# Patient Record
Sex: FEMALE | Race: WHITE | Employment: OTHER | URBAN - METROPOLITAN AREA
[De-identification: names, ages, dates, MRNs, and addresses within clinical notes are randomized per-mention and may not be internally consistent; named-entity substitution may affect disease eponyms.]

---

## 2017-12-27 PROBLEM — I48.91 A-FIB (HCC): Status: ACTIVE | Noted: 2017-12-27

## 2018-03-10 ENCOUNTER — HOSPITAL ENCOUNTER (EMERGENCY)
Dept: GENERAL RADIOLOGY | Age: 78
Discharge: HOME | End: 2018-03-12
Payer: MEDICARE

## 2018-03-10 ENCOUNTER — HOSPITAL ENCOUNTER (EMERGENCY)
Age: 78
Discharge: HOME | End: 2018-03-10
Attending: FAMILY MEDICINE
Payer: MEDICARE

## 2018-03-10 VITALS
SYSTOLIC BLOOD PRESSURE: 148 MMHG | TEMPERATURE: 98 F | RESPIRATION RATE: 18 BRPM | DIASTOLIC BLOOD PRESSURE: 82 MMHG | HEIGHT: 63 IN | BODY MASS INDEX: 33.66 KG/M2 | WEIGHT: 190 LBS | OXYGEN SATURATION: 99 % | HEART RATE: 100 BPM

## 2018-03-10 DIAGNOSIS — S93.401A MODERATE RIGHT ANKLE SPRAIN, INITIAL ENCOUNTER: Primary | ICD-10-CM

## 2018-03-10 PROCEDURE — 73610 X-RAY EXAM OF ANKLE: CPT

## 2018-03-10 PROCEDURE — 99283 EMERGENCY DEPT VISIT LOW MDM: CPT

## 2018-03-10 ASSESSMENT — PAIN SCALES - GENERAL
PAINLEVEL_OUTOF10: 10
PAINLEVEL_OUTOF10: 4

## 2018-03-10 ASSESSMENT — PAIN DESCRIPTION - ORIENTATION
ORIENTATION: RIGHT
ORIENTATION: RIGHT

## 2018-03-10 ASSESSMENT — PAIN DESCRIPTION - LOCATION
LOCATION: ANKLE;FOOT
LOCATION: ANKLE;FOOT

## 2018-03-10 ASSESSMENT — PAIN DESCRIPTION - PAIN TYPE
TYPE: ACUTE PAIN
TYPE: ACUTE PAIN

## 2018-03-10 ASSESSMENT — PAIN DESCRIPTION - DESCRIPTORS
DESCRIPTORS: ACHING
DESCRIPTORS: ACHING;THROBBING

## 2018-03-10 NOTE — ED PROVIDER NOTES
Department of Emergency Medicine   ED  Provider Note  Admit Date/RoomTime: 3/10/2018 12:22 PM  ED Room: 07/07   Chief Complaint:   Ankle Pain (right foot and ankle pain. No known injury.)    History of Present Illness   Source of history provided by:  patient and spouse/SO. History/Exam Limitations: none. Yessica Butler is a 68 y.o. old female presenting to the emergency department by private vehicle and ambulatory, for Right ankle pain which occured 1 day(s) prior to arrival.  Cause of complaint: unknown while at home. There has been a history of no prior problems with this area in the past.  Since onset the symptoms have been moderate in degree with ability to bear weight, but with some pain. Her pain is aggraveated by walking and relieved by rest.  Tetanus Status: up to date. ROS    Pertinent positives and negatives are stated within HPI, all other systems reviewed and are negative. Past Surgical History:   Procedure Laterality Date    APPENDECTOMY      BACK SURGERY      BREAST SURGERY      reduction    CARPAL TUNNEL RELEASE      CATARACT REMOVAL  10/2016    CERVICAL DISCECTOMY  01/19/2007    ACDF C3-4, C4-5, C5-6 Dr. Jalen Montoya          FRACTURE SURGERY      left lower leg    HYSTERECTOMY      LAPAROSCOPY  05/17/2017    [facility]   Social History:  reports that she quit smoking about 21 years ago. She quit after 15.00 years of use. She has never used smokeless tobacco. She reports that she does not drink alcohol or use drugs. Family History: family history includes Cancer in her brother; Diabetes in her brother, mother, and sister; Heart Disease in her brother; High Blood Pressure in her brother and sister; Stroke in her father. Allergies: Benadryl [diphenhydramine hcl]; Benadryl [diphenhydramine];  Fish-derived products; and Iodine    Physical Exam           ED Triage Vitals   BP Temp Temp Source Pulse Resp SpO2 Height Weight   03/10/18 1228 03/10/18 1228 03/10/18 1228 03/10/18 1228 -- 03/10/18 1228 03/10/18 1226 03/10/18 1226   (!) 160/82 98.4 °F (36.9 °C) Oral 100  97 % 5' 3\" (1.6 m) 190 lb (86.2 kg)       Oxygen Saturation Interpretation: Normal.    Constitutional:  Alert, development consistent with age. Neck:  Normal ROM. Supple. Ankle:  Right Lateral:              Tenderness:  moderate. Swelling: Moderate. Deformity: no.             ROM: diminished range with pain. Skin:  no erythema, rash or wounds noted. Neurovascular: Motor deficit: none. Sensory deficit:   none. Pulse deficit: none. Capillary refill: normal.  Knee:              Tenderness:  none. Swelling: None. Deformity: no.             ROM: full range of motion. Skin:  no erythema, rash or wounds noted. Foot:              Tenderness:  none. Swelling: None. Deformity: no.             ROM: full range of motion. Skin:  no erythema, rash or wounds noted. Gait:  normal.   Lymphatics: No lymphangitis or adenopathy noted. Neurological:  Oriented. Motor functions intact. Lab / Imaging Results   (All laboratory and radiology results have been personally reviewed by myself)  Labs:  No results found for this visit on 03/10/18. Imaging: All Radiology results interpreted by Radiologist unless otherwise noted. XR ANKLE RIGHT (MIN 3 VIEWS)   Final Result      1. Remote/old fracture is seen involving medial malleolus with   adjacent well-corticated fracture fragment. 2. No acute fracture or dislocation. ED Course / Medical Decision Making   Medications - No data to display  ED Course      Consults:   None    Procedure(s):   none    MDM:       Films were obtained based on moderate suspicion for bony injury as per history/physical findings .  Plan is subsequently for symptom

## 2018-03-10 NOTE — ED NOTES
Nurse instructed the pt on homecare of the right ankle sprain using the ED discharge summary. The pt was taught the acronym RICE and indicates that she understands the teaching. ACE wrap applied to the right foot/ankle with air cast in place. The pt can wiggle her toes comfortably and has no numbness.      Two Oolitic Yerington, RN  03/10/18 3368

## 2018-03-12 ENCOUNTER — CARE COORDINATION (OUTPATIENT)
Dept: CARE COORDINATION | Age: 78
End: 2018-03-12

## 2018-03-13 ENCOUNTER — TELEPHONE (OUTPATIENT)
Dept: CARDIOLOGY CLINIC | Age: 78
End: 2018-03-13

## 2018-03-16 NOTE — TELEPHONE ENCOUNTER
Per Dr. Sherita Whelan, there is a high risk of CVA with stopping or interrupting Xarelto. Preference is not to do this.

## 2018-04-05 ENCOUNTER — HOSPITAL ENCOUNTER (OUTPATIENT)
Age: 78
Discharge: HOME | End: 2018-04-05
Payer: MEDICARE

## 2018-04-05 LAB
ALBUMIN SERPL-MCNC: 4.3 G/DL (ref 3.5–5.2)
ALP BLD-CCNC: 60 U/L (ref 35–104)
ALT SERPL-CCNC: 20 U/L (ref 0–32)
ANION GAP SERPL CALCULATED.3IONS-SCNC: 15 MMOL/L (ref 7–16)
AST SERPL-CCNC: 27 U/L (ref 0–31)
BASOPHILS ABSOLUTE: 0.06 E9/L (ref 0–0.2)
BASOPHILS RELATIVE PERCENT: 0.5 % (ref 0–2)
BILIRUB SERPL-MCNC: 0.5 MG/DL (ref 0–1.2)
BUN BLDV-MCNC: 8 MG/DL (ref 8–23)
CALCIUM SERPL-MCNC: 9.9 MG/DL (ref 8.6–10.2)
CHLORIDE BLD-SCNC: 99 MMOL/L (ref 98–107)
CO2: 29 MMOL/L (ref 22–29)
CREAT SERPL-MCNC: 0.5 MG/DL (ref 0.5–1)
EOSINOPHILS ABSOLUTE: 0.31 E9/L (ref 0.05–0.5)
EOSINOPHILS RELATIVE PERCENT: 2.5 % (ref 0–6)
GFR AFRICAN AMERICAN: >60
GFR NON-AFRICAN AMERICAN: >60 ML/MIN/1.73
GLUCOSE BLD-MCNC: 106 MG/DL (ref 74–109)
HCT VFR BLD CALC: 38.8 % (ref 34–48)
HEMOGLOBIN: 12.1 G/DL (ref 11.5–15.5)
IMMATURE GRANULOCYTES #: 0.07 E9/L
IMMATURE GRANULOCYTES %: 0.6 % (ref 0–5)
INR BLD: 2.1
LYMPHOCYTES ABSOLUTE: 3.15 E9/L (ref 1.5–4)
LYMPHOCYTES RELATIVE PERCENT: 25.9 % (ref 20–42)
MCH RBC QN AUTO: 28.2 PG (ref 26–35)
MCHC RBC AUTO-ENTMCNC: 31.2 % (ref 32–34.5)
MCV RBC AUTO: 90.4 FL (ref 80–99.9)
MONOCYTES ABSOLUTE: 0.64 E9/L (ref 0.1–0.95)
MONOCYTES RELATIVE PERCENT: 5.3 % (ref 2–12)
NEUTROPHILS ABSOLUTE: 7.93 E9/L (ref 1.8–7.3)
NEUTROPHILS RELATIVE PERCENT: 65.2 % (ref 43–80)
PDW BLD-RTO: 14.6 FL (ref 11.5–15)
PLATELET # BLD: 268 E9/L (ref 130–450)
PMV BLD AUTO: 10.2 FL (ref 7–12)
POTASSIUM SERPL-SCNC: 3.7 MMOL/L (ref 3.5–5)
PROTHROMBIN TIME: 24.5 SEC (ref 9.3–12.4)
RBC # BLD: 4.29 E12/L (ref 3.5–5.5)
SODIUM BLD-SCNC: 143 MMOL/L (ref 132–146)
TOTAL PROTEIN: 7.8 G/DL (ref 6.4–8.3)
WBC # BLD: 12.2 E9/L (ref 4.5–11.5)

## 2018-04-05 PROCEDURE — 85025 COMPLETE CBC W/AUTO DIFF WBC: CPT

## 2018-04-05 PROCEDURE — 80053 COMPREHEN METABOLIC PANEL: CPT

## 2018-04-05 PROCEDURE — 85610 PROTHROMBIN TIME: CPT

## 2018-04-05 PROCEDURE — 82105 ALPHA-FETOPROTEIN SERUM: CPT

## 2018-04-05 PROCEDURE — 36415 COLL VENOUS BLD VENIPUNCTURE: CPT

## 2018-04-07 LAB — AFP-TUMOR MARKER: 2 NG/ML (ref 0–9)

## 2018-10-31 ENCOUNTER — HOSPITAL ENCOUNTER (EMERGENCY)
Age: 78
Discharge: HOME | End: 2018-10-31
Attending: EMERGENCY MEDICINE
Payer: MEDICARE

## 2018-10-31 VITALS
SYSTOLIC BLOOD PRESSURE: 112 MMHG | HEIGHT: 63 IN | TEMPERATURE: 98 F | RESPIRATION RATE: 16 BRPM | OXYGEN SATURATION: 94 % | HEART RATE: 98 BPM | WEIGHT: 186 LBS | DIASTOLIC BLOOD PRESSURE: 56 MMHG | BODY MASS INDEX: 32.96 KG/M2

## 2018-10-31 DIAGNOSIS — D64.89 ANEMIA DUE TO OTHER CAUSE, NOT CLASSIFIED: ICD-10-CM

## 2018-10-31 DIAGNOSIS — M79.89 LEG SWELLING: Primary | ICD-10-CM

## 2018-10-31 LAB
ALBUMIN SERPL-MCNC: 4.1 G/DL (ref 3.5–5.2)
ALP BLD-CCNC: 64 U/L (ref 35–104)
ALT SERPL-CCNC: 12 U/L (ref 0–32)
ANION GAP SERPL CALCULATED.3IONS-SCNC: 18 MMOL/L (ref 7–16)
AST SERPL-CCNC: 17 U/L (ref 0–31)
BILIRUB SERPL-MCNC: 0.7 MG/DL (ref 0–1.2)
BUN BLDV-MCNC: 10 MG/DL (ref 8–23)
CALCIUM SERPL-MCNC: 9.2 MG/DL (ref 8.6–10.2)
CHLORIDE BLD-SCNC: 97 MMOL/L (ref 98–107)
CO2: 23 MMOL/L (ref 22–29)
CREAT SERPL-MCNC: 1.1 MG/DL (ref 0.5–1)
D DIMER: 281 NG/ML DDU
GFR AFRICAN AMERICAN: 58
GFR NON-AFRICAN AMERICAN: 48 ML/MIN/1.73
GLUCOSE BLD-MCNC: 144 MG/DL (ref 74–109)
POTASSIUM SERPL-SCNC: 4 MMOL/L (ref 3.5–5)
SODIUM BLD-SCNC: 138 MMOL/L (ref 132–146)
TOTAL PROTEIN: 7.5 G/DL (ref 6.4–8.3)

## 2018-10-31 PROCEDURE — 85378 FIBRIN DEGRADE SEMIQUANT: CPT

## 2018-10-31 PROCEDURE — 80053 COMPREHEN METABOLIC PANEL: CPT

## 2018-10-31 PROCEDURE — 99283 EMERGENCY DEPT VISIT LOW MDM: CPT

## 2018-10-31 PROCEDURE — 36415 COLL VENOUS BLD VENIPUNCTURE: CPT

## 2018-10-31 PROCEDURE — 96372 THER/PROPH/DIAG INJ SC/IM: CPT

## 2018-10-31 PROCEDURE — 6360000002 HC RX W HCPCS: Performed by: EMERGENCY MEDICINE

## 2018-10-31 PROCEDURE — 85025 COMPLETE CBC W/AUTO DIFF WBC: CPT

## 2018-10-31 RX ORDER — KETOROLAC TROMETHAMINE 30 MG/ML
60 INJECTION, SOLUTION INTRAMUSCULAR; INTRAVENOUS ONCE
Status: COMPLETED | OUTPATIENT
Start: 2018-10-31 | End: 2018-10-31

## 2018-10-31 RX ADMIN — KETOROLAC TROMETHAMINE 60 MG: 30 INJECTION, SOLUTION INTRAMUSCULAR at 19:55

## 2018-10-31 ASSESSMENT — PAIN SCALES - GENERAL
PAINLEVEL_OUTOF10: 9
PAINLEVEL_OUTOF10: 9

## 2018-10-31 ASSESSMENT — PAIN DESCRIPTION - ORIENTATION: ORIENTATION: LEFT;RIGHT

## 2018-10-31 ASSESSMENT — PAIN DESCRIPTION - LOCATION: LOCATION: LEG

## 2018-10-31 ASSESSMENT — PAIN DESCRIPTION - PAIN TYPE: TYPE: ACUTE PAIN

## 2018-10-31 NOTE — ED PROVIDER NOTES
U/L    AST 17 0 - 31 U/L   D-Dimer, Quantitative   Result Value Ref Range    D-Dimer, Quant 281 ng/mL DDU       RADIOLOGY:  Interpreted by Radiologist.  No orders to display       ------------------------- NURSING NOTES AND VITALS REVIEWED ---------------------------   The nursing notes within the ED encounter and vital signs as below have been reviewed. BP (!) 112/56   Pulse 98   Temp 98 °F (36.7 °C) (Oral)   Resp 16   Ht 5' 3\" (1.6 m)   Wt 186 lb (84.4 kg)   LMP  (LMP Unknown)   SpO2 94%   BMI 32.95 kg/m²   Oxygen Saturation Interpretation: Abnormal    ---------------------------------------------------PHYSICAL EXAM--------------------------------------    Constitutional/General: Alert and oriented x3, well appearing, non toxic in NAD  Head: NC/AT  Eyes: PERRL, EOMI  Mouth: Oropharynx clear, handling secretions  Neck: Supple, full ROM, no tenderness   Pulmonary: Lungs clear to auscultation bilaterally, no wheezes, rales, or rhonchi. Not in respiratory distress  Cardiovascular:  Regular rate and regular rhythm, no murmurs, gallops, or rubs. 2+ distal pulses  Abdomen: Soft, non tender, non distended. No guarding or rebound. Extremities: Moves all extremities x 4. Warm and well perfused. LLE slightly swollen but is baseline due to recent tibia and fibula fracture 3 yrs ago. RLE exhibits mild edema. Skin: warm and dry without rash. Skin intact. Neurologic: No focal deficits. Cranial nerves II-XII grossly intact. 5/5 muscle strength to extremities x 4. Psych: Normal Affect      ------------------------------ ED COURSE/MEDICAL DECISION MAKING----------------------  Medications   ketorolac (TORADOL) injection 60 mg (60 mg Intramuscular Given 10/31/18 1955)       Medical Decision Making:     Pt with Hx of Arthritis, C-spine and L-spine stenosis, DM, HTN, HLD, neck and back pain, presents to the ED due to right lower extremity edema that began 3 days ago.  Pt has chronic edema and soreness in the left lower

## 2018-11-01 ENCOUNTER — HOSPITAL ENCOUNTER (OUTPATIENT)
Dept: ULTRASOUND IMAGING | Age: 78
Discharge: HOME | End: 2018-11-03
Payer: MEDICARE

## 2018-11-01 DIAGNOSIS — M79.89 PAIN AND SWELLING OF RIGHT LOWER LEG: ICD-10-CM

## 2018-11-01 DIAGNOSIS — M79.661 PAIN AND SWELLING OF RIGHT LOWER LEG: ICD-10-CM

## 2018-11-01 LAB
ANISOCYTOSIS: ABNORMAL
BASOPHILS ABSOLUTE: 0.05 E9/L (ref 0–0.2)
BASOPHILS RELATIVE PERCENT: 0.4 % (ref 0–2)
EOSINOPHILS ABSOLUTE: 0.45 E9/L (ref 0.05–0.5)
EOSINOPHILS RELATIVE PERCENT: 3.5 % (ref 0–6)
HCT VFR BLD CALC: 24.8 % (ref 34–48)
HEMOGLOBIN: 6.7 G/DL (ref 11.5–15.5)
HYPOCHROMIA: ABNORMAL
IMMATURE GRANULOCYTES #: 0.07 E9/L
IMMATURE GRANULOCYTES %: 0.5 % (ref 0–5)
LYMPHOCYTES ABSOLUTE: 2.39 E9/L (ref 1.5–4)
LYMPHOCYTES RELATIVE PERCENT: 18.6 % (ref 20–42)
MCH RBC QN AUTO: 19.5 PG (ref 26–35)
MCHC RBC AUTO-ENTMCNC: 27 % (ref 32–34.5)
MCV RBC AUTO: 72.3 FL (ref 80–99.9)
MONOCYTES ABSOLUTE: 0.62 E9/L (ref 0.1–0.95)
MONOCYTES RELATIVE PERCENT: 4.8 % (ref 2–12)
NEUTROPHILS ABSOLUTE: 9.27 E9/L (ref 1.8–7.3)
NEUTROPHILS RELATIVE PERCENT: 72.2 % (ref 43–80)
OVALOCYTES: ABNORMAL
PDW BLD-RTO: 18.3 FL (ref 11.5–15)
PLATELET # BLD: 364 E9/L (ref 130–450)
PMV BLD AUTO: 9 FL (ref 7–12)
POIKILOCYTES: ABNORMAL
POLYCHROMASIA: ABNORMAL
RBC # BLD: 3.43 E12/L (ref 3.5–5.5)
SCHISTOCYTES: ABNORMAL
WBC # BLD: 12.9 E9/L (ref 4.5–11.5)

## 2018-11-01 PROCEDURE — 93971 EXTREMITY STUDY: CPT

## 2018-11-01 NOTE — ED NOTES
Instructions provided and questions answered. US Rx provided for outpt ultrasound. 2+ pitting edema bilat lower extrems. Pt stated right worse than left.      Clara Faulkner RN  10/31/18 [id number]

## 2018-11-02 ENCOUNTER — HOSPITAL ENCOUNTER (INPATIENT)
Age: 78
LOS: 4 days | Discharge: HOME | DRG: 378 | End: 2018-11-06
Attending: INTERNAL MEDICINE | Admitting: INTERNAL MEDICINE
Payer: MEDICARE

## 2018-11-02 PROBLEM — D64.9 ANEMIA: Status: ACTIVE | Noted: 2018-11-02

## 2018-11-02 LAB
ABO/RH: NORMAL
ANTIBODY SCREEN: NORMAL
METER GLUCOSE: 113 MG/DL (ref 70–110)

## 2018-11-02 PROCEDURE — 86901 BLOOD TYPING SEROLOGIC RH(D): CPT

## 2018-11-02 PROCEDURE — 1200000000 HC SEMI PRIVATE

## 2018-11-02 PROCEDURE — 86850 RBC ANTIBODY SCREEN: CPT

## 2018-11-02 PROCEDURE — 86900 BLOOD TYPING SEROLOGIC ABO: CPT

## 2018-11-02 PROCEDURE — 86920 COMPATIBILITY TEST SPIN: CPT

## 2018-11-02 PROCEDURE — 82962 GLUCOSE BLOOD TEST: CPT

## 2018-11-02 PROCEDURE — 36415 COLL VENOUS BLD VENIPUNCTURE: CPT

## 2018-11-02 RX ORDER — SODIUM CHLORIDE 0.9 % (FLUSH) 0.9 %
10 SYRINGE (ML) INJECTION PRN
Status: DISCONTINUED | OUTPATIENT
Start: 2018-11-02 | End: 2018-11-06 | Stop reason: HOSPADM

## 2018-11-02 RX ORDER — 0.9 % SODIUM CHLORIDE 0.9 %
250 INTRAVENOUS SOLUTION INTRAVENOUS ONCE
Status: COMPLETED | OUTPATIENT
Start: 2018-11-02 | End: 2018-11-03

## 2018-11-02 RX ORDER — OXYBUTYNIN CHLORIDE 5 MG/1
5 TABLET ORAL 2 TIMES DAILY
Status: DISCONTINUED | OUTPATIENT
Start: 2018-11-02 | End: 2018-11-06 | Stop reason: HOSPADM

## 2018-11-02 RX ORDER — GABAPENTIN 300 MG/1
300 CAPSULE ORAL 3 TIMES DAILY
Status: DISCONTINUED | OUTPATIENT
Start: 2018-11-02 | End: 2018-11-06 | Stop reason: HOSPADM

## 2018-11-02 RX ORDER — PRAMIPEXOLE DIHYDROCHLORIDE 0.25 MG/1
0.25 TABLET ORAL NIGHTLY
Status: DISCONTINUED | OUTPATIENT
Start: 2018-11-02 | End: 2018-11-03

## 2018-11-02 RX ORDER — ONDANSETRON 2 MG/ML
4 INJECTION INTRAMUSCULAR; INTRAVENOUS EVERY 6 HOURS PRN
Status: DISCONTINUED | OUTPATIENT
Start: 2018-11-02 | End: 2018-11-06 | Stop reason: HOSPADM

## 2018-11-02 RX ORDER — SODIUM CHLORIDE 0.9 % (FLUSH) 0.9 %
10 SYRINGE (ML) INJECTION EVERY 12 HOURS SCHEDULED
Status: DISCONTINUED | OUTPATIENT
Start: 2018-11-02 | End: 2018-11-06 | Stop reason: HOSPADM

## 2018-11-02 RX ORDER — SIMVASTATIN 20 MG
20 TABLET ORAL NIGHTLY
Status: DISCONTINUED | OUTPATIENT
Start: 2018-11-02 | End: 2018-11-06 | Stop reason: HOSPADM

## 2018-11-02 RX ORDER — AMLODIPINE BESYLATE 10 MG/1
10 TABLET ORAL NIGHTLY
Status: DISCONTINUED | OUTPATIENT
Start: 2018-11-02 | End: 2018-11-06 | Stop reason: HOSPADM

## 2018-11-02 ASSESSMENT — PAIN SCALES - GENERAL: PAINLEVEL_OUTOF10: 0

## 2018-11-03 ENCOUNTER — ANESTHESIA EVENT (OUTPATIENT)
Dept: ENDOSCOPY | Age: 78
DRG: 378 | End: 2018-11-03
Payer: MEDICARE

## 2018-11-03 ENCOUNTER — ANESTHESIA (OUTPATIENT)
Dept: ENDOSCOPY | Age: 78
DRG: 378 | End: 2018-11-03
Payer: MEDICARE

## 2018-11-03 LAB
ALBUMIN SERPL-MCNC: 4.5 G/DL (ref 3.5–5.2)
ALP BLD-CCNC: 64 U/L (ref 35–104)
ALT SERPL-CCNC: 13 U/L (ref 0–32)
ANISOCYTOSIS: ABNORMAL
AST SERPL-CCNC: 15 U/L (ref 0–31)
BASOPHILS ABSOLUTE: 0.05 E9/L (ref 0–0.2)
BASOPHILS RELATIVE PERCENT: 0.4 % (ref 0–2)
BILIRUB SERPL-MCNC: 1.7 MG/DL (ref 0–1.2)
BILIRUBIN DIRECT: 0.4 MG/DL (ref 0–0.3)
BILIRUBIN, INDIRECT: 1.3 MG/DL (ref 0–1)
BLOOD BANK DISPENSE STATUS: NORMAL
BLOOD BANK PRODUCT CODE: NORMAL
BPU ID: NORMAL
DESCRIPTION BLOOD BANK: NORMAL
EOSINOPHILS ABSOLUTE: 0.15 E9/L (ref 0.05–0.5)
EOSINOPHILS RELATIVE PERCENT: 1.2 % (ref 0–6)
HCT VFR BLD CALC: 29 % (ref 34–48)
HCT VFR BLD CALC: 31.1 % (ref 34–48)
HEMOGLOBIN: 8.8 G/DL (ref 11.5–15.5)
HEMOGLOBIN: 8.9 G/DL (ref 11.5–15.5)
HYPOCHROMIA: ABNORMAL
IMMATURE GRANULOCYTES #: 0.07 E9/L
IMMATURE GRANULOCYTES %: 0.6 % (ref 0–5)
INR BLD: 1.4
LIPASE: 16 U/L (ref 13–60)
LYMPHOCYTES ABSOLUTE: 1.42 E9/L (ref 1.5–4)
LYMPHOCYTES RELATIVE PERCENT: 11.6 % (ref 20–42)
MCH RBC QN AUTO: 21.2 PG (ref 26–35)
MCHC RBC AUTO-ENTMCNC: 28.6 % (ref 32–34.5)
MCV RBC AUTO: 74.2 FL (ref 80–99.9)
METER GLUCOSE: 103 MG/DL (ref 70–110)
METER GLUCOSE: 113 MG/DL (ref 70–110)
METER GLUCOSE: 122 MG/DL (ref 70–110)
METER GLUCOSE: 161 MG/DL (ref 70–110)
METER GLUCOSE: 169 MG/DL (ref 70–110)
MONOCYTES ABSOLUTE: 0.57 E9/L (ref 0.1–0.95)
MONOCYTES RELATIVE PERCENT: 4.6 % (ref 2–12)
NEUTROPHILS ABSOLUTE: 10.02 E9/L (ref 1.8–7.3)
NEUTROPHILS RELATIVE PERCENT: 81.6 % (ref 43–80)
OVALOCYTES: ABNORMAL
PDW BLD-RTO: 19.7 FL (ref 11.5–15)
PLATELET # BLD: 375 E9/L (ref 130–450)
PMV BLD AUTO: 9.7 FL (ref 7–12)
POIKILOCYTES: ABNORMAL
POLYCHROMASIA: ABNORMAL
PROTHROMBIN TIME: 15.8 SEC (ref 9.3–12.4)
RBC # BLD: 4.19 E12/L (ref 3.5–5.5)
TOTAL PROTEIN: 7.9 G/DL (ref 6.4–8.3)
WBC # BLD: 12.3 E9/L (ref 4.5–11.5)

## 2018-11-03 PROCEDURE — 6360000002 HC RX W HCPCS: Performed by: NURSE PRACTITIONER

## 2018-11-03 PROCEDURE — 36415 COLL VENOUS BLD VENIPUNCTURE: CPT

## 2018-11-03 PROCEDURE — 6370000000 HC RX 637 (ALT 250 FOR IP): Performed by: INTERNAL MEDICINE

## 2018-11-03 PROCEDURE — 1200000000 HC SEMI PRIVATE

## 2018-11-03 PROCEDURE — 83690 ASSAY OF LIPASE: CPT

## 2018-11-03 PROCEDURE — 6360000002 HC RX W HCPCS: Performed by: INTERNAL MEDICINE

## 2018-11-03 PROCEDURE — 80076 HEPATIC FUNCTION PANEL: CPT

## 2018-11-03 PROCEDURE — 85018 HEMOGLOBIN: CPT

## 2018-11-03 PROCEDURE — P9016 RBC LEUKOCYTES REDUCED: HCPCS

## 2018-11-03 PROCEDURE — 82962 GLUCOSE BLOOD TEST: CPT

## 2018-11-03 PROCEDURE — 85025 COMPLETE CBC W/AUTO DIFF WBC: CPT

## 2018-11-03 PROCEDURE — 2580000003 HC RX 258: Performed by: INTERNAL MEDICINE

## 2018-11-03 PROCEDURE — C9113 INJ PANTOPRAZOLE SODIUM, VIA: HCPCS | Performed by: NURSE PRACTITIONER

## 2018-11-03 PROCEDURE — 85610 PROTHROMBIN TIME: CPT

## 2018-11-03 PROCEDURE — 85014 HEMATOCRIT: CPT

## 2018-11-03 PROCEDURE — 36430 TRANSFUSION BLD/BLD COMPNT: CPT

## 2018-11-03 PROCEDURE — 2580000003 HC RX 258: Performed by: NURSE PRACTITIONER

## 2018-11-03 RX ORDER — DEXTROSE MONOHYDRATE 25 G/50ML
12.5 INJECTION, SOLUTION INTRAVENOUS PRN
Status: DISCONTINUED | OUTPATIENT
Start: 2018-11-03 | End: 2018-11-06 | Stop reason: HOSPADM

## 2018-11-03 RX ORDER — NICOTINE POLACRILEX 4 MG
15 LOZENGE BUCCAL PRN
Status: DISCONTINUED | OUTPATIENT
Start: 2018-11-03 | End: 2018-11-06 | Stop reason: HOSPADM

## 2018-11-03 RX ORDER — PRAMIPEXOLE DIHYDROCHLORIDE 0.25 MG/1
0.75 TABLET ORAL ONCE
Status: COMPLETED | OUTPATIENT
Start: 2018-11-04 | End: 2018-11-04

## 2018-11-03 RX ORDER — DEXTROSE MONOHYDRATE 50 MG/ML
100 INJECTION, SOLUTION INTRAVENOUS PRN
Status: DISCONTINUED | OUTPATIENT
Start: 2018-11-03 | End: 2018-11-06 | Stop reason: HOSPADM

## 2018-11-03 RX ORDER — LORAZEPAM 2 MG/ML
0.5 INJECTION INTRAMUSCULAR ONCE
Status: COMPLETED | OUTPATIENT
Start: 2018-11-03 | End: 2018-11-03

## 2018-11-03 RX ORDER — PRAMIPEXOLE DIHYDROCHLORIDE 1 MG/1
1 TABLET ORAL NIGHTLY
Status: DISCONTINUED | OUTPATIENT
Start: 2018-11-04 | End: 2018-11-06 | Stop reason: HOSPADM

## 2018-11-03 RX ORDER — 0.9 % SODIUM CHLORIDE 0.9 %
250 INTRAVENOUS SOLUTION INTRAVENOUS ONCE
Status: COMPLETED | OUTPATIENT
Start: 2018-11-03 | End: 2018-11-03

## 2018-11-03 RX ORDER — TRAMADOL HYDROCHLORIDE 50 MG/1
50 TABLET ORAL EVERY 6 HOURS PRN
Status: DISCONTINUED | OUTPATIENT
Start: 2018-11-03 | End: 2018-11-06 | Stop reason: HOSPADM

## 2018-11-03 RX ADMIN — PRAMIPEXOLE DIHYDROCHLORIDE 0.25 MG: 0.25 TABLET ORAL at 20:40

## 2018-11-03 RX ADMIN — SODIUM CHLORIDE 8 MG/HR: 9 INJECTION, SOLUTION INTRAVENOUS at 12:59

## 2018-11-03 RX ADMIN — SODIUM CHLORIDE 250 ML: 9 INJECTION, SOLUTION INTRAVENOUS at 04:45

## 2018-11-03 RX ADMIN — INSULIN LISPRO 1 UNITS: 100 INJECTION, SOLUTION INTRAVENOUS; SUBCUTANEOUS at 20:58

## 2018-11-03 RX ADMIN — OCTREOTIDE ACETATE 50 MCG/HR: 500 INJECTION, SOLUTION INTRAVENOUS; SUBCUTANEOUS at 21:08

## 2018-11-03 RX ADMIN — SODIUM CHLORIDE 250 ML: 9 INJECTION, SOLUTION INTRAVENOUS at 00:39

## 2018-11-03 RX ADMIN — GABAPENTIN 300 MG: 300 CAPSULE ORAL at 13:44

## 2018-11-03 RX ADMIN — OXYBUTYNIN CHLORIDE 5 MG: 5 TABLET ORAL at 20:40

## 2018-11-03 RX ADMIN — AMLODIPINE BESYLATE 10 MG: 10 TABLET ORAL at 20:40

## 2018-11-03 RX ADMIN — LORAZEPAM 0.5 MG: 2 INJECTION INTRAMUSCULAR; INTRAVENOUS at 08:49

## 2018-11-03 RX ADMIN — METOPROLOL TARTRATE 25 MG: 25 TABLET ORAL at 20:40

## 2018-11-03 RX ADMIN — SODIUM CHLORIDE 8 MG/HR: 9 INJECTION, SOLUTION INTRAVENOUS at 21:08

## 2018-11-03 RX ADMIN — OCTREOTIDE ACETATE 50 MCG/HR: 500 INJECTION, SOLUTION INTRAVENOUS; SUBCUTANEOUS at 11:30

## 2018-11-03 RX ADMIN — SIMVASTATIN 20 MG: 20 TABLET, FILM COATED ORAL at 20:40

## 2018-11-03 RX ADMIN — TRAMADOL HYDROCHLORIDE 50 MG: 50 TABLET, FILM COATED ORAL at 13:43

## 2018-11-03 RX ADMIN — INSULIN LISPRO 1 UNITS: 100 INJECTION, SOLUTION INTRAVENOUS; SUBCUTANEOUS at 18:14

## 2018-11-03 RX ADMIN — GABAPENTIN 300 MG: 300 CAPSULE ORAL at 20:40

## 2018-11-03 RX ADMIN — TRAMADOL HYDROCHLORIDE 50 MG: 50 TABLET, FILM COATED ORAL at 20:40

## 2018-11-03 RX ADMIN — Medication 10 ML: at 08:57

## 2018-11-03 ASSESSMENT — PAIN SCALES - GENERAL
PAINLEVEL_OUTOF10: 10
PAINLEVEL_OUTOF10: 10
PAINLEVEL_OUTOF10: 0
PAINLEVEL_OUTOF10: 10
PAINLEVEL_OUTOF10: 10

## 2018-11-03 ASSESSMENT — PAIN DESCRIPTION - FREQUENCY
FREQUENCY: CONTINUOUS

## 2018-11-03 ASSESSMENT — PAIN DESCRIPTION - ORIENTATION
ORIENTATION: RIGHT;LEFT

## 2018-11-03 ASSESSMENT — PAIN DESCRIPTION - LOCATION
LOCATION: FOOT;LEG
LOCATION: FOOT;LEG
LOCATION: LEG;FOOT

## 2018-11-03 ASSESSMENT — PAIN DESCRIPTION - DESCRIPTORS
DESCRIPTORS: DISCOMFORT;NUMBNESS;TINGLING
DESCRIPTORS: ACHING;BURNING;CONSTANT
DESCRIPTORS: ACHING;BURNING;DISCOMFORT

## 2018-11-03 ASSESSMENT — PAIN DESCRIPTION - PAIN TYPE
TYPE: CHRONIC PAIN

## 2018-11-03 NOTE — H&P
twice daily Non insulin dependent e11.9 1 kit 0    Coenzyme Q10 (CO Q 10) 100 MG CAPS Take 200 mg by mouth daily (with breakfast).  Calcium Carbonate-Vitamin D (CALCIUM + D) 600-200 MG-UNIT TABS Take 600 mg by mouth daily.  BIOTIN 5000 PO Take 5,000 mcg by mouth daily.  Oil of Oregano 1500 MG CAPS Take 1,500 mg by mouth. 1 at breakfast and 1 at supper       Past Medical History:   Diagnosis Date    Arm numbness     Arthritis     Blood transfusion     Cervical spinal stenosis     DM (diabetes mellitus) ([facility] 10/14/2012    GERD (gastroesophageal reflux disease)     HTN, goal below 130/80 10/14/2012    Hyperlipidemia LDL goal < 100 10/14/2012    Liver disease     fatty liver    Low back pain     Lumbar stenosis     Neck pain     Numbness and tingling     hand    Obesity (BMI 30.0-34.9) 10/14/2012    Renal artery aneurysm ([facility] 7/15/2015    Type II or unspecified type diabetes mellitus without mention of complication, not stated as uncontrolled     Wrist pain     radiates into shoulder & arm & rates severity of pain 10/10 on pain scale as of 11/14/13       ROS  Patient positive for  Indigestion   Patient denies any fever, chills, night sweats, weight changes   Denies headache, visual changes,   Denies dysphagia, odynophagia dysphonia,   Denies SOB, cough, sputum production,   Denies chest pain, pressure, orthopnea, palpitations,   Denies abd pain, N/V/D/C/melena, hematochezia,   Denies urinary frequency, urgency, dysuria, hematuria,   Denies any acute muscle aches, paresthesias, weakness, seizure, syncopal episodes, Denies depression, anxiety.   OBJECTIVE  Vitals:    11/03/18 0535   BP: 120/65   Pulse: 106   Resp: 18   Temp: 98.5 °F (36.9 °C)   SpO2: 93%     Gen: AO3, NAD  Head: AT/NC, PERRL, EOMIx2, no icterus, conjunctival injection  Neck: No JVD, carotid bruits, LAD, thyroid non-palpable  Heart: RRR with no murmurs, rubs, gallops  Lungs: CTA B/L, no W/R/R  Abd: soft, NT, ND, BS+, no G/R, no HSM  Ext: No C/C/E, pulses intact distally B/L  Neuro: CN 2-12 grossly intact with no focal deficits    ASSESSMENT  Anemia  GERD  HTN  DM2  PLAN    GI consult   Transfuse

## 2018-11-04 LAB
ALBUMIN SERPL-MCNC: 3.9 G/DL (ref 3.5–5.2)
ALP BLD-CCNC: 58 U/L (ref 35–104)
ALT SERPL-CCNC: 13 U/L (ref 0–32)
AST SERPL-CCNC: 19 U/L (ref 0–31)
BILIRUB SERPL-MCNC: 1.6 MG/DL (ref 0–1.2)
BILIRUBIN DIRECT: 0.4 MG/DL (ref 0–0.3)
BILIRUBIN, INDIRECT: 1.2 MG/DL (ref 0–1)
HCT VFR BLD CALC: 28.3 % (ref 34–48)
HCT VFR BLD CALC: 28.4 % (ref 34–48)
HCT VFR BLD CALC: 30.7 % (ref 34–48)
HEMOGLOBIN: 8.1 G/DL (ref 11.5–15.5)
HEMOGLOBIN: 8.4 G/DL (ref 11.5–15.5)
HEMOGLOBIN: 9 G/DL (ref 11.5–15.5)
METER GLUCOSE: 119 MG/DL (ref 70–110)
METER GLUCOSE: 150 MG/DL (ref 70–110)
METER GLUCOSE: 154 MG/DL (ref 70–110)
METER GLUCOSE: 203 MG/DL (ref 70–110)
TOTAL PROTEIN: 7.2 G/DL (ref 6.4–8.3)

## 2018-11-04 PROCEDURE — C9113 INJ PANTOPRAZOLE SODIUM, VIA: HCPCS | Performed by: NURSE PRACTITIONER

## 2018-11-04 PROCEDURE — 36415 COLL VENOUS BLD VENIPUNCTURE: CPT

## 2018-11-04 PROCEDURE — 6370000000 HC RX 637 (ALT 250 FOR IP): Performed by: INTERNAL MEDICINE

## 2018-11-04 PROCEDURE — 6360000002 HC RX W HCPCS: Performed by: NURSE PRACTITIONER

## 2018-11-04 PROCEDURE — 2580000003 HC RX 258: Performed by: NURSE PRACTITIONER

## 2018-11-04 PROCEDURE — 85014 HEMATOCRIT: CPT

## 2018-11-04 PROCEDURE — 80076 HEPATIC FUNCTION PANEL: CPT

## 2018-11-04 PROCEDURE — 1200000000 HC SEMI PRIVATE

## 2018-11-04 PROCEDURE — 85018 HEMOGLOBIN: CPT

## 2018-11-04 PROCEDURE — 82962 GLUCOSE BLOOD TEST: CPT

## 2018-11-04 PROCEDURE — 2580000003 HC RX 258: Performed by: INTERNAL MEDICINE

## 2018-11-04 RX ADMIN — OCTREOTIDE ACETATE 50 MCG/HR: 500 INJECTION, SOLUTION INTRAVENOUS; SUBCUTANEOUS at 15:55

## 2018-11-04 RX ADMIN — GABAPENTIN 300 MG: 300 CAPSULE ORAL at 08:54

## 2018-11-04 RX ADMIN — GABAPENTIN 300 MG: 300 CAPSULE ORAL at 13:49

## 2018-11-04 RX ADMIN — OCTREOTIDE ACETATE 50 MCG/HR: 500 INJECTION, SOLUTION INTRAVENOUS; SUBCUTANEOUS at 04:57

## 2018-11-04 RX ADMIN — GABAPENTIN 300 MG: 300 CAPSULE ORAL at 20:13

## 2018-11-04 RX ADMIN — SODIUM CHLORIDE 8 MG/HR: 9 INJECTION, SOLUTION INTRAVENOUS at 15:55

## 2018-11-04 RX ADMIN — TRAMADOL HYDROCHLORIDE 50 MG: 50 TABLET, FILM COATED ORAL at 20:13

## 2018-11-04 RX ADMIN — INSULIN LISPRO 1 UNITS: 100 INJECTION, SOLUTION INTRAVENOUS; SUBCUTANEOUS at 18:03

## 2018-11-04 RX ADMIN — PRAMIPEXOLE DIHYDROCHLORIDE 0.75 MG: 0.25 TABLET ORAL at 00:23

## 2018-11-04 RX ADMIN — PRAMIPEXOLE DIHYDROCHLORIDE 1 MG: 1 TABLET ORAL at 20:13

## 2018-11-04 RX ADMIN — OXYBUTYNIN CHLORIDE 5 MG: 5 TABLET ORAL at 20:13

## 2018-11-04 RX ADMIN — METOPROLOL TARTRATE 25 MG: 25 TABLET ORAL at 08:55

## 2018-11-04 RX ADMIN — SODIUM CHLORIDE 8 MG/HR: 9 INJECTION, SOLUTION INTRAVENOUS at 05:00

## 2018-11-04 RX ADMIN — INSULIN LISPRO 1 UNITS: 100 INJECTION, SOLUTION INTRAVENOUS; SUBCUTANEOUS at 08:53

## 2018-11-04 RX ADMIN — Medication 10 ML: at 22:12

## 2018-11-04 RX ADMIN — INSULIN LISPRO 2 UNITS: 100 INJECTION, SOLUTION INTRAVENOUS; SUBCUTANEOUS at 13:23

## 2018-11-04 RX ADMIN — AMLODIPINE BESYLATE 10 MG: 10 TABLET ORAL at 20:13

## 2018-11-04 RX ADMIN — SIMVASTATIN 20 MG: 20 TABLET, FILM COATED ORAL at 20:13

## 2018-11-04 RX ADMIN — METOPROLOL TARTRATE 25 MG: 25 TABLET ORAL at 20:13

## 2018-11-04 RX ADMIN — OXYBUTYNIN CHLORIDE 5 MG: 5 TABLET ORAL at 08:54

## 2018-11-04 ASSESSMENT — PAIN DESCRIPTION - DESCRIPTORS: DESCRIPTORS: ACHING;BURNING

## 2018-11-04 ASSESSMENT — PAIN DESCRIPTION - FREQUENCY: FREQUENCY: CONTINUOUS

## 2018-11-04 ASSESSMENT — PAIN DESCRIPTION - ORIENTATION: ORIENTATION: RIGHT;LEFT

## 2018-11-04 ASSESSMENT — PAIN DESCRIPTION - PAIN TYPE: TYPE: CHRONIC PAIN

## 2018-11-04 ASSESSMENT — PAIN DESCRIPTION - LOCATION: LOCATION: LEG;FOOT

## 2018-11-04 ASSESSMENT — PAIN SCALES - GENERAL
PAINLEVEL_OUTOF10: 6
PAINLEVEL_OUTOF10: 6
PAINLEVEL_OUTOF10: 0

## 2018-11-04 NOTE — PROGRESS NOTES
PROGRESS NOTE    Patient Presents with/Seen in Consultation For      *anemia  CHIEF COMPLAINT:  \"Low blood count\"    Subjective:     Patient states she feels ok today just fatigued. Pt denies abd pain, n/v, or diarrhea. States BMs are brown in color. EGD for tomorrow d/w pt with all her questions answered. Pt states she ate all her meal today. Review of Systems  Aside from what was mentioned in the PMH and HPI, essentially unremarkable, all others negative. Objective:     BP (!) 147/68   Pulse 90   Temp 98.3 °F (36.8 °C) (Oral)   Resp 18   Ht 5' 3\" (1.6 m)   Wt 196 lb 3.2 oz (89 kg)   LMP  (LMP Unknown)   SpO2 93%   BMI 34.76 kg/m²     General appearance: alert, awake, pale, sitting on edge of bed, and cooperative  Eyes: conjunctiva pale, sclera anicteric. PERRL.   Lungs: clear to auscultation bilaterally  Heart: regular rate and rhythm, no murmur, 2+ pulses; 1+ BLE edema  Abdomen: soft, non-tender; bowel sounds normal; no masses,  no organomegaly  Extremities: extremities with 1+ BLE edema  Pulses: 2+ and symmetric  Skin: Skin color pale, texture, turgor normal.   Neurologic: Grossly normal      insulin lispro (HUMALOG) injection vial 0-6 Units TID WC   insulin lispro (HUMALOG) injection vial 0-3 Units Nightly   glucose (GLUTOSE) 40 % oral gel 15 g PRN   dextrose 50 % solution 12.5 g PRN   glucagon (rDNA) injection 1 mg PRN   dextrose 5 % solution PRN   pantoprazole (PROTONIX) 80 mg in sodium chloride 0.9 % 100 mL infusion Continuous   octreotide (SANDOSTATIN) 500 mcg in sodium chloride 0.9 % 100 mL infusion Continuous   traMADol (ULTRAM) tablet 50 mg Q6H PRN   pramipexole (MIRAPEX) tablet 1 mg Nightly   amLODIPine (NORVASC) tablet 10 mg Nightly   gabapentin (NEURONTIN) capsule 300 mg TID   metoprolol tartrate (LOPRESSOR) tablet 25 mg BID   oxybutynin (DITROPAN) tablet 5 mg BID   simvastatin (ZOCOR) tablet 20 mg Nightly   sodium chloride flush 0.9 % injection 10 mL 2 times per day   sodium chloride flush 0.9 % injection 10 mL PRN   ondansetron (ZOFRAN) injection 4 mg Q6H PRN        Data Review  CBC: Lab Results   Component Value Date    WBC 12.3 11/03/2018    RBC 4.19 11/03/2018    HGB 8.4 11/04/2018    HCT 28.3 11/04/2018    MCV 74.2 11/03/2018    MCH 21.2 11/03/2018    MCHC 28.6 11/03/2018    RDW 19.7 11/03/2018     11/03/2018    MPV 9.7 11/03/2018     CMP:  Lab Results   Component Value Date     10/31/2018    K 4.0 10/31/2018    CL 97 10/31/2018    CO2 23 10/31/2018    BUN 10 10/31/2018    CREATININE 1.1 10/31/2018    GFRAA 58 10/31/2018    LABGLOM 48 10/31/2018    GLUCOSE 144 10/31/2018    PROT 7.2 11/04/2018    LABALBU 3.9 11/04/2018    CALCIUM 9.2 10/31/2018    BILITOT 1.6 11/04/2018    ALKPHOS 58 11/04/2018    AST 19 11/04/2018    ALT 13 11/04/2018     Hepatic Function Panel:  Lab Results   Component Value Date    ALKPHOS 58 11/04/2018    ALT 13 11/04/2018    AST 19 11/04/2018    PROT 7.2 11/04/2018    BILITOT 1.6 11/04/2018    BILIDIR 0.4 11/04/2018    IBILI 1.2 11/04/2018    LABALBU 3.9 11/04/2018     No components found for: CHLPL  Lab Results   Component Value Date    TRIG 162 (H) 10/15/2012     Lab Results   Component Value Date    HDL 55 10/31/2017    HDL 49.0 10/15/2012     Lab Results   Component Value Date    LDLCALC 50 10/31/2017    LDLCALC 74 10/15/2012     Lab Results   Component Value Date    LABVLDL 33 10/31/2017      PT/INR:    Lab Results   Component Value Date    PROTIME 15.8 11/03/2018    INR 1.4 11/03/2018       Assessment:     Active Problems:  · Anemia, microcytic, hypochromic  · Indigestion   · 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  Random colon bx done to r/o microscopic colitis. Gilles Regalado, ascending, biopsy: Adenomatous polyp (tubular adenoma)  (multiple fragments of). Rosanne Howard, random, biopsies (multiple): No significant pathologic change. No evidence of collagenous, microscopic/lymphocytic, or  acute/self-limited colitis is identified in specimen. Annamarie Plasencia active or acute colitis is seen. Megan Garcia, descending, biopsy: Adenomatous polyp (tubular adenoma).        Plan:     · Continue Octreotide and Protonix gtts as ordered  · EGD tomorrow with Dr. Lisseth Waters. All additional questions answered.    · Clear liquid diet  · NPO after midnight for EGD  · Serial H&H, transfuse per PCP < 7  · Monitor CMP, CBC, INR daily  · Continue to medicate for pain, nausea as ordered  · Defer other comorbidities to others  · Continue to follow    Discussed with Dr. Armando Lazaro per Dr. Juventino Elizondo [id number], FNP-BC 11/4/2018 10:52 AM For Dr. Lisseth Waters

## 2018-11-05 VITALS
SYSTOLIC BLOOD PRESSURE: 126 MMHG | OXYGEN SATURATION: 95 % | DIASTOLIC BLOOD PRESSURE: 58 MMHG | RESPIRATION RATE: 22 BRPM

## 2018-11-05 LAB
ALBUMIN SERPL-MCNC: 3.9 G/DL (ref 3.5–5.2)
ALP BLD-CCNC: 57 U/L (ref 35–104)
ALT SERPL-CCNC: 14 U/L (ref 0–32)
AST SERPL-CCNC: 21 U/L (ref 0–31)
BILIRUB SERPL-MCNC: 1.1 MG/DL (ref 0–1.2)
BILIRUBIN DIRECT: 0.3 MG/DL (ref 0–0.3)
BILIRUBIN, INDIRECT: 0.8 MG/DL (ref 0–1)
HCT VFR BLD CALC: 28.5 % (ref 34–48)
HCT VFR BLD CALC: 30.9 % (ref 34–48)
HEMOGLOBIN: 8.3 G/DL (ref 11.5–15.5)
HEMOGLOBIN: 9 G/DL (ref 11.5–15.5)
INR BLD: 1.3
METER GLUCOSE: 144 MG/DL (ref 74–99)
METER GLUCOSE: 146 MG/DL (ref 74–99)
METER GLUCOSE: 149 MG/DL (ref 74–99)
METER GLUCOSE: 160 MG/DL (ref 74–99)
PROTHROMBIN TIME: 15.5 SEC (ref 9.3–12.4)
TOTAL PROTEIN: 7 G/DL (ref 6.4–8.3)

## 2018-11-05 PROCEDURE — 6360000002 HC RX W HCPCS: Performed by: NURSE PRACTITIONER

## 2018-11-05 PROCEDURE — C9113 INJ PANTOPRAZOLE SODIUM, VIA: HCPCS | Performed by: NURSE PRACTITIONER

## 2018-11-05 PROCEDURE — 2580000003 HC RX 258: Performed by: NURSE ANESTHETIST, CERTIFIED REGISTERED

## 2018-11-05 PROCEDURE — 85610 PROTHROMBIN TIME: CPT

## 2018-11-05 PROCEDURE — 97165 OT EVAL LOW COMPLEX 30 MIN: CPT

## 2018-11-05 PROCEDURE — 99223 1ST HOSP IP/OBS HIGH 75: CPT | Performed by: INTERNAL MEDICINE

## 2018-11-05 PROCEDURE — 0DB98ZX EXCISION OF DUODENUM, VIA NATURAL OR ARTIFICIAL OPENING ENDOSCOPIC, DIAGNOSTIC: ICD-10-PCS | Performed by: INTERNAL MEDICINE

## 2018-11-05 PROCEDURE — 2500000003 HC RX 250 WO HCPCS: Performed by: NURSE ANESTHETIST, CERTIFIED REGISTERED

## 2018-11-05 PROCEDURE — G8988 SELF CARE GOAL STATUS: HCPCS

## 2018-11-05 PROCEDURE — 2709999900 HC NON-CHARGEABLE SUPPLY: Performed by: INTERNAL MEDICINE

## 2018-11-05 PROCEDURE — 97161 PT EVAL LOW COMPLEX 20 MIN: CPT

## 2018-11-05 PROCEDURE — APPSS60 APP SPLIT SHARED TIME 46-60 MINUTES: Performed by: NURSE PRACTITIONER

## 2018-11-05 PROCEDURE — 2580000003 HC RX 258: Performed by: NURSE PRACTITIONER

## 2018-11-05 PROCEDURE — 87081 CULTURE SCREEN ONLY: CPT

## 2018-11-05 PROCEDURE — 2700000000 HC OXYGEN THERAPY PER DAY

## 2018-11-05 PROCEDURE — 6370000000 HC RX 637 (ALT 250 FOR IP): Performed by: INTERNAL MEDICINE

## 2018-11-05 PROCEDURE — 82962 GLUCOSE BLOOD TEST: CPT

## 2018-11-05 PROCEDURE — 97530 THERAPEUTIC ACTIVITIES: CPT

## 2018-11-05 PROCEDURE — 7100000010 HC PHASE II RECOVERY - FIRST 15 MIN: Performed by: INTERNAL MEDICINE

## 2018-11-05 PROCEDURE — 85018 HEMOGLOBIN: CPT

## 2018-11-05 PROCEDURE — 1200000000 HC SEMI PRIVATE

## 2018-11-05 PROCEDURE — 80076 HEPATIC FUNCTION PANEL: CPT

## 2018-11-05 PROCEDURE — 36415 COLL VENOUS BLD VENIPUNCTURE: CPT

## 2018-11-05 PROCEDURE — 2580000003 HC RX 258: Performed by: INTERNAL MEDICINE

## 2018-11-05 PROCEDURE — 0DB68ZX EXCISION OF STOMACH, VIA NATURAL OR ARTIFICIAL OPENING ENDOSCOPIC, DIAGNOSTIC: ICD-10-PCS | Performed by: INTERNAL MEDICINE

## 2018-11-05 PROCEDURE — 3609012400 HC EGD TRANSORAL BIOPSY SINGLE/MULTIPLE: Performed by: INTERNAL MEDICINE

## 2018-11-05 PROCEDURE — 3700000000 HC ANESTHESIA ATTENDED CARE: Performed by: INTERNAL MEDICINE

## 2018-11-05 PROCEDURE — 3700000001 HC ADD 15 MINUTES (ANESTHESIA): Performed by: INTERNAL MEDICINE

## 2018-11-05 PROCEDURE — 7100000011 HC PHASE II RECOVERY - ADDTL 15 MIN: Performed by: INTERNAL MEDICINE

## 2018-11-05 PROCEDURE — 6360000002 HC RX W HCPCS: Performed by: NURSE ANESTHETIST, CERTIFIED REGISTERED

## 2018-11-05 PROCEDURE — G8979 MOBILITY GOAL STATUS: HCPCS

## 2018-11-05 PROCEDURE — G8987 SELF CARE CURRENT STATUS: HCPCS

## 2018-11-05 PROCEDURE — G8978 MOBILITY CURRENT STATUS: HCPCS

## 2018-11-05 PROCEDURE — 85014 HEMATOCRIT: CPT

## 2018-11-05 RX ORDER — LIDOCAINE HYDROCHLORIDE 10 MG/ML
INJECTION, SOLUTION EPIDURAL; INFILTRATION; INTRACAUDAL; PERINEURAL PRN
Status: DISCONTINUED | OUTPATIENT
Start: 2018-11-05 | End: 2018-11-05 | Stop reason: SDUPTHER

## 2018-11-05 RX ORDER — PROPOFOL 10 MG/ML
INJECTION, EMULSION INTRAVENOUS PRN
Status: DISCONTINUED | OUTPATIENT
Start: 2018-11-05 | End: 2018-11-05 | Stop reason: SDUPTHER

## 2018-11-05 RX ORDER — SODIUM CHLORIDE 9 MG/ML
INJECTION, SOLUTION INTRAVENOUS CONTINUOUS PRN
Status: DISCONTINUED | OUTPATIENT
Start: 2018-11-05 | End: 2018-11-05 | Stop reason: SDUPTHER

## 2018-11-05 RX ADMIN — SODIUM CHLORIDE 8 MG/HR: 9 INJECTION, SOLUTION INTRAVENOUS at 18:28

## 2018-11-05 RX ADMIN — OXYBUTYNIN CHLORIDE 5 MG: 5 TABLET ORAL at 21:23

## 2018-11-05 RX ADMIN — METOPROLOL TARTRATE 25 MG: 25 TABLET ORAL at 08:11

## 2018-11-05 RX ADMIN — SODIUM CHLORIDE 8 MG/HR: 9 INJECTION, SOLUTION INTRAVENOUS at 03:40

## 2018-11-05 RX ADMIN — INSULIN LISPRO 1 UNITS: 100 INJECTION, SOLUTION INTRAVENOUS; SUBCUTANEOUS at 21:27

## 2018-11-05 RX ADMIN — LIDOCAINE HYDROCHLORIDE 20 MG: 10 INJECTION, SOLUTION EPIDURAL; INFILTRATION; INTRACAUDAL; PERINEURAL at 14:28

## 2018-11-05 RX ADMIN — PRAMIPEXOLE DIHYDROCHLORIDE 1 MG: 1 TABLET ORAL at 21:23

## 2018-11-05 RX ADMIN — SIMVASTATIN 20 MG: 20 TABLET, FILM COATED ORAL at 21:23

## 2018-11-05 RX ADMIN — GABAPENTIN 300 MG: 300 CAPSULE ORAL at 21:23

## 2018-11-05 RX ADMIN — METOPROLOL TARTRATE 25 MG: 25 TABLET ORAL at 21:23

## 2018-11-05 RX ADMIN — INSULIN LISPRO 1 UNITS: 100 INJECTION, SOLUTION INTRAVENOUS; SUBCUTANEOUS at 16:31

## 2018-11-05 RX ADMIN — SODIUM CHLORIDE: 9 INJECTION, SOLUTION INTRAVENOUS at 14:28

## 2018-11-05 RX ADMIN — AMLODIPINE BESYLATE 10 MG: 10 TABLET ORAL at 21:23

## 2018-11-05 RX ADMIN — TRAMADOL HYDROCHLORIDE 50 MG: 50 TABLET, FILM COATED ORAL at 21:23

## 2018-11-05 RX ADMIN — Medication 10 ML: at 21:24

## 2018-11-05 RX ADMIN — PROPOFOL 180 MG: 10 INJECTION, EMULSION INTRAVENOUS at 14:28

## 2018-11-05 RX ADMIN — OCTREOTIDE ACETATE 50 MCG/HR: 500 INJECTION, SOLUTION INTRAVENOUS; SUBCUTANEOUS at 01:33

## 2018-11-05 ASSESSMENT — PAIN DESCRIPTION - FREQUENCY: FREQUENCY: INTERMITTENT

## 2018-11-05 ASSESSMENT — PAIN - FUNCTIONAL ASSESSMENT: PAIN_FUNCTIONAL_ASSESSMENT: 0-10

## 2018-11-05 ASSESSMENT — PAIN DESCRIPTION - LOCATION: LOCATION: ABDOMEN

## 2018-11-05 ASSESSMENT — PAIN DESCRIPTION - ONSET: ONSET: ON-GOING

## 2018-11-05 ASSESSMENT — PAIN DESCRIPTION - PROGRESSION: CLINICAL_PROGRESSION: NOT CHANGED

## 2018-11-05 ASSESSMENT — PAIN SCALES - GENERAL
PAINLEVEL_OUTOF10: 10
PAINLEVEL_OUTOF10: 0
PAINLEVEL_OUTOF10: 2
PAINLEVEL_OUTOF10: 0

## 2018-11-05 ASSESSMENT — PAIN DESCRIPTION - PAIN TYPE: TYPE: ACUTE PAIN

## 2018-11-05 ASSESSMENT — PAIN DESCRIPTION - DESCRIPTORS: DESCRIPTORS: ACHING;DISCOMFORT;SORE

## 2018-11-05 NOTE — CONSULTS
(DITROPAN) 5 MG tablet TAKE 1 TABLET BY MOUTH  DAILY WITH BREAKFAST 7/5/18   Aubrie Gonzalez MD   metFORMIN (GLUCOPHAGE) 500 MG tablet Take 2 tablets by mouth  twice a day 7/5/18   Lynne Jorge MD   glimepiride (AMARYL) 2 MG tablet Take 1 tablet by mouth every morning (before breakfast) 7/5/18   Aubrie Gonzalez MD   hydrOXYzine (ATARAX) 10 MG tablet Take 10 mg by mouth nightly 7/31/17   Historical Provider, MD   Handicap Placard MISC by Does not apply route Duration 5 years 8/2/16   Lynne Jorge MD   [facility] VERIO strip Test 3 times daily as  needed 6/24/16   Lynne Jorge MD   Blood Glucose Monitoring Suppl (ONETOUCH VERIO IQ SYSTEM) W/DEVICE KIT Test blood sugar twice daily Non insulin dependent e11.9 12/8/15   Aubrie Gonzalez MD   Coenzyme Q10 (CO Q 10) 100 MG CAPS Take 200 mg by mouth daily (with breakfast). Historical Provider, MD   Calcium Carbonate-Vitamin D (CALCIUM + D) 600-200 MG-UNIT TABS Take 600 mg by mouth daily. Historical Provider, MD   BIOTIN 5000 PO Take 5,000 mcg by mouth daily. Historical Provider, MD   Oil of Oregano 1500 MG CAPS Take 1,500 mg by mouth.  1 at breakfast and 1 at supper    Historical Provider, MD       Current Medications:    Current Facility-Administered Medications: insulin lispro (HUMALOG) injection vial 0-6 Units, 0-6 Units, Subcutaneous, TID WC  insulin lispro (HUMALOG) injection vial 0-3 Units, 0-3 Units, Subcutaneous, Nightly  glucose (GLUTOSE) 40 % oral gel 15 g, 15 g, Oral, PRN  dextrose 50 % solution 12.5 g, 12.5 g, Intravenous, PRN  glucagon (rDNA) injection 1 mg, 1 mg, Intramuscular, PRN  dextrose 5 % solution, 100 mL/hr, Intravenous, PRN  pantoprazole (PROTONIX) 80 mg in sodium chloride 0.9 % 100 mL infusion, 8 mg/hr, Intravenous, Continuous  octreotide (SANDOSTATIN) 500 mcg in sodium chloride 0.9 % 100 mL infusion, 50 mcg/hr, Intravenous, Continuous  traMADol (ULTRAM) tablet 50 mg, 50 mg, Oral, Q6H PRN  pramipexole
11/03/2018    MCHC 28.6 11/03/2018    RDW 19.7 11/03/2018    LYMPHOPCT 11.6 11/03/2018    MONOPCT 4.6 11/03/2018    BASOPCT 0.4 11/03/2018    MONOSABS 0.57 11/03/2018    LYMPHSABS 1.42 11/03/2018    EOSABS 0.15 11/03/2018    BASOSABS 0.05 11/03/2018     CMP:    Lab Results   Component Value Date     10/31/2018    K 4.0 10/31/2018    CL 97 10/31/2018    CO2 23 10/31/2018    BUN 10 10/31/2018    CREATININE 1.1 10/31/2018    GFRAA 58 10/31/2018    LABGLOM 48 10/31/2018    GLUCOSE 144 10/31/2018    PROT 7.5 10/31/2018    LABALBU 4.1 10/31/2018    CALCIUM 9.2 10/31/2018    BILITOT 0.7 10/31/2018    ALKPHOS 64 10/31/2018    AST 17 10/31/2018    ALT 12 10/31/2018     Hepatic Function Panel:    Lab Results   Component Value Date    ALKPHOS 64 10/31/2018    ALT 12 10/31/2018    AST 17 10/31/2018    PROT 7.5 10/31/2018    BILITOT 0.7 10/31/2018    BILIDIR <0.2 04/12/2016    IBILI 0.3 04/12/2016    LABALBU 4.1 10/31/2018     PT/INR:    Lab Results   Component Value Date    PROTIME 24.5 04/05/2018    INR 2.1 04/05/2018     PTT:    Lab Results   Component Value Date    APTT 34.0 12/28/2017   [APTT}  Last 3 Troponin:    Lab Results   Component Value Date    TROPONINI 0.01 12/28/2017    TROPONINI <0.01 12/28/2017    TROPONINI <0.01 12/27/2017     TSH:    Lab Results   Component Value Date    TSH 1.150 12/27/2017     Component Value Date    HDL 55 10/31/2017    HDL 49.0 10/15/2012     Lab Results   Component Value Date    LDLCALC 50 10/31/2017    LDLCALC 74 10/15/2012     Lab Results   Component Value Date    LABVLDL 33 10/31/2017        No results found.     IMPRESSION:    · Anemia, microcytic, hypochromic  · Indigestion   · Fatty liver  · DM  · HLD  · Chronic back pain  · HTN  · GERD  · [facility] of colon cancer- brother  · Personal history of colon polyps    RECOMMENDATIONS:      · Protonix GTT as ordered  · Octreotide GTT as ordered  · PT/INR today  · Lipase today  · LFTs today  · Will hold off on liver serology, as she is know

## 2018-11-05 NOTE — OP NOTE
Brief Postoperative Note    Moi Jennings  YOB: 1940  [id number]    Procedure: EGD with biopsy    Anesthesia: [facility]    Surgeon:  Eddy Ceballos MD    Findings:       Esophagus:  GERD      Stomach: MULTIPLE SMALL ANTRAL ULCERS.  Gastritis bx done to rule out H. Pylori      Duodenum:  Normal         Complications: None      Wilberto Easton MD

## 2018-11-05 NOTE — PROGRESS NOTES
80 mg in sodium chloride 0.9 % 100 mL infusion, 8 mg/hr, Intravenous, Continuous, Catie A Sugar, APRN - CNP, Last Rate: 10 mL/hr at 11/05/18 0340, 8 mg/hr at 11/05/18 0340    octreotide (SANDOSTATIN) 500 mcg in sodium chloride 0.9 % 100 mL infusion, 50 mcg/hr, Intravenous, Continuous, Catie A Sugar, APRN - CNP, Last Rate: 10 mL/hr at 11/05/18 0133, 50 mcg/hr at 11/05/18 0133    traMADol (ULTRAM) tablet 50 mg, 50 mg, Oral, Q6H PRN, Tien Barks, DO, 50 mg at 11/04/18 2013    pramipexole (MIRAPEX) tablet 1 mg, 1 mg, Oral, Nightly, Tien Barks, DO, 1 mg at 11/04/18 2013    amLODIPine (NORVASC) tablet 10 mg, 10 mg, Oral, Nightly, Tien Barks, DO, 10 mg at 11/04/18 2013    gabapentin (NEURONTIN) capsule 300 mg, 300 mg, Oral, TID, Tien Barks, DO, 300 mg at 11/04/18 2013    metoprolol tartrate (LOPRESSOR) tablet 25 mg, 25 mg, Oral, BID, Tien Barks, DO, 25 mg at 11/04/18 2013    oxybutynin (DITROPAN) tablet 5 mg, 5 mg, Oral, BID, Tien Barks, DO, 5 mg at 11/04/18 2013    simvastatin (ZOCOR) tablet 20 mg, 20 mg, Oral, Nightly, Tien Barks, DO, 20 mg at 11/04/18 2013    sodium chloride flush 0.9 % injection 10 mL, 10 mL, Intravenous, 2 times per day, Tien Barks, DO, 10 mL at 11/04/18 2212    sodium chloride flush 0.9 % injection 10 mL, 10 mL, Intravenous, PRN, Tien Barks, DO    ondansetron [facility]) injection 4 mg, 4 mg, Intravenous, Q6H PRN, Tien Barks, DO    A/P:      Patient Active Problem List   Diagnosis    Spinal stenosis in cervical region    Disorder of muscle, ligament, and fascia    Displacement of lumbar intervertebral disc without myelopathy    Spinal stenosis, lumbar region, without neurogenic claudication    HTN, goal below 130/80    Hyperlipidemia with target LDL less than 100    DM [facility]diabetes mellitus) ([facility]    Obesity (BMI 30.0-34. 9)    Other chest pain    Left wrist pain    Renal artery aneurysm (HCC)    Chronic neck pain    Chronic back pain    A-fib

## 2018-11-05 NOTE — PROGRESS NOTES
Physical Therapy    Facility/Department: [address] MED SURG/TELE  Initial Assessment    NAME: Lizabeth Cabezas  : 1940  MRN: [id number]    Date of Service: 2018    Evaluating Therapist: Reyes Roers. Lerry Skiff., P.T. Room #: 6414/4426-G  DIAGNOSIS: Anemia  PRECAUTIONS: falls    Social:  Pt lives with  in a 1 floor plan 2 steps and 1 rail to enter. Prior to admission pt walked with no AD, but has been using a cane lately for balance and due to her feeling fatigued. Initial Evaluation  Date: 18 Treatment      Short Term/ Long Term   Goals   Was pt agreeable to Eval/treatment? yes     Does pt have pain? No c/o pain     Bed Mobility  NA, pt found sitting EOB. Independent   Transfers Sit to stand: Independent  Stand to sit: Independent  Stand pivot: Independent  Independent   Ambulation    100 feet with SPC with supervision  300 feet with SPC Independent   Stair negotiation: ascended and descended NA  2 steps with 1 rail Independent   AM-PAC Raw Score  22/24       Pt is alert and Oriented x3  BLE ROM is WFL. BLE strength is grossly 4/5. Balance: sitting EOB Independent and standing with SPC Independent  Sensation: c/o chronic numbness and tingling B feet from neuropathy. Edema: none noted  Endurance: fair     ASSESSMENT  Pt displays functional ability as noted in the objective portion of this evaluation. Comments/Treatment:  Pt c/o feeling fatigued and that limited her amb distance today. Her Hgb is 8.3 today. Pt walked with slow, but steady gait and had no LOB or SOB. Pt was left sitting up in chair with call light in reach. Rehab potential is Good for reaching above PT goals. Pts/ family goals   1. To go home today. Patient and or family understand(s) diagnosis, prognosis, and plan of care. PLAN  PT care will be provided in accordance with the objectives noted above. Whenever appropriate, clear delegation orders will be provided for nursing staff.   Exercises and functional mobility practice will be used as well as appropriate assistive devices or modalities to obtain goals. Patient and family education will also be administered as needed. Frequency of treatments will be 2-5x/week x 2-3 days. Time in: 07:10  Time out: 07:35    Erik Davila., P.T.    License number:  [id number]

## 2018-11-06 VITALS
SYSTOLIC BLOOD PRESSURE: 132 MMHG | RESPIRATION RATE: 16 BRPM | TEMPERATURE: 99.3 F | OXYGEN SATURATION: 92 % | HEIGHT: 63 IN | DIASTOLIC BLOOD PRESSURE: 60 MMHG | HEART RATE: 91 BPM | BODY MASS INDEX: 34.98 KG/M2 | WEIGHT: 197.44 LBS

## 2018-11-06 LAB
ALBUMIN SERPL-MCNC: 4 G/DL (ref 3.5–5.2)
ALP BLD-CCNC: 60 U/L (ref 35–104)
ALT SERPL-CCNC: 15 U/L (ref 0–32)
AST SERPL-CCNC: 19 U/L (ref 0–31)
BILIRUB SERPL-MCNC: 1.6 MG/DL (ref 0–1.2)
BILIRUBIN DIRECT: 0.5 MG/DL (ref 0–0.3)
BILIRUBIN, INDIRECT: 1.1 MG/DL (ref 0–1)
CLOTEST: NORMAL
HCT VFR BLD CALC: 28.8 % (ref 34–48)
HCT VFR BLD CALC: 30.5 % (ref 34–48)
HEMOGLOBIN: 8.3 G/DL (ref 11.5–15.5)
HEMOGLOBIN: 8.6 G/DL (ref 11.5–15.5)
INR BLD: 1.4
METER GLUCOSE: 144 MG/DL (ref 74–99)
METER GLUCOSE: 179 MG/DL (ref 74–99)
PROTHROMBIN TIME: 16.1 SEC (ref 9.3–12.4)
TOTAL PROTEIN: 7.4 G/DL (ref 6.4–8.3)

## 2018-11-06 PROCEDURE — 97530 THERAPEUTIC ACTIVITIES: CPT

## 2018-11-06 PROCEDURE — 85610 PROTHROMBIN TIME: CPT

## 2018-11-06 PROCEDURE — 82962 GLUCOSE BLOOD TEST: CPT

## 2018-11-06 PROCEDURE — 6370000000 HC RX 637 (ALT 250 FOR IP): Performed by: INTERNAL MEDICINE

## 2018-11-06 PROCEDURE — 36415 COLL VENOUS BLD VENIPUNCTURE: CPT

## 2018-11-06 PROCEDURE — 85014 HEMATOCRIT: CPT

## 2018-11-06 PROCEDURE — 80076 HEPATIC FUNCTION PANEL: CPT

## 2018-11-06 PROCEDURE — C9113 INJ PANTOPRAZOLE SODIUM, VIA: HCPCS | Performed by: NURSE PRACTITIONER

## 2018-11-06 PROCEDURE — 6360000002 HC RX W HCPCS: Performed by: NURSE PRACTITIONER

## 2018-11-06 PROCEDURE — 2580000003 HC RX 258: Performed by: NURSE PRACTITIONER

## 2018-11-06 PROCEDURE — 85018 HEMOGLOBIN: CPT

## 2018-11-06 PROCEDURE — 99232 SBSQ HOSP IP/OBS MODERATE 35: CPT | Performed by: NURSE PRACTITIONER

## 2018-11-06 RX ORDER — PANTOPRAZOLE SODIUM 40 MG/1
40 TABLET, DELAYED RELEASE ORAL
Status: DISCONTINUED | OUTPATIENT
Start: 2018-11-06 | End: 2018-11-06 | Stop reason: HOSPADM

## 2018-11-06 RX ORDER — PANTOPRAZOLE SODIUM 40 MG/1
40 TABLET, DELAYED RELEASE ORAL
Qty: 30 TABLET | Refills: 3 | Status: SHIPPED | OUTPATIENT
Start: 2018-11-06 | End: 2019-01-03 | Stop reason: SDUPTHER

## 2018-11-06 RX ADMIN — OCTREOTIDE ACETATE 50 MCG/HR: 500 INJECTION, SOLUTION INTRAVENOUS; SUBCUTANEOUS at 01:37

## 2018-11-06 RX ADMIN — METOPROLOL TARTRATE 25 MG: 25 TABLET ORAL at 08:11

## 2018-11-06 RX ADMIN — SODIUM CHLORIDE 8 MG/HR: 9 INJECTION, SOLUTION INTRAVENOUS at 05:12

## 2018-11-06 RX ADMIN — INSULIN LISPRO 1 UNITS: 100 INJECTION, SOLUTION INTRAVENOUS; SUBCUTANEOUS at 11:19

## 2018-11-06 RX ADMIN — GABAPENTIN 300 MG: 300 CAPSULE ORAL at 08:11

## 2018-11-06 RX ADMIN — OXYBUTYNIN CHLORIDE 5 MG: 5 TABLET ORAL at 08:11

## 2018-11-06 RX ADMIN — GABAPENTIN 300 MG: 300 CAPSULE ORAL at 13:33

## 2018-11-06 RX ADMIN — TRAMADOL HYDROCHLORIDE 50 MG: 50 TABLET, FILM COATED ORAL at 05:13

## 2018-11-06 ASSESSMENT — PAIN SCALES - GENERAL
PAINLEVEL_OUTOF10: 9
PAINLEVEL_OUTOF10: 0
PAINLEVEL_OUTOF10: 2

## 2018-11-06 ASSESSMENT — PAIN DESCRIPTION - ORIENTATION: ORIENTATION: RIGHT;LEFT

## 2018-11-06 ASSESSMENT — PAIN DESCRIPTION - PAIN TYPE: TYPE: ACUTE PAIN

## 2018-11-06 ASSESSMENT — PAIN DESCRIPTION - DESCRIPTORS: DESCRIPTORS: ACHING;DISCOMFORT;SORE

## 2018-11-06 ASSESSMENT — PAIN DESCRIPTION - FREQUENCY: FREQUENCY: INTERMITTENT

## 2018-11-06 ASSESSMENT — PAIN DESCRIPTION - ONSET: ONSET: ON-GOING

## 2018-11-06 ASSESSMENT — PAIN DESCRIPTION - LOCATION: LOCATION: LEG

## 2018-11-06 ASSESSMENT — PAIN DESCRIPTION - PROGRESSION: CLINICAL_PROGRESSION: NOT CHANGED

## 2018-11-06 NOTE — PROGRESS NOTES
PROGRESS NOTE    Patient Presents with/Seen in Consultation For      *anemia  CHIEF COMPLAINT:  \"Low blood count\"    Subjective:     Patient states she feels good. Pt denies abd pain, n/v, or diarrhea. States BMs are brown in color. EGD results d/w pt with all questions answered. Pt states she ate all her meal today. Review of Systems  Aside from what was mentioned in the PMH and HPI, essentially unremarkable, all others negative. Objective:     /60   Pulse 91   Temp 99.3 °F (37.4 °C) (Oral)   Resp 16   Ht 5' 3\" (1.6 m)   Wt 197 lb 7 oz (89.6 kg)   LMP  (LMP Unknown)   SpO2 92%   BMI 34.97 kg/m²     General appearance: alert, awake, sitting on edge of bed, and cooperative  Eyes: conjunctiva pale, sclera anicteric. PERRL.   Lungs: clear to auscultation bilaterally  Heart: regular rate and rhythm, no murmur, 2+ pulses; 1+ BLE edema  Abdomen: soft, non-tender; bowel sounds normal; no masses,  no organomegaly  Extremities: extremities with 1+ BLE edema  Pulses: 2+ and symmetric  Skin: Skin color pale, texture, turgor normal.   Neurologic: Grossly normal      pantoprazole (PROTONIX) tablet 40 mg BID AC   [START ON 11/8/2018] rivaroxaban (XARELTO) tablet 20 mg Daily   insulin lispro (HUMALOG) injection vial 0-6 Units TID WC   insulin lispro (HUMALOG) injection vial 0-3 Units Nightly   glucose (GLUTOSE) 40 % oral gel 15 g PRN   dextrose 50 % solution 12.5 g PRN   glucagon (rDNA) injection 1 mg PRN   dextrose 5 % solution PRN   traMADol (ULTRAM) tablet 50 mg Q6H PRN   pramipexole (MIRAPEX) tablet 1 mg Nightly   amLODIPine (NORVASC) tablet 10 mg Nightly   gabapentin (NEURONTIN) capsule 300 mg TID   metoprolol tartrate (LOPRESSOR) tablet 25 mg BID   oxybutynin (DITROPAN) tablet 5 mg BID   simvastatin (ZOCOR) tablet 20 mg Nightly   sodium chloride flush 0.9 % injection 10 mL 2 times per day   sodium chloride flush 0.9 % injection 10 mL PRN   ondansetron (ZOFRAN) injection 4 mg Q6H PRN        Data

## 2018-11-06 NOTE — PROGRESS NOTES
Pt seen/ examined. ROS x 10 neg. Except:EGD discussed with patient   Chart reviewed. meds reviewed. D/w nursing + family as available. EXAM:  /60   Pulse 91   Temp 99.3 °F (37.4 °C) (Oral)   Resp 16   Ht 5' 3\" (1.6 m)   Wt 197 lb 7 oz (89.6 kg)   LMP  (LMP Unknown)   SpO2 92%   BMI 34.97 kg/m²   GEN: A+O NAD. HEENT:NCAT. EOMI. HÉCTOR   NECK:  No JVD. No bruits. Trach midline. No thyromegaly. LUNGS: CTA w/o rales, rhonchi, wheezes. Good excursion. CV: rrr w/o mrg  ABD: soft, non tender. Nl BS. No organomegaly. EXT: no clubbing, cyanosis, edema.   NEURO:   Labs/data reviewed  LABS: CBC:   Lab Results   Component Value Date    WBC 12.3 11/03/2018    RBC 4.19 11/03/2018    HGB 8.3 11/05/2018    HCT 28.8 11/05/2018    MCV 74.2 11/03/2018    MCH 21.2 11/03/2018    MCHC 28.6 11/03/2018    RDW 19.7 11/03/2018     11/03/2018    MPV 9.7 11/03/2018     CMP:    Lab Results   Component Value Date     10/31/2018    K 4.0 10/31/2018    CL 97 10/31/2018    CO2 23 10/31/2018    BUN 10 10/31/2018    CREATININE 1.1 10/31/2018    GFRAA 58 10/31/2018    LABGLOM 48 10/31/2018    GLUCOSE 144 10/31/2018    PROT 7.4 11/06/2018    LABALBU 4.0 11/06/2018    CALCIUM 9.2 10/31/2018    BILITOT 1.6 11/06/2018    ALKPHOS 60 11/06/2018    AST 19 11/06/2018    ALT 15 11/06/2018       Current Facility-Administered Medications:     insulin lispro (HUMALOG) injection vial 0-6 Units, 0-6 Units, Subcutaneous, TID WC, Fernie Madera, DO, 1 Units at 11/05/18 1631    insulin lispro (HUMALOG) injection vial 0-3 Units, 0-3 Units, Subcutaneous, Nightly, Fernie Madera, DO, 1 Units at 11/05/18 2127    glucose (GLUTOSE) 40 % oral gel 15 g, 15 g, Oral, PRN, Fernie Mcmahond, DO    dextrose 50 % solution 12.5 g, 12.5 g, Intravenous, PRN, Fernie Mcmahond, DO    glucagon (rDNA) injection 1 mg, 1 mg, Intramuscular, PRN, Fernie Mcmahond, DO    dextrose 5 % solution, 100 mL/hr, Intravenous, PRN, Fernie Mcmahond, DO    pantoprazole

## 2018-11-09 ENCOUNTER — CLINICAL DOCUMENTATION (OUTPATIENT)
Dept: CARDIOLOGY CLINIC | Age: 78
End: 2018-11-09

## 2019-01-29 ENCOUNTER — HOSPITAL ENCOUNTER (OUTPATIENT)
Age: 79
Discharge: HOME | End: 2019-01-31

## 2019-01-29 PROCEDURE — 88305 TISSUE EXAM BY PATHOLOGIST: CPT

## 2019-02-11 ENCOUNTER — PREP FOR PROCEDURE (OUTPATIENT)
Dept: SURGERY | Age: 79
End: 2019-02-11

## 2019-02-11 ENCOUNTER — HOSPITAL ENCOUNTER (OUTPATIENT)
Dept: PREADMISSION TESTING | Age: 79
Discharge: HOME | End: 2019-02-11
Payer: MEDICARE

## 2019-02-11 ENCOUNTER — OFFICE VISIT (OUTPATIENT)
Dept: CARDIOLOGY CLINIC | Age: 79
End: 2019-02-11
Payer: MEDICARE

## 2019-02-11 VITALS
SYSTOLIC BLOOD PRESSURE: 119 MMHG | TEMPERATURE: 98.2 F | HEIGHT: 63 IN | RESPIRATION RATE: 16 BRPM | HEART RATE: 89 BPM | BODY MASS INDEX: 31.71 KG/M2 | OXYGEN SATURATION: 94 % | WEIGHT: 179 LBS | DIASTOLIC BLOOD PRESSURE: 59 MMHG

## 2019-02-11 VITALS
RESPIRATION RATE: 18 BRPM | WEIGHT: 187.4 LBS | HEART RATE: 88 BPM | BODY MASS INDEX: 33.2 KG/M2 | HEIGHT: 63 IN | DIASTOLIC BLOOD PRESSURE: 60 MMHG | SYSTOLIC BLOOD PRESSURE: 128 MMHG

## 2019-02-11 DIAGNOSIS — I48.0 PAF (PAROXYSMAL ATRIAL FIBRILLATION) (HCC): ICD-10-CM

## 2019-02-11 DIAGNOSIS — Z01.810 PREOP CARDIOVASCULAR EXAM: Primary | ICD-10-CM

## 2019-02-11 LAB
ABO/RH: NORMAL
ANION GAP SERPL CALCULATED.3IONS-SCNC: 14 MMOL/L (ref 7–16)
ANTIBODY SCREEN: NORMAL
BUN BLDV-MCNC: 8 MG/DL (ref 8–23)
CALCIUM SERPL-MCNC: 9.5 MG/DL (ref 8.6–10.2)
CHLORIDE BLD-SCNC: 93 MMOL/L (ref 98–107)
CO2: 31 MMOL/L (ref 22–29)
CREAT SERPL-MCNC: 0.7 MG/DL (ref 0.5–1)
GFR AFRICAN AMERICAN: >60
GFR NON-AFRICAN AMERICAN: >60 ML/MIN/1.73
GLUCOSE BLD-MCNC: 219 MG/DL (ref 74–99)
HCT VFR BLD CALC: 35 % (ref 34–48)
HEMOGLOBIN: 10.2 G/DL (ref 11.5–15.5)
MCH RBC QN AUTO: 25.9 PG (ref 26–35)
MCHC RBC AUTO-ENTMCNC: 29.1 % (ref 32–34.5)
MCV RBC AUTO: 88.8 FL (ref 80–99.9)
PDW BLD-RTO: 16.6 FL (ref 11.5–15)
PLATELET # BLD: 340 E9/L (ref 130–450)
PMV BLD AUTO: 9.6 FL (ref 7–12)
POTASSIUM SERPL-SCNC: 3.5 MMOL/L (ref 3.5–5)
RBC # BLD: 3.94 E12/L (ref 3.5–5.5)
SODIUM BLD-SCNC: 138 MMOL/L (ref 132–146)
WBC # BLD: 9.7 E9/L (ref 4.5–11.5)

## 2019-02-11 PROCEDURE — G8482 FLU IMMUNIZE ORDER/ADMIN: HCPCS | Performed by: INTERNAL MEDICINE

## 2019-02-11 PROCEDURE — 80048 BASIC METABOLIC PNL TOTAL CA: CPT

## 2019-02-11 PROCEDURE — G8417 CALC BMI ABV UP PARAM F/U: HCPCS | Performed by: INTERNAL MEDICINE

## 2019-02-11 PROCEDURE — 1090F PRES/ABSN URINE INCON ASSESS: CPT | Performed by: INTERNAL MEDICINE

## 2019-02-11 PROCEDURE — 99214 OFFICE O/P EST MOD 30 MIN: CPT | Performed by: INTERNAL MEDICINE

## 2019-02-11 PROCEDURE — 4040F PNEUMOC VAC/ADMIN/RCVD: CPT | Performed by: INTERNAL MEDICINE

## 2019-02-11 PROCEDURE — G8399 PT W/DXA RESULTS DOCUMENT: HCPCS | Performed by: INTERNAL MEDICINE

## 2019-02-11 PROCEDURE — 36415 COLL VENOUS BLD VENIPUNCTURE: CPT

## 2019-02-11 PROCEDURE — 93000 ELECTROCARDIOGRAM COMPLETE: CPT | Performed by: INTERNAL MEDICINE

## 2019-02-11 PROCEDURE — 86850 RBC ANTIBODY SCREEN: CPT

## 2019-02-11 PROCEDURE — 1036F TOBACCO NON-USER: CPT | Performed by: INTERNAL MEDICINE

## 2019-02-11 PROCEDURE — 87081 CULTURE SCREEN ONLY: CPT

## 2019-02-11 PROCEDURE — 1101F PT FALLS ASSESS-DOCD LE1/YR: CPT | Performed by: INTERNAL MEDICINE

## 2019-02-11 PROCEDURE — 1123F ACP DISCUSS/DSCN MKR DOCD: CPT | Performed by: INTERNAL MEDICINE

## 2019-02-11 PROCEDURE — 85027 COMPLETE CBC AUTOMATED: CPT

## 2019-02-11 PROCEDURE — 86901 BLOOD TYPING SEROLOGIC RH(D): CPT

## 2019-02-11 PROCEDURE — G8427 DOCREV CUR MEDS BY ELIG CLIN: HCPCS | Performed by: INTERNAL MEDICINE

## 2019-02-11 PROCEDURE — 86900 BLOOD TYPING SEROLOGIC ABO: CPT

## 2019-02-11 RX ORDER — GABAPENTIN 300 MG/1
300 CAPSULE ORAL 4 TIMES DAILY
COMMUNITY
End: 2019-07-30

## 2019-02-11 RX ORDER — M-VIT,TX,IRON,MINS/CALC/FOLIC 27MG-0.4MG
1 TABLET ORAL DAILY
COMMUNITY
End: 2020-12-05

## 2019-02-11 RX ORDER — SODIUM CHLORIDE 0.9 % (FLUSH) 0.9 %
10 SYRINGE (ML) INJECTION EVERY 12 HOURS SCHEDULED
Status: CANCELLED | OUTPATIENT
Start: 2019-02-11

## 2019-02-11 RX ORDER — SODIUM CHLORIDE 9 MG/ML
INJECTION, SOLUTION INTRAVENOUS CONTINUOUS
Status: CANCELLED | OUTPATIENT
Start: 2019-02-11

## 2019-02-11 RX ORDER — TRAMADOL HYDROCHLORIDE 50 MG/1
50 TABLET ORAL EVERY 6 HOURS PRN
COMMUNITY
End: 2019-10-28 | Stop reason: SDUPTHER

## 2019-02-11 RX ORDER — POTASSIUM CHLORIDE 750 MG/1
20 CAPSULE, EXTENDED RELEASE ORAL DAILY
COMMUNITY
End: 2019-02-12 | Stop reason: SDUPTHER

## 2019-02-11 RX ORDER — THIAMINE MONONITRATE (VIT B1) 100 MG
100 TABLET ORAL DAILY
COMMUNITY
End: 2020-12-05

## 2019-02-11 RX ORDER — AMLODIPINE BESYLATE 5 MG/1
5 TABLET ORAL DAILY
COMMUNITY
End: 2019-07-30

## 2019-02-11 RX ORDER — FLUTICASONE PROPIONATE 50 MCG
1 SPRAY, SUSPENSION (ML) NASAL PRN
COMMUNITY
End: 2019-08-22 | Stop reason: SDUPTHER

## 2019-02-12 ENCOUNTER — HOSPITAL ENCOUNTER (OUTPATIENT)
Dept: PREADMISSION TESTING | Age: 79
Discharge: HOME | End: 2019-02-12

## 2019-02-12 LAB — MRSA CULTURE ONLY: NORMAL

## 2019-02-19 ENCOUNTER — ANESTHESIA (OUTPATIENT)
Dept: OPERATING ROOM | Age: 79
DRG: 330 | End: 2019-02-19
Payer: MEDICARE

## 2019-02-19 ENCOUNTER — HOSPITAL ENCOUNTER (INPATIENT)
Age: 79
LOS: 9 days | Discharge: NURSING, REHAB OR LONG-TERM CARE | DRG: 330 | End: 2019-02-28
Attending: SURGERY | Admitting: SURGERY
Payer: MEDICARE

## 2019-02-19 ENCOUNTER — ANESTHESIA EVENT (OUTPATIENT)
Dept: OPERATING ROOM | Age: 79
DRG: 330 | End: 2019-02-19
Payer: MEDICARE

## 2019-02-19 VITALS
DIASTOLIC BLOOD PRESSURE: 57 MMHG | RESPIRATION RATE: 3 BRPM | OXYGEN SATURATION: 94 % | SYSTOLIC BLOOD PRESSURE: 127 MMHG | TEMPERATURE: 97.3 F

## 2019-02-19 DIAGNOSIS — Z90.49 S/P COLON RESECTION: Primary | ICD-10-CM

## 2019-02-19 PROBLEM — C18.0 MALIGNANT NEOPLASM OF CECUM (HCC): Status: ACTIVE | Noted: 2019-02-19

## 2019-02-19 LAB
METER GLUCOSE: 140 MG/DL (ref 74–99)
METER GLUCOSE: 183 MG/DL (ref 74–99)
METER GLUCOSE: 201 MG/DL (ref 74–99)
METER GLUCOSE: 210 MG/DL (ref 74–99)

## 2019-02-19 PROCEDURE — 2500000003 HC RX 250 WO HCPCS

## 2019-02-19 PROCEDURE — 2720000010 HC SURG SUPPLY STERILE: Performed by: SURGERY

## 2019-02-19 PROCEDURE — 7100000000 HC PACU RECOVERY - FIRST 15 MIN: Performed by: SURGERY

## 2019-02-19 PROCEDURE — 3700000001 HC ADD 15 MINUTES (ANESTHESIA): Performed by: SURGERY

## 2019-02-19 PROCEDURE — 6370000000 HC RX 637 (ALT 250 FOR IP): Performed by: STUDENT IN AN ORGANIZED HEALTH CARE EDUCATION/TRAINING PROGRAM

## 2019-02-19 PROCEDURE — 2580000003 HC RX 258: Performed by: NURSE ANESTHETIST, CERTIFIED REGISTERED

## 2019-02-19 PROCEDURE — 6360000002 HC RX W HCPCS: Performed by: NURSE ANESTHETIST, CERTIFIED REGISTERED

## 2019-02-19 PROCEDURE — 6360000002 HC RX W HCPCS: Performed by: ANESTHESIOLOGY

## 2019-02-19 PROCEDURE — 6360000002 HC RX W HCPCS: Performed by: SURGERY

## 2019-02-19 PROCEDURE — 3600000013 HC SURGERY LEVEL 3 ADDTL 15MIN: Performed by: SURGERY

## 2019-02-19 PROCEDURE — 2709999900 HC NON-CHARGEABLE SUPPLY: Performed by: SURGERY

## 2019-02-19 PROCEDURE — 3700000000 HC ANESTHESIA ATTENDED CARE: Performed by: SURGERY

## 2019-02-19 PROCEDURE — 82962 GLUCOSE BLOOD TEST: CPT

## 2019-02-19 PROCEDURE — 3600000003 HC SURGERY LEVEL 3 BASE: Performed by: SURGERY

## 2019-02-19 PROCEDURE — 6360000002 HC RX W HCPCS: Performed by: STUDENT IN AN ORGANIZED HEALTH CARE EDUCATION/TRAINING PROGRAM

## 2019-02-19 PROCEDURE — 1200000000 HC SEMI PRIVATE

## 2019-02-19 PROCEDURE — 2580000003 HC RX 258: Performed by: STUDENT IN AN ORGANIZED HEALTH CARE EDUCATION/TRAINING PROGRAM

## 2019-02-19 PROCEDURE — 6370000000 HC RX 637 (ALT 250 FOR IP)

## 2019-02-19 PROCEDURE — 0DTF4ZZ RESECTION OF RIGHT LARGE INTESTINE, PERCUTANEOUS ENDOSCOPIC APPROACH: ICD-10-PCS | Performed by: STUDENT IN AN ORGANIZED HEALTH CARE EDUCATION/TRAINING PROGRAM

## 2019-02-19 PROCEDURE — 2580000003 HC RX 258: Performed by: SURGERY

## 2019-02-19 PROCEDURE — 7100000001 HC PACU RECOVERY - ADDTL 15 MIN: Performed by: SURGERY

## 2019-02-19 PROCEDURE — 88309 TISSUE EXAM BY PATHOLOGIST: CPT

## 2019-02-19 PROCEDURE — 2500000003 HC RX 250 WO HCPCS: Performed by: NURSE ANESTHETIST, CERTIFIED REGISTERED

## 2019-02-19 RX ORDER — PRAMIPEXOLE DIHYDROCHLORIDE 1 MG/1
1 TABLET ORAL NIGHTLY
Status: DISCONTINUED | OUTPATIENT
Start: 2019-02-19 | End: 2019-02-28 | Stop reason: HOSPADM

## 2019-02-19 RX ORDER — METOPROLOL TARTRATE 5 MG/5ML
2.5 INJECTION INTRAVENOUS ONCE
Status: COMPLETED | OUTPATIENT
Start: 2019-02-19 | End: 2019-02-19

## 2019-02-19 RX ORDER — SODIUM CHLORIDE 0.9 % (FLUSH) 0.9 %
10 SYRINGE (ML) INJECTION EVERY 12 HOURS SCHEDULED
Status: DISCONTINUED | OUTPATIENT
Start: 2019-02-19 | End: 2019-02-19 | Stop reason: HOSPADM

## 2019-02-19 RX ORDER — SCOLOPAMINE TRANSDERMAL SYSTEM 1 MG/1
1 PATCH, EXTENDED RELEASE TRANSDERMAL ONCE
Status: DISCONTINUED | OUTPATIENT
Start: 2019-02-19 | End: 2019-02-22

## 2019-02-19 RX ORDER — ROCURONIUM BROMIDE 10 MG/ML
INJECTION, SOLUTION INTRAVENOUS PRN
Status: DISCONTINUED | OUTPATIENT
Start: 2019-02-19 | End: 2019-02-19 | Stop reason: SDUPTHER

## 2019-02-19 RX ORDER — DEXTROSE MONOHYDRATE 25 G/50ML
12.5 INJECTION, SOLUTION INTRAVENOUS PRN
Status: DISCONTINUED | OUTPATIENT
Start: 2019-02-19 | End: 2019-02-28 | Stop reason: HOSPADM

## 2019-02-19 RX ORDER — ACETAMINOPHEN 500 MG
500 TABLET ORAL EVERY 4 HOURS
Status: DISCONTINUED | OUTPATIENT
Start: 2019-02-19 | End: 2019-02-28 | Stop reason: HOSPADM

## 2019-02-19 RX ORDER — PANTOPRAZOLE SODIUM 40 MG/1
40 TABLET, DELAYED RELEASE ORAL
Status: DISCONTINUED | OUTPATIENT
Start: 2019-02-19 | End: 2019-02-28 | Stop reason: HOSPADM

## 2019-02-19 RX ORDER — ONDANSETRON 2 MG/ML
4 INJECTION INTRAMUSCULAR; INTRAVENOUS EVERY 6 HOURS PRN
Status: DISCONTINUED | OUTPATIENT
Start: 2019-02-19 | End: 2019-02-28 | Stop reason: HOSPADM

## 2019-02-19 RX ORDER — LIDOCAINE HYDROCHLORIDE 20 MG/ML
INJECTION, SOLUTION EPIDURAL; INFILTRATION; INTRACAUDAL; PERINEURAL PRN
Status: DISCONTINUED | OUTPATIENT
Start: 2019-02-19 | End: 2019-02-19 | Stop reason: SDUPTHER

## 2019-02-19 RX ORDER — METHOCARBAMOL 500 MG/1
1000 TABLET, FILM COATED ORAL 4 TIMES DAILY
Status: DISCONTINUED | OUTPATIENT
Start: 2019-02-19 | End: 2019-02-28 | Stop reason: HOSPADM

## 2019-02-19 RX ORDER — SODIUM CHLORIDE 9 MG/ML
INJECTION, SOLUTION INTRAVENOUS CONTINUOUS
Status: DISCONTINUED | OUTPATIENT
Start: 2019-02-19 | End: 2019-02-19

## 2019-02-19 RX ORDER — DEXAMETHASONE SODIUM PHOSPHATE 4 MG/ML
INJECTION, SOLUTION INTRA-ARTICULAR; INTRALESIONAL; INTRAMUSCULAR; INTRAVENOUS; SOFT TISSUE PRN
Status: DISCONTINUED | OUTPATIENT
Start: 2019-02-19 | End: 2019-02-19 | Stop reason: SDUPTHER

## 2019-02-19 RX ORDER — SODIUM CHLORIDE, SODIUM LACTATE, POTASSIUM CHLORIDE, CALCIUM CHLORIDE 600; 310; 30; 20 MG/100ML; MG/100ML; MG/100ML; MG/100ML
INJECTION, SOLUTION INTRAVENOUS CONTINUOUS PRN
Status: DISCONTINUED | OUTPATIENT
Start: 2019-02-19 | End: 2019-02-19 | Stop reason: SDUPTHER

## 2019-02-19 RX ORDER — HYDROXYZINE HYDROCHLORIDE 10 MG/1
10 TABLET, FILM COATED ORAL NIGHTLY PRN
Status: DISCONTINUED | OUTPATIENT
Start: 2019-02-19 | End: 2019-02-28 | Stop reason: HOSPADM

## 2019-02-19 RX ORDER — OXYCODONE HYDROCHLORIDE 5 MG/1
10 TABLET ORAL EVERY 4 HOURS PRN
Status: DISCONTINUED | OUTPATIENT
Start: 2019-02-19 | End: 2019-02-28 | Stop reason: HOSPADM

## 2019-02-19 RX ORDER — KETOROLAC TROMETHAMINE 30 MG/ML
15 INJECTION, SOLUTION INTRAMUSCULAR; INTRAVENOUS EVERY 6 HOURS
Status: DISPENSED | OUTPATIENT
Start: 2019-02-19 | End: 2019-02-24

## 2019-02-19 RX ORDER — GABAPENTIN 300 MG/1
300 CAPSULE ORAL 4 TIMES DAILY
Status: DISCONTINUED | OUTPATIENT
Start: 2019-02-19 | End: 2019-02-28 | Stop reason: HOSPADM

## 2019-02-19 RX ORDER — SODIUM CHLORIDE 0.9 % (FLUSH) 0.9 %
10 SYRINGE (ML) INJECTION PRN
Status: DISCONTINUED | OUTPATIENT
Start: 2019-02-19 | End: 2019-02-28 | Stop reason: HOSPADM

## 2019-02-19 RX ORDER — SODIUM CHLORIDE, SODIUM LACTATE, POTASSIUM CHLORIDE, CALCIUM CHLORIDE 600; 310; 30; 20 MG/100ML; MG/100ML; MG/100ML; MG/100ML
INJECTION, SOLUTION INTRAVENOUS CONTINUOUS
Status: DISCONTINUED | OUTPATIENT
Start: 2019-02-19 | End: 2019-02-20

## 2019-02-19 RX ORDER — SODIUM CHLORIDE 0.9 % (FLUSH) 0.9 %
10 SYRINGE (ML) INJECTION EVERY 12 HOURS SCHEDULED
Status: DISCONTINUED | OUTPATIENT
Start: 2019-02-19 | End: 2019-02-28 | Stop reason: HOSPADM

## 2019-02-19 RX ORDER — FENTANYL CITRATE 50 UG/ML
INJECTION, SOLUTION INTRAMUSCULAR; INTRAVENOUS PRN
Status: DISCONTINUED | OUTPATIENT
Start: 2019-02-19 | End: 2019-02-19 | Stop reason: SDUPTHER

## 2019-02-19 RX ORDER — SIMVASTATIN 20 MG
20 TABLET ORAL NIGHTLY
Status: DISCONTINUED | OUTPATIENT
Start: 2019-02-19 | End: 2019-02-28 | Stop reason: HOSPADM

## 2019-02-19 RX ORDER — NICOTINE POLACRILEX 4 MG
15 LOZENGE BUCCAL PRN
Status: DISCONTINUED | OUTPATIENT
Start: 2019-02-19 | End: 2019-02-28 | Stop reason: HOSPADM

## 2019-02-19 RX ORDER — SCOLOPAMINE TRANSDERMAL SYSTEM 1 MG/1
PATCH, EXTENDED RELEASE TRANSDERMAL
Status: COMPLETED
Start: 2019-02-19 | End: 2019-02-22

## 2019-02-19 RX ORDER — METOPROLOL TARTRATE 5 MG/5ML
INJECTION INTRAVENOUS
Status: COMPLETED
Start: 2019-02-19 | End: 2019-02-19

## 2019-02-19 RX ORDER — ONDANSETRON 2 MG/ML
INJECTION INTRAMUSCULAR; INTRAVENOUS PRN
Status: DISCONTINUED | OUTPATIENT
Start: 2019-02-19 | End: 2019-02-19 | Stop reason: SDUPTHER

## 2019-02-19 RX ORDER — AMLODIPINE BESYLATE 5 MG/1
5 TABLET ORAL DAILY
Status: DISCONTINUED | OUTPATIENT
Start: 2019-02-20 | End: 2019-02-21

## 2019-02-19 RX ORDER — DEXTROSE MONOHYDRATE 50 MG/ML
100 INJECTION, SOLUTION INTRAVENOUS PRN
Status: DISCONTINUED | OUTPATIENT
Start: 2019-02-19 | End: 2019-02-28 | Stop reason: HOSPADM

## 2019-02-19 RX ORDER — OXYCODONE HYDROCHLORIDE 5 MG/1
5 TABLET ORAL EVERY 4 HOURS PRN
Status: DISCONTINUED | OUTPATIENT
Start: 2019-02-19 | End: 2019-02-28 | Stop reason: HOSPADM

## 2019-02-19 RX ORDER — PROPOFOL 10 MG/ML
INJECTION, EMULSION INTRAVENOUS PRN
Status: DISCONTINUED | OUTPATIENT
Start: 2019-02-19 | End: 2019-02-19 | Stop reason: SDUPTHER

## 2019-02-19 RX ORDER — PROMETHAZINE HYDROCHLORIDE 25 MG/ML
6.25 INJECTION, SOLUTION INTRAMUSCULAR; INTRAVENOUS
Status: DISCONTINUED | OUTPATIENT
Start: 2019-02-19 | End: 2019-02-19 | Stop reason: HOSPADM

## 2019-02-19 RX ADMIN — METHOCARBAMOL TABLETS 1000 MG: 500 TABLET, COATED ORAL at 21:34

## 2019-02-19 RX ADMIN — FENTANYL CITRATE 50 MCG: 50 INJECTION, SOLUTION INTRAMUSCULAR; INTRAVENOUS at 12:05

## 2019-02-19 RX ADMIN — PHENYLEPHRINE HYDROCHLORIDE 100 MCG: 10 INJECTION INTRAVENOUS at 13:10

## 2019-02-19 RX ADMIN — HYDROMORPHONE HYDROCHLORIDE 0.5 MG: 1 INJECTION, SOLUTION INTRAMUSCULAR; INTRAVENOUS; SUBCUTANEOUS at 14:39

## 2019-02-19 RX ADMIN — PRAMIPEXOLE DIHYDROCHLORIDE 1 MG: 1 TABLET ORAL at 21:35

## 2019-02-19 RX ADMIN — KETOROLAC TROMETHAMINE 15 MG: 30 INJECTION, SOLUTION INTRAMUSCULAR; INTRAVENOUS at 14:40

## 2019-02-19 RX ADMIN — ROCURONIUM BROMIDE 50 MG: 10 SOLUTION INTRAVENOUS at 12:05

## 2019-02-19 RX ADMIN — ACETAMINOPHEN 500 MG: 500 TABLET ORAL at 21:34

## 2019-02-19 RX ADMIN — SIMVASTATIN 20 MG: 20 TABLET, FILM COATED ORAL at 21:35

## 2019-02-19 RX ADMIN — FENTANYL CITRATE 50 MCG: 50 INJECTION, SOLUTION INTRAMUSCULAR; INTRAVENOUS at 12:42

## 2019-02-19 RX ADMIN — OXYCODONE HYDROCHLORIDE 5 MG: 5 TABLET ORAL at 22:46

## 2019-02-19 RX ADMIN — DEXAMETHASONE SODIUM PHOSPHATE 8 MG: 4 INJECTION, SOLUTION INTRAMUSCULAR; INTRAVENOUS at 12:05

## 2019-02-19 RX ADMIN — METOPROLOL TARTRATE 25 MG: 25 TABLET ORAL at 21:35

## 2019-02-19 RX ADMIN — METOPROLOL TARTRATE 2.5 MG: 5 INJECTION, SOLUTION INTRAVENOUS at 15:12

## 2019-02-19 RX ADMIN — ONDANSETRON HYDROCHLORIDE 4 MG: 2 INJECTION, SOLUTION INTRAMUSCULAR; INTRAVENOUS at 12:42

## 2019-02-19 RX ADMIN — HYDROMORPHONE HYDROCHLORIDE 0.25 MG: 1 INJECTION, SOLUTION INTRAMUSCULAR; INTRAVENOUS; SUBCUTANEOUS at 18:34

## 2019-02-19 RX ADMIN — SODIUM CHLORIDE, POTASSIUM CHLORIDE, SODIUM LACTATE AND CALCIUM CHLORIDE: 600; 310; 30; 20 INJECTION, SOLUTION INTRAVENOUS at 11:59

## 2019-02-19 RX ADMIN — SODIUM CHLORIDE, POTASSIUM CHLORIDE, SODIUM LACTATE AND CALCIUM CHLORIDE: 600; 310; 30; 20 INJECTION, SOLUTION INTRAVENOUS at 12:25

## 2019-02-19 RX ADMIN — LIDOCAINE HYDROCHLORIDE 100 MG: 20 INJECTION, SOLUTION EPIDURAL; INFILTRATION; INTRACAUDAL; PERINEURAL at 12:05

## 2019-02-19 RX ADMIN — SODIUM CHLORIDE, POTASSIUM CHLORIDE, SODIUM LACTATE AND CALCIUM CHLORIDE: 600; 310; 30; 20 INJECTION, SOLUTION INTRAVENOUS at 16:15

## 2019-02-19 RX ADMIN — CEFOXITIN 2 G: 1 INJECTION, POWDER, FOR SOLUTION INTRAVENOUS at 12:04

## 2019-02-19 RX ADMIN — METOPROLOL TARTRATE 2.5 MG: 5 INJECTION INTRAVENOUS at 15:12

## 2019-02-19 RX ADMIN — HYDROMORPHONE HYDROCHLORIDE 0.5 MG: 1 INJECTION, SOLUTION INTRAMUSCULAR; INTRAVENOUS; SUBCUTANEOUS at 14:27

## 2019-02-19 RX ADMIN — FENTANYL CITRATE 50 MCG: 50 INJECTION, SOLUTION INTRAMUSCULAR; INTRAVENOUS at 12:12

## 2019-02-19 RX ADMIN — SUGAMMADEX 150 MG: 100 INJECTION, SOLUTION INTRAVENOUS at 14:03

## 2019-02-19 RX ADMIN — PHENYLEPHRINE HYDROCHLORIDE 100 MCG: 10 INJECTION INTRAVENOUS at 13:45

## 2019-02-19 RX ADMIN — ROCURONIUM BROMIDE 10 MG: 10 SOLUTION INTRAVENOUS at 13:04

## 2019-02-19 RX ADMIN — GABAPENTIN 300 MG: 300 CAPSULE ORAL at 21:35

## 2019-02-19 RX ADMIN — ROCURONIUM BROMIDE 10 MG: 10 SOLUTION INTRAVENOUS at 13:40

## 2019-02-19 RX ADMIN — PROPOFOL 100 MG: 10 INJECTION, EMULSION INTRAVENOUS at 12:05

## 2019-02-19 RX ADMIN — INSULIN LISPRO 4 UNITS: 100 INJECTION, SOLUTION INTRAVENOUS; SUBCUTANEOUS at 21:42

## 2019-02-19 RX ADMIN — KETOROLAC TROMETHAMINE 15 MG: 30 INJECTION, SOLUTION INTRAMUSCULAR; INTRAVENOUS at 21:35

## 2019-02-19 ASSESSMENT — PULMONARY FUNCTION TESTS
PIF_VALUE: 38
PIF_VALUE: 23
PIF_VALUE: 38
PIF_VALUE: 35
PIF_VALUE: 23
PIF_VALUE: 23
PIF_VALUE: 1
PIF_VALUE: 23
PIF_VALUE: 28
PIF_VALUE: 37
PIF_VALUE: 35
PIF_VALUE: 2
PIF_VALUE: 29
PIF_VALUE: 1
PIF_VALUE: 35
PIF_VALUE: 1
PIF_VALUE: 23
PIF_VALUE: 38
PIF_VALUE: 23
PIF_VALUE: 37
PIF_VALUE: 23
PIF_VALUE: 23
PIF_VALUE: 37
PIF_VALUE: 23
PIF_VALUE: 41
PIF_VALUE: 23
PIF_VALUE: 24
PIF_VALUE: 23
PIF_VALUE: 31
PIF_VALUE: 37
PIF_VALUE: 23
PIF_VALUE: 28
PIF_VALUE: 38
PIF_VALUE: 38
PIF_VALUE: 5
PIF_VALUE: 23
PIF_VALUE: 29
PIF_VALUE: 23
PIF_VALUE: 37
PIF_VALUE: 23
PIF_VALUE: 23
PIF_VALUE: 1
PIF_VALUE: 23
PIF_VALUE: 4
PIF_VALUE: 23
PIF_VALUE: 15
PIF_VALUE: 23
PIF_VALUE: 23
PIF_VALUE: 37
PIF_VALUE: 23
PIF_VALUE: 27
PIF_VALUE: 23
PIF_VALUE: 24
PIF_VALUE: 1
PIF_VALUE: 1
PIF_VALUE: 31
PIF_VALUE: 23
PIF_VALUE: 38
PIF_VALUE: 28
PIF_VALUE: 23
PIF_VALUE: 36
PIF_VALUE: 25
PIF_VALUE: 35
PIF_VALUE: 1
PIF_VALUE: 37
PIF_VALUE: 24
PIF_VALUE: 24
PIF_VALUE: 35
PIF_VALUE: 4
PIF_VALUE: 24
PIF_VALUE: 23
PIF_VALUE: 35
PIF_VALUE: 38
PIF_VALUE: 38
PIF_VALUE: 1
PIF_VALUE: 23
PIF_VALUE: 37
PIF_VALUE: 23
PIF_VALUE: 30
PIF_VALUE: 23
PIF_VALUE: 37
PIF_VALUE: 17
PIF_VALUE: 38
PIF_VALUE: 23
PIF_VALUE: 35
PIF_VALUE: 23
PIF_VALUE: 4
PIF_VALUE: 38
PIF_VALUE: 35
PIF_VALUE: 24
PIF_VALUE: 30
PIF_VALUE: 38
PIF_VALUE: 24
PIF_VALUE: 38
PIF_VALUE: 38
PIF_VALUE: 23
PIF_VALUE: 35
PIF_VALUE: 23
PIF_VALUE: 35
PIF_VALUE: 37
PIF_VALUE: 23
PIF_VALUE: 38
PIF_VALUE: 23
PIF_VALUE: 38
PIF_VALUE: 37
PIF_VALUE: 23
PIF_VALUE: 37
PIF_VALUE: 35
PIF_VALUE: 37
PIF_VALUE: 23
PIF_VALUE: 1
PIF_VALUE: 23
PIF_VALUE: 23

## 2019-02-19 ASSESSMENT — PAIN DESCRIPTION - PAIN TYPE
TYPE: SURGICAL PAIN

## 2019-02-19 ASSESSMENT — PAIN DESCRIPTION - ORIENTATION
ORIENTATION: MID

## 2019-02-19 ASSESSMENT — PAIN DESCRIPTION - FREQUENCY
FREQUENCY: CONTINUOUS
FREQUENCY: CONTINUOUS

## 2019-02-19 ASSESSMENT — PAIN DESCRIPTION - ONSET
ONSET: ON-GOING
ONSET: ON-GOING

## 2019-02-19 ASSESSMENT — PAIN SCALES - GENERAL
PAINLEVEL_OUTOF10: 8
PAINLEVEL_OUTOF10: 6
PAINLEVEL_OUTOF10: 10
PAINLEVEL_OUTOF10: 0
PAINLEVEL_OUTOF10: 6
PAINLEVEL_OUTOF10: 0
PAINLEVEL_OUTOF10: 8
PAINLEVEL_OUTOF10: 2
PAINLEVEL_OUTOF10: 5

## 2019-02-19 ASSESSMENT — PAIN - FUNCTIONAL ASSESSMENT
PAIN_FUNCTIONAL_ASSESSMENT: ACTIVITIES ARE NOT PREVENTED
PAIN_FUNCTIONAL_ASSESSMENT: PREVENTS OR INTERFERES SOME ACTIVE ACTIVITIES AND ADLS
PAIN_FUNCTIONAL_ASSESSMENT: 0-10

## 2019-02-19 ASSESSMENT — PAIN DESCRIPTION - DESCRIPTORS
DESCRIPTORS: ACHING;SORE
DESCRIPTORS: ACHING

## 2019-02-19 ASSESSMENT — PAIN DESCRIPTION - LOCATION
LOCATION: ABDOMEN

## 2019-02-19 ASSESSMENT — PAIN DESCRIPTION - PROGRESSION
CLINICAL_PROGRESSION: NOT CHANGED
CLINICAL_PROGRESSION: GRADUALLY WORSENING

## 2019-02-20 LAB
ANION GAP SERPL CALCULATED.3IONS-SCNC: 10 MMOL/L (ref 7–16)
BUN BLDV-MCNC: 6 MG/DL (ref 8–23)
CALCIUM SERPL-MCNC: 8.9 MG/DL (ref 8.6–10.2)
CHLORIDE BLD-SCNC: 98 MMOL/L (ref 98–107)
CO2: 29 MMOL/L (ref 22–29)
CREAT SERPL-MCNC: 0.6 MG/DL (ref 0.5–1)
GFR AFRICAN AMERICAN: >60
GFR NON-AFRICAN AMERICAN: >60 ML/MIN/1.73
GLUCOSE BLD-MCNC: 162 MG/DL (ref 74–99)
HCT VFR BLD CALC: 32.3 % (ref 34–48)
HEMOGLOBIN: 9.5 G/DL (ref 11.5–15.5)
MCH RBC QN AUTO: 26.3 PG (ref 26–35)
MCHC RBC AUTO-ENTMCNC: 29.4 % (ref 32–34.5)
MCV RBC AUTO: 89.5 FL (ref 80–99.9)
METER GLUCOSE: 123 MG/DL (ref 74–99)
METER GLUCOSE: 147 MG/DL (ref 74–99)
METER GLUCOSE: 157 MG/DL (ref 74–99)
METER GLUCOSE: 160 MG/DL (ref 74–99)
METER GLUCOSE: 166 MG/DL (ref 74–99)
METER GLUCOSE: 234 MG/DL (ref 74–99)
PDW BLD-RTO: 15.1 FL (ref 11.5–15)
PLATELET # BLD: 310 E9/L (ref 130–450)
PMV BLD AUTO: 9.8 FL (ref 7–12)
POTASSIUM SERPL-SCNC: 4.6 MMOL/L (ref 3.5–5)
RBC # BLD: 3.61 E12/L (ref 3.5–5.5)
SODIUM BLD-SCNC: 137 MMOL/L (ref 132–146)
WBC # BLD: 18.3 E9/L (ref 4.5–11.5)

## 2019-02-20 PROCEDURE — 82962 GLUCOSE BLOOD TEST: CPT

## 2019-02-20 PROCEDURE — 6360000002 HC RX W HCPCS: Performed by: SURGERY

## 2019-02-20 PROCEDURE — 1200000000 HC SEMI PRIVATE

## 2019-02-20 PROCEDURE — 80048 BASIC METABOLIC PNL TOTAL CA: CPT

## 2019-02-20 PROCEDURE — 85027 COMPLETE CBC AUTOMATED: CPT

## 2019-02-20 PROCEDURE — 6370000000 HC RX 637 (ALT 250 FOR IP): Performed by: STUDENT IN AN ORGANIZED HEALTH CARE EDUCATION/TRAINING PROGRAM

## 2019-02-20 PROCEDURE — 6360000002 HC RX W HCPCS: Performed by: STUDENT IN AN ORGANIZED HEALTH CARE EDUCATION/TRAINING PROGRAM

## 2019-02-20 PROCEDURE — 36415 COLL VENOUS BLD VENIPUNCTURE: CPT

## 2019-02-20 PROCEDURE — 2700000000 HC OXYGEN THERAPY PER DAY

## 2019-02-20 RX ORDER — POTASSIUM CHLORIDE AND SODIUM CHLORIDE 450; 150 MG/100ML; MG/100ML
INJECTION, SOLUTION INTRAVENOUS CONTINUOUS
Status: DISCONTINUED | OUTPATIENT
Start: 2019-02-20 | End: 2019-02-23

## 2019-02-20 RX ADMIN — SIMVASTATIN 20 MG: 20 TABLET, FILM COATED ORAL at 20:58

## 2019-02-20 RX ADMIN — GABAPENTIN 300 MG: 300 CAPSULE ORAL at 17:11

## 2019-02-20 RX ADMIN — ACETAMINOPHEN 500 MG: 500 TABLET ORAL at 14:12

## 2019-02-20 RX ADMIN — POTASSIUM CHLORIDE AND SODIUM CHLORIDE: 450; 150 INJECTION, SOLUTION INTRAVENOUS at 22:43

## 2019-02-20 RX ADMIN — ACETAMINOPHEN 500 MG: 500 TABLET ORAL at 21:00

## 2019-02-20 RX ADMIN — PRAMIPEXOLE DIHYDROCHLORIDE 1 MG: 1 TABLET ORAL at 20:58

## 2019-02-20 RX ADMIN — METHOCARBAMOL TABLETS 1000 MG: 500 TABLET, COATED ORAL at 09:37

## 2019-02-20 RX ADMIN — METOPROLOL TARTRATE 25 MG: 25 TABLET ORAL at 20:58

## 2019-02-20 RX ADMIN — KETOROLAC TROMETHAMINE 15 MG: 30 INJECTION, SOLUTION INTRAMUSCULAR; INTRAVENOUS at 03:36

## 2019-02-20 RX ADMIN — ACETAMINOPHEN 500 MG: 500 TABLET ORAL at 06:00

## 2019-02-20 RX ADMIN — OXYCODONE HYDROCHLORIDE 10 MG: 5 TABLET ORAL at 18:46

## 2019-02-20 RX ADMIN — INSULIN LISPRO 2 UNITS: 100 INJECTION, SOLUTION INTRAVENOUS; SUBCUTANEOUS at 09:38

## 2019-02-20 RX ADMIN — INSULIN LISPRO 1 UNITS: 100 INJECTION, SOLUTION INTRAVENOUS; SUBCUTANEOUS at 20:58

## 2019-02-20 RX ADMIN — POTASSIUM CHLORIDE AND SODIUM CHLORIDE: 450; 150 INJECTION, SOLUTION INTRAVENOUS at 18:46

## 2019-02-20 RX ADMIN — METOPROLOL TARTRATE 25 MG: 25 TABLET ORAL at 09:53

## 2019-02-20 RX ADMIN — PANTOPRAZOLE SODIUM 40 MG: 40 TABLET, DELAYED RELEASE ORAL at 06:00

## 2019-02-20 RX ADMIN — KETOROLAC TROMETHAMINE 15 MG: 30 INJECTION, SOLUTION INTRAMUSCULAR; INTRAVENOUS at 14:12

## 2019-02-20 RX ADMIN — INSULIN LISPRO 4 UNITS: 100 INJECTION, SOLUTION INTRAVENOUS; SUBCUTANEOUS at 17:19

## 2019-02-20 RX ADMIN — METHOCARBAMOL TABLETS 1000 MG: 500 TABLET, COATED ORAL at 17:11

## 2019-02-20 RX ADMIN — PANTOPRAZOLE SODIUM 40 MG: 40 TABLET, DELAYED RELEASE ORAL at 17:11

## 2019-02-20 RX ADMIN — METHOCARBAMOL TABLETS 1000 MG: 500 TABLET, COATED ORAL at 20:58

## 2019-02-20 RX ADMIN — ACETAMINOPHEN 500 MG: 500 TABLET ORAL at 17:11

## 2019-02-20 RX ADMIN — KETOROLAC TROMETHAMINE 15 MG: 30 INJECTION, SOLUTION INTRAMUSCULAR; INTRAVENOUS at 09:42

## 2019-02-20 RX ADMIN — ACETAMINOPHEN 500 MG: 500 TABLET ORAL at 01:45

## 2019-02-20 RX ADMIN — KETOROLAC TROMETHAMINE 15 MG: 30 INJECTION, SOLUTION INTRAMUSCULAR; INTRAVENOUS at 20:59

## 2019-02-20 RX ADMIN — AMLODIPINE BESYLATE 5 MG: 5 TABLET ORAL at 09:36

## 2019-02-20 RX ADMIN — POTASSIUM CHLORIDE AND SODIUM CHLORIDE: 450; 150 INJECTION, SOLUTION INTRAVENOUS at 08:33

## 2019-02-20 RX ADMIN — GABAPENTIN 300 MG: 300 CAPSULE ORAL at 09:36

## 2019-02-20 RX ADMIN — ACETAMINOPHEN 500 MG: 500 TABLET ORAL at 09:37

## 2019-02-20 RX ADMIN — ENOXAPARIN SODIUM 40 MG: 40 INJECTION SUBCUTANEOUS at 09:49

## 2019-02-20 RX ADMIN — GABAPENTIN 300 MG: 300 CAPSULE ORAL at 20:59

## 2019-02-20 RX ADMIN — METHOCARBAMOL TABLETS 1000 MG: 500 TABLET, COATED ORAL at 14:12

## 2019-02-20 RX ADMIN — GABAPENTIN 300 MG: 300 CAPSULE ORAL at 14:12

## 2019-02-20 ASSESSMENT — PAIN SCALES - GENERAL
PAINLEVEL_OUTOF10: 0
PAINLEVEL_OUTOF10: 0
PAINLEVEL_OUTOF10: 7
PAINLEVEL_OUTOF10: 9
PAINLEVEL_OUTOF10: 0
PAINLEVEL_OUTOF10: 8
PAINLEVEL_OUTOF10: 10
PAINLEVEL_OUTOF10: 7
PAINLEVEL_OUTOF10: 8
PAINLEVEL_OUTOF10: 0
PAINLEVEL_OUTOF10: 4
PAINLEVEL_OUTOF10: 0

## 2019-02-20 ASSESSMENT — PAIN - FUNCTIONAL ASSESSMENT
PAIN_FUNCTIONAL_ASSESSMENT: PREVENTS OR INTERFERES SOME ACTIVE ACTIVITIES AND ADLS

## 2019-02-20 ASSESSMENT — PAIN DESCRIPTION - ONSET
ONSET: ON-GOING

## 2019-02-20 ASSESSMENT — PAIN DESCRIPTION - DESCRIPTORS
DESCRIPTORS: ACHING;DISCOMFORT;DULL

## 2019-02-20 ASSESSMENT — PAIN DESCRIPTION - ORIENTATION
ORIENTATION: MID
ORIENTATION: MID
ORIENTATION: ANTERIOR
ORIENTATION: RIGHT;LEFT;ANTERIOR

## 2019-02-20 ASSESSMENT — PAIN DESCRIPTION - PROGRESSION
CLINICAL_PROGRESSION: GRADUALLY WORSENING

## 2019-02-20 ASSESSMENT — PAIN DESCRIPTION - PAIN TYPE
TYPE: ACUTE PAIN;SURGICAL PAIN

## 2019-02-20 ASSESSMENT — PAIN DESCRIPTION - LOCATION
LOCATION: ABDOMEN

## 2019-02-20 ASSESSMENT — PAIN DESCRIPTION - FREQUENCY
FREQUENCY: CONTINUOUS

## 2019-02-21 ENCOUNTER — APPOINTMENT (OUTPATIENT)
Dept: GENERAL RADIOLOGY | Age: 79
DRG: 330 | End: 2019-02-21
Attending: SURGERY
Payer: MEDICARE

## 2019-02-21 LAB
ALBUMIN SERPL-MCNC: 3.7 G/DL (ref 3.5–5.2)
ALP BLD-CCNC: 60 U/L (ref 35–104)
ALT SERPL-CCNC: 16 U/L (ref 0–32)
ANION GAP SERPL CALCULATED.3IONS-SCNC: 12 MMOL/L (ref 7–16)
ANION GAP SERPL CALCULATED.3IONS-SCNC: 9 MMOL/L (ref 7–16)
AST SERPL-CCNC: 17 U/L (ref 0–31)
BACTERIA: ABNORMAL /HPF
BASOPHILS ABSOLUTE: 0.02 E9/L (ref 0–0.2)
BASOPHILS RELATIVE PERCENT: 0.1 % (ref 0–2)
BILIRUB SERPL-MCNC: 1.4 MG/DL (ref 0–1.2)
BILIRUBIN URINE: NEGATIVE
BLOOD, URINE: ABNORMAL
BUN BLDV-MCNC: 8 MG/DL (ref 8–23)
BUN BLDV-MCNC: 8 MG/DL (ref 8–23)
CALCIUM IONIZED: 1.29 MMOL/L (ref 1.15–1.33)
CALCIUM SERPL-MCNC: 8.7 MG/DL (ref 8.6–10.2)
CALCIUM SERPL-MCNC: 9.1 MG/DL (ref 8.6–10.2)
CHLORIDE BLD-SCNC: 97 MMOL/L (ref 98–107)
CHLORIDE BLD-SCNC: 98 MMOL/L (ref 98–107)
CLARITY: CLEAR
CO2: 25 MMOL/L (ref 22–29)
CO2: 28 MMOL/L (ref 22–29)
COLOR: YELLOW
CREAT SERPL-MCNC: 0.6 MG/DL (ref 0.5–1)
CREAT SERPL-MCNC: 0.6 MG/DL (ref 0.5–1)
EOSINOPHILS ABSOLUTE: 0.05 E9/L (ref 0.05–0.5)
EOSINOPHILS RELATIVE PERCENT: 0.3 % (ref 0–6)
EPITHELIAL CELLS, UA: ABNORMAL /HPF
GFR AFRICAN AMERICAN: >60
GFR AFRICAN AMERICAN: >60
GFR NON-AFRICAN AMERICAN: >60 ML/MIN/1.73
GFR NON-AFRICAN AMERICAN: >60 ML/MIN/1.73
GLUCOSE BLD-MCNC: 146 MG/DL (ref 74–99)
GLUCOSE BLD-MCNC: 170 MG/DL (ref 74–99)
GLUCOSE URINE: NEGATIVE MG/DL
HCT VFR BLD CALC: 31.1 % (ref 34–48)
HCT VFR BLD CALC: 33.9 % (ref 34–48)
HEMOGLOBIN: 8.9 G/DL (ref 11.5–15.5)
HEMOGLOBIN: 9.8 G/DL (ref 11.5–15.5)
IMMATURE GRANULOCYTES #: 0.1 E9/L
IMMATURE GRANULOCYTES %: 0.7 % (ref 0–5)
KETONES, URINE: NEGATIVE MG/DL
LACTIC ACID: 2.1 MMOL/L (ref 0.5–2.2)
LEUKOCYTE ESTERASE, URINE: ABNORMAL
LYMPHOCYTES ABSOLUTE: 0.81 E9/L (ref 1.5–4)
LYMPHOCYTES RELATIVE PERCENT: 5.6 % (ref 20–42)
MAGNESIUM: 1.6 MG/DL (ref 1.6–2.6)
MCH RBC QN AUTO: 25.9 PG (ref 26–35)
MCH RBC QN AUTO: 26.3 PG (ref 26–35)
MCHC RBC AUTO-ENTMCNC: 28.6 % (ref 32–34.5)
MCHC RBC AUTO-ENTMCNC: 28.9 % (ref 32–34.5)
MCV RBC AUTO: 90.7 FL (ref 80–99.9)
MCV RBC AUTO: 90.9 FL (ref 80–99.9)
METER GLUCOSE: 135 MG/DL (ref 74–99)
METER GLUCOSE: 148 MG/DL (ref 74–99)
METER GLUCOSE: 152 MG/DL (ref 74–99)
METER GLUCOSE: 160 MG/DL (ref 74–99)
METER GLUCOSE: 170 MG/DL (ref 74–99)
MONOCYTES ABSOLUTE: 0.36 E9/L (ref 0.1–0.95)
MONOCYTES RELATIVE PERCENT: 2.5 % (ref 2–12)
NEUTROPHILS ABSOLUTE: 13.15 E9/L (ref 1.8–7.3)
NEUTROPHILS RELATIVE PERCENT: 90.8 % (ref 43–80)
NITRITE, URINE: NEGATIVE
PDW BLD-RTO: 15.1 FL (ref 11.5–15)
PDW BLD-RTO: 15.3 FL (ref 11.5–15)
PH UA: 6 (ref 5–9)
PHOSPHORUS: 1.6 MG/DL (ref 2.5–4.5)
PLATELET # BLD: 286 E9/L (ref 130–450)
PLATELET # BLD: 302 E9/L (ref 130–450)
PMV BLD AUTO: 9.6 FL (ref 7–12)
PMV BLD AUTO: 9.7 FL (ref 7–12)
POLYCHROMASIA: ABNORMAL
POTASSIUM SERPL-SCNC: 4 MMOL/L (ref 3.5–5)
POTASSIUM SERPL-SCNC: 4.2 MMOL/L (ref 3.5–5)
PROCALCITONIN: 0.56 NG/ML (ref 0–0.08)
PROTEIN UA: NEGATIVE MG/DL
RBC # BLD: 3.43 E12/L (ref 3.5–5.5)
RBC # BLD: 3.73 E12/L (ref 3.5–5.5)
RBC UA: ABNORMAL /HPF (ref 0–2)
SODIUM BLD-SCNC: 134 MMOL/L (ref 132–146)
SODIUM BLD-SCNC: 135 MMOL/L (ref 132–146)
SPECIFIC GRAVITY UA: <=1.005 (ref 1–1.03)
TOTAL PROTEIN: 7.9 G/DL (ref 6.4–8.3)
UROBILINOGEN, URINE: 0.2 E.U./DL
WBC # BLD: 14.5 E9/L (ref 4.5–11.5)
WBC # BLD: 15.4 E9/L (ref 4.5–11.5)
WBC UA: ABNORMAL /HPF (ref 0–5)

## 2019-02-21 PROCEDURE — 36415 COLL VENOUS BLD VENIPUNCTURE: CPT

## 2019-02-21 PROCEDURE — 81001 URINALYSIS AUTO W/SCOPE: CPT

## 2019-02-21 PROCEDURE — 83605 ASSAY OF LACTIC ACID: CPT

## 2019-02-21 PROCEDURE — 2700000000 HC OXYGEN THERAPY PER DAY

## 2019-02-21 PROCEDURE — 51701 INSERT BLADDER CATHETER: CPT

## 2019-02-21 PROCEDURE — 82962 GLUCOSE BLOOD TEST: CPT

## 2019-02-21 PROCEDURE — 6360000002 HC RX W HCPCS: Performed by: STUDENT IN AN ORGANIZED HEALTH CARE EDUCATION/TRAINING PROGRAM

## 2019-02-21 PROCEDURE — 6360000002 HC RX W HCPCS: Performed by: SURGERY

## 2019-02-21 PROCEDURE — 6370000000 HC RX 637 (ALT 250 FOR IP): Performed by: STUDENT IN AN ORGANIZED HEALTH CARE EDUCATION/TRAINING PROGRAM

## 2019-02-21 PROCEDURE — 2060000000 HC ICU INTERMEDIATE R&B

## 2019-02-21 PROCEDURE — 82330 ASSAY OF CALCIUM: CPT

## 2019-02-21 PROCEDURE — 83735 ASSAY OF MAGNESIUM: CPT

## 2019-02-21 PROCEDURE — 85027 COMPLETE CBC AUTOMATED: CPT

## 2019-02-21 PROCEDURE — 87040 BLOOD CULTURE FOR BACTERIA: CPT

## 2019-02-21 PROCEDURE — 2580000003 HC RX 258: Performed by: STUDENT IN AN ORGANIZED HEALTH CARE EDUCATION/TRAINING PROGRAM

## 2019-02-21 PROCEDURE — 84145 PROCALCITONIN (PCT): CPT

## 2019-02-21 PROCEDURE — 84100 ASSAY OF PHOSPHORUS: CPT

## 2019-02-21 PROCEDURE — 71045 X-RAY EXAM CHEST 1 VIEW: CPT

## 2019-02-21 PROCEDURE — 80048 BASIC METABOLIC PNL TOTAL CA: CPT

## 2019-02-21 PROCEDURE — 85025 COMPLETE CBC W/AUTO DIFF WBC: CPT

## 2019-02-21 PROCEDURE — 93005 ELECTROCARDIOGRAM TRACING: CPT | Performed by: SURGERY

## 2019-02-21 PROCEDURE — 80053 COMPREHEN METABOLIC PANEL: CPT

## 2019-02-21 RX ORDER — MAGNESIUM SULFATE 1 G/100ML
1 INJECTION INTRAVENOUS PRN
Status: DISCONTINUED | OUTPATIENT
Start: 2019-02-21 | End: 2019-02-28 | Stop reason: HOSPADM

## 2019-02-21 RX ORDER — FUROSEMIDE 10 MG/ML
20 INJECTION INTRAMUSCULAR; INTRAVENOUS ONCE
Status: COMPLETED | OUTPATIENT
Start: 2019-02-21 | End: 2019-02-21

## 2019-02-21 RX ADMIN — ACETAMINOPHEN 500 MG: 500 TABLET ORAL at 13:21

## 2019-02-21 RX ADMIN — KETOROLAC TROMETHAMINE 15 MG: 30 INJECTION, SOLUTION INTRAMUSCULAR; INTRAVENOUS at 17:00

## 2019-02-21 RX ADMIN — INSULIN LISPRO 2 UNITS: 100 INJECTION, SOLUTION INTRAVENOUS; SUBCUTANEOUS at 17:02

## 2019-02-21 RX ADMIN — INSULIN LISPRO 1 UNITS: 100 INJECTION, SOLUTION INTRAVENOUS; SUBCUTANEOUS at 21:22

## 2019-02-21 RX ADMIN — ACETAMINOPHEN 500 MG: 500 TABLET ORAL at 10:03

## 2019-02-21 RX ADMIN — FUROSEMIDE 20 MG: 10 INJECTION, SOLUTION INTRAVENOUS at 15:33

## 2019-02-21 RX ADMIN — SIMVASTATIN 20 MG: 20 TABLET, FILM COATED ORAL at 21:21

## 2019-02-21 RX ADMIN — GABAPENTIN 300 MG: 300 CAPSULE ORAL at 21:22

## 2019-02-21 RX ADMIN — METOPROLOL TARTRATE 25 MG: 25 TABLET ORAL at 10:05

## 2019-02-21 RX ADMIN — POTASSIUM CHLORIDE AND SODIUM CHLORIDE: 450; 150 INJECTION, SOLUTION INTRAVENOUS at 13:21

## 2019-02-21 RX ADMIN — INSULIN LISPRO 2 UNITS: 100 INJECTION, SOLUTION INTRAVENOUS; SUBCUTANEOUS at 07:07

## 2019-02-21 RX ADMIN — METHOCARBAMOL TABLETS 1000 MG: 500 TABLET, COATED ORAL at 21:22

## 2019-02-21 RX ADMIN — GABAPENTIN 300 MG: 300 CAPSULE ORAL at 17:02

## 2019-02-21 RX ADMIN — Medication 10 ML: at 21:22

## 2019-02-21 RX ADMIN — ENOXAPARIN SODIUM 40 MG: 40 INJECTION SUBCUTANEOUS at 10:02

## 2019-02-21 RX ADMIN — OXYCODONE HYDROCHLORIDE 10 MG: 5 TABLET ORAL at 05:22

## 2019-02-21 RX ADMIN — ACETAMINOPHEN 500 MG: 500 TABLET ORAL at 21:21

## 2019-02-21 RX ADMIN — PRAMIPEXOLE DIHYDROCHLORIDE 1 MG: 1 TABLET ORAL at 22:58

## 2019-02-21 RX ADMIN — ACETAMINOPHEN 500 MG: 500 TABLET ORAL at 05:21

## 2019-02-21 RX ADMIN — INSULIN LISPRO 2 UNITS: 100 INJECTION, SOLUTION INTRAVENOUS; SUBCUTANEOUS at 11:44

## 2019-02-21 RX ADMIN — AMLODIPINE BESYLATE 5 MG: 5 TABLET ORAL at 10:04

## 2019-02-21 RX ADMIN — ACETAMINOPHEN 500 MG: 500 TABLET ORAL at 16:33

## 2019-02-21 RX ADMIN — KETOROLAC TROMETHAMINE 15 MG: 30 INJECTION, SOLUTION INTRAMUSCULAR; INTRAVENOUS at 10:03

## 2019-02-21 RX ADMIN — ACETAMINOPHEN 500 MG: 500 TABLET ORAL at 01:41

## 2019-02-21 RX ADMIN — METHOCARBAMOL TABLETS 1000 MG: 500 TABLET, COATED ORAL at 10:03

## 2019-02-21 RX ADMIN — METHOCARBAMOL TABLETS 1000 MG: 500 TABLET, COATED ORAL at 13:21

## 2019-02-21 RX ADMIN — GABAPENTIN 300 MG: 300 CAPSULE ORAL at 10:03

## 2019-02-21 RX ADMIN — METHOCARBAMOL TABLETS 1000 MG: 500 TABLET, COATED ORAL at 17:02

## 2019-02-21 RX ADMIN — PANTOPRAZOLE SODIUM 40 MG: 40 TABLET, DELAYED RELEASE ORAL at 05:22

## 2019-02-21 RX ADMIN — GABAPENTIN 300 MG: 300 CAPSULE ORAL at 13:21

## 2019-02-21 RX ADMIN — KETOROLAC TROMETHAMINE 15 MG: 30 INJECTION, SOLUTION INTRAMUSCULAR; INTRAVENOUS at 22:58

## 2019-02-21 RX ADMIN — PANTOPRAZOLE SODIUM 40 MG: 40 TABLET, DELAYED RELEASE ORAL at 17:00

## 2019-02-21 ASSESSMENT — PAIN - FUNCTIONAL ASSESSMENT
PAIN_FUNCTIONAL_ASSESSMENT: PREVENTS OR INTERFERES SOME ACTIVE ACTIVITIES AND ADLS
PAIN_FUNCTIONAL_ASSESSMENT: PREVENTS OR INTERFERES WITH ALL ACTIVE AND SOME PASSIVE ACTIVITIES
PAIN_FUNCTIONAL_ASSESSMENT: PREVENTS OR INTERFERES SOME ACTIVE ACTIVITIES AND ADLS
PAIN_FUNCTIONAL_ASSESSMENT: PREVENTS OR INTERFERES SOME ACTIVE ACTIVITIES AND ADLS

## 2019-02-21 ASSESSMENT — PAIN DESCRIPTION - ORIENTATION
ORIENTATION: MID

## 2019-02-21 ASSESSMENT — PAIN SCALES - GENERAL
PAINLEVEL_OUTOF10: 0
PAINLEVEL_OUTOF10: 2
PAINLEVEL_OUTOF10: 8
PAINLEVEL_OUTOF10: 8
PAINLEVEL_OUTOF10: 0
PAINLEVEL_OUTOF10: 6
PAINLEVEL_OUTOF10: 9
PAINLEVEL_OUTOF10: 7

## 2019-02-21 ASSESSMENT — PAIN DESCRIPTION - ONSET
ONSET: ON-GOING

## 2019-02-21 ASSESSMENT — PAIN DESCRIPTION - PAIN TYPE
TYPE: SURGICAL PAIN
TYPE: ACUTE PAIN;SURGICAL PAIN
TYPE: ACUTE PAIN
TYPE: ACUTE PAIN

## 2019-02-21 ASSESSMENT — PAIN DESCRIPTION - LOCATION
LOCATION: ABDOMEN

## 2019-02-21 ASSESSMENT — PAIN DESCRIPTION - FREQUENCY
FREQUENCY: INTERMITTENT
FREQUENCY: INTERMITTENT
FREQUENCY: CONTINUOUS
FREQUENCY: CONTINUOUS

## 2019-02-21 ASSESSMENT — PAIN DESCRIPTION - DESCRIPTORS
DESCRIPTORS: ACHING;DISCOMFORT;SHOOTING
DESCRIPTORS: ACHING;DISCOMFORT;DULL
DESCRIPTORS: ACHING;DISCOMFORT
DESCRIPTORS: ACHING;SORE

## 2019-02-21 ASSESSMENT — PAIN DESCRIPTION - PROGRESSION
CLINICAL_PROGRESSION: GRADUALLY WORSENING
CLINICAL_PROGRESSION: GRADUALLY WORSENING

## 2019-02-22 LAB
ANION GAP SERPL CALCULATED.3IONS-SCNC: 7 MMOL/L (ref 7–16)
BUN BLDV-MCNC: 7 MG/DL (ref 8–23)
CALCIUM SERPL-MCNC: 8.8 MG/DL (ref 8.6–10.2)
CHLORIDE BLD-SCNC: 101 MMOL/L (ref 98–107)
CO2: 26 MMOL/L (ref 22–29)
CREAT SERPL-MCNC: 0.6 MG/DL (ref 0.5–1)
EKG ATRIAL RATE: 129 BPM
EKG Q-T INTERVAL: 290 MS
EKG QRS DURATION: 66 MS
EKG QTC CALCULATION (BAZETT): 428 MS
EKG R AXIS: 25 DEGREES
EKG T AXIS: 28 DEGREES
EKG VENTRICULAR RATE: 131 BPM
GFR AFRICAN AMERICAN: >60
GFR NON-AFRICAN AMERICAN: >60 ML/MIN/1.73
GLUCOSE BLD-MCNC: 138 MG/DL (ref 74–99)
HCT VFR BLD CALC: 29 % (ref 34–48)
HEMOGLOBIN: 8.6 G/DL (ref 11.5–15.5)
MAGNESIUM: 1.8 MG/DL (ref 1.6–2.6)
MCH RBC QN AUTO: 26.5 PG (ref 26–35)
MCHC RBC AUTO-ENTMCNC: 29.7 % (ref 32–34.5)
MCV RBC AUTO: 89.2 FL (ref 80–99.9)
METER GLUCOSE: 122 MG/DL (ref 74–99)
METER GLUCOSE: 131 MG/DL (ref 74–99)
METER GLUCOSE: 139 MG/DL (ref 74–99)
METER GLUCOSE: 140 MG/DL (ref 74–99)
PDW BLD-RTO: 14.8 FL (ref 11.5–15)
PLATELET # BLD: 266 E9/L (ref 130–450)
PMV BLD AUTO: 9.8 FL (ref 7–12)
POTASSIUM SERPL-SCNC: 4 MMOL/L (ref 3.5–5)
RBC # BLD: 3.25 E12/L (ref 3.5–5.5)
SODIUM BLD-SCNC: 134 MMOL/L (ref 132–146)
WBC # BLD: 16.3 E9/L (ref 4.5–11.5)

## 2019-02-22 PROCEDURE — 82962 GLUCOSE BLOOD TEST: CPT

## 2019-02-22 PROCEDURE — 2060000000 HC ICU INTERMEDIATE R&B

## 2019-02-22 PROCEDURE — 99222 1ST HOSP IP/OBS MODERATE 55: CPT | Performed by: INTERNAL MEDICINE

## 2019-02-22 PROCEDURE — 85027 COMPLETE CBC AUTOMATED: CPT

## 2019-02-22 PROCEDURE — APPSS60 APP SPLIT SHARED TIME 46-60 MINUTES: Performed by: NURSE PRACTITIONER

## 2019-02-22 PROCEDURE — 83735 ASSAY OF MAGNESIUM: CPT

## 2019-02-22 PROCEDURE — 80048 BASIC METABOLIC PNL TOTAL CA: CPT

## 2019-02-22 PROCEDURE — 2580000003 HC RX 258: Performed by: STUDENT IN AN ORGANIZED HEALTH CARE EDUCATION/TRAINING PROGRAM

## 2019-02-22 PROCEDURE — 6370000000 HC RX 637 (ALT 250 FOR IP): Performed by: STUDENT IN AN ORGANIZED HEALTH CARE EDUCATION/TRAINING PROGRAM

## 2019-02-22 PROCEDURE — 6360000002 HC RX W HCPCS: Performed by: STUDENT IN AN ORGANIZED HEALTH CARE EDUCATION/TRAINING PROGRAM

## 2019-02-22 PROCEDURE — 36415 COLL VENOUS BLD VENIPUNCTURE: CPT

## 2019-02-22 PROCEDURE — 6360000002 HC RX W HCPCS: Performed by: SURGERY

## 2019-02-22 PROCEDURE — 2700000000 HC OXYGEN THERAPY PER DAY

## 2019-02-22 RX ADMIN — POTASSIUM CHLORIDE AND SODIUM CHLORIDE: 450; 150 INJECTION, SOLUTION INTRAVENOUS at 18:25

## 2019-02-22 RX ADMIN — GABAPENTIN 300 MG: 300 CAPSULE ORAL at 21:15

## 2019-02-22 RX ADMIN — ACETAMINOPHEN 500 MG: 500 TABLET ORAL at 12:42

## 2019-02-22 RX ADMIN — KETOROLAC TROMETHAMINE 15 MG: 30 INJECTION, SOLUTION INTRAMUSCULAR; INTRAVENOUS at 22:27

## 2019-02-22 RX ADMIN — METHOCARBAMOL TABLETS 1000 MG: 500 TABLET, COATED ORAL at 09:11

## 2019-02-22 RX ADMIN — ACETAMINOPHEN 500 MG: 500 TABLET ORAL at 16:20

## 2019-02-22 RX ADMIN — GABAPENTIN 300 MG: 300 CAPSULE ORAL at 09:11

## 2019-02-22 RX ADMIN — SIMVASTATIN 20 MG: 20 TABLET, FILM COATED ORAL at 21:15

## 2019-02-22 RX ADMIN — OXYCODONE HYDROCHLORIDE 10 MG: 5 TABLET ORAL at 17:38

## 2019-02-22 RX ADMIN — KETOROLAC TROMETHAMINE 15 MG: 30 INJECTION, SOLUTION INTRAMUSCULAR; INTRAVENOUS at 04:20

## 2019-02-22 RX ADMIN — PANTOPRAZOLE SODIUM 40 MG: 40 TABLET, DELAYED RELEASE ORAL at 16:19

## 2019-02-22 RX ADMIN — ENOXAPARIN SODIUM 40 MG: 40 INJECTION SUBCUTANEOUS at 09:11

## 2019-02-22 RX ADMIN — GABAPENTIN 300 MG: 300 CAPSULE ORAL at 12:42

## 2019-02-22 RX ADMIN — METHOCARBAMOL TABLETS 1000 MG: 500 TABLET, COATED ORAL at 16:19

## 2019-02-22 RX ADMIN — INSULIN LISPRO 2 UNITS: 100 INJECTION, SOLUTION INTRAVENOUS; SUBCUTANEOUS at 06:37

## 2019-02-22 RX ADMIN — GABAPENTIN 300 MG: 300 CAPSULE ORAL at 16:19

## 2019-02-22 RX ADMIN — PANTOPRAZOLE SODIUM 40 MG: 40 TABLET, DELAYED RELEASE ORAL at 06:26

## 2019-02-22 RX ADMIN — PRAMIPEXOLE DIHYDROCHLORIDE 1 MG: 1 TABLET ORAL at 21:14

## 2019-02-22 RX ADMIN — ACETAMINOPHEN 500 MG: 500 TABLET ORAL at 21:15

## 2019-02-22 RX ADMIN — METHOCARBAMOL TABLETS 1000 MG: 500 TABLET, COATED ORAL at 21:15

## 2019-02-22 RX ADMIN — KETOROLAC TROMETHAMINE 15 MG: 30 INJECTION, SOLUTION INTRAMUSCULAR; INTRAVENOUS at 16:19

## 2019-02-22 RX ADMIN — ACETAMINOPHEN 500 MG: 500 TABLET ORAL at 05:45

## 2019-02-22 RX ADMIN — KETOROLAC TROMETHAMINE 15 MG: 30 INJECTION, SOLUTION INTRAMUSCULAR; INTRAVENOUS at 09:13

## 2019-02-22 RX ADMIN — ACETAMINOPHEN 500 MG: 500 TABLET ORAL at 01:39

## 2019-02-22 RX ADMIN — Medication 10 ML: at 21:15

## 2019-02-22 RX ADMIN — METHOCARBAMOL TABLETS 1000 MG: 500 TABLET, COATED ORAL at 12:42

## 2019-02-22 RX ADMIN — ACETAMINOPHEN 500 MG: 500 TABLET ORAL at 09:11

## 2019-02-22 ASSESSMENT — PAIN DESCRIPTION - LOCATION
LOCATION: ABDOMEN

## 2019-02-22 ASSESSMENT — PAIN SCALES - GENERAL
PAINLEVEL_OUTOF10: 1
PAINLEVEL_OUTOF10: 5
PAINLEVEL_OUTOF10: 8
PAINLEVEL_OUTOF10: 2
PAINLEVEL_OUTOF10: 0
PAINLEVEL_OUTOF10: 6
PAINLEVEL_OUTOF10: 3
PAINLEVEL_OUTOF10: 8
PAINLEVEL_OUTOF10: 4
PAINLEVEL_OUTOF10: 0
PAINLEVEL_OUTOF10: 3
PAINLEVEL_OUTOF10: 0
PAINLEVEL_OUTOF10: 7

## 2019-02-22 ASSESSMENT — PAIN DESCRIPTION - ORIENTATION
ORIENTATION: MID

## 2019-02-22 ASSESSMENT — PAIN DESCRIPTION - PAIN TYPE
TYPE: SURGICAL PAIN

## 2019-02-22 ASSESSMENT — PAIN DESCRIPTION - DESCRIPTORS
DESCRIPTORS: ACHING;CONSTANT;DISCOMFORT

## 2019-02-23 LAB
ANION GAP SERPL CALCULATED.3IONS-SCNC: 12 MMOL/L (ref 7–16)
BUN BLDV-MCNC: 7 MG/DL (ref 8–23)
CALCIUM SERPL-MCNC: 9.1 MG/DL (ref 8.6–10.2)
CHLORIDE BLD-SCNC: 99 MMOL/L (ref 98–107)
CO2: 24 MMOL/L (ref 22–29)
CREAT SERPL-MCNC: 0.5 MG/DL (ref 0.5–1)
GFR AFRICAN AMERICAN: >60
GFR NON-AFRICAN AMERICAN: >60 ML/MIN/1.73
GLUCOSE BLD-MCNC: 151 MG/DL (ref 74–99)
HCT VFR BLD CALC: 31.5 % (ref 34–48)
HEMOGLOBIN: 9.4 G/DL (ref 11.5–15.5)
MAGNESIUM: 1.8 MG/DL (ref 1.6–2.6)
MCH RBC QN AUTO: 26.1 PG (ref 26–35)
MCHC RBC AUTO-ENTMCNC: 29.8 % (ref 32–34.5)
MCV RBC AUTO: 87.5 FL (ref 80–99.9)
METER GLUCOSE: 118 MG/DL (ref 74–99)
METER GLUCOSE: 153 MG/DL (ref 74–99)
METER GLUCOSE: 155 MG/DL (ref 74–99)
METER GLUCOSE: 168 MG/DL (ref 74–99)
PDW BLD-RTO: 14.7 FL (ref 11.5–15)
PLATELET # BLD: 352 E9/L (ref 130–450)
PMV BLD AUTO: 9.7 FL (ref 7–12)
POTASSIUM SERPL-SCNC: 3.7 MMOL/L (ref 3.5–5)
RBC # BLD: 3.6 E12/L (ref 3.5–5.5)
SODIUM BLD-SCNC: 135 MMOL/L (ref 132–146)
WBC # BLD: 16 E9/L (ref 4.5–11.5)

## 2019-02-23 PROCEDURE — 2060000000 HC ICU INTERMEDIATE R&B

## 2019-02-23 PROCEDURE — 85027 COMPLETE CBC AUTOMATED: CPT

## 2019-02-23 PROCEDURE — 6370000000 HC RX 637 (ALT 250 FOR IP): Performed by: STUDENT IN AN ORGANIZED HEALTH CARE EDUCATION/TRAINING PROGRAM

## 2019-02-23 PROCEDURE — 36415 COLL VENOUS BLD VENIPUNCTURE: CPT

## 2019-02-23 PROCEDURE — 6360000002 HC RX W HCPCS: Performed by: STUDENT IN AN ORGANIZED HEALTH CARE EDUCATION/TRAINING PROGRAM

## 2019-02-23 PROCEDURE — 2580000003 HC RX 258: Performed by: STUDENT IN AN ORGANIZED HEALTH CARE EDUCATION/TRAINING PROGRAM

## 2019-02-23 PROCEDURE — 6360000002 HC RX W HCPCS: Performed by: SURGERY

## 2019-02-23 PROCEDURE — 83735 ASSAY OF MAGNESIUM: CPT

## 2019-02-23 PROCEDURE — 82962 GLUCOSE BLOOD TEST: CPT

## 2019-02-23 PROCEDURE — 80048 BASIC METABOLIC PNL TOTAL CA: CPT

## 2019-02-23 RX ADMIN — ENOXAPARIN SODIUM 40 MG: 40 INJECTION SUBCUTANEOUS at 09:42

## 2019-02-23 RX ADMIN — METHOCARBAMOL TABLETS 1000 MG: 500 TABLET, COATED ORAL at 17:19

## 2019-02-23 RX ADMIN — ONDANSETRON 4 MG: 2 INJECTION INTRAMUSCULAR; INTRAVENOUS at 02:37

## 2019-02-23 RX ADMIN — INSULIN LISPRO 2 UNITS: 100 INJECTION, SOLUTION INTRAVENOUS; SUBCUTANEOUS at 12:31

## 2019-02-23 RX ADMIN — ACETAMINOPHEN 500 MG: 500 TABLET ORAL at 12:26

## 2019-02-23 RX ADMIN — ACETAMINOPHEN 500 MG: 500 TABLET ORAL at 09:43

## 2019-02-23 RX ADMIN — ACETAMINOPHEN 500 MG: 500 TABLET ORAL at 06:00

## 2019-02-23 RX ADMIN — Medication 10 ML: at 09:42

## 2019-02-23 RX ADMIN — PANTOPRAZOLE SODIUM 40 MG: 40 TABLET, DELAYED RELEASE ORAL at 06:12

## 2019-02-23 RX ADMIN — SIMVASTATIN 20 MG: 20 TABLET, FILM COATED ORAL at 21:48

## 2019-02-23 RX ADMIN — ACETAMINOPHEN 500 MG: 500 TABLET ORAL at 17:20

## 2019-02-23 RX ADMIN — METHOCARBAMOL TABLETS 1000 MG: 500 TABLET, COATED ORAL at 21:48

## 2019-02-23 RX ADMIN — HYDROMORPHONE HYDROCHLORIDE 0.5 MG: 1 INJECTION, SOLUTION INTRAMUSCULAR; INTRAVENOUS; SUBCUTANEOUS at 21:48

## 2019-02-23 RX ADMIN — INSULIN LISPRO 2 UNITS: 100 INJECTION, SOLUTION INTRAVENOUS; SUBCUTANEOUS at 17:27

## 2019-02-23 RX ADMIN — GABAPENTIN 300 MG: 300 CAPSULE ORAL at 12:26

## 2019-02-23 RX ADMIN — Medication 10 ML: at 17:20

## 2019-02-23 RX ADMIN — ACETAMINOPHEN 500 MG: 500 TABLET ORAL at 01:44

## 2019-02-23 RX ADMIN — PANTOPRAZOLE SODIUM 40 MG: 40 TABLET, DELAYED RELEASE ORAL at 17:20

## 2019-02-23 RX ADMIN — METHOCARBAMOL TABLETS 1000 MG: 500 TABLET, COATED ORAL at 12:27

## 2019-02-23 RX ADMIN — GABAPENTIN 300 MG: 300 CAPSULE ORAL at 09:42

## 2019-02-23 RX ADMIN — GABAPENTIN 300 MG: 300 CAPSULE ORAL at 17:20

## 2019-02-23 RX ADMIN — GABAPENTIN 300 MG: 300 CAPSULE ORAL at 21:48

## 2019-02-23 RX ADMIN — METHOCARBAMOL TABLETS 1000 MG: 500 TABLET, COATED ORAL at 09:42

## 2019-02-23 RX ADMIN — Medication 10 ML: at 21:48

## 2019-02-23 RX ADMIN — ACETAMINOPHEN 500 MG: 500 TABLET ORAL at 21:48

## 2019-02-23 RX ADMIN — PRAMIPEXOLE DIHYDROCHLORIDE 1 MG: 1 TABLET ORAL at 21:48

## 2019-02-23 RX ADMIN — KETOROLAC TROMETHAMINE 15 MG: 30 INJECTION, SOLUTION INTRAMUSCULAR; INTRAVENOUS at 17:20

## 2019-02-23 RX ADMIN — HYDROXYZINE HYDROCHLORIDE 10 MG: 10 TABLET ORAL at 00:11

## 2019-02-23 RX ADMIN — KETOROLAC TROMETHAMINE 15 MG: 30 INJECTION, SOLUTION INTRAMUSCULAR; INTRAVENOUS at 04:42

## 2019-02-23 RX ADMIN — POTASSIUM CHLORIDE AND SODIUM CHLORIDE: 450; 150 INJECTION, SOLUTION INTRAVENOUS at 06:11

## 2019-02-23 RX ADMIN — INSULIN LISPRO 2 UNITS: 100 INJECTION, SOLUTION INTRAVENOUS; SUBCUTANEOUS at 06:39

## 2019-02-23 RX ADMIN — KETOROLAC TROMETHAMINE 15 MG: 30 INJECTION, SOLUTION INTRAMUSCULAR; INTRAVENOUS at 09:42

## 2019-02-23 ASSESSMENT — PAIN DESCRIPTION - PROGRESSION
CLINICAL_PROGRESSION: GRADUALLY WORSENING
CLINICAL_PROGRESSION: GRADUALLY WORSENING
CLINICAL_PROGRESSION: GRADUALLY IMPROVING

## 2019-02-23 ASSESSMENT — PAIN - FUNCTIONAL ASSESSMENT
PAIN_FUNCTIONAL_ASSESSMENT: PREVENTS OR INTERFERES SOME ACTIVE ACTIVITIES AND ADLS
PAIN_FUNCTIONAL_ASSESSMENT: PREVENTS OR INTERFERES SOME ACTIVE ACTIVITIES AND ADLS
PAIN_FUNCTIONAL_ASSESSMENT: PREVENTS OR INTERFERES WITH MANY ACTIVE NOT PASSIVE ACTIVITIES

## 2019-02-23 ASSESSMENT — PAIN DESCRIPTION - ORIENTATION
ORIENTATION: MID

## 2019-02-23 ASSESSMENT — PAIN DESCRIPTION - DESCRIPTORS
DESCRIPTORS: ACHING;CONSTANT;SORE
DESCRIPTORS: SHARP
DESCRIPTORS: SHARP;SORE;DISCOMFORT
DESCRIPTORS: ACHING;CONSTANT;DULL

## 2019-02-23 ASSESSMENT — PAIN SCALES - GENERAL
PAINLEVEL_OUTOF10: 7
PAINLEVEL_OUTOF10: 4
PAINLEVEL_OUTOF10: 4
PAINLEVEL_OUTOF10: 0
PAINLEVEL_OUTOF10: 3
PAINLEVEL_OUTOF10: 8
PAINLEVEL_OUTOF10: 3
PAINLEVEL_OUTOF10: 3
PAINLEVEL_OUTOF10: 2
PAINLEVEL_OUTOF10: 9
PAINLEVEL_OUTOF10: 0
PAINLEVEL_OUTOF10: 0

## 2019-02-23 ASSESSMENT — PAIN DESCRIPTION - FREQUENCY
FREQUENCY: INTERMITTENT
FREQUENCY: INTERMITTENT
FREQUENCY: CONTINUOUS
FREQUENCY: CONTINUOUS

## 2019-02-23 ASSESSMENT — PAIN DESCRIPTION - LOCATION
LOCATION: ABDOMEN

## 2019-02-23 ASSESSMENT — PAIN DESCRIPTION - PAIN TYPE
TYPE: SURGICAL PAIN
TYPE: SURGICAL PAIN

## 2019-02-23 ASSESSMENT — PAIN DESCRIPTION - ONSET
ONSET: ON-GOING

## 2019-02-24 ENCOUNTER — APPOINTMENT (OUTPATIENT)
Dept: GENERAL RADIOLOGY | Age: 79
DRG: 330 | End: 2019-02-24
Attending: SURGERY
Payer: MEDICARE

## 2019-02-24 LAB
ANION GAP SERPL CALCULATED.3IONS-SCNC: 12 MMOL/L (ref 7–16)
BACTERIA: ABNORMAL /HPF
BILIRUBIN URINE: ABNORMAL
BLOOD, URINE: ABNORMAL
BUN BLDV-MCNC: 10 MG/DL (ref 8–23)
CALCIUM SERPL-MCNC: 9.2 MG/DL (ref 8.6–10.2)
CHLORIDE BLD-SCNC: 101 MMOL/L (ref 98–107)
CLARITY: ABNORMAL
CO2: 24 MMOL/L (ref 22–29)
COLOR: ABNORMAL
CREAT SERPL-MCNC: 0.6 MG/DL (ref 0.5–1)
GFR AFRICAN AMERICAN: >60
GFR NON-AFRICAN AMERICAN: >60 ML/MIN/1.73
GLUCOSE BLD-MCNC: 151 MG/DL (ref 74–99)
GLUCOSE URINE: NEGATIVE MG/DL
HCT VFR BLD CALC: 34.1 % (ref 34–48)
HEMOGLOBIN: 10.1 G/DL (ref 11.5–15.5)
KETONES, URINE: 15 MG/DL
LEUKOCYTE ESTERASE, URINE: ABNORMAL
MAGNESIUM: 1.6 MG/DL (ref 1.6–2.6)
MCH RBC QN AUTO: 25.8 PG (ref 26–35)
MCHC RBC AUTO-ENTMCNC: 29.6 % (ref 32–34.5)
MCV RBC AUTO: 87 FL (ref 80–99.9)
METER GLUCOSE: 122 MG/DL (ref 74–99)
METER GLUCOSE: 129 MG/DL (ref 74–99)
METER GLUCOSE: 130 MG/DL (ref 74–99)
METER GLUCOSE: 153 MG/DL (ref 74–99)
NITRITE, URINE: POSITIVE
PDW BLD-RTO: 15.1 FL (ref 11.5–15)
PH UA: 5.5 (ref 5–9)
PLATELET # BLD: 390 E9/L (ref 130–450)
PMV BLD AUTO: 9.5 FL (ref 7–12)
POTASSIUM SERPL-SCNC: 3.8 MMOL/L (ref 3.5–5)
PROTEIN UA: ABNORMAL MG/DL
RBC # BLD: 3.92 E12/L (ref 3.5–5.5)
RBC UA: ABNORMAL /HPF (ref 0–2)
SODIUM BLD-SCNC: 137 MMOL/L (ref 132–146)
SPECIFIC GRAVITY UA: 1.02 (ref 1–1.03)
UROBILINOGEN, URINE: 1 E.U./DL
WBC # BLD: 9 E9/L (ref 4.5–11.5)
WBC UA: >20 /HPF (ref 0–5)

## 2019-02-24 PROCEDURE — 87186 SC STD MICRODIL/AGAR DIL: CPT

## 2019-02-24 PROCEDURE — 71046 X-RAY EXAM CHEST 2 VIEWS: CPT

## 2019-02-24 PROCEDURE — 81001 URINALYSIS AUTO W/SCOPE: CPT

## 2019-02-24 PROCEDURE — 85027 COMPLETE CBC AUTOMATED: CPT

## 2019-02-24 PROCEDURE — 80048 BASIC METABOLIC PNL TOTAL CA: CPT

## 2019-02-24 PROCEDURE — 2700000000 HC OXYGEN THERAPY PER DAY

## 2019-02-24 PROCEDURE — 6360000002 HC RX W HCPCS: Performed by: STUDENT IN AN ORGANIZED HEALTH CARE EDUCATION/TRAINING PROGRAM

## 2019-02-24 PROCEDURE — 83735 ASSAY OF MAGNESIUM: CPT

## 2019-02-24 PROCEDURE — 82962 GLUCOSE BLOOD TEST: CPT

## 2019-02-24 PROCEDURE — 87324 CLOSTRIDIUM AG IA: CPT

## 2019-02-24 PROCEDURE — 2060000000 HC ICU INTERMEDIATE R&B

## 2019-02-24 PROCEDURE — 2580000003 HC RX 258: Performed by: STUDENT IN AN ORGANIZED HEALTH CARE EDUCATION/TRAINING PROGRAM

## 2019-02-24 PROCEDURE — 36415 COLL VENOUS BLD VENIPUNCTURE: CPT

## 2019-02-24 PROCEDURE — 6370000000 HC RX 637 (ALT 250 FOR IP): Performed by: STUDENT IN AN ORGANIZED HEALTH CARE EDUCATION/TRAINING PROGRAM

## 2019-02-24 PROCEDURE — 87077 CULTURE AEROBIC IDENTIFY: CPT

## 2019-02-24 PROCEDURE — 6370000000 HC RX 637 (ALT 250 FOR IP): Performed by: SURGERY

## 2019-02-24 PROCEDURE — 51701 INSERT BLADDER CATHETER: CPT

## 2019-02-24 PROCEDURE — 87088 URINE BACTERIA CULTURE: CPT

## 2019-02-24 PROCEDURE — 2580000003 HC RX 258: Performed by: SURGERY

## 2019-02-24 RX ORDER — SODIUM CHLORIDE, SODIUM LACTATE, POTASSIUM CHLORIDE, CALCIUM CHLORIDE 600; 310; 30; 20 MG/100ML; MG/100ML; MG/100ML; MG/100ML
INJECTION, SOLUTION INTRAVENOUS CONTINUOUS
Status: DISCONTINUED | OUTPATIENT
Start: 2019-02-24 | End: 2019-02-25

## 2019-02-24 RX ORDER — CIPROFLOXACIN 500 MG/1
500 TABLET, FILM COATED ORAL EVERY 12 HOURS SCHEDULED
Status: DISCONTINUED | OUTPATIENT
Start: 2019-02-24 | End: 2019-02-28 | Stop reason: HOSPADM

## 2019-02-24 RX ADMIN — METHOCARBAMOL TABLETS 1000 MG: 500 TABLET, COATED ORAL at 13:04

## 2019-02-24 RX ADMIN — METHOCARBAMOL TABLETS 1000 MG: 500 TABLET, COATED ORAL at 08:23

## 2019-02-24 RX ADMIN — HYDROMORPHONE HYDROCHLORIDE 0.5 MG: 1 INJECTION, SOLUTION INTRAMUSCULAR; INTRAVENOUS; SUBCUTANEOUS at 08:23

## 2019-02-24 RX ADMIN — HYDROMORPHONE HYDROCHLORIDE 0.5 MG: 1 INJECTION, SOLUTION INTRAMUSCULAR; INTRAVENOUS; SUBCUTANEOUS at 21:23

## 2019-02-24 RX ADMIN — HYDROMORPHONE HYDROCHLORIDE 0.5 MG: 1 INJECTION, SOLUTION INTRAMUSCULAR; INTRAVENOUS; SUBCUTANEOUS at 04:03

## 2019-02-24 RX ADMIN — ENOXAPARIN SODIUM 40 MG: 40 INJECTION SUBCUTANEOUS at 08:22

## 2019-02-24 RX ADMIN — MAGNESIUM SULFATE HEPTAHYDRATE 1 G: 1 INJECTION, SOLUTION INTRAVENOUS at 08:23

## 2019-02-24 RX ADMIN — ONDANSETRON 4 MG: 2 INJECTION INTRAMUSCULAR; INTRAVENOUS at 21:26

## 2019-02-24 RX ADMIN — ONDANSETRON 4 MG: 2 INJECTION INTRAMUSCULAR; INTRAVENOUS at 08:23

## 2019-02-24 RX ADMIN — SODIUM CHLORIDE, POTASSIUM CHLORIDE, SODIUM LACTATE AND CALCIUM CHLORIDE: 600; 310; 30; 20 INJECTION, SOLUTION INTRAVENOUS at 16:54

## 2019-02-24 RX ADMIN — ACETAMINOPHEN 500 MG: 500 TABLET ORAL at 14:26

## 2019-02-24 RX ADMIN — CIPROFLOXACIN HYDROCHLORIDE 500 MG: 500 TABLET, FILM COATED ORAL at 21:31

## 2019-02-24 RX ADMIN — ACETAMINOPHEN 500 MG: 500 TABLET ORAL at 10:20

## 2019-02-24 RX ADMIN — KETOROLAC TROMETHAMINE 15 MG: 30 INJECTION, SOLUTION INTRAMUSCULAR; INTRAVENOUS at 06:15

## 2019-02-24 RX ADMIN — METHOCARBAMOL TABLETS 1000 MG: 500 TABLET, COATED ORAL at 16:57

## 2019-02-24 RX ADMIN — KETOROLAC TROMETHAMINE 15 MG: 30 INJECTION, SOLUTION INTRAMUSCULAR; INTRAVENOUS at 13:05

## 2019-02-24 RX ADMIN — GABAPENTIN 300 MG: 300 CAPSULE ORAL at 13:05

## 2019-02-24 RX ADMIN — GABAPENTIN 300 MG: 300 CAPSULE ORAL at 08:22

## 2019-02-24 RX ADMIN — PANTOPRAZOLE SODIUM 40 MG: 40 TABLET, DELAYED RELEASE ORAL at 06:15

## 2019-02-24 RX ADMIN — SIMVASTATIN 20 MG: 20 TABLET, FILM COATED ORAL at 21:31

## 2019-02-24 RX ADMIN — ACETAMINOPHEN 500 MG: 500 TABLET ORAL at 21:30

## 2019-02-24 RX ADMIN — GABAPENTIN 300 MG: 300 CAPSULE ORAL at 21:31

## 2019-02-24 RX ADMIN — ONDANSETRON 4 MG: 2 INJECTION INTRAMUSCULAR; INTRAVENOUS at 14:26

## 2019-02-24 RX ADMIN — INSULIN LISPRO 2 UNITS: 100 INJECTION, SOLUTION INTRAVENOUS; SUBCUTANEOUS at 13:05

## 2019-02-24 RX ADMIN — METOPROLOL TARTRATE 25 MG: 25 TABLET ORAL at 21:47

## 2019-02-24 RX ADMIN — HYDROMORPHONE HYDROCHLORIDE 0.5 MG: 1 INJECTION, SOLUTION INTRAMUSCULAR; INTRAVENOUS; SUBCUTANEOUS at 15:15

## 2019-02-24 RX ADMIN — PANTOPRAZOLE SODIUM 40 MG: 40 TABLET, DELAYED RELEASE ORAL at 16:57

## 2019-02-24 RX ADMIN — ACETAMINOPHEN 500 MG: 500 TABLET ORAL at 00:18

## 2019-02-24 RX ADMIN — METHOCARBAMOL TABLETS 1000 MG: 500 TABLET, COATED ORAL at 21:31

## 2019-02-24 RX ADMIN — MAGNESIUM SULFATE HEPTAHYDRATE 1 G: 1 INJECTION, SOLUTION INTRAVENOUS at 10:21

## 2019-02-24 RX ADMIN — Medication 10 ML: at 21:26

## 2019-02-24 RX ADMIN — ACETAMINOPHEN 500 MG: 500 TABLET ORAL at 18:22

## 2019-02-24 RX ADMIN — ONDANSETRON 4 MG: 2 INJECTION INTRAMUSCULAR; INTRAVENOUS at 01:48

## 2019-02-24 RX ADMIN — GABAPENTIN 300 MG: 300 CAPSULE ORAL at 16:57

## 2019-02-24 RX ADMIN — Medication 10 ML: at 21:24

## 2019-02-24 RX ADMIN — ACETAMINOPHEN 500 MG: 500 TABLET ORAL at 06:15

## 2019-02-24 RX ADMIN — METOPROLOL TARTRATE 25 MG: 25 TABLET ORAL at 08:23

## 2019-02-24 RX ADMIN — PRAMIPEXOLE DIHYDROCHLORIDE 1 MG: 1 TABLET ORAL at 21:31

## 2019-02-24 RX ADMIN — Medication 10 ML: at 10:20

## 2019-02-24 RX ADMIN — KETOROLAC TROMETHAMINE 15 MG: 30 INJECTION, SOLUTION INTRAMUSCULAR; INTRAVENOUS at 00:18

## 2019-02-24 ASSESSMENT — PAIN DESCRIPTION - DESCRIPTORS
DESCRIPTORS: SHARP;SORE;DISCOMFORT
DESCRIPTORS: SORE;DISCOMFORT
DESCRIPTORS: DISCOMFORT;SORE
DESCRIPTORS: SHARP;SORE;DISCOMFORT

## 2019-02-24 ASSESSMENT — PAIN DESCRIPTION - LOCATION
LOCATION: ABDOMEN

## 2019-02-24 ASSESSMENT — PAIN SCALES - GENERAL
PAINLEVEL_OUTOF10: 8
PAINLEVEL_OUTOF10: 0
PAINLEVEL_OUTOF10: 6
PAINLEVEL_OUTOF10: 8
PAINLEVEL_OUTOF10: 3
PAINLEVEL_OUTOF10: 9
PAINLEVEL_OUTOF10: 10
PAINLEVEL_OUTOF10: 9
PAINLEVEL_OUTOF10: 6
PAINLEVEL_OUTOF10: 10
PAINLEVEL_OUTOF10: 9
PAINLEVEL_OUTOF10: 5
PAINLEVEL_OUTOF10: 9
PAINLEVEL_OUTOF10: 8
PAINLEVEL_OUTOF10: 4

## 2019-02-24 ASSESSMENT — PAIN DESCRIPTION - FREQUENCY
FREQUENCY: INTERMITTENT

## 2019-02-24 ASSESSMENT — PAIN - FUNCTIONAL ASSESSMENT
PAIN_FUNCTIONAL_ASSESSMENT: ACTIVITIES ARE NOT PREVENTED

## 2019-02-24 ASSESSMENT — PAIN DESCRIPTION - PAIN TYPE
TYPE: SURGICAL PAIN

## 2019-02-25 LAB
ANION GAP SERPL CALCULATED.3IONS-SCNC: 9 MMOL/L (ref 7–16)
BUN BLDV-MCNC: 11 MG/DL (ref 8–23)
C DIFFICILE TOXIN, EIA: NORMAL
CALCIUM SERPL-MCNC: 8.7 MG/DL (ref 8.6–10.2)
CHLORIDE BLD-SCNC: 103 MMOL/L (ref 98–107)
CO2: 22 MMOL/L (ref 22–29)
CREAT SERPL-MCNC: 0.5 MG/DL (ref 0.5–1)
GFR AFRICAN AMERICAN: >60
GFR NON-AFRICAN AMERICAN: >60 ML/MIN/1.73
GLUCOSE BLD-MCNC: 144 MG/DL (ref 74–99)
HCT VFR BLD CALC: 29.5 % (ref 34–48)
HEMOGLOBIN: 8.7 G/DL (ref 11.5–15.5)
MAGNESIUM: 1.7 MG/DL (ref 1.6–2.6)
MCH RBC QN AUTO: 26 PG (ref 26–35)
MCHC RBC AUTO-ENTMCNC: 29.5 % (ref 32–34.5)
MCV RBC AUTO: 88.1 FL (ref 80–99.9)
METER GLUCOSE: 115 MG/DL (ref 74–99)
METER GLUCOSE: 121 MG/DL (ref 74–99)
METER GLUCOSE: 124 MG/DL (ref 74–99)
METER GLUCOSE: 143 MG/DL (ref 74–99)
PDW BLD-RTO: 15 FL (ref 11.5–15)
PLATELET # BLD: 332 E9/L (ref 130–450)
PMV BLD AUTO: 9.4 FL (ref 7–12)
POTASSIUM SERPL-SCNC: 3.7 MMOL/L (ref 3.5–5)
RBC # BLD: 3.35 E12/L (ref 3.5–5.5)
SODIUM BLD-SCNC: 134 MMOL/L (ref 132–146)
WBC # BLD: 8.3 E9/L (ref 4.5–11.5)

## 2019-02-25 PROCEDURE — 6360000002 HC RX W HCPCS: Performed by: STUDENT IN AN ORGANIZED HEALTH CARE EDUCATION/TRAINING PROGRAM

## 2019-02-25 PROCEDURE — 6370000000 HC RX 637 (ALT 250 FOR IP): Performed by: STUDENT IN AN ORGANIZED HEALTH CARE EDUCATION/TRAINING PROGRAM

## 2019-02-25 PROCEDURE — 36415 COLL VENOUS BLD VENIPUNCTURE: CPT

## 2019-02-25 PROCEDURE — 80048 BASIC METABOLIC PNL TOTAL CA: CPT

## 2019-02-25 PROCEDURE — 83735 ASSAY OF MAGNESIUM: CPT

## 2019-02-25 PROCEDURE — 97165 OT EVAL LOW COMPLEX 30 MIN: CPT

## 2019-02-25 PROCEDURE — 2580000003 HC RX 258: Performed by: STUDENT IN AN ORGANIZED HEALTH CARE EDUCATION/TRAINING PROGRAM

## 2019-02-25 PROCEDURE — 2580000003 HC RX 258: Performed by: SURGERY

## 2019-02-25 PROCEDURE — 97530 THERAPEUTIC ACTIVITIES: CPT

## 2019-02-25 PROCEDURE — 85027 COMPLETE CBC AUTOMATED: CPT

## 2019-02-25 PROCEDURE — 99232 SBSQ HOSP IP/OBS MODERATE 35: CPT | Performed by: INTERNAL MEDICINE

## 2019-02-25 PROCEDURE — 2060000000 HC ICU INTERMEDIATE R&B

## 2019-02-25 PROCEDURE — 6370000000 HC RX 637 (ALT 250 FOR IP): Performed by: SURGERY

## 2019-02-25 PROCEDURE — 82962 GLUCOSE BLOOD TEST: CPT

## 2019-02-25 RX ORDER — OXYCODONE HYDROCHLORIDE AND ACETAMINOPHEN 5; 325 MG/1; MG/1
1 TABLET ORAL EVERY 6 HOURS PRN
Qty: 28 TABLET | Refills: 0 | Status: SHIPPED | OUTPATIENT
Start: 2019-02-25 | End: 2019-03-04

## 2019-02-25 RX ORDER — ONDANSETRON 4 MG/1
4 TABLET, FILM COATED ORAL DAILY PRN
Qty: 12 TABLET | Refills: 0 | Status: SHIPPED | OUTPATIENT
Start: 2019-02-25 | End: 2019-03-09

## 2019-02-25 RX ORDER — DIPHENOXYLATE HYDROCHLORIDE AND ATROPINE SULFATE 2.5; .025 MG/1; MG/1
1 TABLET ORAL 2 TIMES DAILY
Status: DISCONTINUED | OUTPATIENT
Start: 2019-02-25 | End: 2019-02-26

## 2019-02-25 RX ORDER — DOCUSATE SODIUM 100 MG/1
100 CAPSULE, LIQUID FILLED ORAL 2 TIMES DAILY
Qty: 50 CAPSULE | Refills: 0 | Status: SHIPPED | OUTPATIENT
Start: 2019-02-25 | End: 2019-06-05 | Stop reason: ALTCHOICE

## 2019-02-25 RX ADMIN — HYDROMORPHONE HYDROCHLORIDE 0.5 MG: 1 INJECTION, SOLUTION INTRAMUSCULAR; INTRAVENOUS; SUBCUTANEOUS at 04:17

## 2019-02-25 RX ADMIN — SODIUM CHLORIDE, POTASSIUM CHLORIDE, SODIUM LACTATE AND CALCIUM CHLORIDE: 600; 310; 30; 20 INJECTION, SOLUTION INTRAVENOUS at 01:15

## 2019-02-25 RX ADMIN — GABAPENTIN 300 MG: 300 CAPSULE ORAL at 13:30

## 2019-02-25 RX ADMIN — CIPROFLOXACIN HYDROCHLORIDE 500 MG: 500 TABLET, FILM COATED ORAL at 10:03

## 2019-02-25 RX ADMIN — ENOXAPARIN SODIUM 40 MG: 40 INJECTION SUBCUTANEOUS at 10:03

## 2019-02-25 RX ADMIN — Medication 10 ML: at 21:32

## 2019-02-25 RX ADMIN — ACETAMINOPHEN 500 MG: 500 TABLET ORAL at 04:18

## 2019-02-25 RX ADMIN — METHOCARBAMOL TABLETS 1000 MG: 500 TABLET, COATED ORAL at 13:33

## 2019-02-25 RX ADMIN — Medication 10 ML: at 10:08

## 2019-02-25 RX ADMIN — ACETAMINOPHEN 500 MG: 500 TABLET ORAL at 10:02

## 2019-02-25 RX ADMIN — CIPROFLOXACIN HYDROCHLORIDE 500 MG: 500 TABLET, FILM COATED ORAL at 21:28

## 2019-02-25 RX ADMIN — INSULIN LISPRO 2 UNITS: 100 INJECTION, SOLUTION INTRAVENOUS; SUBCUTANEOUS at 12:15

## 2019-02-25 RX ADMIN — ACETAMINOPHEN 500 MG: 500 TABLET ORAL at 13:29

## 2019-02-25 RX ADMIN — DIPHENOXYLATE HYDROCHLORIDE AND ATROPINE SULFATE 1 TABLET: 2.5; .025 TABLET ORAL at 15:45

## 2019-02-25 RX ADMIN — ONDANSETRON 4 MG: 2 INJECTION INTRAMUSCULAR; INTRAVENOUS at 10:08

## 2019-02-25 RX ADMIN — PANTOPRAZOLE SODIUM 40 MG: 40 TABLET, DELAYED RELEASE ORAL at 15:45

## 2019-02-25 RX ADMIN — METHOCARBAMOL TABLETS 1000 MG: 500 TABLET, COATED ORAL at 17:17

## 2019-02-25 RX ADMIN — PRAMIPEXOLE DIHYDROCHLORIDE 1 MG: 1 TABLET ORAL at 21:28

## 2019-02-25 RX ADMIN — METOPROLOL TARTRATE 25 MG: 25 TABLET ORAL at 21:27

## 2019-02-25 RX ADMIN — GABAPENTIN 300 MG: 300 CAPSULE ORAL at 10:02

## 2019-02-25 RX ADMIN — METHOCARBAMOL TABLETS 1000 MG: 500 TABLET, COATED ORAL at 21:27

## 2019-02-25 RX ADMIN — ACETAMINOPHEN 500 MG: 500 TABLET ORAL at 17:16

## 2019-02-25 RX ADMIN — PANTOPRAZOLE SODIUM 40 MG: 40 TABLET, DELAYED RELEASE ORAL at 06:43

## 2019-02-25 RX ADMIN — METHOCARBAMOL TABLETS 1000 MG: 500 TABLET, COATED ORAL at 10:02

## 2019-02-25 RX ADMIN — SIMVASTATIN 20 MG: 20 TABLET, FILM COATED ORAL at 21:28

## 2019-02-25 RX ADMIN — GABAPENTIN 300 MG: 300 CAPSULE ORAL at 17:16

## 2019-02-25 RX ADMIN — OXYCODONE HYDROCHLORIDE 10 MG: 5 TABLET ORAL at 23:03

## 2019-02-25 RX ADMIN — GABAPENTIN 300 MG: 300 CAPSULE ORAL at 21:28

## 2019-02-25 RX ADMIN — ACETAMINOPHEN 500 MG: 500 TABLET ORAL at 01:15

## 2019-02-25 RX ADMIN — ONDANSETRON 4 MG: 2 INJECTION INTRAMUSCULAR; INTRAVENOUS at 17:23

## 2019-02-25 RX ADMIN — OXYCODONE HYDROCHLORIDE 10 MG: 5 TABLET ORAL at 10:07

## 2019-02-25 RX ADMIN — OXYCODONE HYDROCHLORIDE 10 MG: 5 TABLET ORAL at 19:00

## 2019-02-25 RX ADMIN — ONDANSETRON 4 MG: 2 INJECTION INTRAMUSCULAR; INTRAVENOUS at 04:17

## 2019-02-25 RX ADMIN — OXYCODONE HYDROCHLORIDE 10 MG: 5 TABLET ORAL at 14:23

## 2019-02-25 RX ADMIN — ACETAMINOPHEN 500 MG: 500 TABLET ORAL at 21:28

## 2019-02-25 RX ADMIN — METOPROLOL TARTRATE 25 MG: 25 TABLET ORAL at 10:03

## 2019-02-25 ASSESSMENT — PAIN DESCRIPTION - PAIN TYPE
TYPE: SURGICAL PAIN;ACUTE PAIN
TYPE: SURGICAL PAIN

## 2019-02-25 ASSESSMENT — PAIN DESCRIPTION - DESCRIPTORS
DESCRIPTORS: SHARP;ACHING
DESCRIPTORS: ACHING
DESCRIPTORS: DISCOMFORT;SORE;ACHING
DESCRIPTORS: CONSTANT;ACHING

## 2019-02-25 ASSESSMENT — PAIN DESCRIPTION - LOCATION
LOCATION: ABDOMEN

## 2019-02-25 ASSESSMENT — PAIN SCALES - GENERAL
PAINLEVEL_OUTOF10: 10
PAINLEVEL_OUTOF10: 10
PAINLEVEL_OUTOF10: 0
PAINLEVEL_OUTOF10: 9
PAINLEVEL_OUTOF10: 9
PAINLEVEL_OUTOF10: 10
PAINLEVEL_OUTOF10: 9
PAINLEVEL_OUTOF10: 6
PAINLEVEL_OUTOF10: 3
PAINLEVEL_OUTOF10: 10
PAINLEVEL_OUTOF10: 10
PAINLEVEL_OUTOF10: 8
PAINLEVEL_OUTOF10: 9

## 2019-02-25 ASSESSMENT — PAIN DESCRIPTION - PROGRESSION: CLINICAL_PROGRESSION: NOT CHANGED

## 2019-02-25 ASSESSMENT — PAIN DESCRIPTION - FREQUENCY: FREQUENCY: CONTINUOUS

## 2019-02-25 ASSESSMENT — PAIN DESCRIPTION - ORIENTATION: ORIENTATION: MID;RIGHT

## 2019-02-25 ASSESSMENT — PAIN DESCRIPTION - ONSET: ONSET: ON-GOING

## 2019-02-25 ASSESSMENT — PAIN - FUNCTIONAL ASSESSMENT: PAIN_FUNCTIONAL_ASSESSMENT: ACTIVITIES ARE NOT PREVENTED

## 2019-02-26 LAB
ANION GAP SERPL CALCULATED.3IONS-SCNC: 7 MMOL/L (ref 7–16)
BLOOD CULTURE, ROUTINE: NORMAL
BUN BLDV-MCNC: 5 MG/DL (ref 8–23)
CALCIUM SERPL-MCNC: 8.5 MG/DL (ref 8.6–10.2)
CHLORIDE BLD-SCNC: 104 MMOL/L (ref 98–107)
CO2: 24 MMOL/L (ref 22–29)
CREAT SERPL-MCNC: 0.6 MG/DL (ref 0.5–1)
CULTURE, BLOOD 2: NORMAL
GFR AFRICAN AMERICAN: >60
GFR NON-AFRICAN AMERICAN: >60 ML/MIN/1.73
GLUCOSE BLD-MCNC: 113 MG/DL (ref 74–99)
HCT VFR BLD CALC: 28.6 % (ref 34–48)
HEMOGLOBIN: 8.3 G/DL (ref 11.5–15.5)
MAGNESIUM: 1.7 MG/DL (ref 1.6–2.6)
MCH RBC QN AUTO: 25.7 PG (ref 26–35)
MCHC RBC AUTO-ENTMCNC: 29 % (ref 32–34.5)
MCV RBC AUTO: 88.5 FL (ref 80–99.9)
METER GLUCOSE: 128 MG/DL (ref 74–99)
METER GLUCOSE: 147 MG/DL (ref 74–99)
METER GLUCOSE: 151 MG/DL (ref 74–99)
ORGANISM: ABNORMAL
PDW BLD-RTO: 15.1 FL (ref 11.5–15)
PLATELET # BLD: 360 E9/L (ref 130–450)
PMV BLD AUTO: 9.5 FL (ref 7–12)
POTASSIUM SERPL-SCNC: 3.4 MMOL/L (ref 3.5–5)
RBC # BLD: 3.23 E12/L (ref 3.5–5.5)
SODIUM BLD-SCNC: 135 MMOL/L (ref 132–146)
URINE CULTURE, ROUTINE: ABNORMAL
URINE CULTURE, ROUTINE: ABNORMAL
WBC # BLD: 12.1 E9/L (ref 4.5–11.5)

## 2019-02-26 PROCEDURE — 6370000000 HC RX 637 (ALT 250 FOR IP): Performed by: SURGERY

## 2019-02-26 PROCEDURE — 36415 COLL VENOUS BLD VENIPUNCTURE: CPT

## 2019-02-26 PROCEDURE — 2060000000 HC ICU INTERMEDIATE R&B

## 2019-02-26 PROCEDURE — 85027 COMPLETE CBC AUTOMATED: CPT

## 2019-02-26 PROCEDURE — 6370000000 HC RX 637 (ALT 250 FOR IP): Performed by: INTERNAL MEDICINE

## 2019-02-26 PROCEDURE — 6360000002 HC RX W HCPCS: Performed by: SURGERY

## 2019-02-26 PROCEDURE — 6370000000 HC RX 637 (ALT 250 FOR IP): Performed by: STUDENT IN AN ORGANIZED HEALTH CARE EDUCATION/TRAINING PROGRAM

## 2019-02-26 PROCEDURE — 2580000003 HC RX 258: Performed by: STUDENT IN AN ORGANIZED HEALTH CARE EDUCATION/TRAINING PROGRAM

## 2019-02-26 PROCEDURE — 80048 BASIC METABOLIC PNL TOTAL CA: CPT

## 2019-02-26 PROCEDURE — 6360000002 HC RX W HCPCS: Performed by: STUDENT IN AN ORGANIZED HEALTH CARE EDUCATION/TRAINING PROGRAM

## 2019-02-26 PROCEDURE — 82962 GLUCOSE BLOOD TEST: CPT

## 2019-02-26 PROCEDURE — 99232 SBSQ HOSP IP/OBS MODERATE 35: CPT | Performed by: INTERNAL MEDICINE

## 2019-02-26 PROCEDURE — 83735 ASSAY OF MAGNESIUM: CPT

## 2019-02-26 RX ORDER — POTASSIUM CHLORIDE 20 MEQ/1
40 TABLET, EXTENDED RELEASE ORAL ONCE
Status: COMPLETED | OUTPATIENT
Start: 2019-02-26 | End: 2019-02-26

## 2019-02-26 RX ORDER — CIPROFLOXACIN 500 MG/1
500 TABLET, FILM COATED ORAL 2 TIMES DAILY
Qty: 6 TABLET | Refills: 0 | Status: SHIPPED | OUTPATIENT
Start: 2019-02-26 | End: 2019-03-01

## 2019-02-26 RX ADMIN — GABAPENTIN 300 MG: 300 CAPSULE ORAL at 16:29

## 2019-02-26 RX ADMIN — OXYCODONE HYDROCHLORIDE 10 MG: 5 TABLET ORAL at 19:32

## 2019-02-26 RX ADMIN — SIMVASTATIN 20 MG: 20 TABLET, FILM COATED ORAL at 20:32

## 2019-02-26 RX ADMIN — METHOCARBAMOL TABLETS 1000 MG: 500 TABLET, COATED ORAL at 16:29

## 2019-02-26 RX ADMIN — PRAMIPEXOLE DIHYDROCHLORIDE 1 MG: 1 TABLET ORAL at 20:32

## 2019-02-26 RX ADMIN — PANTOPRAZOLE SODIUM 40 MG: 40 TABLET, DELAYED RELEASE ORAL at 05:54

## 2019-02-26 RX ADMIN — ACETAMINOPHEN 500 MG: 500 TABLET ORAL at 02:03

## 2019-02-26 RX ADMIN — OXYCODONE HYDROCHLORIDE 10 MG: 5 TABLET ORAL at 03:53

## 2019-02-26 RX ADMIN — GABAPENTIN 300 MG: 300 CAPSULE ORAL at 09:43

## 2019-02-26 RX ADMIN — Medication 10 ML: at 13:06

## 2019-02-26 RX ADMIN — METHOCARBAMOL TABLETS 1000 MG: 500 TABLET, COATED ORAL at 09:44

## 2019-02-26 RX ADMIN — GABAPENTIN 300 MG: 300 CAPSULE ORAL at 20:31

## 2019-02-26 RX ADMIN — CIPROFLOXACIN HYDROCHLORIDE 500 MG: 500 TABLET, FILM COATED ORAL at 09:43

## 2019-02-26 RX ADMIN — ACETAMINOPHEN 500 MG: 500 TABLET ORAL at 05:54

## 2019-02-26 RX ADMIN — ONDANSETRON 4 MG: 2 INJECTION INTRAMUSCULAR; INTRAVENOUS at 00:16

## 2019-02-26 RX ADMIN — METOPROLOL TARTRATE 25 MG: 25 TABLET ORAL at 09:44

## 2019-02-26 RX ADMIN — INSULIN LISPRO 2 UNITS: 100 INJECTION, SOLUTION INTRAVENOUS; SUBCUTANEOUS at 11:49

## 2019-02-26 RX ADMIN — ENOXAPARIN SODIUM 40 MG: 40 INJECTION SUBCUTANEOUS at 09:44

## 2019-02-26 RX ADMIN — HYDROMORPHONE HYDROCHLORIDE 0.5 MG: 1 INJECTION, SOLUTION INTRAMUSCULAR; INTRAVENOUS; SUBCUTANEOUS at 00:45

## 2019-02-26 RX ADMIN — INSULIN LISPRO 1 UNITS: 100 INJECTION, SOLUTION INTRAVENOUS; SUBCUTANEOUS at 20:32

## 2019-02-26 RX ADMIN — ACETAMINOPHEN 500 MG: 500 TABLET ORAL at 09:53

## 2019-02-26 RX ADMIN — HYDROMORPHONE HYDROCHLORIDE 0.5 MG: 1 INJECTION, SOLUTION INTRAMUSCULAR; INTRAVENOUS; SUBCUTANEOUS at 11:49

## 2019-02-26 RX ADMIN — ACETAMINOPHEN 500 MG: 500 TABLET ORAL at 20:31

## 2019-02-26 RX ADMIN — METHOCARBAMOL TABLETS 1000 MG: 500 TABLET, COATED ORAL at 20:31

## 2019-02-26 RX ADMIN — OXYCODONE HYDROCHLORIDE 10 MG: 5 TABLET ORAL at 09:53

## 2019-02-26 RX ADMIN — CIPROFLOXACIN HYDROCHLORIDE 500 MG: 500 TABLET, FILM COATED ORAL at 20:31

## 2019-02-26 RX ADMIN — ACETAMINOPHEN 500 MG: 500 TABLET ORAL at 16:29

## 2019-02-26 RX ADMIN — LIDOCAINE HYDROCHLORIDE: 20 SOLUTION ORAL; TOPICAL at 13:32

## 2019-02-26 RX ADMIN — Medication 10 ML: at 20:32

## 2019-02-26 RX ADMIN — ONDANSETRON 4 MG: 2 INJECTION INTRAMUSCULAR; INTRAVENOUS at 13:05

## 2019-02-26 RX ADMIN — Medication 10 ML: at 09:44

## 2019-02-26 RX ADMIN — PANTOPRAZOLE SODIUM 40 MG: 40 TABLET, DELAYED RELEASE ORAL at 16:26

## 2019-02-26 RX ADMIN — POTASSIUM CHLORIDE 40 MEQ: 20 TABLET, EXTENDED RELEASE ORAL at 09:52

## 2019-02-26 RX ADMIN — METOPROLOL TARTRATE 25 MG: 25 TABLET ORAL at 20:31

## 2019-02-26 RX ADMIN — DIPHENOXYLATE HYDROCHLORIDE AND ATROPINE SULFATE 1 TABLET: 2.5; .025 TABLET ORAL at 09:43

## 2019-02-26 ASSESSMENT — PAIN DESCRIPTION - LOCATION
LOCATION: ABDOMEN

## 2019-02-26 ASSESSMENT — PAIN DESCRIPTION - PAIN TYPE
TYPE: ACUTE PAIN;SURGICAL PAIN
TYPE: SURGICAL PAIN
TYPE: ACUTE PAIN;SURGICAL PAIN
TYPE: SURGICAL PAIN

## 2019-02-26 ASSESSMENT — PAIN SCALES - GENERAL
PAINLEVEL_OUTOF10: 10
PAINLEVEL_OUTOF10: 8
PAINLEVEL_OUTOF10: 9
PAINLEVEL_OUTOF10: 8
PAINLEVEL_OUTOF10: 9
PAINLEVEL_OUTOF10: 8
PAINLEVEL_OUTOF10: 10
PAINLEVEL_OUTOF10: 8
PAINLEVEL_OUTOF10: 7
PAINLEVEL_OUTOF10: 7
PAINLEVEL_OUTOF10: 8
PAINLEVEL_OUTOF10: 10

## 2019-02-26 ASSESSMENT — PAIN DESCRIPTION - DESCRIPTORS
DESCRIPTORS: SHARP;DISCOMFORT
DESCRIPTORS: DISCOMFORT;SHARP;STABBING
DESCRIPTORS: SHARP;STABBING
DESCRIPTORS: CONSTANT;DISCOMFORT
DESCRIPTORS: ACHING;DISCOMFORT
DESCRIPTORS: CONSTANT;DISCOMFORT;SHARP
DESCRIPTORS: DISCOMFORT;SHARP
DESCRIPTORS: SHARP;STABBING

## 2019-02-26 ASSESSMENT — PAIN DESCRIPTION - PROGRESSION
CLINICAL_PROGRESSION: NOT CHANGED
CLINICAL_PROGRESSION: GRADUALLY WORSENING

## 2019-02-26 ASSESSMENT — PAIN DESCRIPTION - ORIENTATION
ORIENTATION: RIGHT
ORIENTATION: RIGHT;LOWER
ORIENTATION: RIGHT
ORIENTATION: RIGHT;MID

## 2019-02-26 ASSESSMENT — PAIN - FUNCTIONAL ASSESSMENT
PAIN_FUNCTIONAL_ASSESSMENT: ACTIVITIES ARE NOT PREVENTED
PAIN_FUNCTIONAL_ASSESSMENT: ACTIVITIES ARE NOT PREVENTED

## 2019-02-26 ASSESSMENT — PAIN DESCRIPTION - ONSET
ONSET: ON-GOING

## 2019-02-26 ASSESSMENT — PAIN DESCRIPTION - FREQUENCY
FREQUENCY: INTERMITTENT
FREQUENCY: CONTINUOUS
FREQUENCY: INTERMITTENT
FREQUENCY: CONTINUOUS
FREQUENCY: INTERMITTENT

## 2019-02-27 ENCOUNTER — APPOINTMENT (OUTPATIENT)
Dept: CT IMAGING | Age: 79
DRG: 330 | End: 2019-02-27
Attending: SURGERY
Payer: MEDICARE

## 2019-02-27 LAB
ANION GAP SERPL CALCULATED.3IONS-SCNC: 8 MMOL/L (ref 7–16)
BUN BLDV-MCNC: 4 MG/DL (ref 8–23)
CALCIUM SERPL-MCNC: 8.6 MG/DL (ref 8.6–10.2)
CHLORIDE BLD-SCNC: 102 MMOL/L (ref 98–107)
CO2: 27 MMOL/L (ref 22–29)
CREAT SERPL-MCNC: 0.5 MG/DL (ref 0.5–1)
GFR AFRICAN AMERICAN: >60
GFR NON-AFRICAN AMERICAN: >60 ML/MIN/1.73
GLUCOSE BLD-MCNC: 143 MG/DL (ref 74–99)
HCT VFR BLD CALC: 29.5 % (ref 34–48)
HEMOGLOBIN: 8.6 G/DL (ref 11.5–15.5)
MAGNESIUM: 1.5 MG/DL (ref 1.6–2.6)
MCH RBC QN AUTO: 25.6 PG (ref 26–35)
MCHC RBC AUTO-ENTMCNC: 29.2 % (ref 32–34.5)
MCV RBC AUTO: 87.8 FL (ref 80–99.9)
METER GLUCOSE: 117 MG/DL (ref 74–99)
METER GLUCOSE: 133 MG/DL (ref 74–99)
METER GLUCOSE: 169 MG/DL (ref 74–99)
METER GLUCOSE: 176 MG/DL (ref 74–99)
PDW BLD-RTO: 15.1 FL (ref 11.5–15)
PLATELET # BLD: 409 E9/L (ref 130–450)
PMV BLD AUTO: 9.4 FL (ref 7–12)
POTASSIUM SERPL-SCNC: 3.9 MMOL/L (ref 3.5–5)
RBC # BLD: 3.36 E12/L (ref 3.5–5.5)
SODIUM BLD-SCNC: 137 MMOL/L (ref 132–146)
WBC # BLD: 18.6 E9/L (ref 4.5–11.5)

## 2019-02-27 PROCEDURE — 2580000003 HC RX 258: Performed by: STUDENT IN AN ORGANIZED HEALTH CARE EDUCATION/TRAINING PROGRAM

## 2019-02-27 PROCEDURE — 6370000000 HC RX 637 (ALT 250 FOR IP): Performed by: SURGERY

## 2019-02-27 PROCEDURE — 99232 SBSQ HOSP IP/OBS MODERATE 35: CPT | Performed by: INTERNAL MEDICINE

## 2019-02-27 PROCEDURE — 97535 SELF CARE MNGMENT TRAINING: CPT

## 2019-02-27 PROCEDURE — 82962 GLUCOSE BLOOD TEST: CPT

## 2019-02-27 PROCEDURE — 36415 COLL VENOUS BLD VENIPUNCTURE: CPT

## 2019-02-27 PROCEDURE — 97530 THERAPEUTIC ACTIVITIES: CPT

## 2019-02-27 PROCEDURE — 6360000002 HC RX W HCPCS: Performed by: STUDENT IN AN ORGANIZED HEALTH CARE EDUCATION/TRAINING PROGRAM

## 2019-02-27 PROCEDURE — 6360000004 HC RX CONTRAST MEDICATION: Performed by: RADIOLOGY

## 2019-02-27 PROCEDURE — 6370000000 HC RX 637 (ALT 250 FOR IP): Performed by: STUDENT IN AN ORGANIZED HEALTH CARE EDUCATION/TRAINING PROGRAM

## 2019-02-27 PROCEDURE — 80048 BASIC METABOLIC PNL TOTAL CA: CPT

## 2019-02-27 PROCEDURE — 83735 ASSAY OF MAGNESIUM: CPT

## 2019-02-27 PROCEDURE — 85027 COMPLETE CBC AUTOMATED: CPT

## 2019-02-27 PROCEDURE — 74176 CT ABD & PELVIS W/O CONTRAST: CPT

## 2019-02-27 PROCEDURE — 2060000000 HC ICU INTERMEDIATE R&B

## 2019-02-27 RX ADMIN — GABAPENTIN 300 MG: 300 CAPSULE ORAL at 08:52

## 2019-02-27 RX ADMIN — INSULIN LISPRO 2 UNITS: 100 INJECTION, SOLUTION INTRAVENOUS; SUBCUTANEOUS at 11:50

## 2019-02-27 RX ADMIN — METHOCARBAMOL TABLETS 1000 MG: 500 TABLET, COATED ORAL at 11:50

## 2019-02-27 RX ADMIN — METHOCARBAMOL TABLETS 1000 MG: 500 TABLET, COATED ORAL at 08:52

## 2019-02-27 RX ADMIN — PRAMIPEXOLE DIHYDROCHLORIDE 1 MG: 1 TABLET ORAL at 20:35

## 2019-02-27 RX ADMIN — CIPROFLOXACIN HYDROCHLORIDE 500 MG: 500 TABLET, FILM COATED ORAL at 20:42

## 2019-02-27 RX ADMIN — GABAPENTIN 300 MG: 300 CAPSULE ORAL at 11:50

## 2019-02-27 RX ADMIN — Medication 10 ML: at 08:53

## 2019-02-27 RX ADMIN — SIMVASTATIN 20 MG: 20 TABLET, FILM COATED ORAL at 20:44

## 2019-02-27 RX ADMIN — METHOCARBAMOL TABLETS 1000 MG: 500 TABLET, COATED ORAL at 17:05

## 2019-02-27 RX ADMIN — OXYCODONE HYDROCHLORIDE 10 MG: 5 TABLET ORAL at 01:15

## 2019-02-27 RX ADMIN — GABAPENTIN 300 MG: 300 CAPSULE ORAL at 20:36

## 2019-02-27 RX ADMIN — OXYCODONE HYDROCHLORIDE 10 MG: 5 TABLET ORAL at 22:39

## 2019-02-27 RX ADMIN — INSULIN LISPRO 1 UNITS: 100 INJECTION, SOLUTION INTRAVENOUS; SUBCUTANEOUS at 20:36

## 2019-02-27 RX ADMIN — ACETAMINOPHEN 500 MG: 500 TABLET ORAL at 05:13

## 2019-02-27 RX ADMIN — METOPROLOL TARTRATE 25 MG: 25 TABLET ORAL at 20:35

## 2019-02-27 RX ADMIN — METOPROLOL TARTRATE 25 MG: 25 TABLET ORAL at 08:52

## 2019-02-27 RX ADMIN — MAGNESIUM SULFATE HEPTAHYDRATE 1 G: 1 INJECTION, SOLUTION INTRAVENOUS at 04:52

## 2019-02-27 RX ADMIN — ACETAMINOPHEN 500 MG: 500 TABLET ORAL at 08:52

## 2019-02-27 RX ADMIN — MAGNESIUM SULFATE HEPTAHYDRATE 1 G: 1 INJECTION, SOLUTION INTRAVENOUS at 06:19

## 2019-02-27 RX ADMIN — IOHEXOL 50 ML: 240 INJECTION, SOLUTION INTRATHECAL; INTRAVASCULAR; INTRAVENOUS; ORAL at 14:46

## 2019-02-27 RX ADMIN — PANTOPRAZOLE SODIUM 40 MG: 40 TABLET, DELAYED RELEASE ORAL at 06:18

## 2019-02-27 RX ADMIN — GABAPENTIN 300 MG: 300 CAPSULE ORAL at 17:05

## 2019-02-27 RX ADMIN — PANTOPRAZOLE SODIUM 40 MG: 40 TABLET, DELAYED RELEASE ORAL at 17:05

## 2019-02-27 RX ADMIN — Medication 10 ML: at 20:44

## 2019-02-27 RX ADMIN — ACETAMINOPHEN 500 MG: 500 TABLET ORAL at 13:00

## 2019-02-27 RX ADMIN — OXYCODONE HYDROCHLORIDE 10 MG: 5 TABLET ORAL at 17:09

## 2019-02-27 RX ADMIN — CIPROFLOXACIN HYDROCHLORIDE 500 MG: 500 TABLET, FILM COATED ORAL at 08:52

## 2019-02-27 RX ADMIN — ACETAMINOPHEN 500 MG: 500 TABLET ORAL at 17:04

## 2019-02-27 RX ADMIN — OXYCODONE HYDROCHLORIDE 10 MG: 5 TABLET ORAL at 05:23

## 2019-02-27 RX ADMIN — ACETAMINOPHEN 500 MG: 500 TABLET ORAL at 01:15

## 2019-02-27 RX ADMIN — ACETAMINOPHEN 500 MG: 500 TABLET ORAL at 20:35

## 2019-02-27 RX ADMIN — METHOCARBAMOL TABLETS 1000 MG: 500 TABLET, COATED ORAL at 20:35

## 2019-02-27 ASSESSMENT — PAIN DESCRIPTION - DESCRIPTORS
DESCRIPTORS: ACHING;CRAMPING
DESCRIPTORS: CONSTANT;ACHING;DISCOMFORT
DESCRIPTORS: ACHING;CRAMPING
DESCRIPTORS: ACHING;DULL
DESCRIPTORS: ACHING;CONSTANT;DISCOMFORT
DESCRIPTORS: ACHING;DULL

## 2019-02-27 ASSESSMENT — PAIN DESCRIPTION - FREQUENCY
FREQUENCY: INTERMITTENT
FREQUENCY: CONTINUOUS
FREQUENCY: CONTINUOUS
FREQUENCY: INTERMITTENT
FREQUENCY: INTERMITTENT

## 2019-02-27 ASSESSMENT — PAIN - FUNCTIONAL ASSESSMENT
PAIN_FUNCTIONAL_ASSESSMENT: PREVENTS OR INTERFERES SOME ACTIVE ACTIVITIES AND ADLS
PAIN_FUNCTIONAL_ASSESSMENT: ACTIVITIES ARE NOT PREVENTED
PAIN_FUNCTIONAL_ASSESSMENT: PREVENTS OR INTERFERES SOME ACTIVE ACTIVITIES AND ADLS

## 2019-02-27 ASSESSMENT — PAIN DESCRIPTION - PROGRESSION
CLINICAL_PROGRESSION: NOT CHANGED

## 2019-02-27 ASSESSMENT — PAIN DESCRIPTION - PAIN TYPE
TYPE: SURGICAL PAIN
TYPE: ACUTE PAIN;SURGICAL PAIN
TYPE: ACUTE PAIN;SURGICAL PAIN
TYPE: SURGICAL PAIN

## 2019-02-27 ASSESSMENT — PAIN SCALES - GENERAL
PAINLEVEL_OUTOF10: 7
PAINLEVEL_OUTOF10: 8
PAINLEVEL_OUTOF10: 0
PAINLEVEL_OUTOF10: 5
PAINLEVEL_OUTOF10: 8
PAINLEVEL_OUTOF10: 7
PAINLEVEL_OUTOF10: 6
PAINLEVEL_OUTOF10: 9
PAINLEVEL_OUTOF10: 8
PAINLEVEL_OUTOF10: 0
PAINLEVEL_OUTOF10: 8

## 2019-02-27 ASSESSMENT — PAIN DESCRIPTION - ORIENTATION
ORIENTATION: LOWER
ORIENTATION: LOWER
ORIENTATION: MID
ORIENTATION: LOWER;RIGHT
ORIENTATION: MID;LOWER
ORIENTATION: LOWER

## 2019-02-27 ASSESSMENT — PAIN DESCRIPTION - LOCATION
LOCATION: ABDOMEN

## 2019-02-27 ASSESSMENT — PAIN DESCRIPTION - ONSET
ONSET: ON-GOING

## 2019-02-28 VITALS
WEIGHT: 183.5 LBS | TEMPERATURE: 98.7 F | OXYGEN SATURATION: 94 % | RESPIRATION RATE: 16 BRPM | DIASTOLIC BLOOD PRESSURE: 70 MMHG | BODY MASS INDEX: 30.57 KG/M2 | HEART RATE: 94 BPM | HEIGHT: 65 IN | SYSTOLIC BLOOD PRESSURE: 124 MMHG

## 2019-02-28 LAB
ANION GAP SERPL CALCULATED.3IONS-SCNC: 10 MMOL/L (ref 7–16)
BUN BLDV-MCNC: 4 MG/DL (ref 8–23)
CALCIUM SERPL-MCNC: 8.4 MG/DL (ref 8.6–10.2)
CHLORIDE BLD-SCNC: 97 MMOL/L (ref 98–107)
CO2: 28 MMOL/L (ref 22–29)
CREAT SERPL-MCNC: 0.5 MG/DL (ref 0.5–1)
GFR AFRICAN AMERICAN: >60
GFR NON-AFRICAN AMERICAN: >60 ML/MIN/1.73
GLUCOSE BLD-MCNC: 119 MG/DL (ref 74–99)
HCT VFR BLD CALC: 29.3 % (ref 34–48)
HEMOGLOBIN: 8.8 G/DL (ref 11.5–15.5)
MAGNESIUM: 1.9 MG/DL (ref 1.6–2.6)
MCH RBC QN AUTO: 26.3 PG (ref 26–35)
MCHC RBC AUTO-ENTMCNC: 30 % (ref 32–34.5)
MCV RBC AUTO: 87.5 FL (ref 80–99.9)
METER GLUCOSE: 139 MG/DL (ref 74–99)
METER GLUCOSE: 178 MG/DL (ref 74–99)
PDW BLD-RTO: 15.2 FL (ref 11.5–15)
PLATELET # BLD: 442 E9/L (ref 130–450)
PMV BLD AUTO: 9.1 FL (ref 7–12)
POTASSIUM SERPL-SCNC: 3.6 MMOL/L (ref 3.5–5)
RBC # BLD: 3.35 E12/L (ref 3.5–5.5)
SODIUM BLD-SCNC: 135 MMOL/L (ref 132–146)
WBC # BLD: 16.4 E9/L (ref 4.5–11.5)

## 2019-02-28 PROCEDURE — 80048 BASIC METABOLIC PNL TOTAL CA: CPT

## 2019-02-28 PROCEDURE — 36415 COLL VENOUS BLD VENIPUNCTURE: CPT

## 2019-02-28 PROCEDURE — 82962 GLUCOSE BLOOD TEST: CPT

## 2019-02-28 PROCEDURE — 6370000000 HC RX 637 (ALT 250 FOR IP): Performed by: STUDENT IN AN ORGANIZED HEALTH CARE EDUCATION/TRAINING PROGRAM

## 2019-02-28 PROCEDURE — 83735 ASSAY OF MAGNESIUM: CPT

## 2019-02-28 PROCEDURE — 99232 SBSQ HOSP IP/OBS MODERATE 35: CPT | Performed by: INTERNAL MEDICINE

## 2019-02-28 PROCEDURE — 2580000003 HC RX 258: Performed by: STUDENT IN AN ORGANIZED HEALTH CARE EDUCATION/TRAINING PROGRAM

## 2019-02-28 PROCEDURE — 85027 COMPLETE CBC AUTOMATED: CPT

## 2019-02-28 PROCEDURE — 6370000000 HC RX 637 (ALT 250 FOR IP): Performed by: SURGERY

## 2019-02-28 PROCEDURE — 6370000000 HC RX 637 (ALT 250 FOR IP): Performed by: INTERNAL MEDICINE

## 2019-02-28 RX ORDER — POTASSIUM CHLORIDE 20 MEQ/1
40 TABLET, EXTENDED RELEASE ORAL ONCE
Status: COMPLETED | OUTPATIENT
Start: 2019-02-28 | End: 2019-02-28

## 2019-02-28 RX ORDER — BISACODYL 10 MG
10 SUPPOSITORY, RECTAL RECTAL DAILY
Status: DISCONTINUED | OUTPATIENT
Start: 2019-02-28 | End: 2019-02-28 | Stop reason: HOSPADM

## 2019-02-28 RX ORDER — METOPROLOL TARTRATE 37.5 MG/1
37.5 TABLET, FILM COATED ORAL 2 TIMES DAILY
Qty: 60 TABLET | Refills: 3 | DISCHARGE
Start: 2019-02-28 | End: 2019-09-26 | Stop reason: SDUPTHER

## 2019-02-28 RX ADMIN — PANTOPRAZOLE SODIUM 40 MG: 40 TABLET, DELAYED RELEASE ORAL at 06:30

## 2019-02-28 RX ADMIN — CIPROFLOXACIN HYDROCHLORIDE 500 MG: 500 TABLET, FILM COATED ORAL at 10:21

## 2019-02-28 RX ADMIN — METOPROLOL TARTRATE 37.5 MG: 25 TABLET ORAL at 10:21

## 2019-02-28 RX ADMIN — GABAPENTIN 300 MG: 300 CAPSULE ORAL at 14:23

## 2019-02-28 RX ADMIN — ACETAMINOPHEN 500 MG: 500 TABLET ORAL at 10:22

## 2019-02-28 RX ADMIN — ACETAMINOPHEN 500 MG: 500 TABLET ORAL at 01:37

## 2019-02-28 RX ADMIN — BISACODYL 10 MG: 10 SUPPOSITORY RECTAL at 10:22

## 2019-02-28 RX ADMIN — Medication 10 ML: at 10:22

## 2019-02-28 RX ADMIN — ACETAMINOPHEN 500 MG: 500 TABLET ORAL at 14:23

## 2019-02-28 RX ADMIN — METHOCARBAMOL TABLETS 1000 MG: 500 TABLET, COATED ORAL at 14:23

## 2019-02-28 RX ADMIN — ACETAMINOPHEN 500 MG: 500 TABLET ORAL at 05:22

## 2019-02-28 RX ADMIN — METHOCARBAMOL TABLETS 1000 MG: 500 TABLET, COATED ORAL at 10:20

## 2019-02-28 RX ADMIN — INSULIN LISPRO 2 UNITS: 100 INJECTION, SOLUTION INTRAVENOUS; SUBCUTANEOUS at 11:54

## 2019-02-28 RX ADMIN — GABAPENTIN 300 MG: 300 CAPSULE ORAL at 10:20

## 2019-02-28 RX ADMIN — POTASSIUM CHLORIDE 40 MEQ: 20 TABLET, EXTENDED RELEASE ORAL at 10:20

## 2019-02-28 RX ADMIN — OXYCODONE HYDROCHLORIDE 5 MG: 5 TABLET ORAL at 05:28

## 2019-02-28 ASSESSMENT — PAIN SCALES - GENERAL
PAINLEVEL_OUTOF10: 6
PAINLEVEL_OUTOF10: 8
PAINLEVEL_OUTOF10: 8
PAINLEVEL_OUTOF10: 9
PAINLEVEL_OUTOF10: 8
PAINLEVEL_OUTOF10: 0

## 2019-02-28 ASSESSMENT — PAIN DESCRIPTION - ONSET: ONSET: ON-GOING

## 2019-02-28 ASSESSMENT — PAIN DESCRIPTION - FREQUENCY: FREQUENCY: INTERMITTENT

## 2019-02-28 ASSESSMENT — PAIN DESCRIPTION - PROGRESSION
CLINICAL_PROGRESSION: NOT CHANGED
CLINICAL_PROGRESSION: NOT CHANGED

## 2019-02-28 ASSESSMENT — PAIN DESCRIPTION - LOCATION: LOCATION: ABDOMEN

## 2019-02-28 ASSESSMENT — PAIN DESCRIPTION - DESCRIPTORS: DESCRIPTORS: ACHING;DULL

## 2019-02-28 ASSESSMENT — PAIN DESCRIPTION - ORIENTATION: ORIENTATION: LOWER

## 2019-02-28 ASSESSMENT — PAIN - FUNCTIONAL ASSESSMENT: PAIN_FUNCTIONAL_ASSESSMENT: ACTIVITIES ARE NOT PREVENTED

## 2019-02-28 ASSESSMENT — PAIN DESCRIPTION - PAIN TYPE: TYPE: ACUTE PAIN;SURGICAL PAIN

## 2019-03-04 ENCOUNTER — TELEPHONE (OUTPATIENT)
Dept: CARDIOLOGY CLINIC | Age: 79
End: 2019-03-04

## 2019-03-06 ENCOUNTER — TELEPHONE (OUTPATIENT)
Dept: CARDIOLOGY CLINIC | Age: 79
End: 2019-03-06

## 2019-03-18 ENCOUNTER — TELEPHONE (OUTPATIENT)
Dept: ADMINISTRATIVE | Age: 79
End: 2019-03-18

## 2019-03-27 ENCOUNTER — CARE COORDINATION (OUTPATIENT)
Dept: CASE MANAGEMENT | Age: 79
End: 2019-03-27

## 2019-04-18 ENCOUNTER — TELEPHONE (OUTPATIENT)
Dept: ADMINISTRATIVE | Age: 79
End: 2019-04-18

## 2019-06-04 ENCOUNTER — OFFICE VISIT (OUTPATIENT)
Dept: CARDIOLOGY CLINIC | Age: 79
End: 2019-06-04
Payer: MEDICARE

## 2019-06-04 VITALS
WEIGHT: 190 LBS | HEART RATE: 98 BPM | SYSTOLIC BLOOD PRESSURE: 122 MMHG | DIASTOLIC BLOOD PRESSURE: 80 MMHG | RESPIRATION RATE: 12 BRPM | HEIGHT: 63 IN | BODY MASS INDEX: 33.66 KG/M2

## 2019-06-04 DIAGNOSIS — I48.0 PAF (PAROXYSMAL ATRIAL FIBRILLATION) (HCC): Primary | ICD-10-CM

## 2019-06-04 PROCEDURE — 4040F PNEUMOC VAC/ADMIN/RCVD: CPT | Performed by: INTERNAL MEDICINE

## 2019-06-04 PROCEDURE — 1090F PRES/ABSN URINE INCON ASSESS: CPT | Performed by: INTERNAL MEDICINE

## 2019-06-04 PROCEDURE — 93000 ELECTROCARDIOGRAM COMPLETE: CPT | Performed by: INTERNAL MEDICINE

## 2019-06-04 PROCEDURE — 99213 OFFICE O/P EST LOW 20 MIN: CPT | Performed by: INTERNAL MEDICINE

## 2019-06-04 PROCEDURE — 1036F TOBACCO NON-USER: CPT | Performed by: INTERNAL MEDICINE

## 2019-06-04 PROCEDURE — G8417 CALC BMI ABV UP PARAM F/U: HCPCS | Performed by: INTERNAL MEDICINE

## 2019-06-04 PROCEDURE — G8399 PT W/DXA RESULTS DOCUMENT: HCPCS | Performed by: INTERNAL MEDICINE

## 2019-06-04 PROCEDURE — G8427 DOCREV CUR MEDS BY ELIG CLIN: HCPCS | Performed by: INTERNAL MEDICINE

## 2019-06-04 PROCEDURE — 1123F ACP DISCUSS/DSCN MKR DOCD: CPT | Performed by: INTERNAL MEDICINE

## 2019-06-04 NOTE — PROGRESS NOTES
[facility]  Ana Paula Granado. Lukas Wood M.D. Flora Soriano M.D.  Sumanth Palmer. Oleksandr Fonseca M.D. Rony Mcgee M.D. Ivonne Min M.D. Charlcie Leyden, MD      This 70-year-old woman is seen for outpatient cardiac follow-up today. She has a history of hypertension, diabetes mellitus, obesity and paroxysmal atrial fibrillation. She had laparoscopic hemicolectomy for a malignant neoplasm in February 2019. She seems to be recovering from this well and gradually increasing her levels of activity. She gets occasional dependent edema which is relieved by leg elevation. She has no chest pain or dyspnea. Medical History:  1. Admission 12/27/17 with weakness. Unable to walk. AF/RVR. 2. Obesity.  BMI 33.66 - 06/2019.  3. DM.  4. Hypertension. 5. HLD  6. PAF. [id number] score = 5.   7. Cardiac cath ~2012  No. significant abnormality per patient report NA. 8. FH :  + for atrial fibrillation. 9. Arthritis  10. Cervical stenosis s/p diskectomy and [place] stenosis without radiculopathy. Follows with Dr. Garfield Ernandez. On chronic narcotic therapy   11. Echo, 12/28/2017.  Stage II diastolic dysfunction.  EF visual estimate 55-60%.  Mild LAE.  Elevated LA pressure.  Normal MV structure and fucntion.  AV mildly sclerotic.  Mild PHTN.  No pericardial efffusion. 12. GERD  13. Remote tobacco abuse   14. S/p Appendectomy, bilateral breast reduction, carpal tunnel release, cholecystectomy, hysterectomy, LLE tibia and fibula fracture surgical repair in 2015   15. Iodine Allergy  (urticaria and rash reaction)  16. Admitted 02/19/2019  For laparoscopic right hemicolectomy for cancer.      Review of Systems:  Constitutional: negative for fever and chills  Respiratory: negative for cough and hemoptysis  Cardiovascular:   Gastrointestinal: negative for abdominal pain, diarrhea, nausea and vomiting  Genitourinary:negative for dysuria and hematuria  Derm: negative for rash and skin lesion(s)  Neurological: negative for seizures and tremors  Endocrine: negative for diabetic symptoms including polydipsia and polyuria  Musculoskeletal: negative for CTD  Psychiatric: negative for psychosis and major depression    On examination, she is an alert, pleasant, elderly woman in no distress. Skin is warm and dry. Respirations are unlabored. [place] HEENT negative for scleral icterus. Extraocular muscles intact. No facial asymmetry or central cyanosis. Neck without masses or goiter. No bruit or JVD. Cardiac apex not displaced. Rhythm regular. Abdomen normal.  Extremities without edema. Lungs are clear. EKG today shows sinus rhythm. Low QRS voltage. Possible old septal MI. She is symptomatically and functionally stable from a cardiac perspective. Medications are reviewed and no changes made. She will have cardiac follow-up in 6 months.     At completion of today's visit, medications include the following:    Current Outpatient Medications:     pantoprazole (PROTONIX) 40 MG tablet, TAKE ONE TABLET BY MOUTH TWO TIMES A DAY BEFORE MEALS, Disp: 30 tablet, Rfl: 2    hydrOXYzine (ATARAX) 10 MG tablet, Take 1 tablet by mouth nightly, Disp: 30 tablet, Rfl: 1    amLODIPine (NORVASC) 10 MG tablet, TAKE 1 TABLET BY MOUTH  DAILY, Disp: 90 tablet, Rfl: 1    furosemide (LASIX) 20 MG tablet, Take 1 tablet by mouth daily, Disp: 90 tablet, Rfl: 0    ferrous sulfate 325 (65 Fe) MG tablet, Take 1 tablet by mouth daily, Disp: 90 tablet, Rfl: 1    pramipexole (MIRAPEX) 0.25 MG tablet, 4 tablets at night, Disp: 360 tablet, Rfl: 0    simvastatin (ZOCOR) 20 MG tablet, TAKE 1 TABLET BY MOUTH  DAILY WITH SUPPER, Disp: 90 tablet, Rfl: 3    oxybutynin (DITROPAN) 5 MG tablet, TAKE 1 TABLET BY MOUTH  DAILY WITH BREAKFAST, Disp: 90 tablet, Rfl: 1    metFORMIN (GLUCOPHAGE) 500 MG tablet, TAKE 2 TABLETS BY MOUTH  TWICE A DAY, Disp: 360 tablet, Rfl: 2    ONETOUCH VERIO strip, USE TO TEST 3 TIMES DAILY  AS NEEDED, Disp: 150 strip, Rfl: 11    metoprolol tartrate 37.5 MG TABS, Take 37.5 mg by mouth 2 times daily (Patient taking differently: Take 25 mg by mouth 2 times daily ), Disp: 60 tablet, Rfl: 3    potassium chloride (MICRO-K) 10 MEQ extended release capsule, Take 2 capsules by mouth daily, Disp: 90 capsule, Rfl: 0    gabapentin (NEURONTIN) 300 MG capsule, Take 300 mg by mouth 4 times daily. ., Disp: , Rfl:     fluticasone (FLONASE) 50 MCG/ACT nasal spray, 1 spray by Each Nare route as needed for Rhinitis, Disp: , Rfl:     amLODIPine (NORVASC) 5 MG tablet, Take 5 mg by mouth daily, Disp: , Rfl:     vitamin B-1 (THIAMINE) 100 MG tablet, Take 100 mg by mouth daily, Disp: , Rfl:     vitamin D (CHOLECALCIFEROL) 1000 UNIT TABS tablet, Take 1,000 Units by mouth daily, Disp: , Rfl:     Multiple Vitamins-Minerals (THERAPEUTIC MULTIVITAMIN-MINERALS) tablet, Take 1 tablet by mouth daily, Disp: , Rfl:     traMADol (ULTRAM) 50 MG tablet, Take 50 mg by mouth every 6 hours as needed for Pain. ., Disp: , Rfl:     rivaroxaban (XARELTO) 20 MG TABS tablet, Take 1 tablet by mouth daily (with breakfast), Disp: 90 tablet, Rfl: 0    dicyclomine (BENTYL) 10 MG capsule, TAKE 1 CAPSULE BY MOUTH  DAILY AS NEEDED, Disp: 90 capsule, Rfl: 1    Coenzyme Q10 (CO Q 10) 100 MG CAPS, Take 200 mg by mouth daily (with breakfast). , Disp: , Rfl:     Calcium Carbonate-Vitamin D (CALCIUM + D) 600-200 MG-UNIT TABS, Take 600 mg by mouth daily. , Disp: , Rfl:     BIOTIN 5000 PO, Take 5,000 mcg by mouth daily. , Disp: , Rfl:     gabapentin (NEURONTIN) 300 MG capsule, TAKE 1 CAPSULE BY MOUTH 3  TIMES DAILY, Disp: 270 capsule, Rfl: 0      Álvaro Sanderson MD

## 2020-01-27 ENCOUNTER — HOSPITAL ENCOUNTER (OUTPATIENT)
Age: 80
Discharge: HOME | End: 2020-01-29

## 2020-01-27 PROCEDURE — 88305 TISSUE EXAM BY PATHOLOGIST: CPT

## 2020-06-10 ENCOUNTER — HOSPITAL ENCOUNTER (EMERGENCY)
Age: 80
Discharge: HOME | End: 2020-06-10
Attending: EMERGENCY MEDICINE
Payer: MEDICARE

## 2020-06-10 ENCOUNTER — APPOINTMENT (OUTPATIENT)
Dept: GENERAL RADIOLOGY | Age: 80
End: 2020-06-10
Payer: MEDICARE

## 2020-06-10 ENCOUNTER — APPOINTMENT (OUTPATIENT)
Dept: CT IMAGING | Age: 80
End: 2020-06-10
Payer: MEDICARE

## 2020-06-10 VITALS
DIASTOLIC BLOOD PRESSURE: 70 MMHG | BODY MASS INDEX: 32.25 KG/M2 | HEART RATE: 90 BPM | TEMPERATURE: 97.8 F | RESPIRATION RATE: 16 BRPM | OXYGEN SATURATION: 95 % | HEIGHT: 63 IN | WEIGHT: 182 LBS | SYSTOLIC BLOOD PRESSURE: 130 MMHG

## 2020-06-10 PROCEDURE — 99284 EMERGENCY DEPT VISIT MOD MDM: CPT

## 2020-06-10 PROCEDURE — 6360000002 HC RX W HCPCS: Performed by: EMERGENCY MEDICINE

## 2020-06-10 PROCEDURE — 96372 THER/PROPH/DIAG INJ SC/IM: CPT

## 2020-06-10 PROCEDURE — 73564 X-RAY EXAM KNEE 4 OR MORE: CPT

## 2020-06-10 PROCEDURE — 90715 TDAP VACCINE 7 YRS/> IM: CPT | Performed by: EMERGENCY MEDICINE

## 2020-06-10 PROCEDURE — 90471 IMMUNIZATION ADMIN: CPT | Performed by: EMERGENCY MEDICINE

## 2020-06-10 PROCEDURE — 73700 CT LOWER EXTREMITY W/O DYE: CPT

## 2020-06-10 RX ORDER — KETOROLAC TROMETHAMINE 30 MG/ML
30 INJECTION, SOLUTION INTRAMUSCULAR; INTRAVENOUS ONCE
Status: COMPLETED | OUTPATIENT
Start: 2020-06-10 | End: 2020-06-10

## 2020-06-10 RX ADMIN — KETOROLAC TROMETHAMINE 30 MG: 30 INJECTION, SOLUTION INTRAMUSCULAR at 19:54

## 2020-06-10 RX ADMIN — TETANUS TOXOID, REDUCED DIPHTHERIA TOXOID AND ACELLULAR PERTUSSIS VACCINE, ADSORBED 0.5 ML: 5; 2.5; 8; 8; 2.5 SUSPENSION INTRAMUSCULAR at 19:55

## 2020-06-10 ASSESSMENT — PAIN SCALES - GENERAL
PAINLEVEL_OUTOF10: 10
PAINLEVEL_OUTOF10: 2
PAINLEVEL_OUTOF10: 4

## 2020-06-10 ASSESSMENT — PAIN DESCRIPTION - ORIENTATION
ORIENTATION: LEFT
ORIENTATION: LEFT

## 2020-06-10 ASSESSMENT — PAIN DESCRIPTION - LOCATION
LOCATION: KNEE
LOCATION: KNEE

## 2020-06-10 ASSESSMENT — PAIN DESCRIPTION - DESCRIPTORS: DESCRIPTORS: ACHING;SHARP

## 2020-06-10 ASSESSMENT — PAIN DESCRIPTION - FREQUENCY: FREQUENCY: CONTINUOUS

## 2020-06-10 ASSESSMENT — PAIN DESCRIPTION - PAIN TYPE
TYPE: ACUTE PAIN
TYPE: ACUTE PAIN

## 2020-06-11 ENCOUNTER — CARE COORDINATION (OUTPATIENT)
Dept: CARE COORDINATION | Age: 80
End: 2020-06-11

## 2020-06-11 NOTE — ED PROVIDER NOTES
HPI:  6/10/20,   Time: 9:17 PM EDT        Reginald José is a [de-identified] y.o. female presenting to the ED for left knee pain. Patient states she tripped and fell earlier today while walking landing on her left knee. Did not hit her head, no LOC, no headache neck or back pain or any other complaints. Patient complaining of pain and swelling to left knee with superficial abrasion to the front of her left knee. Patient states she is able to walk but it is associated with some pain. ROS:   GEN: no fevers, no chills, no fatique  HENT: No trauma, no sore throat, no swelling throat or oral mucosa  CARDIO: No chest pain, no palpitations  PULM: No trouble breathing, no wheezing  ABD: No pain, no nausea, no vomiting  MSK: See HPI  SKIN: See HPI  NEURO: No changes in mentation, no headache, no weakness     --------------------------------------------- PAST HISTORY ---------------------------------------------  Past Medical History:  has a past medical history of Adenocarcinoma of cecum ([facility] [address] Anemia, Arm numbness, Arthritis, Blood transfusion, Cervical spinal stenosis, Diabetes mellitus ([facility] [address] Fatty liver, GERD (gastroesophageal reflux disease), Hyperlipidemia, Hypertension, Low back pain, Lumbar stenosis, Neck pain, Obesity (BMI 30.0-34.9), PAF (paroxysmal atrial fibrillation) ([facility] [address] and Renal artery aneurysm ([facility] [address] Past Surgical History:  has a past surgical history that includes Cervical discectomy (01/19/2007); back surgery; Hysterectomy; fracture surgery; Appendectomy; Breast surgery; Cholecystectomy; Carpal tunnel release; Cataract removal (10/2016); laparoscopy (05/17/2017); Upper gastrointestinal endoscopy (N/A, 11/5/2018); and hemicolectomy (N/A, 2/19/2019). Social History:  reports that she quit smoking about 23 years ago. She has a 30.00 pack-year smoking history. She has never used smokeless tobacco. She reports current alcohol use. She reports that she does not use drugs.     Family History: family history includes Cancer in her brother; Diabetes in her brother, mother, and sister; Heart Disease in her brother; High Blood Pressure in her brother and sister; Stroke in her father. The patients home medications have been reviewed. Allergies: Benadryl [diphenhydramine]; Fish-derived products; and Iodine    -------------------------------------------------- RESULTS -------------------------------------------------  All laboratory and radiology results have been personally reviewed by myself   LABS:  No results found for this visit on 06/10/20. RADIOLOGY:  Interpreted by Radiologist.  CT KNEE LEFT WO CONTRAST   Final Result      No acute fracture is identified. Knee joint effusion with extensive anterior soft tissue swelling. Remote fracture of the proximal fibula. XR KNEE LEFT (MIN 4 VIEWS)   Final Result   Slight cortical irregularity involving the lateral tibial plateau   concerning for an occult fracture. There is also joint effusion and   soft tissue swelling. Consider further assessment with CT scan.                ------------------------- NURSING NOTES AND VITALS REVIEWED ---------------------------   The nursing notes within the ED encounter and vital signs as below have been reviewed. /70   Pulse 90   Temp 97.8 °F (36.6 °C) (Temporal)   Resp 16   Ht 5' 3\" (1.6 m)   Wt 182 lb (82.6 kg)   LMP  (LMP Unknown)   SpO2 95%   BMI 32.24 kg/m²   Oxygen Saturation Interpretation: Normal    ---------------------------------------------------PHYSICAL EXAM--------------------------------------    GEN: No acute distress, well-appearing, well nourished   HENT: Normocephalic, atraumatic, oral mucosa moist  EYES: No scleral injection, no scleral icterus, PERRL   PULM: Lungs clear to ascultation bilaterally, no wheezes, no crackles  CARDIO: Regular rate, regular rhythm, normal S1/S2  ABD: Soft, non-tender, no rigidity  MSK: No deformities, palpable pulses all extremities.  + Significant swelling with overlying ecchymosis diffusely left knee.  + Tenderness to palpation diffusely left knee. SKIN: No rashes, no lacerations. + small superficial abrasion anterior aspect left knee  NEURO: Awake, alert, appropriate         ------------------------------ ED COURSE/MEDICAL DECISION MAKING----------------------  Medications   Tetanus-Diphth-Acell Pertussis (BOOSTRIX) injection 0.5 mL (0.5 mLs Intramuscular Given 6/10/20 1955)   ketorolac (TORADOL) injection 30 mg (30 mg Intramuscular Given 6/10/20 1954)         ED Course and Medical Decision Making:   Patient presents with left knee pain after fall earlier today, isolated injury, denies any other trauma injuries or complaints. Significant edema and ecchymosis noted however CT unremarkable for fracture. Patient nontoxic and well-appearing. Patient with easily palpable DP and TP pulses which are equal bilaterally, good cap refill noted left foot, neurovascularly intact distal to injury. Patient was discharged home with appropriate recommendations and return precautions, recommended follow-up with primary care physician. Patient states understanding and agrees to plan. Tetanus was updated. --------------------------------- ADDITIONAL PROVIDER NOTES ---------------------------------    This patient's ED course included: re-evaluation prior to disposition and a personal history and physicial eaxmination    This patient has remained hemodynamically stable during their ED course. Counseling: The emergency provider has spoken with the patient and spouse/SO and discussed todays results, in addition to providing specific details for the plan of care and counseling regarding the diagnosis and prognosis.   Questions are answered at this time and they are agreeable with the plan.      --------------------------------- IMPRESSION AND DISPOSITION ---------------------------------    IMPRESSION  1. Knee injury, left, initial encounter

## 2020-11-06 ENCOUNTER — TELEPHONE (OUTPATIENT)
Dept: ADMINISTRATIVE | Age: 80
End: 2020-11-06

## 2020-11-06 NOTE — TELEPHONE ENCOUNTER
Pt needs to have some dental work and wants to know when to stop taking her xarelto.   Please call pt and advise

## 2020-12-05 ENCOUNTER — APPOINTMENT (OUTPATIENT)
Dept: CT IMAGING | Age: 80
DRG: 392 | End: 2020-12-05
Payer: MEDICARE

## 2020-12-05 ENCOUNTER — HOSPITAL ENCOUNTER (INPATIENT)
Age: 80
LOS: 3 days | Discharge: HOME | DRG: 392 | End: 2020-12-08
Attending: EMERGENCY MEDICINE | Admitting: INTERNAL MEDICINE
Payer: MEDICARE

## 2020-12-05 PROBLEM — R10.9 ABDOMINAL PAIN: Status: ACTIVE | Noted: 2020-12-05

## 2020-12-05 PROBLEM — E87.1 HYPONATREMIA: Status: ACTIVE | Noted: 2020-12-05

## 2020-12-05 LAB
ADENOVIRUS BY PCR: NOT DETECTED
ALBUMIN SERPL-MCNC: 2.6 G/DL (ref 3.5–5.2)
ALP BLD-CCNC: 39 U/L (ref 35–104)
ALT SERPL-CCNC: 9 U/L (ref 0–32)
ANION GAP SERPL CALCULATED.3IONS-SCNC: 12 MMOL/L (ref 7–16)
ANION GAP SERPL CALCULATED.3IONS-SCNC: 14 MMOL/L (ref 7–16)
ANION GAP SERPL CALCULATED.3IONS-SCNC: 16 MMOL/L (ref 7–16)
AST SERPL-CCNC: 15 U/L (ref 0–31)
BACTERIA: ABNORMAL /HPF
BILIRUB SERPL-MCNC: 0.7 MG/DL (ref 0–1.2)
BILIRUBIN URINE: NEGATIVE
BLOOD, URINE: NEGATIVE
BORDETELLA PARAPERTUSSIS BY PCR: NOT DETECTED
BORDETELLA PERTUSSIS BY PCR: NOT DETECTED
BUN BLDV-MCNC: 29 MG/DL (ref 8–23)
BUN BLDV-MCNC: 30 MG/DL (ref 8–23)
BUN BLDV-MCNC: 30 MG/DL (ref 8–23)
CALCIUM SERPL-MCNC: 8.2 MG/DL (ref 8.6–10.2)
CALCIUM SERPL-MCNC: 8.7 MG/DL (ref 8.6–10.2)
CALCIUM SERPL-MCNC: 8.7 MG/DL (ref 8.6–10.2)
CHLAMYDOPHILIA PNEUMONIAE BY PCR: NOT DETECTED
CHLORIDE BLD-SCNC: 76 MMOL/L (ref 98–107)
CHLORIDE BLD-SCNC: 98 MMOL/L (ref 98–107)
CHLORIDE BLD-SCNC: 99 MMOL/L (ref 98–107)
CHLORIDE URINE RANDOM: <20 MMOL/L
CLARITY: CLEAR
CO2: 18 MMOL/L (ref 22–29)
CO2: 18 MMOL/L (ref 22–29)
CO2: 21 MMOL/L (ref 22–29)
COLOR: YELLOW
CORONAVIRUS 229E BY PCR: NOT DETECTED
CORONAVIRUS HKU1 BY PCR: NOT DETECTED
CORONAVIRUS NL63 BY PCR: NOT DETECTED
CORONAVIRUS OC43 BY PCR: NOT DETECTED
CREAT SERPL-MCNC: 1.1 MG/DL (ref 0.5–1)
CREAT SERPL-MCNC: 1.1 MG/DL (ref 0.5–1)
CREAT SERPL-MCNC: 1.2 MG/DL (ref 0.5–1)
CREATININE URINE: 93 MG/DL (ref 29–226)
GFR AFRICAN AMERICAN: 52
GFR AFRICAN AMERICAN: 58
GFR AFRICAN AMERICAN: 58
GFR AFRICAN AMERICAN: >60
GFR NON-AFRICAN AMERICAN: 43 ML/MIN/1.73
GFR NON-AFRICAN AMERICAN: 48 ML/MIN/1.73
GFR NON-AFRICAN AMERICAN: 48 ML/MIN/1.73
GFR NON-AFRICAN AMERICAN: 53 ML/MIN/1.73
GLUCOSE BLD-MCNC: 125 MG/DL (ref 74–99)
GLUCOSE BLD-MCNC: 145 MG/DL (ref 74–99)
GLUCOSE BLD-MCNC: 152 MG/DL (ref 74–99)
GLUCOSE BLD-MCNC: 193 MG/DL (ref 74–99)
GLUCOSE URINE: 500 MG/DL
HCT VFR BLD CALC: 35.7 % (ref 34–48)
HEMOGLOBIN: 10 G/DL (ref 11.5–15.5)
HUMAN METAPNEUMOVIRUS BY PCR: NOT DETECTED
HUMAN RHINOVIRUS/ENTEROVIRUS BY PCR: NOT DETECTED
INFLUENZA A BY PCR: NOT DETECTED
INFLUENZA B BY PCR: NOT DETECTED
INR BLD: 3.5
KETONES, URINE: NEGATIVE MG/DL
LACTIC ACID, SEPSIS: 4.4 MMOL/L (ref 0.5–1.9)
LACTIC ACID, SEPSIS: 4.9 MMOL/L (ref 0.5–1.9)
LACTIC ACID: 4.1 MMOL/L (ref 0.5–2.2)
LEUKOCYTE ESTERASE, URINE: NEGATIVE
LIPASE: 7 U/L (ref 13–60)
MCH RBC QN AUTO: 23.5 PG (ref 26–35)
MCHC RBC AUTO-ENTMCNC: 28 % (ref 32–34.5)
MCV RBC AUTO: 84 FL (ref 80–99.9)
MYCOPLASMA PNEUMONIAE BY PCR: NOT DETECTED
NITRITE, URINE: NEGATIVE
OSMOLALITY URINE: 553 MOSM/KG (ref 300–900)
OSMOLALITY: 297 MOSM/KG (ref 285–310)
PARAINFLUENZA VIRUS 1 BY PCR: NOT DETECTED
PARAINFLUENZA VIRUS 2 BY PCR: NOT DETECTED
PARAINFLUENZA VIRUS 3 BY PCR: NOT DETECTED
PARAINFLUENZA VIRUS 4 BY PCR: NOT DETECTED
PDW BLD-RTO: 19.3 FL (ref 11.5–15)
PERFORMED ON: ABNORMAL
PH UA: 6 (ref 5–9)
PLATELET # BLD: 387 E9/L (ref 130–450)
PMV BLD AUTO: 9.6 FL (ref 7–12)
POC CHLORIDE: 103 MMOL/L (ref 100–108)
POC CREATININE: 1 MG/DL (ref 0.5–1)
POC POTASSIUM: 4.9 MMOL/L (ref 3.5–5)
POC SODIUM: 133 MMOL/L (ref 132–146)
POTASSIUM SERPL-SCNC: 4 MMOL/L (ref 3.5–5)
POTASSIUM SERPL-SCNC: 4.6 MMOL/L (ref 3.5–5)
POTASSIUM SERPL-SCNC: 4.9 MMOL/L (ref 3.5–5)
POTASSIUM, UR: 25.5 MMOL/L
PROTEIN UA: 30 MG/DL
PROTHROMBIN TIME: 40.1 SEC (ref 9.3–12.4)
RBC # BLD: 4.25 E12/L (ref 3.5–5.5)
RBC UA: ABNORMAL /HPF (ref 0–2)
RESPIRATORY SYNCYTIAL VIRUS BY PCR: NOT DETECTED
SARS-COV-2, PCR: NOT DETECTED
SODIUM BLD-SCNC: 110 MMOL/L (ref 132–146)
SODIUM BLD-SCNC: 130 MMOL/L (ref 132–146)
SODIUM BLD-SCNC: 132 MMOL/L (ref 132–146)
SODIUM URINE: <20 MMOL/L
SPECIFIC GRAVITY UA: 1.01 (ref 1–1.03)
TOTAL PROTEIN: 4.2 G/DL (ref 6.4–8.3)
TROPONIN: 0.03 NG/ML (ref 0–0.03)
TSH SERPL DL<=0.05 MIU/L-ACNC: 1.24 UIU/ML (ref 0.27–4.2)
UROBILINOGEN, URINE: 0.2 E.U./DL
WBC # BLD: 12.8 E9/L (ref 4.5–11.5)
WBC UA: ABNORMAL /HPF (ref 0–5)

## 2020-12-05 PROCEDURE — 84484 ASSAY OF TROPONIN QUANT: CPT

## 2020-12-05 PROCEDURE — G0378 HOSPITAL OBSERVATION PER HR: HCPCS

## 2020-12-05 PROCEDURE — 84132 ASSAY OF SERUM POTASSIUM: CPT

## 2020-12-05 PROCEDURE — 83930 ASSAY OF BLOOD OSMOLALITY: CPT

## 2020-12-05 PROCEDURE — 83935 ASSAY OF URINE OSMOLALITY: CPT

## 2020-12-05 PROCEDURE — 96376 TX/PRO/DX INJ SAME DRUG ADON: CPT

## 2020-12-05 PROCEDURE — 96375 TX/PRO/DX INJ NEW DRUG ADDON: CPT

## 2020-12-05 PROCEDURE — 82565 ASSAY OF CREATININE: CPT

## 2020-12-05 PROCEDURE — 93005 ELECTROCARDIOGRAM TRACING: CPT | Performed by: NURSE PRACTITIONER

## 2020-12-05 PROCEDURE — 6370000000 HC RX 637 (ALT 250 FOR IP): Performed by: EMERGENCY MEDICINE

## 2020-12-05 PROCEDURE — 85610 PROTHROMBIN TIME: CPT

## 2020-12-05 PROCEDURE — 36415 COLL VENOUS BLD VENIPUNCTURE: CPT

## 2020-12-05 PROCEDURE — 2580000003 HC RX 258: Performed by: RADIOLOGY

## 2020-12-05 PROCEDURE — 84133 ASSAY OF URINE POTASSIUM: CPT

## 2020-12-05 PROCEDURE — 6360000004 HC RX CONTRAST MEDICATION: Performed by: RADIOLOGY

## 2020-12-05 PROCEDURE — 82570 ASSAY OF URINE CREATININE: CPT

## 2020-12-05 PROCEDURE — 83605 ASSAY OF LACTIC ACID: CPT

## 2020-12-05 PROCEDURE — 85027 COMPLETE CBC AUTOMATED: CPT

## 2020-12-05 PROCEDURE — 6360000002 HC RX W HCPCS

## 2020-12-05 PROCEDURE — 83690 ASSAY OF LIPASE: CPT

## 2020-12-05 PROCEDURE — 84436 ASSAY OF TOTAL THYROXINE: CPT

## 2020-12-05 PROCEDURE — 82947 ASSAY GLUCOSE BLOOD QUANT: CPT

## 2020-12-05 PROCEDURE — 74177 CT ABD & PELVIS W/CONTRAST: CPT

## 2020-12-05 PROCEDURE — 2500000003 HC RX 250 WO HCPCS: Performed by: EMERGENCY MEDICINE

## 2020-12-05 PROCEDURE — 82435 ASSAY OF BLOOD CHLORIDE: CPT

## 2020-12-05 PROCEDURE — 87449 NOS EACH ORGANISM AG IA: CPT

## 2020-12-05 PROCEDURE — 6360000002 HC RX W HCPCS: Performed by: EMERGENCY MEDICINE

## 2020-12-05 PROCEDURE — 2060000000 HC ICU INTERMEDIATE R&B

## 2020-12-05 PROCEDURE — 84295 ASSAY OF SERUM SODIUM: CPT

## 2020-12-05 PROCEDURE — 96365 THER/PROPH/DIAG IV INF INIT: CPT

## 2020-12-05 PROCEDURE — 84300 ASSAY OF URINE SODIUM: CPT

## 2020-12-05 PROCEDURE — C9113 INJ PANTOPRAZOLE SODIUM, VIA: HCPCS | Performed by: EMERGENCY MEDICINE

## 2020-12-05 PROCEDURE — 71275 CT ANGIOGRAPHY CHEST: CPT

## 2020-12-05 PROCEDURE — 80048 BASIC METABOLIC PNL TOTAL CA: CPT

## 2020-12-05 PROCEDURE — 99283 EMERGENCY DEPT VISIT LOW MDM: CPT

## 2020-12-05 PROCEDURE — 81001 URINALYSIS AUTO W/SCOPE: CPT

## 2020-12-05 PROCEDURE — 80053 COMPREHEN METABOLIC PANEL: CPT

## 2020-12-05 PROCEDURE — 2580000003 HC RX 258: Performed by: EMERGENCY MEDICINE

## 2020-12-05 PROCEDURE — 0202U NFCT DS 22 TRGT SARS-COV-2: CPT

## 2020-12-05 PROCEDURE — 84443 ASSAY THYROID STIM HORMONE: CPT

## 2020-12-05 PROCEDURE — 82436 ASSAY OF URINE CHLORIDE: CPT

## 2020-12-05 RX ORDER — FENTANYL CITRATE 50 UG/ML
25 INJECTION, SOLUTION INTRAMUSCULAR; INTRAVENOUS ONCE
Status: COMPLETED | OUTPATIENT
Start: 2020-12-05 | End: 2020-12-05

## 2020-12-05 RX ORDER — GLIMEPIRIDE 2 MG/1
2 TABLET ORAL 2 TIMES DAILY WITH MEALS
Status: DISCONTINUED | OUTPATIENT
Start: 2020-12-06 | End: 2020-12-08 | Stop reason: HOSPADM

## 2020-12-05 RX ORDER — TRIAMCINOLONE ACETONIDE 1 MG/G
CREAM TOPICAL 2 TIMES DAILY PRN
Status: ON HOLD | COMMUNITY
End: 2020-12-30 | Stop reason: ALTCHOICE

## 2020-12-05 RX ORDER — OXYBUTYNIN CHLORIDE 5 MG/1
5 TABLET ORAL 2 TIMES DAILY
Status: DISCONTINUED | OUTPATIENT
Start: 2020-12-06 | End: 2020-12-06

## 2020-12-05 RX ORDER — 0.9 % SODIUM CHLORIDE 0.9 %
1000 INTRAVENOUS SOLUTION INTRAVENOUS ONCE
Status: COMPLETED | OUTPATIENT
Start: 2020-12-05 | End: 2020-12-05

## 2020-12-05 RX ORDER — ONDANSETRON 2 MG/ML
4 INJECTION INTRAMUSCULAR; INTRAVENOUS ONCE
Status: COMPLETED | OUTPATIENT
Start: 2020-12-05 | End: 2020-12-05

## 2020-12-05 RX ORDER — TRAMADOL HYDROCHLORIDE 50 MG/1
50 TABLET ORAL EVERY 8 HOURS PRN
Status: DISCONTINUED | OUTPATIENT
Start: 2020-12-05 | End: 2020-12-08 | Stop reason: HOSPADM

## 2020-12-05 RX ORDER — PREGABALIN 50 MG/1
50 CAPSULE ORAL DAILY
COMMUNITY
End: 2021-06-09 | Stop reason: ALTCHOICE

## 2020-12-05 RX ORDER — POTASSIUM CHLORIDE 750 MG/1
10 TABLET, EXTENDED RELEASE ORAL 2 TIMES DAILY WITH MEALS
Status: DISCONTINUED | OUTPATIENT
Start: 2020-12-06 | End: 2020-12-06

## 2020-12-05 RX ORDER — HYDROXYZINE HYDROCHLORIDE 10 MG/1
10 TABLET, FILM COATED ORAL NIGHTLY
COMMUNITY
End: 2020-12-05

## 2020-12-05 RX ORDER — HYDROXYZINE HYDROCHLORIDE 10 MG/1
10 TABLET, FILM COATED ORAL NIGHTLY
Status: DISCONTINUED | OUTPATIENT
Start: 2020-12-06 | End: 2020-12-06 | Stop reason: CLARIF

## 2020-12-05 RX ORDER — FENTANYL CITRATE 50 UG/ML
INJECTION, SOLUTION INTRAMUSCULAR; INTRAVENOUS
Status: COMPLETED
Start: 2020-12-05 | End: 2020-12-05

## 2020-12-05 RX ORDER — METHYLPREDNISOLONE SODIUM SUCCINATE 125 MG/2ML
125 INJECTION, POWDER, LYOPHILIZED, FOR SOLUTION INTRAMUSCULAR; INTRAVENOUS ONCE
Status: COMPLETED | OUTPATIENT
Start: 2020-12-05 | End: 2020-12-05

## 2020-12-05 RX ORDER — PREGABALIN 50 MG/1
50 CAPSULE ORAL DAILY
Status: DISCONTINUED | OUTPATIENT
Start: 2020-12-06 | End: 2020-12-08 | Stop reason: HOSPADM

## 2020-12-05 RX ORDER — PANTOPRAZOLE SODIUM 40 MG/1
40 TABLET, DELAYED RELEASE ORAL
Status: DISCONTINUED | OUTPATIENT
Start: 2020-12-06 | End: 2020-12-08 | Stop reason: HOSPADM

## 2020-12-05 RX ORDER — DULOXETIN HYDROCHLORIDE 30 MG/1
30 CAPSULE, DELAYED RELEASE ORAL DAILY
Status: DISCONTINUED | OUTPATIENT
Start: 2020-12-06 | End: 2020-12-08 | Stop reason: HOSPADM

## 2020-12-05 RX ORDER — DICYCLOMINE HYDROCHLORIDE 10 MG/1
10 CAPSULE ORAL 3 TIMES DAILY
Status: DISCONTINUED | OUTPATIENT
Start: 2020-12-06 | End: 2020-12-06

## 2020-12-05 RX ORDER — AMLODIPINE BESYLATE 10 MG/1
10 TABLET ORAL DAILY
Status: DISCONTINUED | OUTPATIENT
Start: 2020-12-06 | End: 2020-12-08 | Stop reason: HOSPADM

## 2020-12-05 RX ORDER — DIPHENHYDRAMINE HYDROCHLORIDE 50 MG/ML
25 INJECTION INTRAMUSCULAR; INTRAVENOUS ONCE
Status: DISCONTINUED | OUTPATIENT
Start: 2020-12-05 | End: 2020-12-06

## 2020-12-05 RX ORDER — PANTOPRAZOLE SODIUM 40 MG/10ML
40 INJECTION, POWDER, LYOPHILIZED, FOR SOLUTION INTRAVENOUS ONCE
Status: COMPLETED | OUTPATIENT
Start: 2020-12-05 | End: 2020-12-05

## 2020-12-05 RX ORDER — FENTANYL CITRATE 50 UG/ML
50 INJECTION, SOLUTION INTRAMUSCULAR; INTRAVENOUS ONCE
Status: COMPLETED | OUTPATIENT
Start: 2020-12-05 | End: 2020-12-05

## 2020-12-05 RX ORDER — SODIUM CHLORIDE 0.9 % (FLUSH) 0.9 %
10 SYRINGE (ML) INJECTION PRN
Status: DISCONTINUED | OUTPATIENT
Start: 2020-12-05 | End: 2020-12-08 | Stop reason: HOSPADM

## 2020-12-05 RX ORDER — GLIMEPIRIDE 2 MG/1
2 TABLET ORAL
Status: ON HOLD | COMMUNITY
End: 2021-09-16 | Stop reason: HOSPADM

## 2020-12-05 RX ADMIN — IOPAMIDOL 90 ML: 755 INJECTION, SOLUTION INTRAVENOUS at 14:41

## 2020-12-05 RX ADMIN — FENTANYL CITRATE 25 MCG: 50 INJECTION, SOLUTION INTRAMUSCULAR; INTRAVENOUS at 16:52

## 2020-12-05 RX ADMIN — Medication 10 ML: at 14:42

## 2020-12-05 RX ADMIN — FENTANYL CITRATE 25 MCG: 50 INJECTION, SOLUTION INTRAMUSCULAR; INTRAVENOUS at 14:24

## 2020-12-05 RX ADMIN — DILTIAZEM HYDROCHLORIDE 25 MG: 5 INJECTION INTRAVENOUS at 13:28

## 2020-12-05 RX ADMIN — FENTANYL CITRATE 50 MCG: 50 INJECTION, SOLUTION INTRAMUSCULAR; INTRAVENOUS at 13:23

## 2020-12-05 RX ADMIN — FAMOTIDINE 20 MG: 10 INJECTION, SOLUTION INTRAVENOUS at 13:23

## 2020-12-05 RX ADMIN — SODIUM CHLORIDE 1000 ML: 9 INJECTION, SOLUTION INTRAVENOUS at 13:22

## 2020-12-05 RX ADMIN — ONDANSETRON 4 MG: 2 INJECTION INTRAMUSCULAR; INTRAVENOUS at 20:31

## 2020-12-05 RX ADMIN — LIDOCAINE HYDROCHLORIDE: 20 SOLUTION ORAL; TOPICAL at 16:52

## 2020-12-05 RX ADMIN — PANTOPRAZOLE SODIUM 40 MG: 40 INJECTION, POWDER, FOR SOLUTION INTRAVENOUS at 20:31

## 2020-12-05 RX ADMIN — METHYLPREDNISOLONE SODIUM SUCCINATE 125 MG: 125 INJECTION, POWDER, FOR SOLUTION INTRAMUSCULAR; INTRAVENOUS at 13:23

## 2020-12-05 ASSESSMENT — PAIN DESCRIPTION - ORIENTATION
ORIENTATION: MID;UPPER
ORIENTATION: MID

## 2020-12-05 ASSESSMENT — PAIN DESCRIPTION - DESCRIPTORS: DESCRIPTORS: CONSTANT;DISCOMFORT

## 2020-12-05 ASSESSMENT — PAIN DESCRIPTION - PAIN TYPE
TYPE: ACUTE PAIN

## 2020-12-05 ASSESSMENT — PAIN SCALES - GENERAL
PAINLEVEL_OUTOF10: 6
PAINLEVEL_OUTOF10: 7
PAINLEVEL_OUTOF10: 10
PAINLEVEL_OUTOF10: 8
PAINLEVEL_OUTOF10: 10
PAINLEVEL_OUTOF10: 8
PAINLEVEL_OUTOF10: 10

## 2020-12-05 ASSESSMENT — PAIN DESCRIPTION - LOCATION
LOCATION: EAR
LOCATION: ABDOMEN
LOCATION: ABDOMEN

## 2020-12-05 ASSESSMENT — ENCOUNTER SYMPTOMS
SHORTNESS OF BREATH: 0
VOMITING: 0
EYE REDNESS: 0
NAUSEA: 0
ABDOMINAL PAIN: 1

## 2020-12-05 ASSESSMENT — PAIN - FUNCTIONAL ASSESSMENT: PAIN_FUNCTIONAL_ASSESSMENT: PREVENTS OR INTERFERES SOME ACTIVE ACTIVITIES AND ADLS

## 2020-12-05 ASSESSMENT — PAIN DESCRIPTION - PROGRESSION: CLINICAL_PROGRESSION: NOT CHANGED

## 2020-12-05 ASSESSMENT — PAIN DESCRIPTION - ONSET
ONSET: ON-GOING
ONSET: ON-GOING

## 2020-12-05 ASSESSMENT — PAIN DESCRIPTION - FREQUENCY
FREQUENCY: CONTINUOUS
FREQUENCY: CONTINUOUS

## 2020-12-05 NOTE — ED PROVIDER NOTES
Chief complaint: Abdominal pain      HPI:  12/5/20, Time: 1:07 PM EST      Abdominal Pain   Pain location:  Epigastric  Pain quality: bloating and pressure    Pain radiates to:  Does not radiate  Pain severity:  Moderate  Onset quality:  Gradual  Duration:  1 week  Timing:  Constant  Progression:  Worsening  Chronicity:  New  Worsened by:  Nothing  Ineffective treatments:  None tried  Associated symptoms: no chest pain, no chills, no dysuria, no fatigue, no nausea, no shortness of breath and no vomiting    Risk factors: multiple surgeries and obesity                   Dwight Stauffer is a [de-identified] y.o. female presenting to the ED for abdominal pain. The patient states that she began approximately 1 week ago with abdominal pain. The pain is located in the epigastric region. The pain is described as a pressure and bloating sensation. The pain is nonradiating. The pain is currently rated 10 out of 10. Not makes pain better. Not makes pain worse. Patient has not had any treatments for the pain prior to arrival.  The patient states the pain has been constant since onset. The patient does admit to associated nausea, no episodes of emesis. She states that she has been having regular bowel movements. She denies any fevers, chest pain, shortness of breath, dysuria or hematuria. Patient does have prior abdominal surgery and a partial colectomy secondary to malignancy. ROS:   Review of Systems   Constitutional: Negative for chills and fatigue. HENT: Negative for congestion. Eyes: Negative for redness. Respiratory: Negative for shortness of breath. Cardiovascular: Negative for chest pain. Gastrointestinal: Positive for abdominal pain. Negative for nausea and vomiting. Genitourinary: Negative for dysuria. Musculoskeletal: Negative for arthralgias. Skin: Negative for rash. Neurological: Negative for light-headedness. Psychiatric/Behavioral: Negative for confusion.    All other systems reviewed and are negative.      --------------------------------------------- PAST HISTORY ---------------------------------------------  Past Medical History:  has a past medical history of Adenocarcinoma of cecum ([facility] Anemia, Arm numbness, Arthritis, Blood transfusion, Cervical spinal stenosis, Diabetes mellitus ([facility] Fatty liver, GERD (gastroesophageal reflux disease), Hyperlipidemia, Hypertension, Low back pain, Lumbar stenosis, Neck pain, Obesity (BMI 30.0-34.9), PAF (paroxysmal atrial fibrillation) ([facility] and Renal artery aneurysm ([facility] Past Surgical History:  has a past surgical history that includes Cervical discectomy (01/19/2007); back surgery; Hysterectomy; fracture surgery; Appendectomy; Breast surgery; Cholecystectomy; Carpal tunnel release; Cataract removal (10/2016); laparoscopy (05/17/2017); Upper gastrointestinal endoscopy (N/A, 11/5/2018); and hemicolectomy (N/A, 2/19/2019). Social History:  reports that she quit smoking about 23 years ago. She has a 30.00 pack-year smoking history. She has never used smokeless tobacco. She reports current alcohol use. She reports that she does not use drugs. Family History: family history includes Cancer in her brother; Diabetes in her brother, mother, and sister; Heart Disease in her brother; High Blood Pressure in her brother and sister; Stroke in her father. The patients home medications have been reviewed. Allergies: Benadryl [diphenhydramine]; Fish-derived products; and Iodine    ---------------------------------------------------PHYSICAL EXAM--------------------------------------    Constitutional/General: Alert and oriented x3, well appearing, non toxic in NAD  Head: Normocephalic and atraumatic  Mouth: Oropharynx clear, handling secretions, no trismus  Neck: Supple, full ROM,  Pulmonary: Lungs clear to auscultation bilaterally, no wheezes, rales, or rhonchi. Not in respiratory distress  Cardiovascular:  Regular rate. Regular rhythm.  No murmurs  Chest: no chest wall tenderness  Abdomen: Soft. Mildly distended, no rebound, rigidity or guarding, hypoactive bowel sounds, there is tenderness diffusely with greatest focality in epigastric region  Musculoskeletal: Moves all extremities x 4. Warm and well perfused, no clubbing, cyanosis, or edema. Capillary refill <3 seconds  Skin: warm and dry. No rashes. Neurologic: GCS 15, no gross focal neurologic deficits  Psych: Normal Affect    -------------------------------------------------- RESULTS -------------------------------------------------  I have personally reviewed all laboratory and imaging results for this patient. Results are listed below.      LABS:  Results for orders placed or performed during the hospital encounter of 12/05/20   Respiratory Panel, Molecular, with COVID-19 (Restricted: peds pts or suitable admitted adults)    Specimen: Nasopharyngeal   Result Value Ref Range    Adenovirus by PCR Not Detected Not Detected    Bordetella parapertussis by PCR Not Detected Not Detected    Bordetella pertussis by PCR Not Detected Not Detected    Chlamydophilia pneumoniae by PCR Not Detected Not Detected    Coronavirus 229E by PCR Not Detected Not Detected    Coronavirus HKU1 by PCR Not Detected Not Detected    Coronavirus NL63 by PCR Not Detected Not Detected    Coronavirus OC43 by PCR Not Detected Not Detected    SARS-CoV-2, PCR Not Detected Not Detected    Human Metapneumovirus by PCR Not Detected Not Detected    Human Rhinovirus/Enterovirus by PCR Not Detected Not Detected    Influenza A by PCR Not Detected Not Detected    Influenza B by PCR Not Detected Not Detected    Mycoplasma pneumoniae by PCR Not Detected Not Detected    Parainfluenza Virus 1 by PCR Not Detected Not Detected    Parainfluenza Virus 2 by PCR Not Detected Not Detected    Parainfluenza Virus 3 by PCR Not Detected Not Detected    Parainfluenza Virus 4 by PCR Not Detected Not Detected    Respiratory Syncytial Virus by PCR Not Detected Not Detected   CBC   Result Value Ref Range    WBC 12.8 (H) 4.5 - 11.5 E9/L    RBC 4.25 3.50 - 5.50 E12/L    Hemoglobin 10.0 (L) 11.5 - 15.5 g/dL    Hematocrit 35.7 34.0 - 48.0 %    MCV 84.0 80.0 - 99.9 fL    MCH 23.5 (L) 26.0 - 35.0 pg    MCHC 28.0 (L) 32.0 - 34.5 %    RDW 19.3 (H) 11.5 - 15.0 fL    Platelets 004 386 - 259 E9/L    MPV 9.6 7.0 - 12.0 fL   Comprehensive Metabolic Panel   Result Value Ref Range    Sodium 110 (LL) 132 - 146 mmol/L    Potassium 4.0 3.5 - 5.0 mmol/L    Chloride 76 (L) 98 - 107 mmol/L    CO2 18 (L) 22 - 29 mmol/L    Anion Gap 16 7 - 16 mmol/L    Glucose 193 (H) 74 - 99 mg/dL    BUN 30 (H) 8 - 23 mg/dL    CREATININE 1.2 (H) 0.5 - 1.0 mg/dL    GFR Non-African American 43 >=60 mL/min/1.73    GFR African American 52     Calcium 8.2 (L) 8.6 - 10.2 mg/dL    Total Protein 4.2 (L) 6.4 - 8.3 g/dL    Alb 2.6 (L) 3.5 - 5.2 g/dL    Total Bilirubin 0.7 0.0 - 1.2 mg/dL    Alkaline Phosphatase 39 35 - 104 U/L    ALT 9 0 - 32 U/L    AST 15 0 - 31 U/L   Lactate, Sepsis   Result Value Ref Range    Lactic Acid, Sepsis 4.4 (HH) 0.5 - 1.9 mmol/L   Lactate, Sepsis   Result Value Ref Range    Lactic Acid, Sepsis 4.9 (HH) 0.5 - 1.9 mmol/L   Lipase   Result Value Ref Range    Lipase 7 (L) 13 - 60 U/L   Urinalysis with Microscopic   Result Value Ref Range    Color, UA Yellow Straw/Yellow    Clarity, UA Clear Clear    Glucose, Ur 500 (A) Negative mg/dL    Bilirubin Urine Negative Negative    Ketones, Urine Negative Negative mg/dL    Specific Gravity, UA 1.010 1.005 - 1.030    Blood, Urine Negative Negative    pH, UA 6.0 5.0 - 9.0    Protein, UA 30 (A) Negative mg/dL    Urobilinogen, Urine 0.2 <2.0 E.U./dL    Nitrite, Urine Negative Negative    Leukocyte Esterase, Urine Negative Negative    WBC, UA 2-5 0 - 5 /HPF    RBC, UA 1-3 0 - 2 /HPF    Bacteria, UA NONE SEEN None Seen /HPF   Basic Metabolic Panel   Result Value Ref Range    Sodium 132 132 - 146 mmol/L    Potassium 4.6 3.5 - 5.0 mmol/L    Chloride 99 98 - 107 mmol/L    CO2 21 (L) 22 - 29 mmol/L    Anion Gap 12 7 - 16 mmol/L    Glucose 125 (H) 74 - 99 mg/dL    BUN 30 (H) 8 - 23 mg/dL    CREATININE 1.1 (H) 0.5 - 1.0 mg/dL    GFR Non-African American 48 >=60 mL/min/1.73    GFR African American 58     Calcium 8.7 8.6 - 10.2 mg/dL   TSH without Reflex   Result Value Ref Range    TSH 1.240 0.270 - 4.200 uIU/mL   Osmolality, Serum   Result Value Ref Range    Osmolality 297 285 - 310 mOsm/Kg   URINE ELECTROLYTES   Result Value Ref Range    Sodium, Ur <20 Not Established mmol/L    Potassium, Ur 25.5 Not Established mmol/L    Chloride <20 Not Established mmol/L   CREATININE, RANDOM URINE   Result Value Ref Range    Creatinine, Ur 93 29 - 226 mg/dL   OSMOLALITY, URINE   Result Value Ref Range    Osmolality, Ur 553 300 - 900 mOsm/kg   Basic metabolic panel   Result Value Ref Range    Sodium 130 (L) 132 - 146 mmol/L    Potassium 4.9 3.5 - 5.0 mmol/L    Chloride 98 98 - 107 mmol/L    CO2 18 (L) 22 - 29 mmol/L    Anion Gap 14 7 - 16 mmol/L    Glucose 145 (H) 74 - 99 mg/dL    BUN 29 (H) 8 - 23 mg/dL    CREATININE 1.1 (H) 0.5 - 1.0 mg/dL    GFR Non-African American 48 >=60 mL/min/1.73    GFR African American 58     Calcium 8.7 8.6 - 10.2 mg/dL   Protime-INR   Result Value Ref Range    Protime 40.1 (H) 9.3 - 12.4 sec    INR 3.5    Troponin   Result Value Ref Range    Troponin 0.03 0.00 - 0.03 ng/mL   Lactic Acid, Plasma   Result Value Ref Range    Lactic Acid 4.1 (HH) 0.5 - 2.2 mmol/L   POCT Venous   Result Value Ref Range    POC Sodium 133 132 - 146 mmol/L    POC Potassium 4.9 3.5 - 5.0 mmol/L    POC Chloride 103 100 - 108 mmol/L    POC Glucose 152 (H) 74 - 99 mg/dl    POC Creatinine 1.0 0.5 - 1.0 mg/dL    GFR Non-African American 53 >=60 mL/min/1.73    GFR  >60     Performed on SEE BELOW        RADIOLOGY:  Interpreted by Radiologist.  CT ABDOMEN PELVIS W IV CONTRAST Additional Contrast? None   Final Result   Cirrhosis.    Bibasilar atelectasis and pleuroparenchymal scarring, both lung bases. Moderate ascites. CTA CHEST W CONTRAST   Final Result   1. No pulmonary embolism is seen. 2.  No acute cardiopulmonary abnormality. 3. Cardiomegaly. No pulmonary edema or pleural effusion. 4. Mild mediastinal lymphadenopathy, which may be reactive. Given patient's   history of cecal adenocarcinoma, a metastatic etiology cannot be excluded. 5. Irregular liver contour indicating cirrhosis. Small volume perihepatic   ascites. 6. Reflux of contrast into the hepatic veins may signify right heart failure. EKG:  This EKG is signed and interpreted by me. Atrial fibrillation with rapid ventricular response, rate of 153, nonspecific T wave flattening, no ST segment ovation, QRS duration 70 MS, QTC duration 456 MS, changes in patient's prior EKG as patient is in A. fib RVR  Interpreted by me      ------------------------- NURSING NOTES AND VITALS REVIEWED ---------------------------   The nursing notes within the ED encounter and vital signs as below have been reviewed by myself. BP (!) 102/52   Pulse 100   Temp 98 °F (36.7 °C)   Resp 16   Ht 5' 3\" (1.6 m)   Wt 186 lb (84.4 kg)   LMP  (LMP Unknown)   SpO2 98%   BMI 32.95 kg/m²   Oxygen Saturation Interpretation: Normal    The patients available past medical records and past encounters were reviewed.         ------------------------------ ED COURSE/MEDICAL DECISION MAKING----------------------  Medications   diphenhydrAMINE (BENADRYL) injection 25 mg (25 mg Intravenous Not Given 12/5/20 1331)   sodium chloride flush 0.9 % injection 10 mL (10 mLs Intravenous Given 12/5/20 1442)   0.9 % sodium chloride bolus (0 mLs Intravenous Stopped 12/5/20 1609)   fentaNYL (SUBLIMAZE) injection 50 mcg (50 mcg Intravenous Given 12/5/20 1323)   famotidine (PEPCID) injection 20 mg (20 mg Intravenous Given 12/5/20 1323)   methylPREDNISolone sodium (SOLU-MEDROL) injection 125 mg (125 mg Intravenous Given 12/5/20 1323)   fentaNYL (SUBLIMAZE) injection 25 mcg (25 mcg Intravenous Given 12/5/20 1424)   iopamidol (ISOVUE-370) 76 % injection 90 mL (90 mLs Intravenous Given 12/5/20 1441)   aluminum & magnesium hydroxide-simethicone (MAALOX) 30 mL, lidocaine viscous hcl (XYLOCAINE) 5 mL (GI COCKTAIL) ( Oral Given 12/5/20 1652)   fentaNYL (SUBLIMAZE) injection 25 mcg (25 mcg Intravenous Given 12/5/20 1652)   ondansetron (ZOFRAN) injection 4 mg (4 mg Intravenous Given 12/5/20 2031)   pantoprazole (PROTONIX) injection 40 mg (40 mg Intravenous Given 12/5/20 2031)             Medical Decision Making:   I, Dr. Pawan Leon am the primary physician of record. Fredrick Esteban is a [de-identified] y.o. female who presents to the ED for abdominal pain. Differential diagnosis includes but is not limited to gastritis, acute cholecystitis, hepatitis the patient does have a past medical history of   has a past medical history of Adenocarcinoma of cecum ([address] Anemia, Arm numbness, Arthritis, Blood transfusion, Cervical spinal stenosis, Diabetes mellitus ([address] Fatty liver, GERD (gastroesophageal reflux disease), Hyperlipidemia, Hypertension, Low back pain, Lumbar stenosis, Neck pain, Obesity (BMI 30.0-34.9), PAF (paroxysmal atrial fibrillation) ([address] and Renal artery aneurysm ([address] .  The patient arrived she was in A. fib RVR, patient did receive Cardizem, she did have improvement in her heart rate with the Cardizem. The patient did remain rate controlled in the emergency department. The patient did have labs which were reviewed.   The patient did have CBC remarkable for mild leukocytosis of 12.8, patient did have CMP remarkable for hyponatremia at 110 this was repeated it did return at 130, this was new for the patient, patient did have lactic acid elevated at 4.4, patient did receive IV fluids, patient did have nephrology consultation prior to the repeat sodium resulting the patient was mildly hypoxic at 89%, Covid swab ordered, CTA of the chest negative, CT abdomen pelvis unremarkable for any acute pathology but likely be causing the patient's abdominal pain, there are cirrhotic liver, no significant ascites, doubt SBP. Please note that the patient initially had a BMP reported of sodium of 110, this was followed with nephrology consultation as well as ICU admission, on repeat this was found to be a lab error. Re-Evaluations/Consultations:             ED Course as of Dec 05 2035   Sat Dec 05, 2020   1543 Spoke with Dr. Claude Cedar for nephrology she will provide consultation      [MT]   4285 Spoke with Dr. Gucci Eric he will accept the patient for admission. [MT]   1823 Spoke with Dr. Helio Tinajero   ICU. He will accept the patient for admission. [MT]   1622 Patient is in the bed in no acute distress. Results of today were discussed with the patient. She is agreeable to admission. [MT]   1923 The patient is in the bed and the results of today were discussed. The patient does have mildly elevated lactic, the patient states that she has had a improvement in her symptoms and she has no abdominal pain at the current time. The patient is requesting to eat. [MT]   2032 Spoke with Dr. Gucci Eric updated that the patient is not hyponatremic, patient does complain of persistent abdominal pain and at this time we will admit for persistent abdominal pain and consult surgery. [MT]      ED Course User Index  [MT] Phillip Willingham,                This patient's ED course included: History, physical examination, reevaluation prior to disposition, labs, imaging, telemetry monitoring, EKG, IV medication, IV fluid          This patient has remained hemodynamically stable during their ED course. Counseling: The emergency provider has spoken with the patient and discussed todays results, in addition to providing specific details for the plan of care and counseling regarding the diagnosis and prognosis.   Questions are answered at this time and they are agreeable with the plan.       --------------------------------- IMPRESSION AND DISPOSITION ---------------------------------    IMPRESSION  1. Generalized abdominal pain    2. Lactic acidosis        DISPOSITION  Disposition: [facility]    Patient condition is serious        NOTE: This report was transcribed using voice recognition software.  Every effort was made to ensure accuracy; however, inadvertent computerized transcription errors may be present         [place],   12/05/20 3670

## 2020-12-05 NOTE — CONSULTS
Nephrology Consult  [facility]  Michelle Guardado MD    CC:   hyponatremia    HPI:   The pt is an [de-identified] yo female with a pmh of djd, chronic back pain on narcotics, gerd, fatty liver, dm, colon cancer, hyperlipidemia, htn, paf who presented with abd pain and nausea. Her labs showed a na of 110. She does not have a h/o hyponatremia. She was given a liter of saline in the er. Office visit from June 2020 has lasix listed. PMH:    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 01/2019    Anemia     Arm numbness     Arthritis     Blood transfusion     Cervical spinal stenosis     Diabetes mellitus (HCC)     Fatty liver     GERD (gastroesophageal reflux disease)     Hyperlipidemia     Hypertension     Low back pain     Lumbar stenosis     Neck pain     Obesity (BMI 30.0-34.9) 10/14/2012    PAF (paroxysmal atrial fibrillation) (HCC)     Renal artery aneurysm ([facility] 7/15/2015       Patient Active Problem List   Diagnosis    Spinal stenosis in cervical region    Disorder of muscle, ligament, and fascia    Displacement of lumbar intervertebral disc without myelopathy    Spinal stenosis, lumbar region, without neurogenic claudication    Essential hypertension    Mixed hyperlipidemia    DM (diabetes mellitus) ([facility]    Obesity (BMI 30.0-34. 9)    Other chest pain    Left wrist pain    Renal artery aneurysm (HCC)    Chronic neck pain    Chronic back pain    A-fib (HCC)    PAF (paroxysmal atrial fibrillation) (HCC)    Anemia    Chronic anticoagulation    Class 1 obesity due to excess calories without serious comorbidity with body mass index (BMI) of 34.0 to 34.9 in adult    Malignant neoplasm of cecum (HCC)    Nonrheumatic aortic valve stenosis    Pulmonary HTN (HCC)       Meds:     diphenhydrAMINE  25 mg Intravenous Once    GI cocktail   Oral Once           Meds prn:     sodium chloride flush    Meds prior to admission:     No current facility-administered medications on file prior to encounter. Current Outpatient Medications on File Prior to Encounter   Medication Sig Dispense Refill    traMADol (ULTRAM) 50 MG tablet Take 1 tablet by mouth every 8 hours as needed for Pain for up to 60 days. Take lowest dose possible to manage pain 90 tablet 1    empagliflozin (JARDIANCE) 10 MG tablet Take 1 tablet by mouth daily 28 tablet 0    DULoxetine (CYMBALTA) 30 MG extended release capsule Take 1 capsule by mouth daily 30 capsule 0    pregabalin (LYRICA) 50 MG capsule Take 1 capsule by mouth 2 times daily for 90 days.  60 capsule 2    pramipexole (MIRAPEX) 0.25 MG tablet TAKE 5 TABLETS BY MOUTH  NIGHTLY 450 tablet 0    oxybutynin (DITROPAN) 5 MG tablet TAKE 1 TABLET BY MOUTH  DAILY WITH BREAKFAST 90 tablet 0    hydrOXYzine (ATARAX) 10 MG tablet Take 1 tablet by mouth nightly 90 tablet 0    simvastatin (ZOCOR) 20 MG tablet TAKE 1 TABLET BY MOUTH  DAILY WITH SUPPER 90 tablet 0    amLODIPine (NORVASC) 10 MG tablet TAKE 1 TABLET BY MOUTH  DAILY 90 tablet 0    pantoprazole (PROTONIX) 40 MG tablet TAKE ONE TABLET BY MOUTH TWO TIMES A DAY BEFORE MEALS 60 tablet 2    glimepiride (AMARYL) 2 MG tablet Take 1 tablet by mouth 2 times daily 1 tablet daily while on Prednisone 60 tablet 2    metoprolol tartrate (LOPRESSOR) 25 MG tablet Take 1 tablet by mouth 2 times daily 180 tablet 1    rivaroxaban (XARELTO) 20 MG TABS tablet Take 1 tablet by mouth daily (with breakfast) 90 tablet 1    potassium chloride (KLOR-CON M) 10 MEQ extended release tablet Take 1 tablet by mouth 2 times daily 180 tablet 1    furosemide (LASIX) 20 MG tablet TAKE ONE TABLET BY MOUTH EVERY DAY 90 tablet 1    metFORMIN (GLUCOPHAGE) 500 MG tablet TAKE 2 TABLETS BY MOUTH  TWICE A  tablet 2    gabapentin (NEURONTIN) 300 MG capsule TAKE 1 CAPSULE BY MOUTH 3  TIMES DAILY (Patient not taking: Reported on 1/21/2020) 270 capsule 0    fluticasone (FLONASE) 50 MCG/ACT nasal spray 1 spray by Each 12/05/20 1226 -- 97.8 °F (36.6 °C) 76 -- 93 % -- --       No intake or output data in the 24 hours ending 12/05/20 1612    Constitutional: Patient in no acute distress   Head: normocephalic, atraumatic   Neck: supple, no jvd  Cardiovascular: regular rate and rhythm, no murmurs, gallops, or rubs   Respiratory: Clear, no rales, rhochi, or wheezes,   Gastrointestinal: soft, nontender, nondistended, no hepatosplenomegaly  Ext: no edema  Neuro: aaox3  Skin: dry, no rash   Back: nontender    Data:    Recent Labs     12/05/20  1241   WBC 12.8*   HGB 10.0*   HCT 35.7   MCV 84.0          Recent Labs     12/05/20  1241   *   K 4.0   CL 76*   CO2 18*   CREATININE 1.2*   BUN 30*   LABGLOM 43   GLUCOSE 193*   CALCIUM 8.2*       No results found for: VITD25    No results found for: PTH    Recent Labs     12/05/20  1241   ALT 9   AST 15   ALKPHOS 39   BILITOT 0.7       Recent Labs     12/05/20  1241   LABALBU 2.6*       No results found for: FERRITIN, IRON, TIBC    No results found for: [id number]    No results found for: FOLATE      Lab Results   Component Value Date    COLORU DARK YELLOW 02/24/2019    LABPH 0 10/22/2019    NITRU POSITIVE 02/24/2019    GLUCOSEU Negative 02/24/2019    KETUA 15 02/24/2019    UROBILINOGEN 1.0 02/24/2019    BILIRUBINUR SMALL 02/24/2019       No results found for: NATACHA, CREURRAN, MACREATRATIO, OSMOU    No components found for: URIC    No results found for: LIPIDPAN      Assessment and Plan:    1. Hyponatremia  Possibly due to hypovolemia with abd pain nausea  adh release with pain and nausea  Send urine lytes  Sp L of saline in er  Repeat na  May need 3%. Check na q 2  Send tsh cortisol  Goal 8 pts per 24 hrs correction  Hold lasix    2. abd pain nausea  covid being ruled out  abd imaging not revealing cause    3. htn  Follow on norvasc lopressor    4. afib  Starting dilt drip    5. Chronic back pain  Per neuro        Vianca Canales.  Joaquin Reynoso MD

## 2020-12-06 LAB
ABO/RH: NORMAL
ALBUMIN SERPL-MCNC: 4 G/DL (ref 3.5–5.2)
ALP BLD-CCNC: 77 U/L (ref 35–104)
ALT SERPL-CCNC: 23 U/L (ref 0–32)
ANION GAP SERPL CALCULATED.3IONS-SCNC: 11 MMOL/L (ref 7–16)
ANION GAP SERPL CALCULATED.3IONS-SCNC: 13 MMOL/L (ref 7–16)
ANTIBODY SCREEN: NORMAL
AST SERPL-CCNC: 26 U/L (ref 0–31)
BILIRUB SERPL-MCNC: 1.1 MG/DL (ref 0–1.2)
BUN BLDV-MCNC: 35 MG/DL (ref 8–23)
BUN BLDV-MCNC: 36 MG/DL (ref 8–23)
CALCIUM SERPL-MCNC: 9 MG/DL (ref 8.6–10.2)
CALCIUM SERPL-MCNC: 9.1 MG/DL (ref 8.6–10.2)
CHLORIDE BLD-SCNC: 100 MMOL/L (ref 98–107)
CHLORIDE BLD-SCNC: 99 MMOL/L (ref 98–107)
CO2: 16 MMOL/L (ref 22–29)
CO2: 20 MMOL/L (ref 22–29)
CREAT SERPL-MCNC: 1.1 MG/DL (ref 0.5–1)
CREAT SERPL-MCNC: 1.2 MG/DL (ref 0.5–1)
GFR AFRICAN AMERICAN: 52
GFR AFRICAN AMERICAN: 58
GFR NON-AFRICAN AMERICAN: 43 ML/MIN/1.73
GFR NON-AFRICAN AMERICAN: 48 ML/MIN/1.73
GLUCOSE BLD-MCNC: 102 MG/DL (ref 74–99)
GLUCOSE BLD-MCNC: 159 MG/DL (ref 74–99)
HCT VFR BLD CALC: 33.4 % (ref 34–48)
HEMOGLOBIN: 9.7 G/DL (ref 11.5–15.5)
L. PNEUMOPHILA SEROGP 1 UR AG: NORMAL
LACTIC ACID: 3.4 MMOL/L (ref 0.5–2.2)
MCH RBC QN AUTO: 23.7 PG (ref 26–35)
MCHC RBC AUTO-ENTMCNC: 29 % (ref 32–34.5)
MCV RBC AUTO: 81.5 FL (ref 80–99.9)
METER GLUCOSE: 112 MG/DL (ref 74–99)
METER GLUCOSE: 114 MG/DL (ref 74–99)
METER GLUCOSE: 145 MG/DL (ref 74–99)
METER GLUCOSE: 169 MG/DL (ref 74–99)
PDW BLD-RTO: 19.3 FL (ref 11.5–15)
PLATELET # BLD: 434 E9/L (ref 130–450)
PMV BLD AUTO: 9.6 FL (ref 7–12)
POTASSIUM SERPL-SCNC: 4.7 MMOL/L (ref 3.5–5)
POTASSIUM SERPL-SCNC: 5.6 MMOL/L (ref 3.5–5)
RBC # BLD: 4.1 E12/L (ref 3.5–5.5)
SODIUM BLD-SCNC: 128 MMOL/L (ref 132–146)
SODIUM BLD-SCNC: 131 MMOL/L (ref 132–146)
STREP PNEUMONIAE ANTIGEN, URINE: NORMAL
T4 TOTAL: 6.4 MCG/DL (ref 4.5–11.7)
TOTAL PROTEIN: 7.6 G/DL (ref 6.4–8.3)
WBC # BLD: 14.5 E9/L (ref 4.5–11.5)

## 2020-12-06 PROCEDURE — 83605 ASSAY OF LACTIC ACID: CPT

## 2020-12-06 PROCEDURE — 80048 BASIC METABOLIC PNL TOTAL CA: CPT

## 2020-12-06 PROCEDURE — 36415 COLL VENOUS BLD VENIPUNCTURE: CPT

## 2020-12-06 PROCEDURE — 6370000000 HC RX 637 (ALT 250 FOR IP): Performed by: INTERNAL MEDICINE

## 2020-12-06 PROCEDURE — 80053 COMPREHEN METABOLIC PANEL: CPT

## 2020-12-06 PROCEDURE — 86850 RBC ANTIBODY SCREEN: CPT

## 2020-12-06 PROCEDURE — 86901 BLOOD TYPING SEROLOGIC RH(D): CPT

## 2020-12-06 PROCEDURE — 2060000000 HC ICU INTERMEDIATE R&B

## 2020-12-06 PROCEDURE — 86900 BLOOD TYPING SEROLOGIC ABO: CPT

## 2020-12-06 PROCEDURE — 82962 GLUCOSE BLOOD TEST: CPT

## 2020-12-06 PROCEDURE — 85027 COMPLETE CBC AUTOMATED: CPT

## 2020-12-06 RX ORDER — OXYBUTYNIN CHLORIDE 5 MG/1
5 TABLET ORAL DAILY
Status: DISCONTINUED | OUTPATIENT
Start: 2020-12-07 | End: 2020-12-08 | Stop reason: HOSPADM

## 2020-12-06 RX ORDER — DICYCLOMINE HYDROCHLORIDE 10 MG/1
10 CAPSULE ORAL DAILY PRN
Status: DISCONTINUED | OUTPATIENT
Start: 2020-12-06 | End: 2020-12-08 | Stop reason: HOSPADM

## 2020-12-06 RX ORDER — HYDROXYZINE HYDROCHLORIDE 10 MG/1
10 TABLET, FILM COATED ORAL DAILY
Status: DISCONTINUED | OUTPATIENT
Start: 2020-12-06 | End: 2020-12-08 | Stop reason: HOSPADM

## 2020-12-06 RX ADMIN — HYDROXYZINE HYDROCHLORIDE 10 MG: 10 TABLET ORAL at 09:15

## 2020-12-06 RX ADMIN — GLIMEPIRIDE 2 MG: 2 TABLET ORAL at 17:03

## 2020-12-06 RX ADMIN — INSULIN LISPRO 1 UNITS: 100 INJECTION, SOLUTION INTRAVENOUS; SUBCUTANEOUS at 13:02

## 2020-12-06 RX ADMIN — METOPROLOL TARTRATE 25 MG: 25 TABLET, FILM COATED ORAL at 01:21

## 2020-12-06 RX ADMIN — INSULIN LISPRO 1 UNITS: 100 INJECTION, SOLUTION INTRAVENOUS; SUBCUTANEOUS at 08:45

## 2020-12-06 RX ADMIN — TRAMADOL HYDROCHLORIDE 50 MG: 50 TABLET, FILM COATED ORAL at 18:54

## 2020-12-06 RX ADMIN — AMLODIPINE BESYLATE 10 MG: 10 TABLET ORAL at 09:14

## 2020-12-06 RX ADMIN — GLIMEPIRIDE 2 MG: 2 TABLET ORAL at 09:14

## 2020-12-06 RX ADMIN — METOPROLOL TARTRATE 25 MG: 25 TABLET, FILM COATED ORAL at 21:18

## 2020-12-06 RX ADMIN — PRAMIPEXOLE DIHYDROCHLORIDE 0.62 MG: 0.25 TABLET ORAL at 01:21

## 2020-12-06 RX ADMIN — TRAMADOL HYDROCHLORIDE 50 MG: 50 TABLET, FILM COATED ORAL at 11:05

## 2020-12-06 RX ADMIN — PREGABALIN 50 MG: 50 CAPSULE ORAL at 09:17

## 2020-12-06 RX ADMIN — DULOXETINE HYDROCHLORIDE 30 MG: 30 CAPSULE, DELAYED RELEASE ORAL at 09:14

## 2020-12-06 RX ADMIN — PRAMIPEXOLE DIHYDROCHLORIDE 0.62 MG: 0.25 TABLET ORAL at 21:18

## 2020-12-06 RX ADMIN — METOPROLOL TARTRATE 25 MG: 25 TABLET, FILM COATED ORAL at 09:16

## 2020-12-06 ASSESSMENT — PAIN DESCRIPTION - DESCRIPTORS
DESCRIPTORS: DISCOMFORT;TENDER
DESCRIPTORS: ACHING;CRAMPING;DISCOMFORT;SHARP
DESCRIPTORS: CRAMPING

## 2020-12-06 ASSESSMENT — PAIN DESCRIPTION - PAIN TYPE
TYPE: ACUTE PAIN
TYPE: ACUTE PAIN
TYPE: CHRONIC PAIN

## 2020-12-06 ASSESSMENT — PAIN SCALES - GENERAL
PAINLEVEL_OUTOF10: 8
PAINLEVEL_OUTOF10: 3
PAINLEVEL_OUTOF10: 7
PAINLEVEL_OUTOF10: 7
PAINLEVEL_OUTOF10: 0

## 2020-12-06 ASSESSMENT — PAIN DESCRIPTION - ORIENTATION
ORIENTATION: RIGHT;LEFT
ORIENTATION: RIGHT
ORIENTATION: MID;UPPER;OTHER (COMMENT)

## 2020-12-06 ASSESSMENT — PAIN DESCRIPTION - PROGRESSION
CLINICAL_PROGRESSION: NOT CHANGED
CLINICAL_PROGRESSION: NOT CHANGED

## 2020-12-06 ASSESSMENT — PAIN DESCRIPTION - FREQUENCY: FREQUENCY: INTERMITTENT

## 2020-12-06 ASSESSMENT — PAIN DESCRIPTION - ONSET: ONSET: ON-GOING

## 2020-12-06 ASSESSMENT — PAIN DESCRIPTION - LOCATION
LOCATION: LEG
LOCATION: OTHER (COMMENT)
LOCATION: ABDOMEN

## 2020-12-06 NOTE — PROGRESS NOTES
Midnight     PHYSICAL EXAM:     Patient Vitals for the past 24 hrs:   BP Temp Temp src Pulse Resp SpO2 Height Weight   12/06/20 0800 (!) 119/59 97.6 °F (36.4 °C) Oral 89 22 -- -- --   12/06/20 0030 -- -- -- -- -- 91 % -- --   12/06/20 0028 (!) 102/56 98.1 °F (36.7 °C) Temporal 91 16 -- -- --   12/05/20 2214 116/66 97.3 °F (36.3 °C) Temporal 91 16 93 % -- --   12/05/20 2032 (!) 102/52 98 °F (36.7 °C) -- 100 16 98 % -- --   12/05/20 1802 107/62 -- -- 77 20 94 % -- --   12/05/20 1632 -- -- -- 119 22 95 % -- --   12/05/20 1630 110/72 -- -- 128 14 95 % -- --   12/05/20 1500 -- -- -- 100 22 -- -- --   12/05/20 1231 114/85 -- -- -- -- -- 5' 3\" (1.6 m) 186 lb (84.4 kg)   12/05/20 1226 -- 97.8 °F (36.6 °C) -- 76 -- 93 % -- --   @      Intake/Output Summary (Last 24 hours) at 12/6/2020 1905  Last data filed at 12/5/2020 1932  Gross per 24 hour   Intake --   Output 400 ml   Net -400 ml         Wt Readings from Last 3 Encounters:   12/05/20 186 lb (84.4 kg)   06/10/20 182 lb (82.6 kg)   03/19/20 189 lb (85.7 kg)       Constitutional:  Pt is in no acute distress  Head: normocephalic, atraumatic  Neck: no JVD  Cardiovascular: regular rate and rhythm, no murmurs, gallops, or rubs  Respiratory:  No rales, rhochi, or wheezes  Gastrointestinal:  Soft, nontender, nondistended, bowel sounds x 4  Ext: no edema  Skin: dry, no rash  Neuro: aaox3    MEDS (scheduled):    [START ON 12/7/2020] oxybutynin  5 mg Oral Daily    hydrOXYzine  10 mg Oral Daily    amLODIPine  10 mg Oral Daily    DULoxetine  30 mg Oral Daily    glimepiride  2 mg Oral BID WC    metoprolol tartrate  25 mg Oral BID    pramipexole  0.625 mg Oral Nightly    pregabalin  50 mg Oral Daily    rivaroxaban  20 mg Oral Daily with breakfast    pantoprazole  40 mg Oral QAM AC    potassium chloride  10 mEq Oral BID WC    insulin lispro  0-6 Units Subcutaneous TID WC    insulin lispro  0-3 Units Subcutaneous Nightly       MEDS (infusions):      MEDS (prn):  dicyclomine, sodium chloride flush, traMADol    DATA:    Recent Labs     12/05/20  1241 12/06/20  0533   WBC 12.8* 14.5*   HGB 10.0* 9.7*   HCT 35.7 33.4*   MCV 84.0 81.5    434     Recent Labs     12/05/20  1241 12/05/20  1607 12/05/20  1657 12/05/20  1703 12/06/20  0533   * 132 130*  --  131*   K 4.0 4.6 4.9  --  5.6*   CL 76* 99 98  --  100   CO2 18* 21* 18*  --  20*   BUN 30* 30* 29*  --  36*   CREATININE 1.2* 1.1* 1.1* 1.0 1.2*   LABGLOM 43 48 48 53 43   GLUCOSE 193* 125* 145*  --  159*   CALCIUM 8.2* 8.7 8.7  --  9.0   ALT 9  --   --   --  23   AST 15  --   --   --  26   BILITOT 0.7  --   --   --  1.1   ALKPHOS 39  --   --   --  77       Lab Results   Component Value Date    LABALBU 4.0 12/06/2020    LABALBU 2.6 (L) 12/05/2020    LABALBU 3.7 02/21/2019     Lab Results   Component Value Date    TSH 1.240 12/05/2020       Iron Studies  No results found for: IRON, TIBC, FERRITIN  No results found for: [id number]  No results found for: FOLATE    No results found for: VITD25  No results found for: PTH    No components found for: URIC    Lab Results   Component Value Date    COLORU Yellow 12/05/2020    LABPH 0 10/22/2019    NITRU Negative 12/05/2020    GLUCOSEU 500 12/05/2020    KETUA Negative 12/05/2020    UROBILINOGEN 0.2 12/05/2020    BILIRUBINUR Negative 12/05/2020       No results found for: LIPIDPAN      IMPRESSION/RECOMMENDATIONS:     1. Hyponatremia  Possibly due to hypovolemia with abd pain nausea  adh release with pain and nausea  fena <1  Sp L of saline in er  Repeat na  May need 3%. Check na q 2  tsh 1.24  Hold lasix  initital na 110 likely an error drawn off iv     2. abd pain nausea  covid neg  abd imaging not revealing cause  For egd     3. htn  Follow on norvasc lopressor     4. afib  Starting dilt drip     5. Chronic back pain  Per neuro          Maryjane Postin.  Jameson Landau, MD

## 2020-12-06 NOTE — CONSULTS
GENERAL SURGERY  CONSULT NOTE  12/6/2020    Physician Consulted: Dr. Rigoberto Dinh  Reason for Consult: Abdominal pain  Referring Physician: Dr. Julieta Donovan is a [de-identified] y.o. female with PMH as below and surgical history of right hemicolectomy in 2019 presents with about a week of vague abdominal pain and nausea. She states she has not been able to eat more than a couple bites of food and after she eats she will get nauseated. She has not had any episodes of emesis. She continues to have regular bowel movements. She states she went to her PCPs office for some blood work on Tuesday but when the symptoms were not improving she decided to come to the hospital. CT scan done revealed cirrhosis with moderate ascites. Her original lab work was concerning for hyponatremia but repeat is within normal limits. Lactic acid is 4.1. 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[facility] [address] 01/2019    Anemia     Arm numbness     Arthritis     Blood transfusion     Cervical spinal stenosis     Diabetes mellitus (HCC)     Fatty liver     GERD (gastroesophageal reflux disease)     Hyperlipidemia     Hypertension     Low back pain     Lumbar stenosis     Neck pain     Obesity (BMI 30.0-34.9) 10/14/2012    PAF (paroxysmal atrial fibrillation) (HCC)     Renal artery aneurysm ([facility] [address] 7/15/2015       Past Surgical History:   Procedure Laterality Date    APPENDECTOMY      BACK SURGERY      BREAST SURGERY      reduction    CARPAL TUNNEL RELEASE      CATARACT REMOVAL  10/2016    CERVICAL DISCECTOMY  01/19/2007    ACDF C3-4, C4-5, C5-6 Dr. Cj Hamilton      left lower leg    HEMICOLECTOMY N/A 2/19/2019    DIAGNOSTIC LAPAROSCOPY, LYSIS OF ADHESIONS, OPEN RIGHT HEMICOLECTOMY performed by Singh Silva MD at [address] [place] [address]  05/17/2017    [facility].  Dr.Joseph Jacky Fung UPPER GASTROINTESTINAL ENDOSCOPY N/A 11/5/2018    EGD BIOPSY performed by Jarrett Liu MD at [facility] ENDOSCOPY       Medications Prior to Admission:    Prior to Admission medications    Medication Sig Start Date End Date Taking? Authorizing Provider   glimepiride (AMARYL) 2 MG tablet Take 2 mg by mouth 2 times daily   Yes Historical Provider, MD   pregabalin (LYRICA) 50 MG capsule Take 50 mg by mouth daily. Yes Historical Provider, MD   triamcinolone (KENALOG) 0.1 % cream Apply topically 2 times daily as needed   Yes Historical Provider, MD   traMADol (ULTRAM) 50 MG tablet Take 1 tablet by mouth every 8 hours as needed for Pain for up to 60 days.  Take lowest dose possible to manage pain 11/19/20 1/18/21 Yes Maikol Quijano MD   DULoxetine (CYMBALTA) 30 MG extended release capsule Take 1 capsule by mouth daily 6/17/20  Yes Surinder Anand MD   pramipexole (MIRAPEX) 0.25 MG tablet TAKE 5 TABLETS BY MOUTH  NIGHTLY 6/8/20  Yes Surinder Anand MD   oxybutynin (DITROPAN) 5 MG tablet TAKE 1 TABLET BY MOUTH  DAILY WITH BREAKFAST 5/21/20  Yes Surinder Anand MD   hydrOXYzine (ATARAX) 10 MG tablet Take 1 tablet by mouth nightly 5/8/20  Yes Surinder Anand MD   simvastatin (ZOCOR) 20 MG tablet TAKE 1 TABLET BY MOUTH  DAILY WITH SUPPER 4/23/20  Yes Surinder Anand MD   amLODIPine (NORVASC) 10 MG tablet TAKE 1 TABLET BY MOUTH  DAILY 4/23/20  Yes Surinder Anand MD   pantoprazole (PROTONIX) 40 MG tablet TAKE ONE TABLET BY MOUTH TWO TIMES A DAY BEFORE MEALS 4/20/20  Yes Surinder Anand MD   metoprolol tartrate (LOPRESSOR) 25 MG tablet Take 1 tablet by mouth 2 times daily 3/25/20  Yes Surinder Anand MD   rivaroxaban (XARELTO) 20 MG TABS tablet Take 1 tablet by mouth daily (with breakfast) 3/16/20  Yes Surinder Anand MD   potassium chloride (KLOR-CON M) 10 MEQ extended release tablet Take 1 tablet by mouth 2 times daily 3/4/20  Yes Surinder Anand MD   furosemide (LASIX) 20 MG tablet TAKE ONE TABLET BY MOUTH EVERY DAY 2/24/20  Yes Rolando Colin MD   metFORMIN (GLUCOPHAGE) 500 MG tablet TAKE 2 TABLETS BY MOUTH  TWICE A DAY 20  Yes Rolando Colin MD   empagliflozin (JARDIANCE) 10 MG tablet Take 1 tablet by mouth daily 20   Rolando Colin MD   dicyclomine (BENTYL) 10 MG capsule TAKE 1 CAPSULE BY MOUTH  DAILY AS NEEDED 10/16/18   Aubrie Gonzalez MD       Allergies   Allergen Reactions    Benadryl [Diphenhydramine]      hyper    Fish-Derived Products Rash    Iodine Rash       Family History   Problem Relation Age of Onset    Diabetes Mother     Stroke Father     Diabetes Sister     High Blood Pressure Sister     Diabetes Brother     High Blood Pressure Brother     Cancer Brother     Heart Disease Brother        Social History     Tobacco Use    Smoking status: Former Smoker     Packs/day: 2.00     Years: 15.00     Pack years: 30.00     Last attempt to quit: 3/1/1997     Years since quittin.7    Smokeless tobacco: Never Used   Substance Use Topics    Alcohol use: Yes     Comment: Holidays    Drug use: No         Review of Systems   General ROS: negative  Hematological and Lymphatic ROS: negative  Respiratory ROS: negative  Cardiovascular ROS: negative  Gastrointestinal ROS: positive for - abdominal pain, appetite loss and nausea/vomiting  Genito-Urinary ROS: negative  Musculoskeletal ROS: negative      PHYSICAL EXAM:    Vitals:    20 0030   BP:    Pulse:    Resp:    Temp:    SpO2: 91%       General Appearance:  awake, alert, oriented, in no acute distress  Skin:  Skin color, texture, turgor normal. No rashes or lesions. Head/face:  NCAT  Eyes:  No gross abnormalities. Lungs:  Normal expansion. Clear to auscultation. Heart:  Heart regular rate and rhythm  Abdomen:  Soft, mild epigastric tenderness, midline chronic post op hematoma is still present  Extremities: Extremities warm to touch, pink, with no edema.       LABS:    CBC  Recent Labs     20  1241   WBC 12.8*   HGB 10.0*   HCT 35.7        BMP  Recent Labs     12/05/20  1657 12/05/20  1703   *  --    K 4.9  --    CL 98  --    CO2 18*  --    BUN 29*  --    CREATININE 1.1* 1.0   CALCIUM 8.7  --      Liver Function  Recent Labs     12/05/20  1241   LIPASE 7*   BILITOT 0.7   AST 15   ALT 9   ALKPHOS 39   PROT 4.2*   LABALBU 2.6*     No results for input(s): LACTATE in the last 72 hours. Recent Labs     12/05/20  1641   INR 3.5       RADIOLOGY    Ct Abdomen Pelvis W Iv Contrast Additional Contrast? None    Result Date: 12/5/2020  EXAMINATION: CT OF THE ABDOMEN AND PELVIS WITH CONTRAST 12/5/2020 2:40 pm TECHNIQUE: CT of the abdomen and pelvis was performed with the administration of intravenous contrast. Multiplanar reformatted images are provided for review. Dose modulation, iterative reconstruction, and/or weight based adjustment of the mA/kV was utilized to reduce the radiation dose to as low as reasonably achievable. COMPARISON: February 27, 2019 HISTORY: ORDERING SYSTEM PROVIDED HISTORY: epigastric pain TECHNOLOGIST PROVIDED HISTORY: Additional Contrast?->None Reason for exam:->epigastric pain What reading provider will be dictating this exam?->CRC FINDINGS: Lower Chest: Atelectasis and chronic pleuroparenchymal scarring noted at the lung bases. No significant pleural effusion. The heart is enlarged. Eventration of the right hemidiaphragm with protrusion of the liver and subphrenic fluid collection. Organs: The liver is cirrhotic. Spleen is not enlarged. Pancreas is atrophic. The adrenal glands appear unremarkable. The kidneys demonstrate symmetric enhancement with no hydronephrosis. Gallbladder not visualized. GI/Bowel: No obstructing or constricting mass lesions. Pelvis: No pathologic masses or adenopathy. Peritoneum/Retroperitoneum: Moderate ascites. No significant adenopathy. Bones/Soft Tissues: Mild subcutaneous edema. Degenerative changes of the spine and hips.   There is scoliosis of the lumbar spine.    Cirrhosis. Bibasilar atelectasis and pleuroparenchymal scarring, both lung bases. Moderate ascites. Cta Chest W Contrast    Result Date: 12/5/2020  EXAMINATION: CTA OF THE CHEST 12/5/2020 2:40 pm TECHNIQUE: CTA of the chest was performed after the administration of intravenous contrast.  Multiplanar reformatted images are provided for review. MIP images are provided for review. Dose modulation, iterative reconstruction, and/or weight based adjustment of the mA/kV was utilized to reduce the radiation dose to as low as reasonably achievable. COMPARISON: None. HISTORY: ORDERING SYSTEM PROVIDED HISTORY: hypoxia TECHNOLOGIST PROVIDED HISTORY: Reason for exam:->hypoxia What reading provider will be dictating this exam?->CRC FINDINGS: Pulmonary Arteries: No pulmonary embolism is seen. Suboptimal evaluation of the peripheral pulmonary arteries in the lower lobes due to prominent respiratory motion artifact. The main pulmonary artery is normal in caliber. Mediastinum: The heart is enlarged. Prominent calcified atherosclerosis seen in the left anterior descending coronary artery. Mild calcified atherosclerosis is seen in the aorta. No aneurysm. Mildly enlarged lymph nodes are seen in the mediastinum, with the largest in the right paratracheal region measuring approximately 2.4 x 1.8 cm. No significant enlargement of the axillary or hilar lymph nodes is seen. Lungs/pleura: Minimal dependent atelectasis or scarring is seen in the lower lobes and lingula. The lungs are otherwise clear. The central airway is clear. No pneumothorax or pleural effusion is seen. Upper Abdomen: Mild irregularity of the liver contour suggests cirrhosis. Small volume perihepatic ascites is seen. Reflux of contrast into the hepatic veins may signify right heart failure. 1. No pulmonary embolism is seen. 2.  No acute cardiopulmonary abnormality. 3. Cardiomegaly. No pulmonary edema or pleural effusion.  4. Mild mediastinal lymphadenopathy, which may be reactive. Given patient's history of cecal adenocarcinoma, a metastatic etiology cannot be excluded. 5. Irregular liver contour indicating cirrhosis. Small volume perihepatic ascites. 6. Reflux of contrast into the hepatic veins may signify right heart failure. ASSESSMENT:  [de-identified] y.o. female with history of right hemicolectomy presents with epigastric abdominal pain and nausea.     PLAN:  - Plan for EGD 12/7  - Monitor abdominal exam  - Correct electrolytes   - follow lactic acid    Discussed with Dr. Hernandez Gather     Electronically signed by Mary Hernandez MD on 12/6/20 at 12:33 AM EST

## 2020-12-07 ENCOUNTER — ANESTHESIA (OUTPATIENT)
Dept: ENDOSCOPY | Age: 80
DRG: 392 | End: 2020-12-07
Payer: MEDICARE

## 2020-12-07 ENCOUNTER — ANESTHESIA EVENT (OUTPATIENT)
Dept: ENDOSCOPY | Age: 80
DRG: 392 | End: 2020-12-07
Payer: MEDICARE

## 2020-12-07 VITALS
OXYGEN SATURATION: 93 % | DIASTOLIC BLOOD PRESSURE: 54 MMHG | SYSTOLIC BLOOD PRESSURE: 102 MMHG | RESPIRATION RATE: 14 BRPM

## 2020-12-07 LAB
EKG ATRIAL RATE: 78 BPM
EKG Q-T INTERVAL: 286 MS
EKG QRS DURATION: 70 MS
EKG QTC CALCULATION (BAZETT): 456 MS
EKG R AXIS: 76 DEGREES
EKG T AXIS: -84 DEGREES
EKG VENTRICULAR RATE: 153 BPM
HCT VFR BLD CALC: 33.7 % (ref 34–48)
HEMOGLOBIN: 9.7 G/DL (ref 11.5–15.5)
INR BLD: 1.6
LACTIC ACID: 1.2 MMOL/L (ref 0.5–2.2)
MCH RBC QN AUTO: 23.8 PG (ref 26–35)
MCHC RBC AUTO-ENTMCNC: 28.8 % (ref 32–34.5)
MCV RBC AUTO: 82.6 FL (ref 80–99.9)
METER GLUCOSE: 117 MG/DL (ref 74–99)
METER GLUCOSE: 146 MG/DL (ref 74–99)
METER GLUCOSE: 59 MG/DL (ref 74–99)
METER GLUCOSE: 87 MG/DL (ref 74–99)
METER GLUCOSE: 88 MG/DL (ref 74–99)
METER GLUCOSE: 91 MG/DL (ref 74–99)
PDW BLD-RTO: 19.5 FL (ref 11.5–15)
PLATELET # BLD: 387 E9/L (ref 130–450)
PMV BLD AUTO: 9.6 FL (ref 7–12)
PROTHROMBIN TIME: 18.1 SEC (ref 9.3–12.4)
RBC # BLD: 4.08 E12/L (ref 3.5–5.5)
WBC # BLD: 16.5 E9/L (ref 4.5–11.5)

## 2020-12-07 PROCEDURE — 3609013000 HC EGD TRANSORAL CONTROL BLEEDING ANY METHOD: Performed by: SURGERY

## 2020-12-07 PROCEDURE — 2580000003 HC RX 258: Performed by: RADIOLOGY

## 2020-12-07 PROCEDURE — 2720000010 HC SURG SUPPLY STERILE: Performed by: SURGERY

## 2020-12-07 PROCEDURE — 82962 GLUCOSE BLOOD TEST: CPT

## 2020-12-07 PROCEDURE — 83605 ASSAY OF LACTIC ACID: CPT

## 2020-12-07 PROCEDURE — 3700000000 HC ANESTHESIA ATTENDED CARE: Performed by: SURGERY

## 2020-12-07 PROCEDURE — 93010 ELECTROCARDIOGRAM REPORT: CPT | Performed by: INTERNAL MEDICINE

## 2020-12-07 PROCEDURE — 6370000000 HC RX 637 (ALT 250 FOR IP): Performed by: SURGERY

## 2020-12-07 PROCEDURE — 6360000002 HC RX W HCPCS: Performed by: NURSE ANESTHETIST, CERTIFIED REGISTERED

## 2020-12-07 PROCEDURE — 2580000003 HC RX 258: Performed by: STUDENT IN AN ORGANIZED HEALTH CARE EDUCATION/TRAINING PROGRAM

## 2020-12-07 PROCEDURE — 6370000000 HC RX 637 (ALT 250 FOR IP): Performed by: INTERNAL MEDICINE

## 2020-12-07 PROCEDURE — 2580000003 HC RX 258

## 2020-12-07 PROCEDURE — 7100000001 HC PACU RECOVERY - ADDTL 15 MIN: Performed by: SURGERY

## 2020-12-07 PROCEDURE — 2709999900 HC NON-CHARGEABLE SUPPLY: Performed by: SURGERY

## 2020-12-07 PROCEDURE — 3609012400 HC EGD TRANSORAL BIOPSY SINGLE/MULTIPLE: Performed by: SURGERY

## 2020-12-07 PROCEDURE — 0W3P8ZZ CONTROL BLEEDING IN GASTROINTESTINAL TRACT, VIA NATURAL OR ARTIFICIAL OPENING ENDOSCOPIC: ICD-10-PCS | Performed by: SURGERY

## 2020-12-07 PROCEDURE — 2580000003 HC RX 258: Performed by: NURSE ANESTHETIST, CERTIFIED REGISTERED

## 2020-12-07 PROCEDURE — 2060000000 HC ICU INTERMEDIATE R&B

## 2020-12-07 PROCEDURE — 85027 COMPLETE CBC AUTOMATED: CPT

## 2020-12-07 PROCEDURE — 3700000001 HC ADD 15 MINUTES (ANESTHESIA): Performed by: SURGERY

## 2020-12-07 PROCEDURE — 85610 PROTHROMBIN TIME: CPT

## 2020-12-07 PROCEDURE — 88305 TISSUE EXAM BY PATHOLOGIST: CPT

## 2020-12-07 PROCEDURE — 0DB78ZX EXCISION OF STOMACH, PYLORUS, VIA NATURAL OR ARTIFICIAL OPENING ENDOSCOPIC, DIAGNOSTIC: ICD-10-PCS | Performed by: SURGERY

## 2020-12-07 PROCEDURE — 36415 COLL VENOUS BLD VENIPUNCTURE: CPT

## 2020-12-07 PROCEDURE — 7100000000 HC PACU RECOVERY - FIRST 15 MIN: Performed by: SURGERY

## 2020-12-07 RX ORDER — SODIUM CHLORIDE 9 MG/ML
INJECTION, SOLUTION INTRAVENOUS CONTINUOUS PRN
Status: DISCONTINUED | OUTPATIENT
Start: 2020-12-07 | End: 2020-12-07 | Stop reason: SDUPTHER

## 2020-12-07 RX ORDER — NICOTINE POLACRILEX 4 MG
15 LOZENGE BUCCAL PRN
Status: DISCONTINUED | OUTPATIENT
Start: 2020-12-07 | End: 2020-12-08 | Stop reason: HOSPADM

## 2020-12-07 RX ORDER — PROPOFOL 10 MG/ML
INJECTION, EMULSION INTRAVENOUS PRN
Status: DISCONTINUED | OUTPATIENT
Start: 2020-12-07 | End: 2020-12-07 | Stop reason: SDUPTHER

## 2020-12-07 RX ORDER — DEXTROSE MONOHYDRATE 25 G/50ML
12.5 INJECTION, SOLUTION INTRAVENOUS PRN
Status: DISCONTINUED | OUTPATIENT
Start: 2020-12-07 | End: 2020-12-08 | Stop reason: HOSPADM

## 2020-12-07 RX ORDER — DEXTROSE MONOHYDRATE 50 MG/ML
100 INJECTION, SOLUTION INTRAVENOUS PRN
Status: DISCONTINUED | OUTPATIENT
Start: 2020-12-07 | End: 2020-12-08 | Stop reason: HOSPADM

## 2020-12-07 RX ORDER — DEXTROSE MONOHYDRATE 25 G/50ML
INJECTION, SOLUTION INTRAVENOUS
Status: COMPLETED
Start: 2020-12-07 | End: 2020-12-07

## 2020-12-07 RX ADMIN — DULOXETINE HYDROCHLORIDE 30 MG: 30 CAPSULE, DELAYED RELEASE ORAL at 07:47

## 2020-12-07 RX ADMIN — METOPROLOL TARTRATE 25 MG: 25 TABLET, FILM COATED ORAL at 07:47

## 2020-12-07 RX ADMIN — OXYBUTYNIN CHLORIDE 5 MG: 5 TABLET ORAL at 07:46

## 2020-12-07 RX ADMIN — PANTOPRAZOLE SODIUM 40 MG: 40 TABLET, DELAYED RELEASE ORAL at 07:47

## 2020-12-07 RX ADMIN — TRAMADOL HYDROCHLORIDE 50 MG: 50 TABLET, FILM COATED ORAL at 13:50

## 2020-12-07 RX ADMIN — PREGABALIN 50 MG: 50 CAPSULE ORAL at 07:47

## 2020-12-07 RX ADMIN — METOPROLOL TARTRATE 25 MG: 25 TABLET, FILM COATED ORAL at 20:32

## 2020-12-07 RX ADMIN — PRAMIPEXOLE DIHYDROCHLORIDE 0.62 MG: 0.25 TABLET ORAL at 20:32

## 2020-12-07 RX ADMIN — GLIMEPIRIDE 2 MG: 2 TABLET ORAL at 17:10

## 2020-12-07 RX ADMIN — AMLODIPINE BESYLATE 10 MG: 10 TABLET ORAL at 07:47

## 2020-12-07 RX ADMIN — SODIUM CHLORIDE: 9 INJECTION, SOLUTION INTRAVENOUS at 15:37

## 2020-12-07 RX ADMIN — INSULIN LISPRO 1 UNITS: 100 INJECTION, SOLUTION INTRAVENOUS; SUBCUTANEOUS at 20:33

## 2020-12-07 RX ADMIN — GLIMEPIRIDE 2 MG: 2 TABLET ORAL at 07:46

## 2020-12-07 RX ADMIN — PROPOFOL 120 MG: 10 INJECTION, EMULSION INTRAVENOUS at 15:38

## 2020-12-07 RX ADMIN — Medication 10 ML: at 07:01

## 2020-12-07 RX ADMIN — DEXTROSE MONOHYDRATE 50 ML: 25 INJECTION, SOLUTION INTRAVENOUS at 07:01

## 2020-12-07 RX ADMIN — Medication 10 ML: at 13:51

## 2020-12-07 RX ADMIN — DEXTROSE MONOHYDRATE 12.5 G: 25 INJECTION, SOLUTION INTRAVENOUS at 13:51

## 2020-12-07 RX ADMIN — HYDROXYZINE HYDROCHLORIDE 10 MG: 10 TABLET ORAL at 07:46

## 2020-12-07 ASSESSMENT — PAIN DESCRIPTION - DESCRIPTORS: DESCRIPTORS: SORE

## 2020-12-07 ASSESSMENT — PAIN SCALES - GENERAL
PAINLEVEL_OUTOF10: 0
PAINLEVEL_OUTOF10: 9
PAINLEVEL_OUTOF10: 10

## 2020-12-07 ASSESSMENT — PAIN DESCRIPTION - LOCATION: LOCATION: ABDOMEN

## 2020-12-07 ASSESSMENT — PAIN DESCRIPTION - PAIN TYPE: TYPE: CHRONIC PAIN

## 2020-12-07 ASSESSMENT — LIFESTYLE VARIABLES: SMOKING_STATUS: 0

## 2020-12-07 NOTE — PROGRESS NOTES
GENERAL SURGERY  DAILY PROGRESS NOTE  12/7/2020    Subjective:  No acute events overnight  Passing gas, no BM    Objective:  /69   Pulse 90   Temp 97.9 °F (36.6 °C) (Temporal)   Resp 19   Ht 5' 3\" (1.6 m)   Wt 186 lb (84.4 kg)   LMP  (LMP Unknown)   SpO2 95%   BMI 32.95 kg/m²     GENERAL:  Laying in bed, cooperative, no apparent distress  HEAD: Normocephalic  LUNGS:  No increased work of breathing  CARDIOVASCULAR:  RR  ABDOMEN:  Soft, non-tender, non-distended  EXTREMITIES: No edema or swelling      Assessment/Plan:  [de-identified] y.o. female with history of right hemicolectomy presents with epigastric abdominal pain and nausea. EGD today 12/7  Lactate normal  Doubt abdominal source for  Increased WBC  Iggy Mojica was reexamined preoperatively today, 12/7/20. There is no substantial change in her physical status since the time of her pre-anesthesia testing, allergies to drugs and biologics were reviewed on chart and are unchanged. She is fit for the proposed surgical procedure. Iggy Mojica has no further questions regarding the risks, benefits, nature and alternatives of surgery and wishes to proceed.       Electronically signed by Hayden Mike DO on 12/7/2020 at 6:40 AM

## 2020-12-07 NOTE — PROGRESS NOTES
[facility]  Nephrology Attending Progress Note  Davy Jimenes. Iza Zafar MD        SUBJECTIVE:     12/5: The pt is an [de-identified] yo female with a pmh of djd, chronic back pain on narcotics, gerd, fatty liver, dm, colon cancer, hyperlipidemia, htn, paf who presented with abd pain and nausea. Her labs showed a na of 110. She does not have a h/o hyponatremia. She was given a liter of saline in the er. Office visit from June 2020 has lasix listed.      12/6: pt seen in room, co abd pain, seen by general surgery    12/7: for egd today, no complaints      PROBLEM LIST:    Patient Active Problem List   Diagnosis    Spinal stenosis in cervical region    Disorder of muscle, ligament, and fascia    Displacement of lumbar intervertebral disc without myelopathy    Spinal stenosis, lumbar region, without neurogenic claudication    Essential hypertension    Mixed hyperlipidemia    DM (diabetes mellitus) ([address]    Obesity (BMI 30.0-34. 9)    Other chest pain    Left wrist pain    Renal artery aneurysm (HCC)    Chronic neck pain    Chronic back pain    A-fib (HCC)    PAF (paroxysmal atrial fibrillation) (HCC)    Anemia    Chronic anticoagulation    Class 1 obesity due to excess calories without serious comorbidity with body mass index (BMI) of 34.0 to 34.9 in adult    Malignant neoplasm of cecum (HCC)    Nonrheumatic aortic valve stenosis    Pulmonary HTN ([address]    Hyponatremia    Abdominal pain        PAST MEDICAL HISTORY:    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[address] 01/2019    Anemia     Arm numbness     Arthritis     Blood transfusion     Cervical spinal stenosis     Diabetes mellitus (HCC)     Fatty liver     GERD (gastroesophageal reflux disease)     Hyperlipidemia     Hypertension     Low back pain     Lumbar stenosis     Neck pain     Obesity (BMI 30.0-34.9) 10/14/2012    PAF (paroxysmal atrial fibrillation) ([address]     Renal artery aneurysm ([address] 7/15/2015 DIET:    Diet NPO, After Midnight     PHYSICAL EXAM:     Patient Vitals for the past 24 hrs:   BP Temp Temp src Pulse Resp SpO2   12/07/20 0710 121/69 97.2 °F (36.2 °C) Temporal 91 18 --   12/07/20 0012 106/69 97.9 °F (36.6 °C) Temporal 90 19 95 %   12/06/20 1900 (!) 104/54 97.6 °F (36.4 °C) Temporal 95 18 94 %   12/06/20 1503 (!) 100/54 96.9 °F (36.1 °C) Temporal 89 20 92 %   @      Intake/Output Summary (Last 24 hours) at 12/7/2020 1205  Last data filed at 12/6/2020 1431  Gross per 24 hour   Intake 500 ml   Output --   Net 500 ml         Wt Readings from Last 3 Encounters:   12/05/20 186 lb (84.4 kg)   06/10/20 182 lb (82.6 kg)   03/19/20 189 lb (85.7 kg)       Constitutional:  Pt is in no acute distress  Head: normocephalic, atraumatic  Neck: no JVD  Cardiovascular: regular rate and rhythm, no murmurs, gallops, or rubs  Respiratory:  No rales, rhochi, or wheezes  Gastrointestinal:  Soft, nontender, nondistended, bowel sounds x 4  Ext: no edema  Skin: dry, no rash  Neuro: aaox3    MEDS (scheduled):    oxybutynin  5 mg Oral Daily    hydrOXYzine  10 mg Oral Daily    amLODIPine  10 mg Oral Daily    DULoxetine  30 mg Oral Daily    glimepiride  2 mg Oral BID WC    metoprolol tartrate  25 mg Oral BID    pramipexole  0.625 mg Oral Nightly    pregabalin  50 mg Oral Daily    [Held by provider] rivaroxaban  20 mg Oral Daily with breakfast    pantoprazole  40 mg Oral QAM AC    insulin lispro  0-6 Units Subcutaneous TID WC    insulin lispro  0-3 Units Subcutaneous Nightly       MEDS (infusions):   dextrose         MEDS (prn):  glucose, dextrose, glucagon (rDNA), dextrose, dicyclomine, sodium chloride flush, traMADol    DATA:    Recent Labs     12/05/20  1241 12/06/20  0533 12/07/20  0431   WBC 12.8* 14.5* 16.5*   HGB 10.0* 9.7* 9.7*   HCT 35.7 33.4* 33.7*   MCV 84.0 81.5 82.6    434 387     Recent Labs     12/05/20  1241  12/05/20  1657 12/05/20  1703 12/06/20  0533 12/06/20 2006   *   < > 130*  --  131* 128*   K 4.0   < > 4.9  --  5.6* 4.7   CL 76*   < > 98  --  100 99   CO2 18*   < > 18*  --  20* 16*   BUN 30*   < > 29*  --  36* 35*   CREATININE 1.2*   < > 1.1* 1.0 1.2* 1.1*   LABGLOM 43   < > 48 53 43 48   GLUCOSE 193*   < > 145*  --  159* 102*   CALCIUM 8.2*   < > 8.7  --  9.0 9.1   ALT 9  --   --   --  23  --    AST 15  --   --   --  26  --    BILITOT 0.7  --   --   --  1.1  --    ALKPHOS 39  --   --   --  77  --     < > = values in this interval not displayed. Lab Results   Component Value Date    LABALBU 4.0 12/06/2020    LABALBU 2.6 (L) 12/05/2020    LABALBU 3.7 02/21/2019     Lab Results   Component Value Date    TSH 1.240 12/05/2020       Iron Studies  No results found for: IRON, TIBC, FERRITIN  No results found for: [id number]  No results found for: FOLATE    No results found for: VITD25  No results found for: PTH    No components found for: URIC    Lab Results   Component Value Date    COLORU Yellow 12/05/2020    LABPH 0 10/22/2019    NITRU Negative 12/05/2020    GLUCOSEU 500 12/05/2020    KETUA Negative 12/05/2020    UROBILINOGEN 0.2 12/05/2020    BILIRUBINUR Negative 12/05/2020       No results found for: [facility]      IMPRESSION/RECOMMENDATIONS:     1. Hyponatremia  Possibly due to hypovolemia with abd pain nausea  adh release with pain and nausea  fena <1  Sp L of saline in er  tsh 1.24  Hold lasix  initital na 110 likely an error drawn off iv     2. abd pain nausea  covid neg  abd imaging not revealing cause  For egd 12/7     3. htn  Follow on norvasc lopressor     4. afib  On bb     5. Chronic back pain  Per neuro          Redgie Jetty.  Susie Sutton MD

## 2020-12-07 NOTE — PLAN OF CARE
Problem: Pain:  Goal: Control of chronic pain  Description: Control of chronic pain  Outcome: Met This Shift     Problem: Falls - Risk of:  Goal: Will remain free from falls  Description: Will remain free from falls  Outcome: Met This Shift

## 2020-12-07 NOTE — ANESTHESIA PRE PROCEDURE
Department of Anesthesiology  Preprocedure Note       Name:  Matthew Barker   Age:  [de-identified] y.o.  :  1940                                          MRN:  [id number]         Date:  2020      Surgeon: Donell Bronson):  Jerrell Mares MD    Procedure: Procedure(s):  EGD DIAGNOSTIC ONLY    Medications prior to admission:   Prior to Admission medications    Medication Sig Start Date End Date Taking? Authorizing Provider   glimepiride (AMARYL) 2 MG tablet Take 2 mg by mouth 2 times daily   Yes Historical Provider, MD   pregabalin (LYRICA) 50 MG capsule Take 50 mg by mouth daily. Yes Historical Provider, MD   triamcinolone (KENALOG) 0.1 % cream Apply topically 2 times daily as needed   Yes Historical Provider, MD   traMADol (ULTRAM) 50 MG tablet Take 1 tablet by mouth every 8 hours as needed for Pain for up to 60 days.  Take lowest dose possible to manage pain 20 Yes Steve Mackey MD   DULoxetine (CYMBALTA) 30 MG extended release capsule Take 1 capsule by mouth daily 20  Yes Scar Ybarra MD   pramipexole (MIRAPEX) 0.25 MG tablet TAKE 5 TABLETS BY MOUTH  NIGHTLY 20  Yes Scar Ybarra MD   oxybutynin (DITROPAN) 5 MG tablet TAKE 1 TABLET BY MOUTH  DAILY WITH BREAKFAST 20  Yes Scar Ybarra MD   hydrOXYzine (ATARAX) 10 MG tablet Take 1 tablet by mouth nightly 20  Yes Scar Ybarra MD   simvastatin (ZOCOR) 20 MG tablet TAKE 1 TABLET BY MOUTH  DAILY WITH SUPPER 20  Yes Scar Ybarra MD   amLODIPine (NORVASC) 10 MG tablet TAKE 1 TABLET BY MOUTH  DAILY 20  Yes Scar Ybarra MD   pantoprazole (PROTONIX) 40 MG tablet TAKE ONE TABLET BY MOUTH TWO TIMES A DAY BEFORE MEALS 20  Yes Scar Ybarra MD   metoprolol tartrate (LOPRESSOR) 25 MG tablet Take 1 tablet by mouth 2 times daily 3/25/20  Yes Scar Ybarra MD   rivaroxaban (XARELTO) 20 MG TABS tablet Take 1 tablet by mouth daily (with breakfast) 3/16/20  Yes Aubrie MD Lisa   potassium chloride (KLOR-CON M) 10 MEQ extended release tablet Take 1 tablet by mouth 2 times daily 3/4/20  Yes Norma Ramírez MD   furosemide (LASIX) 20 MG tablet TAKE ONE TABLET BY MOUTH EVERY DAY 2/24/20  Yes Norma Ramírez MD   metFORMIN (GLUCOPHAGE) 500 MG tablet TAKE 2 TABLETS BY MOUTH  TWICE A DAY 2/6/20  Yes Norma Ramírez MD   empagliflozin (JARDIANCE) 10 MG tablet Take 1 tablet by mouth daily 7/1/20   Aubrie Gonzalez MD   dicyclomine (BENTYL) 10 MG capsule TAKE 1 CAPSULE BY MOUTH  DAILY AS NEEDED 10/16/18   Aubrie Gonzalez MD       Current medications:    Current Facility-Administered Medications   Medication Dose Route Frequency Provider Last Rate Last Dose    glucose (GLUTOSE) 40 % oral gel 15 g  15 g Oral PRN Luke Hsu, DO        dextrose 50 % IV solution  12.5 g Intravenous PRN Luke Hsu, DO   12.5 g at 12/07/20 1351    glucagon (rDNA) injection 1 mg  1 mg Intramuscular PRN Luke Hsu, DO        dextrose 5 % solution  100 mL/hr Intravenous PRN Luke Hsu, DO        dicyclomine (BENTYL) capsule 10 mg  10 mg Oral Daily PRN Norma Ramírez MD        oxybutynin (DITROPAN) tablet 5 mg  5 mg Oral Daily Aubrie Gonzalez MD   5 mg at 12/07/20 0746    hydrOXYzine (ATARAX) tablet 10 mg  10 mg Oral Daily Aubrie Gonzalez MD   10 mg at 12/07/20 0746    sodium chloride flush 0.9 % injection 10 mL  10 mL Intravenous PRN Tomasa Juan MD   10 mL at 12/07/20 1351    amLODIPine (NORVASC) tablet 10 mg  10 mg Oral Daily Aubrie Gonzalez MD   10 mg at 12/07/20 0747    DULoxetine (CYMBALTA) extended release capsule 30 mg  30 mg Oral Daily Aubrie Gonzalez MD   30 mg at 12/07/20 0747    glimepiride (AMARYL) tablet 2 mg  2 mg Oral BID WC Aubrie Gonzalez MD   2 mg at 12/07/20 0746    metoprolol tartrate (LOPRESSOR) tablet 25 mg  25 mg Oral BID Norma Ramírez MD   25 mg at 12/07/20 0747    pramipexole (MIRAPEX) tablet 0.625 mg  0.625 mg Oral Nightly Aubrie Gonzalez MD   0.625 mg at 12/06/20 2118    pregabalin (LYRICA) capsule 50 mg  50 mg Oral Daily Aubrie Gonzalez MD   50 mg at 12/07/20 0747    [Held by provider] rivaroxaban (XARELTO) tablet 20 mg  20 mg Oral Daily with breakfast Memo Knox MD   Stopped at 12/06/20 0917    traMADol (ULTRAM) tablet 50 mg  50 mg Oral Q8H PRN Aubrie Gonzalez MD   50 mg at 12/07/20 1350    pantoprazole (PROTONIX) tablet 40 mg  40 mg Oral QAM AC Aubrie Gonzalez MD   40 mg at 12/07/20 0747    insulin lispro (HUMALOG) injection vial 0-6 Units  0-6 Units Subcutaneous TID WC Aubrie Gonzalez MD   1 Units at 12/06/20 1302    insulin lispro (HUMALOG) injection vial 0-3 Units  0-3 Units Subcutaneous Nightly Memo Knox MD           Allergies: Allergies   Allergen Reactions    Benadryl [Diphenhydramine]      hyper    Fish-Derived Products Rash    Iodine Rash       Problem List:    Patient Active Problem List   Diagnosis Code    Spinal stenosis in cervical region M48.02    Disorder of muscle, ligament, and fascia M62.9    Displacement of lumbar intervertebral disc without myelopathy M51.26    Spinal stenosis, lumbar region, without neurogenic claudication M48.061    Essential hypertension I10    Mixed hyperlipidemia E78.2    DM (diabetes mellitus) ([facility] E11.9    Obesity (BMI 30.0-34. 9) E66.9    Other chest pain R07.89    Left wrist pain M25.532    Renal artery aneurysm (HCC) I72.2    Chronic neck pain M54.2, G89.29    Chronic back pain M54.9, G89.29    A-fib (HCC) I48.91    PAF (paroxysmal atrial fibrillation) (HCC) I48.0    Anemia D64.9    Chronic anticoagulation Z79.01    Class 1 obesity due to excess calories without serious comorbidity with body mass index (BMI) of 34.0 to 34.9 in adult E66.09, Z68.34    Malignant neoplasm of cecum (HCC) C18.0    Nonrheumatic aortic valve stenosis I35.0    Pulmonary HTN ([address] Height:                                                  BP Readings from Last 3 Encounters:   12/07/20 121/69   06/15/20 (!) 140/72   06/10/20 130/70       NPO Status: Time of last liquid consumption: 2300                        Time of last solid consumption: 2300                        Date of last liquid consumption: 12/06/20                        Date of last solid food consumption: 12/06/20    BMI:   Wt Readings from Last 3 Encounters:   12/05/20 186 lb (84.4 kg)   06/10/20 182 lb (82.6 kg)   03/19/20 189 lb (85.7 kg)     Body mass index is 32.95 kg/m².     CBC:   Lab Results   Component Value Date    WBC 16.5 12/07/2020    RBC 4.08 12/07/2020    HGB 9.7 12/07/2020    HCT 33.7 12/07/2020    MCV 82.6 12/07/2020    RDW 19.5 12/07/2020     12/07/2020       CMP:   Lab Results   Component Value Date     12/06/2020    K 4.7 12/06/2020    CL 99 12/06/2020    CO2 16 12/06/2020    BUN 35 12/06/2020    CREATININE 1.1 12/06/2020    GFRAA 58 12/06/2020    LABGLOM 48 12/06/2020    GLUCOSE 102 12/06/2020    PROT 7.6 12/06/2020    CALCIUM 9.1 12/06/2020    BILITOT 1.1 12/06/2020    ALKPHOS 77 12/06/2020    AST 26 12/06/2020    ALT 23 12/06/2020       POC Tests:   Recent Labs     12/05/20  1703   POCGLU 152*   POCNA 133   POCK 4.9   POCCL 103       Coags:   Lab Results   Component Value Date    PROTIME 18.1 12/07/2020    INR 1.6 12/07/2020    APTT 34.0 12/28/2017       HCG (If Applicable): No results found for: PREGTESTUR, PREGSERUM, HCG, HCGQUANT     ABGs: No results found for: PHART, PO2ART, KRY8FHP, VPD8XBS, BEART, I1WITMNM     Type & Screen (If Applicable):  No results found for: LABABO, LABRH    Drug/Infectious Status (If Applicable):  No results found for: HIV, HEPCAB    COVID-19 Screening (If Applicable):   Lab Results   Component Value Date    COVID19 Not Detected 12/05/2020         Anesthesia Evaluation    Airway: Mallampati: III  TM distance: >3 FB   Neck ROM: full  Mouth opening: > = 3 FB Dental:

## 2020-12-07 NOTE — ANESTHESIA POSTPROCEDURE EVALUATION
Department of Anesthesiology  Postprocedure Note    Patient: Tamela Escalera  MRN: [id number]  [place]: 1940  Date of evaluation: 12/7/2020  Time:  6:16 PM     Procedure Summary     Date:  12/07/20 Room / Location:  [address] [place] [address] / [facility]    Anesthesia Start:  1537 Anesthesia Stop:  7324    Procedures:       EGD BIOPSY (N/A )      EGD CONTROL HEMORRHAGE Diagnosis:  (.)    Surgeon:  Araseli Chung MD Responsible Provider:  Anival Justice MD    Anesthesia Type:  MAC ASA Status:  4          Anesthesia Type: MAC    Gonzalez Phase I: Gonzalez Score: 9    Gonzalez Phase II:      Last vitals: Reviewed and per EMR flowsheets.        Anesthesia Post Evaluation    Patient location during evaluation: bedside  Patient participation: complete - patient participated  Level of consciousness: awake and alert  Airway patency: patent  Nausea & Vomiting: no nausea and no vomiting  Complications: no  Cardiovascular status: blood pressure returned to baseline and hemodynamically stable  Respiratory status: acceptable and spontaneous ventilation  Hydration status: euvolemic

## 2020-12-07 NOTE — CARE COORDINATION
Patient from home, lives with spouse. She live in one story home. She uses a walker for ambulation. PCP is Dr. Billy Campbell. No history of GALE or hhc. Plan is home with spouse when released. He will provide transport home. For EGD today. For questions I can be reached at [phone].  Audra Sanchez [place]

## 2020-12-07 NOTE — H&P
ABDOMEN:  Soft, distended. Positive bowel sounds. Tenderness in mid  abdomen. EXTREMITIES:  No cyanosis, clubbing, or edema. ASSESSMENT:  1. Abdominal pain, etiology unclear. History of liver cirrhosis on  previous EGD. 2.  History of adenocarcinoma of cecum, status post hemicolectomy. 3.  Chronic kidney disease with acute component. 4.  Diabetes type 2. PLAN:  Please see orders.         Theresa Park MD    D: 12/06/2020 20:12:00       T: 12/06/2020 20:15:41     [email or web address]  Job#: [id number]     Doc#: [id number]    CC:

## 2020-12-08 VITALS
RESPIRATION RATE: 18 BRPM | TEMPERATURE: 98.4 F | WEIGHT: 186 LBS | OXYGEN SATURATION: 93 % | SYSTOLIC BLOOD PRESSURE: 127 MMHG | DIASTOLIC BLOOD PRESSURE: 60 MMHG | BODY MASS INDEX: 32.96 KG/M2 | HEIGHT: 63 IN | HEART RATE: 99 BPM

## 2020-12-08 LAB
HCT VFR BLD CALC: 31.7 % (ref 34–48)
HEMOGLOBIN: 9.2 G/DL (ref 11.5–15.5)
LACTIC ACID, SEPSIS: 2.5 MMOL/L (ref 0.5–1.9)
MCH RBC QN AUTO: 23.6 PG (ref 26–35)
MCHC RBC AUTO-ENTMCNC: 29 % (ref 32–34.5)
MCV RBC AUTO: 81.3 FL (ref 80–99.9)
METER GLUCOSE: 145 MG/DL (ref 74–99)
METER GLUCOSE: 59 MG/DL (ref 74–99)
METER GLUCOSE: 99 MG/DL (ref 74–99)
PDW BLD-RTO: 19 FL (ref 11.5–15)
PLATELET # BLD: 345 E9/L (ref 130–450)
PMV BLD AUTO: 8.9 FL (ref 7–12)
RBC # BLD: 3.9 E12/L (ref 3.5–5.5)
WBC # BLD: 10.5 E9/L (ref 4.5–11.5)

## 2020-12-08 PROCEDURE — 87040 BLOOD CULTURE FOR BACTERIA: CPT

## 2020-12-08 PROCEDURE — 85027 COMPLETE CBC AUTOMATED: CPT

## 2020-12-08 PROCEDURE — 82962 GLUCOSE BLOOD TEST: CPT

## 2020-12-08 PROCEDURE — 36415 COLL VENOUS BLD VENIPUNCTURE: CPT

## 2020-12-08 PROCEDURE — 6370000000 HC RX 637 (ALT 250 FOR IP): Performed by: SURGERY

## 2020-12-08 PROCEDURE — 83605 ASSAY OF LACTIC ACID: CPT

## 2020-12-08 RX ADMIN — TRAMADOL HYDROCHLORIDE 50 MG: 50 TABLET, FILM COATED ORAL at 02:09

## 2020-12-08 RX ADMIN — HYDROXYZINE HYDROCHLORIDE 10 MG: 10 TABLET ORAL at 09:32

## 2020-12-08 RX ADMIN — OXYBUTYNIN CHLORIDE 5 MG: 5 TABLET ORAL at 09:32

## 2020-12-08 RX ADMIN — TRAMADOL HYDROCHLORIDE 50 MG: 50 TABLET, FILM COATED ORAL at 11:32

## 2020-12-08 RX ADMIN — DULOXETINE HYDROCHLORIDE 30 MG: 30 CAPSULE, DELAYED RELEASE ORAL at 09:32

## 2020-12-08 RX ADMIN — AMLODIPINE BESYLATE 10 MG: 10 TABLET ORAL at 09:32

## 2020-12-08 RX ADMIN — GLIMEPIRIDE 2 MG: 2 TABLET ORAL at 09:32

## 2020-12-08 RX ADMIN — PREGABALIN 50 MG: 50 CAPSULE ORAL at 09:32

## 2020-12-08 RX ADMIN — METOPROLOL TARTRATE 25 MG: 25 TABLET, FILM COATED ORAL at 09:32

## 2020-12-08 RX ADMIN — PANTOPRAZOLE SODIUM 40 MG: 40 TABLET, DELAYED RELEASE ORAL at 06:05

## 2020-12-08 ASSESSMENT — PAIN DESCRIPTION - LOCATION
LOCATION: ABDOMEN
LOCATION: ABDOMEN

## 2020-12-08 ASSESSMENT — PAIN DESCRIPTION - PROGRESSION
CLINICAL_PROGRESSION: NOT CHANGED

## 2020-12-08 ASSESSMENT — PAIN DESCRIPTION - FREQUENCY
FREQUENCY: CONTINUOUS
FREQUENCY: CONTINUOUS

## 2020-12-08 ASSESSMENT — PAIN DESCRIPTION - ONSET
ONSET: ON-GOING
ONSET: ON-GOING

## 2020-12-08 ASSESSMENT — PAIN DESCRIPTION - PAIN TYPE
TYPE: ACUTE PAIN
TYPE: ACUTE PAIN

## 2020-12-08 ASSESSMENT — PAIN SCALES - GENERAL
PAINLEVEL_OUTOF10: 10
PAINLEVEL_OUTOF10: 7
PAINLEVEL_OUTOF10: 0
PAINLEVEL_OUTOF10: 9

## 2020-12-08 ASSESSMENT — PAIN DESCRIPTION - ORIENTATION
ORIENTATION: UPPER
ORIENTATION: UPPER

## 2020-12-08 ASSESSMENT — PAIN DESCRIPTION - DESCRIPTORS
DESCRIPTORS: CONSTANT;DISCOMFORT;TENDER
DESCRIPTORS: CONSTANT;DISCOMFORT;TENDER

## 2020-12-08 NOTE — PROGRESS NOTES
GENERAL SURGERY  DAILY PROGRESS NOTE  12/8/2020    Subjective:  No acute events overnight  Passing gas  No Fever, chills or Nausea, vomiting   Handling diet  EGD- mild gastritis     Objective:  /60   Pulse 97   Temp 97.7 °F (36.5 °C) (Temporal)   Resp 20   Ht 5' 3\" (1.6 m)   Wt 186 lb (84.4 kg)   LMP  (LMP Unknown)   SpO2 91%   BMI 32.95 kg/m²     GENERAL:  Laying in bed, cooperative, no apparent distress  HEAD: Normocephalic  LUNGS:  No increased work of breathing  CARDIOVASCULAR:  RR  ABDOMEN:  Soft, non-tender, non-distended  EXTREMITIES: No edema or swelling      Assessment/Plan:  [de-identified] y.o. female with history of right hemicolectomy presents with epigastric abdominal pain and nausea. Overall care per primary   EGD  12/7- mild gastritis, small siding hernia  Advance diet to peptic ulcer diet   PPI  No more acute surgical intervention needed at this time  Please call with any questions   Okay to discharge when okay with primary       Electronically signed by Hayley Mendoza DO on 12/8/2020 at 6:41 AM     Attending Note:    Patient seen/examined 12/8/2020. Agree with above assessment and plan.

## 2020-12-08 NOTE — PROGRESS NOTES
Subjective: The patient is awake and alert. No problems overnight. Denies chest pain, angina, and dyspnea. Denies abdominal pain. Tolerating diet. No nausea or vomiting. Objective:  Pt is resting in nad   /60   Pulse 97   Temp 97.7 °F (36.5 °C) (Temporal)   Resp 20   Ht 5' 3\" (1.6 m)   Wt 186 lb (84.4 kg)   LMP  (LMP Unknown)   SpO2 91%   BMI 32.95 kg/m²   HEENT no adenopathy no bruits  Heart:  RRR, no murmurs, gallops, or rubs. Lungs:  CTA bilaterally, no wheeze, rales or rhonchi  Abd: bowel sounds present, nontender, nondistended, no masses  Extrem:  No clubbing, cyanosis, or edema  WBC/Hgb/Hct/Plts:  10.5/9.2/31.7/345 (12/08 7516) basic metabolic panel     Assessment:    Patient Active Problem List   Diagnosis    Spinal stenosis in cervical region    Disorder of muscle, ligament, and fascia    Displacement of lumbar intervertebral disc without myelopathy    Spinal stenosis, lumbar region, without neurogenic claudication    Essential hypertension    Mixed hyperlipidemia    DM (diabetes mellitus) ([address]    Obesity (BMI 30.0-34. 9)    Other chest pain    Left wrist pain    Renal artery aneurysm (HCC)    Chronic neck pain    Chronic back pain    A-fib (HCC)    PAF (paroxysmal atrial fibrillation) (HCC)    Anemia    Chronic anticoagulation    Class 1 obesity due to excess calories without serious comorbidity with body mass index (BMI) of 34.0 to 34.9 in adult    Malignant neoplasm of cecum (HCC)    Nonrheumatic aortic valve stenosis    Pulmonary HTN ([address]    Hyponatremia    Abdominal pain       Plan:  If she tolerates diet, discharge to home today         Ginny Rea  6:49 AM  12/8/2020

## 2020-12-08 NOTE — PROGRESS NOTES
[facility]  Nephrology Attending Progress Note  Eloise Gonzales. Marisol Kauffman MD        SUBJECTIVE:     12/5: The pt is an [de-identified] yo female with a pmh of djd, chronic back pain on narcotics, gerd, fatty liver, dm, colon cancer, hyperlipidemia, htn, paf who presented with abd pain and nausea. Her labs showed a na of 110. She does not have a h/o hyponatremia. She was given a liter of saline in the er. Office visit from June 2020 has lasix listed.      12/6: pt seen in room, co abd pain, seen by general surgery    12/7: for egd today, no complaints    12/8: sp egd yesterday showing gastritis, c/o nausea, waiting to eat lunch      PROBLEM LIST:    Patient Active Problem List   Diagnosis    Spinal stenosis in cervical region    Disorder of muscle, ligament, and fascia    Displacement of lumbar intervertebral disc without myelopathy    Spinal stenosis, lumbar region, without neurogenic claudication    Essential hypertension    Mixed hyperlipidemia    DM (diabetes mellitus) ([address]    Obesity (BMI 30.0-34. 9)    Other chest pain    Left wrist pain    Renal artery aneurysm (HCC)    Chronic neck pain    Chronic back pain    A-fib (HCC)    PAF (paroxysmal atrial fibrillation) (HCC)    Anemia    Chronic anticoagulation    Class 1 obesity due to excess calories without serious comorbidity with body mass index (BMI) of 34.0 to 34.9 in adult    Malignant neoplasm of cecum (HCC)    Nonrheumatic aortic valve stenosis    Pulmonary HTN ([address]    Hyponatremia    Abdominal pain        PAST MEDICAL HISTORY:    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[address] 01/2019    Anemia     Arm numbness     Arthritis     Blood transfusion     Cervical spinal stenosis     Diabetes mellitus (HCC)     Fatty liver     GERD (gastroesophageal reflux disease)     Hyperlipidemia     Hypertension     Low back pain     Lumbar stenosis     Neck pain     Obesity (BMI 30.0-34.9) 10/14/2012    PA[facility]) [facility]     Renal artery aneurysm ([facility] 7/15/2015       DIET:    DIET PEPTIC ULCER;     PHYSICAL EXAM:     Patient Vitals for the past 24 hrs:   BP Temp Temp src Pulse Resp SpO2   12/08/20 0840 127/60 98.4 °F (36.9 °C) Temporal 99 18 93 %   12/07/20 1934 119/60 97.7 °F (36.5 °C) Temporal 97 20 91 %   12/07/20 1658 121/67 96.7 °F (35.9 °C) Temporal 95 16 --   12/07/20 1630 (!) 112/55 -- -- 93 19 95 %   12/07/20 1615 104/65 -- -- 92 10 93 %   12/07/20 1605 (!) 95/52 96.9 °F (36.1 °C) Temporal 92 16 94 %   12/07/20 1551 (!) 102/54 -- -- 92 18 94 %   @      Intake/Output Summary (Last 24 hours) at 12/8/2020 1024  Last data filed at 12/7/2020 1551  Gross per 24 hour   Intake 100 ml   Output --   Net 100 ml         Wt Readings from Last 3 Encounters:   12/05/20 186 lb (84.4 kg)   06/10/20 182 lb (82.6 kg)   03/19/20 189 lb (85.7 kg)       Constitutional:  Pt is in no acute distress  Head: normocephalic, atraumatic  Neck: no JVD  Cardiovascular: regular rate and rhythm, no murmurs, gallops, or rubs  Respiratory:  No rales, rhochi, or wheezes  Gastrointestinal:  Soft, nontender, nondistended, bowel sounds x 4  Ext: no edema  Skin: dry, no rash  Neuro: aaox3    MEDS (scheduled):    oxybutynin  5 mg Oral Daily    hydrOXYzine  10 mg Oral Daily    amLODIPine  10 mg Oral Daily    DULoxetine  30 mg Oral Daily    glimepiride  2 mg Oral BID WC    metoprolol tartrate  25 mg Oral BID    pramipexole  0.625 mg Oral Nightly    pregabalin  50 mg Oral Daily    [Held by provider] rivaroxaban  20 mg Oral Daily with breakfast    pantoprazole  40 mg Oral QAM AC    insulin lispro  0-6 Units Subcutaneous TID WC    insulin lispro  0-3 Units Subcutaneous Nightly       MEDS (infusions):   dextrose         MEDS (prn):  glucose, dextrose, glucagon (rDNA), dextrose, dicyclomine, sodium chloride flush, traMADol    DATA:    Recent Labs     12/06/20  0533 12/07/20  0431 12/08/20  0418   WBC 14.5* 16.5* 10.5   HGB 9.7* 9. 7* 9.2*   HCT 33.4* 33.7* 31.7*   MCV 81.5 82.6 81.3    387 345     Recent Labs     12/05/20  1241  12/05/20  1657 12/05/20  1703 12/06/20  0533 12/06/20 2006   *   < > 130*  --  131* 128*   K 4.0   < > 4.9  --  5.6* 4.7   CL 76*   < > 98  --  100 99   CO2 18*   < > 18*  --  20* 16*   BUN 30*   < > 29*  --  36* 35*   CREATININE 1.2*   < > 1.1* 1.0 1.2* 1.1*   LABGLOM 43   < > 48 53 43 48   GLUCOSE 193*   < > 145*  --  159* 102*   CALCIUM 8.2*   < > 8.7  --  9.0 9.1   ALT 9  --   --   --  23  --    AST 15  --   --   --  26  --    BILITOT 0.7  --   --   --  1.1  --    ALKPHOS 39  --   --   --  77  --     < > = values in this interval not displayed. Lab Results   Component Value Date    LABALBU 4.0 12/06/2020    LABALBU 2.6 (L) 12/05/2020    LABALBU 3.7 02/21/2019     Lab Results   Component Value Date    TSH 1.240 12/05/2020       Iron Studies  No results found for: IRON, TIBC, FERRITIN  No results found for: TBXFHILD94  No results found for: FOLATE    No results found for: VITD25  No results found for: PTH    No components found for: URIC    Lab Results   Component Value Date    COLORU Yellow 12/05/2020    LABPH 0 10/22/2019    NITRU Negative 12/05/2020    GLUCOSEU 500 12/05/2020    KETUA Negative 12/05/2020    UROBILINOGEN 0.2 12/05/2020    BILIRUBINUR Negative 12/05/2020       No results found for: Dean Youngblood      IMPRESSION/RECOMMENDATIONS:     1. Hyponatremia  Possibly due to hypovolemia with abd pain nausea  adh release with pain and nausea  fena <1  Sp L of saline in er  tsh 1.24  Hold lasix  initital na 110 likely an error drawn off iv  Na now 131 continue ivf     2. abd pain nausea  covid neg  abd imaging not revealing cause  Sp egd 12/7 showing gastritis and small hiatal hernia     3. htn  Follow on norvasc lopressor     4. afib  On bb     5. Chronic back pain  Per neuro          Aldo Paredes.  Barb Chaidez MD

## 2020-12-09 ENCOUNTER — CARE COORDINATION (OUTPATIENT)
Dept: CASE MANAGEMENT | Age: 80
End: 2020-12-09

## 2020-12-09 NOTE — CARE COORDINATION
[facility] Transitions Initial Follow Up Call    Call within 2 business days of discharge: Yes    Patient: Silvia Paris Patient : 1940   MRN: [id number]  Reason for Admission: Generalized abdominal pain  Discharge Date: 20 RARS: Readmission Risk Score: 25      Last Discharge [facility]       Complaint Diagnosis Description Type Department Provider    20 Abdominal Pain Generalized abdominal pain . .. ED to Hosp-Admission (Discharged) (ADMITTED) [facility] P[facility] Mark Hampton MD; Dorota Rivera Spoke with: Silvia Paris, patient    Facility: [facility]      Challenges to be reviewed by the provider   Additional needs identified to be addressed with provider No  none         Method of communication with provider : none    Was this a readmission? No  Patient stated reason for admission: abdominal pain, nausea    Care Transition Nurse (CTN) contacted the patient by telephone to perform post hospital discharge assessment. Verified name and  with patient as identifiers. Provided introduction to self, and explanation of the CTN role. CTN reviewed discharge instructions, medical action plan and red flags with patient who verbalized understanding. Patient given an opportunity to ask questions and does not have any further questions or concerns at this time. Were discharge instructions available to patient? Yes. Reviewed appropriate site of care based on symptoms and resources available to patient including: PCP and Specialist. The patient agrees to contact the PCP office for questions related to their healthcare. Discussed follow-up appointments. If no appointment was previously scheduled, appointment scheduling offered: Patient states she will call Dr. Shon Badillo office to schedule follow up. Is follow up appointment scheduled within 7 days of discharge?  Patient states she will call Dr. Shon Badillo office to schedule follow up  [id number] [address] follow up appointment(s): Patient states she will call Dr. Blayne Bowles office to schedule follow up    Non-face-to-face services provided:  Scheduled appointment with PCP-Patient states she will call Dr. Blayne Bowles office to schedule follow up  Scheduled appointment with Specialist-Patient to schedule appt with Dr. Kathryn maon per AVS  Obtained and reviewed discharge summary and/or continuity of care documents    Care Transitions 24 Hour Call    Do you have any ongoing symptoms?:  Yes  Patient-reported symptoms:  Abdominal Pain, Nausea  Do you have a copy of your discharge instructions?:  Yes  Do you have all of your prescriptions and are they filled?:  Yes  Have you been contacted by a Faheem Franco Pharmacist?:  No  Have you scheduled your follow up appointment?:  No  Were you discharged with any Home Care or Post Acute Services:  No  Post Acute Services:  [facility] [address] you feel like you have everything you need to keep you well at home?:  Yes  Care Transitions Interventions    Physical Therapy:  Declined         Spoke with patient for care transition call post hospital discharge. Med review not completed; 1111F not entered. Patient declined full med review. Patient verified Lasix has been discontinued. Patient presented to the emergency department on 12/5/20 for abdominal pain and nausea. Patient reports abdominal pain and nausea continue. Patient reports good bm. Patient reports she is eating a soft, bland diet. Patient denies s/s of infection, states temp 97.6. Patient denies chills. Patient reports she drinks a lot of water. Patient reports bilateral leg weakness. Patient declined home care. Patient reports fbs today 100 mg/dl. Patient states she tries to eat a low carb diet. Patient denies any further needs, questions, or concerns at this time. This CTN to sign off and resolve episode.         Follow Up  Future Appointments   Date Time Provider Delia Galindo   1/19/2021 10:00 AM MD CHRYSTAL Singh Althea Puri RN

## 2020-12-13 LAB
BLOOD CULTURE, ROUTINE: NORMAL
CULTURE, BLOOD 2: NORMAL

## 2020-12-30 ENCOUNTER — HOSPITAL ENCOUNTER (INPATIENT)
Age: 80
LOS: 5 days | Discharge: HOME | DRG: 602 | End: 2021-01-04
Attending: INTERNAL MEDICINE | Admitting: INTERNAL MEDICINE
Payer: MEDICARE

## 2020-12-30 PROBLEM — L03.115 CELLULITIS OF RIGHT LEG: Status: ACTIVE | Noted: 2020-12-30

## 2020-12-30 LAB — METER GLUCOSE: 179 MG/DL (ref 74–99)

## 2020-12-30 PROCEDURE — 1200000000 HC SEMI PRIVATE

## 2020-12-30 PROCEDURE — 82962 GLUCOSE BLOOD TEST: CPT

## 2020-12-30 RX ORDER — DEXTROSE MONOHYDRATE 50 MG/ML
100 INJECTION, SOLUTION INTRAVENOUS PRN
Status: DISCONTINUED | OUTPATIENT
Start: 2020-12-30 | End: 2021-01-04 | Stop reason: HOSPADM

## 2020-12-30 RX ORDER — DEXTROSE MONOHYDRATE 25 G/50ML
12.5 INJECTION, SOLUTION INTRAVENOUS PRN
Status: DISCONTINUED | OUTPATIENT
Start: 2020-12-30 | End: 2021-01-04 | Stop reason: HOSPADM

## 2020-12-30 RX ORDER — SODIUM CHLORIDE 0.9 % (FLUSH) 0.9 %
10 SYRINGE (ML) INJECTION EVERY 12 HOURS SCHEDULED
Status: DISCONTINUED | OUTPATIENT
Start: 2020-12-30 | End: 2021-01-04 | Stop reason: HOSPADM

## 2020-12-30 RX ORDER — TRAMADOL HYDROCHLORIDE 50 MG/1
50 TABLET ORAL EVERY 6 HOURS PRN
Status: DISCONTINUED | OUTPATIENT
Start: 2020-12-30 | End: 2021-01-04 | Stop reason: HOSPADM

## 2020-12-30 RX ORDER — PREGABALIN 50 MG/1
50 CAPSULE ORAL DAILY
Status: DISCONTINUED | OUTPATIENT
Start: 2020-12-31 | End: 2021-01-04 | Stop reason: HOSPADM

## 2020-12-30 RX ORDER — OXYBUTYNIN CHLORIDE 5 MG/1
5 TABLET ORAL 2 TIMES DAILY
Status: DISCONTINUED | OUTPATIENT
Start: 2020-12-30 | End: 2021-01-04 | Stop reason: HOSPADM

## 2020-12-30 RX ORDER — AMLODIPINE BESYLATE 5 MG/1
5 TABLET ORAL DAILY
Status: DISCONTINUED | OUTPATIENT
Start: 2020-12-31 | End: 2021-01-04 | Stop reason: HOSPADM

## 2020-12-30 RX ORDER — PANTOPRAZOLE SODIUM 40 MG/1
40 TABLET, DELAYED RELEASE ORAL
Status: DISCONTINUED | OUTPATIENT
Start: 2020-12-31 | End: 2021-01-04 | Stop reason: HOSPADM

## 2020-12-30 RX ORDER — FUROSEMIDE 10 MG/ML
20 INJECTION INTRAMUSCULAR; INTRAVENOUS EVERY 12 HOURS
Status: DISCONTINUED | OUTPATIENT
Start: 2020-12-30 | End: 2021-01-03

## 2020-12-30 RX ORDER — HYDROXYZINE HYDROCHLORIDE 10 MG/1
10 TABLET, FILM COATED ORAL NIGHTLY
Status: DISCONTINUED | OUTPATIENT
Start: 2020-12-30 | End: 2021-01-04 | Stop reason: HOSPADM

## 2020-12-30 RX ORDER — SODIUM CHLORIDE 0.9 % (FLUSH) 0.9 %
10 SYRINGE (ML) INJECTION PRN
Status: DISCONTINUED | OUTPATIENT
Start: 2020-12-30 | End: 2021-01-04 | Stop reason: HOSPADM

## 2020-12-30 RX ORDER — PRAMIPEXOLE DIHYDROCHLORIDE 0.25 MG/1
0.5 TABLET ORAL NIGHTLY
Status: DISCONTINUED | OUTPATIENT
Start: 2020-12-30 | End: 2021-01-04 | Stop reason: HOSPADM

## 2020-12-30 RX ORDER — DICYCLOMINE HYDROCHLORIDE 10 MG/1
10 CAPSULE ORAL 3 TIMES DAILY PRN
Status: DISCONTINUED | OUTPATIENT
Start: 2020-12-30 | End: 2021-01-04 | Stop reason: HOSPADM

## 2020-12-30 RX ORDER — ACETAMINOPHEN 650 MG/1
650 SUPPOSITORY RECTAL EVERY 6 HOURS PRN
Status: DISCONTINUED | OUTPATIENT
Start: 2020-12-30 | End: 2021-01-04 | Stop reason: HOSPADM

## 2020-12-30 RX ORDER — DULOXETIN HYDROCHLORIDE 20 MG/1
20 CAPSULE, DELAYED RELEASE ORAL DAILY
Status: DISCONTINUED | OUTPATIENT
Start: 2020-12-31 | End: 2021-01-04 | Stop reason: HOSPADM

## 2020-12-30 RX ORDER — SPIRONOLACTONE 25 MG/1
25 TABLET ORAL DAILY
Status: ON HOLD | COMMUNITY
End: 2021-01-04 | Stop reason: HOSPADM

## 2020-12-30 RX ORDER — PROMETHAZINE HYDROCHLORIDE 25 MG/1
12.5 TABLET ORAL EVERY 6 HOURS PRN
Status: DISCONTINUED | OUTPATIENT
Start: 2020-12-30 | End: 2021-01-04 | Stop reason: HOSPADM

## 2020-12-30 RX ORDER — ACETAMINOPHEN 325 MG/1
650 TABLET ORAL EVERY 6 HOURS PRN
Status: DISCONTINUED | OUTPATIENT
Start: 2020-12-30 | End: 2021-01-04 | Stop reason: HOSPADM

## 2020-12-30 RX ORDER — POTASSIUM CHLORIDE 750 MG/1
10 TABLET, EXTENDED RELEASE ORAL 2 TIMES DAILY
Status: DISCONTINUED | OUTPATIENT
Start: 2020-12-30 | End: 2021-01-04 | Stop reason: HOSPADM

## 2020-12-30 RX ORDER — NICOTINE POLACRILEX 4 MG
15 LOZENGE BUCCAL PRN
Status: DISCONTINUED | OUTPATIENT
Start: 2020-12-30 | End: 2021-01-04 | Stop reason: HOSPADM

## 2020-12-30 RX ORDER — ONDANSETRON 2 MG/ML
4 INJECTION INTRAMUSCULAR; INTRAVENOUS EVERY 6 HOURS PRN
Status: DISCONTINUED | OUTPATIENT
Start: 2020-12-30 | End: 2021-01-04 | Stop reason: HOSPADM

## 2020-12-30 RX ORDER — POLYETHYLENE GLYCOL 3350 17 G/17G
17 POWDER, FOR SOLUTION ORAL DAILY PRN
Status: DISCONTINUED | OUTPATIENT
Start: 2020-12-30 | End: 2021-01-04 | Stop reason: HOSPADM

## 2020-12-31 ENCOUNTER — APPOINTMENT (OUTPATIENT)
Dept: ULTRASOUND IMAGING | Age: 80
DRG: 602 | End: 2020-12-31
Attending: INTERNAL MEDICINE
Payer: MEDICARE

## 2020-12-31 LAB
ALBUMIN SERPL-MCNC: 4.1 G/DL (ref 3.5–5.2)
ALP BLD-CCNC: 83 U/L (ref 35–104)
ALT SERPL-CCNC: 15 U/L (ref 0–32)
AMMONIA: 24 UMOL/L (ref 11–51)
ANION GAP SERPL CALCULATED.3IONS-SCNC: 11 MMOL/L (ref 7–16)
ANION GAP SERPL CALCULATED.3IONS-SCNC: 14 MMOL/L (ref 7–16)
AST SERPL-CCNC: 18 U/L (ref 0–31)
BASOPHILS ABSOLUTE: 0.04 E9/L (ref 0–0.2)
BASOPHILS RELATIVE PERCENT: 0.4 % (ref 0–2)
BILIRUB SERPL-MCNC: 0.9 MG/DL (ref 0–1.2)
BUN BLDV-MCNC: 10 MG/DL (ref 8–23)
BUN BLDV-MCNC: 11 MG/DL (ref 8–23)
C-REACTIVE PROTEIN: 3.7 MG/DL (ref 0–0.4)
CALCIUM SERPL-MCNC: 8.9 MG/DL (ref 8.6–10.2)
CALCIUM SERPL-MCNC: 9.1 MG/DL (ref 8.6–10.2)
CHLORIDE BLD-SCNC: 97 MMOL/L (ref 98–107)
CHLORIDE BLD-SCNC: 98 MMOL/L (ref 98–107)
CO2: 26 MMOL/L (ref 22–29)
CO2: 28 MMOL/L (ref 22–29)
CREAT SERPL-MCNC: 0.8 MG/DL (ref 0.5–1)
CREAT SERPL-MCNC: 0.9 MG/DL (ref 0.5–1)
EOSINOPHILS ABSOLUTE: 0.41 E9/L (ref 0.05–0.5)
EOSINOPHILS RELATIVE PERCENT: 3.6 % (ref 0–6)
GFR AFRICAN AMERICAN: >60
GFR AFRICAN AMERICAN: >60
GFR NON-AFRICAN AMERICAN: >60 ML/MIN/1.73
GFR NON-AFRICAN AMERICAN: >60 ML/MIN/1.73
GLUCOSE BLD-MCNC: 107 MG/DL (ref 74–99)
GLUCOSE BLD-MCNC: 77 MG/DL (ref 74–99)
HCT VFR BLD CALC: 28.9 % (ref 34–48)
HEMOGLOBIN: 8.5 G/DL (ref 11.5–15.5)
IMMATURE GRANULOCYTES #: 0.06 E9/L
IMMATURE GRANULOCYTES %: 0.5 % (ref 0–5)
INR BLD: 1.9
LACTIC ACID: 1.5 MMOL/L (ref 0.5–2.2)
LYMPHOCYTES ABSOLUTE: 2.22 E9/L (ref 1.5–4)
LYMPHOCYTES RELATIVE PERCENT: 19.5 % (ref 20–42)
MCH RBC QN AUTO: 23.4 PG (ref 26–35)
MCHC RBC AUTO-ENTMCNC: 29.4 % (ref 32–34.5)
MCV RBC AUTO: 79.6 FL (ref 80–99.9)
METER GLUCOSE: 132 MG/DL (ref 74–99)
METER GLUCOSE: 162 MG/DL (ref 74–99)
METER GLUCOSE: 206 MG/DL (ref 74–99)
METER GLUCOSE: 84 MG/DL (ref 74–99)
MONOCYTES ABSOLUTE: 0.69 E9/L (ref 0.1–0.95)
MONOCYTES RELATIVE PERCENT: 6 % (ref 2–12)
NEUTROPHILS ABSOLUTE: 7.99 E9/L (ref 1.8–7.3)
NEUTROPHILS RELATIVE PERCENT: 70 % (ref 43–80)
PDW BLD-RTO: 17.9 FL (ref 11.5–15)
PLATELET # BLD: 215 E9/L (ref 130–450)
PMV BLD AUTO: 10.1 FL (ref 7–12)
POTASSIUM REFLEX MAGNESIUM: 3.6 MMOL/L (ref 3.5–5)
POTASSIUM REFLEX MAGNESIUM: 4 MMOL/L (ref 3.5–5)
PROCALCITONIN: 0.08 NG/ML (ref 0–0.08)
PROTHROMBIN TIME: 21.1 SEC (ref 9.3–12.4)
RBC # BLD: 3.63 E12/L (ref 3.5–5.5)
SARS-COV-2, NAAT: NOT DETECTED
SEDIMENTATION RATE, ERYTHROCYTE: 47 MM/HR (ref 0–20)
SODIUM BLD-SCNC: 136 MMOL/L (ref 132–146)
SODIUM BLD-SCNC: 138 MMOL/L (ref 132–146)
TOTAL PROTEIN: 7.3 G/DL (ref 6.4–8.3)
WBC # BLD: 11.4 E9/L (ref 4.5–11.5)

## 2020-12-31 PROCEDURE — 85025 COMPLETE CBC W/AUTO DIFF WBC: CPT

## 2020-12-31 PROCEDURE — 6370000000 HC RX 637 (ALT 250 FOR IP): Performed by: INTERNAL MEDICINE

## 2020-12-31 PROCEDURE — 2500000003 HC RX 250 WO HCPCS: Performed by: SPECIALIST

## 2020-12-31 PROCEDURE — 87040 BLOOD CULTURE FOR BACTERIA: CPT

## 2020-12-31 PROCEDURE — 82140 ASSAY OF AMMONIA: CPT

## 2020-12-31 PROCEDURE — 6370000000 HC RX 637 (ALT 250 FOR IP): Performed by: SPECIALIST

## 2020-12-31 PROCEDURE — 36415 COLL VENOUS BLD VENIPUNCTURE: CPT

## 2020-12-31 PROCEDURE — 76705 ECHO EXAM OF ABDOMEN: CPT

## 2020-12-31 PROCEDURE — 85651 RBC SED RATE NONAUTOMATED: CPT

## 2020-12-31 PROCEDURE — 83605 ASSAY OF LACTIC ACID: CPT

## 2020-12-31 PROCEDURE — U0002 COVID-19 LAB TEST NON-CDC: HCPCS

## 2020-12-31 PROCEDURE — 1200000000 HC SEMI PRIVATE

## 2020-12-31 PROCEDURE — 82962 GLUCOSE BLOOD TEST: CPT

## 2020-12-31 PROCEDURE — 6360000002 HC RX W HCPCS: Performed by: SPECIALIST

## 2020-12-31 PROCEDURE — 80053 COMPREHEN METABOLIC PANEL: CPT

## 2020-12-31 PROCEDURE — 80048 BASIC METABOLIC PNL TOTAL CA: CPT

## 2020-12-31 PROCEDURE — 84145 PROCALCITONIN (PCT): CPT

## 2020-12-31 PROCEDURE — 76705 ECHO EXAM OF ABDOMEN: CPT | Performed by: RADIOLOGY

## 2020-12-31 PROCEDURE — 2580000003 HC RX 258: Performed by: INTERNAL MEDICINE

## 2020-12-31 PROCEDURE — 6360000002 HC RX W HCPCS: Performed by: INTERNAL MEDICINE

## 2020-12-31 PROCEDURE — 86140 C-REACTIVE PROTEIN: CPT

## 2020-12-31 PROCEDURE — 85610 PROTHROMBIN TIME: CPT

## 2020-12-31 RX ORDER — TERBINAFINE HYDROCHLORIDE 250 MG/1
250 TABLET ORAL DAILY
Status: DISCONTINUED | OUTPATIENT
Start: 2020-12-31 | End: 2021-01-04 | Stop reason: HOSPADM

## 2020-12-31 RX ORDER — METOLAZONE 5 MG/1
5 TABLET ORAL DAILY
Status: DISCONTINUED | OUTPATIENT
Start: 2020-12-31 | End: 2021-01-01

## 2020-12-31 RX ORDER — HYDROCODONE BITARTRATE AND ACETAMINOPHEN 7.5; 325 MG/1; MG/1
1 TABLET ORAL EVERY 6 HOURS PRN
Status: DISCONTINUED | OUTPATIENT
Start: 2020-12-31 | End: 2021-01-04 | Stop reason: HOSPADM

## 2020-12-31 RX ADMIN — HYDROXYZINE HYDROCHLORIDE 10 MG: 10 TABLET, FILM COATED ORAL at 20:07

## 2020-12-31 RX ADMIN — AMLODIPINE BESYLATE 5 MG: 5 TABLET ORAL at 09:10

## 2020-12-31 RX ADMIN — HYDROXYZINE HYDROCHLORIDE 10 MG: 10 TABLET, FILM COATED ORAL at 00:40

## 2020-12-31 RX ADMIN — OXYBUTYNIN CHLORIDE 5 MG: 5 TABLET ORAL at 09:10

## 2020-12-31 RX ADMIN — METOLAZONE 5 MG: 5 TABLET ORAL at 20:09

## 2020-12-31 RX ADMIN — SODIUM CHLORIDE, PRESERVATIVE FREE 10 ML: 5 INJECTION INTRAVENOUS at 09:12

## 2020-12-31 RX ADMIN — Medication 2 G: at 23:58

## 2020-12-31 RX ADMIN — Medication 2 G: at 16:48

## 2020-12-31 RX ADMIN — PANTOPRAZOLE SODIUM 40 MG: 40 TABLET, DELAYED RELEASE ORAL at 16:48

## 2020-12-31 RX ADMIN — FUROSEMIDE 20 MG: 10 INJECTION, SOLUTION INTRAMUSCULAR; INTRAVENOUS at 09:11

## 2020-12-31 RX ADMIN — SODIUM CHLORIDE, PRESERVATIVE FREE 10 ML: 5 INJECTION INTRAVENOUS at 20:07

## 2020-12-31 RX ADMIN — PRAMIPEXOLE DIHYDROCHLORIDE 0.5 MG: 0.25 TABLET ORAL at 20:02

## 2020-12-31 RX ADMIN — METOPROLOL TARTRATE 25 MG: 25 TABLET, FILM COATED ORAL at 20:07

## 2020-12-31 RX ADMIN — OXYBUTYNIN CHLORIDE 5 MG: 5 TABLET ORAL at 20:01

## 2020-12-31 RX ADMIN — SODIUM CHLORIDE, PRESERVATIVE FREE 10 ML: 5 INJECTION INTRAVENOUS at 16:48

## 2020-12-31 RX ADMIN — TRAMADOL HYDROCHLORIDE 50 MG: 50 TABLET, FILM COATED ORAL at 22:13

## 2020-12-31 RX ADMIN — Medication 2 G: at 09:10

## 2020-12-31 RX ADMIN — SODIUM CHLORIDE, PRESERVATIVE FREE 10 ML: 5 INJECTION INTRAVENOUS at 00:43

## 2020-12-31 RX ADMIN — CEFAZOLIN 1 G: 1 INJECTION, POWDER, FOR SOLUTION INTRAMUSCULAR; INTRAVENOUS at 00:44

## 2020-12-31 RX ADMIN — TERBINAFINE 250 MG: 250 TABLET ORAL at 16:47

## 2020-12-31 RX ADMIN — TRAMADOL HYDROCHLORIDE 50 MG: 50 TABLET, FILM COATED ORAL at 15:30

## 2020-12-31 RX ADMIN — TRAMADOL HYDROCHLORIDE 50 MG: 50 TABLET, FILM COATED ORAL at 00:40

## 2020-12-31 RX ADMIN — MICONAZOLE NITRATE 5 G: 20.6 POWDER TOPICAL at 20:02

## 2020-12-31 RX ADMIN — METOPROLOL TARTRATE 25 MG: 25 TABLET, FILM COATED ORAL at 09:12

## 2020-12-31 RX ADMIN — FUROSEMIDE 20 MG: 10 INJECTION, SOLUTION INTRAMUSCULAR; INTRAVENOUS at 20:08

## 2020-12-31 RX ADMIN — HYDROCODONE BITARTRATE AND ACETAMINOPHEN 1 TABLET: 7.5; 325 TABLET ORAL at 20:12

## 2020-12-31 RX ADMIN — ACETAMINOPHEN 650 MG: 325 TABLET, FILM COATED ORAL at 04:40

## 2020-12-31 RX ADMIN — TRAMADOL HYDROCHLORIDE 50 MG: 50 TABLET, FILM COATED ORAL at 07:49

## 2020-12-31 RX ADMIN — POTASSIUM CHLORIDE 10 MEQ: 750 TABLET, EXTENDED RELEASE ORAL at 20:07

## 2020-12-31 RX ADMIN — POTASSIUM CHLORIDE 10 MEQ: 750 TABLET, EXTENDED RELEASE ORAL at 00:40

## 2020-12-31 RX ADMIN — PRAMIPEXOLE DIHYDROCHLORIDE 0.5 MG: 0.25 TABLET ORAL at 00:41

## 2020-12-31 RX ADMIN — RIVAROXABAN 20 MG: 20 TABLET, FILM COATED ORAL at 22:13

## 2020-12-31 RX ADMIN — ACETAMINOPHEN 650 MG: 325 TABLET, FILM COATED ORAL at 11:36

## 2020-12-31 RX ADMIN — FUROSEMIDE 20 MG: 10 INJECTION, SOLUTION INTRAMUSCULAR; INTRAVENOUS at 00:41

## 2020-12-31 ASSESSMENT — PAIN DESCRIPTION - DESCRIPTORS
DESCRIPTORS: BURNING;SHARP;STABBING
DESCRIPTORS: BURNING;SHARP;STABBING
DESCRIPTORS: ACHING;DISCOMFORT;SORE
DESCRIPTORS: BURNING;SHOOTING;STABBING

## 2020-12-31 ASSESSMENT — PAIN SCALES - GENERAL
PAINLEVEL_OUTOF10: 8
PAINLEVEL_OUTOF10: 0
PAINLEVEL_OUTOF10: 7
PAINLEVEL_OUTOF10: 7

## 2020-12-31 ASSESSMENT — PAIN DESCRIPTION - ONSET
ONSET: ON-GOING
ONSET: ON-GOING

## 2020-12-31 ASSESSMENT — PAIN DESCRIPTION - FREQUENCY
FREQUENCY: CONTINUOUS
FREQUENCY: CONTINUOUS

## 2020-12-31 ASSESSMENT — PAIN DESCRIPTION - ORIENTATION
ORIENTATION: RIGHT
ORIENTATION: RIGHT

## 2020-12-31 ASSESSMENT — PAIN DESCRIPTION - LOCATION
LOCATION: LEG

## 2020-12-31 ASSESSMENT — PAIN - FUNCTIONAL ASSESSMENT: PAIN_FUNCTIONAL_ASSESSMENT: PREVENTS OR INTERFERES SOME ACTIVE ACTIVITIES AND ADLS

## 2020-12-31 ASSESSMENT — PAIN DESCRIPTION - PAIN TYPE
TYPE: ACUTE PAIN
TYPE: ACUTE PAIN

## 2020-12-31 NOTE — CONSULTS
Nephrology Consult Note  Patient's Name: Travis Page  4:37 PM  12/31/2020        Reason for Consult:  Volume overload  Requesting Physician:  Jarek Pond MD    Chief Complaint: Generalized edema; redness right leg  History Obtained From:  Patient; EHR    History of Present Ilness:    Travis Page is a [de-identified] y.o. female with a history of: CVA, hypertension, hyperlipidemia, type 2 diabetes mellitus and other medical problems. She was a direct admit from her PCP office for progressive generalized edema and redness of her right lower extremity. Patient states that that she has observed at least a 10 to 12 pound weight gain over the course of last 2 weeks. She has been on diuretics had had been compliant with her diuretics. This was adjusted as an outpatient however her generalized edema continued. She denies any fever or chills. No trauma to her right leg. Lab data significant for normal kidney function, albumin of 4.1, WBC 11.4 and a hemoglobin of 8.5 . Renal is consulted for generalized edema and to assist with diuretic management. Patient was seen by our service when she presented about 3 weeks ago with hyponatremia.     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    Anemia     Arm numbness     Arthritis     Blood transfusion     Cervical spinal stenosis     Cirrhosis of liver (HCC)     Diabetes mellitus (HCC)     Fatty liver     GERD (gastroesophageal reflux disease)     Hyperlipidemia     Hypertension     Low back pain     Lumbar stenosis     Neck pain     Obesity (BMI 30.0-34.9) 10/14/2012    PAF (paroxysmal atrial fibrillation) (HCC)     Renal artery aneurysm ([facility] 7/15/2015       Past Surgical History:   Procedure Laterality Date    APPENDECTOMY      BACK SURGERY      BREAST SURGERY      reduction    CARPAL TUNNEL RELEASE      CATARACT REMOVAL  10/2016    CERVICAL DISCECTOMY  01/19/2007    ACDF C3-4, C4-5, C5-6 Dr. Yahir Vázquez  CHOLECYSTECTOMY      FRACTURE SURGERY      left lower leg    HEMICOLECTOMY N/A 2/19/2019    DIAGNOSTIC LAPAROSCOPY, LYSIS OF ADHESIONS, OPEN RIGHT HEMICOLECTOMY performed by Gulshan Hogan MD at [address] [place] [address]  05/17/2017    [facility]. Dr.Joseph Irma Sparks UPPER GASTROINTESTINAL ENDOSCOPY N/A 11/5/2018    EGD BIOPSY performed by Diana Anders MD at [address] [facility] N/A 12/7/2020    EGD BIOPSY performed by Jones Wolf MD at [address] [facility]  12/7/2020    EGD CONTROL HEMORRHAGE performed by Jones Wolf MD at [place] [address] History   Problem Relation Age of Onset    Diabetes Mother     Stroke Father     Diabetes Sister     High Blood Pressure Sister     Diabetes Brother     High Blood Pressure Brother     Cancer Brother     Heart Disease Brother         reports that she quit smoking about 23 years ago. She has a 30.00 pack-year smoking history. She has never used smokeless tobacco. She reports current alcohol use. She reports that she does not use drugs.     Allergies:  Benadryl [diphenhydramine], Fish-derived products, and Iodine    Current Medications:        ceFAZolin (ANCEF) 2 g in sterile water 20 mL IV syringe, Q8H      perflutren lipid microspheres (DEFINITY) injection 1.65 mg, ONCE PRN      miconazole (MICOTIN) 2 % powder 5 g, BID      terbinafine (LAMISIL) tablet 250 mg, Daily      amLODIPine (NORVASC) tablet 5 mg, Daily      dicyclomine (BENTYL) capsule 10 mg, TID PRN      DULoxetine (CYMBALTA) extended release capsule 20 mg, Daily      empagliflozin (JARDIANCE) tablet TABS 10 mg, Daily      hydrOXYzine (ATARAX) tablet 10 mg, Nightly      metoprolol tartrate (LOPRESSOR) tablet 25 mg, BID      oxybutynin (DITROPAN) tablet 5 mg, BID      pantoprazole (PROTONIX) tablet 40 mg, BID AC      potassium chloride (KLOR-CON M) extended release tablet 10 mEq, BID   pramipexole (MIRAPEX) tablet 0.5 mg, Nightly      pregabalin (LYRICA) capsule 50 mg, Daily      traMADol (ULTRAM) tablet 50 mg, Q6H PRN      insulin lispro (HUMALOG) injection vial 0-6 Units, TID WC      insulin lispro (HUMALOG) injection vial 0-3 Units, Nightly      sodium chloride flush 0.9 % injection 10 mL, 2 times per day      sodium chloride flush 0.9 % injection 10 mL, PRN      promethazine (PHENERGAN) tablet 12.5 mg, Q6H PRN    Or      ondansetron (ZOFRAN) injection 4 mg, Q6H PRN      polyethylene glycol (GLYCOLAX) packet 17 g, Daily PRN      acetaminophen (TYLENOL) tablet 650 mg, Q6H PRN    Or      acetaminophen (TYLENOL) suppository 650 mg, Q6H PRN      furosemide (LASIX) injection 20 mg, Q12H      glucose (GLUTOSE) 40 % oral gel 15 g, PRN      dextrose 50 % IV solution, PRN      glucagon (rDNA) injection 1 mg, PRN      dextrose 5 % solution, PRN        Review of Systems:   Pertinent items are noted in HPI. Physical exam:  Vitals:    12/31/20 1523   BP: (!) 126/58   Pulse: 96   Resp: 18   Temp: 97.3 °F (36.3 °C)   SpO2: 93%           General: No distress. Alert. Eyes: PERRL. No sclera icterus. No conjunctival injection. ENT: No discharge. Pharynx clear. Neck: Trachea midline. Normal thyroid. Lungs: No accessory muscle use. No crackles. No wheezing. No rhonchi. CV: Regular rate. Regular rhythm. No murmur or rub. .   Abd: Non-tender. Non-distended. No masses. No organmegaly. Normal bowel sounds. Skin: Warm and dry. No nodule on exposed extremities. No rash on exposed extremities. Ext: No cyanosis, clubbing, 3+ pitting bilateral legs edema; R leg warm red tender swollen  Neuro: Awake. Follows commands. Positive pupils/gag/corneals. Normal pain response.       Data:   Labs:  Lab Results   Component Value Date     12/31/2020     12/31/2020     (L) 12/06/2020    K 3.6 12/31/2020    K 4.0 12/31/2020    K 4.7 12/06/2020    CL 97 (L) 12/31/2020    CO2 28 12/31/2020 CO2 26 2020    CO2 16 (L) 2020    CREATININE 0.9 2020    CREATININE 0.8 2020    CREATININE 1.1 (H) 2020    BUN 10 2020    BUN 11 2020    BUN 35 (H) 2020    GLUCOSE 107 (H) 2020    GLUCOSE 77 2020    GLUCOSE 102 (H) 2020    PHOS 1.6 (L) 2019    WBC 11.4 2020    WBC 10.5 2020    WBC 16.5 (H) 2020    HGB 8.5 (L) 2020    HGB 9.2 (L) 2020    HGB 9.7 (L) 2020    HCT 28.9 (L) 2020    HCT 31.7 (L) 2020    HCT 33.7 (L) 2020    MCV 79.6 (L) 2020     2020         Imaging:  Us Abdomen Limited    Result Date: 2020  Patient MRN:  [id number] : 1940 Age: [de-identified] years Gender: Female Order Date:  2020 10:42 AM EXAM: US ABDOMEN LIMITED NUMBER OF IMAGES:  16 INDICATION:  ascites check , dr Rowe to read ascites check , dr Rowe to read What reading provider will be dictating this exam?->[facility] COMPARISON: None Four-quadrant ultrasound was performed demonstrating no ascites     unremarkable study. No ascites seen, not on the fluid identified for paracentesis . Assessment /Plans    1. Volume overload demonstrated by significant peripheral edema; etiology not entirely clear liver and renal functions within normal limits. No hypoalbuminemia; possible exacerbation of HFpEF  -Diuresis; she appears to be responding to current diuretic regimen will continue same  -Fluid and sodium restriction   -doppler US of both legs    2. Right lower extremity cellulitis  -Currently on cefazolin  -ID following    3.   Anemia due to chronic disease  -Check iron stores and monitor     Will follow  Thanks    Sean Silvestre MD  4:37 PM  2020

## 2020-12-31 NOTE — PROGRESS NOTES
Tubi  obtained for patient for bilateral lower extremities per physician order. Size F for left lower extremity and size E for right lower extremity. Bedside nurse to apply.

## 2020-12-31 NOTE — H&P
Department of Internal Medicine  Dr. Darline Cameron History and Physical      CHIEF COMPLAINT:  swelling    Reason for Admission:  Anasarca, cellulitis      HISTORY OF PRESENT ILLNESS:      The patient is a [de-identified] y.o. female with  who presents with the above problems. SHE SAW DR SOARES ON 12/21 FOR SIMILAR REASONS AND HE DID US OF RLE WHICH WAS NEG FOR DVT AND INCREASED HER DIURETIC AND ADDED ALDACTONE, BUT SHE RETURNED 10 DAYS LATER HAVING GAINED APROX 10 LBS. WITH A RIGHT SEEPING LEG THAT WAS HOT, RED AND SORE. SHE DENIED FEVER. SHE ALSO C/O OF INCREASING ABD GIRTH. SHE HAS KNOWN AMES. SHE WAS IN [address] [place] [address] THE MONTH AND CT SHOWED MODERATE AMT OF ASCITES ON 12/6. DECISION TO ADMIT HER HFOR RIGHT LEG CELLULITIS AND ANASARCA WAS MADE AS OUTPT THERAPY HAD FAILED. SHE NEEDS THERAPEUTIC PARACENTESIS, ONCE ELIQUIS IS ON HOLD FOR 48 HRS. Past Medical History:        Diagnosis Date    Adenocarcinoma of [facility]ecum ([facility] [address] 01/2019    Anemia     Arm numbness     Arthritis     Blood transfusion     Cervical spinal stenosis     Cirrhosis of liver (HCC)     Diabetes mellitus (HCC)     Fatty liver     GERD (gastroesophageal reflux disease)     Hyperlipidemia     Hypertension     Low back pain     Lumbar stenosis     Neck pain     Obesity (BMI 30.0-34.9) 10/14/2012    PAF (paroxysmal atrial fibrillation) (HCC)     Renal artery aneurysm ([facility] [address] 7/15/2015     Past Surgical History:        Procedure Laterality Date    APPENDECTOMY      BACK SURGERY      BREAST SURGERY      reduction    CARPAL TUNNEL RELEASE      CATARACT REMOVAL  10/2016    CERVICAL DISCECTOMY  01/19/2007    ACDF C3-4, C4-5, C5-6 Dr. Radha Tyson      left lower leg    HEMICOLECTOMY N/A 2/19/2019    DIAGNOSTIC LAPAROSCOPY, LYSIS OF ADHESIONS, OPEN RIGHT HEMICOLECTOMY performed by Mei Saxena MD at [address] [place] [address]  05/17/2017    [facility].  Altagarcia Aviles  UPPER GASTROINTESTINAL ENDOSCOPY N/A 11/5/2018    EGD BIOPSY performed by Amaury Salazar MD at [address] 12/7/2020    EGD BIOPSY performed by Tania Solomon MD at [address] [facility] [facility]  12/7/2020    EGD CONTROL HEMORRHAGE performed by Tania Solomon MD at [address]       Medications Prior to Admission:    Medications Prior to Admission: spironolactone (ALDACTONE) 25 MG tablet, Take 25 mg by mouth daily  glimepiride (AMARYL) 2 MG tablet, Take 2 mg by mouth every morning (before breakfast)   pregabalin (LYRICA) 50 MG capsule, Take 50 mg by mouth daily. traMADol (ULTRAM) 50 MG tablet, Take 1 tablet by mouth every 8 hours as needed for Pain for up to 60 days.  Take lowest dose possible to manage pain  empagliflozin (JARDIANCE) 10 MG tablet, Take 1 tablet by mouth daily  DULoxetine (CYMBALTA) 30 MG extended release capsule, Take 1 capsule by mouth daily  pramipexole (MIRAPEX) 0.25 MG tablet, TAKE 5 TABLETS BY MOUTH  NIGHTLY  oxybutynin (DITROPAN) 5 MG tablet, TAKE 1 TABLET BY MOUTH  DAILY WITH BREAKFAST  hydrOXYzine (ATARAX) 10 MG tablet, Take 1 tablet by mouth nightly  simvastatin (ZOCOR) 20 MG tablet, TAKE 1 TABLET BY MOUTH  DAILY WITH SUPPER  amLODIPine (NORVASC) 10 MG tablet, TAKE 1 TABLET BY MOUTH  DAILY  pantoprazole (PROTONIX) 40 MG tablet, TAKE ONE TABLET BY MOUTH TWO TIMES A DAY BEFORE MEALS  metoprolol tartrate (LOPRESSOR) 25 MG tablet, Take 1 tablet by mouth 2 times daily  rivaroxaban (XARELTO) 20 MG TABS tablet, Take 1 tablet by mouth daily (with breakfast)  potassium chloride (KLOR-CON M) 10 MEQ extended release tablet, Take 1 tablet by mouth 2 times daily  metFORMIN (GLUCOPHAGE) 500 MG tablet, TAKE 2 TABLETS BY MOUTH  TWICE A DAY (Patient taking differently: Take 1,000 mg by mouth daily (with breakfast) Take 2 tablets by mouth  twice a day) dicyclomine (BENTYL) 10 MG capsule, TAKE 1 CAPSULE BY MOUTH  DAILY AS NEEDED    Allergies:  Benadryl [diphenhydramine], Fish-derived products, and Iodine    Social History:   TOBACCO:   reports that she quit smoking about 23 years ago. She has a 30.00 pack-year smoking history. She has never used smokeless tobacco.    Family History:       Problem Relation Age of Onset    Diabetes Mother     Stroke Father     Diabetes Sister     High Blood Pressure Sister     Diabetes Brother     High Blood Pressure Brother     Cancer Brother     Heart Disease Brother      REVIEW OF SYSTEMS:  CONSTITUTIONAL:  positive for  fatigue, malaise and anorexia  EYES:  negative  HEENT:  negative  RESPIRATORY:  positive for  dyspnea  CARDIOVASCULAR:  negative  GASTROINTESTINAL:  positive for nausea, abdominal pain and abdominal distention  GENITOURINARY:  negative  INTEGUMENT/BREAST:  positive for skin color change and REDNESS PAIN IN R. LEG  HEMATOLOGIC/LYMPHATIC:  negative  ALLERGIC/IMMUNOLOGIC:  negative  ENDOCRINE:  negative  MUSCULOSKELETAL:  positive for  myalgias, arthralgias, pain and muscle weakness  NEUROLOGICAL:  negative  PHYSICAL EXAM:    Vitals:  BP (!) 125/59   Pulse 98   Temp 97.9 °F (36.6 °C) (Temporal)   Resp 18   Wt 217 lb 8 oz (98.7 kg)   LMP  (LMP Unknown)   SpO2 93%   BMI 38.53 kg/m²     CONSTITUTIONAL:  awake, alert, cooperative, no apparent distress, and appears stated age  EYES:  Lids and lashes normal, pupils equal, round and reactive to light, extra ocular muscles intact, sclera clear, conjunctiva normal  ENT:  Normocephalic, without obvious abnormality, atraumatic, sinuses nontender on palpation, external ears without lesions, oral pharynx with moist mucus membranes, tonsils without erythema or exudates, gums normal and good dentition.   NECK:  Supple, symmetrical, trachea midline, no adenopathy, thyroid symmetric, not enlarged and no tenderness, skin normal HEMATOLOGIC/LYMPHATICS:  no cervical lymphadenopathy  BACK:  Symmetric, no curvature, spinous processes are non-tender on palpation, paraspinous muscles are non-tender on palpation, no costal vertebral tenderness  LUNGS:  No increased work of breathing, good air exchange, clear to auscultation bilaterally, no crackles or wheezing  CARDIOVASCULAR:  IRREG  ABDOMEN:  ascites absent  MUSCULOSKELETAL:  RIGHT LEG RED, HOT SWOLLEN   NEUROLOGIC:  Awake, alert, oriented to name, place and time. Cranial nerves II-XII are grossly intact. Motor is 5 out of 5 bilaterally. Cerebellar finger to nose, heel to shin intact. Sensory is intact.   Babinski down going, Romberg negative, and gait is normal.  SKIN:  no bruising or bleeding    DATA:  CBC:   Lab Results   Component Value Date    WBC 11.4 12/31/2020    RBC 3.63 12/31/2020    HGB 8.5 12/31/2020    HCT 28.9 12/31/2020    MCV 79.6 12/31/2020    MCH 23.4 12/31/2020    MCHC 29.4 12/31/2020    RDW 17.9 12/31/2020     12/31/2020    MPV 10.1 12/31/2020     CMP:    Lab Results   Component Value Date     12/31/2020    K 4.0 12/31/2020    CL 98 12/31/2020    CO2 26 12/31/2020    BUN 11 12/31/2020    CREATININE 0.8 12/31/2020    GFRAA >60 12/31/2020    LABGLOM >60 12/31/2020    GLUCOSE 77 12/31/2020    PROT 7.3 12/31/2020    LABALBU 4.1 12/31/2020    CALCIUM 8.9 12/31/2020    BILITOT 0.9 12/31/2020    ALKPHOS 83 12/31/2020    AST 18 12/31/2020    ALT 15 12/31/2020     LDH:  No results found for: LDH  Warfarin PT/INR:  No components found for: PTPATWAR, PTINRWAR  Last 3 Troponin:    Lab Results   Component Value Date    TROPONINI 0.03 12/05/2020    TROPONINI 0.01 12/28/2017    TROPONINI <0.01 12/28/2017     ABG:  No results found for: PH, PCO2, PO2, HCO3, BE, THGB, TCO2, O2SAT  HgBA1c:    Lab Results   Component Value Date    LABA1C 7.4 03/19/2020     TSH:    Lab Results   Component Value Date    TSH 1.240 12/05/2020     VITAMIN B12: No components found for: B12 FOLATE:  No results found for: FOLATE  IRON:  No results found for: IRON  Iron Saturation:  No components found for: PERCENTFE  TIBC:  No results found for: TIBC  FERRITIN:  No results found for: FERRITIN  RPR:  No results found for: RPR  KIRAN:  No results found for: ANATITER, KIRAN  AMYLASE:    Lab Results   Component Value Date    AMYLASE 40 06/19/2015     LIPASE:    Lab Results   Component Value Date    LIPASE 7 12/05/2020       IMAGING    Ct Abdomen Pelvis W Iv Contrast Additional Contrast? None    Result Date: 12/5/2020  EXAMINATION: CT OF THE ABDOMEN AND PELVIS WITH CONTRAST 12/5/2020 2:40 pm TECHNIQUE: CT of the abdomen and pelvis was performed with the administration of intravenous contrast. Multiplanar reformatted images are provided for review. Dose modulation, iterative reconstruction, and/or weight based adjustment of the mA/kV was utilized to reduce the radiation dose to as low as reasonably achievable. COMPARISON: February 27, 2019 HISTORY: ORDERING SYSTEM PROVIDED HISTORY: epigastric pain TECHNOLOGIST PROVIDED HISTORY: Additional Contrast?->None Reason for exam:->epigastric pain What reading provider will be dictating this exam?->[facility] FINDINGS: Lower Chest: Atelectasis and chronic pleuroparenchymal scarring noted at the lung bases. No significant pleural effusion. The heart is enlarged. Eventration of the right hemidiaphragm with protrusion of the liver and subphrenic fluid collection. Organs: The liver is cirrhotic. Spleen is not enlarged. Pancreas is atrophic. The adrenal glands appear unremarkable. The kidneys demonstrate symmetric enhancement with no hydronephrosis. Gallbladder not visualized. GI/Bowel: No obstructing or constricting mass lesions. Pelvis: No pathologic masses or adenopathy. Peritoneum/Retroperitoneum: Moderate ascites. No significant adenopathy. Bones/Soft Tissues: Mild subcutaneous edema. Degenerative changes of the spine and hips. There is scoliosis of the lumbar spine. Cirrhosis. Bibasilar atelectasis and pleuroparenchymal scarring, both lung bases. Moderate ascites. Cta Chest W Contrast    Result Date: 12/5/2020  EXAMINATION: CTA OF THE CHEST 12/5/2020 2:40 pm TECHNIQUE: CTA of the chest was performed after the administration of intravenous contrast.  Multiplanar reformatted images are provided for review. MIP images are provided for review. Dose modulation, iterative reconstruction, and/or weight based adjustment of the mA/kV was utilized to reduce the radiation dose to as low as reasonably achievable. COMPARISON: None. HISTORY: ORDERING SYSTEM PROVIDED HISTORY: hypoxia TECHNOLOGIST PROVIDED HISTORY: Reason for exam:->hypoxia What reading provider will be dictating this exam?->CRC FINDINGS: Pulmonary Arteries: No pulmonary embolism is seen. Suboptimal evaluation of the peripheral pulmonary arteries in the lower lobes due to prominent respiratory motion artifact. The main pulmonary artery is normal in caliber. Mediastinum: The heart is enlarged. Prominent calcified atherosclerosis seen in the left anterior descending coronary artery. Mild calcified atherosclerosis is seen in the aorta. No aneurysm. Mildly enlarged lymph nodes are seen in the mediastinum, with the largest in the right paratracheal region measuring approximately 2.4 x 1.8 cm. No significant enlargement of the axillary or hilar lymph nodes is seen. Lungs/pleura: Minimal dependent atelectasis or scarring is seen in the lower lobes and lingula. The lungs are otherwise clear. The central airway is clear. No pneumothorax or pleural effusion is seen. Upper Abdomen: Mild irregularity of the liver contour suggests cirrhosis. Small volume perihepatic ascites is seen. Reflux of contrast into the hepatic veins may signify right heart failure. 1. No pulmonary embolism is seen. 2.  No acute cardiopulmonary abnormality. 3. Cardiomegaly. No pulmonary edema or pleural effusion. 4. Mild mediastinal lymphadenopathy, which may be reactive. Given patient's history of cecal adenocarcinoma, a metastatic etiology cannot be excluded. 5. Irregular liver contour indicating cirrhosis. Small volume perihepatic ascites. 6. Reflux of contrast into the hepatic veins may signify right heart failure.     ASSESSMENT AND PLAN:    AMES  ANASARCA  ASCITES  RLE CELLULITIS  ATBX PER ID  NEPH FOR HELP IN DIURESIS  IR FOR PARACENTESIS      I can be reached though [facility] or [facility] at [phone]

## 2020-12-31 NOTE — PROGRESS NOTES
Department of Internal Medicine  Dr. Isma Flores History and Physical      CHIEF COMPLAINT:  swelling    Reason for Admission:  Anasarca, cellulitis      HISTORY OF PRESENT ILLNESS:      The patient is a [de-identified] y.o. female with  who presents with the above problems. SHE SAW DR SOARES ON 12/21 FOR SIMILAR REASONS AND HE DID US OF RLE WHICH WAS NEG FOR DVT AND INCREASED HER DIURETIC AND ADDED ALDACTONE, BUT SHE RETURNED 10 DAYS LATER HAVING GAINED APROX 10 LBS. WITH A RIGHT SEEPING LEG THAT WAS HOT, RED AND SORE. SHE DENIED FEVER. SHE ALSO C/O OF INCREASING ABD GIRTH. SHE HAS KNOWN AMES. SHE WAS IN [address] [place] [address] THE MONTH AND CT SHOWED MODERATE AMT OF ASCITES ON 12/6. DECISION TO ADMIT HER HFOR RIGHT LEG CELLULITIS AND ANASARCA WAS MADE AS OUTPT THERAPY HAD FAILED. SHE NEEDS THERAPEUTIC PARACENTESIS, ONCE ELIQUIS IS ON HOLD FOR 48 HRS. Past Medical History:        Diagnosis Date    Adenocarcinoma of cecum ([facility] [address] 01/2019    Anemia     Arm numbness     Arthritis     Blood transfusion     Cervical spinal stenosis     Cirrhosis of liver (HCC)     Diabetes mellitus (HCC)     Fatty liver     GERD (gastroesophageal reflux disease)     Hyperlipidemia     Hypertension     Low back pain     Lumbar stenosis     Neck pain     Obesity (BMI 30.0-34.9) 10/14/2012    PAF (paroxysmal atrial fibrillation) (HCC)     Renal artery aneurysm ([facility] [address] 7/15/2015     Past Surgical History:        Procedure Laterality Date    APPENDECTOMY      BACK SURGERY      BREAST SURGERY      reduction    CARPAL TUNNEL RELEASE      CATARACT REMOVAL  10/2016    CERVICAL DISCECTOMY  01/19/2007    ACDF C3-4, C4-5, C5-6 Dr. Kemi Talbot      left lower leg    HEMICOLECTOMY N/A 2/19/2019    DIAGNOSTIC LAPAROSCOPY, LYSIS OF ADHESIONS, OPEN RIGHT HEMICOLECTOMY performed by Lizzette Ontiveros MD at [address] [place] [address]  05/17/2017    [facility].  [facility]  UPPER GASTROINTESTINAL ENDOSCOPY N/A 11/5/2018    EGD BIOPSY performed by Pam Winston MD at [address] [facility] 12/7/2020    EGD BIOPSY performed by Tabatha Dorantes MD at [address] [facility]  12/7/2020    EGD CONTROL HEMORRHAGE performed by Tabatha Dorantes MD at [address] [facility]       Medications Prior to Admission:    Medications Prior to Admission: spironolactone (ALDACTONE) 25 MG tablet, Take 25 mg by mouth daily  glimepiride (AMARYL) 2 MG tablet, Take 2 mg by mouth every morning (before breakfast)   pregabalin (LYRICA) 50 MG capsule, Take 50 mg by mouth daily. traMADol (ULTRAM) 50 MG tablet, Take 1 tablet by mouth every 8 hours as needed for Pain for up to 60 days.  Take lowest dose possible to manage pain  empagliflozin (JARDIANCE) 10 MG tablet, Take 1 tablet by mouth daily  DULoxetine (CYMBALTA) 30 MG extended release capsule, Take 1 capsule by mouth daily  pramipexole (MIRAPEX) 0.25 MG tablet, TAKE 5 TABLETS BY MOUTH  NIGHTLY  oxybutynin (DITROPAN) 5 MG tablet, TAKE 1 TABLET BY MOUTH  DAILY WITH BREAKFAST  hydrOXYzine (ATARAX) 10 MG tablet, Take 1 tablet by mouth nightly  simvastatin (ZOCOR) 20 MG tablet, TAKE 1 TABLET BY MOUTH  DAILY WITH SUPPER  amLODIPine (NORVASC) 10 MG tablet, TAKE 1 TABLET BY MOUTH  DAILY  pantoprazole (PROTONIX) 40 MG tablet, TAKE ONE TABLET BY MOUTH TWO TIMES A DAY BEFORE MEALS  metoprolol tartrate (LOPRESSOR) 25 MG tablet, Take 1 tablet by mouth 2 times daily  rivaroxaban (XARELTO) 20 MG TABS tablet, Take 1 tablet by mouth daily (with breakfast)  potassium chloride (KLOR-CON M) 10 MEQ extended release tablet, Take 1 tablet by mouth 2 times daily  metFORMIN (GLUCOPHAGE) 500 MG tablet, TAKE 2 TABLETS BY MOUTH  TWICE A DAY (Patient taking differently: Take 1,000 mg by mouth daily (with breakfast) Take 2 tablets by mouth  twice a day) dicyclomine (BENTYL) 10 MG capsule, TAKE 1 CAPSULE BY MOUTH  DAILY AS NEEDED    Allergies:  Benadryl [diphenhydramine], Fish-derived products, and Iodine    Social History:   TOBACCO:   reports that she quit smoking about 23 years ago. She has a 30.00 pack-year smoking history. She has never used smokeless tobacco.    Family History:       Problem Relation Age of Onset    Diabetes Mother     Stroke Father     Diabetes Sister     High Blood Pressure Sister     Diabetes Brother     High Blood Pressure Brother     Cancer Brother     Heart Disease Brother      REVIEW OF SYSTEMS:  CONSTITUTIONAL:  positive for  fatigue, malaise and anorexia  EYES:  negative  HEENT:  negative  RESPIRATORY:  positive for  dyspnea  CARDIOVASCULAR:  negative  GASTROINTESTINAL:  positive for nausea, abdominal pain and abdominal distention  GENITOURINARY:  negative  INTEGUMENT/BREAST:  positive for skin color change and REDNESS PAIN IN R. LEG  HEMATOLOGIC/LYMPHATIC:  negative  ALLERGIC/IMMUNOLOGIC:  negative  ENDOCRINE:  negative  MUSCULOSKELETAL:  positive for  myalgias, arthralgias, pain and muscle weakness  NEUROLOGICAL:  negative  PHYSICAL EXAM:    Vitals:  BP (!) 125/59   Pulse 98   Temp 97.9 °F (36.6 °C) (Temporal)   Resp 18   Wt 217 lb 8 oz (98.7 kg)   LMP  (LMP Unknown)   SpO2 93%   BMI 38.53 kg/m²     CONSTITUTIONAL:  awake, alert, cooperative, no apparent distress, and appears stated age  EYES:  Lids and lashes normal, pupils equal, round and reactive to light, extra ocular muscles intact, sclera clear, conjunctiva normal  ENT:  Normocephalic, without obvious abnormality, atraumatic, sinuses nontender on palpation, external ears without lesions, oral pharynx with moist mucus membranes, tonsils without erythema or exudates, gums normal and good dentition.   NECK:  Supple, symmetrical, trachea midline, no adenopathy, thyroid symmetric, not enlarged and no tenderness, skin normal HEMATOLOGIC/LYMPHATICS:  no cervical lymphadenopathy  BACK:  Symmetric, no curvature, spinous processes are non-tender on palpation, paraspinous muscles are non-tender on palpation, no costal vertebral tenderness  LUNGS:  No increased work of breathing, good air exchange, clear to auscultation bilaterally, no crackles or wheezing  CARDIOVASCULAR:  IRREG  ABDOMEN:  ascites absent  MUSCULOSKELETAL:  RIGHT LEG RED, HOT SWOLLEN   NEUROLOGIC:  Awake, alert, oriented to name, place and time. Cranial nerves II-XII are grossly intact. Motor is 5 out of 5 bilaterally. Cerebellar finger to nose, heel to shin intact. Sensory is intact.   Babinski down going, Romberg negative, and gait is normal.  SKIN:  no bruising or bleeding    DATA:  CBC:   Lab Results   Component Value Date    WBC 11.4 12/31/2020    RBC 3.63 12/31/2020    HGB 8.5 12/31/2020    HCT 28.9 12/31/2020    MCV 79.6 12/31/2020    MCH 23.4 12/31/2020    MCHC 29.4 12/31/2020    RDW 17.9 12/31/2020     12/31/2020    MPV 10.1 12/31/2020     CMP:    Lab Results   Component Value Date     12/31/2020    K 4.0 12/31/2020    CL 98 12/31/2020    CO2 26 12/31/2020    BUN 11 12/31/2020    CREATININE 0.8 12/31/2020    GFRAA >60 12/31/2020    LABGLOM >60 12/31/2020    GLUCOSE 77 12/31/2020    PROT 7.3 12/31/2020    LABALBU 4.1 12/31/2020    CALCIUM 8.9 12/31/2020    BILITOT 0.9 12/31/2020    ALKPHOS 83 12/31/2020    AST 18 12/31/2020    ALT 15 12/31/2020     LDH:  No results found for: LDH  Warfarin PT/INR:  No components found for: PTPATWAR, PTINRWAR  Last 3 Troponin:    Lab Results   Component Value Date    TROPONINI 0.03 12/05/2020    TROPONINI 0.01 12/28/2017    TROPONINI <0.01 12/28/2017     ABG:  No results found for: PH, PCO2, PO2, HCO3, BE, THGB, TCO2, O2SAT  HgBA1c:    Lab Results   Component Value Date    LABA1C 7.4 03/19/2020     TSH:    Lab Results   Component Value Date    TSH 1.240 12/05/2020     VITAMIN B12: No components found for: B12 FOLATE:  No results found for: FOLATE  IRON:  No results found for: IRON  Iron Saturation:  No components found for: PERCENTFE  TIBC:  No results found for: TIBC  FERRITIN:  No results found for: FERRITIN  RPR:  No results found for: RPR  KIRAN:  No results found for: ANATITER, KIRAN  AMYLASE:    Lab Results   Component Value Date    AMYLASE 40 06/19/2015     LIPASE:    Lab Results   Component Value Date    LIPASE 7 12/05/2020       IMAGING    Ct Abdomen Pelvis W Iv Contrast Additional Contrast? None    Result Date: 12/5/2020  EXAMINATION: CT OF THE ABDOMEN AND PELVIS WITH CONTRAST 12/5/2020 2:40 pm TECHNIQUE: CT of the abdomen and pelvis was performed with the administration of intravenous contrast. Multiplanar reformatted images are provided for review. Dose modulation, iterative reconstruction, and/or weight based adjustment of the mA/kV was utilized to reduce the radiation dose to as low as reasonably achievable. COMPARISON: February 27, 2019 HISTORY: ORDERING SYSTEM PROVIDED HISTORY: epigastric pain TECHNOLOGIST PROVIDED HISTORY: Additional Contrast?->None Reason for exam:->epigastric pain What reading provider will be dictating this exam?->CRC FINDINGS: Lower Chest: Atelectasis and chronic pleuroparenchymal scarring noted at the lung bases. No significant pleural effusion. The heart is enlarged. Eventration of the right hemidiaphragm with protrusion of the liver and subphrenic fluid collection. Organs: The liver is cirrhotic. Spleen is not enlarged. Pancreas is atrophic. The adrenal glands appear unremarkable. The kidneys demonstrate symmetric enhancement with no hydronephrosis. Gallbladder not visualized. GI/Bowel: No obstructing or constricting mass lesions. Pelvis: No pathologic masses or adenopathy. Peritoneum/Retroperitoneum: Moderate ascites. No significant adenopathy. Bones/Soft Tissues: Mild subcutaneous edema. Degenerative changes of the spine and hips. There is scoliosis of the lumbar spine. 1. No pulmonary embolism is seen. 2.  No acute cardiopulmonary abnormality. 3. Cardiomegaly. No pulmonary edema or pleural effusion. 4. Mild mediastinal lymphadenopathy, which may be reactive. Given patient's history of cecal adenocarcinoma, a metastatic etiology cannot be excluded. 5. Irregular liver contour indicating cirrhosis. Small volume perihepatic ascites. 6. Reflux of contrast into the hepatic veins may signify right heart failure.     ASSESSMENT AND PLAN:    AMES  ANASARCA  ASCITES  RLE CELLULITIS  ATBX PER ID  NEPH FOR HELP IN DIURESIS  IR FOR PARACENTESIS      I can be reached though [facility] or [facility] [place] at [phone]

## 2020-12-31 NOTE — CONSULTS
[address] [place] [address] [facility]    Consultation Note     Admit Date: 12/30/2020  9:03 PM    Reason for Consult:   Cellulitis right lower extremity    Attending Physician:  Scarlett Gordon MD     Chief Complaint: Hot red tender right lower extremity    HISTORY OF PRESENT ILLNESS:   The patient is a [de-identified] y.o.  woman not known to the Infectious Diseases service. The patient is a direct admission from primary care physician's office because of fluid retention resulting in anasarca and swelling of the lower extremities with lymphedema and cellulitis primarily on the right lower extremity. Patient denies any fevers or chills and has been afebrile since admission. Her BUN and creatinine are 11 and 0.8 white count is 11.4 with a hemoglobin 8.5. Her albumin is 4.1. As an outpatient she was aggressively diuresed without any success. ID is been asked to evaluate for treatment of lower extremity cellulitis. She was given a gram of Rocephin initially and I continue to cefazolin 2 g every 8. Today she feels a little bit better. In addition to the right light she also complains of little bit on the left leg dorsum.     Past Medical History:        Diagnosis Date    Adenocarcinoma of cecum ([address] 01/2019    Anemia     Arm numbness     Arthritis     Blood transfusion     Cervical spinal stenosis     Cirrhosis of liver (HCC)     Diabetes mellitus (HCC)     Fatty liver     GERD (gastroesophageal reflux disease)     Hyperlipidemia     Hypertension     Low back pain     Lumbar stenosis     Neck pain     Obesity (BMI 30.0-34.9) 10/14/2012    PAF (paroxysmal atrial fibrillation) (HCC)     Renal artery aneurysm ([facility] [address] 7/15/2015     Past Surgical History:        Procedure Laterality Date    APPENDECTOMY      BACK SURGERY      BREAST SURGERY      reduction    CARPAL TUNNEL RELEASE      CATARACT REMOVAL  10/2016    CERVICAL DISCECTOMY  01/19/2007    ACDF C3-4, C4-5, C5-6 Dr. Kailey Gann Inability: None    Transportation needs     Medical: None     Non-medical: None   Tobacco Use    Smoking status: Former Smoker     Packs/day: 2.00     Years: 15.00     Pack years: 30.00     Quit date: 3/1/1997     Years since quittin.8    Smokeless tobacco: Never Used   Substance and Sexual Activity    Alcohol use: Yes     Comment: Holidays    Drug use: No    Sexual activity: None   Lifestyle    Physical activity     Days per week: None     Minutes per session: None    Stress: None   Relationships    Social connections     Talks on phone: None     Gets together: None     Attends Shinto service: None     Active member of club or organization: None     Attends meetings of clubs or organizations: None     Relationship status: None    Intimate partner violence     Fear of current or ex partner: None     Emotionally abused: None     Physically abused: None     Forced sexual activity: None   Other Topics Concern    None   Social History Narrative    None     Tobacco: No-reformed  Alcohol: No  Pets: No  Travel: No    Family History:       Problem Relation Age of Onset    Diabetes Mother     Stroke Father     Diabetes Sister     High Blood Pressure Sister     Diabetes Brother     High Blood Pressure Brother     Cancer Brother     Heart Disease Brother    . Otherwise non-pertinent to the chief complaint. REVIEW OF SYSTEMS:    CONSTITUTIONAL:  No chills, fevers or night sweats. No loss of weight. EYES:  No double vision or drainage from eyes, ears or throat. HEENT:  No neck stiffness. No dysphagia. No drainage from eyes, ears or throat  RESPIRATORY:  No cough, productive sputum or hemoptysis. CARDIOVASCULAR:  No chest pain, palpitations, orthopnea or dyspnea on exertion. GASTROINTESTINAL:  No nausea, vomiting, diarrhea or constipation or hematochezia   GENITOURINARY:  No frequency burning dysuria or hematuria. INTEGUMENT/BREAST:  No rash or breast masses. HEMATOLOGIC/LYMPHATIC:  No lymphadenopathy or blood dyscrasics. ALLERGIC/IMMUNOLOGIC:  No anaphylaxis. ENDOCRINE:  No polyuria or polydipsia or temperature intolerance. MUSCULOSKELETAL:  No myalgia or arthralgia. Full ROM. See history of present illness  NEUROLOGICAL:  No focal motor sensory deficit. BEHAVIOR/PSYCH:  No psychosis. PHYSICAL EXAM:    Vitals:    BP (!) 125/59   Pulse 98   Temp 97.9 °F (36.6 °C) (Temporal)   Resp 18   Wt 217 lb 8 oz (98.7 kg)   LMP  (LMP Unknown)   SpO2 93%   BMI 38.53 kg/m²   Constitutional: The patient is awake, alert, and oriented. Skin: Warm and dry. No rashes were noted. No jaundice. HEENT: Eyes show round, and reactive pupils. Moist mucous membranes, no ulcerations, no thrush. Neck: Supple to movements. No lymphadenopathy. Chest: No use of accessory muscles to breathe. Symmetrical expansion. Auscultation reveals no wheezing, crackles, or rhonchi. Cardiovascular: S1 and S2 are rhythmic and regular. No murmurs appreciated. Abdomen: Positive bowel sounds to auscultation. Benign to palpation. No masses felt. No hepatosplenomegaly. Genitourinary: Female  Extremities: No clubbing, no cyanosis, positive bilaterally edema. Musculoskeletal: Equal and symmetrical except for the right lower extremity is a little bit more edematous than the left but both have bilateral edema with redness and swelling some serous drainage from the right leg and some erythema in the dorsum of the left.   His tinea pedis is well  Neurological: No focal  Lines: peripheral      CBC+dif:  Recent Labs     12/31/20  0111   WBC 11.4   HGB 8.5*   HCT 28.9*   MCV 79.6*      NEUTROABS 7.99*     Lab Results   Component Value Date    CRP 3.7 (H) 12/31/2020    CRP 1.4 (H) 11/07/2014     No results found for: CRPHS  Lab Results   Component Value Date    SEDRATE 47 (H) 12/31/2020    SEDRATE 58 (H) 11/07/2014     Lab Results   Component Value Date    ALT 15 12/31/2020 AST 18 12/31/2020    ALKPHOS 83 12/31/2020    BILITOT 0.9 12/31/2020     Lab Results   Component Value Date     12/31/2020    K 4.0 12/31/2020    CL 98 12/31/2020    CO2 26 12/31/2020    BUN 11 12/31/2020    CREATININE 0.8 12/31/2020    GFRAA >60 12/31/2020    LABGLOM >60 12/31/2020    GLUCOSE 77 12/31/2020    PROT 7.3 12/31/2020    LABALBU 4.1 12/31/2020    CALCIUM 8.9 12/31/2020    BILITOT 0.9 12/31/2020    ALKPHOS 83 12/31/2020    AST 18 12/31/2020    ALT 15 12/31/2020       Lab Results   Component Value Date    PROTIME 21.1 12/31/2020    INR 1.9 12/31/2020       Lab Results   Component Value Date    TSH 1.240 12/05/2020       Lab Results   Component Value Date    COLORU Yellow 12/05/2020    PHUR 6.0 12/05/2020    WBCUA 2-5 12/05/2020    RBCUA 1-3 12/05/2020    RBCUA NONE 10/14/2012    BACTERIA NONE SEEN 12/05/2020    CLARITYU Clear 12/05/2020    SPECGRAV 1.010 12/05/2020    LEUKOCYTESUR Negative 12/05/2020    UROBILINOGEN 0.2 12/05/2020    BILIRUBINUR Negative 12/05/2020    BLOODU Negative 12/05/2020    GLUCOSEU 500 12/05/2020       No results found for: WXP9SQO, BEART, D7GHTIDQ, PHART, THGBART, HAT0OTZ, PO2ART, JIZ2BLC  Radiology:  US ABDOMEN LIMITED   Final Result   unremarkable study. No ascites seen, not on the fluid   identified for paracentesis   . Microbiology:  Pending  No results for input(s): BC in the last 72 hours. No results for input(s): ORG in the last 72 hours. No results for input(s): Jaxon Buckle in the last 72 hours. No results for input(s): STREPNEUMAGU in the last 72 hours. No results for input(s): LP1UAG in the last 72 hours. No results for input(s): ASO in the last 72 hours. No results for input(s): CULTRESP in the last 72 hours. Assessment:  · Bilateral lower leg extremity cellulitis right worse than the left with lymphedema tinea pedis bilaterally. Tinea pedis may be the port of entry  · Right leg cellulitis is more consistent with staph.     Plan: · Cont Ancef 2 g every 8  · Compression dressing  · Topical miconazole and terbinafine  · Check cultures  · Baseline ESR, CRP  · Monitor labs  · Will follow with you    Thank you for having us see this patient in consultation. I will be discussing this case with the treating physicians.       Electronically signed by Zac Pal MD on 12/31/2020 at 2:54 PM

## 2021-01-01 LAB
ALBUMIN SERPL-MCNC: 4.2 G/DL (ref 3.5–5.2)
ALP BLD-CCNC: 83 U/L (ref 35–104)
ALT SERPL-CCNC: 6 U/L (ref 0–32)
ANION GAP SERPL CALCULATED.3IONS-SCNC: 12 MMOL/L (ref 7–16)
ANTISTREPTOLYSIN-O: <20 IU/ML (ref 0–200)
AST SERPL-CCNC: 16 U/L (ref 0–31)
BASOPHILS ABSOLUTE: 0.07 E9/L (ref 0–0.2)
BASOPHILS RELATIVE PERCENT: 0.6 % (ref 0–2)
BILIRUB SERPL-MCNC: 0.7 MG/DL (ref 0–1.2)
BUN BLDV-MCNC: 11 MG/DL (ref 8–23)
CALCIUM SERPL-MCNC: 9 MG/DL (ref 8.6–10.2)
CHLORIDE BLD-SCNC: 92 MMOL/L (ref 98–107)
CO2: 31 MMOL/L (ref 22–29)
CREAT SERPL-MCNC: 1.1 MG/DL (ref 0.5–1)
EOSINOPHILS ABSOLUTE: 0.5 E9/L (ref 0.05–0.5)
EOSINOPHILS RELATIVE PERCENT: 4.5 % (ref 0–6)
GFR AFRICAN AMERICAN: 58
GFR NON-AFRICAN AMERICAN: 48 ML/MIN/1.73
GLUCOSE BLD-MCNC: 171 MG/DL (ref 74–99)
HCT VFR BLD CALC: 32 % (ref 34–48)
HEMOGLOBIN: 9.4 G/DL (ref 11.5–15.5)
IMMATURE GRANULOCYTES #: 0.05 E9/L
IMMATURE GRANULOCYTES %: 0.5 % (ref 0–5)
LYMPHOCYTES ABSOLUTE: 1.61 E9/L (ref 1.5–4)
LYMPHOCYTES RELATIVE PERCENT: 14.6 % (ref 20–42)
MCH RBC QN AUTO: 22.8 PG (ref 26–35)
MCHC RBC AUTO-ENTMCNC: 29.4 % (ref 32–34.5)
MCV RBC AUTO: 77.7 FL (ref 80–99.9)
METER GLUCOSE: 110 MG/DL (ref 74–99)
METER GLUCOSE: 115 MG/DL (ref 74–99)
METER GLUCOSE: 158 MG/DL (ref 74–99)
METER GLUCOSE: 165 MG/DL (ref 74–99)
MONOCYTES ABSOLUTE: 0.64 E9/L (ref 0.1–0.95)
MONOCYTES RELATIVE PERCENT: 5.8 % (ref 2–12)
NEUTROPHILS ABSOLUTE: 8.18 E9/L (ref 1.8–7.3)
NEUTROPHILS RELATIVE PERCENT: 74 % (ref 43–80)
PDW BLD-RTO: 18 FL (ref 11.5–15)
PLATELET # BLD: 456 E9/L (ref 130–450)
PMV BLD AUTO: 8.7 FL (ref 7–12)
POTASSIUM SERPL-SCNC: 3.9 MMOL/L (ref 3.5–5)
RBC # BLD: 4.12 E12/L (ref 3.5–5.5)
SODIUM BLD-SCNC: 135 MMOL/L (ref 132–146)
TOTAL PROTEIN: 7.7 G/DL (ref 6.4–8.3)
WBC # BLD: 11.1 E9/L (ref 4.5–11.5)

## 2021-01-01 PROCEDURE — 6370000000 HC RX 637 (ALT 250 FOR IP): Performed by: INTERNAL MEDICINE

## 2021-01-01 PROCEDURE — 85025 COMPLETE CBC W/AUTO DIFF WBC: CPT

## 2021-01-01 PROCEDURE — 36415 COLL VENOUS BLD VENIPUNCTURE: CPT

## 2021-01-01 PROCEDURE — 6360000002 HC RX W HCPCS: Performed by: INTERNAL MEDICINE

## 2021-01-01 PROCEDURE — 80053 COMPREHEN METABOLIC PANEL: CPT

## 2021-01-01 PROCEDURE — 6370000000 HC RX 637 (ALT 250 FOR IP): Performed by: SPECIALIST

## 2021-01-01 PROCEDURE — 6360000002 HC RX W HCPCS: Performed by: SPECIALIST

## 2021-01-01 PROCEDURE — 82962 GLUCOSE BLOOD TEST: CPT

## 2021-01-01 PROCEDURE — 2580000003 HC RX 258: Performed by: INTERNAL MEDICINE

## 2021-01-01 PROCEDURE — 86060 ANTISTREPTOLYSIN O TITER: CPT

## 2021-01-01 PROCEDURE — 1200000000 HC SEMI PRIVATE

## 2021-01-01 RX ADMIN — PANTOPRAZOLE SODIUM 40 MG: 40 TABLET, DELAYED RELEASE ORAL at 15:49

## 2021-01-01 RX ADMIN — OXYBUTYNIN CHLORIDE 5 MG: 5 TABLET ORAL at 20:33

## 2021-01-01 RX ADMIN — SODIUM CHLORIDE, PRESERVATIVE FREE 10 ML: 5 INJECTION INTRAVENOUS at 20:33

## 2021-01-01 RX ADMIN — AMLODIPINE BESYLATE 5 MG: 5 TABLET ORAL at 08:57

## 2021-01-01 RX ADMIN — OXYBUTYNIN CHLORIDE 5 MG: 5 TABLET ORAL at 08:57

## 2021-01-01 RX ADMIN — DICYCLOMINE HYDROCHLORIDE 10 MG: 10 CAPSULE ORAL at 08:58

## 2021-01-01 RX ADMIN — PANTOPRAZOLE SODIUM 40 MG: 40 TABLET, DELAYED RELEASE ORAL at 08:57

## 2021-01-01 RX ADMIN — MICONAZOLE NITRATE 5 G: 20.6 POWDER TOPICAL at 20:32

## 2021-01-01 RX ADMIN — DICYCLOMINE HYDROCHLORIDE 10 MG: 10 CAPSULE ORAL at 20:33

## 2021-01-01 RX ADMIN — HYDROXYZINE HYDROCHLORIDE 10 MG: 10 TABLET, FILM COATED ORAL at 20:32

## 2021-01-01 RX ADMIN — METOLAZONE 5 MG: 5 TABLET ORAL at 08:57

## 2021-01-01 RX ADMIN — HYDROCODONE BITARTRATE AND ACETAMINOPHEN 1 TABLET: 7.5; 325 TABLET ORAL at 09:00

## 2021-01-01 RX ADMIN — HYDROCODONE BITARTRATE AND ACETAMINOPHEN 1 TABLET: 7.5; 325 TABLET ORAL at 02:36

## 2021-01-01 RX ADMIN — METOPROLOL TARTRATE 25 MG: 25 TABLET, FILM COATED ORAL at 20:34

## 2021-01-01 RX ADMIN — SODIUM CHLORIDE, PRESERVATIVE FREE 10 ML: 5 INJECTION INTRAVENOUS at 08:56

## 2021-01-01 RX ADMIN — FUROSEMIDE 20 MG: 10 INJECTION, SOLUTION INTRAMUSCULAR; INTRAVENOUS at 20:32

## 2021-01-01 RX ADMIN — MICONAZOLE NITRATE 5 G: 20.6 POWDER TOPICAL at 08:56

## 2021-01-01 RX ADMIN — TERBINAFINE 250 MG: 250 TABLET ORAL at 08:57

## 2021-01-01 RX ADMIN — TRAMADOL HYDROCHLORIDE 50 MG: 50 TABLET, FILM COATED ORAL at 05:31

## 2021-01-01 RX ADMIN — POTASSIUM CHLORIDE 10 MEQ: 750 TABLET, EXTENDED RELEASE ORAL at 20:33

## 2021-01-01 RX ADMIN — Medication 2 G: at 15:49

## 2021-01-01 RX ADMIN — HYDROCODONE BITARTRATE AND ACETAMINOPHEN 1 TABLET: 7.5; 325 TABLET ORAL at 15:49

## 2021-01-01 RX ADMIN — DULOXETINE HYDROCHLORIDE 20 MG: 20 CAPSULE, DELAYED RELEASE ORAL at 08:58

## 2021-01-01 RX ADMIN — PRAMIPEXOLE DIHYDROCHLORIDE 0.5 MG: 0.25 TABLET ORAL at 20:33

## 2021-01-01 RX ADMIN — Medication 2 G: at 23:37

## 2021-01-01 RX ADMIN — Medication 2 G: at 08:56

## 2021-01-01 RX ADMIN — FUROSEMIDE 20 MG: 10 INJECTION, SOLUTION INTRAMUSCULAR; INTRAVENOUS at 13:37

## 2021-01-01 RX ADMIN — METOPROLOL TARTRATE 25 MG: 25 TABLET, FILM COATED ORAL at 08:58

## 2021-01-01 RX ADMIN — PREGABALIN 50 MG: 50 CAPSULE ORAL at 08:57

## 2021-01-01 RX ADMIN — RIVAROXABAN 20 MG: 20 TABLET, FILM COATED ORAL at 17:17

## 2021-01-01 RX ADMIN — HYDROCODONE BITARTRATE AND ACETAMINOPHEN 1 TABLET: 7.5; 325 TABLET ORAL at 22:40

## 2021-01-01 RX ADMIN — POTASSIUM CHLORIDE 10 MEQ: 750 TABLET, EXTENDED RELEASE ORAL at 08:58

## 2021-01-01 ASSESSMENT — PAIN DESCRIPTION - ONSET
ONSET: ON-GOING

## 2021-01-01 ASSESSMENT — PAIN DESCRIPTION - PAIN TYPE
TYPE: ACUTE PAIN

## 2021-01-01 ASSESSMENT — PAIN SCALES - GENERAL
PAINLEVEL_OUTOF10: 0
PAINLEVEL_OUTOF10: 0
PAINLEVEL_OUTOF10: 6
PAINLEVEL_OUTOF10: 7
PAINLEVEL_OUTOF10: 3
PAINLEVEL_OUTOF10: 0

## 2021-01-01 ASSESSMENT — PAIN DESCRIPTION - LOCATION
LOCATION: LEG

## 2021-01-01 ASSESSMENT — PAIN DESCRIPTION - ORIENTATION
ORIENTATION: RIGHT
ORIENTATION: LEFT

## 2021-01-01 ASSESSMENT — PAIN DESCRIPTION - PROGRESSION: CLINICAL_PROGRESSION: NOT CHANGED

## 2021-01-01 ASSESSMENT — PAIN DESCRIPTION - FREQUENCY
FREQUENCY: CONTINUOUS
FREQUENCY: CONTINUOUS

## 2021-01-01 ASSESSMENT — PAIN - FUNCTIONAL ASSESSMENT: PAIN_FUNCTIONAL_ASSESSMENT: PREVENTS OR INTERFERES SOME ACTIVE ACTIVITIES AND ADLS

## 2021-01-01 NOTE — PROGRESS NOTES
[address] [place] [address] [facility]  Progress Note    SUBJECTIVE:  CC: cellulitis right leg     Patient is tolerating medications. No reported adverse drug reactions. No nausea, vomiting, diarrhea. Afebrile. States redness & pain is improving, edema remains. Review of systems:  As stated above in the chief complaint, otherwise negative. Medications:  Scheduled Meds:   ceFAZolin  2 g Intravenous Q8H    miconazole  5 g Topical BID    terbinafine  250 mg Oral Daily    metOLazone  5 mg Oral Daily    rivaroxaban  20 mg Oral Daily    amLODIPine  5 mg Oral Daily    DULoxetine  20 mg Oral Daily    empagliflozin  10 mg Oral Daily    hydrOXYzine  10 mg Oral Nightly    metoprolol tartrate  25 mg Oral BID    oxybutynin  5 mg Oral BID    pantoprazole  40 mg Oral BID AC    potassium chloride  10 mEq Oral BID    pramipexole  0.5 mg Oral Nightly    pregabalin  50 mg Oral Daily    insulin lispro  0-6 Units Subcutaneous TID WC    insulin lispro  0-3 Units Subcutaneous Nightly    sodium chloride flush  10 mL Intravenous 2 times per day    furosemide  20 mg Intravenous Q12H     Continuous Infusions:   dextrose       PRN Meds:perflutren lipid microspheres, HYDROcodone-acetaminophen, dicyclomine, traMADol, sodium chloride flush, promethazine **OR** ondansetron, polyethylene glycol, acetaminophen **OR** acetaminophen, glucose, dextrose, glucagon (rDNA), dextrose    OBJECTIVE:  /70   Pulse 92   Temp 98.1 °F (36.7 °C) (Temporal)   Resp 18   Wt 208 lb 9 oz (94.6 kg)   LMP  (LMP Unknown)   SpO2 95%   BMI 36.95 kg/m²   Temp  Av.6 °F (36.4 °C)  Min: 97 °F (36.1 °C)  Max: 98.1 °F (36.7 °C)  Constitutional: The patient is awake, alert, and oriented. Sitting up on edge of bed  Skin: Warm and dry. No rashes were noted. HEENT: Round and reactive pupils. Moist mucous membranes. No ulcerations or thrush. Neck: Supple to movements. ORG Enterococcus faecalis 10/30/2014     Lab Results   Component Value Date    BLOODCULT2 24 Hours no growth 12/31/2020    BLOODCULT2 5 Days no growth 12/08/2020    BLOODCULT2 5 Days- no growth 02/21/2019    ORG Escherichia coli 02/24/2019    ORG Gram-positive cocci 11/06/2014    ORG Enterococcus faecalis 10/30/2014     No results found for: WNDABS  No results found for: RESPSMEAR  No results found for: MPNEUMO, CLAMYDCU, LABLEGI, AFBCX, FUNGSM, LABFUNG  No results found for: CULTRESP  No results found for: CXCATHTIP  No results found for: BFCS  No results found for: CXSURG  Urine Culture, Routine   Date Value Ref Range Status   02/24/2019 >100,000 CFU/ml  Final   10/30/2014 >100,000 CFU/ml  Final     MRSA Culture Only   Date Value Ref Range Status   02/11/2019 Methicillin resistant Staph aureus not isolated  Final     Recent Labs     12/31/20  0111   PROCAL 0.08       ASSESSMENT:  · Bilateral lower leg extremity cellulitis right worse than the left with lymphedema. · Tinea pedis bilaterally -- may be the port of entry  · Right leg cellulitis is more consistent with staph. PLAN:  · Continue ancef 2g q8  · tubigrips   · Topical miconazole and terbinafine  · Check final cultures  · Monitor labs -- ordered     Erickson Rounds  4:02 PM  1/1/2021   Pt seen and examined. Above discussed agree with advanced practice nurse. Labs, cultures, and radiographs reviewed. Face to Face encounter occurred. Changes made as necessary.      Guillermo Dixon MD

## 2021-01-01 NOTE — PROGRESS NOTES
Nephrology Progress Note  1/1/2021 5:31 PM  Subjective:   Admit Date: 12/30/2020  PCP: Maddie Rivers MD    Interval History: Less right thigh discomfort. Able to eat and drink. Told me that she is making a lot of urine. Not short of breath and not on oxygen    Diet: DIET LOW SODIUM 2 GM;    Data:     Scheduled Meds:   ceFAZolin  2 g Intravenous Q8H    miconazole  5 g Topical BID    terbinafine  250 mg Oral Daily    metOLazone  5 mg Oral Daily    rivaroxaban  20 mg Oral Daily    amLODIPine  5 mg Oral Daily    DULoxetine  20 mg Oral Daily    empagliflozin  10 mg Oral Daily    hydrOXYzine  10 mg Oral Nightly    metoprolol tartrate  25 mg Oral BID    oxybutynin  5 mg Oral BID    pantoprazole  40 mg Oral BID AC    potassium chloride  10 mEq Oral BID    pramipexole  0.5 mg Oral Nightly    pregabalin  50 mg Oral Daily    insulin lispro  0-6 Units Subcutaneous TID WC    insulin lispro  0-3 Units Subcutaneous Nightly    sodium chloride flush  10 mL Intravenous 2 times per day    furosemide  20 mg Intravenous Q12H     Continuous Infusions:   dextrose       PRN Meds:perflutren lipid microspheres, HYDROcodone-acetaminophen, dicyclomine, traMADol, sodium chloride flush, promethazine **OR** ondansetron, polyethylene glycol, acetaminophen **OR** acetaminophen, glucose, dextrose, glucagon (rDNA), dextrose  I/O last 3 completed shifts: In: 80 [P.O.:800; I.V.:10]  Out: -   No intake/output data recorded.     Intake/Output Summary (Last 24 hours) at 1/1/2021 1731  Last data filed at 1/1/2021 1400  Gross per 24 hour   Intake 800 ml   Output    Net 800 ml     CBC:   Recent Labs     12/31/20 0111   WBC 11.4   HGB 8.5*        BMP:    Recent Labs     12/31/20  0111 12/31/20  1456 01/01/21  1628    136 135   K 4.0 3.6 3.9   CL 98 97* 92*   CO2 26 28 31*   BUN 11 10 11   CREATININE 0.8 0.9 1.1*   GLUCOSE 77 107* 171*     Hepatic:   Recent Labs     12/31/20  0111 01/01/21  1628   AST 18 16 ALT 15 6   BILITOT 0.9 0.7   ALKPHOS 83 83     Protein/ Albumin:    Lab Results   Component Value Date    LABALBU 4.2 2021               Procedure Component Value Ref Range Date/Time     US ABDOMEN LIMITED [[id number]] Resulted: 20 1054     Order Status: Completed Updated: 20 1057     Narrative:       Patient MRN:  [id number]   : 1940   Age: [de-identified] years   Gender: Female   Order Date:  2020 10:42 AM   EXAM: US ABDOMEN LIMITED   NUMBER OF IMAGES:  16   INDICATION:  ascites check , dr Basim Archibald to read   ascites check , dr Basim Archibald to read   What reading provider will be dictating this exam?->[facility]   COMPARISON: None   Four-quadrant ultrasound was performed demonstrating no ascites      Impression:       unremarkable study. No ascites seen, not on the fluid   identified for paracentesis      ECHO  2017   Stage II diastolic dysfunction. Ejection fraction is visually estimated at 55-60%. The left atrium is mildly dilated. Elevated L atrial pressure   Normal mitral valve structure and function. The aortic valve appears mildly sclerotic. Pulmonary hypertension is mild . No pericardial effusion. Objective:     Vitals: BP (!) 124/59   Pulse 96   Temp 97.6 °F (36.4 °C) (Temporal)   Resp 18   Wt 208 lb 9 oz (94.6 kg)   LMP  (LMP Unknown)   SpO2 94%   BMI 36.95 kg/m²   General appearance: Lying in bed comfortably with her head elevated. Awake alert and oriented not in any distress and not wearing oxygen  Lungs: Good air movement bilaterally with no wheeze  Heart: No S3, No rub  Abdomen: Lax, soft No tenderness, no rebound tenderness, large reducible ventral hernia  L. Extremities: Some edema and redness right lower extremity. No edema left lower extremity.     Assessment & Plan:

## 2021-01-01 NOTE — PLAN OF CARE
Problem: Pain:  Goal: Pain level will decrease  Description: Pain level will decrease  Outcome: Met This Shift  Goal: Control of acute pain  Description: Control of acute pain  Outcome: Met This Shift     Problem: Falls - Risk of:  Goal: Will remain free from falls  Description: Will remain free from falls  Outcome: Ongoing

## 2021-01-01 NOTE — PROGRESS NOTES
Component Value Date    TROPONINI 0.03 12/05/2020    TROPONINI 0.01 12/28/2017    TROPONINI <0.01 12/28/2017     ABG:  No results found for: PH, PCO2, PO2, HCO3, BE, THGB, TCO2, O2SAT  IRON:  No results found for: IRON  IMAGING    Ct Abdomen Pelvis W Iv Contrast Additional Contrast? None    Result Date: 12/5/2020  EXAMINATION: CT OF THE ABDOMEN AND PELVIS WITH CONTRAST 12/5/2020 2:40 pm TECHNIQUE: CT of the abdomen and pelvis was performed with the administration of intravenous contrast. Multiplanar reformatted images are provided for review. Dose modulation, iterative reconstruction, and/or weight based adjustment of the mA/kV was utilized to reduce the radiation dose to as low as reasonably achievable. COMPARISON: February 27, 2019 HISTORY: ORDERING SYSTEM PROVIDED HISTORY: epigastric pain TECHNOLOGIST PROVIDED HISTORY: Additional Contrast?->None Reason for exam:->epigastric pain What reading provider will be dictating this exam?->CRC FINDINGS: Lower Chest: Atelectasis and chronic pleuroparenchymal scarring noted at the lung bases. No significant pleural effusion. The heart is enlarged. Eventration of the right hemidiaphragm with protrusion of the liver and subphrenic fluid collection. Organs: The liver is cirrhotic. Spleen is not enlarged. Pancreas is atrophic. The adrenal glands appear unremarkable. The kidneys demonstrate symmetric enhancement with no hydronephrosis. Gallbladder not visualized. GI/Bowel: No obstructing or constricting mass lesions. Pelvis: No pathologic masses or adenopathy. Peritoneum/Retroperitoneum: Moderate ascites. No significant adenopathy. Bones/Soft Tissues: Mild subcutaneous edema. Degenerative changes of the spine and hips. There is scoliosis of the lumbar spine. Cirrhosis. Bibasilar atelectasis and pleuroparenchymal scarring, both lung bases. Moderate ascites.     Cta Chest W Contrast    Result Date: 12/5/2020 EXAMINATION: CTA OF THE CHEST 12/5/2020 2:40 pm TECHNIQUE: CTA of the chest was performed after the administration of intravenous contrast.  Multiplanar reformatted images are provided for review. MIP images are provided for review. Dose modulation, iterative reconstruction, and/or weight based adjustment of the mA/kV was utilized to reduce the radiation dose to as low as reasonably achievable. COMPARISON: None. HISTORY: ORDERING SYSTEM PROVIDED HISTORY: hypoxia TECHNOLOGIST PROVIDED HISTORY: Reason for exam:->hypoxia What reading provider will be dictating this exam?->CRC FINDINGS: Pulmonary Arteries: No pulmonary embolism is seen. Suboptimal evaluation of the peripheral pulmonary arteries in the lower lobes due to prominent respiratory motion artifact. The main pulmonary artery is normal in caliber. Mediastinum: The heart is enlarged. Prominent calcified atherosclerosis seen in the left anterior descending coronary artery. Mild calcified atherosclerosis is seen in the aorta. No aneurysm. Mildly enlarged lymph nodes are seen in the mediastinum, with the largest in the right paratracheal region measuring approximately 2.4 x 1.8 cm. No significant enlargement of the axillary or hilar lymph nodes is seen. Lungs/pleura: Minimal dependent atelectasis or scarring is seen in the lower lobes and lingula. The lungs are otherwise clear. The central airway is clear. No pneumothorax or pleural effusion is seen. Upper Abdomen: Mild irregularity of the liver contour suggests cirrhosis. Small volume perihepatic ascites is seen. Reflux of contrast into the hepatic veins may signify right heart failure. 1. No pulmonary embolism is seen. 2.  No acute cardiopulmonary abnormality. 3. Cardiomegaly. No pulmonary edema or pleural effusion. 4. Mild mediastinal lymphadenopathy, which may be reactive. Given patient's history of cecal adenocarcinoma, a metastatic etiology cannot be excluded. 5. Irregular liver contour indicating cirrhosis. Small volume perihepatic ascites. 6. Reflux of contrast into the hepatic veins may signify right heart failure. Us Abdomen Limited    Result Date: 2020  Patient MRN:  [id number] : 1940 Age: [de-identified] years Gender: Female Order Date:  2020 10:42 AM EXAM: US ABDOMEN LIMITED NUMBER OF IMAGES:  16 INDICATION:  ascites check , dr Shawnee Euceda to read ascites check , dr Shawnee Euceda to read What reading provider will be dictating this exam?->[facility] COMPARISON: None Four-quadrant ultrasound was performed demonstrating no ascites     unremarkable study. No ascites seen, not on the fluid identified for paracentesis . DIET:  DIET LOW SODIUM 2 GM;     Medications:    Scheduled Meds:   ceFAZolin  2 g Intravenous Q8H    miconazole  5 g Topical BID    terbinafine  250 mg Oral Daily    metOLazone  5 mg Oral Daily    rivaroxaban  20 mg Oral Daily    amLODIPine  5 mg Oral Daily    DULoxetine  20 mg Oral Daily    empagliflozin  10 mg Oral Daily    hydrOXYzine  10 mg Oral Nightly    metoprolol tartrate  25 mg Oral BID    oxybutynin  5 mg Oral BID    pantoprazole  40 mg Oral BID AC    potassium chloride  10 mEq Oral BID    pramipexole  0.5 mg Oral Nightly    pregabalin  50 mg Oral Daily    insulin lispro  0-6 Units Subcutaneous TID WC    insulin lispro  0-3 Units Subcutaneous Nightly    sodium chloride flush  10 mL Intravenous 2 times per day    furosemide  20 mg Intravenous Q12H       Continuous Infusions:   dextrose PRN Meds:perflutren lipid microspheres, HYDROcodone-acetaminophen, dicyclomine, traMADol, sodium chloride flush, promethazine **OR** ondansetron, polyethylene glycol, acetaminophen **OR** acetaminophen, glucose, dextrose, glucagon (rDNA), dextrose    A/P:      Patient Active Problem List   Diagnosis    Spinal stenosis in cervical region    Disorder of muscle, ligament, and fascia    Displacement of lumbar intervertebral disc without myelopathy    Spinal stenosis, lumbar region, without neurogenic claudication    Essential hypertension    Mixed hyperlipidemia    DM [facility]diabetes mellitus) ([facility] [address]    Obesity (BMI 30.0-34. 9)    Other chest pain    Left wrist pain    Renal artery aneurysm (HCC)    Chronic neck pain    Chronic back pain    A-fib (HCC)    PAF (paroxysmal atrial fibrillation) (HCC)    Anemia    Chronic anticoagulation    Class 1 obesity due to excess calories without serious comorbidity with body mass index (BMI) of 34.0 to 34.9 in adult    Malignant neoplasm of cecum (HCC)    Nonrheumatic aortic valve stenosis    Pulmonary HTN (HCC)    Hyponatremia    Abdominal pain    Cellulitis of right leg    wt is down per bed scale significantly    PLAN:  Cont diuresis    I can be reached though [facility] or [facility] [place] at [phone]

## 2021-01-02 LAB
ANION GAP SERPL CALCULATED.3IONS-SCNC: 12 MMOL/L (ref 7–16)
ANISOCYTOSIS: ABNORMAL
BASOPHILS ABSOLUTE: 0.06 E9/L (ref 0–0.2)
BASOPHILS RELATIVE PERCENT: 0.6 % (ref 0–2)
BUN BLDV-MCNC: 12 MG/DL (ref 8–23)
CALCIUM SERPL-MCNC: 9.4 MG/DL (ref 8.6–10.2)
CHLORIDE BLD-SCNC: 91 MMOL/L (ref 98–107)
CO2: 34 MMOL/L (ref 22–29)
CREAT SERPL-MCNC: 1 MG/DL (ref 0.5–1)
EOSINOPHILS ABSOLUTE: 0.47 E9/L (ref 0.05–0.5)
EOSINOPHILS RELATIVE PERCENT: 4.3 % (ref 0–6)
GFR AFRICAN AMERICAN: >60
GFR NON-AFRICAN AMERICAN: 53 ML/MIN/1.73
GLUCOSE BLD-MCNC: 103 MG/DL (ref 74–99)
HCT VFR BLD CALC: 32.6 % (ref 34–48)
HEMOGLOBIN: 9.4 G/DL (ref 11.5–15.5)
HYPOCHROMIA: ABNORMAL
IMMATURE GRANULOCYTES #: 0.05 E9/L
IMMATURE GRANULOCYTES %: 0.5 % (ref 0–5)
LV EF: 65 %
LVEF MODALITY: NORMAL
LYMPHOCYTES ABSOLUTE: 1.92 E9/L (ref 1.5–4)
LYMPHOCYTES RELATIVE PERCENT: 17.6 % (ref 20–42)
MCH RBC QN AUTO: 22.8 PG (ref 26–35)
MCHC RBC AUTO-ENTMCNC: 28.8 % (ref 32–34.5)
MCV RBC AUTO: 79.1 FL (ref 80–99.9)
METER GLUCOSE: 145 MG/DL (ref 74–99)
METER GLUCOSE: 154 MG/DL (ref 74–99)
METER GLUCOSE: 175 MG/DL (ref 74–99)
MONOCYTES ABSOLUTE: 0.67 E9/L (ref 0.1–0.95)
MONOCYTES RELATIVE PERCENT: 6.2 % (ref 2–12)
NEUTROPHILS ABSOLUTE: 7.72 E9/L (ref 1.8–7.3)
NEUTROPHILS RELATIVE PERCENT: 70.8 % (ref 43–80)
OVALOCYTES: ABNORMAL
PDW BLD-RTO: 17.8 FL (ref 11.5–15)
PLATELET # BLD: 448 E9/L (ref 130–450)
PMV BLD AUTO: 9 FL (ref 7–12)
POIKILOCYTES: ABNORMAL
POLYCHROMASIA: ABNORMAL
POTASSIUM SERPL-SCNC: 3.4 MMOL/L (ref 3.5–5)
RBC # BLD: 4.12 E12/L (ref 3.5–5.5)
SODIUM BLD-SCNC: 137 MMOL/L (ref 132–146)
TARGET CELLS: ABNORMAL
WBC # BLD: 10.9 E9/L (ref 4.5–11.5)

## 2021-01-02 PROCEDURE — 97161 PT EVAL LOW COMPLEX 20 MIN: CPT

## 2021-01-02 PROCEDURE — 80048 BASIC METABOLIC PNL TOTAL CA: CPT

## 2021-01-02 PROCEDURE — 6370000000 HC RX 637 (ALT 250 FOR IP): Performed by: INTERNAL MEDICINE

## 2021-01-02 PROCEDURE — 6360000002 HC RX W HCPCS: Performed by: INTERNAL MEDICINE

## 2021-01-02 PROCEDURE — 82962 GLUCOSE BLOOD TEST: CPT

## 2021-01-02 PROCEDURE — 97535 SELF CARE MNGMENT TRAINING: CPT

## 2021-01-02 PROCEDURE — 2580000003 HC RX 258: Performed by: INTERNAL MEDICINE

## 2021-01-02 PROCEDURE — 1200000000 HC SEMI PRIVATE

## 2021-01-02 PROCEDURE — 93306 TTE W/DOPPLER COMPLETE: CPT

## 2021-01-02 PROCEDURE — 6360000002 HC RX W HCPCS: Performed by: SPECIALIST

## 2021-01-02 PROCEDURE — 36415 COLL VENOUS BLD VENIPUNCTURE: CPT

## 2021-01-02 PROCEDURE — 6370000000 HC RX 637 (ALT 250 FOR IP): Performed by: SPECIALIST

## 2021-01-02 PROCEDURE — 85025 COMPLETE CBC W/AUTO DIFF WBC: CPT

## 2021-01-02 PROCEDURE — 97530 THERAPEUTIC ACTIVITIES: CPT

## 2021-01-02 PROCEDURE — 97165 OT EVAL LOW COMPLEX 30 MIN: CPT

## 2021-01-02 RX ORDER — POTASSIUM CHLORIDE 20 MEQ/1
40 TABLET, EXTENDED RELEASE ORAL ONCE
Status: COMPLETED | OUTPATIENT
Start: 2021-01-02 | End: 2021-01-02

## 2021-01-02 RX ADMIN — HYDROXYZINE HYDROCHLORIDE 10 MG: 10 TABLET, FILM COATED ORAL at 20:22

## 2021-01-02 RX ADMIN — Medication 2 G: at 23:37

## 2021-01-02 RX ADMIN — SODIUM CHLORIDE, PRESERVATIVE FREE 10 ML: 5 INJECTION INTRAVENOUS at 08:06

## 2021-01-02 RX ADMIN — OXYBUTYNIN CHLORIDE 5 MG: 5 TABLET ORAL at 08:07

## 2021-01-02 RX ADMIN — METOPROLOL TARTRATE 25 MG: 25 TABLET, FILM COATED ORAL at 20:24

## 2021-01-02 RX ADMIN — PRAMIPEXOLE DIHYDROCHLORIDE 0.5 MG: 0.25 TABLET ORAL at 20:23

## 2021-01-02 RX ADMIN — PREGABALIN 50 MG: 50 CAPSULE ORAL at 08:06

## 2021-01-02 RX ADMIN — SODIUM CHLORIDE, PRESERVATIVE FREE 10 ML: 5 INJECTION INTRAVENOUS at 20:24

## 2021-01-02 RX ADMIN — FUROSEMIDE 20 MG: 10 INJECTION, SOLUTION INTRAMUSCULAR; INTRAVENOUS at 20:32

## 2021-01-02 RX ADMIN — POTASSIUM CHLORIDE 10 MEQ: 750 TABLET, EXTENDED RELEASE ORAL at 08:05

## 2021-01-02 RX ADMIN — MICONAZOLE NITRATE 5 G: 20.6 POWDER TOPICAL at 08:18

## 2021-01-02 RX ADMIN — Medication 2 G: at 17:11

## 2021-01-02 RX ADMIN — Medication 2 G: at 08:05

## 2021-01-02 RX ADMIN — HYDROCODONE BITARTRATE AND ACETAMINOPHEN 1 TABLET: 7.5; 325 TABLET ORAL at 08:05

## 2021-01-02 RX ADMIN — SODIUM CHLORIDE, PRESERVATIVE FREE 10 ML: 5 INJECTION INTRAVENOUS at 17:11

## 2021-01-02 RX ADMIN — AMLODIPINE BESYLATE 5 MG: 5 TABLET ORAL at 08:06

## 2021-01-02 RX ADMIN — POTASSIUM CHLORIDE 10 MEQ: 750 TABLET, EXTENDED RELEASE ORAL at 20:22

## 2021-01-02 RX ADMIN — METOPROLOL TARTRATE 25 MG: 25 TABLET, FILM COATED ORAL at 08:05

## 2021-01-02 RX ADMIN — POTASSIUM CHLORIDE 40 MEQ: 1500 TABLET, EXTENDED RELEASE ORAL at 17:11

## 2021-01-02 RX ADMIN — OXYBUTYNIN CHLORIDE 5 MG: 5 TABLET ORAL at 20:22

## 2021-01-02 RX ADMIN — RIVAROXABAN 20 MG: 20 TABLET, FILM COATED ORAL at 17:11

## 2021-01-02 RX ADMIN — DULOXETINE HYDROCHLORIDE 20 MG: 20 CAPSULE, DELAYED RELEASE ORAL at 08:07

## 2021-01-02 RX ADMIN — FUROSEMIDE 20 MG: 10 INJECTION, SOLUTION INTRAMUSCULAR; INTRAVENOUS at 08:06

## 2021-01-02 RX ADMIN — PANTOPRAZOLE SODIUM 40 MG: 40 TABLET, DELAYED RELEASE ORAL at 17:11

## 2021-01-02 RX ADMIN — TRAMADOL HYDROCHLORIDE 50 MG: 50 TABLET, FILM COATED ORAL at 12:29

## 2021-01-02 RX ADMIN — MICONAZOLE NITRATE 5 G: 20.6 POWDER TOPICAL at 20:24

## 2021-01-02 RX ADMIN — HYDROCODONE BITARTRATE AND ACETAMINOPHEN 1 TABLET: 7.5; 325 TABLET ORAL at 15:44

## 2021-01-02 RX ADMIN — TERBINAFINE 250 MG: 250 TABLET ORAL at 08:06

## 2021-01-02 RX ADMIN — PANTOPRAZOLE SODIUM 40 MG: 40 TABLET, DELAYED RELEASE ORAL at 06:39

## 2021-01-02 ASSESSMENT — PAIN DESCRIPTION - DESCRIPTORS
DESCRIPTORS: ACHING;DISCOMFORT;SORE
DESCRIPTORS: ACHING;DISCOMFORT;SORE

## 2021-01-02 ASSESSMENT — PAIN DESCRIPTION - ONSET: ONSET: ON-GOING

## 2021-01-02 ASSESSMENT — PAIN DESCRIPTION - PAIN TYPE
TYPE: ACUTE PAIN
TYPE: ACUTE PAIN

## 2021-01-02 ASSESSMENT — PAIN DESCRIPTION - ORIENTATION: ORIENTATION: RIGHT

## 2021-01-02 ASSESSMENT — PAIN DESCRIPTION - LOCATION
LOCATION: LEG
LOCATION: LEG

## 2021-01-02 ASSESSMENT — PAIN - FUNCTIONAL ASSESSMENT: PAIN_FUNCTIONAL_ASSESSMENT: PREVENTS OR INTERFERES SOME ACTIVE ACTIVITIES AND ADLS

## 2021-01-02 NOTE — PROGRESS NOTES
ORG Escherichia coli 02/24/2019    ORG Gram-positive cocci 11/06/2014    ORG Enterococcus faecalis 10/30/2014     Lab Results   Component Value Date    BLOODCULT2 24 Hours no growth 12/31/2020    BLOODCULT2 5 Days no growth 12/08/2020    BLOODCULT2 5 Days- no growth 02/21/2019    ORG Escherichia coli 02/24/2019    ORG Gram-positive cocci 11/06/2014    ORG Enterococcus faecalis 10/30/2014     No results found for: WNDABS  No results found for: RESPSMEAR  No results found for: MPNEUMO, CLAMYDCU, LABLEGI, AFBCX, FUNGSM, LABFUNG  No results found for: CULTRESP  No results found for: CXCATHTIP  No results found for: BFCS  No results found for: CXSURG  Urine Culture, Routine   Date Value Ref Range Status   02/24/2019 >100,000 CFU/ml  Final   10/30/2014 >100,000 CFU/ml  Final     MRSA Culture Only   Date Value Ref Range Status   02/11/2019 Methicillin resistant Staph aureus not isolated  Final     Recent Labs     12/31/20  0111   PROCAL 0.08       ASSESSMENT:  · Bilateral lower leg extremity cellulitis right worse than the left with lymphedema. - some improvement     · Tinea pedis bilaterally -- may be the port of entry  · Right leg cellulitis is more consistent with staph. PLAN:  · Continue ancef 2g q8 for now  · tubigrips   · Topical miconazole and terbinafine  · Check final cultures - BC NGTD in 24 hours   · Monitor labs    Neeraj Victor M  3:23 PM  1/2/2021   Pt seen and examined. Above discussed agree with advanced practice nurse. Labs, cultures, and radiographs reviewed. Face to Face encounter occurred. Changes made as necessary.      Shanell Knott MD

## 2021-01-02 NOTE — PROGRESS NOTES
Attempted to call ECHO department to make sure patients ECHO is scheduled, no answer.  Will attempt again shortly

## 2021-01-02 NOTE — PROGRESS NOTES
Occupational Therapy  OCCUPATIONAL THERAPY INITIAL EVALUATION      Date:2021  Patient Name: Travis Page  MRN: [id number]  : 1940  Room: [address] [place] [address]    Referring Provider:  Lizeth Finn MD    Evaluating OT: Cindi Jung OTR/L #[id number]     AM-PAC Daily Activity Raw Score: 1824    Recommended Adaptive Equipment:  TBD     Diagnosis: Cellulitis R LE   Patient presented to the hospital with generalized edema and R LE redness     Pertinent Medical History:    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[facility] [address] 2019    Anemia     Arm numbness     Arthritis     Blood transfusion     Cervical spinal stenosis     Cirrhosis of liver ([facility] [address]     Diabetes mellitus ([facility] [address]     Fatty liver     GERD (gastroesophageal reflux disease)     Hyperlipidemia     Hypertension     Low back pain     Lumbar stenosis     Neck pain     Obesity (BMI 30.0-34.9) 10/14/2012    PAF (paroxysmal atrial fibrillation) (HCC)     Renal artery aneurysm ([facility] [address] 7/15/2015      Precautions:  Falls     Home Living: Pt lives with  in a 1 story condo with 2 DOMENICA and 1 hand rail   Bathroom setup: walk-in shower with shower chair   Equipment owned: w/w, cane, shower chair    Prior Level of Function: mod I with ADLs (except assist prn with sock), assist prn with IADLs; ambulated without AD; recently utilizing w/w in house  Driving: no  Occupation: na    Pain Level: Pt reports 8/10 R LE pain this session;  Therapist facilitated repositioning to address pain      Cognition: A&O: 4/4; Follows 2 step directions   Memory:  good   Sequencing:  good   Problem solving:  fair   Judgement/safety:  fair     Functional Assessment:   Initial Eval Status  Date: 21 Treatment Status  Date: Short Term Goals = Long Term Goals  Treatment frequency: 1-4x/wk; PRN   Feeding Independent       Grooming Stand by Assist    Standing at sink    Modified [place]    UB Dressing Stand by Assist   Modified [place] LB Dressing Moderate Assist   Stand by Assist    Bathing Minimal Assist  Stand by Assist    Toileting Stand by Assist   Modified [place]    Bed Mobility  Supine to sit: Stand by Assist   Sit to supine: NT   Supine to sit: Modified [place]   Sit to supine: Modified [place]    Functional Transfers Stand by Assist   Modified [place]    Functional Mobility Stand by Assist     Ambulated in room including to/from bathroom with w/w  Modified [place]    Balance Sitting:     Static:  indep    Dynamic:Sup  Standing: SBA     Activity Tolerance F    Light activity  F+   Visual/  Perceptual wfl                  Hand dominance: right     Strength ROM Additional Info:    RUE   4/5 wfl good  and wfl FMC/dexterity noted during ADL tasks     LUE 4/5 wfl good  and wfl FMC/dexterity noted during ADL tasks     Hearing: wfl  Sensation:wfl  Tone: wfl  Edema: B LE edema noted                            Comments: Upon arrival, patient supine in bed and agreeable to OT Session. Pt demonstrating fair+ understanding of education/techniques, requiring additional training / education. At end of session, patient seated in chair with call light and phone within reach. Pt would benefit from continued skilled OT to increase safety,  independence and quality of life. Treatment:  OT services provided: Facilitation of bed mobility, unsupported sitting balance (impacting ADLs; addressing posture, weight shifting, dynamic reaching), functional transfers (various surfaces: bed, toilet, chair), standing tolerance tasks (addressing posture, balance and activity tolerance while incorporating light functional reaching; impacting ADLs and functional activity) and functional ambulation tasks with w/w (including to/from bathroom ; cuing on posture and safety) - skilled cuing on hand placement, posture, body mechanics, energy conservation techniques and safety. Therapist facilitated self-care retraining: UB/LB self-care tasks (robe, socks), simulated toileting task and standing grooming tasks while educating pt on modified techniques, posture, safety and energy conservation techniques. Skilled monitoring of HR, O2 sats and pts response to treatment.         Assessment of current deficits    Functional mobility [x]  ADLs [x] Strength [x]  Cognition []  Functional transfers  [x] IADLs [x] Safety Awareness [x]  Endurance [x]  Fine Motor Coordination [] Balance [x] Vision/perception [] Sensation []   Gross Motor Coordination [] ROM [] Delirium []                  Motor Control []      Plan of Care:  1-4 days/wk for 1-2 weeks PRN   Instruction/training on adapted ADL techniques and AE recommendations to increase functional independence within precautions  Training on energy conservation strategies/techniques to improve independence/tolerance for self-care routine  Functional transfer/mobility training/DME recommendations for increased independence, safety, and fall prevention  Patient/Family education to increase follow through with safety techniques and functional independence  Recommendation of environmental modifications for increased safety with functional transfers/mobility and ADLs  Therapeutic exercise to improve motor endurance, ROM, and functional strength for ADLs/functional transfers Therapeutic activities to facilitate/challenge dynamic balance, stand tolerance, fine motor dexterity/in-hand manipulation for increased independence with ADLs  Neuro-muscular re-education: facilitation of righting/equilibrium reactions, midline orientation, scapular stability/mobility, normalization of muscle tone, and facilitation of volitional active controled movement      Rehab Potential: Good for established goals     Patient / Family Goal: return home      Patient and/or family were instructed on functional diagnosis, prognosis/goals and OT plan of care. Demonstrated fair+ understanding. AM-PAC Daily Activity Inpatient   How much help for putting on and taking off regular lower body clothing?: A Lot  How much help for Bathing?: A Little  How much help for Toileting?: A Little  How much help for putting on and taking off regular upper body clothing?: A Little  How much help for taking care of personal grooming?: A Little  How much help for eating meals?: None  AM-[facility] Inpatient Daily Activity Raw Score: 18  AM-PAC Inpatient ADL T-Scale Score : 38.66  ADL Inpatient CMS 0-100% Score: 46.65  ADL Inpatient CMS G-Code Modifier : CK         Eval Complexity: Low    Time In: 729  Time Out: 800  Total Treatment Time: 23 minutes    Min Units   OT Eval Low 97165  x  1   OT Eval Medium 42312      OT Eval High 04725       OT Re-Eval [id number]       Therapeutic Ex 98909       Therapeutic Activities 64130  86  1   ADL/Self Care 36061  9  1   Orthotic Management 45021       Neuro Re-Ed 12418       Non-Billable Time          Evaluation Time includes thorough review of current medical information, gathering information on past medical history/social history and prior level of function, completion of standardized testing/informal observation of tasks, assessment of data and education on plan of care and goals.            Jean Hester OTR/L #[id number]

## 2021-01-02 NOTE — PROGRESS NOTES
0111 21  1628   AST 18 16   ALT 15 6   BILITOT 0.9 0.7   ALKPHOS 83 83     Protein/ Albumin:    Lab Results   Component Value Date    LABALBU 4.2 2021               Procedure Component Value Ref Range Date/Time     US ABDOMEN LIMITED [[id number]] Resulted: 20 1054     Order Status: Completed Updated: 20 105     Narrative:       Patient MRN:  [id number]   : 1940   Age: [de-identified] years   Gender: Female   Order Date:  2020 10:42 AM   EXAM: US ABDOMEN LIMITED   NUMBER OF IMAGES:  16   INDICATION:  ascites check , dr Nery Loaiza to read   ascites check , dr Nery Loaiza to read   What reading provider will be dictating this exam?->[facility]   COMPARISON: None   Four-quadrant ultrasound was performed demonstrating no ascites      Impression:       unremarkable study. No ascites seen, not on the fluid   identified for paracentesis        Echocardiogram 2021   Left ventricular internal dimensions were normal in diastole and systole. Borderline concentric left ventricular hypertrophy. No regional wall motion abnormalities seen. Normal left ventricular ejection fraction. There is doppler evidence of stage II diastolic dysfunction. The left atrium is severely dilated. Mild mitral annular calcification. Mild mitral regurgitation is present. The aortic valve appears mildly sclerotic. Mild tricuspid regurgitation. Pulmonary hypertension is mild . There is a trivial posterior pericardial effusion noted.      Objective:     Vitals: BP (!) 130/58   Pulse 91   Temp 98.1 °F (36.7 °C) (Temporal)   Resp 16   Wt 209 lb (94.8 kg)   LMP  (LMP Unknown)   SpO2 93%   BMI 37.02 kg/m²   General appearance: Sitting up on a bedside chair not wearing oxygen awake alert and oriented not in any acute distress  Lungs: Good air movement bilaterally with no wheeze but has few scattered crepitations  Heart: No S3, No rub  Abdomen: Lax, soft No tenderness, no rebound tenderness, large reducible ventral hernia L. Extremities: Edema and redness right lower extremity. No edema left lower extremity. Assessment & Plan:     Edema: Likely secondary to diastolic dysfunction and pulmonary hypertension may be reflecting right-sided heart failure. Unfortunately, urine output is not documented but documented daily weight is down from 217---> 209 pounds. She does not have anasarca. No ascites. Not short of breath and not on oxygen. No chest x-ray was done during this admission but the patient had enhanced CT scan of the chest on 12/13/2020 which revealed no acute cardiopulmonary abnormality, no pulmonary edema, no pleural effusion. She developed acute kidney injury on twice daily Lasix and Zaroxolyn combination (creatinine 0.8 up to 1.1 likely reflecting prerenal azotemia) I discontinued Zaroxolyn and repeat serum creatinine today is stable at 1.0. She is also developing metabolic alkalosis (CO2 26 up to 31) likely reflecting contraction alkalosis. On 12/5/2020 patient had undetectable urine sodium chloride. The patient is on SGLT2 inhibitor which can cause osmotic diuresis. Continue to to monitor changes in serum creatinine, daily weight, evidence of pulmonary congestion, edema and blood pressure and adjust diuretics as needed. Watch for overdiuresis. Supplement hypokalemia likely related to diuretics    Anemia : Stable. Followed by PCP    Right lower extremity cellulitis followed by ID      This note was created using voice recognition software.     Electronically signed by Shelley Montes MD on 1/2/2021 at 4:18 PM

## 2021-01-02 NOTE — PROGRESS NOTES
Physical Therapy    Facility/Department: [facility]  Initial Assessment    NAME: Abimbola Katz  : 1940  MRN: [id number]    Date of Service: 2021    Referring Provider:  Isa Watkins MD    Date of Service: 2021    Evaluating PT:  John Bird PT, DPT, [id number]    Room #:  [facility]4/6553-N  Diagnosis:  Cellulitis of R LE  Precautions:  Falls   Equipment Needs:  None at this time. SUBJECTIVE:    Pt lives with her  in a 1 story condo with 2 stairs to enter and 1 rail. Pt ambulated with no PTA. Pt has a SPC and a WW at home. OBJECTIVE:   Initial Evaluation  Date: 2021 Treatment Short Term/ Long Term   Goals   AM-PAC 6 Clicks 10/26     Was pt agreeable to Eval/treatment? Yes      Does pt have pain? 10/10 R LE     Bed Mobility  Rolling: TBA  Supine to sit: TBA  Sit to supine: TBA  Scooting: TBA  Pt seated in bedside chair before and after evaluation. Rolling: modified independent   Supine to sit: modified independent   Sit to supine: modified independent   Scooting: modified independent    Transfers Sit to stand: SBA  Stand to sit: SBA  Stand pivot: SBA with SPC  Sit to stand: modified independent   Stand to sit: modified independent   Stand pivot: modified independent    Ambulation    48' with SPC CGA  >80' with [address] modified independent    Stair negotiation: ascended and descended  TBA  2 steps with 1 HR and AAD if needed modified independent    ROM BUE:  Defer to OT. BLE:  AROM B LE's WFL     Strength BUE:  Defer to OT. BLE:  At least 3/5 with functional mobility. Increase B LE strength to at least 4/5 to 4+/5 throughout. Balance Sitting EOB:  NT  Dynamic Standing:  CGA  Sitting EOB:  Independent   Dynamic Standing:  modified independent      Pt is A & O x 3  Sensation:  Pt reported numbness/tingling in B feet. Edema:  Noted redness and moderate edema in R lower leg. Vitals:  Not assessed during evaluation.   Blood Pressure at rest  Blood Pressure post session Total Treatment Time:  0 minutes     Evaluation Time includes thorough review of current medical information, gathering information on past medical history/social history and prior level of function, completion of standardized testing/informal observation of tasks, assessment of data and education on plan of care and goals.     CPT codes:  [x] Low Complexity PT evaluation 75429  [] Moderate Complexity PT evaluation 16709  [] High Complexity PT evaluation 72737  [] PT Re-evaluation 64267  [] Gait training 30937 - 0 minutes  [] Manual therapy 42447 - 0 minutes  [] Therapeutic activities 52195 - 0 minutes  [] Therapeutic exercises 30698 - 0 minutes  [] Neuromuscular reeducation 05826 - 0 minutes     Gato Medina, PT, DPT   [id number]

## 2021-01-02 NOTE — PROGRESS NOTES
Last 3 Troponin:    Lab Results   Component Value Date    TROPONINI 0.03 12/05/2020    TROPONINI 0.01 12/28/2017    TROPONINI <0.01 12/28/2017     ABG:  No results found for: PH, PCO2, PO2, HCO3, BE, THGB, TCO2, O2SAT  IRON:  No results found for: IRON  IMAGING    Ct Abdomen Pelvis W Iv Contrast Additional Contrast? None    Result Date: 12/5/2020  EXAMINATION: CT OF THE ABDOMEN AND PELVIS WITH CONTRAST 12/5/2020 2:40 pm TECHNIQUE: CT of the abdomen and pelvis was performed with the administration of intravenous contrast. Multiplanar reformatted images are provided for review. Dose modulation, iterative reconstruction, and/or weight based adjustment of the mA/kV was utilized to reduce the radiation dose to as low as reasonably achievable. COMPARISON: February 27, 2019 HISTORY: ORDERING SYSTEM PROVIDED HISTORY: epigastric pain TECHNOLOGIST PROVIDED HISTORY: Additional Contrast?->None Reason for exam:->epigastric pain What reading provider will be dictating this exam?->CRC FINDINGS: Lower Chest: Atelectasis and chronic pleuroparenchymal scarring noted at the lung bases. No significant pleural effusion. The heart is enlarged. Eventration of the right hemidiaphragm with protrusion of the liver and subphrenic fluid collection. Organs: The liver is cirrhotic. Spleen is not enlarged. Pancreas is atrophic. The adrenal glands appear unremarkable. The kidneys demonstrate symmetric enhancement with no hydronephrosis. Gallbladder not visualized. GI/Bowel: No obstructing or constricting mass lesions. Pelvis: No pathologic masses or adenopathy. Peritoneum/Retroperitoneum: Moderate ascites. No significant adenopathy. Bones/Soft Tissues: Mild subcutaneous edema. Degenerative changes of the spine and hips. There is scoliosis of the lumbar spine. Cirrhosis. Bibasilar atelectasis and pleuroparenchymal scarring, both lung bases. Moderate ascites.     Cta Chest W Contrast    Result Date: 12/5/2020 EXAMINATION: CTA OF THE CHEST 12/5/2020 2:40 pm TECHNIQUE: CTA of the chest was performed after the administration of intravenous contrast.  Multiplanar reformatted images are provided for review. MIP images are provided for review. Dose modulation, iterative reconstruction, and/or weight based adjustment of the mA/kV was utilized to reduce the radiation dose to as low as reasonably achievable. COMPARISON: None. HISTORY: ORDERING SYSTEM PROVIDED HISTORY: hypoxia TECHNOLOGIST PROVIDED HISTORY: Reason for exam:->hypoxia What reading provider will be dictating this exam?->CRC FINDINGS: Pulmonary Arteries: No pulmonary embolism is seen. Suboptimal evaluation of the peripheral pulmonary arteries in the lower lobes due to prominent respiratory motion artifact. The main pulmonary artery is normal in caliber. Mediastinum: The heart is enlarged. Prominent calcified atherosclerosis seen in the left anterior descending coronary artery. Mild calcified atherosclerosis is seen in the aorta. No aneurysm. Mildly enlarged lymph nodes are seen in the mediastinum, with the largest in the right paratracheal region measuring approximately 2.4 x 1.8 cm. No significant enlargement of the axillary or hilar lymph nodes is seen. Lungs/pleura: Minimal dependent atelectasis or scarring is seen in the lower lobes and lingula. The lungs are otherwise clear. The central airway is clear. No pneumothorax or pleural effusion is seen. Upper Abdomen: Mild irregularity of the liver contour suggests cirrhosis. Small volume perihepatic ascites is seen. Reflux of contrast into the hepatic veins may signify right heart failure. PRN Meds:perflutren lipid microspheres, HYDROcodone-acetaminophen, dicyclomine, traMADol, sodium chloride flush, promethazine **OR** ondansetron, polyethylene glycol, acetaminophen **OR** acetaminophen, glucose, dextrose, glucagon (rDNA), dextrose    A/P:      Patient Active Problem List   Diagnosis    Spinal stenosis in cervical region    Disorder of muscle, ligament, and fascia    Displacement of lumbar intervertebral disc without myelopathy    Spinal stenosis, lumbar region, without neurogenic claudication    Essential hypertension    Mixed hyperlipidemia    DM (diabetes mellitus) ([facility] [address]    Obesity (BMI 30.0-34. 9)    Other chest pain    Left wrist pain    Renal artery aneurysm (HCC)    Chronic neck pain    Chronic back pain    A-fib (HCC)    PAF (paroxysmal atrial fibrillation) (HCC)    Anemia    Chronic anticoagulation    Class 1 obesity due to excess calories without serious comorbidity with body mass index (BMI) of 34.0 to 34.9 in adult    Malignant neoplasm of cecum (HCC)    Nonrheumatic aortic valve stenosis    Pulmonary HTN (HCC)    Hyponatremia    Abdominal pain    Cellulitis of right leg    wt is down per bed scale significantly    PLAN:  Cont diuresis  RE-ORDER ECHO- CHECK W/NURSING  CONT WOUND CARE/ATBX    I can be reached though [facility] or [facility] [place] at [phone]

## 2021-01-03 LAB
ANION GAP SERPL CALCULATED.3IONS-SCNC: 14 MMOL/L (ref 7–16)
BASOPHILS ABSOLUTE: 0.06 E9/L (ref 0–0.2)
BASOPHILS RELATIVE PERCENT: 0.5 % (ref 0–2)
BUN BLDV-MCNC: 17 MG/DL (ref 8–23)
CALCIUM SERPL-MCNC: 9.3 MG/DL (ref 8.6–10.2)
CHLORIDE BLD-SCNC: 91 MMOL/L (ref 98–107)
CO2: 33 MMOL/L (ref 22–29)
CREAT SERPL-MCNC: 0.9 MG/DL (ref 0.5–1)
EOSINOPHILS ABSOLUTE: 0.48 E9/L (ref 0.05–0.5)
EOSINOPHILS RELATIVE PERCENT: 3.8 % (ref 0–6)
GFR AFRICAN AMERICAN: >60
GFR NON-AFRICAN AMERICAN: >60 ML/MIN/1.73
GLUCOSE BLD-MCNC: 116 MG/DL (ref 74–99)
HCT VFR BLD CALC: 34.1 % (ref 34–48)
HEMOGLOBIN: 10.1 G/DL (ref 11.5–15.5)
IMMATURE GRANULOCYTES #: 0.07 E9/L
IMMATURE GRANULOCYTES %: 0.6 % (ref 0–5)
LYMPHOCYTES ABSOLUTE: 2.03 E9/L (ref 1.5–4)
LYMPHOCYTES RELATIVE PERCENT: 16 % (ref 20–42)
MAGNESIUM: 1.3 MG/DL (ref 1.6–2.6)
MCH RBC QN AUTO: 23 PG (ref 26–35)
MCHC RBC AUTO-ENTMCNC: 29.6 % (ref 32–34.5)
MCV RBC AUTO: 77.5 FL (ref 80–99.9)
METER GLUCOSE: 129 MG/DL (ref 74–99)
METER GLUCOSE: 144 MG/DL (ref 74–99)
METER GLUCOSE: 156 MG/DL (ref 74–99)
METER GLUCOSE: 180 MG/DL (ref 74–99)
MONOCYTES ABSOLUTE: 0.78 E9/L (ref 0.1–0.95)
MONOCYTES RELATIVE PERCENT: 6.2 % (ref 2–12)
NEUTROPHILS ABSOLUTE: 9.23 E9/L (ref 1.8–7.3)
NEUTROPHILS RELATIVE PERCENT: 72.9 % (ref 43–80)
PDW BLD-RTO: 17.6 FL (ref 11.5–15)
PHOSPHORUS: 3.6 MG/DL (ref 2.5–4.5)
PLATELET # BLD: 485 E9/L (ref 130–450)
PMV BLD AUTO: 8.9 FL (ref 7–12)
POTASSIUM SERPL-SCNC: 3.3 MMOL/L (ref 3.5–5)
RBC # BLD: 4.4 E12/L (ref 3.5–5.5)
SODIUM BLD-SCNC: 138 MMOL/L (ref 132–146)
WBC # BLD: 12.7 E9/L (ref 4.5–11.5)

## 2021-01-03 PROCEDURE — 85025 COMPLETE CBC W/AUTO DIFF WBC: CPT

## 2021-01-03 PROCEDURE — 80048 BASIC METABOLIC PNL TOTAL CA: CPT

## 2021-01-03 PROCEDURE — 82962 GLUCOSE BLOOD TEST: CPT

## 2021-01-03 PROCEDURE — 6360000002 HC RX W HCPCS: Performed by: SPECIALIST

## 2021-01-03 PROCEDURE — 84100 ASSAY OF PHOSPHORUS: CPT

## 2021-01-03 PROCEDURE — 6360000002 HC RX W HCPCS: Performed by: INTERNAL MEDICINE

## 2021-01-03 PROCEDURE — 36415 COLL VENOUS BLD VENIPUNCTURE: CPT

## 2021-01-03 PROCEDURE — 83735 ASSAY OF MAGNESIUM: CPT

## 2021-01-03 PROCEDURE — 1200000000 HC SEMI PRIVATE

## 2021-01-03 PROCEDURE — 6370000000 HC RX 637 (ALT 250 FOR IP): Performed by: INTERNAL MEDICINE

## 2021-01-03 PROCEDURE — 6370000000 HC RX 637 (ALT 250 FOR IP): Performed by: SPECIALIST

## 2021-01-03 PROCEDURE — 2580000003 HC RX 258: Performed by: INTERNAL MEDICINE

## 2021-01-03 RX ORDER — BUMETANIDE 1 MG/1
2 TABLET ORAL DAILY
Status: DISCONTINUED | OUTPATIENT
Start: 2021-01-04 | End: 2021-01-04 | Stop reason: HOSPADM

## 2021-01-03 RX ADMIN — PANTOPRAZOLE SODIUM 40 MG: 40 TABLET, DELAYED RELEASE ORAL at 07:00

## 2021-01-03 RX ADMIN — POTASSIUM CHLORIDE 10 MEQ: 750 TABLET, EXTENDED RELEASE ORAL at 08:43

## 2021-01-03 RX ADMIN — Medication 2 G: at 08:42

## 2021-01-03 RX ADMIN — POTASSIUM CHLORIDE 10 MEQ: 750 TABLET, EXTENDED RELEASE ORAL at 21:00

## 2021-01-03 RX ADMIN — TERBINAFINE 250 MG: 250 TABLET ORAL at 08:42

## 2021-01-03 RX ADMIN — FUROSEMIDE 20 MG: 10 INJECTION, SOLUTION INTRAMUSCULAR; INTRAVENOUS at 08:43

## 2021-01-03 RX ADMIN — MICONAZOLE NITRATE 5 G: 20.6 POWDER TOPICAL at 21:01

## 2021-01-03 RX ADMIN — AMLODIPINE BESYLATE 5 MG: 5 TABLET ORAL at 08:42

## 2021-01-03 RX ADMIN — SODIUM CHLORIDE, PRESERVATIVE FREE 10 ML: 5 INJECTION INTRAVENOUS at 08:43

## 2021-01-03 RX ADMIN — HYDROXYZINE HYDROCHLORIDE 10 MG: 10 TABLET, FILM COATED ORAL at 21:01

## 2021-01-03 RX ADMIN — RIVAROXABAN 20 MG: 20 TABLET, FILM COATED ORAL at 17:32

## 2021-01-03 RX ADMIN — OXYBUTYNIN CHLORIDE 5 MG: 5 TABLET ORAL at 21:02

## 2021-01-03 RX ADMIN — HYDROCODONE BITARTRATE AND ACETAMINOPHEN 1 TABLET: 7.5; 325 TABLET ORAL at 07:00

## 2021-01-03 RX ADMIN — PRAMIPEXOLE DIHYDROCHLORIDE 0.5 MG: 0.25 TABLET ORAL at 21:00

## 2021-01-03 RX ADMIN — INSULIN LISPRO 1 UNITS: 100 INJECTION, SOLUTION INTRAVENOUS; SUBCUTANEOUS at 08:44

## 2021-01-03 RX ADMIN — PANTOPRAZOLE SODIUM 40 MG: 40 TABLET, DELAYED RELEASE ORAL at 15:43

## 2021-01-03 RX ADMIN — MICONAZOLE NITRATE 5 G: 20.6 POWDER TOPICAL at 08:44

## 2021-01-03 RX ADMIN — INSULIN LISPRO 1 UNITS: 100 INJECTION, SOLUTION INTRAVENOUS; SUBCUTANEOUS at 11:58

## 2021-01-03 RX ADMIN — OXYBUTYNIN CHLORIDE 5 MG: 5 TABLET ORAL at 08:42

## 2021-01-03 RX ADMIN — TRAMADOL HYDROCHLORIDE 50 MG: 50 TABLET, FILM COATED ORAL at 15:43

## 2021-01-03 RX ADMIN — PREGABALIN 50 MG: 50 CAPSULE ORAL at 08:42

## 2021-01-03 RX ADMIN — DULOXETINE HYDROCHLORIDE 20 MG: 20 CAPSULE, DELAYED RELEASE ORAL at 08:43

## 2021-01-03 RX ADMIN — METOPROLOL TARTRATE 25 MG: 25 TABLET, FILM COATED ORAL at 21:01

## 2021-01-03 RX ADMIN — METOPROLOL TARTRATE 25 MG: 25 TABLET, FILM COATED ORAL at 08:42

## 2021-01-03 RX ADMIN — Medication 2 G: at 15:43

## 2021-01-03 RX ADMIN — HYDROCODONE BITARTRATE AND ACETAMINOPHEN 1 TABLET: 7.5; 325 TABLET ORAL at 19:57

## 2021-01-03 RX ADMIN — SODIUM CHLORIDE, PRESERVATIVE FREE 10 ML: 5 INJECTION INTRAVENOUS at 21:01

## 2021-01-03 ASSESSMENT — PAIN DESCRIPTION - ONSET
ONSET: ON-GOING
ONSET: ON-GOING

## 2021-01-03 ASSESSMENT — PAIN DESCRIPTION - FREQUENCY: FREQUENCY: CONTINUOUS

## 2021-01-03 ASSESSMENT — PAIN SCALES - GENERAL
PAINLEVEL_OUTOF10: 10
PAINLEVEL_OUTOF10: 10
PAINLEVEL_OUTOF10: 9
PAINLEVEL_OUTOF10: 6
PAINLEVEL_OUTOF10: 0
PAINLEVEL_OUTOF10: 4

## 2021-01-03 ASSESSMENT — PAIN DESCRIPTION - DESCRIPTORS
DESCRIPTORS: ACHING;DISCOMFORT
DESCRIPTORS: ACHING;CONSTANT;DISCOMFORT

## 2021-01-03 ASSESSMENT — PAIN DESCRIPTION - ORIENTATION: ORIENTATION: RIGHT

## 2021-01-03 ASSESSMENT — PAIN DESCRIPTION - PAIN TYPE
TYPE: ACUTE PAIN
TYPE: ACUTE PAIN

## 2021-01-03 ASSESSMENT — PAIN DESCRIPTION - LOCATION
LOCATION: LEG
LOCATION: LEG

## 2021-01-03 NOTE — PROGRESS NOTES
ORG Gram-positive cocci 11/06/2014    ORG Enterococcus faecalis 10/30/2014     Lab Results   Component Value Date    BLOODCULT2 24 Hours no growth 12/31/2020    BLOODCULT2 5 Days no growth 12/08/2020    BLOODCULT2 5 Days- no growth 02/21/2019    ORG Escherichia coli 02/24/2019    ORG Gram-positive cocci 11/06/2014    ORG Enterococcus faecalis 10/30/2014     No results found for: WNDABS  No results found for: RESPSMEAR  No results found for: MPNEUMO, CLAMYDCU, LABLEGI, AFBCX, FUNGSM, LABFUNG  No results found for: CULTRESP  No results found for: CXCATHTIP  No results found for: BFCS  No results found for: CXSURG  Urine Culture, Routine   Date Value Ref Range Status   02/24/2019 >100,000 CFU/ml  Final   10/30/2014 >100,000 CFU/ml  Final     MRSA Culture Only   Date Value Ref Range Status   02/11/2019 Methicillin resistant Staph aureus not isolated  Final     No results for input(s): PROCAL in the last 72 hours. ASSESSMENT:  · Bilateral lower leg extremity cellulitis right worse than the left with lymphedema. - some improvement     · Tinea pedis bilaterally -- may be the port of entry  · Right leg cellulitis is more consistent with staph. PLAN:  · Continue ancef 2g q8 for now - can likely transition to orals tomorrow   · tubigrips   · Topical miconazole and terbinafine  · Check final cultures - BC NGTD  · Monitor labs    Juan Villalba  2:47 PM  1/3/2021   Pt seen and examined. Above discussed agree with advanced practice nurse. Labs, cultures, and radiographs reviewed. Face to Face encounter occurred. Changes made as necessary.      Saira Calero MD

## 2021-01-03 NOTE — PROGRESS NOTES
PT SEEN AND EXAMINED. ECHO OK  Chart reviewed. meds reviewed. D/w nursing + family as available. EXAM: IN GENERAL, NAD. AWAKE AND ALERT. ROS NEGx10 EXCEPT:   BP (!) 123/58   Pulse 92   Temp 97.8 °F (36.6 °C) (Temporal)   Resp 18   Wt 209 lb (94.8 kg)   LMP  (LMP Unknown)   SpO2 95%   BMI 37.02 kg/m²   GEN: A+O NAD. HEENT: NCAT. EOMI. LUKE  NECK: NO JVD. TRACH MIDLINE. NO BRUITS. NO THYROMEGALY. LUNGS: CTA BL NO RALES, RHONCHI OR WHEEZES. GOOD EXCURSION. CV: Regular rate and rhythm, NO Murmurs, Rubs, Or gallops  ABD: Soft. Nontender. Normal bowel sounds. No organomegaly  EXT:No clubbing cyanosis or edema  Neuro: Alert and oriented x 3. No focal motor deficits. No sensory deficits. Reflexes appear intact.   Labs/data reviewedLABS: CBC with Differential:    Lab Results   Component Value Date    WBC 12.7 01/03/2021    RBC 4.40 01/03/2021    HGB 10.1 01/03/2021    HCT 34.1 01/03/2021     01/03/2021    MCV 77.5 01/03/2021    MCH 23.0 01/03/2021    MCHC 29.6 01/03/2021    RDW 17.6 01/03/2021    LYMPHOPCT 16.0 01/03/2021    MONOPCT 6.2 01/03/2021    BASOPCT 0.5 01/03/2021    MONOSABS 0.78 01/03/2021    LYMPHSABS 2.03 01/03/2021    EOSABS 0.48 01/03/2021    BASOSABS 0.06 01/03/2021     Platelets:    Lab Results   Component Value Date     01/03/2021     CMP:    Lab Results   Component Value Date     01/03/2021    K 3.3 01/03/2021    K 3.6 12/31/2020    CL 91 01/03/2021    CO2 33 01/03/2021    BUN 17 01/03/2021    CREATININE 0.9 01/03/2021    GFRAA >60 01/03/2021    LABGLOM >60 01/03/2021    GLUCOSE 116 01/03/2021    PROT 7.7 01/01/2021    LABALBU 4.2 01/01/2021    CALCIUM 9.3 01/03/2021    BILITOT 0.7 01/01/2021    ALKPHOS 83 01/01/2021    AST 16 01/01/2021    ALT 6 01/01/2021     Magnesium:    Lab Results   Component Value Date    MG 1.3 01/03/2021     LDH:  No results found for: LDH  PT/INR:    Lab Results   Component Value Date    PROTIME 21.1 12/31/2020    INR 1.9 12/31/2020 Last 3 Troponin:    Lab Results   Component Value Date    TROPONINI 0.03 12/05/2020    TROPONINI 0.01 12/28/2017    TROPONINI <0.01 12/28/2017     ABG:  No results found for: PH, PCO2, PO2, HCO3, BE, THGB, TCO2, O2SAT  IRON:  No results found for: IRON  IMAGING    Ct Abdomen Pelvis W Iv Contrast Additional Contrast? None    Result Date: 12/5/2020  EXAMINATION: CT OF THE ABDOMEN AND PELVIS WITH CONTRAST 12/5/2020 2:40 pm TECHNIQUE: CT of the abdomen and pelvis was performed with the administration of intravenous contrast. Multiplanar reformatted images are provided for review. Dose modulation, iterative reconstruction, and/or weight based adjustment of the mA/kV was utilized to reduce the radiation dose to as low as reasonably achievable. COMPARISON: February 27, 2019 HISTORY: ORDERING SYSTEM PROVIDED HISTORY: epigastric pain TECHNOLOGIST PROVIDED HISTORY: Additional Contrast?->None Reason for exam:->epigastric pain What reading provider will be dictating this exam?->CRC FINDINGS: Lower Chest: Atelectasis and chronic pleuroparenchymal scarring noted at the lung bases. No significant pleural effusion. The heart is enlarged. Eventration of the right hemidiaphragm with protrusion of the liver and subphrenic fluid collection. Organs: The liver is cirrhotic. Spleen is not enlarged. Pancreas is atrophic. The adrenal glands appear unremarkable. The kidneys demonstrate symmetric enhancement with no hydronephrosis. Gallbladder not visualized. GI/Bowel: No obstructing or constricting mass lesions. Pelvis: No pathologic masses or adenopathy. Peritoneum/Retroperitoneum: Moderate ascites. No significant adenopathy. Bones/Soft Tissues: Mild subcutaneous edema. Degenerative changes of the spine and hips. There is scoliosis of the lumbar spine. Cirrhosis. Bibasilar atelectasis and pleuroparenchymal scarring, both lung bases. Moderate ascites.     Cta Chest W Contrast    Result Date: 12/5/2020 EXAMINATION: CTA OF THE CHEST 12/5/2020 2:40 pm TECHNIQUE: CTA of the chest was performed after the administration of intravenous contrast.  Multiplanar reformatted images are provided for review. MIP images are provided for review. Dose modulation, iterative reconstruction, and/or weight based adjustment of the mA/kV was utilized to reduce the radiation dose to as low as reasonably achievable. COMPARISON: None. HISTORY: ORDERING SYSTEM PROVIDED HISTORY: hypoxia TECHNOLOGIST PROVIDED HISTORY: Reason for exam:->hypoxia What reading provider will be dictating this exam?->CRC FINDINGS: Pulmonary Arteries: No pulmonary embolism is seen. Suboptimal evaluation of the peripheral pulmonary arteries in the lower lobes due to prominent respiratory motion artifact. The main pulmonary artery is normal in caliber. Mediastinum: The heart is enlarged. Prominent calcified atherosclerosis seen in the left anterior descending coronary artery. Mild calcified atherosclerosis is seen in the aorta. No aneurysm. Mildly enlarged lymph nodes are seen in the mediastinum, with the largest in the right paratracheal region measuring approximately 2.4 x 1.8 cm. No significant enlargement of the axillary or hilar lymph nodes is seen. Lungs/pleura: Minimal dependent atelectasis or scarring is seen in the lower lobes and lingula. The lungs are otherwise clear. The central airway is clear. No pneumothorax or pleural effusion is seen. Upper Abdomen: Mild irregularity of the liver contour suggests cirrhosis. Small volume perihepatic ascites is seen. Reflux of contrast into the hepatic veins may signify right heart failure. 1. No pulmonary embolism is seen. 2.  No acute cardiopulmonary abnormality. 3. Cardiomegaly. No pulmonary edema or pleural effusion. 4. Mild mediastinal lymphadenopathy, which may be reactive. Given patient's history of cecal adenocarcinoma, a metastatic etiology cannot be excluded. 5. Irregular liver contour indicating cirrhosis. Small volume perihepatic ascites. 6. Reflux of contrast into the hepatic veins may signify right heart failure. Us Abdomen Limited    Result Date: 2020  Patient MRN:  [id number] : 1940 Age: [de-identified] years Gender: Female Order Date:  2020 10:42 AM EXAM: US ABDOMEN LIMITED NUMBER OF IMAGES:  16 INDICATION:  ascites check , dr Daryn Ballesteros to read ascites check , dr Daryn Ballesteros to read What reading provider will be dictating this exam?->[facility] COMPARISON: None Four-quadrant ultrasound was performed demonstrating no ascites     unremarkable study. No ascites seen, not on the fluid identified for paracentesis . DIET:  DIET LOW SODIUM 2 GM;     Medications:    Scheduled Meds:   ceFAZolin  2 g Intravenous Q8H    miconazole  5 g Topical BID    terbinafine  250 mg Oral Daily    rivaroxaban  20 mg Oral Daily    amLODIPine  5 mg Oral Daily    DULoxetine  20 mg Oral Daily    empagliflozin  10 mg Oral Daily    hydrOXYzine  10 mg Oral Nightly    metoprolol tartrate  25 mg Oral BID    oxybutynin  5 mg Oral BID    pantoprazole  40 mg Oral BID AC    potassium chloride  10 mEq Oral BID    pramipexole  0.5 mg Oral Nightly    pregabalin  50 mg Oral Daily    insulin lispro  0-6 Units Subcutaneous TID WC    insulin lispro  0-3 Units Subcutaneous Nightly    sodium chloride flush  10 mL Intravenous 2 times per day    furosemide  20 mg Intravenous Q12H       Continuous Infusions:   dextrose

## 2021-01-03 NOTE — PROGRESS NOTES
Nephrology Progress Note  Patient's Name: Namrata Jackson  5:49 PM  1/3/2021        Reason for Consult:  Volume overload  Requesting Physician:  Tyra Morrow MD    Chief Complaint: Generalized edema; redness right leg  History Obtained From:  Patient; EHR    History of Present Ilness:    Namrata Jackson is a [de-identified] y.o. female with a history of: CVA, hypertension, hyperlipidemia, type 2 diabetes mellitus and other medical problems. She was a direct admit from her PCP office for progressive generalized edema and redness of her right lower extremity. Patient states that that she has observed at least a 10 to 12 pound weight gain over the course of last 2 weeks. She has been on diuretics had had been compliant with her diuretics. This was adjusted as an outpatient however her generalized edema continued. She denies any fever or chills. No trauma to her right leg. Lab data significant for normal kidney function, albumin of 4.1, WBC 11.4 and a hemoglobin of 8.5 . Renal is consulted for generalized edema and to assist with diuretic management. Patient was seen by our service when she presented about 3 weeks ago with hyponatremia.     Subjective:     1/3: legs edema improving          Allergies:  Benadryl [diphenhydramine], Fish-derived products, and Iodine    Current Medications:        ceFAZolin (ANCEF) 2 g in sterile water 20 mL IV syringe, Q8H      perflutren lipid microspheres (DEFINITY) injection 1.65 mg, ONCE PRN      miconazole (MICOTIN) 2 % powder 5 g, BID      terbinafine (LAMISIL) tablet 250 mg, Daily      rivaroxaban (XARELTO) tablet 20 mg, Daily      HYDROcodone-acetaminophen (NORCO) 7.5-325 MG per tablet 1 tablet, Q6H PRN      amLODIPine (NORVASC) tablet 5 mg, Daily      dicyclomine (BENTYL) capsule 10 mg, TID PRN      DULoxetine (CYMBALTA) extended release capsule 20 mg, Daily      empagliflozin (JARDIANCE) tablet TABS 10 mg, Daily      hydrOXYzine (ATARAX) tablet 10 mg, Nightly   metoprolol tartrate (LOPRESSOR) tablet 25 mg, BID      oxybutynin (DITROPAN) tablet 5 mg, BID      pantoprazole (PROTONIX) tablet 40 mg, BID AC      potassium chloride (KLOR-CON M) extended release tablet 10 mEq, BID      pramipexole (MIRAPEX) tablet 0.5 mg, Nightly      pregabalin (LYRICA) capsule 50 mg, Daily      traMADol (ULTRAM) tablet 50 mg, Q6H PRN      insulin lispro (HUMALOG) injection vial 0-6 Units, TID WC      insulin lispro (HUMALOG) injection vial 0-3 Units, Nightly      sodium chloride flush 0.9 % injection 10 mL, 2 times per day      sodium chloride flush 0.9 % injection 10 mL, PRN      promethazine (PHENERGAN) tablet 12.5 mg, Q6H PRN    Or      ondansetron (ZOFRAN) injection 4 mg, Q6H PRN      polyethylene glycol (GLYCOLAX) packet 17 g, Daily PRN      acetaminophen (TYLENOL) tablet 650 mg, Q6H PRN    Or      acetaminophen (TYLENOL) suppository 650 mg, Q6H PRN      furosemide (LASIX) injection 20 mg, Q12H      glucose (GLUTOSE) 40 % oral gel 15 g, PRN      dextrose 50 % IV solution, PRN      glucagon (rDNA) injection 1 mg, PRN      dextrose 5 % solution, PRN        Review of Systems:   Pertinent items are noted in HPI. Physical exam:  Vitals:    01/03/21 1429   BP: (!) 123/58   Pulse: 96   Resp: 16   Temp: 98 °F (36.7 °C)   SpO2: 92%           General: No distress. Alert. Eyes: PERRL. No sclera icterus. No conjunctival injection. ENT: No discharge. Pharynx clear. Neck: Trachea midline. Normal thyroid. Lungs: No accessory muscle use. No crackles. No wheezing. No rhonchi. CV: Regular rate. Regular rhythm. No murmur or rub. .   Abd: Non-tender. Non-distended. No masses. No organmegaly. Normal bowel sounds. Skin: Warm and dry. No nodule on exposed extremities. No rash on exposed extremities. Ext: No cyanosis, clubbing, 1+ pitting bilateral legs edema; R leg warm redness improved  Neuro: Awake. Follows commands. Positive pupils/gag/corneals. Normal pain response. Data:   Labs:  Lab Results   Component Value Date     2021     2021     2021    K 3.3 (L) 2021    K 3.4 (L) 2021    K 3.9 2021    CL 91 (L) 2021    CO2 33 (H) 2021    CO2 34 (H) 2021    CO2 31 (H) 2021    CREATININE 0.9 2021    CREATININE 1.0 2021    CREATININE 1.1 (H) 2021    BUN 17 2021    BUN 12 2021    BUN 11 2021    GLUCOSE 116 (H) 2021    GLUCOSE 103 (H) 2021    GLUCOSE 171 (H) 2021    PHOS 3.6 2021    PHOS 1.6 (L) 2019    WBC 12.7 (H) 2021    WBC 10.9 2021    WBC 11.1 2021    HGB 10.1 (L) 2021    HGB 9.4 (L) 2021    HGB 9.4 (L) 2021    HCT 34.1 2021    HCT 32.6 (L) 2021    HCT 32.0 (L) 2021    MCV 77.5 (L) 2021     (H) 2021         Imaging:  Us Abdomen Limited    Result Date: 2020  Patient MRN:  [id number] : 1940 Age: [de-identified] years Gender: Female Order Date:  2020 10:42 AM EXAM: US ABDOMEN LIMITED NUMBER OF IMAGES:  16 INDICATION:  ascites check , dr Mariely Anand to read ascites check , dr Mariely Anand to read What reading provider will be dictating this exam?->[facility] COMPARISON: None Four-quadrant ultrasound was performed demonstrating no ascites     unremarkable study. No ascites seen, not on the fluid identified for paracentesis . Assessment /Plans    1. Volume overload demonstrated by significant peripheral edema; etiology not entirely clear liver and renal functions within normal limits. No hypoalbuminemia; possible exacerbation of HFpEF  -Diuresis; she appears to be responding to current diuretic regimen will continue same; transition to oral diuretics   -Fluid and sodium restriction     2. Right lower extremity cellulitis  -Currently on cefazolin  -ID following    3.   Anemia due to chronic disease  -Check iron stores and monitor       Laxmi Mello MD  5:49 PM  1/3/2021

## 2021-01-04 VITALS
TEMPERATURE: 98.4 F | OXYGEN SATURATION: 93 % | HEART RATE: 86 BPM | DIASTOLIC BLOOD PRESSURE: 45 MMHG | WEIGHT: 209 LBS | SYSTOLIC BLOOD PRESSURE: 116 MMHG | RESPIRATION RATE: 16 BRPM | BODY MASS INDEX: 37.02 KG/M2

## 2021-01-04 LAB
BASOPHILS ABSOLUTE: 0.07 E9/L (ref 0–0.2)
BASOPHILS RELATIVE PERCENT: 0.6 % (ref 0–2)
EOSINOPHILS ABSOLUTE: 0.52 E9/L (ref 0.05–0.5)
EOSINOPHILS RELATIVE PERCENT: 4.2 % (ref 0–6)
HCT VFR BLD CALC: 35.5 % (ref 34–48)
HEMOGLOBIN: 10.4 G/DL (ref 11.5–15.5)
IMMATURE GRANULOCYTES #: 0.07 E9/L
IMMATURE GRANULOCYTES %: 0.6 % (ref 0–5)
LYMPHOCYTES ABSOLUTE: 2.08 E9/L (ref 1.5–4)
LYMPHOCYTES RELATIVE PERCENT: 16.9 % (ref 20–42)
MCH RBC QN AUTO: 22.9 PG (ref 26–35)
MCHC RBC AUTO-ENTMCNC: 29.3 % (ref 32–34.5)
MCV RBC AUTO: 78.2 FL (ref 80–99.9)
METER GLUCOSE: 153 MG/DL (ref 74–99)
METER GLUCOSE: 183 MG/DL (ref 74–99)
MONOCYTES ABSOLUTE: 0.83 E9/L (ref 0.1–0.95)
MONOCYTES RELATIVE PERCENT: 6.7 % (ref 2–12)
NEUTROPHILS ABSOLUTE: 8.74 E9/L (ref 1.8–7.3)
NEUTROPHILS RELATIVE PERCENT: 71 % (ref 43–80)
PDW BLD-RTO: 17.6 FL (ref 11.5–15)
PLATELET # BLD: 463 E9/L (ref 130–450)
PMV BLD AUTO: 9 FL (ref 7–12)
RBC # BLD: 4.54 E12/L (ref 3.5–5.5)
WBC # BLD: 12.3 E9/L (ref 4.5–11.5)

## 2021-01-04 PROCEDURE — 6370000000 HC RX 637 (ALT 250 FOR IP): Performed by: INTERNAL MEDICINE

## 2021-01-04 PROCEDURE — 6370000000 HC RX 637 (ALT 250 FOR IP): Performed by: SPECIALIST

## 2021-01-04 PROCEDURE — 82962 GLUCOSE BLOOD TEST: CPT

## 2021-01-04 PROCEDURE — 6360000002 HC RX W HCPCS: Performed by: SPECIALIST

## 2021-01-04 PROCEDURE — 36415 COLL VENOUS BLD VENIPUNCTURE: CPT

## 2021-01-04 PROCEDURE — 97530 THERAPEUTIC ACTIVITIES: CPT

## 2021-01-04 PROCEDURE — 85025 COMPLETE CBC W/AUTO DIFF WBC: CPT

## 2021-01-04 PROCEDURE — 2580000003 HC RX 258: Performed by: INTERNAL MEDICINE

## 2021-01-04 RX ORDER — BUMETANIDE 2 MG/1
2 TABLET ORAL DAILY
Qty: 30 TABLET | Refills: 3 | Status: SHIPPED | OUTPATIENT
Start: 2021-01-05 | End: 2021-06-21

## 2021-01-04 RX ORDER — AMLODIPINE BESYLATE 5 MG/1
5 TABLET ORAL DAILY
Qty: 30 TABLET | Refills: 3 | Status: SHIPPED
Start: 2021-01-05 | End: 2021-06-09 | Stop reason: ALTCHOICE

## 2021-01-04 RX ORDER — POTASSIUM CHLORIDE 750 MG/1
10 TABLET, EXTENDED RELEASE ORAL 2 TIMES DAILY
Qty: 60 TABLET | Refills: 3 | Status: SHIPPED | OUTPATIENT
Start: 2021-01-04 | End: 2021-06-21

## 2021-01-04 RX ORDER — TERBINAFINE HYDROCHLORIDE 250 MG/1
250 TABLET ORAL DAILY
Qty: 30 TABLET | Refills: 0 | Status: SHIPPED | OUTPATIENT
Start: 2021-01-05 | End: 2021-02-04

## 2021-01-04 RX ORDER — CEPHALEXIN 500 MG/1
1000 CAPSULE ORAL 4 TIMES DAILY
Qty: 80 CAPSULE | Refills: 0 | Status: SHIPPED | OUTPATIENT
Start: 2021-01-04 | End: 2021-01-14

## 2021-01-04 RX ADMIN — DULOXETINE HYDROCHLORIDE 20 MG: 20 CAPSULE, DELAYED RELEASE ORAL at 08:54

## 2021-01-04 RX ADMIN — BUMETANIDE 2 MG: 1 TABLET ORAL at 08:53

## 2021-01-04 RX ADMIN — OXYBUTYNIN CHLORIDE 5 MG: 5 TABLET ORAL at 08:54

## 2021-01-04 RX ADMIN — MICONAZOLE NITRATE 5 G: 20.6 POWDER TOPICAL at 08:58

## 2021-01-04 RX ADMIN — TERBINAFINE 250 MG: 250 TABLET ORAL at 08:54

## 2021-01-04 RX ADMIN — SODIUM CHLORIDE, PRESERVATIVE FREE 10 ML: 5 INJECTION INTRAVENOUS at 08:52

## 2021-01-04 RX ADMIN — INSULIN LISPRO 1 UNITS: 100 INJECTION, SOLUTION INTRAVENOUS; SUBCUTANEOUS at 11:44

## 2021-01-04 RX ADMIN — Medication 2 G: at 00:46

## 2021-01-04 RX ADMIN — POTASSIUM CHLORIDE 10 MEQ: 750 TABLET, EXTENDED RELEASE ORAL at 08:58

## 2021-01-04 RX ADMIN — PREGABALIN 50 MG: 50 CAPSULE ORAL at 08:54

## 2021-01-04 RX ADMIN — HYDROCODONE BITARTRATE AND ACETAMINOPHEN 1 TABLET: 7.5; 325 TABLET ORAL at 07:20

## 2021-01-04 RX ADMIN — AMLODIPINE BESYLATE 5 MG: 5 TABLET ORAL at 08:54

## 2021-01-04 RX ADMIN — METOPROLOL TARTRATE 25 MG: 25 TABLET, FILM COATED ORAL at 08:54

## 2021-01-04 RX ADMIN — Medication 2 G: at 08:52

## 2021-01-04 RX ADMIN — PANTOPRAZOLE SODIUM 40 MG: 40 TABLET, DELAYED RELEASE ORAL at 06:28

## 2021-01-04 ASSESSMENT — PAIN SCALES - GENERAL: PAINLEVEL_OUTOF10: 9

## 2021-01-04 NOTE — PROGRESS NOTES
PT SEEN AND EXAMINED. feels good    Chart reviewed. meds reviewed. D/w nursing + family as available. EXAM: IN GENERAL, NAD. AWAKE AND ALERT. ROS NEGx10 EXCEPT:   BP (!) 116/45   Pulse 86   Temp 98.4 °F (36.9 °C) (Temporal)   Resp 16   Wt 209 lb (94.8 kg)   LMP  (LMP Unknown)   SpO2 93%   BMI 37.02 kg/m²   GEN: A+O NAD. HEENT: NCAT. EOMI. LUKE  NECK: NO JVD. TRACH MIDLINE. NO BRUITS. NO THYROMEGALY. LUNGS: CTA BL NO RALES, RHONCHI OR WHEEZES. GOOD EXCURSION. CV: Regular rate and rhythm, NO Murmurs, Rubs, Or gallops  ABD: Soft. Nontender. Normal bowel sounds. No organomegaly  EXT:No clubbing cyanosis or edema  Neuro: Alert and oriented x 3. No focal motor deficits. No sensory deficits. Reflexes appear intact.   Labs/data reviewedLABS: CBC with Differential:    Lab Results   Component Value Date    WBC 12.3 01/04/2021    RBC 4.54 01/04/2021    HGB 10.4 01/04/2021    HCT 35.5 01/04/2021     01/04/2021    MCV 78.2 01/04/2021    MCH 22.9 01/04/2021    MCHC 29.3 01/04/2021    RDW 17.6 01/04/2021    LYMPHOPCT 16.9 01/04/2021    MONOPCT 6.7 01/04/2021    BASOPCT 0.6 01/04/2021    MONOSABS 0.83 01/04/2021    LYMPHSABS 2.08 01/04/2021    EOSABS 0.52 01/04/2021    BASOSABS 0.07 01/04/2021     Platelets:    Lab Results   Component Value Date     01/04/2021     CMP:    Lab Results   Component Value Date     01/03/2021    K 3.3 01/03/2021    K 3.6 12/31/2020    CL 91 01/03/2021    CO2 33 01/03/2021    BUN 17 01/03/2021    CREATININE 0.9 01/03/2021    GFRAA >60 01/03/2021    LABGLOM >60 01/03/2021    GLUCOSE 116 01/03/2021    PROT 7.7 01/01/2021    LABALBU 4.2 01/01/2021    CALCIUM 9.3 01/03/2021    BILITOT 0.7 01/01/2021    ALKPHOS 83 01/01/2021    AST 16 01/01/2021    ALT 6 01/01/2021     Magnesium:    Lab Results   Component Value Date    MG 1.3 01/03/2021     LDH:  No results found for: LDH  PT/INR:    Lab Results   Component Value Date    PROTIME 21.1 12/31/2020    INR 1.9 12/31/2020 Last 3 Troponin:    Lab Results   Component Value Date    TROPONINI 0.03 12/05/2020    TROPONINI 0.01 12/28/2017    TROPONINI <0.01 12/28/2017     ABG:  No results found for: PH, PCO2, PO2, HCO3, BE, THGB, TCO2, O2SAT  IRON:  No results found for: IRON  IMAGING    Echo Complete    Result Date: 1/2/2021 Transthoracic Echocardiography Report (TTE)  Demographics   Patient Name    Kemar Jalloh     Gender            Female                  [facility]   Medical Record  [id number]       Room Number       4449  Number   Account #       [de-identified]      Procedure Date    01/02/2021   Corporate ID                   Ordering          Lonnie Drilling                                 Physician   Accession       [id number]     Referring  Number                         Physician   Date of Birth   1940 [address]   Age             [de-identified] year(s)     Interpreting      [address]                                 Physician         Physician Cardiology                                                   Charl Spurling MD                                  Any Other  Procedure Type of Study   TTE procedure:Echo Complete W/Doppler & Color Flow. Procedure Date Date: 01/02/2021 Start: 02:18 PM Study Location: Portable Technical Quality: Adequate visualization Indications:Congestive heart failure. Patient Status: Routine Height: 63 inches Weight: 220 pounds BSA: 2.01 m^2 BMI: 38.97 kg/m^2 Rhythm: Within normal limits BP: 131/70 mmHg  Findings   Left Ventricle  Left ventricular internal dimensions were normal in diastole and systole. Borderline concentric left ventricular hypertrophy. No regional wall motion abnormalities seen. Normal left ventricular ejection fraction. Ejection fraction is visually estimated at 65%. There is doppler evidence of stage II diastolic dysfunction. Right Ventricle  Normal right ventricular size and function. Left Atrium  The left atrium is severely dilated. Interatrial septum appears intact. Right Atrium  Normal right atrium size. Mitral Valve  Structurally normal mitral valve. Mild mitral annular calcification. Mild mitral regurgitation is present. Tricuspid Valve  The tricuspid valve appears structurally normal.  Mild tricuspid regurgitation. RVSP is 40 mmHg. Pulmonary hypertension is mild . Aortic Valve  The aortic valve appears mildly sclerotic. Pulmonic Valve  The pulmonic valve was not well visualized. Pericardial Effusion  There is a trivial posterior pericardial effusion noted. Aorta  Aortic root dimension within normal limits. Conclusions   Summary  Left ventricular internal dimensions were normal in diastole and systole. Borderline concentric left ventricular hypertrophy. No regional wall motion abnormalities seen. Normal left ventricular ejection fraction. There is doppler evidence of stage II diastolic dysfunction. The left atrium is severely dilated. Mild mitral annular calcification. Mild mitral regurgitation is present. The aortic valve appears mildly sclerotic. Mild tricuspid regurgitation. Pulmonary hypertension is mild . There is a trivial posterior pericardial effusion noted.    Signature   ----------------------------------------------------------------  Electronically signed by Wolf BERRIOSInterpreting  physician) on 01/02/2021 03:59 PM  ----------------------------------------------------------------  M-Mode/2D Measurements & Calculations   LV Diastolic    LV Systolic Dimension: 2.9   AV Cusp Separation: 1.6 cmLA  Dimension: 4.4  cm                           Dimension: 4.3 cmAO Root  cm              LV Volume Diastolic: 86.9 ml Dimension: 2.8 cm  LV FS:34.1 %    LV Volume Systolic: 34.7 ml  LV PW           LV EDV/LV EDV Index: 12.7  Diastolic: 1.1  GY/10 KK/R^3CO ESV/LV ESV  cm              Index: 33.2 ml/17ml/ m^2     RV Diastolic Dimension: 3.4  Septum          EF Calculated: 62.7 %        cm  Diastolic: 1.1  LV Mass Index: 84 l/min*m^2  cm                                           Ascending Aorta: 3 cm                                               LA volume/Index: 108.2 ml  LV Mass: 168.92 LVOT: 2 cm                   /53.57ml/m^2  g                                            RA Area: 18.5 cm^2  Doppler Measurements & Calculations   MV Peak E-Wave: 1.11 AV Peak Velocity:     LVOT Peak Velocity: 1.06 m/s  m/s                  1.75 m/s              LVOT Mean Velocity: 0.73 m/s  MV Peak A-Wave: 0.87 AV Peak Gradient:     LVOT Peak Gradient: 4.5  m/s                  12.26 mmHg            mmHgLVOT Mean Gradient: 2.4  MV E/A Ratio: 1.28   AV Mean Velocity:     mmHg  MV Peak Gradient:    1.38 m/s              Estimated RVSP: 39.3 mmHg  7.8 mmHg             AV Mean Gradient: 8   Estimated RAP:3 mmHg  MV Mean Gradient:    mmHg  3.5 mmHg             AV VTI: 31.1 cm  MV Mean Velocity:    AV Area               TR Velocity:3.01 m/s  0.89 m/s             (Continuity):2.18     TR Gradient:36.31 mmHg  MV Deceleration      cm^2                  PV Peak Velocity: 1.02 m/s  Time: 139.9 msec                           PV Peak Gradient: 4.19 mmHg  MV P1/2t: 45.6 msec  LVOT VTI: 21.6 cm     PV Mean Velocity: 0.78 m/s  MVA by PHT:4.82 cm^2 IVRT: 46.1 msec       PV Mean Gradient: 2.6 mmHg  MV Area              Estimated PASP: 39.31  (continuity): 2.8    mmHg  cm^2  MV E' Septal  Velocity: 0.06 m/s  MV E' Lateral  Velocity: 8 m/s  [email or web address] [id number]%3d    Ct Abdomen Pelvis W Iv Contrast Additional Contrast? None    Result Date: 12/5/2020 EXAMINATION: CT OF THE ABDOMEN AND PELVIS WITH CONTRAST 12/5/2020 2:40 pm TECHNIQUE: CT of the abdomen and pelvis was performed with the administration of intravenous contrast. Multiplanar reformatted images are provided for review. Dose modulation, iterative reconstruction, and/or weight based adjustment of the mA/kV was utilized to reduce the radiation dose to as low as reasonably achievable. COMPARISON: February 27, 2019 HISTORY: ORDERING SYSTEM PROVIDED HISTORY: epigastric pain TECHNOLOGIST PROVIDED HISTORY: Additional Contrast?->None Reason for exam:->epigastric pain What reading provider will be dictating this exam?->CRC FINDINGS: Lower Chest: Atelectasis and chronic pleuroparenchymal scarring noted at the lung bases. No significant pleural effusion. The heart is enlarged. Eventration of the right hemidiaphragm with protrusion of the liver and subphrenic fluid collection. Organs: The liver is cirrhotic. Spleen is not enlarged. Pancreas is atrophic. The adrenal glands appear unremarkable. The kidneys demonstrate symmetric enhancement with no hydronephrosis. Gallbladder not visualized. GI/Bowel: No obstructing or constricting mass lesions. Pelvis: No pathologic masses or adenopathy. Peritoneum/Retroperitoneum: Moderate ascites. No significant adenopathy. Bones/Soft Tissues: Mild subcutaneous edema. Degenerative changes of the spine and hips. There is scoliosis of the lumbar spine. Cirrhosis. Bibasilar atelectasis and pleuroparenchymal scarring, both lung bases. Moderate ascites.     Cta Chest W Contrast    Result Date: 12/5/2020 EXAMINATION: CTA OF THE CHEST 12/5/2020 2:40 pm TECHNIQUE: CTA of the chest was performed after the administration of intravenous contrast.  Multiplanar reformatted images are provided for review. MIP images are provided for review. Dose modulation, iterative reconstruction, and/or weight based adjustment of the mA/kV was utilized to reduce the radiation dose to as low as reasonably achievable. COMPARISON: None. HISTORY: ORDERING SYSTEM PROVIDED HISTORY: hypoxia TECHNOLOGIST PROVIDED HISTORY: Reason for exam:->hypoxia What reading provider will be dictating this exam?->CRC FINDINGS: Pulmonary Arteries: No pulmonary embolism is seen. Suboptimal evaluation of the peripheral pulmonary arteries in the lower lobes due to prominent respiratory motion artifact. The main pulmonary artery is normal in caliber. Mediastinum: The heart is enlarged. Prominent calcified atherosclerosis seen in the left anterior descending coronary artery. Mild calcified atherosclerosis is seen in the aorta. No aneurysm. Mildly enlarged lymph nodes are seen in the mediastinum, with the largest in the right paratracheal region measuring approximately 2.4 x 1.8 cm. No significant enlargement of the axillary or hilar lymph nodes is seen. Lungs/pleura: Minimal dependent atelectasis or scarring is seen in the lower lobes and lingula. The lungs are otherwise clear. The central airway is clear. No pneumothorax or pleural effusion is seen. Upper Abdomen: Mild irregularity of the liver contour suggests cirrhosis. Small volume perihepatic ascites is seen. Reflux of contrast into the hepatic veins may signify right heart failure. PRN Meds:perflutren lipid microspheres, HYDROcodone-acetaminophen, dicyclomine, traMADol, sodium chloride flush, promethazine **OR** ondansetron, polyethylene glycol, acetaminophen **OR** acetaminophen, glucose, dextrose, glucagon (rDNA), dextrose    A/P:      Patient Active Problem List   Diagnosis    Spinal stenosis in cervical region    Disorder of muscle, ligament, and fascia    Displacement of lumbar intervertebral disc without myelopathy    Spinal stenosis, lumbar region, without neurogenic claudication    Essential hypertension    Mixed hyperlipidemia    DM (diabetes mellitus) ([facility]    Obesity (BMI 30.0-34. 9)    Other chest pain    Left wrist pain    Renal artery aneurysm (HCC)    Chronic neck pain    Chronic back pain    A-fib (HCC)    PAF (paroxysmal atrial fibrillation) (HCC)    Anemia    Chronic anticoagulation    Class 1 obesity due to excess calories without serious comorbidity with body mass index (BMI) of 34.0 to 34.9 in adult    Malignant neoplasm of cecum (HCC)    Nonrheumatic aortic valve stenosis    Pulmonary HTN (HCC)    Hyponatremia    Abdominal pain    Cellulitis of right leg        PLAN:  Will dc      I can be reached though [employer] or [facility] [place] at [phone]

## 2021-01-04 NOTE — DISCHARGE SUMMARY
Physician Discharge Summary     Patient ID:  April Gilford  [id number]  16 y.o.  1940    Admit date: 12/30/2020    Discharge date and time: 1/4/2021  Admitting Physician: Heather Parish MD     Discharge Physician: Heather Parish      Admission Diagnoses: Cellulitis of right leg [L03.115]    Discharge Diagnoses:   bl leg cellulitis  · Bilateral lower leg extremity cellulitis right worse than the left with lymphedema tinea pedis bilaterally. Tinea pedis may be the port of entry  · Right leg cellulitis is more consistent with staph.     Anasarca  Liver cirrhosis  Anemia chronic dz  Thrombocytopenia        Admission Condition: poor    Discharged Condition: good    Indication for Admission: Cellulitis of right leg [H12.964]    Hospital Course: pt diureses.  Us abd showed NO ascites    Consults: ID and nephrology    Significant Diagnostic Studies: echo as per chart    Lab Results   Component Value Date     01/03/2021    K 3.3 (L) 01/03/2021    CL 91 (L) 01/03/2021    CO2 33 (H) 01/03/2021    BUN 17 01/03/2021    CREATININE 0.9 01/03/2021    GLUCOSE 116 (H) 01/03/2021    CALCIUM 9.3 01/03/2021    PROT 7.7 01/01/2021    LABALBU 4.2 01/01/2021    BILITOT 0.7 01/01/2021    ALKPHOS 83 01/01/2021    AST 16 01/01/2021    ALT 6 01/01/2021    LABGLOM >60 01/03/2021    GFRAA >60 01/03/2021          Lab Results   Component Value Date    WBC 12.3 (H) 01/04/2021    HGB 10.4 (L) 01/04/2021    HCT 35.5 01/04/2021    MCV 78.2 (L) 01/04/2021     (H) 01/04/2021        Echo Complete    Result Date: 1/2/2021 Transthoracic Echocardiography Report (TTE)  Demographics   Patient Name    Seferino Modi     Gender            Female                  [facility]   Medical Record  [id number]       Room Number       0912  Number   Account #       [de-identified]      Procedure Date    01/02/2021   Corporate ID                   Ordering          Ramesh Reeves                                 Physician   Accession       [id number]     Referring  Number                         Physician   Date of Birth   1940 [address]   Age             [de-identified] year(s)     Interpreting      [address]                                 Physician         Physician Cardiology                                                   Jose Miguel Bang MD                                  Any Other  Procedure Type of Study   TTE procedure:Echo Complete W/Doppler & Color Flow. Procedure Date Date: 01/02/2021 Start: 02:18 PM Study Location: Portable Technical Quality: Adequate visualization Indications:Congestive heart failure. Patient Status: Routine Height: 63 inches Weight: 220 pounds BSA: 2.01 m^2 BMI: 38.97 kg/m^2 Rhythm: Within normal limits BP: 131/70 mmHg  Findings   Left Ventricle  Left ventricular internal dimensions were normal in diastole and systole. Borderline concentric left ventricular hypertrophy. No regional wall motion abnormalities seen. Normal left ventricular ejection fraction. Ejection fraction is visually estimated at 65%. There is doppler evidence of stage II diastolic dysfunction. Right Ventricle  Normal right ventricular size and function. Left Atrium  The left atrium is severely dilated. Interatrial septum appears intact. Right Atrium  Normal right atrium size. Mitral Valve  Structurally normal mitral valve. Mild mitral annular calcification. Mild mitral regurgitation is present. Tricuspid Valve  The tricuspid valve appears structurally normal.  Mild tricuspid regurgitation. RVSP is 40 mmHg. Calculations   MV Peak E-Wave: 1.11 AV Peak Velocity:     LVOT Peak Velocity: 1.06 m/s  m/s                  1.75 m/s              LVOT Mean Velocity: 0.73 m/s  MV Peak A-Wave: 0.87 AV Peak Gradient:     LVOT Peak Gradient: 4.5  m/s                  12.26 mmHg            mmHgLVOT Mean Gradient: 2.4  MV E/A Ratio: 1.28   AV Mean Velocity:     mmHg  MV Peak Gradient:    1.38 m/s              Estimated RVSP: 39.3 mmHg  7.8 mmHg             AV Mean Gradient: 8   Estimated RAP:3 mmHg  MV Mean Gradient:    mmHg  3.5 mmHg             AV VTI: 31.1 cm  MV Mean Velocity:    AV Area               TR Velocity:3.01 m/s  0.89 m/s             (Continuity):2.18     TR Gradient:36.31 mmHg  MV Deceleration      cm^2                  PV Peak Velocity: 1.02 m/s  Time: 139.9 msec                           PV Peak Gradient: 4.19 mmHg  MV P1/2t: 45.6 msec  LVOT VTI: 21.6 cm     PV Mean Velocity: 0.78 m/s  MVA by PHT:4.82 cm^2 IVRT: 46.1 msec       PV Mean Gradient: 2.6 mmHg  MV Area              Estimated PASP: 39.31  (continuity): 2.8    mmHg  cm^2  MV E' Septal  Velocity: 0.06 m/s  MV E' Lateral  Velocity: 8 m/s  [email or web address] [id number]%3d%3d    Ct Abdomen Pelvis W Iv Contrast Additional Contrast? None    Result Date: 12/5/2020 EXAMINATION: CT OF THE ABDOMEN AND PELVIS WITH CONTRAST 12/5/2020 2:40 pm TECHNIQUE: CT of the abdomen and pelvis was performed with the administration of intravenous contrast. Multiplanar reformatted images are provided for review. Dose modulation, iterative reconstruction, and/or weight based adjustment of the mA/kV was utilized to reduce the radiation dose to as low as reasonably achievable. COMPARISON: February 27, 2019 HISTORY: ORDERING SYSTEM PROVIDED HISTORY: epigastric pain TECHNOLOGIST PROVIDED HISTORY: Additional Contrast?->None Reason for exam:->epigastric pain What reading provider will be dictating this exam?->CRC FINDINGS: Lower Chest: Atelectasis and chronic pleuroparenchymal scarring noted at the lung bases. No significant pleural effusion. The heart is enlarged. Eventration of the right hemidiaphragm with protrusion of the liver and subphrenic fluid collection. Organs: The liver is cirrhotic. Spleen is not enlarged. Pancreas is atrophic. The adrenal glands appear unremarkable. The kidneys demonstrate symmetric enhancement with no hydronephrosis. Gallbladder not visualized. GI/Bowel: No obstructing or constricting mass lesions. Pelvis: No pathologic masses or adenopathy. Peritoneum/Retroperitoneum: Moderate ascites. No significant adenopathy. Bones/Soft Tissues: Mild subcutaneous edema. Degenerative changes of the spine and hips. There is scoliosis of the lumbar spine. Cirrhosis. Bibasilar atelectasis and pleuroparenchymal scarring, both lung bases. Moderate ascites.     Cta Chest W Contrast    Result Date: 12/5/2020 EXAMINATION: CTA OF THE CHEST 12/5/2020 2:40 pm TECHNIQUE: CTA of the chest was performed after the administration of intravenous contrast.  Multiplanar reformatted images are provided for review. MIP images are provided for review. Dose modulation, iterative reconstruction, and/or weight based adjustment of the mA/kV was utilized to reduce the radiation dose to as low as reasonably achievable. COMPARISON: None. HISTORY: ORDERING SYSTEM PROVIDED HISTORY: hypoxia TECHNOLOGIST PROVIDED HISTORY: Reason for exam:->hypoxia What reading provider will be dictating this exam?->CRC FINDINGS: Pulmonary Arteries: No pulmonary embolism is seen. Suboptimal evaluation of the peripheral pulmonary arteries in the lower lobes due to prominent respiratory motion artifact. The main pulmonary artery is normal in caliber. Mediastinum: The heart is enlarged. Prominent calcified atherosclerosis seen in the left anterior descending coronary artery. Mild calcified atherosclerosis is seen in the aorta. No aneurysm. Mildly enlarged lymph nodes are seen in the mediastinum, with the largest in the right paratracheal region measuring approximately 2.4 x 1.8 cm. No significant enlargement of the axillary or hilar lymph nodes is seen. Lungs/pleura: Minimal dependent atelectasis or scarring is seen in the lower lobes and lingula. The lungs are otherwise clear. The central airway is clear. No pneumothorax or pleural effusion is seen. Upper Abdomen: Mild irregularity of the liver contour suggests cirrhosis. Small volume perihepatic ascites is seen. Reflux of contrast into the hepatic veins may signify right heart failure. 1. No pulmonary embolism is seen. 2.  No acute cardiopulmonary abnormality. 3. Cardiomegaly. No pulmonary edema or pleural effusion. 4. Mild mediastinal lymphadenopathy, which may be reactive. Given patient's history of cecal adenocarcinoma, a metastatic etiology cannot be excluded. 5. Irregular liver contour indicating cirrhosis. Small volume perihepatic ascites. 6. Reflux of contrast into the hepatic veins may signify right heart failure. Us Abdomen Limited    Result Date: 2020  Patient MRN:  [id number] : 1940 Age: [de-identified] years Gender: Female Order Date:  2020 10:42 AM EXAM: US ABDOMEN LIMITED NUMBER OF IMAGES:  16 INDICATION:  ascites check , dr Durbin Friday to read ascites check , dr Durbin Friday to read What reading provider will be dictating this exam?->[facility] COMPARISON: None Four-quadrant ultrasound was performed demonstrating no ascites     unremarkable study. No ascites seen, not on the fluid identified for paracentesis .       Outstanding Order Results     Date and Time Order Name Status Description    2020 0123 Culture, Blood 2 Preliminary     2020 0111 Culture, Blood 1 Preliminary           Treatments: antibiotics: Ancef    Discharge Exam:  BP (!) 116/45   Pulse 86   Temp 98.4 °F (36.9 °C) (Temporal)   Resp 16   Wt 209 lb (94.8 kg)   LMP  (LMP Unknown)   SpO2 93%   BMI 37.02 kg/m²     General Appearance:    Alert, cooperative, no distress, appears stated age   Head:    Normocephalic, without obvious abnormality, atraumatic   Eyes:    PERRL, conjunctiva/corneas clear, EOM's intact, fundi     benign, both eyes   Ears:    Normal TM's and external ear canals, both ears   Nose:   Nares normal, septum midline, mucosa normal, no drainage    or sinus tenderness   Throat:   Lips, mucosa, and tongue normal; teeth and gums normal   Neck:   Supple, symmetrical, trachea midline, no adenopathy;     thyroid:  no enlargement/tenderness/nodules; no carotid    bruit or JVD Back:     Symmetric, no curvature, ROM normal, no CVA tenderness   Lungs:     Clear to auscultation bilaterally, respirations unlabored   Chest Wall:    No tenderness or deformity    Heart:    Regular rate and rhythm, S1 and S2 normal, no murmur, rub   or gallop   Breast Exam:    No tenderness, masses, or nipple abnormality   Abdomen:     Soft, non-tender, bowel sounds active all four quadrants,     no masses, no organomegaly   Genitalia:    Normal female without lesion, discharge or tenderness   Rectal:    Normal tone ;guaiac negative stool   Extremities:   Extremities normal, atraumatic, no cyanosis or edema   Pulses:   2+ and symmetric all extremities   Skin:   Skin color, texture, turgor normal, no rashes or lesions   Lymph nodes:   Cervical, supraclavicular, and axillary nodes normal   Neurologic:   CNII-XII intact, normal strength, sensation and reflexes     throughout       Disposition: home    Patient Instructions:      Medication List      START taking these medications    bumetanide 2 MG tablet  Commonly known as: BUMEX  Take 1 tablet by mouth daily  Start taking on: January 5, 2021     cephALEXin 500 MG capsule  Commonly known as: KEFLEX  Take 2 capsules by mouth 4 times daily for 10 days     miconazole 2 % powder  Commonly known as: MICOTIN  Apply topically 2 times daily. terbinafine 250 MG tablet  Commonly known as: LAMISIL  Take 1 tablet by mouth daily  Start taking on: January 5, 2021        CHANGE how you take these medications    amLODIPine 5 MG tablet  Commonly known as: NORVASC  Take 1 tablet by mouth daily  Start taking on: January 5, 2021  What changed:   · medication strength  · See the new instructions. metFORMIN 500 MG tablet  Commonly known as: GLUCOPHAGE  TAKE 2 TABLETS BY MOUTH  TWICE A DAY  What changed: See the new instructions.         CONTINUE taking these medications    dicyclomine 10 MG capsule  Commonly known as: BENTYL  TAKE 1 CAPSULE BY MOUTH  DAILY AS NEEDED

## 2021-01-04 NOTE — PROGRESS NOTES
CLINICAL PHARMACY NOTE: MEDS TO [address] Select Patient?: No  Total # of Prescriptions Filled: 3   The following medications were delivered to the patient:  · Potassium chloride 10meq  · Bumetanide 2 mg  · Amlodipine besylate 5 mg  Total # of Interventions Completed: 3  Time Spent (min): 15    Additional Documentation:

## 2021-01-04 NOTE — PROGRESS NOTES
[facility]  Nephrology Attending Progress Note  Anastasia Prater. Ana Maria Grace MD        SUBJECTIVE:     1/2: Georganna Galeazzi is a [de-identified] y.o. female with a history of: CVA, hypertension, hyperlipidemia, type 2 diabetes mellitus and other medical problems. She was a direct admit from her PCP office for progressive generalized edema and redness of her right lower extremity. Patient states that that she has observed at least a 10 to 12 pound weight gain over the course of last 2 weeks. She has been on diuretics had had been compliant with her diuretics. This was adjusted as an outpatient however her generalized edema continued. She denies any fever or chills. No trauma to her right leg. Lab data significant for normal kidney function, albumin of 4.1, WBC 11.4 and a hemoglobin of 8.5 . Renal is consulted for generalized edema and to assist with diuretic management. Patient was seen by our service when she presented about 3 weeks ago with hyponatremia.     Subjective:      1/3: legs edema improving  1.4: pt seen in room, no cp sob, edema improving         PROBLEM LIST:    Patient Active Problem List   Diagnosis    Spinal stenosis in cervical region    Disorder of muscle, ligament, and fascia    Displacement of lumbar intervertebral disc without myelopathy    Spinal stenosis, lumbar region, without neurogenic claudication    Essential hypertension    Mixed hyperlipidemia    DM (diabetes mellitus) ([address]    Obesity (BMI 30.0-34. 9)    Other chest pain    Left wrist pain    Renal artery aneurysm (HCC)    Chronic neck pain    Chronic back pain    A-fib (HCC)    PAF (paroxysmal atrial fibrillation) (HCC)    Anemia    Chronic anticoagulation    Class 1 obesity due to excess calories without serious comorbidity with body mass index (BMI) of 34.0 to 34.9 in adult    Malignant neoplasm of cecum (HCC)    Nonrheumatic aortic valve stenosis    Pulmonary HTN ([address]    Hyponatremia    Abdominal pain  Cellulitis of right leg        PAST MEDICAL HISTORY:    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[facility] 01/2019    Anemia     Arm numbness     Arthritis     Blood transfusion     Cervical spinal stenosis     Cirrhosis of liver (HCC)     Diabetes mellitus ([facility]     Fatty liver     GERD (gastroesophageal reflux disease)     Hyperlipidemia     Hypertension     Low back pain     Lumbar stenosis     Neck pain     Obesity (BMI 30.0-34.9) 10/14/2012    PAF (paroxysmal atrial fibrillation) (HCC)     Renal artery aneurysm ([facility] 7/15/2015       DIET:    DIET LOW SODIUM 2 GM;     PHYSICAL EXAM:     Patient Vitals for the past 24 hrs:   BP Temp Temp src Pulse Resp SpO2   01/04/21 0800 (!) 116/45 98.4 °F (36.9 °C) Temporal 86 16 93 %   01/03/21 2315 123/67 98 °F (36.7 °C) Oral 88 16    01/03/21 2100 136/61   95     01/03/21 1429 (!) 123/58 98 °F (36.7 °C) Temporal 96 16 92 %   @      Intake/Output Summary (Last 24 hours) at 1/4/2021 1105  Last data filed at 1/3/2021 1244  Gross per 24 hour   Intake 120 ml   Output    Net 120 ml         Wt Readings from Last 3 Encounters:   01/02/21 209 lb (94.8 kg)   12/05/20 186 lb (84.4 kg)   06/10/20 182 lb (82.6 kg)       Constitutional:  Pt is in no acute distress  Head: normocephalic, atraumatic  Neck: no JVD  Cardiovascular: regular rate and rhythm, no murmurs, gallops, or rubs  Respiratory:  No rales, rhochi, or wheezes  Gastrointestinal:  Soft, nontender, nondistended, bowel sounds x 4  Ext: stasis  Skin: dry, no rash  Neuro: aaox3    MEDS (scheduled):    bumetanide  2 mg Oral Daily    ceFAZolin  2 g Intravenous Q8H    miconazole  5 g Topical BID    terbinafine  250 mg Oral Daily    rivaroxaban  20 mg Oral Daily    amLODIPine  5 mg Oral Daily    DULoxetine  20 mg Oral Daily    empagliflozin  10 mg Oral Daily    hydrOXYzine  10 mg Oral Nightly    metoprolol tartrate  25 mg Oral BID    oxybutynin  5 mg Oral BID  pantoprazole  40 mg Oral BID AC    potassium chloride  10 mEq Oral BID    pramipexole  0.5 mg Oral Nightly    pregabalin  50 mg Oral Daily    insulin lispro  0-6 Units Subcutaneous TID WC    insulin lispro  0-3 Units Subcutaneous Nightly    sodium chloride flush  10 mL Intravenous 2 times per day       MEDS (infusions):   dextrose         MEDS (prn):  perflutren lipid microspheres, HYDROcodone-acetaminophen, dicyclomine, traMADol, sodium chloride flush, promethazine **OR** ondansetron, polyethylene glycol, acetaminophen **OR** acetaminophen, glucose, dextrose, glucagon (rDNA), dextrose    DATA:    Recent Labs     01/02/21  0521 01/03/21  0439 01/04/21  0538   WBC 10.9 12.7* 12.3*   HGB 9.4* 10.1* 10.4*   HCT 32.6* 34.1 35.5   MCV 79.1* 77.5* 78.2*    485* 463*     Recent Labs     01/01/21  1628 01/02/21 0521 01/03/21  0439    137 138   K 3.9 3.4* 3.3*   CL 92* 91* 91*   CO2 31* 34* 33*   BUN 11 12 17   CREATININE 1.1* 1.0 0.9   LABGLOM 48 53 >60   GLUCOSE 171* 103* 116*   CALCIUM 9.0 9.4 9.3   ALT 6  --   --    AST 16  --   --    BILITOT 0.7  --   --    ALKPHOS 83  --   --    MG  --   --  1.3*   PHOS  --   --  3.6       Lab Results   Component Value Date    LABALBU 4.2 01/01/2021    LABALBU 4.1 12/31/2020    LABALBU 4.0 12/06/2020     Lab Results   Component Value Date    TSH 1.240 12/05/2020       Iron Studies  No results found for: IRON, TIBC, FERRITIN  No results found for: QGGTTNLT93  No results found for: FOLATE    No results found for: VITD25  No results found for: PTH    No components found for: URIC    Lab Results   Component Value Date    COLORU Yellow 12/05/2020    LABPH 0 10/22/2019    NITRU Negative 12/05/2020    GLUCOSEU 500 12/05/2020    KETUA Negative 12/05/2020    UROBILINOGEN 0.2 12/05/2020    BILIRUBINUR Negative 12/05/2020       No results found for: LIPIDPAN      IMPRESSION/RECOMMENDATIONS:      1.  Volume overload  demonstrated by significant peripheral edema etiology not entirely clear liver and renal functions within normal limits  No hypoalbuminemia  possible exacerbation of HFpEF  Venous insufficency as well with bmi of 37  she appears to be responding to current diuretic regimen will continue same transition to oral diuretics   Fluid and sodium restriction  I/o not recorded     2. Right lower extremity cellulitis  Currently on cefazolin  ID following     3. Anemia due to chronic disease  Check iron stores and monitor     Jada Rose.  Naomie Dixon MD

## 2021-01-04 NOTE — CARE COORDINATION
Discharge plan is home with . Per ID, the pt will be on po antibiotics upon discharge.  Daniela Hernandez RN

## 2021-01-04 NOTE — PROGRESS NOTES
Physical Therapy    Facility/Department: [facility] ORTHO-TRAUMA  Rx. Note     NAME: Ravindra Shepard  : [id number]  MRN: [id number]    Date of Service: 2021    Referring Provider:  Gaurav Garner MD    Date of Service: 2021    Evaluating PT:  Bascom Prader, PT, DPT, [id number]    Room #:  [address] [place] [address]  Diagnosis:  Cellulitis of R LE  Precautions:  Falls   Equipment Needs:  None at this time. SUBJECTIVE:    Pt lives with her  in a 1 story condo with 2 stairs to enter and 1 rail. Pt ambulated with no PTA. Pt has a SPC and a WW at home. OBJECTIVE:   Initial Evaluation  Date: 2021 Treatment   Short Term/ Long Term   Goals   AM-PAC 6 Clicks  18    Was pt agreeable to Eval/treatment? Yes   yes     Does pt have pain? 1010 R LE 8/10 right leg    Bed Mobility  Rolling: TBA  Supine to sit: TBA  Sit to supine: TBA  Scooting: TBA  Pt seated in bedside chair before and after evaluation. na Rolling: modified independent   Supine to sit: modified independent   Sit to supine: modified independent   Scooting: modified independent    Transfers Sit to stand: SBA  Stand to sit: SBA  Stand pivot: SBA with SPC Sit to stand sba  Stand to sit sba  Stand pivot ww sba  Sit to stand: modified independent   Stand to sit: modified independent   Stand pivot: modified independent    Ambulation    50' with SPC CGA 60 feet ww sba  >150' with [employer] modified independent    Stair negotiation: ascended and descended  TBA NT 2 steps with 1 HR and AAD if needed modified independent    ROM BUE:  Defer to OT. BLE:  AROM B LE's WFL     Strength BUE:  Defer to OT. BLE:  At least 3/5 with functional mobility. Increase B LE strength to at least 4/5 to 4+/5 throughout.    Balance Sitting EOB:  NT  Dynamic Standing:  CGA Sitting eob independent  Dynamic standing ww sba/s Sitting EOB:  Independent   Dynamic Standing:  modified independent      Pt is A & O x 3  Therapeutic Exercises:  Laq, ankle pumps, sit to stand x 5 Evaluation Time includes thorough review of current medical information, gathering information on past medical history/social history and prior level of function, completion of standardized testing/informal observation of tasks, assessment of data and education on plan of care and goals.     CPT codes:  [] Low Complexity PT evaluation 10080  [] Moderate Complexity PT evaluation 89895  [] High Complexity PT evaluation 35557  [] PT Re-evaluation 21850  [] Gait training 70054 - 0 minutes  [] Manual therapy 94001 - 0 minutes  [x] Therapeutic activities 19542 - 23  minutes  [] Therapeutic exercises 97642 - 0 minutes  [] Neuromuscular reeducation 89046 - 0 minutes     Real Talamantes, [address]

## 2021-01-04 NOTE — PROGRESS NOTES
[address] [place] [address] [facility]  Progress Note    SUBJECTIVE:  CC: cellulitis right leg     Patient is tolerating medications. No reported adverse drug reactions. No nausea, vomiting, diarrhea. Afebrile. Redness, edema & pain are improving. Review of systems:  As stated above in the chief complaint, otherwise negative. Medications:  Scheduled Meds:   bumetanide  2 mg Oral Daily    ceFAZolin  2 g Intravenous Q8H    miconazole  5 g Topical BID    terbinafine  250 mg Oral Daily    rivaroxaban  20 mg Oral Daily    amLODIPine  5 mg Oral Daily    DULoxetine  20 mg Oral Daily    empagliflozin  10 mg Oral Daily    hydrOXYzine  10 mg Oral Nightly    metoprolol tartrate  25 mg Oral BID    oxybutynin  5 mg Oral BID    pantoprazole  40 mg Oral BID AC    potassium chloride  10 mEq Oral BID    pramipexole  0.5 mg Oral Nightly    pregabalin  50 mg Oral Daily    insulin lispro  0-6 Units Subcutaneous TID WC    insulin lispro  0-3 Units Subcutaneous Nightly    sodium chloride flush  10 mL Intravenous 2 times per day     Continuous Infusions:   dextrose       PRN Meds:perflutren lipid microspheres, HYDROcodone-acetaminophen, dicyclomine, traMADol, sodium chloride flush, promethazine **OR** ondansetron, polyethylene glycol, acetaminophen **OR** acetaminophen, glucose, dextrose, glucagon (rDNA), dextrose    OBJECTIVE:  BP (!) 116/45   Pulse 86   Temp 98.4 °F (36.9 °C) (Temporal)   Resp 16   Wt 209 lb (94.8 kg)   LMP  (LMP Unknown)   SpO2 93%   BMI 37.02 kg/m²   Temp  Av.1 °F (36.7 °C)  Min: 98 °F (36.7 °C)  Max: 98.4 °F (36.9 °C)  Constitutional: The patient is awake, alert, and oriented. Lying in bed NAD  Skin: Warm and dry. No rashes were noted. HEENT: Round and reactive pupils. Moist mucous membranes. No ulcerations or thrush. Neck: Supple to movements. Chest: No use of accessory muscles to breathe. Symmetrical expansion. No wheezing, crackles or rhonchi. Cardiovascular: S1 and S2 are rhythmic and regular. No murmurs appreciated. Abdomen: Positive bowel sounds to auscultation. Benign to palpation. No masses felt. No hepatosplenomegaly. Extremities: No clubbing, no cyanosis. BLE edema R>L. Right leg has more redness than the left Left has vascular stasis staining. Dry flaky skin right.  Tinea pedis bilateral  Lines: peripheral    Laboratory and Tests Review:  Lab Results   Component Value Date    WBC 12.3 (H) 01/04/2021    WBC 12.7 (H) 01/03/2021    WBC 10.9 01/02/2021    HGB 10.4 (L) 01/04/2021    HCT 35.5 01/04/2021    MCV 78.2 (L) 01/04/2021     (H) 01/04/2021     Lab Results   Component Value Date    NEUTROABS 8.74 (H) 01/04/2021    NEUTROABS 9.23 (H) 01/03/2021    NEUTROABS 7.72 (H) 01/02/2021     No results found for: [facility]  Lab Results   Component Value Date    ALT 6 01/01/2021    AST 16 01/01/2021    ALKPHOS 83 01/01/2021    BILITOT 0.7 01/01/2021     Lab Results   Component Value Date     01/03/2021    K 3.3 01/03/2021    K 3.6 12/31/2020    CL 91 01/03/2021    CO2 33 01/03/2021    BUN 17 01/03/2021    CREATININE 0.9 01/03/2021    CREATININE 1.0 01/02/2021    CREATININE 1.1 01/01/2021    GFRAA >60 01/03/2021    LABGLOM >60 01/03/2021    GLUCOSE 116 01/03/2021    PROT 7.7 01/01/2021    LABALBU 4.2 01/01/2021    CALCIUM 9.3 01/03/2021    BILITOT 0.7 01/01/2021    ALKPHOS 83 01/01/2021    AST 16 01/01/2021    ALT 6 01/01/2021     Lab Results   Component Value Date    CRP 3.7 (H) 12/31/2020    CRP 1.4 (H) 11/07/2014     Lab Results   Component Value Date    SEDRATE 47 (H) 12/31/2020    SEDRATE 58 (H) 11/07/2014     Radiology:  reviewed    Microbiology:   Lab Results   Component Value Date    BC 24 Hours no growth 12/31/2020    BC 5 Days no growth 12/08/2020    BC 5 Days- no growth 02/21/2019    ORG Escherichia coli 02/24/2019    ORG Gram-positive cocci 11/06/2014    ORG Enterococcus faecalis 10/30/2014     Lab Results   Component Value Date BLOODCULT2 24 Hours no growth 12/31/2020    BLOODCULT2 5 Days no growth 12/08/2020    BLOODCULT2 5 Days- no growth 02/21/2019    ORG Escherichia coli 02/24/2019    ORG Gram-positive cocci 11/06/2014    ORG Enterococcus faecalis 10/30/2014     No results found for: WNDABS  No results found for: RESPSMEAR  No results found for: MPNEUMO, CLAMYDCU, LABLEGI, AFBCX, FUNGSM, LABFUNG  No results found for: CULTRESP  No results found for: CXCATHTIP  No results found for: BFCS  No results found for: CXSURG  Urine Culture, Routine   Date Value Ref Range Status   02/24/2019 >100,000 CFU/ml  Final   10/30/2014 >100,000 CFU/ml  Final     MRSA Culture Only   Date Value Ref Range Status   02/11/2019 Methicillin resistant Staph aureus not isolated  Final     No results for input(s): PROCAL in the last 72 hours. ASSESSMENT:  · Bilateral lower leg extremity cellulitis right worse than the left with lymphedema. - some improvement     · Tinea pedis bilaterally -- may be the port of entry  · Right leg cellulitis is more consistent with staph. PLAN:  · Continue ancef 2g q8 for now - can  transition to keflex for discharge  · tubigrips -needs before dischrage  · Topical miconazole and terbinafine-continue at discharge  · [facility]  · Monitor labs  Electronically signed by TEMO House CNP on 1/4/2021 at 11:57 AM     Pt seen and examined. Above discussed agree with advanced practice nurse. Labs, cultures, and radiographs reviewed. Face to Face encounter occurred. Changes made as necessary.      Nithin Hayward MD

## 2021-01-05 ENCOUNTER — CARE COORDINATION (OUTPATIENT)
Dept: CASE MANAGEMENT | Age: 81
End: 2021-01-05

## 2021-01-05 LAB
BLOOD CULTURE, ROUTINE: NORMAL
CULTURE, BLOOD 2: NORMAL

## 2021-01-05 NOTE — CARE COORDINATION
[facility]    Call within 2 business days of discharge: Yes    Patient: Travis Page Patient : 1940   MRN: [id number]  Reason for Admission: There are no discharge diagnoses documented for the most recent discharge. Discharge Date: 21 RARS: Readmission Risk Score: 25      Last Discharge [facility]       Complaint Diagnosis Description Type Department Provider    20   Admission (Discharged) SEYZ 5WE Lizeth Finn MD           Spoke with: Travis Page, patient    Facility: [facility]      Challenges to be reviewed by the provider   Additional needs identified to be addressed with provider No  none         Method of communication with provider : none    Was this a readmission? Yes  Patient stated reason for admission: sent by PCP  Patients top risk factors for readmission: medical condition    Care Transition Nurse (CTN) contacted the patient by telephone to perform post hospital discharge assessment. Verified name and  with patient as identifiers. Provided introduction to self, and explanation of the CTN role. CTN reviewed discharge instructions, medical action plan and red flags with patient who verbalized understanding. Patient given an opportunity to ask questions and does not have any further questions or concerns at this time. Were discharge instructions available to patient? Yes. Reviewed appropriate site of care based on symptoms and resources available to patient including: PCP, Specialist and When to call 911. The patient agrees to contact the PCP office for questions related to their healthcare. Medication reconciliation was performed with patient, who verbalizes understanding of administration of home medications. Advised obtaining a 90-day supply of all daily and as-needed medications. Discussed follow-up appointments.  If no appointment was previously scheduled, appointment scheduling offered: Patient has call into PCP office regarding med and will discuss follow up appt. . Is follow up appointment scheduled within 7 days of discharge? Not yet scheduled  Non-[facility] follow up appointment(s): Patient to schedule follow up appt with PCP office    Non-face-to-face services provided:  Scheduled appointment with PCP-Patient to schedule appt with Dr. Branden Fernandez or Dr. Arianna Ceron  Scheduled appointment with Specialist-Patient to schedule appt with Dr. Ihsan Segovia. Verified with patient appt with Dr. Glenis Benavidez on 1/19/21 (vv)  Obtained and reviewed discharge summary and/or continuity of care documents    Care Transitions 24 Hour Call    Do you have any ongoing symptoms?: Yes  Patient-reported symptoms: Pain  Do you have a copy of your discharge instructions?: Yes  Do you have all of your prescriptions and are they filled?: Yes  Have you been contacted by a [employer]?: No  Were you discharged with any Home Care or Post Acute Services: No  Post Acute Services: [facility] [address] you feel like you have everything you need to keep you well at home?: Yes  Care Transitions Interventions  No Identified Needs       Spoke with patient for care transition call post hospital discharge. Med review completed; 1111F not entered. Patient reports confusion regarding Metformin directions; patient has already contacted Dr. Summer Austin office and is awaiting return call. Patient to request Jardiance refill from PCP office. Due to Xarelto, bleeding precautions reviewed with patient. Patient denies any abnormal or uncontrolled bleeding. Patient instructed to report any abnormal bleeding and to call 911 for any uncontrolled bleeding or injury to head. Patient verbalized understanding. Patient instructed to take Keflex as directed until completely finished and not to stop unless directed by physician. Patient verbalized understanding. Patient direct admit to [facility] (1-) on 12/30/20 for cellulitis right leg and anasarca. Patient denies edema.   Patient reports right leg is still sore, but getting better. Patient reports redness is decreasing and denies warmth. Patient reports edema has gone down and patient continues to elevate and wear tubigrips. Patient is utilizing ww at this time for safety. Patient reports she is a little tired as she didn't sleep well. Patient reports fbs today 104 mg/dl. Patient states she tries to follow carb controlled diet. Patient reports she is not driving right now due to right lower extremity weakness. Her  will transport to [facility]. Patient denies any further needs, questions, or concerns at this time. This CTN to sign off and resolve episode.     Follow Up  Future Appointments   Date Time Provider Delia Josie   1/19/2021 10:00 AM Arjun Terry MD AFLBP[facility] Romeo RN

## 2021-01-13 ENCOUNTER — CARE COORDINATION (OUTPATIENT)
Dept: CASE MANAGEMENT | Age: 81
End: 2021-01-13

## 2021-01-20 ENCOUNTER — CARE COORDINATION (OUTPATIENT)
Dept: CASE MANAGEMENT | Age: 81
End: 2021-01-20

## 2021-01-22 ENCOUNTER — IMMUNIZATION (OUTPATIENT)
Dept: PRIMARY CARE CLINIC | Age: 81
End: 2021-01-22
Payer: MEDICARE

## 2021-01-22 PROCEDURE — 0011A COVID-19, MODERNA VACCINE 100MCG/0.5ML DOSE: CPT | Performed by: PHYSICIAN ASSISTANT

## 2021-01-22 PROCEDURE — 91301 COVID-19, MODERNA VACCINE 100MCG/0.5ML DOSE: CPT | Performed by: PHYSICIAN ASSISTANT

## 2021-01-27 ENCOUNTER — CARE COORDINATION (OUTPATIENT)
Dept: CASE MANAGEMENT | Age: 81
End: 2021-01-27

## 2021-01-27 NOTE — CARE COORDINATION
[facility] 45 Transitions Follow Up Call    2021    Patient: Yanni Lopez  Patient : 1940   MRN: [id number]  Reason for Admission:   Discharge Date: 21 RARS: Readmission Risk Score: 25         Spoke with: PatientRadha 6 Transitions Subsequent and Final Call    Subsequent and Final Calls  Do you have any ongoing symptoms?: No  Do you have any questions related to your medications?: No  Are you currently active with any services?: Home Health  -Patient reports she had a PT evaluation on 2021  Do you have any needs or concerns that I can assist you with?: No  Identified Barriers: None  Care Transitions Interventions  No Identified Needs    Patient is pleasant in conversation and states she is doing pretty good. -denies any C/O chest pain, palpitations, shortness of breath, lightheaded, or dizziness   -denies any swelling in feet, ankles, legs, or stomach  -Patient reports \"my legs are tired at night\"; reports occasional soreness in arch of right foot.   -ambulates with a walker; denies recent fall   - this AM    Emotional support provided; will continue to follow.      Follow Up  Future Appointments   Date Time Provider Delia Galindo   2021 10:15 AM Shannon Monsivais APRN - CNP [facility] [place] [facility]   2021  8:30 AM MHYX MARIA EUGENIA COVID IMM, MODERNA 28 DAY SECOND DOSE [place] [facility]   3/18/2021 10:00 AM Alex Arevalo MD AFLBP[facility] Mariano RN

## 2021-02-01 ENCOUNTER — HOSPITAL ENCOUNTER (OUTPATIENT)
Age: 81
Discharge: HOME | End: 2021-02-03

## 2021-02-01 PROCEDURE — 88305 TISSUE EXAM BY PATHOLOGIST: CPT

## 2021-02-10 ENCOUNTER — CARE COORDINATION (OUTPATIENT)
Dept: CASE MANAGEMENT | Age: 81
End: 2021-02-10

## 2021-02-10 NOTE — CARE COORDINATION
[facility] 45 Transitions Follow Up Call    2/10/2021    Patient: Pricila Alexis  Patient : 1940   MRN: [id number]  Reason for Admission:   Discharge Date: 21 RARS: Readmission Risk Score: 25         Attempted to reach patient by phone for follow up; BPCI-A. HIPAA compliant message left on patient's voicemail; CTN contact information provided.      Bishop Cuco RN

## 2021-02-17 ENCOUNTER — CARE COORDINATION (OUTPATIENT)
Dept: CASE MANAGEMENT | Age: 81
End: 2021-02-17

## 2021-02-19 ENCOUNTER — IMMUNIZATION (OUTPATIENT)
Dept: PRIMARY CARE CLINIC | Age: 81
End: 2021-02-19
Payer: MEDICARE

## 2021-02-19 PROCEDURE — 0012A COVID-19, MODERNA VACCINE 100MCG/0.5ML DOSE: CPT | Performed by: PHYSICIAN ASSISTANT

## 2021-02-19 PROCEDURE — 91301 COVID-19, MODERNA VACCINE 100MCG/0.5ML DOSE: CPT | Performed by: PHYSICIAN ASSISTANT

## 2021-03-03 ENCOUNTER — CARE COORDINATION (OUTPATIENT)
Dept: CASE MANAGEMENT | Age: 81
End: 2021-03-03

## 2021-03-03 NOTE — CARE COORDINATION
[facility] 45 Transitions Follow Up Call    3/3/2021    Patient: Yanni Lopez  Patient : 1940   MRN: [id number]  Reason for Admission:   Discharge Date: 21 RARS: Readmission Risk Score: 25         Spoke with: PatientRadha 6 Transitions Subsequent and Final Call    Subsequent and Final Calls  Do you have any ongoing symptoms?: No  Do you have any questions related to your medications?: No  Are you currently active with any services?: Home Health  -patient reports services completed today  Do you have any needs or concerns that I can assist you with?: No  Identified Barriers: None  Care Transitions Interventions  No Identified Needs    Patient is pleasant in conversation and reports she is doing well.   -denies any C/O chest pain, palpitations, shortness of breath, lightheaded, or dizziness   -denies any swelling in feet, ankles, legs, or stomach    -normal bladder/bowel elimination   - this AM   -utilizes a walker/cane when ambulating; denies fall     Emotional support provided; will continue to follow.      Follow Up  Future Appointments   Date Time Provider Delia Galindo   3/18/2021 10:00 AM Alex Arevalo MD AFLBPBCMARGAR[facility] Mariano RN

## 2021-03-17 ENCOUNTER — CARE COORDINATION (OUTPATIENT)
Dept: CASE MANAGEMENT | Age: 81
End: 2021-03-17

## 2021-03-17 NOTE — CARE COORDINATION
[facility] Transitions Follow Up Call    3/17/2021    Patient: Yanni Lopez  Patient : 1940   MRN: [id number]  Reason for Admission:   Discharge Date: 21 RARS: Readmission Risk Score: 25         Spoke with: Shawanda Norris Transitions Subsequent and Final Call    Subsequent and Final Calls  Do you have any ongoing symptoms?: No  Do you have any questions related to your medications?: No  Are you currently active with any services?: No  Do you have any needs or concerns that I can assist you with?: No  Care Transitions Interventions  No Identified Needs    Patient is pleasant in conversation and reports she is doing well.   -Patient reports yesterday she had a wisdom tooth extracted; reports gum area is sore   -denies fever, chills   -denies any C/O chest pain, palpitations, shortness of breath, lightheaded, or dizziness   -denies any swelling   -monitors weight and reports minimal fluctuations   -   -ambulates with walker/cane; denies recent fall     Emotional support provided; discussed will continue to follow        Follow Up  Future Appointments   Date Time Provider Delia Galindo   3/18/2021 10:00 AM Alex Arevalo MD [facility] [id number] [place], RN

## 2021-04-01 ENCOUNTER — CARE COORDINATION (OUTPATIENT)
Dept: CASE MANAGEMENT | Age: 81
End: 2021-04-01

## 2021-04-01 NOTE — CARE COORDINATION
[facility] Transitions Follow Up Call    2021    Patient: Sean Orellana  Patient : 1940   MRN: [id number]  Reason for Admission:   Discharge Date: 21 RARS: Readmission Risk Score: 25         Spoke with: Patient, Radha Pizano 6 Transitions Subsequent and Final Call    Subsequent and Final Calls  Do you have any ongoing symptoms?: No  Do you have any questions related to your medications?: No  Do you have any needs or concerns that I can assist you with?: No  Identified Barriers: None  Care Transitions Interventions  No Identified Needs    Patient is pleasant in conversation and reports she is doing well.   -denies fever, chills   -denies any C/O chest pain, palpitations, shortness of breath, lightheaded, or dizziness   -denies any swelling   -denies any C/O pain at site of wisdom tooth extraction; reports f/u with Dentist on 2021  -monitors weight and reports minimal fluctuations   -   -ambulates with cane; denies recent fall   -exercises at home as per instructions provided by PT    Emotional support provided; discussed final call.        Follow Up  Future Appointments   Date Time Provider Delia Galindo   4/15/2021 11:00 AM Aron Welch MD [facility] Samuel RN

## 2021-05-28 ENCOUNTER — HOSPITAL ENCOUNTER (INPATIENT)
Age: 81
LOS: 5 days | Discharge: HOME | DRG: 811 | End: 2021-06-03
Attending: EMERGENCY MEDICINE | Admitting: INTERNAL MEDICINE
Payer: MEDICARE

## 2021-05-28 DIAGNOSIS — D64.9 ANEMIA, UNSPECIFIED TYPE: ICD-10-CM

## 2021-05-28 DIAGNOSIS — R06.00 DYSPNEA, UNSPECIFIED TYPE: Primary | ICD-10-CM

## 2021-05-28 PROCEDURE — 99284 EMERGENCY DEPT VISIT MOD MDM: CPT

## 2021-05-29 ENCOUNTER — APPOINTMENT (OUTPATIENT)
Dept: GENERAL RADIOLOGY | Age: 81
DRG: 811 | End: 2021-05-29
Payer: MEDICARE

## 2021-05-29 PROBLEM — K92.2 GI BLEED: Status: ACTIVE | Noted: 2021-05-29

## 2021-05-29 LAB
ABO/RH: NORMAL
ALBUMIN SERPL-MCNC: 4 G/DL (ref 3.5–5.2)
ALP BLD-CCNC: 75 U/L (ref 35–104)
ALT SERPL-CCNC: 23 U/L (ref 0–32)
ANION GAP SERPL CALCULATED.3IONS-SCNC: 12 MMOL/L (ref 7–16)
ANION GAP SERPL CALCULATED.3IONS-SCNC: 14 MMOL/L (ref 7–16)
ANISOCYTOSIS: ABNORMAL
ANTIBODY SCREEN: NORMAL
AST SERPL-CCNC: 20 U/L (ref 0–31)
BASOPHILS ABSOLUTE: 0.03 E9/L (ref 0–0.2)
BASOPHILS RELATIVE PERCENT: 0.3 % (ref 0–2)
BILIRUB SERPL-MCNC: 0.6 MG/DL (ref 0–1.2)
BLOOD BANK DISPENSE STATUS: NORMAL
BLOOD BANK DISPENSE STATUS: NORMAL
BLOOD BANK PRODUCT CODE: NORMAL
BLOOD BANK PRODUCT CODE: NORMAL
BPU ID: NORMAL
BPU ID: NORMAL
BUN BLDV-MCNC: 18 MG/DL (ref 6–23)
BUN BLDV-MCNC: 22 MG/DL (ref 6–23)
CALCIUM SERPL-MCNC: 8.8 MG/DL (ref 8.6–10.2)
CALCIUM SERPL-MCNC: 9.2 MG/DL (ref 8.6–10.2)
CHLORIDE BLD-SCNC: 100 MMOL/L (ref 98–107)
CHLORIDE BLD-SCNC: 102 MMOL/L (ref 98–107)
CO2: 21 MMOL/L (ref 22–29)
CO2: 24 MMOL/L (ref 22–29)
CREAT SERPL-MCNC: 1 MG/DL (ref 0.5–1)
CREAT SERPL-MCNC: 1.1 MG/DL (ref 0.5–1)
DESCRIPTION BLOOD BANK: NORMAL
DESCRIPTION BLOOD BANK: NORMAL
EOSINOPHILS ABSOLUTE: 0.42 E9/L (ref 0.05–0.5)
EOSINOPHILS RELATIVE PERCENT: 3.7 % (ref 0–6)
GFR AFRICAN AMERICAN: 58
GFR AFRICAN AMERICAN: >60
GFR NON-AFRICAN AMERICAN: 48 ML/MIN/1.73
GFR NON-AFRICAN AMERICAN: 53 ML/MIN/1.73
GLUCOSE BLD-MCNC: 132 MG/DL (ref 74–99)
GLUCOSE BLD-MCNC: 250 MG/DL (ref 74–99)
HCT VFR BLD CALC: 24.2 % (ref 34–48)
HCT VFR BLD CALC: 32.4 % (ref 34–48)
HEMOCCULT STL QL: NEGATIVE
HEMOGLOBIN: 6.7 G/DL (ref 11.5–15.5)
HEMOGLOBIN: 9.3 G/DL (ref 11.5–15.5)
HYPOCHROMIA: ABNORMAL
IMMATURE GRANULOCYTES #: 0.14 E9/L
IMMATURE GRANULOCYTES %: 1.2 % (ref 0–5)
INR BLD: 2.1
LYMPHOCYTES ABSOLUTE: 1.87 E9/L (ref 1.5–4)
LYMPHOCYTES RELATIVE PERCENT: 16.3 % (ref 20–42)
MCH RBC QN AUTO: 20.4 PG (ref 26–35)
MCHC RBC AUTO-ENTMCNC: 27.7 % (ref 32–34.5)
MCV RBC AUTO: 73.8 FL (ref 80–99.9)
METER GLUCOSE: 138 MG/DL (ref 74–99)
METER GLUCOSE: 173 MG/DL (ref 74–99)
METER GLUCOSE: 173 MG/DL (ref 74–99)
METER GLUCOSE: 200 MG/DL (ref 74–99)
MONOCYTES ABSOLUTE: 0.7 E9/L (ref 0.1–0.95)
MONOCYTES RELATIVE PERCENT: 6.1 % (ref 2–12)
NEUTROPHILS ABSOLUTE: 8.34 E9/L (ref 1.8–7.3)
NEUTROPHILS RELATIVE PERCENT: 72.4 % (ref 43–80)
OVALOCYTES: ABNORMAL
PDW BLD-RTO: 18.6 FL (ref 11.5–15)
PLATELET # BLD: 301 E9/L (ref 130–450)
PMV BLD AUTO: 8.3 FL (ref 7–12)
POIKILOCYTES: ABNORMAL
POLYCHROMASIA: ABNORMAL
POTASSIUM REFLEX MAGNESIUM: 3.6 MMOL/L (ref 3.5–5)
POTASSIUM SERPL-SCNC: 4.1 MMOL/L (ref 3.5–5)
PRO-BNP: 1329 PG/ML (ref 0–450)
PROTHROMBIN TIME: 22.4 SEC (ref 9.3–12.4)
RBC # BLD: 3.28 E12/L (ref 3.5–5.5)
SCHISTOCYTES: ABNORMAL
SODIUM BLD-SCNC: 135 MMOL/L (ref 132–146)
SODIUM BLD-SCNC: 138 MMOL/L (ref 132–146)
TOTAL PROTEIN: 7.2 G/DL (ref 6.4–8.3)
TROPONIN, HIGH SENSITIVITY: <6 NG/L (ref 0–9)
WBC # BLD: 11.5 E9/L (ref 4.5–11.5)

## 2021-05-29 PROCEDURE — 86923 COMPATIBILITY TEST ELECTRIC: CPT

## 2021-05-29 PROCEDURE — 2500000003 HC RX 250 WO HCPCS: Performed by: INTERNAL MEDICINE

## 2021-05-29 PROCEDURE — 86901 BLOOD TYPING SEROLOGIC RH(D): CPT

## 2021-05-29 PROCEDURE — 86900 BLOOD TYPING SEROLOGIC ABO: CPT

## 2021-05-29 PROCEDURE — 83880 ASSAY OF NATRIURETIC PEPTIDE: CPT

## 2021-05-29 PROCEDURE — 86850 RBC ANTIBODY SCREEN: CPT

## 2021-05-29 PROCEDURE — 2580000003 HC RX 258: Performed by: INTERNAL MEDICINE

## 2021-05-29 PROCEDURE — 6360000002 HC RX W HCPCS: Performed by: EMERGENCY MEDICINE

## 2021-05-29 PROCEDURE — 6360000002 HC RX W HCPCS: Performed by: INTERNAL MEDICINE

## 2021-05-29 PROCEDURE — 82962 GLUCOSE BLOOD TEST: CPT

## 2021-05-29 PROCEDURE — 2140000000 HC CCU INTERMEDIATE R&B

## 2021-05-29 PROCEDURE — 80053 COMPREHEN METABOLIC PANEL: CPT

## 2021-05-29 PROCEDURE — 6370000000 HC RX 637 (ALT 250 FOR IP): Performed by: INTERNAL MEDICINE

## 2021-05-29 PROCEDURE — P9016 RBC LEUKOCYTES REDUCED: HCPCS

## 2021-05-29 PROCEDURE — 71045 X-RAY EXAM CHEST 1 VIEW: CPT

## 2021-05-29 PROCEDURE — 85025 COMPLETE CBC W/AUTO DIFF WBC: CPT

## 2021-05-29 PROCEDURE — 85018 HEMOGLOBIN: CPT

## 2021-05-29 PROCEDURE — 93005 ELECTROCARDIOGRAM TRACING: CPT | Performed by: EMERGENCY MEDICINE

## 2021-05-29 PROCEDURE — 84484 ASSAY OF TROPONIN QUANT: CPT

## 2021-05-29 PROCEDURE — 80048 BASIC METABOLIC PNL TOTAL CA: CPT

## 2021-05-29 PROCEDURE — 85014 HEMATOCRIT: CPT

## 2021-05-29 PROCEDURE — 6360000002 HC RX W HCPCS

## 2021-05-29 PROCEDURE — 36415 COLL VENOUS BLD VENIPUNCTURE: CPT

## 2021-05-29 PROCEDURE — 85610 PROTHROMBIN TIME: CPT

## 2021-05-29 PROCEDURE — 36430 TRANSFUSION BLD/BLD COMPNT: CPT

## 2021-05-29 RX ORDER — DEXTROSE MONOHYDRATE 50 MG/ML
100 INJECTION, SOLUTION INTRAVENOUS PRN
Status: DISCONTINUED | OUTPATIENT
Start: 2021-05-29 | End: 2021-06-03 | Stop reason: HOSPADM

## 2021-05-29 RX ORDER — ACETAMINOPHEN 325 MG/1
650 TABLET ORAL EVERY 6 HOURS PRN
Status: DISCONTINUED | OUTPATIENT
Start: 2021-05-29 | End: 2021-06-03 | Stop reason: HOSPADM

## 2021-05-29 RX ORDER — FUROSEMIDE 10 MG/ML
20 INJECTION INTRAMUSCULAR; INTRAVENOUS ONCE
Status: COMPLETED | OUTPATIENT
Start: 2021-05-29 | End: 2021-05-29

## 2021-05-29 RX ORDER — ACETAMINOPHEN 650 MG/1
650 SUPPOSITORY RECTAL EVERY 6 HOURS PRN
Status: DISCONTINUED | OUTPATIENT
Start: 2021-05-29 | End: 2021-06-03 | Stop reason: HOSPADM

## 2021-05-29 RX ORDER — SODIUM CHLORIDE 9 MG/ML
INJECTION, SOLUTION INTRAVENOUS PRN
Status: DISCONTINUED | OUTPATIENT
Start: 2021-05-29 | End: 2021-06-03 | Stop reason: HOSPADM

## 2021-05-29 RX ORDER — ATORVASTATIN CALCIUM 10 MG/1
10 TABLET, FILM COATED ORAL DAILY
Refills: 0 | Status: DISCONTINUED | OUTPATIENT
Start: 2021-05-29 | End: 2021-06-03 | Stop reason: HOSPADM

## 2021-05-29 RX ORDER — DULOXETIN HYDROCHLORIDE 30 MG/1
30 CAPSULE, DELAYED RELEASE ORAL DAILY
Status: DISCONTINUED | OUTPATIENT
Start: 2021-05-29 | End: 2021-06-03 | Stop reason: HOSPADM

## 2021-05-29 RX ORDER — BUMETANIDE 1 MG/1
2 TABLET ORAL DAILY
Status: DISCONTINUED | OUTPATIENT
Start: 2021-05-29 | End: 2021-06-03 | Stop reason: HOSPADM

## 2021-05-29 RX ORDER — NICOTINE POLACRILEX 4 MG
15 LOZENGE BUCCAL PRN
Status: DISCONTINUED | OUTPATIENT
Start: 2021-05-29 | End: 2021-06-03 | Stop reason: HOSPADM

## 2021-05-29 RX ORDER — SODIUM CHLORIDE 0.9 % (FLUSH) 0.9 %
5-40 SYRINGE (ML) INJECTION EVERY 12 HOURS SCHEDULED
Status: DISCONTINUED | OUTPATIENT
Start: 2021-05-29 | End: 2021-06-03 | Stop reason: HOSPADM

## 2021-05-29 RX ORDER — SODIUM CHLORIDE 0.9 % (FLUSH) 0.9 %
5-40 SYRINGE (ML) INJECTION PRN
Status: DISCONTINUED | OUTPATIENT
Start: 2021-05-29 | End: 2021-06-03 | Stop reason: HOSPADM

## 2021-05-29 RX ORDER — TRAMADOL HYDROCHLORIDE 50 MG/1
50 TABLET ORAL EVERY 8 HOURS PRN
Status: DISCONTINUED | OUTPATIENT
Start: 2021-05-29 | End: 2021-06-03 | Stop reason: HOSPADM

## 2021-05-29 RX ORDER — AMLODIPINE BESYLATE 5 MG/1
5 TABLET ORAL DAILY
Status: DISCONTINUED | OUTPATIENT
Start: 2021-05-29 | End: 2021-06-03 | Stop reason: HOSPADM

## 2021-05-29 RX ORDER — FUROSEMIDE 10 MG/ML
INJECTION INTRAMUSCULAR; INTRAVENOUS
Status: COMPLETED
Start: 2021-05-29 | End: 2021-05-29

## 2021-05-29 RX ORDER — POTASSIUM CHLORIDE 750 MG/1
10 TABLET, EXTENDED RELEASE ORAL 2 TIMES DAILY
Status: DISCONTINUED | OUTPATIENT
Start: 2021-05-29 | End: 2021-06-03 | Stop reason: HOSPADM

## 2021-05-29 RX ORDER — PREGABALIN 50 MG/1
50 CAPSULE ORAL DAILY
Status: DISCONTINUED | OUTPATIENT
Start: 2021-05-29 | End: 2021-06-03 | Stop reason: HOSPADM

## 2021-05-29 RX ORDER — PANTOPRAZOLE SODIUM 40 MG/1
40 TABLET, DELAYED RELEASE ORAL 2 TIMES DAILY
Status: DISCONTINUED | OUTPATIENT
Start: 2021-05-29 | End: 2021-06-03 | Stop reason: HOSPADM

## 2021-05-29 RX ORDER — HYDROXYZINE HYDROCHLORIDE 10 MG/1
10 TABLET, FILM COATED ORAL NIGHTLY PRN
Status: DISCONTINUED | OUTPATIENT
Start: 2021-05-29 | End: 2021-06-03 | Stop reason: HOSPADM

## 2021-05-29 RX ORDER — DEXTROSE MONOHYDRATE 25 G/50ML
12.5 INJECTION, SOLUTION INTRAVENOUS PRN
Status: DISCONTINUED | OUTPATIENT
Start: 2021-05-29 | End: 2021-06-03 | Stop reason: HOSPADM

## 2021-05-29 RX ORDER — PROMETHAZINE HYDROCHLORIDE 25 MG/1
12.5 TABLET ORAL EVERY 6 HOURS PRN
Status: DISCONTINUED | OUTPATIENT
Start: 2021-05-29 | End: 2021-06-03 | Stop reason: HOSPADM

## 2021-05-29 RX ORDER — POLYETHYLENE GLYCOL 3350 17 G/17G
17 POWDER, FOR SOLUTION ORAL DAILY PRN
Status: DISCONTINUED | OUTPATIENT
Start: 2021-05-29 | End: 2021-06-03 | Stop reason: HOSPADM

## 2021-05-29 RX ORDER — SODIUM CHLORIDE 9 MG/ML
25 INJECTION, SOLUTION INTRAVENOUS PRN
Status: DISCONTINUED | OUTPATIENT
Start: 2021-05-29 | End: 2021-06-03 | Stop reason: HOSPADM

## 2021-05-29 RX ORDER — ONDANSETRON 2 MG/ML
4 INJECTION INTRAMUSCULAR; INTRAVENOUS EVERY 6 HOURS PRN
Status: DISCONTINUED | OUTPATIENT
Start: 2021-05-29 | End: 2021-06-03 | Stop reason: HOSPADM

## 2021-05-29 RX ORDER — FUROSEMIDE 20 MG/1
20 TABLET ORAL EVERY OTHER DAY
Status: ON HOLD | COMMUNITY
End: 2021-06-03 | Stop reason: HOSPADM

## 2021-05-29 RX ADMIN — SODIUM CHLORIDE, PRESERVATIVE FREE 10 ML: 5 INJECTION INTRAVENOUS at 08:22

## 2021-05-29 RX ADMIN — TRAMADOL HYDROCHLORIDE 50 MG: 50 TABLET, FILM COATED ORAL at 21:29

## 2021-05-29 RX ADMIN — SODIUM CHLORIDE, PRESERVATIVE FREE 10 ML: 5 INJECTION INTRAVENOUS at 13:24

## 2021-05-29 RX ADMIN — TRAMADOL HYDROCHLORIDE 50 MG: 50 TABLET, FILM COATED ORAL at 13:24

## 2021-05-29 RX ADMIN — METOPROLOL TARTRATE 25 MG: 25 TABLET, FILM COATED ORAL at 08:21

## 2021-05-29 RX ADMIN — POTASSIUM CHLORIDE 10 MEQ: 750 TABLET, EXTENDED RELEASE ORAL at 21:29

## 2021-05-29 RX ADMIN — AMLODIPINE BESYLATE 5 MG: 5 TABLET ORAL at 10:49

## 2021-05-29 RX ADMIN — METOPROLOL TARTRATE 25 MG: 25 TABLET, FILM COATED ORAL at 21:27

## 2021-05-29 RX ADMIN — SODIUM CHLORIDE, PRESERVATIVE FREE 10 ML: 5 INJECTION INTRAVENOUS at 21:28

## 2021-05-29 RX ADMIN — Medication 2000 MG: at 21:28

## 2021-05-29 RX ADMIN — INSULIN LISPRO 1 UNITS: 100 INJECTION, SOLUTION INTRAVENOUS; SUBCUTANEOUS at 17:37

## 2021-05-29 RX ADMIN — ACETAMINOPHEN 650 MG: 325 TABLET ORAL at 18:52

## 2021-05-29 RX ADMIN — BUMETANIDE 2 MG: 1 TABLET ORAL at 10:49

## 2021-05-29 RX ADMIN — PREGABALIN 50 MG: 50 CAPSULE ORAL at 10:49

## 2021-05-29 RX ADMIN — ANTI-FUNGAL POWDER MICONAZOLE NITRATE TALC FREE: 1.42 POWDER TOPICAL at 08:21

## 2021-05-29 RX ADMIN — Medication 2000 MG: at 13:24

## 2021-05-29 RX ADMIN — ATORVASTATIN CALCIUM 10 MG: 10 TABLET, FILM COATED ORAL at 08:21

## 2021-05-29 RX ADMIN — POTASSIUM CHLORIDE 10 MEQ: 750 TABLET, EXTENDED RELEASE ORAL at 08:20

## 2021-05-29 RX ADMIN — DULOXETINE HYDROCHLORIDE 30 MG: 30 CAPSULE, DELAYED RELEASE ORAL at 08:21

## 2021-05-29 RX ADMIN — INSULIN LISPRO 1 UNITS: 100 INJECTION, SOLUTION INTRAVENOUS; SUBCUTANEOUS at 11:36

## 2021-05-29 RX ADMIN — FUROSEMIDE 20 MG: 10 INJECTION, SOLUTION INTRAMUSCULAR; INTRAVENOUS at 01:54

## 2021-05-29 RX ADMIN — PRAMIPEXOLE DIHYDROCHLORIDE 1.25 MG: 1 TABLET ORAL at 21:28

## 2021-05-29 RX ADMIN — FUROSEMIDE 20 MG: 10 INJECTION, SOLUTION INTRAMUSCULAR; INTRAVENOUS at 08:21

## 2021-05-29 RX ADMIN — ANTI-FUNGAL POWDER MICONAZOLE NITRATE TALC FREE: 1.42 POWDER TOPICAL at 21:27

## 2021-05-29 RX ADMIN — PANTOPRAZOLE SODIUM 40 MG: 40 TABLET, DELAYED RELEASE ORAL at 21:28

## 2021-05-29 RX ADMIN — FUROSEMIDE 20 MG: 10 INJECTION, SOLUTION INTRAMUSCULAR; INTRAVENOUS at 01:34

## 2021-05-29 RX ADMIN — PANTOPRAZOLE SODIUM 40 MG: 40 TABLET, DELAYED RELEASE ORAL at 08:21

## 2021-05-29 ASSESSMENT — PAIN SCALES - GENERAL
PAINLEVEL_OUTOF10: 0
PAINLEVEL_OUTOF10: 6
PAINLEVEL_OUTOF10: 0
PAINLEVEL_OUTOF10: 10
PAINLEVEL_OUTOF10: 0
PAINLEVEL_OUTOF10: 0
PAINLEVEL_OUTOF10: 3
PAINLEVEL_OUTOF10: 0
PAINLEVEL_OUTOF10: 0

## 2021-05-29 ASSESSMENT — PAIN DESCRIPTION - PAIN TYPE: TYPE: CHRONIC PAIN

## 2021-05-29 ASSESSMENT — PAIN DESCRIPTION - LOCATION: LOCATION: ANKLE

## 2021-05-29 ASSESSMENT — PAIN - FUNCTIONAL ASSESSMENT: PAIN_FUNCTIONAL_ASSESSMENT: ACTIVITIES ARE NOT PREVENTED

## 2021-05-29 ASSESSMENT — PAIN DESCRIPTION - FREQUENCY: FREQUENCY: INTERMITTENT

## 2021-05-29 ASSESSMENT — PAIN DESCRIPTION - DESCRIPTORS: DESCRIPTORS: ACHING;DISCOMFORT;DULL

## 2021-05-29 ASSESSMENT — PAIN DESCRIPTION - ORIENTATION: ORIENTATION: RIGHT

## 2021-05-29 ASSESSMENT — PAIN DESCRIPTION - PROGRESSION: CLINICAL_PROGRESSION: NOT CHANGED

## 2021-05-29 ASSESSMENT — PAIN DESCRIPTION - ONSET: ONSET: AWAKENED FROM SLEEP

## 2021-05-29 NOTE — H&P
Department of Internal Medicine  Dr. Remigio Jose History and Physical      CHIEF COMPLAINT:  Shortness of breath  Reason for Admission:  Acute anemia, CHF, cellulitis right leg. HISTORY OF PRESENT ILLNESS:      The patient is a 80 y.o. female with  who presents with the above problems. to the ED for sob, beginning hours Prior to her presentation. The complaint has been persistent, moderate in severity, and worsened by nothing. Patient reporting shortness of breath and heart fluttering that started short time prior to arrival.  Patient reporting no chest pain she reports no vomiting or diarrhea. Patient does have history of Colon cancer and atrial fibrillation. Patient does have history of arthritis history of anemia. Patient reporting no fever chills she reports no productive cough. Patient was given oxygen by EMS. She does report some improvement. Patient reporting  having some discomfort/palpitations in her chest.  Patient reporting swelling to her legs And some redness. No fever shakes or chills.     Past Medical History:        Diagnosis Date    Adenocarcinoma [facility] cecum ([facility] [address] 01/2019    Anemia     Arm numbness     Arthritis     Blood transfusion     Cervical spinal stenosis     Cirrhosis of liver (HCC)     Diabetes mellitus (HCC)     Fatty liver     GERD (gastroesophageal reflux disease)     Hyperlipidemia     Hypertension     Low back pain     Lumbar stenosis     Neck pain     Obesity (BMI 30.0-34.9) 10/14/2012    PAF (paroxysmal atrial fibrillation) (HCC)     Renal artery aneurysm ([facility] [address] 7/15/2015     Past Surgical History:        Procedure Laterality Date    APPENDECTOMY      BACK SURGERY      BREAST SURGERY      reduction    CARPAL TUNNEL RELEASE      CATARACT REMOVAL  10/2016    CERVICAL DISCECTOMY  01/19/2007    ACDF C3-4, C4-5, C5-6 Dr. Zi Greenwood      left lower leg    HEMICOLECTOMY N/A 2/19/2019    DIAGNOSTIC LAPAROSCOPY, LYSIS OF ADHESIONS, OPEN RIGHT HEMICOLECTOMY performed by Fernando Howard MD at [address] [place] [address]  05/17/2017    [facility]. Dr.Joseph Meme Hernandez UPPER GASTROINTESTINAL ENDOSCOPY N/A 11/5/2018    EGD BIOPSY performed by Sacha Wang MD at [address] [facility] 12/7/2020    EGD BIOPSY performed by Julissa Arteaga MD at [address] [facility]  12/7/2020    EGD CONTROL HEMORRHAGE performed by Julissa Arteaga MD at [address]       Medications Prior to Admission:    Medications Prior to Admission: furosemide (LASIX) 20 MG tablet, Take 20 mg by mouth every other day  traMADol (ULTRAM) 50 MG tablet, Take 1 tablet by mouth every 8 hours as needed for Pain for up to 60 days. Take lowest dose possible to manage pain  amLODIPine (NORVASC) 5 MG tablet, Take 1 tablet by mouth daily  potassium chloride (KLOR-CON M) 10 MEQ extended release tablet, Take 1 tablet by mouth 2 times daily  glimepiride (AMARYL) 2 MG tablet, Take 2 mg by mouth every morning (before breakfast)   pregabalin (LYRICA) 50 MG capsule, Take 50 mg by mouth daily.    empagliflozin (JARDIANCE) 10 MG tablet, Take 1 tablet by mouth daily  DULoxetine (CYMBALTA) 30 MG extended release capsule, Take 1 capsule by mouth daily  oxybutynin (DITROPAN) 5 MG tablet, TAKE 1 TABLET BY MOUTH  DAILY WITH BREAKFAST  hydrOXYzine (ATARAX) 10 MG tablet, Take 1 tablet by mouth nightly (Patient taking differently: Take 10 mg by mouth nightly as needed )  simvastatin (ZOCOR) 20 MG tablet, TAKE 1 TABLET BY MOUTH  DAILY WITH SUPPER  pantoprazole (PROTONIX) 40 MG tablet, TAKE ONE TABLET BY MOUTH TWO TIMES A DAY BEFORE MEALS  metoprolol tartrate (LOPRESSOR) 25 MG tablet, Take 1 tablet by mouth 2 times daily  rivaroxaban (XARELTO) 20 MG TABS tablet, Take 1 tablet by mouth daily (with breakfast)  metFORMIN (GLUCOPHAGE) 500 MG tablet, TAKE 2 TABLETS BY MOUTH  TWICE A DAY (Patient taking differently: Take 1,000 mg by mouth daily (with breakfast) Take 2 tablets by mouth  twice a day)  miconazole (MICOTIN) 2 % powder, Apply topically 2 times daily. bumetanide (BUMEX) 2 MG tablet, Take 1 tablet by mouth daily  pramipexole (MIRAPEX) 0.25 MG tablet, TAKE 5 TABLETS BY MOUTH  NIGHTLY  dicyclomine (BENTYL) 10 MG capsule, TAKE 1 CAPSULE BY MOUTH  DAILY AS NEEDED    Allergies:  Benadryl [diphenhydramine], Fish-derived products, and Iodine    Social History:   TOBACCO:   reports that she quit smoking about 24 years ago. She has a 30.00 pack-year smoking history.  She has never used smokeless tobacco.    Family History:       Problem Relation Age of Onset    Diabetes Mother     Stroke Father     Diabetes Sister     High Blood Pressure Sister     Diabetes Brother     High Blood Pressure Brother     Cancer Brother     Heart Disease Brother      REVIEW OF SYSTEMS:  CONSTITUTIONAL:  positive for  fatigue and malaise  EYES:  negative  HEENT:  negative  RESPIRATORY:  positive for  dyspnea  CARDIOVASCULAR:  positive for  palpitations  GASTROINTESTINAL:  negative  GENITOURINARY:  negative  INTEGUMENT/BREAST:  negative  HEMATOLOGIC/LYMPHATIC:  positive for swelling/edema  ALLERGIC/IMMUNOLOGIC:  negative  ENDOCRINE:  negative  MUSCULOSKELETAL:  positive for  muscle weakness  NEUROLOGICAL:  negative  PHYSICAL EXAM:    Vitals:  BP (!) 147/70   Pulse 98   Temp 98.4 °F (36.9 °C) (Temporal)   Resp 16   Ht 5' 3\" (1.6 m)   Wt 215 lb 9.6 oz (97.8 kg)   LMP  (LMP Unknown)   SpO2 95%   BMI 38.19 kg/m²     CONSTITUTIONAL:  awake, alert, cooperative, no apparent distress, and appears stated age  EYES:  Lids and lashes normal, pupils equal, round and reactive to light, extra ocular muscles intact, sclera clear, conjunctiva normal  ENT:  Normocephalic, without obvious abnormality, atraumatic, sinuses nontender on palpation, external ears without lesions, oral pharynx with moist mucus membranes, tonsils without erythema or exudates, gums normal and good dentition. NECK:  Supple, symmetrical, trachea midline, no adenopathy, thyroid symmetric, not enlarged and no tenderness, skin normal  HEMATOLOGIC/LYMPHATICS:  no cervical lymphadenopathy  BACK:  Symmetric, no curvature, spinous processes are non-tender on palpation, paraspinous muscles are non-tender on palpation, no costal vertebral tenderness  LUNGS:  No increased work of breathing, good air exchange, clear to auscultation bilaterally, no crackles or wheezing  CARDIOVASCULAR:  Regular  ABDOMEN:  Soft, nontender. Normal bs    MUSCULOSKELETAL:  1 - 2+ Edema both legs with redness in the right leg  NEUROLOGIC:  Awake, alert, oriented to name, place and time. Cranial nerves II-XII are grossly intact. Motor is 5 out of 5 bilaterally. Cerebellar finger to nose, heel to shin intact. Sensory is intact.   Babinski down going, Romberg negative, and gait is normal.  SKIN:  no bruising or bleeding    DATA:  CBC:   Lab Results   Component Value Date    WBC 11.5 05/29/2021    RBC 3.28 05/29/2021    HGB 9.3 05/29/2021    HCT 32.4 05/29/2021    MCV 73.8 05/29/2021    MCH 20.4 05/29/2021    MCHC 27.7 05/29/2021    RDW 18.6 05/29/2021     05/29/2021    MPV 8.3 05/29/2021     CMP:    Lab Results   Component Value Date     05/29/2021    K 3.6 05/29/2021     05/29/2021    CO2 24 05/29/2021    BUN 18 05/29/2021    CREATININE 1.0 05/29/2021    GFRAA >60 05/29/2021    LABGLOM 53 05/29/2021    GLUCOSE 132 05/29/2021    PROT 7.2 05/29/2021    LABALBU 4.0 05/29/2021    CALCIUM 9.2 05/29/2021    BILITOT 0.6 05/29/2021    ALKPHOS 75 05/29/2021    AST 20 05/29/2021    ALT 23 05/29/2021     LDH:  No results found for: LDH  Warfarin PT/INR:  No components found for: PTPATWAR, PTINRWAR  Last 3 Troponin:    Lab Results   Component Value Date    TROPONINI 0.03 12/05/2020    TROPONINI 0.01 12/28/2017    TROPONINI <0.01 12/28/2017     ABG:  No results found for: PH, PCO2, PO2, HCO3, BE, THGB, TCO2,

## 2021-05-29 NOTE — ED NOTES
[facility] faxed,floor called and aware of pt needing 2 units PRBC, transport pending     Shannon Sinclair RN  05/29/21 [id number]

## 2021-05-29 NOTE — ED PROVIDER NOTES
HPI:  5/28/21, Time: 11:54 PM EDT         Ruby Fox is a 80 y.o. female presenting to the ED for sob, beginning hours ago. The complaint has been persistent, moderate in severity, and worsened by nothing. Patient reporting shortness of breath and heart fluttering that started short time prior to arrival.  Patient reporting no chest pain she reports no vomiting or diarrhea. Patient does have history of paroxysmal A. fib. Patient does have history of arthritis history of anemia. Patient reporting no fever chills she reports no productive cough. Patient was given oxygen by EMS. She does report some improvement. Patient reporting still having some discomfort/palpitations in her chest.  Patient reporting no new swelling to her legs. ROS:   Pertinent positives and negatives are stated within HPI, all other systems reviewed and are negative.  --------------------------------------------- PAST HISTORY ---------------------------------------------  Past Medical History:  has a past medical history of Adenocarcinoma of cecum ([facility] [address] Anemia, Arm numbness, Arthritis, Blood transfusion, Cervical spinal stenosis, Cirrhosis of liver ([facility] [address] Diabetes mellitus ([facility] [address] Fatty liver, GERD (gastroesophageal reflux disease), Hyperlipidemia, Hypertension, Low back pain, Lumbar stenosis, Neck pain, Obesity (BMI 30.0-34.9), PAF (paroxysmal atrial fibrillation) ([facility] [address] and Renal artery aneurysm ([facility] [address] Past Surgical History:  has a past surgical history that includes Cervical discectomy (01/19/2007); back surgery; Hysterectomy; fracture surgery; Appendectomy; Breast surgery; Cholecystectomy; Carpal tunnel release; Cataract removal (10/2016); laparoscopy (05/17/2017); Upper gastrointestinal endoscopy (N/A, 11/5/2018); hemicolectomy (N/A, 2/19/2019); Upper gastrointestinal endoscopy (N/A, 12/7/2020); and Upper gastrointestinal endoscopy (12/7/2020). Social History:  reports that she quit smoking about 24 years ago.  She has 3.28 (L) 3.50 - 5.50 E12/L    Hemoglobin 6.7 (LL) 11.5 - 15.5 g/dL    Hematocrit 24.2 (L) 34.0 - 48.0 %    MCV 73.8 (L) 80.0 - 99.9 fL    MCH 20.4 (L) 26.0 - 35.0 pg    MCHC 27.7 (L) 32.0 - 34.5 %    RDW 18.6 (H) 11.5 - 15.0 fL    Platelets 143 173 - 223 E9/L    MPV 8.3 7.0 - 12.0 fL   Comprehensive Metabolic Panel   Result Value Ref Range    Sodium 135 132 - 146 mmol/L    Potassium 4.1 3.5 - 5.0 mmol/L    Chloride 102 98 - 107 mmol/L    CO2 21 (L) 22 - 29 mmol/L    Anion Gap 12 7 - 16 mmol/L    Glucose 250 (H) 74 - 99 mg/dL    BUN 22 6 - 23 mg/dL    CREATININE 1.1 (H) 0.5 - 1.0 mg/dL    GFR Non-African American 48 >=60 mL/min/1.73    GFR African American 58     Calcium 8.8 8.6 - 10.2 mg/dL    Total Protein 7.2 6.4 - 8.3 g/dL    Albumin 4.0 3.5 - 5.2 g/dL    Total Bilirubin 0.6 0.0 - 1.2 mg/dL    Alkaline Phosphatase 75 35 - 104 U/L    ALT 23 0 - 32 U/L    AST 20 0 - 31 U/L   Troponin   Result Value Ref Range    Troponin, High Sensitivity <6 0 - 9 ng/L   Brain Natriuretic Peptide   Result Value Ref Range    Pro-BNP 1,329 (H) 0 - 450 pg/mL   Protime-INR   Result Value Ref Range    Protime 22.4 (H) 9.3 - 12.4 sec    INR 2.1    POCT occult blood stool   Result Value Ref Range    OCCULT BLOOD FECAL NEGATIVE        RADIOLOGY:  Interpreted by Radiologist.  XR CHEST PORTABLE    (Results Pending)         EKG: This EKG is signed and interpreted by me. Rate: 98  Rhythm: Appears regular A. fib  Interpretation: non-specific EKG  Comparison: stable as compared to patient's most recent EKG    I did look at the chest x-ray. Patient does have some cardiomegaly and haziness to lower lobes. ------------------------- NURSING NOTES AND VITALS REVIEWED ---------------------------   The nursing notes within the ED encounter and vital signs as below have been reviewed by myself.   /71   Pulse 98   Temp 97.7 °F (36.5 °C)   Resp 20   Ht 5' 3\" (1.6 m)   Wt 209 lb (94.8 kg)   LMP  (LMP Unknown)   SpO2 94%   BMI 37.02 kg/m² Oxygen Saturation Interpretation: Normal    The patients available past medical records and past encounters were reviewed. ------------------------------ ED COURSE/MEDICAL DECISION MAKING----------------------  Medications   0.9 % sodium chloride infusion (has no administration in time range)   furosemide (LASIX) injection 20 mg (has no administration in time range)             Medical Decision Making:    Patient presenting here because of shortness of breath that started hours ago. Patient reporting fluttering sensation in her chest. She does have a history of A. fib. She is on anticoagulant. Patient was noted be anemic here. Hemoglobin is in the 6 range. Hemoccult was negative. Abdomen is soft and nontender. Patient vital signs are stable. Patient agreeable to transfusion patient will be admitted to monitored bed    Re-Evaluations:             Re-evaluation. Patients symptoms show no change  Reevaluate patient on oxygen here. Patient reporting no chest pain. Patient made aware of findings and plan. Patient's Hemoccult was negative she is agreeable with transfusion    Consultations:            Spoke to  and they will admit    Critical Care: This patient's ED course included: a personal history and physicial eaxmination    This patient has been closely monitored during their ED course. Counseling: The emergency provider has spoken with the patient and discussed todays results, in addition to providing specific details for the plan of care and counseling regarding the diagnosis and prognosis. Questions are answered at this time and they are agreeable with the plan.       --------------------------------- IMPRESSION AND DISPOSITION ---------------------------------    IMPRESSION  1. Dyspnea, unspecified type    2.  Anemia, unspecified type        DISPOSITION  Disposition: Admit to monitored bed  Patient condition is stable        NOTE: This report was transcribed using voice recognition software.  Every effort was made to ensure accuracy; however, inadvertent computerized transcription errors may be present          Ladi Irwin MD  05/28/21 [id number]       Ladi Irwin MD  05/29/21 [id number]

## 2021-05-29 NOTE — CONSULTS
Department of Internal Medicine  Infectious Diseases   Consult Note      Reason for Consult:  Right leg cellulitis       Requesting Physician: Dr Fariba Doll         HISTORY OF PRESENT ILLNESS:               This is an 80 yrs old female with hx of DM, Liver cirrhosis, stage 2 diastolic dysfunction presented to the ER with increased shortness of breath, and leg swelling for past 2 days . She noticed increased redness and warmth in the right leg , She denied cough or sputum expectoration, Denied fever and chills . WBC was 11.5 K , pro BNP  1329  Chest X ray - bilateral congestion     Past Medical History:       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[facility] [address] 01/2019    Anemia     Arm numbness     Arthritis     Blood transfusion     Cervical spinal stenosis     Cirrhosis of liver (HCC)     Diabetes mellitus (HCC)     Fatty liver     GERD (gastroesophageal reflux disease)     Hyperlipidemia     Hypertension     Low back pain     Lumbar stenosis     Neck pain     Obesity (BMI 30.0-34.9) 10/14/2012    PAF (paroxysmal atrial fibrillation) (HCC)     Renal artery aneurysm ([facility] [address] 7/15/2015         Past Surgical History:      Past Surgical History:   Procedure Laterality Date    APPENDECTOMY      BACK SURGERY      BREAST SURGERY      reduction    CARPAL TUNNEL RELEASE      CATARACT REMOVAL  10/2016    CERVICAL DISCECTOMY  01/19/2007    ACDF C3-4, C4-5, C5-6 Dr. Regina Joya      left lower leg    HEMICOLECTOMY N/A 2/19/2019    DIAGNOSTIC LAPAROSCOPY, LYSIS OF ADHESIONS, OPEN RIGHT HEMICOLECTOMY performed by Sherie Lebron MD at [address] [place] [address]  05/17/2017    [facility].  Dr.Joseph Jackeline Shane UPPER GASTROINTESTINAL ENDOSCOPY N/A 11/5/2018    EGD BIOPSY performed by Flash Montoya MD at [address] 12/7/2020    EGD BIOPSY performed by Parul Mathis MD at [address] UPPER GASTROINTESTINAL ENDOSCOPY  12/7/2020    EGD CONTROL HEMORRHAGE performed by Willis Martinez MD at [address] [facility]         Current Medications:      Current Facility-Administered Medications   Medication Dose Route Frequency Provider Last Rate Last Admin    0.9 % sodium chloride infusion   Intravenous PRN Cecy Gavin MD        amLODIPine (NORVASC) tablet 5 mg  5 mg Oral Daily Surya Diez MD   5 mg at 05/29/21 1049    bumetanide (BUMEX) tablet 2 mg  2 mg Oral Daily Surya Diez MD   2 mg at 05/29/21 1049    DULoxetine (CYMBALTA) extended release capsule 30 mg  30 mg Oral Daily Surya Diez MD   30 mg at 05/29/21 5246    hydrOXYzine (ATARAX) tablet 10 mg  10 mg Oral Nightly PRN Surya Diez MD        metoprolol tartrate (LOPRESSOR) tablet 25 mg  25 mg Oral BID Surya Diez MD   25 mg at 05/29/21 0821    miconazole (MICOTIN) 2 % powder   Topical BID Surya Diez MD   Given at 05/29/21 0821    pantoprazole (PROTONIX) tablet 40 mg  40 mg Oral BID Surya Diez MD   40 mg at 05/29/21 2808    potassium chloride (KLOR-CON M) extended release tablet 10 mEq  10 mEq Oral BID Surya Diez MD   10 mEq at 05/29/21 0820    pramipexole (MIRAPEX) tablet 1.25 mg  1.25 mg Oral Nightly Surya Diez MD        pregabalin (LYRICA) capsule 50 mg  50 mg Oral Daily Surya Diez MD   50 mg at 05/29/21 1049    atorvastatin (LIPITOR) tablet 10 mg  10 mg Oral Daily Surya Diez MD   10 mg at 05/29/21 0821    traMADol (ULTRAM) tablet 50 mg  50 mg Oral Q8H PRN Surya Diez MD        insulin lispro (HUMALOG) injection vial 0-6 Units  0-6 Units Subcutaneous Q6H Jamie López MD   1 Units at 05/29/21 1136    glucose (GLUTOSE) 40 % oral gel 15 g  15 g Oral PRN Surya Diez MD        dextrose 50 % IV solution  12.5 g Intravenous PRN Surya Diez MD        glucagon (rDNA) injection 1 mg  1 mg Intramuscular PRN Surya Diez MD        dextrose 5 % solution  100 mL/hr Intravenous PRN Jamie SANDS MD Rene        sodium chloride flush 0.9 % injection 5-40 mL  5-40 mL Intravenous 2 times per day Romeo Polk MD   10 mL at 21 5492    sodium chloride flush 0.9 % injection 5-40 mL  5-40 mL Intravenous PRN Romeo Polk MD        0.9 % sodium chloride infusion  25 mL Intravenous PRN Romeo Polk MD        promethazine (PHENERGAN) tablet 12.5 mg  12.5 mg Oral Q6H PRN Romeo Polk MD        Or    ondansetron (ZOFRAN) injection 4 mg  4 mg Intravenous Q6H PRN Romeo Polk MD        polyethylene glycol (GLYCOLAX) packet 17 g  17 g Oral Daily PRN Romeo Polk MD        acetaminophen (TYLENOL) tablet 650 mg  650 mg Oral Q6H PRN Romeo Polk MD        Or    acetaminophen (TYLENOL) suppository 650 mg  650 mg Rectal Q6H PRN Romeo Polk MD           Allergies:  Benadryl [diphenhydramine], Fish-derived products, and Iodine    Social History:    Social History     Socioeconomic History    Marital status:      Spouse name: Not on file    Number of children: Not on file    Years of education: Not on file    Highest education level: Not on file   Occupational History    Not on file   Tobacco Use    Smoking status: Former Smoker     Packs/day: 2.00     Years: 15.00     Pack years: 30.00     Quit date: 3/1/1997     Years since quittin.2    Smokeless tobacco: Never Used   Vaping Use    Vaping Use: Never used   Substance and Sexual Activity    Alcohol use: Yes     Comment: Holidays    Drug use: No    Sexual activity: Not on file   Other Topics Concern    Not on file   Social History Narrative    Not on file     Social Determinants of Health     Financial Resource Strain:     Difficulty of Paying Living Expenses:    Food Insecurity:     Worried About Running Out of Food in the Last Year:     Ran Out of Food in the Last Year:    Transportation Needs:     Lack of Transportation (Medical):      Lack of Transportation (Non-Medical):    Physical Activity:     Days of Exercise per Week:     Minutes of Exercise per Session:    Stress:     Feeling of Stress :    Social Connections:     Frequency of Communication with Friends and Family:     Frequency of Social Gatherings with Friends and Family:     Attends Nondenominational Services:     Active Member of Clubs or Organizations:     Attends Club or Organization Meetings:     Marital Status:    Intimate Partner Violence:     Fear of Current or Ex-Partner:     Emotionally Abused:     Physically Abused:     Sexually Abused:          Family History:     Family History   Problem Relation Age of Onset    Diabetes Mother     Stroke Father     Diabetes Sister     High Blood Pressure Sister     Diabetes Brother     High Blood Pressure Brother     Cancer Brother     Heart Disease Brother        REVIEW OF SYSTEMS:    CONSTITUTIONAL:  Denies fever, chill or rigors  HEENT: denies blurring of vision or double vision, denies hearing problem  RESPIRATORY: SOB   CARDIOVASCULAR:  Denies palpitation  GASTROINTESTINAL:  Denies abdomen pain, diarrhea or constipation. GENITOURINARY:  Denies burning urination or frequency of urination  INTEGUMENT:Right leg erythema   HEMATOLOGIC/LYMPHATIC:  Denies lymph node swelling, gum bleeding or easy bruising. MUSCULOSKELETAL:  Leg swelling, redness, warm   NEUROLOGICAL:  Weakness     PHYSICAL EXAM:      Vitals:     BP (!) 147/70   Pulse 98   Temp 98.4 °F (36.9 °C) (Temporal)   Resp 16   Ht 5' 3\" (1.6 m)   Wt 215 lb 9.6 oz (97.8 kg)   LMP  (LMP Unknown)   SpO2 95%   BMI 38.19 kg/m²     General Appearance:    Awake, alert , no acute distress. Head:    Normocephalic, atraumatic   Eyes:    No pallor, no icterus,   Ears:    No obvious deformity or drainage.    Nose:   No nasal drainage   Throat:   Mucosa moist, no oral thrush   Neck:   Supple, no lymphadenopathy   Back:     no CVA tenderness   Lungs:     Bibasilar crackles     Heart:    Regular rate and rhythm   Abdomen:     Soft, non-tender, bowel sounds present    Extremities:   Bilateral pitting edema, right leg erythema , warm to touch, no crepitus    Pulses:   Dorsalis pedis palpable    Skin:   Right leg erythema    CBC with Differential:      Lab Results   Component Value Date    WBC 11.5 05/29/2021    RBC 3.28 05/29/2021    HGB 9.3 05/29/2021    HCT 32.4 05/29/2021     05/29/2021    MCV 73.8 05/29/2021    MCH 20.4 05/29/2021    MCHC 27.7 05/29/2021    RDW 18.6 05/29/2021    LYMPHOPCT 16.3 05/29/2021    MONOPCT 6.1 05/29/2021    BASOPCT 0.3 05/29/2021    MONOSABS 0.70 05/29/2021    LYMPHSABS 1.87 05/29/2021    EOSABS 0.42 05/29/2021    BASOSABS 0.03 05/29/2021       CMP     Lab Results   Component Value Date     05/29/2021    K 3.6 05/29/2021     05/29/2021    CO2 24 05/29/2021    BUN 18 05/29/2021    CREATININE 1.0 05/29/2021    GFRAA >60 05/29/2021    LABGLOM 53 05/29/2021    GLUCOSE 132 05/29/2021    PROT 7.2 05/29/2021    LABALBU 4.0 05/29/2021    CALCIUM 9.2 05/29/2021    BILITOT 0.6 05/29/2021    ALKPHOS 75 05/29/2021    AST 20 05/29/2021    ALT 23 05/29/2021         Hepatic Function Panel:    Lab Results   Component Value Date    ALKPHOS 75 05/29/2021    ALT 23 05/29/2021    AST 20 05/29/2021    PROT 7.2 05/29/2021    BILITOT 0.6 05/29/2021    BILIDIR 0.5 11/06/2018    IBILI 1.1 11/06/2018    LABALBU 4.0 05/29/2021       PT/INR:    Lab Results   Component Value Date    PROTIME 22.4 05/29/2021    INR 2.1 05/29/2021       TSH:    Lab Results   Component Value Date    TSH 1.240 12/05/2020       U/A:    Lab Results   Component Value Date    COLORU Yellow 12/05/2020    PHUR 6.0 12/05/2020    WBCUA 2-5 12/05/2020    RBCUA 1-3 12/05/2020    RBCUA NONE 10/14/2012    BACTERIA NONE SEEN 12/05/2020    CLARITYU Clear 12/05/2020    SPECGRAV 1.010 12/05/2020    LEUKOCYTESUR Negative 12/05/2020    UROBILINOGEN 0.2 12/05/2020    BILIRUBINUR Negative 12/05/2020    BLOODU Negative 12/05/2020    GLUCOSEU 500 12/05/2020       ABG:  No results found for: VAC6NHU, BEART, S9CRMWUJ, PHART, THGBART, FOT8MVF, PO2ART, [place]        Radiology :    Chest X ray - bilateral congestion       IMPRESSION:     1. Right leg cellulitis   2. Resp failure , CHF     RECOMMENDATIONS:      1.  Ancef 2 grams IV q 8 hrs, leg elevation, diuretics     Thank you Dr Emmett Guerra for the consult

## 2021-05-29 NOTE — PLAN OF CARE
Problem: Falls - Risk of:  Goal: Will remain free from falls  Description: Will remain free from falls  5/29/2021 1553 by Leo Ceja RN  Outcome: Met This Shift  5/29/2021 0757 by Sherry Bean RN  Outcome: Met This Shift  Goal: Absence of physical injury  Description: Absence of physical injury  5/29/2021 1553 by Leo Ceja RN  Outcome: Met This Shift  5/29/2021 0757 by Sherry Bean RN  Outcome: Met This Shift

## 2021-05-29 NOTE — CONSULTS
GENERAL SURGERY  CONSULT NOTE  5/29/2021    Physician Consulted: Dr. Sigifredo Serra  Reason for Consult: Anemia  Referring Physician: Dr. Elle Murray    HPI  Neoma Levels is a 80 y.o. female past medical history significant for A. fib on Xarelto, adenocarcinoma of the cecum status post laparoscopic right hemicolectomy by Dr. Kathryn Gonsalez in 2019, cirrhosis secondary to [place], DM, HL, HTN. Patient presents with shortness of breath and rapid heart rate. Hemoglobin found to be 6.7 and 2 units given in the emergency department. INR also elevated at 2.1. Patient denies any abdominal pain. No signs of bleeding-no melena, hematochezia, hematemesis. Bowel movements have been normal.  Good appetite. Most recent EGD was on 12/17/2020 with Dr. Jama Sabillon showed some gastritis. Most recent colonoscopy was in February 2021 that was unremarkable. Follow-up colonoscopy scheduled out to 3 years. 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[facility] [address] 01/2019    Anemia     Arm numbness     Arthritis     Blood transfusion     Cervical spinal stenosis     Cirrhosis of liver (HCC)     Diabetes mellitus (HCC)     Fatty liver     GERD (gastroesophageal reflux disease)     Hyperlipidemia     Hypertension     Low back pain     Lumbar stenosis     Neck pain     Obesity (BMI 30.0-34.9) 10/14/2012    PAF (paroxysmal atrial fibrillation) (HCC)     Renal artery aneurysm ([address] 7/15/2015       Past Surgical History:   Procedure Laterality Date    APPENDECTOMY      BACK SURGERY      BREAST SURGERY      reduction    CARPAL TUNNEL RELEASE      CATARACT REMOVAL  10/2016    CERVICAL DISCECTOMY  01/19/2007    ACDF C3-4, C4-5, C5-6 Dr. Jayce Bryan      left lower leg    HEMICOLECTOMY N/A 2/19/2019    DIAGNOSTIC LAPAROSCOPY, LYSIS OF ADHESIONS, OPEN RIGHT HEMICOLECTOMY performed by Dianne Cameron MD at [address] [place] [address]  05/17/2017    [facility].   BEFORE MEALS 20  Yes Eloisa Wade MD   metoprolol tartrate (LOPRESSOR) 25 MG tablet Take 1 tablet by mouth 2 times daily 3/25/20  Yes Eloisa Wade MD   rivaroxaban (XARELTO) 20 MG TABS tablet Take 1 tablet by mouth daily (with breakfast) 3/16/20  Yes Eloisa Wade MD   metFORMIN (GLUCOPHAGE) 500 MG tablet TAKE 2 TABLETS BY MOUTH  TWICE A DAY  Patient taking differently: Take 1,000 mg by mouth daily (with breakfast) Take 2 tablets by mouth  twice a day 20  Yes Eloisa Wade MD   miconazole (MICOTIN) 2 % powder Apply topically 2 times daily.  21, MD   bumetanide (BUMEX) 2 MG tablet Take 1 tablet by mouth daily 21   Montes Friday, MD   pramipexole (MIRAPEX) 0.25 MG tablet TAKE 5 TABLETS BY MOUTH  NIGHTLY 20   Aubrie Gonzalez MD   dicyclomine (BENTYL) 10 MG capsule TAKE 1 CAPSULE BY MOUTH  DAILY AS NEEDED 10/16/18   Aubrie Gonzalez MD       Allergies   Allergen Reactions    Benadryl [Diphenhydramine]      hyper    Fish-Derived Products Rash    Iodine Rash       Family History   Problem Relation Age of Onset    Diabetes Mother     Stroke Father     Diabetes Sister     High Blood Pressure Sister     Diabetes Brother     High Blood Pressure Brother     Cancer Brother     Heart Disease Brother        Social History     Tobacco Use    Smoking status: Former Smoker     Packs/day: 2.00     Years: 15.00     Pack years: 30.00     Quit date: 3/1/1997     Years since quittin.2    Smokeless tobacco: Never Used   Vaping Use    Vaping Use: Never used   Substance Use Topics    Alcohol use: Yes     Comment: Holidays    Drug use: No         Review of Systems   General ROS: positive for  - fatigue  Hematological and Lymphatic ROS: negative  Respiratory ROS: positive for - shortness of breath  Cardiovascular ROS: positive for - rapid heart rate and shortness of breath  Gastrointestinal ROS: no abdominal pain, change in bowel habits, or black or bloody stools  Genito-Urinary ROS: no dysuria, trouble voiding, or hematuria  Musculoskeletal ROS: negative      PHYSICAL EXAM:    Vitals:    05/29/21 1056   BP: (!) 147/70   Pulse: 98   Resp:    Temp: 98.4 °F (36.9 °C)   SpO2:        General Appearance:  awake, alert, oriented, in no acute distress  Skin:  Skin color, texture, turgor normal. No rashes or lesions. Head/face:  NCAT  Eyes:  No gross abnormalities. Lungs:  Normal expansion. Clear to auscultation. No rales, rhonchi, or wheezing. Heart:  Heart sounds are normal.  Regular rate and rhythm without murmur, gallop or rub. Abdomen:  Soft, non-tender, normal bowel sounds. No bruits, organomegaly or masses. Extremities: Extremities warm to touch, pink, with no edema. Female Rectal: No hemorrhoids or masses, brown stool on finger, FOBT negative    LABS:    CBC  Recent Labs     05/29/21  0014 05/29/21  0915   WBC 11.5  --    HGB 6.7* 9.3*   HCT 24.2* 32.4*     --      BMP  Recent Labs     05/29/21  0915      K 3.6      CO2 24   BUN 18   CREATININE 1.0   CALCIUM 9.2     Liver Function  Recent Labs     05/29/21  0014   BILITOT 0.6   AST 20   ALT 23   ALKPHOS 75   PROT 7.2   LABALBU 4.0     No results for input(s): LACTATE in the last 72 hours. Recent Labs     05/29/21  0014   INR 2.1       RADIOLOGY    XR CHEST PORTABLE    Result Date: 5/29/2021  EXAMINATION: ONE XRAY VIEW OF THE CHEST 5/29/2021 12:52 am COMPARISON: 02/24/2019. HISTORY: ORDERING SYSTEM PROVIDED HISTORY: sob TECHNOLOGIST PROVIDED HISTORY: Reason for exam:->sob What reading provider will be dictating this exam?->CRC FINDINGS: Frontal portable view of the chest.  Overlying head and neck partially obscure the lung apices. Patient rotation to the left. Normal lung volume. Bilateral basilar and perihilar predominant heterogeneous opacities. Cardiomegaly. Possible small bilateral pleural effusions. No definite pneumothorax. Atherosclerotic thoracic aorta.   Multilevel degenerative disc disease. Bilateral basilar and perihilar predominant heterogeneous opacities. Differential considerations include pulmonary edema and an infectious/inflammatory process. Cardiomegaly. Possible small bilateral pleural effusions. Follow-up PA and lateral chest radiographs 6 weeks after the onset of appropriate medical therapy may be helpful to document improvement and evaluate for malignant potential..         ASSESSMENT:  80 y.o. female with shortness of breath and chest palpitations. General surgery consulted because of anemia.     PLAN:    No evidence of GI bleeding  No plans for scopes  Continue to monitor hemoglobin  Transfusions per primary team    Electronically signed by Fredy Jalloh MD on 5/29/21 at 2:10 PM EDT

## 2021-05-30 ENCOUNTER — APPOINTMENT (OUTPATIENT)
Dept: ULTRASOUND IMAGING | Age: 81
DRG: 811 | End: 2021-05-30
Payer: MEDICARE

## 2021-05-30 LAB
ANION GAP SERPL CALCULATED.3IONS-SCNC: 12 MMOL/L (ref 7–16)
BASOPHILS ABSOLUTE: 0.04 E9/L (ref 0–0.2)
BASOPHILS RELATIVE PERCENT: 0.4 % (ref 0–2)
BUN BLDV-MCNC: 13 MG/DL (ref 6–23)
CALCIUM SERPL-MCNC: 8.6 MG/DL (ref 8.6–10.2)
CHLORIDE BLD-SCNC: 101 MMOL/L (ref 98–107)
CO2: 27 MMOL/L (ref 22–29)
CREAT SERPL-MCNC: 1 MG/DL (ref 0.5–1)
EOSINOPHILS ABSOLUTE: 0.45 E9/L (ref 0.05–0.5)
EOSINOPHILS RELATIVE PERCENT: 4 % (ref 0–6)
GFR AFRICAN AMERICAN: >60
GFR NON-AFRICAN AMERICAN: 53 ML/MIN/1.73
GLUCOSE BLD-MCNC: 138 MG/DL (ref 74–99)
HCT VFR BLD CALC: 29.7 % (ref 34–48)
HEMOGLOBIN: 8.6 G/DL (ref 11.5–15.5)
IMMATURE GRANULOCYTES #: 0.08 E9/L
IMMATURE GRANULOCYTES %: 0.7 % (ref 0–5)
LYMPHOCYTES ABSOLUTE: 2.01 E9/L (ref 1.5–4)
LYMPHOCYTES RELATIVE PERCENT: 17.8 % (ref 20–42)
MAGNESIUM: 1.2 MG/DL (ref 1.6–2.6)
MCH RBC QN AUTO: 21.8 PG (ref 26–35)
MCHC RBC AUTO-ENTMCNC: 29 % (ref 32–34.5)
MCV RBC AUTO: 75.2 FL (ref 80–99.9)
METER GLUCOSE: 182 MG/DL (ref 74–99)
METER GLUCOSE: 189 MG/DL (ref 74–99)
METER GLUCOSE: 241 MG/DL (ref 74–99)
METER GLUCOSE: 86 MG/DL (ref 74–99)
MONOCYTES ABSOLUTE: 0.62 E9/L (ref 0.1–0.95)
MONOCYTES RELATIVE PERCENT: 5.5 % (ref 2–12)
NEUTROPHILS ABSOLUTE: 8.07 E9/L (ref 1.8–7.3)
NEUTROPHILS RELATIVE PERCENT: 71.6 % (ref 43–80)
PDW BLD-RTO: 19.4 FL (ref 11.5–15)
PLATELET # BLD: 280 E9/L (ref 130–450)
PMV BLD AUTO: 8.5 FL (ref 7–12)
POTASSIUM REFLEX MAGNESIUM: 3.1 MMOL/L (ref 3.5–5)
RBC # BLD: 3.95 E12/L (ref 3.5–5.5)
SODIUM BLD-SCNC: 140 MMOL/L (ref 132–146)
WBC # BLD: 11.3 E9/L (ref 4.5–11.5)

## 2021-05-30 PROCEDURE — 6360000002 HC RX W HCPCS: Performed by: INTERNAL MEDICINE

## 2021-05-30 PROCEDURE — 36415 COLL VENOUS BLD VENIPUNCTURE: CPT

## 2021-05-30 PROCEDURE — 2500000003 HC RX 250 WO HCPCS: Performed by: NURSE PRACTITIONER

## 2021-05-30 PROCEDURE — 6360000002 HC RX W HCPCS: Performed by: NURSE PRACTITIONER

## 2021-05-30 PROCEDURE — APPSS60 APP SPLIT SHARED TIME 46-60 MINUTES: Performed by: NURSE PRACTITIONER

## 2021-05-30 PROCEDURE — 2500000003 HC RX 250 WO HCPCS: Performed by: INTERNAL MEDICINE

## 2021-05-30 PROCEDURE — 82962 GLUCOSE BLOOD TEST: CPT

## 2021-05-30 PROCEDURE — 6370000000 HC RX 637 (ALT 250 FOR IP): Performed by: NURSE PRACTITIONER

## 2021-05-30 PROCEDURE — 93005 ELECTROCARDIOGRAM TRACING: CPT | Performed by: NURSE PRACTITIONER

## 2021-05-30 PROCEDURE — 85025 COMPLETE CBC W/AUTO DIFF WBC: CPT

## 2021-05-30 PROCEDURE — 2140000000 HC CCU INTERMEDIATE R&B

## 2021-05-30 PROCEDURE — 6370000000 HC RX 637 (ALT 250 FOR IP): Performed by: INTERNAL MEDICINE

## 2021-05-30 PROCEDURE — 83735 ASSAY OF MAGNESIUM: CPT

## 2021-05-30 PROCEDURE — 93970 EXTREMITY STUDY: CPT

## 2021-05-30 PROCEDURE — 93971 EXTREMITY STUDY: CPT | Performed by: RADIOLOGY

## 2021-05-30 PROCEDURE — 2700000000 HC OXYGEN THERAPY PER DAY

## 2021-05-30 PROCEDURE — 99223 1ST HOSP IP/OBS HIGH 75: CPT | Performed by: INTERNAL MEDICINE

## 2021-05-30 PROCEDURE — 80048 BASIC METABOLIC PNL TOTAL CA: CPT

## 2021-05-30 PROCEDURE — 2580000003 HC RX 258: Performed by: INTERNAL MEDICINE

## 2021-05-30 RX ORDER — FERROUS SULFATE 325(65) MG
325 TABLET ORAL
Status: DISCONTINUED | OUTPATIENT
Start: 2021-05-31 | End: 2021-06-03 | Stop reason: HOSPADM

## 2021-05-30 RX ORDER — POTASSIUM CHLORIDE 20 MEQ/1
40 TABLET, EXTENDED RELEASE ORAL ONCE
Status: DISCONTINUED | OUTPATIENT
Start: 2021-05-30 | End: 2021-06-03 | Stop reason: HOSPADM

## 2021-05-30 RX ORDER — METOPROLOL SUCCINATE 25 MG/1
25 TABLET, EXTENDED RELEASE ORAL ONCE
Status: COMPLETED | OUTPATIENT
Start: 2021-05-30 | End: 2021-05-30

## 2021-05-30 RX ORDER — POTASSIUM CHLORIDE 20 MEQ/1
40 TABLET, EXTENDED RELEASE ORAL ONCE
Status: COMPLETED | OUTPATIENT
Start: 2021-05-30 | End: 2021-05-30

## 2021-05-30 RX ORDER — BUMETANIDE 0.25 MG/ML
2 INJECTION, SOLUTION INTRAMUSCULAR; INTRAVENOUS 2 TIMES DAILY
Status: COMPLETED | OUTPATIENT
Start: 2021-05-30 | End: 2021-05-30

## 2021-05-30 RX ORDER — METOPROLOL SUCCINATE 50 MG/1
50 TABLET, EXTENDED RELEASE ORAL DAILY
Status: DISCONTINUED | OUTPATIENT
Start: 2021-05-31 | End: 2021-06-03 | Stop reason: HOSPADM

## 2021-05-30 RX ORDER — MAGNESIUM SULFATE IN WATER 40 MG/ML
4000 INJECTION, SOLUTION INTRAVENOUS ONCE
Status: COMPLETED | OUTPATIENT
Start: 2021-05-30 | End: 2021-05-30

## 2021-05-30 RX ADMIN — INSULIN LISPRO 2 UNITS: 100 INJECTION, SOLUTION INTRAVENOUS; SUBCUTANEOUS at 11:05

## 2021-05-30 RX ADMIN — METOPROLOL TARTRATE 25 MG: 25 TABLET, FILM COATED ORAL at 08:30

## 2021-05-30 RX ADMIN — POTASSIUM CHLORIDE 40 MEQ: 1500 TABLET, EXTENDED RELEASE ORAL at 11:31

## 2021-05-30 RX ADMIN — DULOXETINE HYDROCHLORIDE 30 MG: 30 CAPSULE, DELAYED RELEASE ORAL at 08:30

## 2021-05-30 RX ADMIN — PANTOPRAZOLE SODIUM 40 MG: 40 TABLET, DELAYED RELEASE ORAL at 20:12

## 2021-05-30 RX ADMIN — TRAMADOL HYDROCHLORIDE 50 MG: 50 TABLET, FILM COATED ORAL at 06:49

## 2021-05-30 RX ADMIN — POTASSIUM CHLORIDE 10 MEQ: 750 TABLET, EXTENDED RELEASE ORAL at 20:12

## 2021-05-30 RX ADMIN — SODIUM CHLORIDE, PRESERVATIVE FREE 10 ML: 5 INJECTION INTRAVENOUS at 04:29

## 2021-05-30 RX ADMIN — POTASSIUM CHLORIDE 40 MEQ: 1500 TABLET, EXTENDED RELEASE ORAL at 08:34

## 2021-05-30 RX ADMIN — SODIUM CHLORIDE, PRESERVATIVE FREE 10 ML: 5 INJECTION INTRAVENOUS at 20:11

## 2021-05-30 RX ADMIN — METOPROLOL SUCCINATE 25 MG: 25 TABLET, EXTENDED RELEASE ORAL at 11:31

## 2021-05-30 RX ADMIN — Medication 2000 MG: at 04:29

## 2021-05-30 RX ADMIN — ATORVASTATIN CALCIUM 10 MG: 10 TABLET, FILM COATED ORAL at 08:30

## 2021-05-30 RX ADMIN — AMLODIPINE BESYLATE 5 MG: 5 TABLET ORAL at 08:30

## 2021-05-30 RX ADMIN — BUMETANIDE 2 MG: 0.25 INJECTION, SOLUTION INTRAMUSCULAR; INTRAVENOUS at 20:11

## 2021-05-30 RX ADMIN — ANTI-FUNGAL POWDER MICONAZOLE NITRATE TALC FREE: 1.42 POWDER TOPICAL at 08:31

## 2021-05-30 RX ADMIN — TRAMADOL HYDROCHLORIDE 50 MG: 50 TABLET, FILM COATED ORAL at 18:30

## 2021-05-30 RX ADMIN — Medication 2000 MG: at 20:12

## 2021-05-30 RX ADMIN — PANTOPRAZOLE SODIUM 40 MG: 40 TABLET, DELAYED RELEASE ORAL at 08:31

## 2021-05-30 RX ADMIN — PREGABALIN 50 MG: 50 CAPSULE ORAL at 08:30

## 2021-05-30 RX ADMIN — MAGNESIUM SULFATE HEPTAHYDRATE 4000 MG: 40 INJECTION, SOLUTION INTRAVENOUS at 10:58

## 2021-05-30 RX ADMIN — SODIUM CHLORIDE, PRESERVATIVE FREE 10 ML: 5 INJECTION INTRAVENOUS at 08:30

## 2021-05-30 RX ADMIN — Medication 2000 MG: at 11:05

## 2021-05-30 RX ADMIN — PRAMIPEXOLE DIHYDROCHLORIDE 1.25 MG: 1 TABLET ORAL at 22:40

## 2021-05-30 RX ADMIN — ANTI-FUNGAL POWDER MICONAZOLE NITRATE TALC FREE: 1.42 POWDER TOPICAL at 20:12

## 2021-05-30 RX ADMIN — POTASSIUM CHLORIDE 10 MEQ: 750 TABLET, EXTENDED RELEASE ORAL at 08:31

## 2021-05-30 RX ADMIN — INSULIN LISPRO 1 UNITS: 100 INJECTION, SOLUTION INTRAVENOUS; SUBCUTANEOUS at 20:23

## 2021-05-30 RX ADMIN — BUMETANIDE 2 MG: 0.25 INJECTION, SOLUTION INTRAMUSCULAR; INTRAVENOUS at 08:45

## 2021-05-30 RX ADMIN — INSULIN LISPRO 1 UNITS: 100 INJECTION, SOLUTION INTRAVENOUS; SUBCUTANEOUS at 16:32

## 2021-05-30 ASSESSMENT — PAIN DESCRIPTION - LOCATION: LOCATION: ANKLE

## 2021-05-30 ASSESSMENT — PAIN - FUNCTIONAL ASSESSMENT: PAIN_FUNCTIONAL_ASSESSMENT: ACTIVITIES ARE NOT PREVENTED

## 2021-05-30 ASSESSMENT — PAIN SCALES - GENERAL
PAINLEVEL_OUTOF10: 0
PAINLEVEL_OUTOF10: 0
PAINLEVEL_OUTOF10: 6
PAINLEVEL_OUTOF10: 5
PAINLEVEL_OUTOF10: 10
PAINLEVEL_OUTOF10: 0

## 2021-05-30 ASSESSMENT — PAIN DESCRIPTION - ONSET: ONSET: AWAKENED FROM SLEEP

## 2021-05-30 ASSESSMENT — PAIN DESCRIPTION - PROGRESSION: CLINICAL_PROGRESSION: NOT CHANGED

## 2021-05-30 ASSESSMENT — PAIN DESCRIPTION - DESCRIPTORS: DESCRIPTORS: ACHING;NAGGING;DISCOMFORT

## 2021-05-30 ASSESSMENT — PAIN DESCRIPTION - PAIN TYPE: TYPE: CHRONIC PAIN

## 2021-05-30 ASSESSMENT — PAIN DESCRIPTION - FREQUENCY: FREQUENCY: INTERMITTENT

## 2021-05-30 ASSESSMENT — PAIN DESCRIPTION - ORIENTATION: ORIENTATION: RIGHT

## 2021-05-30 NOTE — PROGRESS NOTES
Department of Internal Medicine  Infectious Diseases  Progress Note      C/C : Right leg cellulitis     Report leg swelling, redness right leg   Denies fever   Afebrile     Current Facility-Administered Medications   Medication Dose Route Frequency Provider Last Rate Last Admin    bumetanide (BUMEX) injection 2 mg  2 mg Intravenous BID Dominic Mistry. STEVEN Summers   2 mg at 05/30/21 0845    magnesium sulfate 4000 mg in 100 mL IVPB premix  4,000 mg Intravenous Once Dominic Mistry. STEVEN Summers        potassium chloride (KLOR-CON M) extended release tablet 40 mEq  40 mEq Oral Once Dominic Mistry. STEVEN Summers        metoprolol succinate (TOPROL XL) extended release tablet 25 mg  25 mg Oral Once Dominic Mistry. STEVEN Summers        [START ON 5/31/2021] metoprolol succinate (TOPROL XL) extended release tablet 50 mg  50 mg Oral Daily Dominic Mistry.  STEVEN Summers        potassium chloride (KLOR-CON M) extended release tablet 40 mEq  40 mEq Oral Once Rolando Arana MD        [START ON 5/31/2021] ferrous sulfate (IRON 325) tablet 325 mg  325 mg Oral Daily with breakfast Rolando Arana MD        0.9 % sodium chloride infusion   Intravenous PRN Modesta Capellan MD        amLODIPine (NORVASC) tablet 5 mg  5 mg Oral Daily Rolando Arana MD   5 mg at 05/30/21 0830    [Held by provider] bumetanide (BUMEX) tablet 2 mg  2 mg Oral Daily Rolando Arana MD   2 mg at 05/29/21 1049    DULoxetine (CYMBALTA) extended release capsule 30 mg  30 mg Oral Daily Rolando Arana MD   30 mg at 05/30/21 0830    hydrOXYzine (ATARAX) tablet 10 mg  10 mg Oral Nightly PRN Rolando Arana MD        miconazole (MICOTIN) 2 % powder   Topical BID Rolando Arana MD   Given at 05/30/21 0831    pantoprazole (PROTONIX) tablet 40 mg  40 mg Oral BID Rolando Arana MD   40 mg at 05/30/21 0831    potassium chloride (KLOR-CON M) extended release tablet 10 mEq  10 mEq Oral BID Rolando Arana MD   10 mEq at 05/30/21 0831    pramipexole (MIRAPEX) tablet 1.25 mg  1.25 mg Oral Nightly Denies abdomen pain, diarrhea or constipation. GENITOURINARY:  Denies burning urination or frequency of urination  INTEGUMENT:Right leg erythema   HEMATOLOGIC/LYMPHATIC:  Denies lymph node swelling, gum bleeding or easy bruising. MUSCULOSKELETAL:  Leg swelling, redness, warm   NEUROLOGICAL:  Weakness     PHYSICAL EXAM:      Vitals:     /79   Pulse 92   Temp 99.4 °F (37.4 °C) (Temporal)   Resp 18   Ht 5' 3\" (1.6 m)   Wt 207 lb 6.4 oz (94.1 kg)   LMP  (LMP Unknown)   SpO2 94%   BMI 36.74 kg/m²     General Appearance:    Awake, alert , no acute distress. Head:    Normocephalic, atraumatic   Eyes:    No pallor, no icterus,   Ears:    No obvious deformity or drainage.    Nose:   No nasal drainage   Throat:   Mucosa moist, no oral thrush   Neck:   Supple, no lymphadenopathy   Back:     no CVA tenderness   Lungs:     Bibasilar crackles     Heart:    Regular rate and rhythm   Abdomen:     Soft, non-tender, bowel sounds present    Extremities:   Bilateral pitting edema, right leg erythema , warm to touch, no crepitus    Pulses:   Dorsalis pedis palpable    Skin:   Right leg erythema    CBC with Differential:      Lab Results   Component Value Date    WBC 11.3 05/30/2021    RBC 3.95 05/30/2021    HGB 8.6 05/30/2021    HCT 29.7 05/30/2021     05/30/2021    MCV 75.2 05/30/2021    MCH 21.8 05/30/2021    MCHC 29.0 05/30/2021    RDW 19.4 05/30/2021    LYMPHOPCT 17.8 05/30/2021    MONOPCT 5.5 05/30/2021    BASOPCT 0.4 05/30/2021    MONOSABS 0.62 05/30/2021    LYMPHSABS 2.01 05/30/2021    EOSABS 0.45 05/30/2021    BASOSABS 0.04 05/30/2021       CMP     Lab Results   Component Value Date     05/30/2021    K 3.1 05/30/2021     05/30/2021    CO2 27 05/30/2021    BUN 13 05/30/2021    CREATININE 1.0 05/30/2021    GFRAA >60 05/30/2021    LABGLOM 53 05/30/2021    GLUCOSE 138 05/30/2021    PROT 7.2 05/29/2021    LABALBU 4.0 05/29/2021    CALCIUM 8.6 05/30/2021    BILITOT 0.6 05/29/2021    ALKPHOS 75 05/29/2021    AST 20 05/29/2021    ALT 23 05/29/2021         Hepatic Function Panel:    Lab Results   Component Value Date    ALKPHOS 75 05/29/2021    ALT 23 05/29/2021    AST 20 05/29/2021    PROT 7.2 05/29/2021    BILITOT 0.6 05/29/2021    BILIDIR 0.5 11/06/2018    IBILI 1.1 11/06/2018    LABALBU 4.0 05/29/2021       PT/INR:    Lab Results   Component Value Date    PROTIME 22.4 05/29/2021    INR 2.1 05/29/2021       TSH:    Lab Results   Component Value Date    TSH 1.240 12/05/2020       U/A:    Lab Results   Component Value Date    COLORU Yellow 12/05/2020    PHUR 6.0 12/05/2020    WBCUA 2-5 12/05/2020    RBCUA 1-3 12/05/2020    RBCUA NONE 10/14/2012    BACTERIA NONE SEEN 12/05/2020    CLARITYU Clear 12/05/2020    SPECGRAV 1.010 12/05/2020    LEUKOCYTESUR Negative 12/05/2020    UROBILINOGEN 0.2 12/05/2020    BILIRUBINUR Negative 12/05/2020    BLOODU Negative 12/05/2020    GLUCOSEU 500 12/05/2020       ABG:  No results found for: [id number], BEART, T3YYUCIX, PHART, THGBART, ASI5FXS, PO2ART, QXP2VBV        Radiology :    Chest X ray - bilateral congestion     Ultra sound leg - no DVT     IMPRESSION:     1. Right leg cellulitis   2. Resp failure , CHF     RECOMMENDATIONS:      1. Ancef 2 grams IV q 8 hrs, leg elevation, diuretics   2.  ACE wrap to the leg

## 2021-05-30 NOTE — PROGRESS NOTES
PT SEEN AND EXAMINED. Chart reviewed. meds reviewed. D/w nursing + family as available. EXAM: IN GENERAL, NAD. AWAKE AND ALERT. ROS NEGx10 EXCEPT:   /79   Pulse 92   Temp 99.4 °F (37.4 °C) (Temporal)   Resp 18   Ht 5' 3\" (1.6 m)   Wt 207 lb 6.4 oz (94.1 kg)   LMP  (LMP Unknown)   SpO2 94%   BMI 36.74 kg/m²   GEN: A+O NAD. HEENT: NCAT. EOMI. LUKE  NECK: NO JVD. TRACH MIDLINE. NO BRUITS. NO THYROMEGALY. LUNGS: CTA BL NO RALES, RHONCHI OR WHEEZES. GOOD EXCURSION. CV: Regular rate and rhythm, NO Murmurs, Rubs, Or gallops  ABD: Soft. Nontender. Normal bowel sounds. No organomegaly  EXT:No clubbing cyanosis or edema  Neuro: Alert and oriented x 3. No focal motor deficits. No sensory deficits. Reflexes appear intact.   Labs/data reviewedLABS: CBC with Differential:    Lab Results   Component Value Date    WBC 11.3 05/30/2021    RBC 3.95 05/30/2021    HGB 8.6 05/30/2021    HCT 29.7 05/30/2021     05/30/2021    MCV 75.2 05/30/2021    MCH 21.8 05/30/2021    MCHC 29.0 05/30/2021    RDW 19.4 05/30/2021    LYMPHOPCT 17.8 05/30/2021    MONOPCT 5.5 05/30/2021    BASOPCT 0.4 05/30/2021    MONOSABS 0.62 05/30/2021    LYMPHSABS 2.01 05/30/2021    EOSABS 0.45 05/30/2021    BASOSABS 0.04 05/30/2021     Platelets:    Lab Results   Component Value Date     05/30/2021     CMP:    Lab Results   Component Value Date     05/30/2021    K 3.1 05/30/2021     05/30/2021    CO2 27 05/30/2021    BUN 13 05/30/2021    CREATININE 1.0 05/30/2021    GFRAA >60 05/30/2021    LABGLOM 53 05/30/2021    GLUCOSE 138 05/30/2021    PROT 7.2 05/29/2021    LABALBU 4.0 05/29/2021    CALCIUM 8.6 05/30/2021    BILITOT 0.6 05/29/2021    ALKPHOS 75 05/29/2021    AST 20 05/29/2021    ALT 23 05/29/2021     Magnesium:    Lab Results   Component Value Date    MG 1.2 05/30/2021     LDH:  No results found for: LDH  PT/INR:    Lab Results   Component Value Date    PROTIME 22.4 05/29/2021    INR 2.1 05/29/2021     Last 3 Troponin: insulin lispro  0-6 Units Subcutaneous Q6H    sodium chloride flush  5-40 mL Intravenous 2 times per day    ceFAZolin  2,000 mg Intravenous Q8H       Continuous Infusions:   sodium chloride      dextrose      sodium chloride         PRN Meds:sodium chloride, hydrOXYzine, traMADol, glucose, dextrose, glucagon (rDNA), dextrose, sodium chloride flush, sodium chloride, promethazine **OR** ondansetron, polyethylene glycol, acetaminophen **OR** acetaminophen    A/P:      Patient Active Problem List   Diagnosis    Spinal stenosis in cervical region    Disorder of muscle, ligament, and fascia    Displacement of lumbar intervertebral disc without myelopathy    Spinal stenosis, lumbar region, without neurogenic claudication    Essential hypertension    Mixed hyperlipidemia    DM (diabetes mellitus) ([facility] [address]    Obesity (BMI 30.0-34. 9)    Other chest pain    Left wrist pain    Renal artery aneurysm (HCC)    Chronic neck pain    Chronic back pain    A-fib (HCC)    PAF (paroxysmal atrial fibrillation) (HCC)    Anemia    Chronic anticoagulation    Class 1 obesity due to excess calories without serious comorbidity with body mass index (BMI) of 34.0 to 34.9 in adult    Malignant neoplasm of cecum (HCC)    Nonrheumatic aortic valve stenosis    Pulmonary HTN (HCC)    Hyponatremia    Abdominal pain    Cellulitis of right leg    GI bleed    ANEMIA  CHF  KEEP HGB~10      PLAN:  LASIX TODAY  MONITOR HGB  NO PLANS FOR SCOPES PER SURG    I can be reached though [facility] or [facility] at [phone]

## 2021-05-30 NOTE — CONSULTS
Inpatient Cardiology Consultation      Reason for Consult:  HFpEF    Consulting Physician: Dr Luigi Pagan    Requesting Physician:  Dr Anju Winter    Date of Consultation: 5/30/2021    HISTORY OF PRESENT ILLNESS: 81 yo  female known to Dr Cesilia Glaser last seen in 2019. PMH: HTN, HLD, obesity, ex heavy smoker, PAF OAC with Xarelto, T2DM with neuropathy, hx RLE cellulitis, anterior cervical diskectomy, adenocarcinoma colon s/p resection in 2019, chronic back/neck pain, and Iodine allergy (rash). Developed SOB on Friday while sitting I chair some fluttering in chest also noticed RLE swelling and redness on Saturday. Denies fever, chills, SOB, CP,  or orthopnea. Endorses 15-20 pound weight gain over last 5-6 months. [facility]/28/[facility]21 BP upon arrival 141/74 HR 90's, O2 saturation 90-91% on RA placed on 4 liters NC. EKG with PAC's. Stool for occult blood negative. Na 135, K+ 4.1--> 3.1, Bun/Cr 22/1.1-->13/1.0, Magnesium 1.2, p-BNP 1329, troponin <6, WBC 11.5, Hgb 6.7-->2 units PRBC-->9.3-->8.6, INR 2.1.  5/29/2021 ID consult-->Right leg cellulitis--> Ancef IV.  5/29/2021 general surgery consult-->No evidence of GI bleeding  No plans for scopes. 5/30/2021 RLE ACE wrap    Please note: past medical records were reviewed per electronic medical record (EMR) - see detailed reports under Past Medical/ Surgical History. Past Medical History:    1. Admission 12/27/17 with weakness. Unable to walk. AF/RVR. 2. Obesity.  BMI 33.66 - 06/2019. BMI 36.7 on 5/30/2021  3. T2DM with neuropathy  4. HTN  5. HLD  6. PAF. [id number] score = 5. OAC with Xarelto  7. Cardiac cath ~2012  No. significant abnormality per patient report NA. 8. FH :  + for atrial fibrillation. 9. Arthritis  10. Fatty liver  11. Cervical stenosis s/p diskectomy and Lumbar stenosis without radiculopathy. Follows with Dr. Bambi Best. On chronic narcotic therapy   12. TTE 12/28/2017.  Stage II diastolic dysfunction.  EF visual estimate 55-60%.  Mild LAE.  Elevated LA pressure.  Normal MV structure and fucntion.  AV mildly sclerotic.  Mild PHTN.  No pericardial efffusion. 13. GERD  14. Remote tobacco abuse   15. S/p Appendectomy, bilateral breast reduction, carpal tunnel release, cholecystectomy, anterior cervical diskectomy, hysterectomy, LLE tibia and fibula fracture surgical repair in 2015   16. Iodine Allergy  (urticaria and rash reaction)  17. Admitted 02/19/2019  For laparoscopic right hemicolectomy for adenocarinoma. 18. 1/2/2021 TTE Dr Mendel Bold: ED 65%. Stage II DD. Severely dilated LA, Mild MR/TR. RVSP 40 mmHg  19. Ambulates with cane/walker  20. COVID-19 Vaccine (Moderna)      Medications Prior to admit:  Prior to Admission medications    Medication Sig Start Date End Date Taking? Authorizing Provider   furosemide (LASIX) 20 MG tablet Take 20 mg by mouth every other day   Yes Historical Provider, MD   traMADol (ULTRAM) 50 MG tablet Take 1 tablet by mouth every 8 hours as needed for Pain for up to 60 days. Take lowest dose possible to manage pain 5/18/21 7/17/21 Yes Heather Steele MD   amLODIPine (NORVASC) 5 MG tablet Take 1 tablet by mouth daily 1/5/21  Yes Romeo Polk MD   potassium chloride (KLOR-CON M) 10 MEQ extended release tablet Take 1 tablet by mouth 2 times daily 1/4/21  Yes Jamie López MD   glimepiride (AMARYL) 2 MG tablet Take 2 mg by mouth every morning (before breakfast)    Yes Historical Provider, MD   pregabalin (LYRICA) 50 MG capsule Take 50 mg by mouth daily.     Yes Historical Provider, MD   empagliflozin (JARDIANCE) 10 MG tablet Take 1 tablet by mouth daily 7/1/20  Yes Doron Burton MD   DULoxetine (CYMBALTA) 30 MG extended release capsule Take 1 capsule by mouth daily 6/17/20  Yes Doron Burton MD   oxybutynin (DITROPAN) 5 MG tablet TAKE 1 TABLET BY MOUTH  DAILY WITH BREAKFAST 5/21/20  Yes Doron Burton MD   hydrOXYzine (ATARAX) 10 MG tablet Take 1 tablet by mouth nightly  Patient taking differently: Take 10 mg by mouth nightly as needed  5/8/20  Yes Aubrie Gonzalez MD   simvastatin (ZOCOR) 20 MG tablet TAKE 1 TABLET BY MOUTH  DAILY WITH SUPPER 4/23/20  Yes Magy Dang MD   pantoprazole (PROTONIX) 40 MG tablet TAKE ONE TABLET BY MOUTH TWO TIMES A DAY BEFORE MEALS 4/20/20  Yes Magy Dang MD   metoprolol tartrate (LOPRESSOR) 25 MG tablet Take 1 tablet by mouth 2 times daily 3/25/20  Yes Magy Dang MD   rivaroxaban (XARELTO) 20 MG TABS tablet Take 1 tablet by mouth daily (with breakfast) 3/16/20  Yes Magy Dang MD   metFORMIN (GLUCOPHAGE) 500 MG tablet TAKE 2 TABLETS BY MOUTH  TWICE A DAY  Patient taking differently: Take 1,000 mg by mouth daily (with breakfast) Take 2 tablets by mouth  twice a day 2/6/20  Yes Magy Dang MD   miconazole (MICOTIN) 2 % powder Apply topically 2 times daily.  1/4/21   Leslie Scales MD   bumetanide (BUMEX) 2 MG tablet Take 1 tablet by mouth daily 1/5/21   Leslie Scales MD   pramipexole (MIRAPEX) 0.25 MG tablet TAKE 5 TABLETS BY MOUTH  NIGHTLY 6/8/20   Aubrie Gonzalez MD   dicyclomine (BENTYL) 10 MG capsule TAKE 1 CAPSULE BY MOUTH  DAILY AS NEEDED 10/16/18   Aubrie Gonzalez MD       Current Medications:    Current Facility-Administered Medications: bumetanide (BUMEX) injection 2 mg, 2 mg, Intravenous, BID  [START ON 5/31/2021] metoprolol succinate (TOPROL XL) extended release tablet 50 mg, 50 mg, Oral, Daily  potassium chloride (KLOR-CON M) extended release tablet 40 mEq, 40 mEq, Oral, Once  [START ON 5/31/2021] ferrous sulfate (IRON 325) tablet 325 mg, 325 mg, Oral, Daily with breakfast  0.9 % sodium chloride infusion, , Intravenous, PRN  amLODIPine (NORVASC) tablet 5 mg, 5 mg, Oral, Daily  [Held by provider] bumetanide (BUMEX) tablet 2 mg, 2 mg, Oral, Daily  DULoxetine (CYMBALTA) extended release capsule 30 mg, 30 mg, Oral, Daily  hydrOXYzine (ATARAX) tablet 10 mg, 10 mg, Oral, Nightly PRN  miconazole (MICOTIN) 2 % powder, , Topical, BID  pantoprazole (PROTONIX) tablet 40 mg, 40 mg, Oral, BID  potassium chloride (KLOR-CON M) extended release tablet 10 mEq, 10 mEq, Oral, BID  pramipexole (MIRAPEX) tablet 1.25 mg, 1.25 mg, Oral, Nightly  pregabalin (LYRICA) capsule 50 mg, 50 mg, Oral, Daily  atorvastatin (LIPITOR) tablet 10 mg, 10 mg, Oral, Daily  traMADol (ULTRAM) tablet 50 mg, 50 mg, Oral, Q8H PRN  insulin lispro (HUMALOG) injection vial 0-6 Units, 0-6 Units, Subcutaneous, Q6H  glucose (GLUTOSE) 40 % oral gel 15 g, 15 g, Oral, PRN  dextrose 50 % IV solution, 12.5 g, Intravenous, PRN  glucagon (rDNA) injection 1 mg, 1 mg, Intramuscular, PRN  dextrose 5 % solution, 100 mL/hr, Intravenous, PRN  sodium chloride flush 0.9 % injection 5-40 mL, 5-40 mL, Intravenous, 2 times per day  sodium chloride flush 0.9 % injection 5-40 mL, 5-40 mL, Intravenous, PRN  0.9 % sodium chloride infusion, 25 mL, Intravenous, PRN  promethazine (PHENERGAN) tablet 12.5 mg, 12.5 mg, Oral, Q6H PRN **OR** ondansetron (ZOFRAN) injection 4 mg, 4 mg, Intravenous, Q6H PRN  polyethylene glycol (GLYCOLAX) packet 17 g, 17 g, Oral, Daily PRN  acetaminophen (TYLENOL) tablet 650 mg, 650 mg, Oral, Q6H PRN **OR** acetaminophen (TYLENOL) suppository 650 mg, 650 mg, Rectal, Q6H PRN  ceFAZolin (ANCEF) 2000 mg in sterile water 20 mL IV syringe, 2,000 mg, Intravenous, Q8H    Allergies:  Benadryl [diphenhydramine], Fish-derived products, and Iodine   Iodine: rash    Social History:    60 pack years quit in 3099  Denies ETOH/Illicit drugs  Caffeine: Coffee  Activity: Lives with  in 1 story condo with basement. Ambulates with cane/walker. Does not drive due to \"leg weakness. \"  Code Status: Full Code      Family History: Non contributory secondary to age      REVIEW OF SYSTEMS:   As per Dr Devine Perfect:   /63   Pulse 93   Temp 98.2 °F (36.8 °C) (Temporal)   Resp 18   Ht 5' 3\" (1.6 m)   Wt 207 lb 6.4 oz (94.1 kg)   LMP  (LMP Unknown)   SpO2 97%   BMI 36.74 kg/m²     As per Dr Neelam Dee:    ECG as per Dr Arian Hay interpretation  Tele strips: SR with PAC's 4 beats NSVT    Diagnostic:    CXR 5/29/2021: Bilateral basilar and perihilar predominant heterogeneous opacities. Differential considerations include pulmonary edema and an   infectious/inflammatory process. Cardiomegaly. Possible small bilateral pleural effusions. BLE Venous Dopplers 5/20/2021: Within the visualized vessels there is no evidence for deep venous thrombosis    Intake/Output Summary (Last 24 hours) at 5/30/2021 1559  Last data filed at 5/30/2021 1306  Gross per 24 hour   Intake 820 ml   Output 3350 ml   Net -2530 ml       Labs:   CBC:   Recent Labs     05/29/21  0014 05/29/21  0915 05/30/21  0500   WBC 11.5  --  11.3   HGB 6.7* 9.3* 8.6*   HCT 24.2* 32.4* 29.7*     --  280     BMP:   Recent Labs     05/29/21  0915 05/30/21  0500    140   K 3.6 3.1*   CO2 24 27   BUN 18 13   CREATININE 1.0 1.0   LABGLOM 53 53   CALCIUM 9.2 8.6     Mag:   Recent Labs     05/30/21  0500   MG 1.2*     TFT:   Lab Results   Component Value Date    TSH 1.240 12/05/2020    S1WTKDV 6.4 12/05/2020      HgA1c:   Lab Results   Component Value Date    LABA1C 7.4 03/19/2020     proBNP:   Recent Labs     05/29/21  0014   PROBNP 1,329*     PT/INR:   Recent Labs     05/29/21  0014   PROTIME 22.4*   INR 2.1     FASTING LIPID PANEL:  Lab Results   Component Value Date    CHOL 155 10/15/2012    HDL 15 06/11/2019    LDLCALC 50 10/31/2017    TRIG 205 06/11/2019     LIVER PROFILE:  Recent Labs     05/29/21  0014   AST 20   ALT 23   LABALBU 4.0   Electronically signed by Daniela Norwood. STEVEN Cosby on 5/30/2021 at 3:59 PM      I have personally seen and evaluated the patient. I personally obtained the history and performed the physical exam.  I personally reviewed all of the above labs, history, review of systems, and data. All of the assessments and recommendations are from me.   All of the above cardiac medical decisions are from me. Please see my additional contributions to the history, physical exam, assessment, and recommendations below.        History of chief complaint:  She states that Saturday she became suddenly short of breath while she was sitting in a chair. She also felt her heart fluttering. Saturday she developed new onset right lower extremity redness and swelling. She has had prior cellulitis in this area. She does feel that she has gained 30 pounds over the past 6 months. She denies orthopnea but she states that she chronically sleeps on 2 pillows. She denies any chest discomfort. She was prescribed diuretics at home but she does not think that she has been taking them.     Review of systems:                Heart: as above              Lungs: as above              Eyes: denies changes in vision or discharge. Ears: denies changes in hearing or pain. Nose: denies epistaxis or masses              Throat: denies sore throat or trouble swallowing. Neuro: denies numbness, tingling, tremors. Skin: denies rashes or itching. : denies hematuria, dysuria              GI: denies vomiting, diarrhea              Psych: denies mood changed, anxiety, depression.                   Physical exam:  /70   Pulse 62   Temp 96.6 °F (35.9 °C) (Temporal)   Resp 18   Ht 5' 3\" (1.6 m)   Wt 207 lb 6.4 oz (94.1 kg)   LMP  (LMP Unknown)   SpO2 94%   BMI 36.74 kg/m²   Constitutional: A&O x3, communicates well, no acute distress. Eyes: extraocular muscles intact, PERRL. Normal lids & conjunctiva. No icterus. ENT: clear, no bleeding. No external masses. Lips normal formation. Neck: Thick and difficult to examine. Supple, full ROM, ? JVD, no bruits, no lymphadenopathy. No masses. trachea midline. Heart: Distant to auscultation. Regular rate & rhythm, normal S1 & S2, I/VI (normal physiologic) systolic murmur, S4 gallop. No heave. Lungs: CTA.   No accessory muscles. Poor air movement. Abd: soft, non-tender. Normal bowel sounds. Neuro: Full ROM X 4, EOMI, no tremors. EXT: Erythema. Moderately severe right and mild to moderate left bilateral lower extremity edema  Skin: warm, dry, intact. Good turgor. Psych: A&O x 3, normal behavior, not anxious.     Patient seen and examined. Chart, labs & data reviewed.     A:  1. Stage II diastolic dysfunction. 2. Obesity. 3. Former smoker. 4. Cellulitis but volume overload on exam.  She is prescribed diuretics at home but she states that she has not been taking these. 5. Diabetes. 6. Paroxysmal atrial fibrillation on Xarelto. She did have palpitations as described above. Sinus rhythm thus far this admission. .  7. Colon cancer in 2019.        Rec:  1. IV diuresis. 2. Replace potassium and magnesium. 3. Follow lites and creatinine. 4. Recent echocardiogram.  No need to repeat. 5. Cellulitis and other comorbidities per others.     Electronically signed by Cori Craft DO on 5/30/2021 at 12:56 PM     Note: This report was completed using computerized voice recognition software.  Every effort has been made to ensure accuracy, however; and invert and computerized transcription errors may be present.

## 2021-05-30 NOTE — CONSULTS
I have personally seen and evaluated the patient. I personally obtained the history and performed the physical exam.  I personally reviewed all of the above labs, history, review of systems, and data. All of the assessments and recommendations are from me. All of the above cardiac medical decisions are from me. Please see my additional contributions to the history, physical exam, assessment, and recommendations below. History of chief complaint:  She states that Saturday she became suddenly short of breath while she was sitting in a chair. She also felt her heart fluttering. Saturday she developed new onset right lower extremity redness and swelling. She has had prior cellulitis in this area. She does feel that she has gained 30 pounds over the past 6 months. She denies orthopnea but she states that she chronically sleeps on 2 pillows. She denies any chest discomfort. She was prescribed diuretics at home but she does not think that she has been taking them. Review of systems:     Heart: as above   Lungs: as above   Eyes: denies changes in vision or discharge. Ears: denies changes in hearing or pain. Nose: denies epistaxis or masses   Throat: denies sore throat or trouble swallowing. Neuro: denies numbness, tingling, tremors. Skin: denies rashes or itching. : denies hematuria, dysuria   GI: denies vomiting, diarrhea   Psych: denies mood changed, anxiety, depression. Physical exam:  /70   Pulse 62   Temp 96.6 °F (35.9 °C) (Temporal)   Resp 18   Ht 5' 3\" (1.6 m)   Wt 207 lb 6.4 oz (94.1 kg)   LMP  (LMP Unknown)   SpO2 94%   BMI 36.74 kg/m²   Constitutional: A&O x3, communicates well, no acute distress. Eyes: extraocular muscles intact, PERRL. Normal lids & conjunctiva. No icterus. ENT: clear, no bleeding. No external masses. Lips normal formation. Neck: Thick and difficult to examine. Supple, full ROM, ? JVD, no bruits, no lymphadenopathy. No masses. trachea midline. Heart: Distant to auscultation. Regular rate & rhythm, normal S1 & S2, I/VI (normal physiologic) systolic murmur, S4 gallop. No heave. Lungs: CTA. No accessory muscles. Poor air movement. Abd: soft, non-tender. Normal bowel sounds. Neuro: Full ROM X 4, EOMI, no tremors. EXT: Erythema. Moderately severe right and mild to moderate left bilateral lower extremity edema  Skin: warm, dry, intact. Good turgor. Psych: A&O x 3, normal behavior, not anxious. Patient seen and examined. Chart, labs & data reviewed. A:  1. Stage II diastolic dysfunction. 2. Obesity. 3. Former smoker. 4. Cellulitis but volume overload on exam.  She is prescribed diuretics at home but she states that she has not been taking these. 5. Diabetes. 6. Paroxysmal atrial fibrillation on Xarelto. She did have palpitations as described above. Sinus rhythm thus far this admission. .  7. Colon cancer in 2019. Rec:  1. IV diuresis. 2. Replace potassium and magnesium. 3. Follow lites and creatinine. 4. Recent echocardiogram.  No need to repeat. 5. Cellulitis and other comorbidities per others. Electronically signed by Willie De Leon DO on 5/30/2021 at 12:56 PM    Note: This report was completed using computerized voice recognition software. Every effort has been made to ensure accuracy, however; and invert and computerized transcription errors may be present.

## 2021-05-31 LAB
ANION GAP SERPL CALCULATED.3IONS-SCNC: 9 MMOL/L (ref 7–16)
BASOPHILS ABSOLUTE: 0.04 E9/L (ref 0–0.2)
BASOPHILS RELATIVE PERCENT: 0.3 % (ref 0–2)
BUN BLDV-MCNC: 12 MG/DL (ref 6–23)
CALCIUM SERPL-MCNC: 8.7 MG/DL (ref 8.6–10.2)
CHLORIDE BLD-SCNC: 99 MMOL/L (ref 98–107)
CO2: 36 MMOL/L (ref 22–29)
CREAT SERPL-MCNC: 1 MG/DL (ref 0.5–1)
EKG ATRIAL RATE: 98 BPM
EKG ATRIAL RATE: 98 BPM
EKG P AXIS: 45 DEGREES
EKG P AXIS: 70 DEGREES
EKG P-R INTERVAL: 168 MS
EKG P-R INTERVAL: 186 MS
EKG Q-T INTERVAL: 314 MS
EKG Q-T INTERVAL: 356 MS
EKG QRS DURATION: 66 MS
EKG QRS DURATION: 70 MS
EKG QTC CALCULATION (BAZETT): 400 MS
EKG QTC CALCULATION (BAZETT): 454 MS
EKG R AXIS: 26 DEGREES
EKG R AXIS: 84 DEGREES
EKG T AXIS: -7 DEGREES
EKG T AXIS: 29 DEGREES
EKG VENTRICULAR RATE: 98 BPM
EKG VENTRICULAR RATE: 98 BPM
EOSINOPHILS ABSOLUTE: 0.43 E9/L (ref 0.05–0.5)
EOSINOPHILS RELATIVE PERCENT: 3.6 % (ref 0–6)
GFR AFRICAN AMERICAN: >60
GFR NON-AFRICAN AMERICAN: 53 ML/MIN/1.73
GLUCOSE BLD-MCNC: 113 MG/DL (ref 74–99)
HCT VFR BLD CALC: 30.2 % (ref 34–48)
HEMOGLOBIN: 8.9 G/DL (ref 11.5–15.5)
IMMATURE GRANULOCYTES #: 0.07 E9/L
IMMATURE GRANULOCYTES %: 0.6 % (ref 0–5)
LYMPHOCYTES ABSOLUTE: 1.9 E9/L (ref 1.5–4)
LYMPHOCYTES RELATIVE PERCENT: 16.1 % (ref 20–42)
MAGNESIUM: 1.6 MG/DL (ref 1.6–2.6)
MCH RBC QN AUTO: 22.1 PG (ref 26–35)
MCHC RBC AUTO-ENTMCNC: 29.5 % (ref 32–34.5)
MCV RBC AUTO: 74.9 FL (ref 80–99.9)
METER GLUCOSE: 133 MG/DL (ref 74–99)
METER GLUCOSE: 147 MG/DL (ref 74–99)
METER GLUCOSE: 224 MG/DL (ref 74–99)
METER GLUCOSE: 240 MG/DL (ref 74–99)
MONOCYTES ABSOLUTE: 0.79 E9/L (ref 0.1–0.95)
MONOCYTES RELATIVE PERCENT: 6.7 % (ref 2–12)
NEUTROPHILS ABSOLUTE: 8.56 E9/L (ref 1.8–7.3)
NEUTROPHILS RELATIVE PERCENT: 72.7 % (ref 43–80)
PDW BLD-RTO: 20 FL (ref 11.5–15)
PLATELET # BLD: 258 E9/L (ref 130–450)
PMV BLD AUTO: 8.4 FL (ref 7–12)
POTASSIUM REFLEX MAGNESIUM: 3.2 MMOL/L (ref 3.5–5)
PRO-BNP: 2018 PG/ML (ref 0–450)
RBC # BLD: 4.03 E12/L (ref 3.5–5.5)
SODIUM BLD-SCNC: 144 MMOL/L (ref 132–146)
WBC # BLD: 11.8 E9/L (ref 4.5–11.5)

## 2021-05-31 PROCEDURE — 6370000000 HC RX 637 (ALT 250 FOR IP): Performed by: NURSE PRACTITIONER

## 2021-05-31 PROCEDURE — 2580000003 HC RX 258: Performed by: INTERNAL MEDICINE

## 2021-05-31 PROCEDURE — 85025 COMPLETE CBC W/AUTO DIFF WBC: CPT

## 2021-05-31 PROCEDURE — 6360000002 HC RX W HCPCS: Performed by: INTERNAL MEDICINE

## 2021-05-31 PROCEDURE — 2500000003 HC RX 250 WO HCPCS: Performed by: INTERNAL MEDICINE

## 2021-05-31 PROCEDURE — 6370000000 HC RX 637 (ALT 250 FOR IP): Performed by: INTERNAL MEDICINE

## 2021-05-31 PROCEDURE — 93010 ELECTROCARDIOGRAM REPORT: CPT | Performed by: INTERNAL MEDICINE

## 2021-05-31 PROCEDURE — 83735 ASSAY OF MAGNESIUM: CPT

## 2021-05-31 PROCEDURE — 83880 ASSAY OF NATRIURETIC PEPTIDE: CPT

## 2021-05-31 PROCEDURE — 2700000000 HC OXYGEN THERAPY PER DAY

## 2021-05-31 PROCEDURE — 82962 GLUCOSE BLOOD TEST: CPT

## 2021-05-31 PROCEDURE — 80048 BASIC METABOLIC PNL TOTAL CA: CPT

## 2021-05-31 PROCEDURE — 36415 COLL VENOUS BLD VENIPUNCTURE: CPT

## 2021-05-31 PROCEDURE — 99232 SBSQ HOSP IP/OBS MODERATE 35: CPT | Performed by: INTERNAL MEDICINE

## 2021-05-31 PROCEDURE — 2140000000 HC CCU INTERMEDIATE R&B

## 2021-05-31 PROCEDURE — 93005 ELECTROCARDIOGRAM TRACING: CPT | Performed by: INTERNAL MEDICINE

## 2021-05-31 RX ORDER — POTASSIUM CHLORIDE 20 MEQ/1
20 TABLET, EXTENDED RELEASE ORAL ONCE
Status: COMPLETED | OUTPATIENT
Start: 2021-05-31 | End: 2021-05-31

## 2021-05-31 RX ORDER — POTASSIUM CHLORIDE 20 MEQ/1
40 TABLET, EXTENDED RELEASE ORAL EVERY 6 HOURS
Status: COMPLETED | OUTPATIENT
Start: 2021-05-31 | End: 2021-05-31

## 2021-05-31 RX ORDER — MAGNESIUM SULFATE IN WATER 40 MG/ML
2000 INJECTION, SOLUTION INTRAVENOUS ONCE
Status: COMPLETED | OUTPATIENT
Start: 2021-05-31 | End: 2021-05-31

## 2021-05-31 RX ADMIN — FERROUS SULFATE TAB 325 MG (65 MG ELEMENTAL FE) 325 MG: 325 (65 FE) TAB at 08:22

## 2021-05-31 RX ADMIN — TRAMADOL HYDROCHLORIDE 50 MG: 50 TABLET, FILM COATED ORAL at 18:39

## 2021-05-31 RX ADMIN — SODIUM CHLORIDE, PRESERVATIVE FREE 10 ML: 5 INJECTION INTRAVENOUS at 20:34

## 2021-05-31 RX ADMIN — POTASSIUM CHLORIDE 10 MEQ: 750 TABLET, EXTENDED RELEASE ORAL at 08:22

## 2021-05-31 RX ADMIN — POTASSIUM CHLORIDE 10 MEQ: 750 TABLET, EXTENDED RELEASE ORAL at 20:34

## 2021-05-31 RX ADMIN — SODIUM CHLORIDE, PRESERVATIVE FREE 10 ML: 5 INJECTION INTRAVENOUS at 08:22

## 2021-05-31 RX ADMIN — Medication 2000 MG: at 04:28

## 2021-05-31 RX ADMIN — INSULIN LISPRO 2 UNITS: 100 INJECTION, SOLUTION INTRAVENOUS; SUBCUTANEOUS at 10:23

## 2021-05-31 RX ADMIN — ACETAMINOPHEN 650 MG: 325 TABLET ORAL at 20:33

## 2021-05-31 RX ADMIN — METOPROLOL SUCCINATE 50 MG: 50 TABLET, EXTENDED RELEASE ORAL at 08:22

## 2021-05-31 RX ADMIN — POTASSIUM CHLORIDE 40 MEQ: 1500 TABLET, EXTENDED RELEASE ORAL at 14:08

## 2021-05-31 RX ADMIN — AMLODIPINE BESYLATE 5 MG: 5 TABLET ORAL at 08:23

## 2021-05-31 RX ADMIN — PANTOPRAZOLE SODIUM 40 MG: 40 TABLET, DELAYED RELEASE ORAL at 20:32

## 2021-05-31 RX ADMIN — SODIUM CHLORIDE, PRESERVATIVE FREE 10 ML: 5 INJECTION INTRAVENOUS at 04:29

## 2021-05-31 RX ADMIN — ANTI-FUNGAL POWDER MICONAZOLE NITRATE TALC FREE: 1.42 POWDER TOPICAL at 20:39

## 2021-05-31 RX ADMIN — ATORVASTATIN CALCIUM 10 MG: 10 TABLET, FILM COATED ORAL at 08:23

## 2021-05-31 RX ADMIN — POTASSIUM CHLORIDE 20 MEQ: 1500 TABLET, EXTENDED RELEASE ORAL at 10:23

## 2021-05-31 RX ADMIN — TRAMADOL HYDROCHLORIDE 50 MG: 50 TABLET, FILM COATED ORAL at 03:37

## 2021-05-31 RX ADMIN — POTASSIUM CHLORIDE 40 MEQ: 1500 TABLET, EXTENDED RELEASE ORAL at 20:33

## 2021-05-31 RX ADMIN — MAGNESIUM SULFATE HEPTAHYDRATE 2000 MG: 40 INJECTION, SOLUTION INTRAVENOUS at 18:57

## 2021-05-31 RX ADMIN — PANTOPRAZOLE SODIUM 40 MG: 40 TABLET, DELAYED RELEASE ORAL at 08:24

## 2021-05-31 RX ADMIN — DULOXETINE HYDROCHLORIDE 30 MG: 30 CAPSULE, DELAYED RELEASE ORAL at 08:23

## 2021-05-31 RX ADMIN — PREGABALIN 50 MG: 50 CAPSULE ORAL at 08:22

## 2021-05-31 RX ADMIN — Medication 2000 MG: at 13:06

## 2021-05-31 RX ADMIN — Medication 2000 MG: at 20:33

## 2021-05-31 RX ADMIN — INSULIN LISPRO 2 UNITS: 100 INJECTION, SOLUTION INTRAVENOUS; SUBCUTANEOUS at 21:40

## 2021-05-31 RX ADMIN — ANTI-FUNGAL POWDER MICONAZOLE NITRATE TALC FREE: 1.42 POWDER TOPICAL at 08:24

## 2021-05-31 RX ADMIN — PRAMIPEXOLE DIHYDROCHLORIDE 1.25 MG: 1 TABLET ORAL at 20:32

## 2021-05-31 ASSESSMENT — PAIN SCALES - GENERAL
PAINLEVEL_OUTOF10: 8
PAINLEVEL_OUTOF10: 10
PAINLEVEL_OUTOF10: 0
PAINLEVEL_OUTOF10: 10
PAINLEVEL_OUTOF10: 0
PAINLEVEL_OUTOF10: 7
PAINLEVEL_OUTOF10: 10
PAINLEVEL_OUTOF10: 0

## 2021-05-31 ASSESSMENT — PAIN DESCRIPTION - ORIENTATION: ORIENTATION: RIGHT;LEFT

## 2021-05-31 ASSESSMENT — PAIN DESCRIPTION - PAIN TYPE: TYPE: CHRONIC PAIN

## 2021-05-31 ASSESSMENT — PAIN DESCRIPTION - LOCATION: LOCATION: FOOT

## 2021-05-31 NOTE — PROGRESS NOTES
Department of Internal Medicine  Infectious Diseases  Progress Note      C/C : Right leg cellulitis     Report leg swelling, redness right leg   Denies fever   Afebrile     Current Facility-Administered Medications   Medication Dose Route Frequency Provider Last Rate Last Admin    potassium chloride (KLOR-CON M) extended release tablet 40 mEq  40 mEq Oral Q6H Maryanne Ayala DO        metoprolol succinate (TOPROL XL) extended release tablet 50 mg  50 mg Oral Daily Warner Manvel.  Josianender, APN   50 mg at 05/31/21 1657    potassium chloride (KLOR-CON M) extended release tablet 40 mEq  40 mEq Oral Once Marcie Greer MD        ferrous sulfate (IRON 325) tablet 325 mg  325 mg Oral Daily with breakfast Marcie Greer MD   325 mg at 05/31/21 5889    0.9 % sodium chloride infusion   Intravenous PRN Stacey Lomax MD        amLODIPine (NORVASC) tablet 5 mg  5 mg Oral Daily Marcie Greer MD   5 mg at 05/31/21 0025    [Held by provider] bumetanide (BUMEX) tablet 2 mg  2 mg Oral Daily Marcie Greer MD   2 mg at 05/29/21 1049    DULoxetine (CYMBALTA) extended release capsule 30 mg  30 mg Oral Daily Marcie Greer MD   30 mg at 05/31/21 4492    hydrOXYzine (ATARAX) tablet 10 mg  10 mg Oral Nightly PRN Marcie Greer MD        miconazole (MICOTIN) 2 % powder   Topical BID Marcie Greer MD   Given at 05/31/21 0824    pantoprazole (PROTONIX) tablet 40 mg  40 mg Oral BID Marcie Greer MD   40 mg at 05/31/21 0824    potassium chloride (KLOR-CON M) extended release tablet 10 mEq  10 mEq Oral BID Marcie Greer MD   10 mEq at 05/31/21 2901    pramipexole (MIRAPEX) tablet 1.25 mg  1.25 mg Oral Nightly Jamie López MD   1.25 mg at 05/30/21 2240    pregabalin (LYRICA) capsule 50 mg  50 mg Oral Daily Marcie Greer MD   50 mg at 05/31/21 9015    atorvastatin (LIPITOR) tablet 10 mg  10 mg Oral Daily Marcie Greer MD   10 mg at 05/31/21 0823    traMADol (ULTRAM) tablet 50 mg  50 mg Oral Q8H PRN Marcie Greer MD   50 mg at 05/31/21 0337    insulin lispro (HUMALOG) injection vial 0-6 Units  0-6 Units Subcutaneous Q6H Tanesha Mancia MD   2 Units at 05/31/21 1023    glucose (GLUTOSE) 40 % oral gel 15 g  15 g Oral PRN Tanesha Mancia MD        dextrose 50 % IV solution  12.5 g Intravenous PRN Tanesha Mancia MD        glucagon (rDNA) injection 1 mg  1 mg Intramuscular PRN Tanesha Mancia MD        dextrose 5 % solution  100 mL/hr Intravenous PRN Tanesha Mancia MD        sodium chloride flush 0.9 % injection 5-40 mL  5-40 mL Intravenous 2 times per day Tanesha Mancia MD   10 mL at 05/31/21 1291    sodium chloride flush 0.9 % injection 5-40 mL  5-40 mL Intravenous PRN Tanesha Mancia MD   10 mL at 05/31/21 0429    0.9 % sodium chloride infusion  25 mL Intravenous PRN Tanesha Mancia MD        promethazine (PHENERGAN) tablet 12.5 mg  12.5 mg Oral Q6H PRN Tanesha Mancia MD        Or    ondansetron (ZOFRAN) injection 4 mg  4 mg Intravenous Q6H PRN Tanesha Mancia MD        polyethylene glycol (GLYCOLAX) packet 17 g  17 g Oral Daily PRN Tanesha Mancia MD        acetaminophen (TYLENOL) tablet 650 mg  650 mg Oral Q6H PRN Tanesha Mancia MD   650 mg at 05/29/21 9206    Or    acetaminophen (TYLENOL) suppository 650 mg  650 mg Rectal Q6H PRN Tanesha Mancia MD        ceFAZolin (ANCEF) 2000 mg in sterile water 20 mL IV syringe  2,000 mg Intravenous Q8H Bob Lee MD   2,000 mg at 05/31/21 1306         REVIEW OF SYSTEMS:    CONSTITUTIONAL:  Denies fever, chill or rigors  HEENT: denies blurring of vision or double vision, denies hearing problem  RESPIRATORY: SOB   CARDIOVASCULAR:  Denies palpitation  GASTROINTESTINAL:  Denies abdomen pain, diarrhea or constipation. GENITOURINARY:  Denies burning urination or frequency of urination  INTEGUMENT:Right leg erythema   HEMATOLOGIC/LYMPHATIC:  Denies lymph node swelling, gum bleeding or easy bruising.   MUSCULOSKELETAL:  Leg swelling, redness, warm   NEUROLOGICAL:  Weakness     PHYSICAL EXAM: Vitals:     /64   Pulse 93   Temp 97.3 °F (36.3 °C) (Temporal)   Resp 18   Ht 5' 3\" (1.6 m)   Wt 201 lb 3.2 oz (91.3 kg)   LMP  (LMP Unknown)   SpO2 95%   BMI 35.64 kg/m²     General Appearance:    Awake, alert , no acute distress. Head:    Normocephalic, atraumatic   Eyes:    No pallor, no icterus,   Ears:    No obvious deformity or drainage.    Nose:   No nasal drainage   Throat:   Mucosa moist, no oral thrush   Neck:   Supple, no lymphadenopathy   Back:     no CVA tenderness   Lungs:     Bibasilar crackles     Heart:    Regular rate and rhythm   Abdomen:     Soft, non-tender, bowel sounds present    Extremities:   Bilateral pitting edema, right leg erythema improving    Pulses:   Dorsalis pedis palpable    Skin:   Right leg erythema    CBC with Differential:      Lab Results   Component Value Date    WBC 11.8 05/31/2021    RBC 4.03 05/31/2021    HGB 8.9 05/31/2021    HCT 30.2 05/31/2021     05/31/2021    MCV 74.9 05/31/2021    MCH 22.1 05/31/2021    MCHC 29.5 05/31/2021    RDW 20.0 05/31/2021    LYMPHOPCT 16.1 05/31/2021    MONOPCT 6.7 05/31/2021    BASOPCT 0.3 05/31/2021    MONOSABS 0.79 05/31/2021    LYMPHSABS 1.90 05/31/2021    EOSABS 0.43 05/31/2021    BASOSABS 0.04 05/31/2021       CMP     Lab Results   Component Value Date     05/31/2021    K 3.2 05/31/2021    CL 99 05/31/2021    CO2 36 05/31/2021    BUN 12 05/31/2021    CREATININE 1.0 05/31/2021    GFRAA >60 05/31/2021    LABGLOM 53 05/31/2021    GLUCOSE 113 05/31/2021    PROT 7.2 05/29/2021    LABALBU 4.0 05/29/2021    CALCIUM 8.7 05/31/2021    BILITOT 0.6 05/29/2021    ALKPHOS 75 05/29/2021    AST 20 05/29/2021    ALT 23 05/29/2021         Hepatic Function Panel:    Lab Results   Component Value Date    ALKPHOS 75 05/29/2021    ALT 23 05/29/2021    AST 20 05/29/2021    PROT 7.2 05/29/2021    BILITOT 0.6 05/29/2021    BILIDIR 0.5 11/06/2018    IBILI 1.1 11/06/2018    LABALBU 4.0 05/29/2021       PT/INR:    Lab Results Component Value Date    PROTIME 22.4 05/29/2021    INR 2.1 05/29/2021       TSH:    Lab Results   Component Value Date    TSH 1.240 12/05/2020       U/A:    Lab Results   Component Value Date    COLORU Yellow 12/05/2020    PHUR 6.0 12/05/2020    WBCUA 2-5 12/05/2020    RBCUA 1-3 12/05/2020    RBCUA NONE 10/14/2012    BACTERIA NONE SEEN 12/05/2020    CLARITYU Clear 12/05/2020    SPECGRAV 1.010 12/05/2020    LEUKOCYTESUR Negative 12/05/2020    UROBILINOGEN 0.2 12/05/2020    BILIRUBINUR Negative 12/05/2020    BLOODU Negative 12/05/2020    GLUCOSEU 500 12/05/2020       ABG:  No results found for: PHT0EXZ, BEART, J1FDECJQ, PHART, THGBART, QLA8WAV, PO2ART, EAF3SXE        Radiology :    Chest X ray - bilateral congestion     Ultra sound leg - no DVT     IMPRESSION:     1. Right leg cellulitis - improving   2. Resp failure , CHF     RECOMMENDATIONS:      1. Ancef 2 grams IV q 8 hrs  2.  ACE wrap to the leg

## 2021-05-31 NOTE — PROGRESS NOTES
PROGRESS NOTE     CARDIOLOGY    Chief complaint: Seen today for follow up, management & recommendations for shortness of breath, hypervolemia. She denies chest pain. Her dyspnea is improving however, she states that she still has shortness of breath with activities and with lying down. Wt Readings from Last 3 Encounters:   05/31/21 201 lb 3.2 oz (91.3 kg)   01/02/21 209 lb (94.8 kg)   12/05/20 186 lb (84.4 kg)     Temp Readings from Last 3 Encounters:   05/31/21 98.4 °F (36.9 °C) (Temporal)   02/15/21 97.6 °F (36.4 °C)   01/04/21 98.4 °F (36.9 °C) (Temporal)     BP Readings from Last 3 Encounters:   05/31/21 119/72   02/15/21 136/84   01/04/21 (!) 116/45     Pulse Readings from Last 3 Encounters:   05/31/21 97   02/15/21 98   01/04/21 86         Intake/Output Summary (Last 24 hours) at 5/31/2021 1724  Last data filed at 5/31/2021 1718  Gross per 24 hour   Intake 840 ml   Output 2750 ml   Net -1910 ml       Recent Labs     05/29/21  0014 05/29/21  0915 05/30/21  0500 05/31/21  0545   WBC 11.5  --  11.3 11.8*   HGB 6.7* 9.3* 8.6* 8.9*   HCT 24.2* 32.4* 29.7* 30.2*   MCV 73.8*  --  75.2* 74.9*     --  280 258     Recent Labs     05/29/21  0915 05/30/21  0500 05/31/21  0545    140 144   K 3.6 3.1* 3.2*    101 99   CO2 24 27 36*   BUN 18 13 12   CREATININE 1.0 1.0 1.0   MG  --  1.2* 1.6     Recent Labs     05/29/21  0014   PROTIME 22.4*   INR 2.1     No results for input(s): CKTOTAL, CKMB, CKMBINDEX, TROPONINI in the last 72 hours. No results for input(s): BNP in the last 72 hours. No results for input(s): CHOL, HDL, TRIG in the last 72 hours.     Invalid input(s): CHOLHDLR, LDLCALCU      potassium chloride (KLOR-CON M) extended release tablet 40 mEq, Q6H  metoprolol succinate (TOPROL XL) extended release tablet 50 mg, Daily  potassium chloride (KLOR-CON M) extended release tablet 40 mEq, Once  ferrous sulfate (IRON 325) tablet 325 mg, Daily with breakfast  0.9 % sodium chloride infusion, PRN  amLODIPine (NORVASC) tablet 5 mg, Daily  [Held by provider] bumetanide (BUMEX) tablet 2 mg, Daily  DULoxetine (CYMBALTA) extended release capsule 30 mg, Daily  hydrOXYzine (ATARAX) tablet 10 mg, Nightly PRN  miconazole (MICOTIN) 2 % powder, BID  pantoprazole (PROTONIX) tablet 40 mg, BID  potassium chloride (KLOR-CON M) extended release tablet 10 mEq, BID  pramipexole (MIRAPEX) tablet 1.25 mg, Nightly  pregabalin (LYRICA) capsule 50 mg, Daily  atorvastatin (LIPITOR) tablet 10 mg, Daily  traMADol (ULTRAM) tablet 50 mg, Q8H PRN  insulin lispro (HUMALOG) injection vial 0-6 Units, Q6H  glucose (GLUTOSE) 40 % oral gel 15 g, PRN  dextrose 50 % IV solution, PRN  glucagon (rDNA) injection 1 mg, PRN  dextrose 5 % solution, PRN  sodium chloride flush 0.9 % injection 5-40 mL, 2 times per day  sodium chloride flush 0.9 % injection 5-40 mL, PRN  0.9 % sodium chloride infusion, PRN  promethazine (PHENERGAN) tablet 12.5 mg, Q6H PRN   Or  ondansetron (ZOFRAN) injection 4 mg, Q6H PRN  polyethylene glycol (GLYCOLAX) packet 17 g, Daily PRN  acetaminophen (TYLENOL) tablet 650 mg, Q6H PRN   Or  acetaminophen (TYLENOL) suppository 650 mg, Q6H PRN  ceFAZolin (ANCEF) 2000 mg in sterile water 20 mL IV syringe, Q8H        Review of systems:     Heart: as above   Lungs: as above   Eyes: denies changes in vision or discharge. Ears: denies changes in hearing or pain. Nose: denies epistaxis or masses   Throat: denies sore throat or trouble swallowing. Neuro: denies numbness, tingling, tremors. Skin: denies rashes or itching. : denies hematuria, dysuria   GI: denies vomiting, diarrhea   Psych: denies mood changed, anxiety, depression. Physical exam:    Constitutional: A&O x3, communicates well, no acute distress. Eyes: extraocular muscles intact, PERRL. Normal lids & conjunctiva. No icterus. ENT: clear, no bleeding. No external masses. Lips normal formation.   Neck: supple, full ROM, + JVD, no bruits, no lymphadenopathy. No masses. trachea midline. Heart: regular rate & rhythm, normal S1 & S2, I/VI (normal physiologic) systolic murmur, S4 gallop. No heave. Lungs: Bilateral rales. No accessory muscles. Abd: soft, non-tender. Normal bowel sounds. Morbidly obese. Neuro: Full ROM X 4, EOMI, no tremors. EXT: Severe right and moderate left bilateral lower extremity edema. Right lower extremity cellulitis  Skin: warm, dry, intact. Good turgor. Psych: A&O x 3, normal behavior, not anxious. Assessment/Recommendations  1. Stage II diastolic dysfunction. 2. Obesity  3. Former smoker  4. Cellulitis  5. Hypervolemia improving but still present. Continue diuresing. Continue to follow lites and creatinine. 6. Diabetes  7. Paroxysmal atrial fibrillation on Xarelto. 8. Colon cancer  9. Replace potassium. 10. Severe anemia. I will defer the comorbidities to others. Note: This report was completed using computerized voice recognition software. Every effort has been made to ensure accuracy, however; and invert and computerized transcription errors may be present.

## 2021-05-31 NOTE — PROGRESS NOTES
GENERAL SURGERY  DAILY PROGRESS NOTE  5/31/2021  Chief Complaint   Patient presents with    Shortness of Breath     started about 2 hrs ago and intermittent throughout the day. pt 94% on room air per ems but placed on 02 for comfort 97% on 4 liters nc.  Leg Swelling     bilateral per pt cellulitis        Subjective:  No overt signs of bleeding    Objective:  /62   Pulse 97   Temp 97.5 °F (36.4 °C) (Temporal)   Resp 18   Ht 5' 3\" (1.6 m)   Wt 201 lb 3.2 oz (91.3 kg)   LMP  (LMP Unknown)   SpO2 96%   BMI 35.64 kg/m²     GENERAL:  Laying in bed, awake, alert, cooperative, no apparent distress  HEAD: Normocephalic, atraumatic  EYES: No sclera icterus, pupils equal  LUNGS:  No increased work of breathing  CARDIOVASCULAR:  RR  ABDOMEN:  Soft, non-tender, non-distended  EXTREMITIES: No edema or swelling  SKIN: Warm and dry    Assessment/Plan:  80 y.o. female with shortness of breath and chest palpitations.   General surgery consulted because of anemia.     PLAN:     No evidence of GI bleeding  No plans for scopes  Continue to monitor hemoglobin  Hemoglobin went from 8.6 yesterday to 8.9 today which is stable  Transfusions per primary team    Electronically signed by Esme Rodriguez DO on 5/31/2021 at 8:32 AM

## 2021-05-31 NOTE — PROGRESS NOTES
Subjective: The patient is awake and alert. No problems overnight. Denies chest pain, angina, and dyspnea. Denies abdominal pain. Tolerating diet. No nausea or vomiting. She does admit her feet hurt. Objective:  Pt is aox3 in nad  /66   Pulse 98   Temp 98.4 °F (36.9 °C) (Temporal)   Resp 18   Ht 5' 3\" (1.6 m)   Wt 201 lb 3.2 oz (91.3 kg)   LMP  (LMP Unknown)   SpO2 96%   BMI 35.64 kg/m²   HEENT no adenopathy no bruits  Heart:  RRR, no murmurs, gallops, or rubs. Lungs:  CTA bilaterally, no wheeze, rales or rhonchi  Abd: bowel sounds present, nontender, nondistended, no masses  Extrem:  No clubbing, cyanosis, minimal edema lower   WBC/Hgb/Hct/Plts:  11.8/8.9/30.2/258 (05/31 6452) basic metabolic panel     Assessment:    Patient Active Problem List   Diagnosis    Spinal stenosis in cervical region    Disorder of muscle, ligament, and fascia    Displacement of lumbar intervertebral disc without myelopathy    Spinal stenosis, lumbar region, without neurogenic claudication    Essential hypertension    Mixed hyperlipidemia    DM (diabetes mellitus) ([address]    Obesity (BMI 30.0-34. 9)    Other chest pain    Left wrist pain    Renal artery aneurysm (HCC)    Chronic neck pain    Chronic back pain    A-fib (HCC)    PAF (paroxysmal atrial fibrillation) (HCC)    Anemia    Chronic anticoagulation    Class 1 obesity due to excess calories without serious comorbidity with body mass index (BMI) of 34.0 to 34.9 in adult    Malignant neoplasm of cecum (HCC)    Nonrheumatic aortic valve stenosis    Pulmonary HTN ([address]    Hyponatremia    Abdominal pain    Cellulitis of right leg    GI bleed    Dyspnea       Plan:  Cont as ordered  Increase activity  Monitor labs        Lucia Childers DO  6:59 AM  5/31/2021

## 2021-06-01 LAB
ANION GAP SERPL CALCULATED.3IONS-SCNC: 10 MMOL/L (ref 7–16)
ANISOCYTOSIS: ABNORMAL
BASOPHILS ABSOLUTE: 0.04 E9/L (ref 0–0.2)
BASOPHILS RELATIVE PERCENT: 0.4 % (ref 0–2)
BUN BLDV-MCNC: 15 MG/DL (ref 6–23)
BURR CELLS: ABNORMAL
CALCIUM SERPL-MCNC: 8.8 MG/DL (ref 8.6–10.2)
CHLORIDE BLD-SCNC: 100 MMOL/L (ref 98–107)
CO2: 30 MMOL/L (ref 22–29)
CREAT SERPL-MCNC: 0.9 MG/DL (ref 0.5–1)
EKG ATRIAL RATE: 96 BPM
EKG P AXIS: -4 DEGREES
EKG P-R INTERVAL: 172 MS
EKG Q-T INTERVAL: 426 MS
EKG QRS DURATION: 72 MS
EKG QTC CALCULATION (BAZETT): 538 MS
EKG R AXIS: 25 DEGREES
EKG T AXIS: -2 DEGREES
EKG VENTRICULAR RATE: 96 BPM
EOSINOPHILS ABSOLUTE: 0.46 E9/L (ref 0.05–0.5)
EOSINOPHILS RELATIVE PERCENT: 4.6 % (ref 0–6)
GFR AFRICAN AMERICAN: >60
GFR NON-AFRICAN AMERICAN: >60 ML/MIN/1.73
GLUCOSE BLD-MCNC: 120 MG/DL (ref 74–99)
HCT VFR BLD CALC: 30.8 % (ref 34–48)
HEMOGLOBIN: 8.6 G/DL (ref 11.5–15.5)
HYPOCHROMIA: ABNORMAL
IMMATURE GRANULOCYTES #: 0.05 E9/L
IMMATURE GRANULOCYTES %: 0.5 % (ref 0–5)
LYMPHOCYTES ABSOLUTE: 1.93 E9/L (ref 1.5–4)
LYMPHOCYTES RELATIVE PERCENT: 19.1 % (ref 20–42)
MCH RBC QN AUTO: 21.7 PG (ref 26–35)
MCHC RBC AUTO-ENTMCNC: 27.9 % (ref 32–34.5)
MCV RBC AUTO: 77.6 FL (ref 80–99.9)
METER GLUCOSE: 119 MG/DL (ref 74–99)
METER GLUCOSE: 176 MG/DL (ref 74–99)
METER GLUCOSE: 192 MG/DL (ref 74–99)
METER GLUCOSE: 198 MG/DL (ref 74–99)
MONOCYTES ABSOLUTE: 0.71 E9/L (ref 0.1–0.95)
MONOCYTES RELATIVE PERCENT: 7 % (ref 2–12)
NEUTROPHILS ABSOLUTE: 6.89 E9/L (ref 1.8–7.3)
NEUTROPHILS RELATIVE PERCENT: 68.4 % (ref 43–80)
OVALOCYTES: ABNORMAL
PDW BLD-RTO: 20.5 FL (ref 11.5–15)
PLATELET # BLD: 260 E9/L (ref 130–450)
PMV BLD AUTO: 8.5 FL (ref 7–12)
POIKILOCYTES: ABNORMAL
POLYCHROMASIA: ABNORMAL
POTASSIUM REFLEX MAGNESIUM: 4.2 MMOL/L (ref 3.5–5)
RBC # BLD: 3.97 E12/L (ref 3.5–5.5)
SODIUM BLD-SCNC: 140 MMOL/L (ref 132–146)
TARGET CELLS: ABNORMAL
WBC # BLD: 10.1 E9/L (ref 4.5–11.5)

## 2021-06-01 PROCEDURE — 85025 COMPLETE CBC W/AUTO DIFF WBC: CPT

## 2021-06-01 PROCEDURE — 6370000000 HC RX 637 (ALT 250 FOR IP): Performed by: NURSE PRACTITIONER

## 2021-06-01 PROCEDURE — 82962 GLUCOSE BLOOD TEST: CPT

## 2021-06-01 PROCEDURE — 2140000000 HC CCU INTERMEDIATE R&B

## 2021-06-01 PROCEDURE — 6360000002 HC RX W HCPCS: Performed by: INTERNAL MEDICINE

## 2021-06-01 PROCEDURE — APPSS45 APP SPLIT SHARED TIME 31-45 MINUTES: Performed by: PHYSICIAN ASSISTANT

## 2021-06-01 PROCEDURE — 2580000003 HC RX 258: Performed by: INTERNAL MEDICINE

## 2021-06-01 PROCEDURE — 99232 SBSQ HOSP IP/OBS MODERATE 35: CPT | Performed by: INTERNAL MEDICINE

## 2021-06-01 PROCEDURE — 2500000003 HC RX 250 WO HCPCS: Performed by: PHYSICIAN ASSISTANT

## 2021-06-01 PROCEDURE — 2500000003 HC RX 250 WO HCPCS: Performed by: INTERNAL MEDICINE

## 2021-06-01 PROCEDURE — 6370000000 HC RX 637 (ALT 250 FOR IP): Performed by: INTERNAL MEDICINE

## 2021-06-01 PROCEDURE — 80048 BASIC METABOLIC PNL TOTAL CA: CPT

## 2021-06-01 PROCEDURE — 36415 COLL VENOUS BLD VENIPUNCTURE: CPT

## 2021-06-01 RX ORDER — FUROSEMIDE 10 MG/ML
20 INJECTION INTRAMUSCULAR; INTRAVENOUS ONCE
Status: DISCONTINUED | OUTPATIENT
Start: 2021-06-01 | End: 2021-06-01

## 2021-06-01 RX ORDER — BUMETANIDE 0.25 MG/ML
2 INJECTION, SOLUTION INTRAMUSCULAR; INTRAVENOUS ONCE
Status: COMPLETED | OUTPATIENT
Start: 2021-06-01 | End: 2021-06-01

## 2021-06-01 RX ADMIN — ANTI-FUNGAL POWDER MICONAZOLE NITRATE TALC FREE: 1.42 POWDER TOPICAL at 20:34

## 2021-06-01 RX ADMIN — TRAMADOL HYDROCHLORIDE 50 MG: 50 TABLET, FILM COATED ORAL at 12:44

## 2021-06-01 RX ADMIN — ACETAMINOPHEN 650 MG: 325 TABLET ORAL at 04:21

## 2021-06-01 RX ADMIN — PANTOPRAZOLE SODIUM 40 MG: 40 TABLET, DELAYED RELEASE ORAL at 08:18

## 2021-06-01 RX ADMIN — Medication 2000 MG: at 12:52

## 2021-06-01 RX ADMIN — PRAMIPEXOLE DIHYDROCHLORIDE 1.25 MG: 1 TABLET ORAL at 20:31

## 2021-06-01 RX ADMIN — Medication 2000 MG: at 20:33

## 2021-06-01 RX ADMIN — INSULIN LISPRO 1 UNITS: 100 INJECTION, SOLUTION INTRAVENOUS; SUBCUTANEOUS at 11:18

## 2021-06-01 RX ADMIN — SODIUM CHLORIDE, PRESERVATIVE FREE 10 ML: 5 INJECTION INTRAVENOUS at 08:19

## 2021-06-01 RX ADMIN — INSULIN LISPRO 1 UNITS: 100 INJECTION, SOLUTION INTRAVENOUS; SUBCUTANEOUS at 16:21

## 2021-06-01 RX ADMIN — SODIUM CHLORIDE, PRESERVATIVE FREE 10 ML: 5 INJECTION INTRAVENOUS at 20:33

## 2021-06-01 RX ADMIN — FERROUS SULFATE TAB 325 MG (65 MG ELEMENTAL FE) 325 MG: 325 (65 FE) TAB at 08:19

## 2021-06-01 RX ADMIN — Medication 2000 MG: at 04:30

## 2021-06-01 RX ADMIN — ANTI-FUNGAL POWDER MICONAZOLE NITRATE TALC FREE: 1.42 POWDER TOPICAL at 08:23

## 2021-06-01 RX ADMIN — PANTOPRAZOLE SODIUM 40 MG: 40 TABLET, DELAYED RELEASE ORAL at 20:33

## 2021-06-01 RX ADMIN — BUMETANIDE 2 MG: 0.25 INJECTION, SOLUTION INTRAMUSCULAR; INTRAVENOUS at 13:54

## 2021-06-01 RX ADMIN — INSULIN LISPRO 1 UNITS: 100 INJECTION, SOLUTION INTRAVENOUS; SUBCUTANEOUS at 20:34

## 2021-06-01 RX ADMIN — AMLODIPINE BESYLATE 5 MG: 5 TABLET ORAL at 08:19

## 2021-06-01 RX ADMIN — TRAMADOL HYDROCHLORIDE 50 MG: 50 TABLET, FILM COATED ORAL at 23:58

## 2021-06-01 RX ADMIN — POTASSIUM CHLORIDE 10 MEQ: 750 TABLET, EXTENDED RELEASE ORAL at 08:19

## 2021-06-01 RX ADMIN — ATORVASTATIN CALCIUM 10 MG: 10 TABLET, FILM COATED ORAL at 08:19

## 2021-06-01 RX ADMIN — TRAMADOL HYDROCHLORIDE 50 MG: 50 TABLET, FILM COATED ORAL at 04:20

## 2021-06-01 RX ADMIN — POTASSIUM CHLORIDE 10 MEQ: 750 TABLET, EXTENDED RELEASE ORAL at 20:32

## 2021-06-01 RX ADMIN — PREGABALIN 50 MG: 50 CAPSULE ORAL at 08:22

## 2021-06-01 RX ADMIN — DULOXETINE HYDROCHLORIDE 30 MG: 30 CAPSULE, DELAYED RELEASE ORAL at 08:19

## 2021-06-01 RX ADMIN — METOPROLOL SUCCINATE 50 MG: 50 TABLET, EXTENDED RELEASE ORAL at 08:18

## 2021-06-01 ASSESSMENT — PAIN SCALES - GENERAL
PAINLEVEL_OUTOF10: 0
PAINLEVEL_OUTOF10: 10
PAINLEVEL_OUTOF10: 0
PAINLEVEL_OUTOF10: 10
PAINLEVEL_OUTOF10: 10

## 2021-06-01 ASSESSMENT — PAIN DESCRIPTION - LOCATION
LOCATION: BACK;HEAD
LOCATION: ANKLE;BACK

## 2021-06-01 ASSESSMENT — PAIN DESCRIPTION - PROGRESSION: CLINICAL_PROGRESSION: NOT CHANGED

## 2021-06-01 ASSESSMENT — PAIN DESCRIPTION - FREQUENCY: FREQUENCY: CONTINUOUS

## 2021-06-01 ASSESSMENT — PAIN DESCRIPTION - ONSET: ONSET: AWAKENED FROM SLEEP

## 2021-06-01 ASSESSMENT — PAIN - FUNCTIONAL ASSESSMENT: PAIN_FUNCTIONAL_ASSESSMENT: ACTIVITIES ARE NOT PREVENTED

## 2021-06-01 ASSESSMENT — PAIN DESCRIPTION - DESCRIPTORS
DESCRIPTORS: ACHING;DISCOMFORT;DULL
DESCRIPTORS: ACHING;HEADACHE

## 2021-06-01 ASSESSMENT — PAIN DESCRIPTION - ORIENTATION: ORIENTATION: MID

## 2021-06-01 ASSESSMENT — PAIN DESCRIPTION - PAIN TYPE
TYPE: CHRONIC PAIN
TYPE: CHRONIC PAIN;ACUTE PAIN

## 2021-06-01 NOTE — PROGRESS NOTES
Inpatient Cardiology Progress Note    Date of Service: 6/1/2021    Reason for Follow-up: HFpEF    SUBJECTIVE:     · Patient seen and examined at bedside this morning. · Still admits to orthopnea, mild dyspnea on exertion and lower extremity edema bilaterally R > L.  · She noted of a brief episode of palpitations yesterday evening, with associated dizziness and near syncope. Denied chest pain or actual syncope. No recurrence of these symptoms today. Noted to have three recurrent episodes of 5-6 beats of NSVT versus Afib with RVR/aberrancy. · Telemetry review today reveals NSR with occasional PVCs and PVCs. Episodes of NSVT versus atrial fibrillation with aberrancy yesterday evening. · No other acute events noted overnight per nursing staff. TTE (Dr. James Crooks, 01/2021)   Summary   Left ventricular internal dimensions were normal in diastole and systole. Borderline concentric left ventricular hypertrophy. No regional wall motion abnormalities seen. Normal left ventricular ejection fraction. EF 65%. There is doppler evidence of stage II diastolic dysfunction. The left atrium is severely dilated. Mild mitral annular calcification. Mild mitral regurgitation is present. The aortic valve appears mildly sclerotic. Mild tricuspid regurgitation. Pulmonary hypertension is mild . There is a trivial posterior pericardial effusion noted. Exercise Nuclear Stress Test (2012)  The patient reached a heart rate of 147 at stage II, which is greater than 90% of  target heart rate. Findings-   There is mild to moderately severe, small, partially  reversible perfusion defect is seen at the inferior and infero-septal  region of the left ventricle, extending from mid cavity to the apex. There is decreased thickening of this segmental inferior wall in the  gated spectral study. Gated SPECT study demonstrates good contraction of remaining segments  of the myocardium.  The left ventricular ejection fraction is MD Lisa   rivaroxaban (XARELTO) 20 MG TABS tablet Take 1 tablet by mouth daily (with breakfast) 3/16/20  Yes Mikel Goldberg MD   metFORMIN (GLUCOPHAGE) 500 MG tablet TAKE 2 TABLETS BY MOUTH  TWICE A DAY  Patient taking differently: Take 1,000 mg by mouth daily (with breakfast) Take 2 tablets by mouth  twice a day 2/6/20  Yes Mikel Goldberg MD   miconazole (MICOTIN) 2 % powder Apply topically 2 times daily. 1/4/21   Mane Dunbar MD   bumetanide (BUMEX) 2 MG tablet Take 1 tablet by mouth daily 1/5/21   Mane Dunbar MD   pramipexole (MIRAPEX) 0.25 MG tablet TAKE 5 TABLETS BY MOUTH  NIGHTLY 6/8/20   Aubrie Gonzalez MD   dicyclomine (BENTYL) 10 MG capsule TAKE 1 CAPSULE BY MOUTH  DAILY AS NEEDED 10/16/18   Aubrie Gonzalez MD       CURRENT MEDICATIONS:      Current Facility-Administered Medications:     metoprolol succinate (TOPROL XL) extended release tablet 50 mg, 50 mg, Oral, Daily, Cydnwesley Summers, APN, 50 mg at 06/01/21 0818    potassium chloride (KLOR-CON M) extended release tablet 40 mEq, 40 mEq, Oral, Once, Mane Dunbar MD    ferrous sulfate (IRON 325) tablet 325 mg, 325 mg, Oral, Daily with breakfast, Jamie López MD, 325 mg at 06/01/21 0819    0.9 % sodium chloride infusion, , Intravenous, PRN, Lilliana Sheldon MD    amLODIPine (NORVASC) tablet 5 mg, 5 mg, Oral, Daily, Jamie López MD, 5 mg at 06/01/21 0819    [Held by provider] bumetanide (BUMEX) tablet 2 mg, 2 mg, Oral, Daily, Jamie López MD, 2 mg at 05/29/21 1049    DULoxetine (CYMBALTA) extended release capsule 30 mg, 30 mg, Oral, Daily, Jamie López MD, 30 mg at 06/01/21 0819    hydrOXYzine (ATARAX) tablet 10 mg, 10 mg, Oral, Nightly PRN, Mane Dunbar MD    miconazole (MICOTIN) 2 % powder, , Topical, BID, Mane Dunbar MD, Given at 06/01/21 0823    pantoprazole (PROTONIX) tablet 40 mg, 40 mg, Oral, BID, Mane Dunbar MD, 40 mg at 06/01/21 0818    potassium chloride (KLOR-CON M) extended release tablet 10 mEq, 10 mEq, Oral, BID, Yana Hampton MD, 10 mEq at 06/01/21 0819    pramipexole (MIRAPEX) tablet 1.25 mg, 1.25 mg, Oral, Nightly, Jamie López MD, 1.25 mg at 05/31/21 2032    pregabalin (LYRICA) capsule 50 mg, 50 mg, Oral, Daily, Yana Hampotn MD, 50 mg at 06/01/21 8120    atorvastatin (LIPITOR) tablet 10 mg, 10 mg, Oral, Daily, Yana Hampton MD, 10 mg at 06/01/21 0819    traMADol (ULTRAM) tablet 50 mg, 50 mg, Oral, Q8H PRN, Yana Hampton MD, 50 mg at 06/01/21 0420    insulin lispro (HUMALOG) injection vial 0-6 Units, 0-6 Units, Subcutaneous, Q6H, Yana Hampton MD, 2 Units at 05/31/21 2140    glucose (GLUTOSE) 40 % oral gel 15 g, 15 g, Oral, PRN, Yana Hampton MD    dextrose 50 % IV solution, 12.5 g, Intravenous, PRN, Yana Hampton MD    glucagon (rDNA) injection 1 mg, 1 mg, Intramuscular, PRN, Yana Hampton MD    dextrose 5 % solution, 100 mL/hr, Intravenous, PRN, Yana Hampton MD    sodium chloride flush 0.9 % injection 5-40 mL, 5-40 mL, Intravenous, 2 times per day, Yana Hampton MD, 10 mL at 06/01/21 0819    sodium chloride flush 0.9 % injection 5-40 mL, 5-40 mL, Intravenous, PRN, Simone López MD, 10 mL at 05/31/21 0429    0.9 % sodium chloride infusion, 25 mL, Intravenous, PRN, Yana Hampton MD    promethazine (PHENERGAN) tablet 12.5 mg, 12.5 mg, Oral, Q6H PRN **OR** ondansetron (ZOFRAN) injection 4 mg, 4 mg, Intravenous, Q6H PRN, Yana Hampton MD    polyethylene glycol (GLYCOLAX) packet 17 g, 17 g, Oral, Daily PRN, Yana Hampton MD    acetaminophen (TYLENOL) tablet 650 mg, 650 mg, Oral, Q6H PRN, 650 mg at 06/01/21 0421 **OR** acetaminophen (TYLENOL) suppository 650 mg, 650 mg, Rectal, Q6H PRN, Yana Hampton MD    ceFAZolin (ANCEF) 2000 mg in sterile water 20 mL IV syringe, 2,000 mg, Intravenous, Q8H, Bob Lee MD, 2,000 mg at 06/01/21 0430      ALLERGIES:  Benadryl [diphenhydramine], Fish-derived products, and Iodine        REVIEW OF SYSTEMS: (symptomatic) at 1800 May 31, 2021. Diagnostic:  All diagnostic testing and lab work thus far this admission reviewed in detail. CXR 5/29/2021:  Impression  Bilateral basilar and perihilar predominant heterogeneous opacities. Differential considerations include pulmonary edema and an  infectious/inflammatory process. Cardiomegaly. Possible small bilateral pleural effusions. Follow-up PA and lateral chest radiographs 6 weeks after the onset of  appropriate medical therapy may be helpful to document improvement and  evaluate for malignant potential..    Venous US 5/30/2021:  Impression  Within the visualized vessels there is no evidence for deep venous  thrombosis        Intake/Output Summary (Last 24 hours) at 6/1/2021 0935  Last data filed at 6/1/2021 0641  Gross per 24 hour   Intake 720 ml   Output 750 ml   Net -30 ml       Labs:   CBC:   Recent Labs     05/31/21  0545 06/01/21  0601   WBC 11.8* 10.1   HGB 8.9* 8.6*   HCT 30.2* 30.8*    260     BMP:   Recent Labs     05/31/21  0545 06/01/21  0601    140   K 3.2* 4.2   CO2 36* 30*   BUN 12 15   CREATININE 1.0 0.9   LABGLOM 53 >60   CALCIUM 8.7 8.8     Mag:   Recent Labs     05/30/21  0500 05/31/21  0545   MG 1.2* 1.6     HgA1c:   Lab Results   Component Value Date    LABA1C 7.4 03/19/2020     FASTING LIPID PANEL:  Lab Results   Component Value Date    CHOL 155 10/15/2012    HDL 15 06/11/2019    LDLCALC 50 10/31/2017    TRIG 205 06/11/2019       ASSESSMENT:  1. Acute on chronic HFpEF. EF 65% with stage II diastolic dysfunction on TTE Jan 2021. Net -7.5 L since admission. 2. Paroxysmal atrial fibrillation, on chronic Xarelto. 3. Episodes of NSVT versus atrial fibrillation with aberrancy yesterday. Likely in setting of hypomagnesemia. No recurrence thus far today. 4. Hypomagnesemia, supplemented. 5. Hypertension, well-controlled. 6. Hyperlipidemia, on statin therapy.   7. Mild MR, mild TR, mild pulmonary HTN and trivial pericardial effusion by TTE Jan 2021.  8. 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 Former tobacco smoker. 10. Obesity,  11. Colon adenocarcinoma status-post resection in 2019. 12. Iodine allergy. 13. History of RLE cellulitis. 14. Anemia. RECOMMENDATIONS:  1. IV Bumex 2 mg x one dose this AM with transition to home PO Bumex dose tonight. 2. Continue to monitor intake and output, daily weights and electrolytes. 3. Recommend keeping Mag > 2.0 and K+ > 4.0 in setting of cardiac arrhythmia. 4. Continue other cardiac medications the same. 5. Cardiology will sign off at this time, please call with any questions or concerns. The above case and recommendations were discussed and made in collaboration with Dr. Radha Guallpa. Further recommendations to follow as per him. Brook Lyons, [address] [place] [address], [place] [id number] Cardiology    Electronically signed by Julia Alvarez PA-C on 6/1/2021 at 9:35 AM     PHYSICIAN ADDENDUM:  I independently interviewed and examined the patient. I have reviewed the above documentation completed by the [facility]. Please see my additional contributions to the HPI, physical exam, and assessment / medical decision making. I independently reviewed the HPI, ROS, PMH, PSH, [address], [place], and medications independently and agree with above documentation.     I discussed the assessment and plan with the patient/family at the bedside as well as the bedside nurse and the primary team.    Marianne Carrera MD, [facility]  Interventional Cardiology/Structural Heart Disease  Office: [phone]

## 2021-06-01 NOTE — PROGRESS NOTES
Department of Internal Medicine  Infectious Diseases  Progress Note      C/C : Right leg cellulitis     Report leg swelling, redness right leg   Denies fever   Afebrile     Current Facility-Administered Medications   Medication Dose Route Frequency Provider Last Rate Last Admin    metoprolol succinate (TOPROL XL) extended release tablet 50 mg  50 mg Oral Daily STEVEN Maguire   50 mg at 06/01/21 0818    potassium chloride (KLOR-CON M) extended release tablet 40 mEq  40 mEq Oral Once Giovanny Hamilton MD        ferrous sulfate (IRON 325) tablet 325 mg  325 mg Oral Daily with breakfast Giovanny Hamilton MD   325 mg at 06/01/21 0819    0.9 % sodium chloride infusion   Intravenous PRN Tray Reynoso MD        amLODIPine (NORVASC) tablet 5 mg  5 mg Oral Daily Giovanny Hamilton MD   5 mg at 06/01/21 0819    [Held by provider] bumetanide (BUMEX) tablet 2 mg  2 mg Oral Daily Giovanny Hamilton MD   2 mg at 05/29/21 1049    DULoxetine (CYMBALTA) extended release capsule 30 mg  30 mg Oral Daily Giovanny Hamilton MD   30 mg at 06/01/21 0819    hydrOXYzine (ATARAX) tablet 10 mg  10 mg Oral Nightly PRN Giovanny Hamilton MD        miconazole (MICOTIN) 2 % powder   Topical BID Giovanny Hamilton MD   Given at 06/01/21 0823    pantoprazole (PROTONIX) tablet 40 mg  40 mg Oral BID Giovanny Hamilton MD   40 mg at 06/01/21 0818    potassium chloride (KLOR-CON M) extended release tablet 10 mEq  10 mEq Oral BID Giovanny Hamilton MD   10 mEq at 06/01/21 0819    pramipexole (MIRAPEX) tablet 1.25 mg  1.25 mg Oral Nightly Jamie López MD   1.25 mg at 05/31/21 2032    pregabalin (LYRICA) capsule 50 mg  50 mg Oral Daily Giovanny Hamilton MD   50 mg at 06/01/21 0597    atorvastatin (LIPITOR) tablet 10 mg  10 mg Oral Daily Giovanny Hamilton MD   10 mg at 06/01/21 0819    traMADol (ULTRAM) tablet 50 mg  50 mg Oral Q8H PRN Giovanny Hamilton MD   50 mg at 06/01/21 1244    insulin lispro (HUMALOG) injection vial 0-6 Units  0-6 Units Subcutaneous Q6H Jamie SANDS MD Rene   1 Units at 06/01/21 1118    glucose (GLUTOSE) 40 % oral gel 15 g  15 g Oral PRN Giovanny Hamilton MD        dextrose 50 % IV solution  12.5 g Intravenous PRN Giovanny Hamilton MD        glucagon (rDNA) injection 1 mg  1 mg Intramuscular PRN Giovanny Hamilton MD        dextrose 5 % solution  100 mL/hr Intravenous PRN Giovanny Hamilton MD        sodium chloride flush 0.9 % injection 5-40 mL  5-40 mL Intravenous 2 times per day Giovanny Hamilton MD   10 mL at 06/01/21 0819    sodium chloride flush 0.9 % injection 5-40 mL  5-40 mL Intravenous PRN Giovanny Hamilton MD   10 mL at 05/31/21 0429    0.9 % sodium chloride infusion  25 mL Intravenous PRN Giovanny Hamilton MD        promethazine (PHENERGAN) tablet 12.5 mg  12.5 mg Oral Q6H PRN Giovanny Hamilton MD        Or    ondansetron (ZOFRAN) injection 4 mg  4 mg Intravenous Q6H PRN Giovanyn Hamilton MD        polyethylene glycol (GLYCOLAX) packet 17 g  17 g Oral Daily PRN Giovanny Hamilton MD        acetaminophen (TYLENOL) tablet 650 mg  650 mg Oral Q6H PRN Giovanny Hamilton MD   650 mg at 06/01/21 0421    Or    acetaminophen (TYLENOL) suppository 650 mg  650 mg Rectal Q6H PRN Giovanny Hamilton MD        ceFAZolin (ANCEF) 2000 mg in sterile water 20 mL IV syringe  2,000 mg Intravenous Q8H Bob Lee MD   2,000 mg at 06/01/21 1252         REVIEW OF SYSTEMS:    CONSTITUTIONAL:  Denies fever, chill or rigors  HEENT: denies blurring of vision or double vision, denies hearing problem  RESPIRATORY: SOB   CARDIOVASCULAR:  Denies palpitation  GASTROINTESTINAL:  Denies abdomen pain, diarrhea or constipation. GENITOURINARY:  Denies burning urination or frequency of urination  INTEGUMENT:Right leg erythema   HEMATOLOGIC/LYMPHATIC:  Denies lymph node swelling, gum bleeding or easy bruising.   MUSCULOSKELETAL:  Leg swelling, redness, warm   NEUROLOGICAL:  Weakness     PHYSICAL EXAM:      Vitals:     /78   Pulse 94   Temp 97.4 °F (36.3 °C) (Temporal)   Resp 16   Ht 5' 3\" (1.6 m) Wt 203 lb (92.1 kg)   LMP  (LMP Unknown)   SpO2 93%   BMI 35.96 kg/m²     General Appearance:    Awake, alert , no acute distress. Head:    Normocephalic, atraumatic   Eyes:    No pallor, no icterus,   Ears:    No obvious deformity or drainage.    Nose:   No nasal drainage   Throat:   Mucosa moist, no oral thrush   Neck:   Supple, no lymphadenopathy   Back:     no CVA tenderness   Lungs:     Bibasilar crackles     Heart:    Regular rate and rhythm   Abdomen:     Soft, non-tender, bowel sounds present    Extremities:   Bilateral pitting edema, right leg erythema improving    Pulses:   Dorsalis pedis palpable    Skin:   Right leg erythema - improving    CBC with Differential:      Lab Results   Component Value Date    WBC 10.1 06/01/2021    RBC 3.97 06/01/2021    HGB 8.6 06/01/2021    HCT 30.8 06/01/2021     06/01/2021    MCV 77.6 06/01/2021    MCH 21.7 06/01/2021    MCHC 27.9 06/01/2021    RDW 20.5 06/01/2021    LYMPHOPCT 19.1 06/01/2021    MONOPCT 7.0 06/01/2021    BASOPCT 0.4 06/01/2021    MONOSABS 0.71 06/01/2021    LYMPHSABS 1.93 06/01/2021    EOSABS 0.46 06/01/2021    BASOSABS 0.04 06/01/2021       CMP     Lab Results   Component Value Date     06/01/2021    K 4.2 06/01/2021     06/01/2021    CO2 30 06/01/2021    BUN 15 06/01/2021    CREATININE 0.9 06/01/2021    GFRAA >60 06/01/2021    LABGLOM >60 06/01/2021    GLUCOSE 120 06/01/2021    PROT 7.2 05/29/2021    LABALBU 4.0 05/29/2021    CALCIUM 8.8 06/01/2021    BILITOT 0.6 05/29/2021    ALKPHOS 75 05/29/2021    AST 20 05/29/2021    ALT 23 05/29/2021         Hepatic Function Panel:    Lab Results   Component Value Date    ALKPHOS 75 05/29/2021    ALT 23 05/29/2021    AST 20 05/29/2021    PROT 7.2 05/29/2021    BILITOT 0.6 05/29/2021    BILIDIR 0.5 11/06/2018    IBILI 1.1 11/06/2018    LABALBU 4.0 05/29/2021       PT/INR:    Lab Results   Component Value Date    PROTIME 22.4 05/29/2021    INR 2.1 05/29/2021       TSH:    Lab Results Component Value Date    TSH 1.240 12/05/2020       U/A:    Lab Results   Component Value Date    COLORU Yellow 12/05/2020    PHUR 6.0 12/05/2020    WBCUA 2-5 12/05/2020    RBCUA 1-3 12/05/2020    RBCUA NONE 10/14/2012    BACTERIA NONE SEEN 12/05/2020    CLARITYU Clear 12/05/2020    SPECGRAV 1.010 12/05/2020    LEUKOCYTESUR Negative 12/05/2020    UROBILINOGEN 0.2 12/05/2020    BILIRUBINUR Negative 12/05/2020    BLOODU Negative 12/05/2020    GLUCOSEU 500 12/05/2020       ABG:  No results found for: MCM5VPF, BEART, K2MIVQYU, PHART, THGBART, MIF8EHG, PO2ART, CGO9SNU        Radiology :    Chest X ray - bilateral congestion     Ultra sound leg - no DVT     IMPRESSION:     1. Right leg cellulitis - improving   2. Resp failure , CHF     RECOMMENDATIONS:      1. Ancef 2 grams IV q 8 hrs  ( to oral soon )   2.  ACE wrap to the leg

## 2021-06-01 NOTE — CARE COORDINATION
SOCIAL WORK/CASEMANAGEMENT TRANSITION OF CARE [id number] [facility] [address], [facility] , [phone]): I met with pt in the room this a.m. pt lives with spouse who works and pt is retired. She is getting around the room in dependently putting socks on. Pt doesn't anticipate any needs but rn will need to check pulse ox. Provided pt with dme list. Pt said she is independent but hasnt driven since last . She has 2 grown children one [facility] and one in [place] . She also has a  child. No c pta. Pt has a ranch home with a 2 garage steps to enter. There is a fww, built in shower chair, and raised toilet seat. Pt is diabetic with out insulin dependence pta and tests blood sugar some times up to 2x's a day. Plan is home with no needs. Sw/cm to follow.  PT to randell. Ilene Marshall, ADRIAN  2021

## 2021-06-01 NOTE — PLAN OF CARE
Problem: Falls - Risk of:  Goal: Will remain free from falls  Description: Will remain free from falls  6/1/2021 1507 by April Rosales RN  Outcome: Met This Shift  6/1/2021 0602 by Karlee Coleman RN  Outcome: Met This Shift  Goal: Absence of physical injury  Description: Absence of physical injury  6/1/2021 1507 by April Rosales RN  Outcome: Met This Shift  6/1/2021 0602 by Karlee Coleman RN  Outcome: Met This Shift     Problem: Pain:  Goal: Pain level will decrease  Description: Pain level will decrease  6/1/2021 0602 by Karlee Coleman RN  Outcome: Met This Shift

## 2021-06-01 NOTE — PROGRESS NOTES
Subjective: The patient is awake and alert. No problems overnight. Denies chest pain, angina, and dyspnea. Denies abdominal pain. Tolerating diet. No nausea or vomiting. Her legs still hurt, but they are better. Objective:  Pt is aox3 in nad   /75   Pulse 91   Temp 98.1 °F (36.7 °C) (Temporal)   Resp 17   Ht 5' 3\" (1.6 m)   Wt 203 lb (92.1 kg)   LMP  (LMP Unknown)   SpO2 100%   BMI 35.96 kg/m²   HEENT no adenopathy no bruits  Heart:  RRR, no murmurs, gallops, or rubs. Lungs:  CTA bilaterally, no wheeze, rales or rhonchi  Abd: bowel sounds present, nontender, nondistended, no masses  Extrem:  No clubbing, cyanosis, +edema  Lower   WBC/Hgb/Hct/Plts:  10.1/8.6/30.8/260 (06/01 3005) basic metabolic panel   Lab Results   Component Value Date     06/01/2021    K 4.2 06/01/2021     06/01/2021    CO2 30 06/01/2021    BUN 15 06/01/2021    CREATININE 0.9 06/01/2021    GLUCOSE 120 06/01/2021    CALCIUM 8.8 06/01/2021        Assessment:    Patient Active Problem List   Diagnosis    Spinal stenosis in cervical region    Disorder of muscle, ligament, and fascia    Displacement of lumbar intervertebral disc without myelopathy    Spinal stenosis, lumbar region, without neurogenic claudication    Essential hypertension    Mixed hyperlipidemia    DM (diabetes mellitus) ([facility] [address]    Obesity (BMI 30.0-34. 9)    Other chest pain    Left wrist pain    Renal artery aneurysm (HCC)    Chronic neck pain    Chronic back pain    A-fib (HCC)    PAF (paroxysmal atrial fibrillation) (HCC)    Anemia    Chronic anticoagulation    Class 1 obesity due to excess calories without serious comorbidity with body mass index (BMI) of 34.0 to 34.9 in adult    Malignant neoplasm of cecum (HCC)    Nonrheumatic aortic valve stenosis    Pulmonary HTN (HCC)    Hyponatremia    Abdominal pain    Cellulitis of right leg    GI bleed    Dyspnea       Plan:  Increase activity  Abx per ID team  Cont diuresis Breonna Omer DO  7:21 AM  6/1/2021

## 2021-06-02 LAB
ANION GAP SERPL CALCULATED.3IONS-SCNC: 10 MMOL/L (ref 7–16)
ANISOCYTOSIS: ABNORMAL
BASOPHILS ABSOLUTE: 0.07 E9/L (ref 0–0.2)
BASOPHILS RELATIVE PERCENT: 0.6 % (ref 0–2)
BUN BLDV-MCNC: 17 MG/DL (ref 6–23)
CALCIUM SERPL-MCNC: 9.1 MG/DL (ref 8.6–10.2)
CHLORIDE BLD-SCNC: 96 MMOL/L (ref 98–107)
CO2: 31 MMOL/L (ref 22–29)
CREAT SERPL-MCNC: 0.9 MG/DL (ref 0.5–1)
EOSINOPHILS ABSOLUTE: 0.41 E9/L (ref 0.05–0.5)
EOSINOPHILS RELATIVE PERCENT: 3.7 % (ref 0–6)
GFR AFRICAN AMERICAN: >60
GFR NON-AFRICAN AMERICAN: >60 ML/MIN/1.73
GLUCOSE BLD-MCNC: 128 MG/DL (ref 74–99)
HCT VFR BLD CALC: 30.9 % (ref 34–48)
HEMOGLOBIN: 8.9 G/DL (ref 11.5–15.5)
HYPOCHROMIA: ABNORMAL
IMMATURE GRANULOCYTES #: 0.08 E9/L
IMMATURE GRANULOCYTES %: 0.7 % (ref 0–5)
LYMPHOCYTES ABSOLUTE: 1.79 E9/L (ref 1.5–4)
LYMPHOCYTES RELATIVE PERCENT: 16.2 % (ref 20–42)
MCH RBC QN AUTO: 22.1 PG (ref 26–35)
MCHC RBC AUTO-ENTMCNC: 28.8 % (ref 32–34.5)
MCV RBC AUTO: 76.7 FL (ref 80–99.9)
METER GLUCOSE: 131 MG/DL (ref 74–99)
METER GLUCOSE: 133 MG/DL (ref 74–99)
METER GLUCOSE: 155 MG/DL (ref 74–99)
METER GLUCOSE: 221 MG/DL (ref 74–99)
METER GLUCOSE: 230 MG/DL (ref 74–99)
MONOCYTES ABSOLUTE: 0.67 E9/L (ref 0.1–0.95)
MONOCYTES RELATIVE PERCENT: 6 % (ref 2–12)
NEUTROPHILS ABSOLUTE: 8.06 E9/L (ref 1.8–7.3)
NEUTROPHILS RELATIVE PERCENT: 72.8 % (ref 43–80)
OVALOCYTES: ABNORMAL
PDW BLD-RTO: 21.2 FL (ref 11.5–15)
PLATELET # BLD: 268 E9/L (ref 130–450)
PMV BLD AUTO: 8.7 FL (ref 7–12)
POIKILOCYTES: ABNORMAL
POLYCHROMASIA: ABNORMAL
POTASSIUM REFLEX MAGNESIUM: 3.8 MMOL/L (ref 3.5–5)
RBC # BLD: 4.03 E12/L (ref 3.5–5.5)
SCHISTOCYTES: ABNORMAL
SODIUM BLD-SCNC: 137 MMOL/L (ref 132–146)
WBC # BLD: 11.1 E9/L (ref 4.5–11.5)

## 2021-06-02 PROCEDURE — 6360000002 HC RX W HCPCS: Performed by: INTERNAL MEDICINE

## 2021-06-02 PROCEDURE — 85025 COMPLETE CBC W/AUTO DIFF WBC: CPT

## 2021-06-02 PROCEDURE — 2140000000 HC CCU INTERMEDIATE R&B

## 2021-06-02 PROCEDURE — 2700000000 HC OXYGEN THERAPY PER DAY

## 2021-06-02 PROCEDURE — 82962 GLUCOSE BLOOD TEST: CPT

## 2021-06-02 PROCEDURE — 6370000000 HC RX 637 (ALT 250 FOR IP): Performed by: INTERNAL MEDICINE

## 2021-06-02 PROCEDURE — 36415 COLL VENOUS BLD VENIPUNCTURE: CPT

## 2021-06-02 PROCEDURE — 2580000003 HC RX 258: Performed by: INTERNAL MEDICINE

## 2021-06-02 PROCEDURE — 2500000003 HC RX 250 WO HCPCS: Performed by: INTERNAL MEDICINE

## 2021-06-02 PROCEDURE — 6370000000 HC RX 637 (ALT 250 FOR IP): Performed by: NURSE PRACTITIONER

## 2021-06-02 PROCEDURE — 80048 BASIC METABOLIC PNL TOTAL CA: CPT

## 2021-06-02 RX ADMIN — POTASSIUM CHLORIDE 10 MEQ: 750 TABLET, EXTENDED RELEASE ORAL at 22:26

## 2021-06-02 RX ADMIN — PREGABALIN 50 MG: 50 CAPSULE ORAL at 09:13

## 2021-06-02 RX ADMIN — Medication 2000 MG: at 12:30

## 2021-06-02 RX ADMIN — BUMETANIDE 2 MG: 1 TABLET ORAL at 09:13

## 2021-06-02 RX ADMIN — Medication 2000 MG: at 22:25

## 2021-06-02 RX ADMIN — FERROUS SULFATE TAB 325 MG (65 MG ELEMENTAL FE) 325 MG: 325 (65 FE) TAB at 09:14

## 2021-06-02 RX ADMIN — INSULIN LISPRO 2 UNITS: 100 INJECTION, SOLUTION INTRAVENOUS; SUBCUTANEOUS at 17:34

## 2021-06-02 RX ADMIN — SODIUM CHLORIDE, PRESERVATIVE FREE 10 ML: 5 INJECTION INTRAVENOUS at 09:13

## 2021-06-02 RX ADMIN — INSULIN LISPRO 2 UNITS: 100 INJECTION, SOLUTION INTRAVENOUS; SUBCUTANEOUS at 12:28

## 2021-06-02 RX ADMIN — TRAMADOL HYDROCHLORIDE 50 MG: 50 TABLET, FILM COATED ORAL at 09:14

## 2021-06-02 RX ADMIN — POTASSIUM CHLORIDE 10 MEQ: 750 TABLET, EXTENDED RELEASE ORAL at 09:14

## 2021-06-02 RX ADMIN — METOPROLOL SUCCINATE 50 MG: 50 TABLET, EXTENDED RELEASE ORAL at 09:23

## 2021-06-02 RX ADMIN — TRAMADOL HYDROCHLORIDE 50 MG: 50 TABLET, FILM COATED ORAL at 17:46

## 2021-06-02 RX ADMIN — ANTI-FUNGAL POWDER MICONAZOLE NITRATE TALC FREE: 1.42 POWDER TOPICAL at 09:13

## 2021-06-02 RX ADMIN — PANTOPRAZOLE SODIUM 40 MG: 40 TABLET, DELAYED RELEASE ORAL at 22:25

## 2021-06-02 RX ADMIN — INSULIN LISPRO 1 UNITS: 100 INJECTION, SOLUTION INTRAVENOUS; SUBCUTANEOUS at 22:28

## 2021-06-02 RX ADMIN — DULOXETINE HYDROCHLORIDE 30 MG: 30 CAPSULE, DELAYED RELEASE ORAL at 09:14

## 2021-06-02 RX ADMIN — PRAMIPEXOLE DIHYDROCHLORIDE 1.25 MG: 1 TABLET ORAL at 22:30

## 2021-06-02 RX ADMIN — PANTOPRAZOLE SODIUM 40 MG: 40 TABLET, DELAYED RELEASE ORAL at 09:14

## 2021-06-02 RX ADMIN — Medication 2000 MG: at 04:48

## 2021-06-02 RX ADMIN — AMLODIPINE BESYLATE 5 MG: 5 TABLET ORAL at 09:13

## 2021-06-02 RX ADMIN — SODIUM CHLORIDE, PRESERVATIVE FREE 10 ML: 5 INJECTION INTRAVENOUS at 22:26

## 2021-06-02 RX ADMIN — ANTI-FUNGAL POWDER MICONAZOLE NITRATE TALC FREE: 1.42 POWDER TOPICAL at 22:27

## 2021-06-02 RX ADMIN — SODIUM CHLORIDE, PRESERVATIVE FREE 10 ML: 5 INJECTION INTRAVENOUS at 12:30

## 2021-06-02 RX ADMIN — ATORVASTATIN CALCIUM 10 MG: 10 TABLET, FILM COATED ORAL at 09:14

## 2021-06-02 ASSESSMENT — PAIN SCALES - GENERAL
PAINLEVEL_OUTOF10: 10
PAINLEVEL_OUTOF10: 0
PAINLEVEL_OUTOF10: 7
PAINLEVEL_OUTOF10: 0

## 2021-06-02 ASSESSMENT — PAIN DESCRIPTION - DESCRIPTORS: DESCRIPTORS: ACHING;DISCOMFORT;SORE

## 2021-06-02 ASSESSMENT — PAIN DESCRIPTION - ORIENTATION: ORIENTATION: RIGHT

## 2021-06-02 ASSESSMENT — PAIN DESCRIPTION - LOCATION: LOCATION: LEG

## 2021-06-02 ASSESSMENT — PAIN DESCRIPTION - PAIN TYPE: TYPE: CHRONIC PAIN

## 2021-06-02 NOTE — PROGRESS NOTES
Department of Internal Medicine  Infectious Diseases  Progress Note      C/C : Right leg cellulitis     Report leg swelling, redness right leg -improving   Denies fever   Afebrile     Current Facility-Administered Medications   Medication Dose Route Frequency Provider Last Rate Last Admin    insulin lispro (HUMALOG) injection vial 0-6 Units  0-6 Units Subcutaneous 4x Daily AC & HS Rohit Joe DO   2 Units at 06/02/21 1228    metoprolol succinate (TOPROL XL) extended release tablet 50 mg  50 mg Oral Daily STEVEN Roman   50 mg at 06/02/21 1300    potassium chloride (KLOR-CON M) extended release tablet 40 mEq  40 mEq Oral Once Zan Troy MD        ferrous sulfate (IRON 325) tablet 325 mg  325 mg Oral Daily with breakfast Zan Troy MD   325 mg at 06/02/21 0914    0.9 % sodium chloride infusion   Intravenous PRN Les Tobar MD        amLODIPine (NORVASC) tablet 5 mg  5 mg Oral Daily Zan Troy MD   5 mg at 06/02/21 0913    bumetanide (BUMEX) tablet 2 mg  2 mg Oral Daily Zan Troy MD   2 mg at 06/02/21 0913    DULoxetine (CYMBALTA) extended release capsule 30 mg  30 mg Oral Daily Zan Troy MD   30 mg at 06/02/21 0914    hydrOXYzine (ATARAX) tablet 10 mg  10 mg Oral Nightly PRN Zan Troy MD        miconazole (MICOTIN) 2 % powder   Topical BID Zan Troy MD   Given at 06/02/21 0913    pantoprazole (PROTONIX) tablet 40 mg  40 mg Oral BID Zan Troy MD   40 mg at 06/02/21 0914    potassium chloride (KLOR-CON M) extended release tablet 10 mEq  10 mEq Oral BID Zan Troy MD   10 mEq at 06/02/21 0914    pramipexole (MIRAPEX) tablet 1.25 mg  1.25 mg Oral Nightly Jamie López MD   1.25 mg at 06/01/21 2031    pregabalin (LYRICA) capsule 50 mg  50 mg Oral Daily Zan Troy MD   50 mg at 06/02/21 0913    atorvastatin (LIPITOR) tablet 10 mg  10 mg Oral Daily Zan Troy MD   10 mg at 06/02/21 0914    traMADol (ULTRAM) tablet 50 mg  50 mg Oral Q8H PRN Rolando Arana MD   50 mg at 06/02/21 0914    glucose (GLUTOSE) 40 % oral gel 15 g  15 g Oral PRN Rolando Arana MD        dextrose 50 % IV solution  12.5 g Intravenous PRN Rolando Arana MD        glucagon (rDNA) injection 1 mg  1 mg Intramuscular PRN Rolando Arana MD        dextrose 5 % solution  100 mL/hr Intravenous PRN Rolando Arana MD        sodium chloride flush 0.9 % injection 5-40 mL  5-40 mL Intravenous 2 times per day Rolando Arana MD   10 mL at 06/02/21 0913    sodium chloride flush 0.9 % injection 5-40 mL  5-40 mL Intravenous PRN Rolando Arana MD   10 mL at 06/02/21 1230    0.9 % sodium chloride infusion  25 mL Intravenous PRN Rolando Arana MD        promethazine (PHENERGAN) tablet 12.5 mg  12.5 mg Oral Q6H PRN Rolando Arana MD        Or    ondansetron (ZOFRAN) injection 4 mg  4 mg Intravenous Q6H PRN Rolando Arana MD        polyethylene glycol (GLYCOLAX) packet 17 g  17 g Oral Daily PRN Rolando Arana MD        acetaminophen (TYLENOL) tablet 650 mg  650 mg Oral Q6H PRN Rolando Arana MD   650 mg at 06/01/21 0421    Or    acetaminophen (TYLENOL) suppository 650 mg  650 mg Rectal Q6H PRN Rolando Arana MD        ceFAZolin (ANCEF) 2000 mg in sterile water 20 mL IV syringe  2,000 mg Intravenous Q8H Bob Lee MD   2,000 mg at 06/02/21 1230         REVIEW OF SYSTEMS:    CONSTITUTIONAL:  Denies fever, chill or rigors  HEENT: Denies sore throat   RESPIRATORY: shortness of breath - improving    CARDIOVASCULAR:  Denies palpitation  GASTROINTESTINAL:  Denies abdomen pain, diarrhea or constipation. GENITOURINARY:  Denies burning urination or frequency of urination  INTEGUMENT:Right leg erythema   HEMATOLOGIC/LYMPHATIC:  Denies lymph node swelling, gum bleeding or easy bruising.   MUSCULOSKELETAL:  Leg swelling, redness, warm   NEUROLOGICAL:  Weakness     PHYSICAL EXAM:      Vitals:     BP (!) 114/55   Pulse 104   Temp 98.4 °F (36.9 °C) (Temporal)   Resp 16   Ht 5' 3\" (1.6 m)   Wt 200 lb 3.2 oz (90.8 kg)   LMP  (LMP Unknown)   SpO2 93%   BMI 35.46 kg/m²     General Appearance:    Awake, alert , no acute distress. Head:    Normocephalic, atraumatic   Eyes:    No pallor, no icterus,   Ears:    No obvious deformity or drainage.    Nose:   No nasal drainage   Throat:   Mucosa moist, no oral thrush   Neck:   Supple, no lymphadenopathy   Back:     no CVA tenderness   Lungs:     Bibasilar crackles     Heart:    Regular rate and rhythm   Abdomen:     Soft, non-tender, bowel sounds present    Extremities:   Bilateral pitting edema, right leg erythema improving    Pulses:   Dorsalis pedis palpable    Skin:   Right leg erythema - improving    CBC with Differential:      Lab Results   Component Value Date    WBC 11.1 06/02/2021    RBC 4.03 06/02/2021    HGB 8.9 06/02/2021    HCT 30.9 06/02/2021     06/02/2021    MCV 76.7 06/02/2021    MCH 22.1 06/02/2021    MCHC 28.8 06/02/2021    RDW 21.2 06/02/2021    LYMPHOPCT 16.2 06/02/2021    MONOPCT 6.0 06/02/2021    BASOPCT 0.6 06/02/2021    MONOSABS 0.67 06/02/2021    LYMPHSABS 1.79 06/02/2021    EOSABS 0.41 06/02/2021    BASOSABS 0.07 06/02/2021       CMP     Lab Results   Component Value Date     06/02/2021    K 3.8 06/02/2021    CL 96 06/02/2021    CO2 31 06/02/2021    BUN 17 06/02/2021    CREATININE 0.9 06/02/2021    GFRAA >60 06/02/2021    LABGLOM >60 06/02/2021    GLUCOSE 128 06/02/2021    PROT 7.2 05/29/2021    LABALBU 4.0 05/29/2021    CALCIUM 9.1 06/02/2021    BILITOT 0.6 05/29/2021    ALKPHOS 75 05/29/2021    AST 20 05/29/2021    ALT 23 05/29/2021         Hepatic Function Panel:    Lab Results   Component Value Date    ALKPHOS 75 05/29/2021    ALT 23 05/29/2021    AST 20 05/29/2021    PROT 7.2 05/29/2021    BILITOT 0.6 05/29/2021    BILIDIR 0.5 11/06/2018    IBILI 1.1 11/06/2018    LABALBU 4.0 05/29/2021       PT/INR:    Lab Results   Component Value Date    PROTIME 22.4 05/29/2021    INR 2.1 05/29/2021       TSH:    Lab Results Component Value Date    TSH 1.240 12/05/2020       U/A:    Lab Results   Component Value Date    COLORU Yellow 12/05/2020    PHUR 6.0 12/05/2020    WBCUA 2-5 12/05/2020    RBCUA 1-3 12/05/2020    RBCUA NONE 10/14/2012    BACTERIA NONE SEEN 12/05/2020    CLARITYU Clear 12/05/2020    SPECGRAV 1.010 12/05/2020    LEUKOCYTESUR Negative 12/05/2020    UROBILINOGEN 0.2 12/05/2020    BILIRUBINUR Negative 12/05/2020    BLOODU Negative 12/05/2020    GLUCOSEU 500 12/05/2020       ABG:  No results found for: FNP2DJV, BEART, W2IUONGU, PHART, THGBART, IFI9VVW, PO2ART, MZF1PYK        Radiology :    Chest X ray - bilateral congestion     Ultra sound leg - no DVT     IMPRESSION:     1. Right leg cellulitis - improving   2. Resp failure , CHF     RECOMMENDATIONS:      1. Ancef 2 grams IV q 8 hrs  (D/C on oral keflex  )   2.  ACE wrap to the leg

## 2021-06-02 NOTE — PLAN OF CARE
Problem: Falls - Risk of:  Goal: Will remain free from falls  Description: Will remain free from falls  6/2/2021 0141 by Cortes Coley RN  Outcome: Met This Shift  6/1/2021 1507 by Hamlet Pope RN  Outcome: Met This Shift  Goal: Absence of physical injury  Description: Absence of physical injury  6/2/2021 0141 by Cortes Coley RN  Outcome: Met This Shift  6/1/2021 1507 by Hamlet Pope RN  Outcome: Met This Shift

## 2021-06-02 NOTE — CARE COORDINATION
SOCIAL WORK/CASEMANAGEMENT TRANSITION OF CARE [id number] [facility], [address], [place], [phone]): ID to change pt to oral abx on discharge. Met with pt and she said pcp told her discharge tomorrow. Cardio signed off. No needs per pt. Sw/luke to follow.  ADRIAN Bailey  .6/2/2021

## 2021-06-02 NOTE — PATIENT CARE CONFERENCE
[facility] Quality Flow/Interdisciplinary Rounds Progress Note        Quality Flow Rounds held on June 2, 2021    Disciplines Attending:  Bedside Nurse, ,  and Nursing Unit Alondra Nascimento was admitted on 5/28/2021 11:54 PM    Anticipated Discharge Date:  Expected Discharge Date: 05/31/21    Disposition:    Valentin Score:  Valentin Scale Score: 20    Readmission Risk              Risk of Unplanned Readmission:  26           Discussed patient goal for the day, patient clinical progression, and barriers to discharge.   The following Goal(s) of the Day/Commitment(s) have been identified:  keep accurate intake and out       Maris Prieto RN  June 2, 2021

## 2021-06-03 VITALS
BODY MASS INDEX: 34.62 KG/M2 | TEMPERATURE: 97.7 F | WEIGHT: 195.4 LBS | HEIGHT: 63 IN | SYSTOLIC BLOOD PRESSURE: 121 MMHG | DIASTOLIC BLOOD PRESSURE: 64 MMHG | OXYGEN SATURATION: 97 % | RESPIRATION RATE: 20 BRPM | HEART RATE: 95 BPM

## 2021-06-03 LAB
ANION GAP SERPL CALCULATED.3IONS-SCNC: 9 MMOL/L (ref 7–16)
ANISOCYTOSIS: ABNORMAL
BASOPHILS ABSOLUTE: 0.05 E9/L (ref 0–0.2)
BASOPHILS RELATIVE PERCENT: 0.4 % (ref 0–2)
BUN BLDV-MCNC: 12 MG/DL (ref 6–23)
CALCIUM SERPL-MCNC: 8.9 MG/DL (ref 8.6–10.2)
CHLORIDE BLD-SCNC: 93 MMOL/L (ref 98–107)
CO2: 35 MMOL/L (ref 22–29)
CREAT SERPL-MCNC: 0.7 MG/DL (ref 0.5–1)
EOSINOPHILS ABSOLUTE: 0.48 E9/L (ref 0.05–0.5)
EOSINOPHILS RELATIVE PERCENT: 4.1 % (ref 0–6)
GFR AFRICAN AMERICAN: >60
GFR NON-AFRICAN AMERICAN: >60 ML/MIN/1.73
GLUCOSE BLD-MCNC: 113 MG/DL (ref 74–99)
HCT VFR BLD CALC: 32.4 % (ref 34–48)
HEMOGLOBIN: 9.3 G/DL (ref 11.5–15.5)
HYPOCHROMIA: ABNORMAL
IMMATURE GRANULOCYTES #: 0.06 E9/L
IMMATURE GRANULOCYTES %: 0.5 % (ref 0–5)
LYMPHOCYTES ABSOLUTE: 2.16 E9/L (ref 1.5–4)
LYMPHOCYTES RELATIVE PERCENT: 18.5 % (ref 20–42)
MAGNESIUM: 1.5 MG/DL (ref 1.6–2.6)
MCH RBC QN AUTO: 22.2 PG (ref 26–35)
MCHC RBC AUTO-ENTMCNC: 28.7 % (ref 32–34.5)
MCV RBC AUTO: 77.5 FL (ref 80–99.9)
METER GLUCOSE: 160 MG/DL (ref 74–99)
MONOCYTES ABSOLUTE: 0.69 E9/L (ref 0.1–0.95)
MONOCYTES RELATIVE PERCENT: 5.9 % (ref 2–12)
NEUTROPHILS ABSOLUTE: 8.24 E9/L (ref 1.8–7.3)
NEUTROPHILS RELATIVE PERCENT: 70.6 % (ref 43–80)
OVALOCYTES: ABNORMAL
PDW BLD-RTO: 21.8 FL (ref 11.5–15)
PLATELET # BLD: 251 E9/L (ref 130–450)
PMV BLD AUTO: 8.9 FL (ref 7–12)
POIKILOCYTES: ABNORMAL
POLYCHROMASIA: ABNORMAL
POTASSIUM REFLEX MAGNESIUM: 3.2 MMOL/L (ref 3.5–5)
RBC # BLD: 4.18 E12/L (ref 3.5–5.5)
SODIUM BLD-SCNC: 137 MMOL/L (ref 132–146)
WBC # BLD: 11.7 E9/L (ref 4.5–11.5)

## 2021-06-03 PROCEDURE — 6370000000 HC RX 637 (ALT 250 FOR IP): Performed by: INTERNAL MEDICINE

## 2021-06-03 PROCEDURE — 85025 COMPLETE CBC W/AUTO DIFF WBC: CPT

## 2021-06-03 PROCEDURE — 6370000000 HC RX 637 (ALT 250 FOR IP): Performed by: NURSE PRACTITIONER

## 2021-06-03 PROCEDURE — 36415 COLL VENOUS BLD VENIPUNCTURE: CPT

## 2021-06-03 PROCEDURE — 82962 GLUCOSE BLOOD TEST: CPT

## 2021-06-03 PROCEDURE — 80048 BASIC METABOLIC PNL TOTAL CA: CPT

## 2021-06-03 PROCEDURE — 83735 ASSAY OF MAGNESIUM: CPT

## 2021-06-03 PROCEDURE — 6360000002 HC RX W HCPCS: Performed by: INTERNAL MEDICINE

## 2021-06-03 RX ORDER — METOPROLOL SUCCINATE 50 MG/1
50 TABLET, EXTENDED RELEASE ORAL DAILY
Qty: 30 TABLET | Refills: 3 | Status: SHIPPED
Start: 2021-06-03 | End: 2021-06-21 | Stop reason: CLARIF

## 2021-06-03 RX ORDER — FERROUS SULFATE 325(65) MG
325 TABLET ORAL
Qty: 30 TABLET | Refills: 3 | Status: SHIPPED | OUTPATIENT
Start: 2021-06-03

## 2021-06-03 RX ORDER — CEPHALEXIN 500 MG/1
500 CAPSULE ORAL 4 TIMES DAILY
Qty: 20 CAPSULE | Refills: 0 | Status: SHIPPED | OUTPATIENT
Start: 2021-06-03 | End: 2021-06-15

## 2021-06-03 RX ADMIN — ATORVASTATIN CALCIUM 10 MG: 10 TABLET, FILM COATED ORAL at 09:06

## 2021-06-03 RX ADMIN — TRAMADOL HYDROCHLORIDE 50 MG: 50 TABLET, FILM COATED ORAL at 11:01

## 2021-06-03 RX ADMIN — ANTI-FUNGAL POWDER MICONAZOLE NITRATE TALC FREE: 1.42 POWDER TOPICAL at 09:05

## 2021-06-03 RX ADMIN — FERROUS SULFATE TAB 325 MG (65 MG ELEMENTAL FE) 325 MG: 325 (65 FE) TAB at 09:05

## 2021-06-03 RX ADMIN — Medication 2000 MG: at 07:09

## 2021-06-03 RX ADMIN — PANTOPRAZOLE SODIUM 40 MG: 40 TABLET, DELAYED RELEASE ORAL at 09:05

## 2021-06-03 RX ADMIN — DULOXETINE HYDROCHLORIDE 30 MG: 30 CAPSULE, DELAYED RELEASE ORAL at 09:05

## 2021-06-03 RX ADMIN — METOPROLOL SUCCINATE 50 MG: 50 TABLET, EXTENDED RELEASE ORAL at 09:05

## 2021-06-03 RX ADMIN — POTASSIUM CHLORIDE 10 MEQ: 750 TABLET, EXTENDED RELEASE ORAL at 09:05

## 2021-06-03 RX ADMIN — INSULIN LISPRO 1 UNITS: 100 INJECTION, SOLUTION INTRAVENOUS; SUBCUTANEOUS at 07:09

## 2021-06-03 RX ADMIN — BUMETANIDE 2 MG: 1 TABLET ORAL at 09:05

## 2021-06-03 RX ADMIN — PREGABALIN 50 MG: 50 CAPSULE ORAL at 09:05

## 2021-06-03 RX ADMIN — AMLODIPINE BESYLATE 5 MG: 5 TABLET ORAL at 09:06

## 2021-06-03 ASSESSMENT — PAIN SCALES - GENERAL
PAINLEVEL_OUTOF10: 0
PAINLEVEL_OUTOF10: 10
PAINLEVEL_OUTOF10: 0

## 2021-06-03 ASSESSMENT — PAIN DESCRIPTION - PAIN TYPE: TYPE: CHRONIC PAIN

## 2021-06-03 ASSESSMENT — PAIN DESCRIPTION - ORIENTATION: ORIENTATION: RIGHT;LEFT

## 2021-06-03 ASSESSMENT — PAIN DESCRIPTION - PROGRESSION: CLINICAL_PROGRESSION: GRADUALLY WORSENING

## 2021-06-03 ASSESSMENT — PAIN DESCRIPTION - LOCATION: LOCATION: FOOT

## 2021-06-03 ASSESSMENT — PAIN DESCRIPTION - DESCRIPTORS: DESCRIPTORS: ACHING;DULL;DISCOMFORT

## 2021-06-03 ASSESSMENT — PAIN DESCRIPTION - FREQUENCY: FREQUENCY: INTERMITTENT

## 2021-06-03 ASSESSMENT — PAIN DESCRIPTION - ONSET: ONSET: GRADUAL

## 2021-06-03 NOTE — PROGRESS NOTES
CLINICAL PHARMACY NOTE: MEDS TO BEDS    Total # of Prescriptions Filled: 2    The following medications were delivered to the patient:  · Cephalexin 500 mg  · Metoprolol ER 50 mg    Additional Documentation:  FeSO4 (iron) over the counter  Delivered to patient 11:58 am

## 2021-06-03 NOTE — PROGRESS NOTES
Subjective: The patient is awake and alert. No problems overnight. Denies chest pain, angina, and dyspnea. Denies abdominal pain. Tolerating diet. No nausea or vomiting. She is putting makeup on at bedside. Objective:  Pt is aox3 in nad   /62   Pulse 91   Temp 97.7 °F (36.5 °C) (Temporal)   Resp 20   Ht 5' 3\" (1.6 m)   Wt 195 lb 6.4 oz (88.6 kg)   LMP  (LMP Unknown)   SpO2 92%   BMI 34.61 kg/m²   HEENT no adenopathy no bruits  Heart:  RRR, no murmurs, gallops, or rubs. Lungs:  CTA bilaterally, no wheeze, rales or rhonchi  Abd: bowel sounds present, nontender, nondistended, no masses  Extrem:  No clubbing, cyanosis, +edema lower   WBC/Hgb/Hct/Plts:  11.7/9.3/32.4/251 (06/03 9572) basic metabolic panel     Assessment:    Patient Active Problem List   Diagnosis    Spinal stenosis in cervical region    Disorder of muscle, ligament, and fascia    Displacement of lumbar intervertebral disc without myelopathy    Spinal stenosis, lumbar region, without neurogenic claudication    Essential hypertension    Mixed hyperlipidemia    DM (diabetes mellitus) ([facility] [address]    Obesity (BMI 30.0-34. 9)    Other chest pain    Left wrist pain    Renal artery aneurysm (HCC)    Chronic neck pain    Chronic back pain    A-fib (HCC)    PAF (paroxysmal atrial fibrillation) (HCC)    Anemia    Chronic anticoagulation    Class 1 obesity due to excess calories without serious comorbidity with body mass index (BMI) of 34.0 to 34.9 in adult    Malignant neoplasm of cecum (HCC)    Nonrheumatic aortic valve stenosis    Pulmonary HTN (HCC)    Hyponatremia    Abdominal pain    Cellulitis of right leg    GI bleed    Dyspnea       Plan:    Discharge to home today       Pham Cortes DO  7:28 AM  6/3/2021

## 2021-06-03 NOTE — PROGRESS NOTES
All discharge information reviewed with patient at this time including new medications, changes in meds and discontinued medications. All follow up information reviewed as well. Patient has no further questions at this time.

## 2021-06-03 NOTE — PLAN OF CARE
Problem: Falls - Risk of:  Goal: Will remain free from falls  Description: Will remain free from falls  6/3/2021 1151 by Heather Bucio RN  Outcome: Completed  6/3/2021 0148 by Cortes Coley RN  Outcome: Met This Shift  Goal: Absence of physical injury  Description: Absence of physical injury  6/3/2021 1151 by Heather Bucio RN  Outcome: Completed  6/3/2021 0148 by Cortes Coley RN  Outcome: Met This Shift     Problem: Pain:  Description: Pain management should include both nonpharmacologic and pharmacologic interventions.   Goal: Pain level will decrease  Description: Pain level will decrease  6/3/2021 1151 by Heather Bucio RN  Outcome: Completed  6/3/2021 0148 by Cortes Coley RN  Outcome: Met This Shift  Goal: Control of acute pain  Description: Control of acute pain  6/3/2021 1151 by Heather Bucio RN  Outcome: Completed  6/3/2021 0148 by Cortes Coley RN  Outcome: Met This Shift  Goal: Control of chronic pain  Description: Control of chronic pain  6/3/2021 1151 by Heather Bucio RN  Outcome: Completed  6/3/2021 0148 by Cortes Coley RN  Outcome: Met This Shift

## 2021-06-04 ENCOUNTER — CARE COORDINATION (OUTPATIENT)
Dept: CASE MANAGEMENT | Age: 81
End: 2021-06-04

## 2021-06-04 NOTE — CARE COORDINATION
[facility] Transitions Initial Follow Up Call    Call within 2 business days of discharge: Yes    Patient: Neoma Levels Patient : 1940   MRN: [id number]  Reason for Admission: dyspnea  Discharge Date: 6/3/21 RARS: Readmission Risk Score: 26      Last Discharge [facility]       Complaint Diagnosis Description Type Department Provider    21 Shortness of Breath; Leg Swelling Dyspnea, unspecified type . .. ED to Hosp-Admission (Discharged) (ADMITTED) SEYZ 6SE [facility] DO Delores; Shilpa Freed... Transitions of Care Initial Call        Challenges to be reviewed by the provider   Additional needs identified to be addressed with provider: No  none             Method of communication with provider : none      Advance Care Planning:   Does patient have an Advance Directive:  decision maker updated. Was this a readmission? No  Patient stated reason for admission: short of breath, heart fluttering, soreness of feet/legs  Patients top risk factors for readmission: medical condition-dyspnea, cellulitis, multiple health system providers and polypharmacy    Care Transition Nurse (CTN) contacted the patient by telephone to perform post hospital discharge assessment. Verified name and  with patient as identifiers. Provided introduction to self, and explanation of the CTN role. CTN reviewed discharge instructions, medical action plan and red flags with patient who verbalized understanding. Patient given an opportunity to ask questions and does not have any further questions or concerns at this time. Were discharge instructions available to patient? Yes. Reviewed appropriate site of care based on symptoms and resources available to patient including: PCP and Specialist. The patient agrees to contact the PCP office for questions related to their healthcare.      Patient unable to complete medication reconciliation today but is agreeable to a call from [facility] to set up telephone appointment-CTN will route to [facility]. Patient verbalizes understanding of administration of home medications. CTN able to confirm with patient new medications of keflex, iron, and toprol as per AVS.       Non-face-to-face services provided:  Scheduled appointment with PCP-6/8/21  Scheduled appointment with Specialist-patient unaware of 6/8/21 appointment with neuro(Dr SHAVON He)as noted on AVS.  Patient states she will call office to check as this is same day as PCP appointment. Patient to call cardiology(Nataliya)for appointment. Obtained and reviewed discharge summary and/or continuity of care documents  Establishment or re-establishment of referrals-to send to [facility] pharmacy. Care Transitions 24 Hour Call    Do you have a copy of your discharge instructions?: Yes  Do you have all of your prescriptions and are they filled?: Yes  Have you been contacted by a [address]?: No  Have you scheduled your follow up appointment?: Yes  How are you going to get to your appointment?: Car - family or friend to transport (Comment: patient's )  Were you discharged with any Home Care or Post Acute Services: No  Post Acute Services: [address] you feel like you have everything you need to keep you well at home?: Yes  Care Transitions Interventions    Pharmacist: Completed         -Spoke with patient for initial BPCI care transition call post hospital discharge. Explained the role of Care Transition Nurse and the BPCI-A program, patient is agreeable to follow up post discharge from the hospital.   -Patient states she is \"doing okay, I think. \"  States she is eating/drinking/sleeping well and is happy to be back home in her own environment. Denies heart fluttering and states breathing is improved from when she was admitted to the hospital.  C/o feet soreness, patient aware she has cellulitis and is taking keflex as prescribed.    -CTN explained that a member of the Care Transition Central Team will be contacting her for further follow up calls, patient is in agreement and denies any other needs or concerns at this time. -CTN will hand off to the Care Transition Central team to follow.            Follow Up  Future Appointments   Date Time Provider Delia Josie   6/8/2021 11:00 AM Rudy Troy MD [id number] June RN

## 2021-06-07 ENCOUNTER — TELEPHONE (OUTPATIENT)
Dept: PHARMACY | Facility: CLINIC | Age: 81
End: 2021-06-07

## 2021-06-07 NOTE — TELEPHONE ENCOUNTER
Angelica Donohue, RN  [facility]    [address]  Patient unable to complete full med review today but is agreeable to a call from [facility] to set up telephone appointment. PCP visit is 6/8/21.  Diabetic history, polypharmacy.  Thank You.

## 2021-06-08 ENCOUNTER — TELEPHONE (OUTPATIENT)
Dept: ADMINISTRATIVE | Age: 81
End: 2021-06-08

## 2021-06-08 NOTE — PROGRESS NOTES
Physician Progress Note      PATIENT:               Isi Jimenes  [facility] #:                  [id number]  :                       1940  ADMIT DATE:       2021 11:54 PM  [address] [place] DATE:        6/3/2021 1:09 PM  RESPONDING  PROVIDER #:        Gabriele Hendrix DO          QUERY TEXT:    Patient admitted with CHF. Noted documentation of acute respiratory failure. In order to support the diagnosis of acute respiratory failure, please include   additional clinical indicators in your documentation. Or please document if   the diagnosis of acute respiratory failure has been ruled out after further   study. The medical record reflects the following:  Risk Factors: Acute on chronic CHF, Afib, Anemia  Clinical Indicators: CXR- Possible small bilateral pleural effusions,   Supplemental O2, O2 saturation 90-91% on RA placed on 4 liters NC, SOB  Treatment: CXR, Supplemental O2, IV lasix, IV Bumex, Increased CO2, NO ABG's    Thank you,  Melissa Abarca    Acute Respiratory Failure Clinical Indicators per [employer] Training Guide and   Quick Reference Guide:  pO2 < 60 mmHg or SpO2 (pulse oximetry) < 91% breathing room air  pCO2 > 50 and pH < 7.35  P/F ratio (pO2 / FIO2) < 300  pO2 decrease or pCO2 increase by 10 mmHg from baseline (if known)  Supplemental oxygen of 40% or more  Presence of respiratory distress, tachypnea, dyspnea, shortness of breath,   wheezing  Unable to speak in complete sentences  Use of accessory muscles to breathe  Extreme anxiety and feeling of impending doom  Tripod position  Confusion/altered mental status/obtunded  Options provided:  -- Acute Respiratory Failure as evidenced by, Please document evidence.   -- Acute Respiratory Failure ruled out after study  -- Other - I will add my own diagnosis  -- Disagree - Not applicable / Not valid  -- Disagree - Clinically unable to determine / Unknown  -- Refer to Clinical Documentation Reviewer    PROVIDER RESPONSE TEXT:    Acute Respiratory Failure has been ruled out after study.     Query created by: Ernesto Macias on 6/7/2021 10:37 AM      Electronically signed by:  David Zeng DO 6/8/2021 3:04 PM

## 2021-06-08 NOTE — TELEPHONE ENCOUNTER
Pt called to schedule hfu with Dr. Marco Antonio Bell. Pt was admitted to [facility] for swelling in both legs and SOB.   Pt was dc'd on 6/3/21 and advised to f/u with Dr. Flower Barnes

## 2021-06-09 ENCOUNTER — SCHEDULED TELEPHONE ENCOUNTER (OUTPATIENT)
Dept: PHARMACY | Facility: CLINIC | Age: 81
End: 2021-06-09

## 2021-06-09 NOTE — TELEPHONE ENCOUNTER
CLINICAL PHARMACY NOTE - Medication Review  Patient outreach to review medications - Spoke with patient. SUBJECTIVE/OBJECTIVE:   Delgado Young is a 80 y.o. female referred to a clinical pharmacy specialist by care coordination. Medications:   Current Outpatient Medications   Medication Sig Comment     metoprolol succinate (TOPROL XL) 50 MG extended release tablet Take 1 tablet by mouth daily Taking   And patient stops metopololol tartrate       ferrous sulfate (IRON 325) 325 (65 Fe) MG tablet Take 1 tablet by mouth daily (with breakfast) ok    cephALEXin (KEFLEX) 500 MG capsule Take 1 capsule by mouth 4 times daily Still taking couple      traMADol (ULTRAM) 50 MG tablet Take 1 tablet by mouth every 8 hours as needed for Pain for up to 60 days. Take lowest dose possible to manage pain ok    amLODIPine (NORVASC) 5 MG tablet Take 1 tablet by mouth daily     miconazole (MICOTIN) 2 % powder Apply topically 2 times daily. Ok     bumetanide (BUMEX) 2 MG tablet Take 1 tablet by mouth daily Ok     potassium chloride (KLOR-CON M) 10 MEQ extended release tablet Take 1 tablet by mouth 2 times daily ok    glimepiride (AMARYL) 2 MG tablet Take 2 mg by mouth every morning (before breakfast)  Ok     pregabalin (LYRICA) 50 MG capsule Take 50 mg by mouth daily.       empagliflozin (JARDIANCE) 10 MG tablet Take 1 tablet by mouth daily Patient report that she is getting sample from the doctor     DULoxetine (CYMBALTA) 30 MG extended release capsule Take 1 capsule by mouth daily Ok     pramipexole (MIRAPEX) 0.25 MG tablet TAKE 5 TABLETS BY MOUTH  NIGHTLY Ok     oxybutynin (DITROPAN) 5 MG tablet TAKE 1 TABLET BY MOUTH  DAILY WITH BREAKFAST Ok     hydrOXYzine (ATARAX) 10 MG tablet Take 1 tablet by mouth nightly (Patient taking differently: Take 10 mg by mouth nightly as needed ) ok    simvastatin (ZOCOR) 20 MG tablet TAKE 1 TABLET BY MOUTH  DAILY WITH SUPPER Ok     pantoprazole (PROTONIX) 40 MG tablet TAKE ONE TABLET BY MOUTH TWO TIMES A DAY BEFORE MEALS Ok     rivaroxaban (XARELTO) 20 MG TABS tablet Take 1 tablet by mouth daily (with breakfast) Ok     metFORMIN (GLUCOPHAGE) 500 MG tablet TAKE 2 TABLETS BY MOUTH  TWICE A DAY (Patient taking differently: Take 1,000 mg by mouth daily (with breakfast) Take 2 tablets by mouth  twice a day)     dicyclomine (BENTYL) 10 MG capsule TAKE 1 CAPSULE BY MOUTH  DAILY AS NEEDED      No current facility-administered medications for this visit. Additional Medications (including OTC/Herbal Supplements):  None     Allergies: Allergies   Allergen Reactions    Benadryl [Diphenhydramine]      hyper    Fish-Derived Products Rash    Iodine Rash       Pertinent Labs/Vitals:  BP Readings from Last 3 Encounters:   21 121/64   02/15/21 136/84   21 (!) 116/45     No results found for: Jason Clements [id number]  Lab Results   Component Value Date    LABA1C 7.4 2020    LABA1C 7.6 2019    LABA1C 7.2 2019     Lab Results   Component Value Date    CHOL 155 10/15/2012    TRIG 205 2019    HDL 15 (A) 2019    LDLCHOLESTEROL 0 2019    LDLCALC 50 10/31/2017     ALT   Date Value Ref Range Status   2021 23 0 - 32 U/L Final     AST   Date Value Ref Range Status   2021 20 0 - 31 U/L Final     The ASCVD Risk score (Latisha Mendenhall, et al., 2013) failed to calculate for the following reasons:     The 2013 ASCVD risk score is only valid for ages 36 to 78     Lab Results   Component Value Date    CREATININE 0.7 2021     eGFR: 66 mL/min/1.73 m^2    Social History:   Social History     Tobacco Use    Smoking status: Former Smoker     Packs/day: 2.00     Years: 15.00     Pack years: 30.00     Quit date: 3/1/1997     Years since quittin.2    Smokeless tobacco: Never Used   Substance Use Topics    Alcohol use: Yes     Comment: Holidays       Immunizations:   Most Recent Immunizations   Administered Date(s) Administered    COVID-19, Moderna, PF, 100mcg/0.5mL 02/19/2021    Influenza 10/10/2008    Influenza A (P1T9-21) Vaccine PF IM 11/17/2009    Influenza Vaccine, unspecified formulation 09/27/2016    Influenza Whole 09/22/2015    Influenza, High Dose (Fluzone 65 yrs and older) 09/05/2019    Pneumococcal Conjugate 13-valent ([id number]) 09/22/2015    Pneumococcal Polysaccharide ([id number]) 10/16/2000    Tdap (Boostrix, Adacel) 06/10/2020    Zoster Live (Zostavax) 08/20/2008         ASSESSMENT/PLAN:   - General Assessment:   · Adherence: adherent   · Cost: not a concern now   · Current pharmacy/pharmacies: [facility]   · Drug interactions: No clinically significant interactions identified via [employer] Interaction Analysis as category D or higher. · Renal dosing: No renal adjustments necessary. · Immunizations: up to date   · Smoking status: Former smoker    · Blood pressure: BP  at goal .  · Lipids: Patient has a history of ASCVD and is therefore a candidate for moderate-intensity statin therapy based on updated guidelines. - Diabetes:    Glycemic goal:  A1c  <8.0%   Current symptoms/problems: none   Any episodes of hypoglycemia: no   Therapy optimization: Yes    De-escalation of therapy: No     Note   - Confirmed with Pt that Metoprolol tartrate was discontinued and Metoprolol succinate was initiated and patient has started on it. - Metoprolol succinate prescription was transferred to [address] [place] [address] per patient request.   - Patient may be considered for ACE/ARB after Microalbinuria labs are available  - Patient has reported a concern with Jardiance cost and rivaroxaban, Pt reported that she is getting Jardiance sample from her PCP, she is taking it sporadically.     Upcoming appointments:   Future Appointments   Date Time Provider Delia Galindo   6/9/2021 11:00 AM SCHEDULE, [facility] Rx None       CHARLOTTE WELCH   Pharm D candidate 2022  [phone] opt7

## 2021-06-09 NOTE — TELEPHONE ENCOUNTER
I have discussed the care of this patient with the student and I agree with the assessment/plan as documented. Patient has seen her PCP who is non-[facility] on 6/8. She said he d/c'd amlodipine and also kept her off Lasix. She is also taking Jardiance intermittently when she gets samples. She says her A1c is at goal.  She is almost finished with her Cephalexin and reports that her infection seems to be gone.     Zuri Crane, PharmD, [address] [facility]  Phone: [phone] option-7    For Rogelio Hung in place:  No   Gap Closed?: Yes    Time Spent (min): 45

## 2021-06-10 ENCOUNTER — OFFICE VISIT (OUTPATIENT)
Dept: CARDIOLOGY CLINIC | Age: 81
End: 2021-06-10
Payer: MEDICARE

## 2021-06-10 VITALS
WEIGHT: 195.1 LBS | RESPIRATION RATE: 16 BRPM | SYSTOLIC BLOOD PRESSURE: 110 MMHG | HEART RATE: 95 BPM | DIASTOLIC BLOOD PRESSURE: 74 MMHG | BODY MASS INDEX: 34.57 KG/M2 | HEIGHT: 63 IN

## 2021-06-10 DIAGNOSIS — I10 ESSENTIAL HYPERTENSION: Primary | ICD-10-CM

## 2021-06-10 DIAGNOSIS — R06.02 SHORTNESS OF BREATH: ICD-10-CM

## 2021-06-10 DIAGNOSIS — I50.31 CONGESTIVE HEART FAILURE WITH LEFT VENTRICULAR DIASTOLIC DYSFUNCTION, ACUTE (HCC): ICD-10-CM

## 2021-06-10 DIAGNOSIS — I48.0 PAF (PAROXYSMAL ATRIAL FIBRILLATION) (HCC): ICD-10-CM

## 2021-06-10 DIAGNOSIS — I27.20 PULMONARY HTN (HCC): ICD-10-CM

## 2021-06-10 DIAGNOSIS — I72.2 RENAL ARTERY ANEURYSM (HCC): ICD-10-CM

## 2021-06-10 PROCEDURE — G8399 PT W/DXA RESULTS DOCUMENT: HCPCS | Performed by: INTERNAL MEDICINE

## 2021-06-10 PROCEDURE — 1036F TOBACCO NON-USER: CPT | Performed by: INTERNAL MEDICINE

## 2021-06-10 PROCEDURE — 99214 OFFICE O/P EST MOD 30 MIN: CPT | Performed by: INTERNAL MEDICINE

## 2021-06-10 PROCEDURE — 1090F PRES/ABSN URINE INCON ASSESS: CPT | Performed by: INTERNAL MEDICINE

## 2021-06-10 PROCEDURE — 1111F DSCHRG MED/CURRENT MED MERGE: CPT | Performed by: INTERNAL MEDICINE

## 2021-06-10 PROCEDURE — 93000 ELECTROCARDIOGRAM COMPLETE: CPT | Performed by: INTERNAL MEDICINE

## 2021-06-10 PROCEDURE — G8427 DOCREV CUR MEDS BY ELIG CLIN: HCPCS | Performed by: INTERNAL MEDICINE

## 2021-06-10 PROCEDURE — 4040F PNEUMOC VAC/ADMIN/RCVD: CPT | Performed by: INTERNAL MEDICINE

## 2021-06-10 PROCEDURE — G8417 CALC BMI ABV UP PARAM F/U: HCPCS | Performed by: INTERNAL MEDICINE

## 2021-06-10 PROCEDURE — 1123F ACP DISCUSS/DSCN MKR DOCD: CPT | Performed by: INTERNAL MEDICINE

## 2021-06-10 RX ORDER — METOLAZONE 5 MG/1
5 TABLET ORAL DAILY
Qty: 30 TABLET | Refills: 5 | Status: SHIPPED
Start: 2021-06-10 | End: 2021-06-21 | Stop reason: CLARIF

## 2021-06-10 NOTE — PROGRESS NOTES
[facility]  [facility]. Ziggy Velarde M.D. Sabrina Richmond M.D.  Sue Lebron. [facility], M.D. Danielle Pena M.D. Mario Huerta M.D. MD Daylin Arcos   1940  Salma Mccord MD      This 70-year-old female was seen today for outpatient cardiac follow-up. She was hospitalized 05/28 through 06/03/2021 with dyspnea. He had improvement with IV diuresis. Echo showed LVEF of 65% with stage II diastolic dysfunction. Treated for lower extremity cellulitis    Today she reports persistent dyspnea and either rest or exertion. She sleeps with her head elevated to avoid dyspnea. She has had some persistent ankle edema. She has not had chest pain sweats or palpitations. Medical History:  1. Admission 12/27/17 with weakness. Unable to walk. AF/RVR. 2. Obesity.  BMI 33.66 - 06/2019.  3. DM.  4. Hypertension. 5. HLD  6. PAF. [id number] score = 5.   7. Cardiac cath ~2012  No. significant abnormality per patient report NA. 8. FH :  + for atrial fibrillation. 9. Arthritis  10. Cervical stenosis s/p diskectomy and [place] stenosis without radiculopathy. Follows with Dr. Yocasta Moreno. On chronic narcotic therapy   11. Echo, 12/28/2017.  Stage II diastolic dysfunction.  EF visual estimate 55-60%.  Mild LAE.  Elevated LA pressure.  Normal MV structure and fucntion.  AV mildly sclerotic.  Mild PHTN.  No pericardial efffusion. 12. GERD  13. Remote tobacco abuse   14. S/p Appendectomy, bilateral breast reduction, carpal tunnel release, cholecystectomy, hysterectomy, LLE tibia and fibula fracture surgical repair in 2015   15. Iodine Allergy  (urticaria and rash reaction)  16. Admitted 02/19/2019  For laparoscopic right hemicolectomy for cancer  17. hospitalized 05/28 through 06/03/2021 with dyspnea. proBNP 1329 he had improvement with IV diuresis. Echo showed LVEF of 65% with stage II diastolic dysfunction  18.  OP cardiac assessment 06/10/2021: Persistent dyspnea with rales. Zaroxolyn 5 mg daily added to Bumex. Review of Systems:  Constitutional: negative for fever and chills  Respiratory: negative for cough and hemoptysis  Cardiovascular:   Gastrointestinal: negative for abdominal pain, diarrhea, nausea and vomiting  Genitourinary:negative for dysuria and hematuria  Derm: negative for rash and skin lesion(s)  Neurological: negative for seizures and tremors  Endocrine: negative for diabetic symptoms including polydipsia and polyuria  Musculoskeletal: negative for CTD  Psychiatric: negative for psychosis and major depression    On examination, she is a pleasant alert frail elderly woman in no distress   skin is warm and dry. Respirations are unlabored. /74   Pulse 95   Resp 16   Ht 5' 3\" (1.6 m)   Wt 195 lb 1.6 oz (88.5 kg)   LMP  (LMP Unknown)   BMI 34.56 kg/m² . HEENT negative for scleral icterus. Extraocular muscles intact. No facial asymmetry or central cyanosis. Neck without masses or goiter. No bruit or JVD. Cardiac apex not displaced. Rhythm regular. Abdomen normal.  Extremities 2/4 pitting ankle edema bilateral.  Bibasilar rales. EKG today per Dr. Jenae Gamino review: Normal sinus rhythm 95/min. Nonspecific poor anterior R wave progression. Low QRS voltage. She has a history of HFpEF. She remains dyspneic at rest and has orthopnea. There is evidence of lung congestion. Bumex is being taken. Zaroxolyn 5 mg/day will be added and she will be seen back in the office for follow-up next week. At that time a BMP will be checked. The possibility of a [facility] HF referral for IV diuretic was discussed with the patient and family and they prefer not to utilize this yet. All medications are reviewed and otherwise no changes made.     At completion of today's visit, medications include the following:    Current Outpatient Medications:     metoprolol succinate (TOPROL XL) 50 MG extended release tablet, Take 1 tablet by mouth daily, Disp: 30 tablet, Rfl: 3    ferrous sulfate (IRON 325) 325 (65 Fe) MG tablet, Take 1 tablet by mouth daily (with breakfast), Disp: 30 tablet, Rfl: 3    cephALEXin (KEFLEX) 500 MG capsule, Take 1 capsule by mouth 4 times daily, Disp: 20 capsule, Rfl: 0    traMADol (ULTRAM) 50 MG tablet, Take 1 tablet by mouth every 8 hours as needed for Pain for up to 60 days. Take lowest dose possible to manage pain, Disp: 90 tablet, Rfl: 1    miconazole (MICOTIN) 2 % powder, Apply topically 2 times daily. , Disp: 45 g, Rfl: 1    potassium chloride (KLOR-CON M) 10 MEQ extended release tablet, Take 1 tablet by mouth 2 times daily, Disp: 60 tablet, Rfl: 3    glimepiride (AMARYL) 2 MG tablet, Take 2 mg by mouth every morning (before breakfast) , Disp: , Rfl:     DULoxetine (CYMBALTA) 30 MG extended release capsule, Take 1 capsule by mouth daily, Disp: 30 capsule, Rfl: 0    pramipexole (MIRAPEX) 0.25 MG tablet, TAKE 5 TABLETS BY MOUTH  NIGHTLY, Disp: 450 tablet, Rfl: 0    simvastatin (ZOCOR) 20 MG tablet, TAKE 1 TABLET BY MOUTH  DAILY WITH SUPPER, Disp: 90 tablet, Rfl: 0    rivaroxaban (XARELTO) 20 MG TABS tablet, Take 1 tablet by mouth daily (with breakfast), Disp: 90 tablet, Rfl: 1    metFORMIN (GLUCOPHAGE) 500 MG tablet, TAKE 2 TABLETS BY MOUTH  TWICE A DAY, Disp: 360 tablet, Rfl: 2    dicyclomine (BENTYL) 10 MG capsule, TAKE 1 CAPSULE BY MOUTH  DAILY AS NEEDED, Disp: 90 capsule, Rfl: 1    bumetanide (BUMEX) 2 MG tablet, Take 1 tablet by mouth daily (Patient not taking: Reported on 6/10/2021), Disp: 30 tablet, Rfl: 3    empagliflozin (JARDIANCE) 10 MG tablet, Take 1 tablet by mouth daily (Patient not taking: Reported on 6/10/2021), Disp: 28 tablet, Rfl: 0    oxybutynin (DITROPAN) 5 MG tablet, TAKE 1 TABLET BY MOUTH  DAILY WITH BREAKFAST (Patient not taking: Reported on 6/10/2021), Disp: 90 tablet, Rfl: 0    hydrOXYzine (ATARAX) 10 MG tablet, Take 1 tablet by mouth nightly (Patient not taking: Reported on 6/10/2021), Disp: 90 tablet, Rfl: 0    pantoprazole (PROTONIX) 40 MG tablet, TAKE ONE TABLET BY MOUTH TWO TIMES A DAY BEFORE MEALS (Patient not taking: Reported on 6/10/2021), Disp: 60 tablet, Rfl: 2      Note: This report was completed utilizing computer voice recognition software. Every effort has been made to ensure accuracy, however; inadvertent computerized transcription errors may be present.     --Parviz Thornton MD on 6/10/2021 at 8:53 AM

## 2021-06-10 NOTE — DISCHARGE SUMMARY
Physician Discharge Summary     Patient ID:  Lexi Bella  [id number]  80 y.o.  1940    Admit date: 5/28/2021    Discharge date and time: 6/3/2021  1:09 PM     Admission Diagnoses: Anemia, unspecified type [D64.9]  GI bleed [K92.2]    Discharge Diagnoses:   Patient Active Problem List   Diagnosis    Spinal stenosis in cervical region    Disorder of muscle, ligament, and fascia    Displacement of lumbar intervertebral disc without myelopathy    Spinal stenosis, lumbar region, without neurogenic claudication    Essential hypertension    Mixed hyperlipidemia    DM (diabetes mellitus) ([facility] [address]    Obesity (BMI 30.0-34. 9)    Other chest pain    Left wrist pain    Renal artery aneurysm (HCC)    Chronic neck pain    Chronic back pain    A-fib (HCC)    PAF (paroxysmal atrial fibrillation) (HCC)    Anemia    Chronic anticoagulation    Class 1 obesity due to excess calories without serious comorbidity with body mass index (BMI) of 34.0 to 34.9 in adult    Malignant neoplasm of cecum (HCC)    Nonrheumatic aortic valve stenosis    Pulmonary HTN (HCC)    Hyponatremia    Abdominal pain    Cellulitis of right leg    GI bleed    Dyspnea             Procedures: None     Hospital Course: Stable, she tolerated all medications and treatments. She tolerated abx and had a good appetite. Swelling of LE improved and she did diuresis well with medications. Discharge Exam:  See progress note from today    Disposition: to Home in stable condition, long-term prognosis is fair. She is advised to follow up with Dr. Yolanda Nazario in one week.     Modesta Johnson DO  6/10/2021

## 2021-06-11 ENCOUNTER — CARE COORDINATION (OUTPATIENT)
Dept: CASE MANAGEMENT | Age: 81
End: 2021-06-11

## 2021-06-11 NOTE — CARE COORDINATION
[facility] Transitions Follow Up Call    2021    Patient: Mayra Gonzalez  Patient : 1940   MRN: [id number]  Reason for Admission:   Discharge Date: 6/3/21 RARS: Readmission Risk Score: 26         Spoke with: PatientRadha 6 Transitions Subsequent and Final Call    Subsequent and Final Calls  Do you have any ongoing symptoms?: Yes  Patient-reported symptoms: Shortness of Breath with exertion, BLE edema  -denies any worsening in symptoms; sleeps in a recliner with pillows  -patient reports decreased swelling in BLE  -reports feet are sore; slight swelling  Have your medications changed?: Yes  Patient Reports: 6/10/2021 Cardiologist prescribed Metolazone 5 mg daily  Do you have any questions related to your medications?: No  Do you currently have any active services?: No  Are you currently active with any services?: No  Do you have any needs or concerns that I can assist you with?: No  Identified Barriers: None  Care Transitions Interventions  No Identified Needs    Pharmacist: Completed    Other Interventions:           Patient is pleasant in conversation.   -denies any C/O chest pain, palpitations, lightheaded, or dizziness  -denies fever, chills  -monitoring daily weights; reports weight loss  -eating and drinking without difficulty   -normal bladder/bowel elimination   -BS range    -ambulates with a walker; denies recent fall   -reports had f/u with PCP at beginning of this week; patient reports PCP placing orders  to evaluate patient for oxygen at home  -reports had f/u with Cardiologist on 6/10/2021  -taking prescribed medications as ordered  -reports will complete antibiotic therapy today    Discussed reviewed monitoring daily weights; notify Cardiologist for weight gain of 2-3# in one day or 5# in one week, and patient verbalized an understanding. Emotional support provided; discussed will continue to follow.      PLAN NEXT CALL:  Symptom assessment      Follow Up  Future Appointments   Date Time Provider Delia Galindo   6/15/2021 11:40 AM [facility]       Tyree Kemp RN

## 2021-06-15 ENCOUNTER — OFFICE VISIT (OUTPATIENT)
Dept: CARDIOLOGY CLINIC | Age: 81
End: 2021-06-15
Payer: MEDICARE

## 2021-06-15 VITALS
WEIGHT: 185.5 LBS | SYSTOLIC BLOOD PRESSURE: 104 MMHG | BODY MASS INDEX: 32.87 KG/M2 | HEIGHT: 63 IN | DIASTOLIC BLOOD PRESSURE: 60 MMHG | HEART RATE: 88 BPM | RESPIRATION RATE: 18 BRPM

## 2021-06-15 DIAGNOSIS — K74.60 CIRRHOSIS OF LIVER WITHOUT ASCITES, UNSPECIFIED HEPATIC CIRRHOSIS TYPE (HCC): ICD-10-CM

## 2021-06-15 DIAGNOSIS — R06.02 SHORTNESS OF BREATH: ICD-10-CM

## 2021-06-15 DIAGNOSIS — I50.31 CONGESTIVE HEART FAILURE WITH LEFT VENTRICULAR DIASTOLIC DYSFUNCTION, ACUTE (HCC): ICD-10-CM

## 2021-06-15 DIAGNOSIS — I10 ESSENTIAL HYPERTENSION: Primary | ICD-10-CM

## 2021-06-15 LAB
ANION GAP SERPL CALCULATED.3IONS-SCNC: 15 MMOL/L (ref 7–16)
BUN BLDV-MCNC: 17 MG/DL (ref 6–23)
CALCIUM SERPL-MCNC: 9.8 MG/DL (ref 8.6–10.2)
CHLORIDE BLD-SCNC: 92 MMOL/L (ref 98–107)
CO2: 32 MMOL/L (ref 22–29)
CREAT SERPL-MCNC: 0.9 MG/DL (ref 0.5–1)
GFR AFRICAN AMERICAN: >60
GFR NON-AFRICAN AMERICAN: >60 ML/MIN/1.73
GLUCOSE BLD-MCNC: 147 MG/DL (ref 74–99)
POTASSIUM SERPL-SCNC: 3.5 MMOL/L (ref 3.5–5)
PRO-BNP: 603 PG/ML (ref 0–450)
SODIUM BLD-SCNC: 139 MMOL/L (ref 132–146)

## 2021-06-15 PROCEDURE — 93000 ELECTROCARDIOGRAM COMPLETE: CPT | Performed by: INTERNAL MEDICINE

## 2021-06-15 PROCEDURE — 1090F PRES/ABSN URINE INCON ASSESS: CPT | Performed by: INTERNAL MEDICINE

## 2021-06-15 PROCEDURE — 99214 OFFICE O/P EST MOD 30 MIN: CPT | Performed by: INTERNAL MEDICINE

## 2021-06-15 PROCEDURE — 4040F PNEUMOC VAC/ADMIN/RCVD: CPT | Performed by: INTERNAL MEDICINE

## 2021-06-15 PROCEDURE — G8427 DOCREV CUR MEDS BY ELIG CLIN: HCPCS | Performed by: INTERNAL MEDICINE

## 2021-06-15 PROCEDURE — G8417 CALC BMI ABV UP PARAM F/U: HCPCS | Performed by: INTERNAL MEDICINE

## 2021-06-15 PROCEDURE — 1036F TOBACCO NON-USER: CPT | Performed by: INTERNAL MEDICINE

## 2021-06-15 PROCEDURE — 1123F ACP DISCUSS/DSCN MKR DOCD: CPT | Performed by: INTERNAL MEDICINE

## 2021-06-15 PROCEDURE — 36415 COLL VENOUS BLD VENIPUNCTURE: CPT | Performed by: INTERNAL MEDICINE

## 2021-06-15 PROCEDURE — 1111F DSCHRG MED/CURRENT MED MERGE: CPT | Performed by: INTERNAL MEDICINE

## 2021-06-15 PROCEDURE — G8399 PT W/DXA RESULTS DOCUMENT: HCPCS | Performed by: INTERNAL MEDICINE

## 2021-06-15 NOTE — PROGRESS NOTES
[facility]  [facility]. Celine Marquez M.D. Carley Aparicio M.D.  He Herrera. [employer] Nana Patter, M.D. Sterling Vora M.D. MD Alex Ferrari   1940  Dalia Cline MD      This 26-year-old female was seen today for outpatient cardiac follow-up. She was hospitalized 05/28 through 06/03/2021 with dyspnea. She  had improvement with IV diuresis. Echo showed LVEF of 65% with stage II diastolic dysfunction. Treated for lower extremity cellulitis    She was evaluated in 06/09/2021 for persistent dyspnea and either rest or exertion. She sleeps with her head elevated to avoid dyspnea. She has had some persistent ankle edema. Zaroxolyn was added to her regimen. Today she has lost about 10 pounds. She has had resolution of ankle edema. She continues to have some mild erythema but no bryan cellulitis. She feels better although she continues to sleep in a recliner to avoid dyspnea. Medical History:  1. Admission 12/27/17 with weakness. Unable to walk. AF/RVR. 2. Obesity.  BMI 33.66 - 06/2019.  3. DM.  4. Hypertension. 5. HLD  6. PAF. [id number] score = 5.   7. Cardiac cath ~2012  No. significant abnormality per patient report NA. 8. FH :  + for atrial fibrillation. 9. Arthritis  10. Cervical stenosis s/p diskectomy and [place] stenosis without radiculopathy. Follows with Dr. Shyam Barger. On chronic narcotic therapy   11. Echo, 12/28/2017.  Stage II diastolic dysfunction.  EF visual estimate 55-60%.  Mild LAE.  Elevated LA pressure.  Normal MV structure and fucntion.  AV mildly sclerotic.  Mild PHTN.  No pericardial efffusion. 12. GERD  13. Remote tobacco abuse   14. S/p Appendectomy, bilateral breast reduction, carpal tunnel release, cholecystectomy, hysterectomy, LLE tibia and fibula fracture surgical repair in 2015   15. Iodine Allergy  (urticaria and rash reaction)  16.  Admitted 02/19/2019  For laparoscopic right hemicolectomy for cancer  17. Abdominal CT 12/2020: Moderate ascites. Cirrhosis. Bibasilar atelectasis lung bases  18. Abdominal US 12/31/2020: No ascites identified for paracentesis  19. hospitalized 05/28 through 06/03/2021 with dyspnea. proBNP 1329 he had improvement with IV diuresis. Echo showed LVEF of 65% with stage II diastolic dysfunction  20. OP cardiac assessment 06/10/2021: Persistent dyspnea with rales. Zaroxolyn 5 mg daily added to Bumex. 21. OP cardiac assessment 06/15/2021: 10 pound weight loss. Continue Zaroxolyn and Bumex. Check BMP and proBNP. Review of Systems:  Constitutional: negative for fever and chills  Respiratory: negative for cough and hemoptysis  Cardiovascular:   Gastrointestinal: negative for abdominal pain, diarrhea, nausea and vomiting  Genitourinary:negative for dysuria and hematuria  Derm: negative for rash and skin lesion(s)  Neurological: negative for seizures and tremors  Endocrine: negative for diabetic symptoms including polydipsia and polyuria  Musculoskeletal: negative for CTD  Psychiatric: negative for psychosis and major depression    On examination, she is a pleasant alert frail elderly woman in no distress   skin is warm and dry. Respirations are unlabored. /60   Pulse 88   Resp 18   Ht 5' 3\" (1.6 m)   Wt 185 lb 8 oz (84.1 kg)   LMP  (LMP Unknown)   BMI 32.86 kg/m² . HEENT negative for scleral icterus. Extraocular muscles intact. No facial asymmetry or central cyanosis. Neck without masses or goiter. No bruit or JVD. Cardiac apex not displaced. Rhythm regular. Abdomen normal.  Extremities  ankle edema1 bilateral.   bibasilar rales. EKG today per Dr. Rosalie Page review: .  Sinus rhythm 88/min. PA interval 140. Diffuse low QRS voltage. Possible old anteroseptal MI. The patient is subjectively improved following diuresis. She continues to have some rales and trace ankle edema.   Today a BMP and proBNP will be obtained and she will be seen back in 1 week. Pending her lab results diuretic dose may be decreased prior to the office visit. Its not completely clear if her dyspnea and apparent orthopnea are cardiac related. She has a significant anemia history and this may need to be reassessed. Previous abdominal CT showing cirrhosis/ascites is noted. At completion of today's visit, medications include the following:    Current Outpatient Medications:     metOLazone (ZAROXOLYN) 5 MG tablet, Take 1 tablet by mouth daily, Disp: 30 tablet, Rfl: 5    metoprolol succinate (TOPROL XL) 50 MG extended release tablet, Take 1 tablet by mouth daily, Disp: 30 tablet, Rfl: 3    ferrous sulfate (IRON 325) 325 (65 Fe) MG tablet, Take 1 tablet by mouth daily (with breakfast), Disp: 30 tablet, Rfl: 3    traMADol (ULTRAM) 50 MG tablet, Take 1 tablet by mouth every 8 hours as needed for Pain for up to 60 days. Take lowest dose possible to manage pain, Disp: 90 tablet, Rfl: 1    miconazole (MICOTIN) 2 % powder, Apply topically 2 times daily. , Disp: 45 g, Rfl: 1    bumetanide (BUMEX) 2 MG tablet, Take 1 tablet by mouth daily, Disp: 30 tablet, Rfl: 3    potassium chloride (KLOR-CON M) 10 MEQ extended release tablet, Take 1 tablet by mouth 2 times daily, Disp: 60 tablet, Rfl: 3    glimepiride (AMARYL) 2 MG tablet, Take 2 mg by mouth every morning (before breakfast) , Disp: , Rfl:     pramipexole (MIRAPEX) 0.25 MG tablet, TAKE 5 TABLETS BY MOUTH  NIGHTLY, Disp: 450 tablet, Rfl: 0    oxybutynin (DITROPAN) 5 MG tablet, TAKE 1 TABLET BY MOUTH  DAILY WITH BREAKFAST, Disp: 90 tablet, Rfl: 0    hydrOXYzine (ATARAX) 10 MG tablet, Take 1 tablet by mouth nightly, Disp: 90 tablet, Rfl: 0    simvastatin (ZOCOR) 20 MG tablet, TAKE 1 TABLET BY MOUTH  DAILY WITH SUPPER, Disp: 90 tablet, Rfl: 0    pantoprazole (PROTONIX) 40 MG tablet, TAKE ONE TABLET BY MOUTH TWO TIMES A DAY BEFORE MEALS, Disp: 60 tablet, Rfl: 2    rivaroxaban (XARELTO) 20 MG TABS tablet, Take 1 tablet by mouth daily (with breakfast), Disp: 90 tablet, Rfl: 1    metFORMIN (GLUCOPHAGE) 500 MG tablet, TAKE 2 TABLETS BY MOUTH  TWICE A DAY, Disp: 360 tablet, Rfl: 2    dicyclomine (BENTYL) 10 MG capsule, TAKE 1 CAPSULE BY MOUTH  DAILY AS NEEDED, Disp: 90 capsule, Rfl: 1    cephALEXin (KEFLEX) 500 MG capsule, Take 1 capsule by mouth 4 times daily, Disp: 20 capsule, Rfl: 0    empagliflozin (JARDIANCE) 10 MG tablet, Take 1 tablet by mouth daily (Patient not taking: Reported on 6/10/2021), Disp: 28 tablet, Rfl: 0    DULoxetine (CYMBALTA) 30 MG extended release capsule, Take 1 capsule by mouth daily, Disp: 30 capsule, Rfl: 0      Note: This report was completed utilizing computer voice recognition software. Every effort has been made to ensure accuracy, however; inadvertent computerized transcription errors may be present.     --Sarah Austin MD on 6/15/2021 at 11:28 AM

## 2021-06-15 NOTE — PROGRESS NOTES
Pt was in for lab work to monitor medications. Labs were drawn from left forearm, using a 21G x 3/4\" butterfly needle. Patient tolerated procedure well.     HOMER Kline

## 2021-06-16 ENCOUNTER — CARE COORDINATION (OUTPATIENT)
Dept: CASE MANAGEMENT | Age: 81
End: 2021-06-16

## 2021-06-16 NOTE — CARE COORDINATION
[facility] Transitions Follow Up Call    2021    Patient: Kylie Ybarra  Patient : 1940   MRN: [id number]  Reason for Admission:   Discharge Date: 6/3/21 RARS: Readmission Risk Score: 26         Spoke with: Patient, Radha Pizano 6 Transitions Subsequent and Final Call    Subsequent and Final Calls  Do you have any ongoing symptoms?: Yes  Patient-reported symptoms: Shortness of Breath, BLE edema  -denies any worsening in symptoms; sleeps in a recliner with pillows  -patient reports decreased swelling in BLE; continues to improve  Have your medications changed?: No  Are you currently active with any services?: Home Health  Do you have any needs or concerns that I can assist you with?: No  Identified Barriers: None  Care Transitions Interventions    Pharmacist: Completed    Other Interventions:         Patient is pleasant in conversation.   -denies any C/O chest pain, palpitations, lightheaded, or dizziness  -denies fever, chills  -monitoring daily weights; reports weight loss  -eating and drinking without difficulty   -normal bladder/bowel elimination   - this am  -taking prescribed medications as ordered  -ambulates with a walker; denies recent fall   -Patient reports she has a follow up appointment with PCP on 2021; patient to follow up regarding oxygen    Patient reports she is frustrated with health issues. - CTN provided therapeutic communication and emotional support. Discussed will continue to follow.         Follow Up  Future Appointments   Date Time Provider Delia Galindo   2021  9:45 AM Bee Menjivar MD [facility]RISTOPH ESPINO   2021 11:40 AM Dora Vilalsenor MD [address] [place] Surya Healy RN

## 2021-06-21 ENCOUNTER — HOSPITAL ENCOUNTER (INPATIENT)
Age: 81
LOS: 9 days | Discharge: HOME | DRG: 286 | End: 2021-06-30
Attending: EMERGENCY MEDICINE | Admitting: INTERNAL MEDICINE
Payer: MEDICARE

## 2021-06-21 ENCOUNTER — TELEPHONE (OUTPATIENT)
Dept: OTHER | Facility: CLINIC | Age: 81
End: 2021-06-21

## 2021-06-21 ENCOUNTER — OFFICE VISIT (OUTPATIENT)
Dept: CARDIOLOGY CLINIC | Age: 81
End: 2021-06-21
Payer: MEDICARE

## 2021-06-21 ENCOUNTER — APPOINTMENT (OUTPATIENT)
Dept: GENERAL RADIOLOGY | Age: 81
DRG: 286 | End: 2021-06-21
Payer: MEDICARE

## 2021-06-21 VITALS
DIASTOLIC BLOOD PRESSURE: 84 MMHG | HEART RATE: 155 BPM | HEIGHT: 63 IN | WEIGHT: 188.2 LBS | SYSTOLIC BLOOD PRESSURE: 142 MMHG | BODY MASS INDEX: 33.35 KG/M2 | RESPIRATION RATE: 16 BRPM

## 2021-06-21 DIAGNOSIS — I50.32 CHRONIC HEART FAILURE WITH PRESERVED EJECTION FRACTION (HCC): ICD-10-CM

## 2021-06-21 DIAGNOSIS — K74.60 LIVER CIRRHOSIS SECONDARY TO NASH (NONALCOHOLIC STEATOHEPATITIS) (HCC): ICD-10-CM

## 2021-06-21 DIAGNOSIS — I48.0 PAF (PAROXYSMAL ATRIAL FIBRILLATION) (HCC): Primary | ICD-10-CM

## 2021-06-21 DIAGNOSIS — K75.81 LIVER CIRRHOSIS SECONDARY TO NASH (NONALCOHOLIC STEATOHEPATITIS) (HCC): ICD-10-CM

## 2021-06-21 DIAGNOSIS — I48.91 ATRIAL FIBRILLATION WITH RVR (HCC): Primary | ICD-10-CM

## 2021-06-21 DIAGNOSIS — R07.9 CHEST PAIN, UNSPECIFIED TYPE: ICD-10-CM

## 2021-06-21 PROBLEM — R10.9 ABDOMINAL PAIN: Status: RESOLVED | Noted: 2020-12-05 | Resolved: 2021-06-21

## 2021-06-21 PROBLEM — K92.2 GI BLEED: Status: RESOLVED | Noted: 2021-05-29 | Resolved: 2021-06-21

## 2021-06-21 PROBLEM — E87.1 HYPONATREMIA: Status: RESOLVED | Noted: 2020-12-05 | Resolved: 2021-06-21

## 2021-06-21 PROBLEM — L03.115 CELLULITIS OF RIGHT LEG: Status: RESOLVED | Noted: 2020-12-30 | Resolved: 2021-06-21

## 2021-06-21 LAB
ALBUMIN SERPL-MCNC: 4.2 G/DL (ref 3.5–5.2)
ALP BLD-CCNC: 74 U/L (ref 35–104)
ALT SERPL-CCNC: 11 U/L (ref 0–32)
ANION GAP SERPL CALCULATED.3IONS-SCNC: 17 MMOL/L (ref 7–16)
ANISOCYTOSIS: ABNORMAL
AST SERPL-CCNC: 19 U/L (ref 0–31)
BASOPHILS ABSOLUTE: 0.05 E9/L (ref 0–0.2)
BASOPHILS RELATIVE PERCENT: 0.4 % (ref 0–2)
BILIRUB SERPL-MCNC: 0.7 MG/DL (ref 0–1.2)
BUN BLDV-MCNC: 33 MG/DL (ref 6–23)
CALCIUM SERPL-MCNC: 9.1 MG/DL (ref 8.6–10.2)
CHLORIDE BLD-SCNC: 92 MMOL/L (ref 98–107)
CO2: 26 MMOL/L (ref 22–29)
CREAT SERPL-MCNC: 1.2 MG/DL (ref 0.5–1)
EOSINOPHILS ABSOLUTE: 0.17 E9/L (ref 0.05–0.5)
EOSINOPHILS RELATIVE PERCENT: 1.3 % (ref 0–6)
GFR AFRICAN AMERICAN: 52
GFR NON-AFRICAN AMERICAN: 43 ML/MIN/1.73
GLUCOSE BLD-MCNC: 132 MG/DL (ref 74–99)
HCT VFR BLD CALC: 35.7 % (ref 34–48)
HEMOGLOBIN: 10.4 G/DL (ref 11.5–15.5)
IMMATURE GRANULOCYTES #: 0.11 E9/L
IMMATURE GRANULOCYTES %: 0.9 % (ref 0–5)
INR BLD: 1.5
LYMPHOCYTES ABSOLUTE: 2.24 E9/L (ref 1.5–4)
LYMPHOCYTES RELATIVE PERCENT: 17.4 % (ref 20–42)
MAGNESIUM: 1.2 MG/DL (ref 1.6–2.6)
MAGNESIUM: 2 MG/DL (ref 1.6–2.6)
MCH RBC QN AUTO: 23 PG (ref 26–35)
MCHC RBC AUTO-ENTMCNC: 29.1 % (ref 32–34.5)
MCV RBC AUTO: 78.8 FL (ref 80–99.9)
METER GLUCOSE: 99 MG/DL (ref 74–99)
MONOCYTES ABSOLUTE: 0.55 E9/L (ref 0.1–0.95)
MONOCYTES RELATIVE PERCENT: 4.3 % (ref 2–12)
NEUTROPHILS ABSOLUTE: 9.77 E9/L (ref 1.8–7.3)
NEUTROPHILS RELATIVE PERCENT: 75.7 % (ref 43–80)
OVALOCYTES: ABNORMAL
PDW BLD-RTO: 24.1 FL (ref 11.5–15)
PLATELET # BLD: 376 E9/L (ref 130–450)
PMV BLD AUTO: 9.2 FL (ref 7–12)
POIKILOCYTES: ABNORMAL
POLYCHROMASIA: ABNORMAL
POTASSIUM REFLEX MAGNESIUM: 3.2 MMOL/L (ref 3.5–5)
PRO-BNP: 5183 PG/ML (ref 0–450)
PROTHROMBIN TIME: 16.4 SEC (ref 9.3–12.4)
RBC # BLD: 4.53 E12/L (ref 3.5–5.5)
SODIUM BLD-SCNC: 135 MMOL/L (ref 132–146)
TOTAL PROTEIN: 8 G/DL (ref 6.4–8.3)
TROPONIN, HIGH SENSITIVITY: 43 NG/L (ref 0–9)
TROPONIN, HIGH SENSITIVITY: 47 NG/L (ref 0–9)
TSH SERPL DL<=0.05 MIU/L-ACNC: 2.25 UIU/ML (ref 0.27–4.2)
WBC # BLD: 12.9 E9/L (ref 4.5–11.5)

## 2021-06-21 PROCEDURE — 2500000003 HC RX 250 WO HCPCS: Performed by: STUDENT IN AN ORGANIZED HEALTH CARE EDUCATION/TRAINING PROGRAM

## 2021-06-21 PROCEDURE — 96365 THER/PROPH/DIAG IV INF INIT: CPT

## 2021-06-21 PROCEDURE — G8399 PT W/DXA RESULTS DOCUMENT: HCPCS | Performed by: INTERNAL MEDICINE

## 2021-06-21 PROCEDURE — 1036F TOBACCO NON-USER: CPT | Performed by: INTERNAL MEDICINE

## 2021-06-21 PROCEDURE — 84484 ASSAY OF TROPONIN QUANT: CPT

## 2021-06-21 PROCEDURE — 99215 OFFICE O/P EST HI 40 MIN: CPT | Performed by: INTERNAL MEDICINE

## 2021-06-21 PROCEDURE — 2140000000 HC CCU INTERMEDIATE R&B

## 2021-06-21 PROCEDURE — 93000 ELECTROCARDIOGRAM COMPLETE: CPT | Performed by: INTERNAL MEDICINE

## 2021-06-21 PROCEDURE — 83880 ASSAY OF NATRIURETIC PEPTIDE: CPT

## 2021-06-21 PROCEDURE — 93005 ELECTROCARDIOGRAM TRACING: CPT | Performed by: STUDENT IN AN ORGANIZED HEALTH CARE EDUCATION/TRAINING PROGRAM

## 2021-06-21 PROCEDURE — 96375 TX/PRO/DX INJ NEW DRUG ADDON: CPT

## 2021-06-21 PROCEDURE — 82962 GLUCOSE BLOOD TEST: CPT

## 2021-06-21 PROCEDURE — 96376 TX/PRO/DX INJ SAME DRUG ADON: CPT

## 2021-06-21 PROCEDURE — 96368 THER/DIAG CONCURRENT INF: CPT

## 2021-06-21 PROCEDURE — 96366 THER/PROPH/DIAG IV INF ADDON: CPT

## 2021-06-21 PROCEDURE — G8427 DOCREV CUR MEDS BY ELIG CLIN: HCPCS | Performed by: INTERNAL MEDICINE

## 2021-06-21 PROCEDURE — 1123F ACP DISCUSS/DSCN MKR DOCD: CPT | Performed by: INTERNAL MEDICINE

## 2021-06-21 PROCEDURE — 85025 COMPLETE CBC W/AUTO DIFF WBC: CPT

## 2021-06-21 PROCEDURE — 1090F PRES/ABSN URINE INCON ASSESS: CPT | Performed by: INTERNAL MEDICINE

## 2021-06-21 PROCEDURE — 96372 THER/PROPH/DIAG INJ SC/IM: CPT

## 2021-06-21 PROCEDURE — 99283 EMERGENCY DEPT VISIT LOW MDM: CPT

## 2021-06-21 PROCEDURE — 6360000002 HC RX W HCPCS: Performed by: EMERGENCY MEDICINE

## 2021-06-21 PROCEDURE — G8417 CALC BMI ABV UP PARAM F/U: HCPCS | Performed by: INTERNAL MEDICINE

## 2021-06-21 PROCEDURE — 6360000002 HC RX W HCPCS: Performed by: STUDENT IN AN ORGANIZED HEALTH CARE EDUCATION/TRAINING PROGRAM

## 2021-06-21 PROCEDURE — 80053 COMPREHEN METABOLIC PANEL: CPT

## 2021-06-21 PROCEDURE — 83735 ASSAY OF MAGNESIUM: CPT

## 2021-06-21 PROCEDURE — 1111F DSCHRG MED/CURRENT MED MERGE: CPT | Performed by: INTERNAL MEDICINE

## 2021-06-21 PROCEDURE — 6370000000 HC RX 637 (ALT 250 FOR IP): Performed by: STUDENT IN AN ORGANIZED HEALTH CARE EDUCATION/TRAINING PROGRAM

## 2021-06-21 PROCEDURE — 4040F PNEUMOC VAC/ADMIN/RCVD: CPT | Performed by: INTERNAL MEDICINE

## 2021-06-21 PROCEDURE — 85610 PROTHROMBIN TIME: CPT

## 2021-06-21 PROCEDURE — 84443 ASSAY THYROID STIM HORMONE: CPT

## 2021-06-21 PROCEDURE — 6370000000 HC RX 637 (ALT 250 FOR IP): Performed by: INTERNAL MEDICINE

## 2021-06-21 PROCEDURE — 2580000003 HC RX 258: Performed by: STUDENT IN AN ORGANIZED HEALTH CARE EDUCATION/TRAINING PROGRAM

## 2021-06-21 PROCEDURE — 71045 X-RAY EXAM CHEST 1 VIEW: CPT

## 2021-06-21 RX ORDER — METOPROLOL TARTRATE 5 MG/5ML
5 INJECTION INTRAVENOUS ONCE
Status: COMPLETED | OUTPATIENT
Start: 2021-06-21 | End: 2021-06-21

## 2021-06-21 RX ORDER — SODIUM CHLORIDE 0.9 % (FLUSH) 0.9 %
5-40 SYRINGE (ML) INJECTION PRN
Status: DISCONTINUED | OUTPATIENT
Start: 2021-06-21 | End: 2021-06-28 | Stop reason: SDUPTHER

## 2021-06-21 RX ORDER — PRAMIPEXOLE DIHYDROCHLORIDE 0.25 MG/1
0.5 TABLET ORAL 3 TIMES DAILY
Status: DISCONTINUED | OUTPATIENT
Start: 2021-06-21 | End: 2021-06-30 | Stop reason: HOSPADM

## 2021-06-21 RX ORDER — AMLODIPINE BESYLATE 10 MG/1
10 TABLET ORAL DAILY
Status: ON HOLD | COMMUNITY
Start: 2021-04-19 | End: 2021-09-16 | Stop reason: HOSPADM

## 2021-06-21 RX ORDER — ONDANSETRON 4 MG/1
4 TABLET, ORALLY DISINTEGRATING ORAL EVERY 8 HOURS PRN
Status: DISCONTINUED | OUTPATIENT
Start: 2021-06-21 | End: 2021-06-30 | Stop reason: HOSPADM

## 2021-06-21 RX ORDER — FUROSEMIDE 20 MG/1
20 TABLET ORAL DAILY
Status: ON HOLD | COMMUNITY
End: 2021-09-16 | Stop reason: HOSPADM

## 2021-06-21 RX ORDER — FLUTICASONE PROPIONATE 50 MCG
2 SPRAY, SUSPENSION (ML) NASAL DAILY PRN
COMMUNITY

## 2021-06-21 RX ORDER — PANTOPRAZOLE SODIUM 40 MG/1
40 TABLET, DELAYED RELEASE ORAL
Status: DISCONTINUED | OUTPATIENT
Start: 2021-06-22 | End: 2021-06-30 | Stop reason: HOSPADM

## 2021-06-21 RX ORDER — POTASSIUM CHLORIDE 20 MEQ/1
20 TABLET, EXTENDED RELEASE ORAL 2 TIMES DAILY
Status: ON HOLD | COMMUNITY
End: 2021-09-16 | Stop reason: HOSPADM

## 2021-06-21 RX ORDER — POTASSIUM CHLORIDE 20 MEQ/1
40 TABLET, EXTENDED RELEASE ORAL ONCE
Status: COMPLETED | OUTPATIENT
Start: 2021-06-21 | End: 2021-06-21

## 2021-06-21 RX ORDER — SODIUM CHLORIDE 0.9 % (FLUSH) 0.9 %
5-40 SYRINGE (ML) INJECTION EVERY 12 HOURS SCHEDULED
Status: DISCONTINUED | OUTPATIENT
Start: 2021-06-21 | End: 2021-06-28 | Stop reason: SDUPTHER

## 2021-06-21 RX ORDER — SPIRONOLACTONE 25 MG/1
25 TABLET ORAL DAILY
Qty: 30 TABLET | Refills: 1 | Status: ON HOLD | OUTPATIENT
Start: 2021-06-21 | End: 2021-09-16 | Stop reason: HOSPADM

## 2021-06-21 RX ORDER — MAGNESIUM SULFATE IN WATER 40 MG/ML
2000 INJECTION, SOLUTION INTRAVENOUS ONCE
Status: COMPLETED | OUTPATIENT
Start: 2021-06-21 | End: 2021-06-21

## 2021-06-21 RX ORDER — POTASSIUM CHLORIDE 20 MEQ/1
20 TABLET, EXTENDED RELEASE ORAL 2 TIMES DAILY
Status: DISCONTINUED | OUTPATIENT
Start: 2021-06-21 | End: 2021-06-30 | Stop reason: HOSPADM

## 2021-06-21 RX ORDER — DEXTROSE MONOHYDRATE 50 MG/ML
100 INJECTION, SOLUTION INTRAVENOUS PRN
Status: DISCONTINUED | OUTPATIENT
Start: 2021-06-21 | End: 2021-06-30 | Stop reason: HOSPADM

## 2021-06-21 RX ORDER — DILTIAZEM HYDROCHLORIDE 5 MG/ML
10 INJECTION INTRAVENOUS ONCE
Status: COMPLETED | OUTPATIENT
Start: 2021-06-21 | End: 2021-06-21

## 2021-06-21 RX ORDER — POLYETHYLENE GLYCOL 3350 17 G/17G
17 POWDER, FOR SOLUTION ORAL DAILY PRN
Status: DISCONTINUED | OUTPATIENT
Start: 2021-06-21 | End: 2021-06-30 | Stop reason: HOSPADM

## 2021-06-21 RX ORDER — HYDROXYZINE HYDROCHLORIDE 10 MG/1
10 TABLET, FILM COATED ORAL NIGHTLY
Status: DISCONTINUED | OUTPATIENT
Start: 2021-06-21 | End: 2021-06-30 | Stop reason: HOSPADM

## 2021-06-21 RX ORDER — TRAMADOL HYDROCHLORIDE 50 MG/1
50 TABLET ORAL EVERY 8 HOURS PRN
Status: DISCONTINUED | OUTPATIENT
Start: 2021-06-21 | End: 2021-06-30 | Stop reason: HOSPADM

## 2021-06-21 RX ORDER — AMLODIPINE BESYLATE 10 MG/1
10 TABLET ORAL DAILY
Status: DISCONTINUED | OUTPATIENT
Start: 2021-06-21 | End: 2021-06-30 | Stop reason: HOSPADM

## 2021-06-21 RX ORDER — NICOTINE POLACRILEX 4 MG
15 LOZENGE BUCCAL PRN
Status: DISCONTINUED | OUTPATIENT
Start: 2021-06-21 | End: 2021-06-30 | Stop reason: HOSPADM

## 2021-06-21 RX ORDER — ACETAMINOPHEN 325 MG/1
650 TABLET ORAL EVERY 6 HOURS PRN
Status: DISCONTINUED | OUTPATIENT
Start: 2021-06-21 | End: 2021-06-28 | Stop reason: SDUPTHER

## 2021-06-21 RX ORDER — ANTIARTHRITIC COMBINATION NO.2 900 MG
TABLET ORAL DAILY
Status: ON HOLD | COMMUNITY
End: 2021-09-16 | Stop reason: HOSPADM

## 2021-06-21 RX ORDER — PRAMIPEXOLE DIHYDROCHLORIDE 0.5 MG/1
0.5 TABLET ORAL 3 TIMES DAILY
COMMUNITY

## 2021-06-21 RX ORDER — DEXTROSE MONOHYDRATE 25 G/50ML
12.5 INJECTION, SOLUTION INTRAVENOUS PRN
Status: DISCONTINUED | OUTPATIENT
Start: 2021-06-21 | End: 2021-06-30 | Stop reason: HOSPADM

## 2021-06-21 RX ORDER — 0.9 % SODIUM CHLORIDE 0.9 %
500 INTRAVENOUS SOLUTION INTRAVENOUS ONCE
Status: COMPLETED | OUTPATIENT
Start: 2021-06-21 | End: 2021-06-21

## 2021-06-21 RX ORDER — CHLORDIAZEPOXIDE HYDROCHLORIDE AND CLIDINIUM BROMIDE 5; 2.5 MG/1; MG/1
1 CAPSULE ORAL PRN
Status: ON HOLD | COMMUNITY
End: 2021-09-16 | Stop reason: HOSPADM

## 2021-06-21 RX ORDER — BUMETANIDE 1 MG/1
2 TABLET ORAL DAILY
Status: DISCONTINUED | OUTPATIENT
Start: 2021-06-21 | End: 2021-06-21

## 2021-06-21 RX ORDER — ACETAMINOPHEN 650 MG/1
650 SUPPOSITORY RECTAL EVERY 6 HOURS PRN
Status: DISCONTINUED | OUTPATIENT
Start: 2021-06-21 | End: 2021-06-30 | Stop reason: HOSPADM

## 2021-06-21 RX ORDER — SPIRONOLACTONE 25 MG/1
25 TABLET ORAL DAILY
Status: DISCONTINUED | OUTPATIENT
Start: 2021-06-22 | End: 2021-06-30 | Stop reason: HOSPADM

## 2021-06-21 RX ORDER — ONDANSETRON 2 MG/ML
4 INJECTION INTRAMUSCULAR; INTRAVENOUS EVERY 6 HOURS PRN
Status: DISCONTINUED | OUTPATIENT
Start: 2021-06-21 | End: 2021-06-30 | Stop reason: HOSPADM

## 2021-06-21 RX ORDER — MAGNESIUM SULFATE HEPTAHYDRATE 500 MG/ML
2000 INJECTION, SOLUTION INTRAMUSCULAR; INTRAVENOUS ONCE
Status: DISCONTINUED | OUTPATIENT
Start: 2021-06-21 | End: 2021-06-21 | Stop reason: SDUPTHER

## 2021-06-21 RX ORDER — FERROUS SULFATE 325(65) MG
325 TABLET ORAL
Status: DISCONTINUED | OUTPATIENT
Start: 2021-06-22 | End: 2021-06-30 | Stop reason: HOSPADM

## 2021-06-21 RX ORDER — CHLORDIAZEPOXIDE HYDROCHLORIDE AND CLIDINIUM BROMIDE 5; 2.5 MG/1; MG/1
1 CAPSULE ORAL 2 TIMES DAILY PRN
Status: DISCONTINUED | OUTPATIENT
Start: 2021-06-21 | End: 2021-06-30 | Stop reason: HOSPADM

## 2021-06-21 RX ORDER — CALCIUM CARBONATE 500(1250)
500 TABLET ORAL DAILY
Status: ON HOLD | COMMUNITY
End: 2021-09-16 | Stop reason: HOSPADM

## 2021-06-21 RX ORDER — DIGOXIN 0.25 MG/ML
500 INJECTION INTRAMUSCULAR; INTRAVENOUS ONCE
Status: COMPLETED | OUTPATIENT
Start: 2021-06-21 | End: 2021-06-21

## 2021-06-21 RX ORDER — SODIUM CHLORIDE 9 MG/ML
25 INJECTION, SOLUTION INTRAVENOUS PRN
Status: DISCONTINUED | OUTPATIENT
Start: 2021-06-21 | End: 2021-06-28 | Stop reason: SDUPTHER

## 2021-06-21 RX ADMIN — DILTIAZEM HYDROCHLORIDE 10 MG: 5 INJECTION INTRAVENOUS at 16:02

## 2021-06-21 RX ADMIN — TRAMADOL HYDROCHLORIDE 50 MG: 50 TABLET, FILM COATED ORAL at 22:18

## 2021-06-21 RX ADMIN — DIGOXIN 500 MCG: 0.25 INJECTION INTRAMUSCULAR; INTRAVENOUS at 19:02

## 2021-06-21 RX ADMIN — AMLODIPINE BESYLATE 10 MG: 10 TABLET ORAL at 22:13

## 2021-06-21 RX ADMIN — POTASSIUM CHLORIDE 40 MEQ: 1500 TABLET, EXTENDED RELEASE ORAL at 17:44

## 2021-06-21 RX ADMIN — ENOXAPARIN SODIUM 80 MG: 80 INJECTION SUBCUTANEOUS at 16:02

## 2021-06-21 RX ADMIN — MAGNESIUM SULFATE HEPTAHYDRATE 2000 MG: 40 INJECTION, SOLUTION INTRAVENOUS at 17:44

## 2021-06-21 RX ADMIN — SODIUM CHLORIDE 500 ML: 9 INJECTION, SOLUTION INTRAVENOUS at 16:01

## 2021-06-21 RX ADMIN — DILTIAZEM HYDROCHLORIDE 10 MG: 5 INJECTION INTRAVENOUS at 16:56

## 2021-06-21 RX ADMIN — METOPROLOL TARTRATE 5 MG: 1 INJECTION, SOLUTION INTRAVENOUS at 17:44

## 2021-06-21 RX ADMIN — DEXTROSE MONOHYDRATE 5 MG/HR: 50 INJECTION, SOLUTION INTRAVENOUS at 16:02

## 2021-06-21 RX ADMIN — PRAMIPEXOLE DIHYDROCHLORIDE 0.5 MG: 0.25 TABLET ORAL at 22:13

## 2021-06-21 RX ADMIN — HYDROXYZINE HYDROCHLORIDE 10 MG: 10 TABLET ORAL at 22:18

## 2021-06-21 RX ADMIN — DEXTROSE MONOHYDRATE 15 MG/HR: 50 INJECTION, SOLUTION INTRAVENOUS at 22:36

## 2021-06-21 ASSESSMENT — PAIN SCALES - GENERAL
PAINLEVEL_OUTOF10: 8
PAINLEVEL_OUTOF10: 0
PAINLEVEL_OUTOF10: 0

## 2021-06-21 ASSESSMENT — ENCOUNTER SYMPTOMS
VOMITING: 0
ABDOMINAL PAIN: 0
TROUBLE SWALLOWING: 0
PHOTOPHOBIA: 0
COUGH: 0
ABDOMINAL DISTENTION: 1
SHORTNESS OF BREATH: 1
NAUSEA: 0
CONSTIPATION: 0
VOICE CHANGE: 0
RHINORRHEA: 0
DIARRHEA: 0

## 2021-06-21 NOTE — ED PROVIDER NOTES
Patient is an 80-year-old female with a history of paroxysmal atrial fibrillation on Xarelto, hypertension, hyperlipidemia, diabetes, liver cirrhosis who presents to the emergency department with a complaint of shortness of breath, chest pain, palpitations. Patient states she has had progressive shortness of breath for the past 3 days that has been intermittent. She woke this morning and had onset of increased shortness of breath at rest and on exertion, as well as left-sided chest pain that is moderate in intensity, nonradiating, nothing makes it better, nothing makes it worse, not reproducible, constant, sharp. Patient also with palpitations. Patient is normally on Xarelto but has been holding it for yesterday and today's dose due to dental procedure planned. Patient went to her PCP today and was found to be in A. fib RVR in the 160s and was sent to the emergency department for further evaluation. Patient denies any headache or blurred vision, any neuro deficits. Patient denies any diarrhea, GI or urinary symptoms. Patient has been eating and drinking well. Patient has been holding her anticoagulation. Patient denies any falls or trauma. Review of Systems   Constitutional: Negative for chills, fatigue and fever. HENT: Negative for congestion, rhinorrhea, trouble swallowing and voice change. Eyes: Negative for photophobia and visual disturbance. Respiratory: Positive for shortness of breath. Negative for cough. Cardiovascular: Positive for chest pain and palpitations. Negative for leg swelling. Gastrointestinal: Positive for abdominal distention. Negative for abdominal pain, constipation, diarrhea, nausea and vomiting. Endocrine: Negative for polyuria. Genitourinary: Negative for dysuria, frequency, hematuria and urgency. Musculoskeletal: Negative for arthralgias and myalgias. Skin: Negative for rash and wound. Allergic/Immunologic: Negative for immunocompromised state. Neurological: Negative for dizziness, syncope, weakness, light-headedness, numbness and headaches. Psychiatric/Behavioral: Negative for confusion. The patient is not nervous/anxious. Physical Exam  Vitals and nursing note reviewed. Constitutional:       General: She is not in acute distress. Appearance: She is not ill-appearing or toxic-appearing. HENT:      Head: Normocephalic and atraumatic. Nose: Nose normal.      Mouth/Throat:      Mouth: Mucous membranes are moist.      Pharynx: Oropharynx is clear. Eyes:      Extraocular Movements: Extraocular movements intact. Conjunctiva/sclera: Conjunctivae normal.      Pupils: Pupils are equal, round, and reactive to light. Cardiovascular:      Rate and Rhythm: Tachycardia present. Rhythm irregular. Pulses: Normal pulses. Heart sounds: Normal heart sounds. Pulmonary:      Effort: Pulmonary effort is normal.      Breath sounds: Normal breath sounds. Abdominal:      General: There is no distension. Palpations: Abdomen is soft. Tenderness: There is no abdominal tenderness. Musculoskeletal:         General: Normal range of motion. Cervical back: Normal range of motion and neck supple. Skin:     General: Skin is warm and dry. Capillary Refill: Capillary refill takes less than 2 seconds. Neurological:      General: No focal deficit present. Mental Status: She is alert and oriented to person, place, and time. Cranial Nerves: No cranial nerve deficit. Sensory: No sensory deficit. Motor: No weakness. MDM  Number of Diagnoses or Management Options  Diagnosis management comments: Patient is an 80-year-old female with a history of A. fib on Xarelto who presented to the emergency department with a complaint of palpitations, chest pain, shortness of breath. Patient stopped taking her Xarelto yesterday and today, went to her PCP and found to be in A. fib RVR.   Patient presents to the patient was discussed. He recommends digoxin 500 now, maintain on Cardizem drip. N.p.o. after midnight for possible cardioversion in the morning. Continue Lovenox 1 mg/kg twice daily.    [QC]   3125 Spoke with Dr. Monique Navarrete patient was accepted    [QC]      ED Course User Index  [QC] Samantha Purvis MD      ED Course as of Jun 22 0104 Mon Jun 21, 2021   1535 Patient was found at 91% on room air, placed on supplemental oxygen. O2 with sats to 97%    [QC]   18 Spoke with Dr. Onesimo Dee patient was discussed. He recommends digoxin 500 now, maintain on Cardizem drip. N.p.o. after midnight for possible cardioversion in the morning. Continue Lovenox 1 mg/kg twice daily.    [QC]   1826 Spoke with Dr. Monique Navarrete patient was accepted    [QC]      ED Course User Index  [QC] Samantha Purvis MD       --------------------------------------------- PAST HISTORY ---------------------------------------------  Past Medical History:  has a past medical history of Adenocarcinoma of cecum ([facility] [address] Anemia, Arm numbness, Arthritis, Blood transfusion, Cervical spinal stenosis, Cirrhosis of liver ([facility] [address] [facility] Fatty liver, GERD (gastroesophageal reflux disease), Hyperlipidemia, Hypertension, Low back pain, Lumbar stenosis, Neck pain, Obesity (BMI 30.0-34.9), PAF (paroxysmal atrial fibrillation) ([facility] [address] and Renal artery aneurysm ([facility] [address] Past Surgical History:  has a past surgical history that includes Cervical discectomy (01/19/2007); back surgery; Hysterectomy; fracture surgery; Appendectomy; Breast surgery; Cholecystectomy; Carpal tunnel release; Cataract removal (10/2016); laparoscopy (05/17/2017); Upper gastrointestinal endoscopy (N/A, 11/5/2018); hemicolectomy (N/A, 2/19/2019); Upper gastrointestinal endoscopy (N/A, 12/7/2020); and Upper gastrointestinal endoscopy (12/7/2020). Social History:  reports that she quit smoking about 24 years ago. She has a 30.00 pack-year smoking history.  She has never used smokeless tobacco. She reports current alcohol use. She reports that she does not use drugs. Family History: family history includes Cancer in her brother; Diabetes in her brother, mother, and sister; Heart Disease in her brother; High Blood Pressure in her brother and sister; Stroke in her father. The patients home medications have been reviewed.     Allergies: Benadryl [diphenhydramine], Fish-derived products, and Iodine    -------------------------------------------------- RESULTS -------------------------------------------------    Lab  Results for orders placed or performed during the hospital encounter of 06/21/21   Protime-INR   Result Value Ref Range    Protime 16.4 (H) 9.3 - 12.4 sec    INR 1.5    CBC Auto Differential   Result Value Ref Range    WBC 12.9 (H) 4.5 - 11.5 E9/L    RBC 4.53 3.50 - 5.50 E12/L    Hemoglobin 10.4 (L) 11.5 - 15.5 g/dL    Hematocrit 35.7 34.0 - 48.0 %    MCV 78.8 (L) 80.0 - 99.9 fL    MCH 23.0 (L) 26.0 - 35.0 pg    MCHC 29.1 (L) 32.0 - 34.5 %    RDW 24.1 (H) 11.5 - 15.0 fL    Platelets 944 396 - 437 E9/L    MPV 9.2 7.0 - 12.0 fL    Neutrophils % 75.7 43.0 - 80.0 %    Immature Granulocytes % 0.9 0.0 - 5.0 %    Lymphocytes % 17.4 (L) 20.0 - 42.0 %    Monocytes % 4.3 2.0 - 12.0 %    Eosinophils % 1.3 0.0 - 6.0 %    Basophils % 0.4 0.0 - 2.0 %    Neutrophils Absolute 9.77 (H) 1.80 - 7.30 E9/L    Immature Granulocytes # 0.11 E9/L    Lymphocytes Absolute 2.24 1.50 - 4.00 E9/L    Monocytes Absolute 0.55 0.10 - 0.95 E9/L    Eosinophils Absolute 0.17 0.05 - 0.50 E9/L    Basophils Absolute 0.05 0.00 - 0.20 E9/L    Anisocytosis 2+     Polychromasia 1+     Poikilocytes 1+     Ovalocytes 1+    Troponin   Result Value Ref Range    Troponin, High Sensitivity 47 (H) 0 - 9 ng/L   Brain Natriuretic Peptide   Result Value Ref Range    Pro-BNP 5,183 (H) 0 - 450 pg/mL   Comprehensive Metabolic Panel w/ Reflex to MG   Result Value Ref Range    Sodium 135 132 - 146 mmol/L    Potassium reflex Magnesium 3.2 (L) 3.5 - 5.0 mmol/L    Chloride 92 (L) 98 - 107 mmol/L    CO2 26 22 - 29 mmol/L    Anion Gap 17 (H) 7 - 16 mmol/L    Glucose 132 (H) 74 - 99 mg/dL    BUN 33 (H) 6 - 23 mg/dL    CREATININE 1.2 (H) 0.5 - 1.0 mg/dL    GFR Non-African American 43 >=60 mL/min/1.73    GFR African American 52     Calcium 9.1 8.6 - 10.2 mg/dL    Total Protein 8.0 6.4 - 8.3 g/dL    Albumin 4.2 3.5 - 5.2 g/dL    Total Bilirubin 0.7 0.0 - 1.2 mg/dL    Alkaline Phosphatase 74 35 - 104 U/L    ALT 11 0 - 32 U/L    AST 19 0 - 31 U/L   Magnesium   Result Value Ref Range    Magnesium 2.0 1.6 - 2.6 mg/dL   TSH WITHOUT REFLEX   Result Value Ref Range    TSH 2.250 0.270 - 4.200 uIU/mL   Magnesium   Result Value Ref Range    Magnesium 1.2 (L) 1.6 - 2.6 mg/dL   Troponin   Result Value Ref Range    Troponin, High Sensitivity 43 (H) 0 - 9 ng/L   POCT Glucose   Result Value Ref Range    Meter Glucose 99 74 - 99 mg/dL   EKG 12 Lead   Result Value Ref Range    Ventricular Rate 151 BPM    Atrial Rate 156 BPM    QRS Duration 68 ms    Q-T Interval 314 ms    QTc Calculation (Bazett) 497 ms    R Axis 22 degrees    T Axis -21 degrees       Radiology  XR CHEST PORTABLE   Final Result   1. Nonspecific streaky pulmonary opacities in the lungs, which are stable as   of 05/29/2021. They may be infectious/inflammatory in etiology or related to   chronic scarring. EKG: This EKG is signed and interpreted by me. Rate: 151  Rhythm: Atrial fibrillation  Interpretation: atrial fibrillation (chronic)  Comparison: was normal and no previous EKG available    ------------------------- NURSING NOTES AND VITALS REVIEWED ---------------------------  Date / Time Roomed:  6/21/2021  3:23 PM  ED Bed Assignment:  [id number]    The nursing notes within the ED encounter and vital signs as below have been reviewed.    Patient Vitals for the past 24 hrs:   BP Temp Temp src Pulse Resp SpO2 Height Weight   06/21/21 2307 109/77 98.1 °F (36.7 °C) Temporal 143 20 95 % -- -- 06/21/21 1950 120/86 98.1 °F (36.7 °C) Temporal 145 23 95 % 5' 3\" (1.6 m) 182 lb 6.4 oz (82.7 kg)   06/21/21 1900 121/72 -- -- 142 22 97 % -- --   06/21/21 1847 109/72 -- -- 144 22 96 % -- --   06/21/21 1827 105/79 -- -- 138 20 97 % -- --   06/21/21 1822 104/67 -- -- 140 21 96 % -- --   06/21/21 1726 (!) 121/90 -- -- 153 24 97 % -- --   06/21/21 1722 123/86 -- -- 148 25 97 % -- --   06/21/21 1656 111/74 -- -- 152 20 98 % -- --   06/21/21 1651 114/87 -- -- 146 24 98 % -- --   06/21/21 1636 104/81 -- -- 149 23 98 % -- --   06/21/21 1626 116/89 -- -- 152 20 98 % -- --   06/21/21 1601 117/73 -- -- 152 23 98 % -- --   06/21/21 1520 (!) 121/97 -- -- -- 16 -- 5' 3\" (1.6 m) 180 lb (81.6 kg)   06/21/21 1517 -- 97.5 °F (36.4 °C) -- 167 -- 94 % -- --       Oxygen Saturation Interpretation: Normal    ------------------------------------------ PROGRESS NOTES ------------------------------------------    Re-evaluation(s):  Patients symptoms are improving  Repeat physical examination is not changed    I have spoken with the patient and discussed todays results, in addition to providing specific details for the plan of care and counseling regarding the diagnosis and prognosis. Their questions are answered at this time and they are agreeable with the plan.    --------------------------------- ADDITIONAL PROVIDER NOTES ---------------------------------  Consultations:  Spoke with Dr. Niki Mcnulty,  They will provide consultation. Spoke with hospitalist and patient was accepted. This patient's ED course included: a personal history and physicial examination, multiple bedside re-evaluations, IV medications, cardiac monitoring and continuous pulse oximetry    This patient has remained hemodynamically stable, improved and been closely monitored during their ED course.     Please note that the withdrawal or failure to initiate urgent interventions for this patient would likely result in a life threatening deterioration or permanent disability. Accordingly this patient received 43 minutes of critical care time, excluding separately billable procedures. Clinical Impression  1. Atrial fibrillation with RVR (HCC)    2. Chest pain, unspecified type        Disposition  Patient's disposition: Admit to telemetry  Patient's condition is stable    6/21/21, 3:34 PM EDT.     This note is prepared by Leander Eid MD -PGY- 2             Leander Eid MD  Resident  06/22/21 3299

## 2021-06-21 NOTE — PROGRESS NOTES
Chief Complaint   Patient presents with    Atrial Fibrillation    Congestive Heart Failure       Patient Active Problem List    Diagnosis Date Noted    Essential hypertension 10/14/2012     Priority: High    Mixed hyperlipidemia 10/14/2012     Priority: High     Overview Note:     replace inactive diagnosis      DM (diabetes mellitus) ([facility] 10/14/2012     Priority: High    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) ([facility] 06/21/2021    Chronic heart failure with preserved ejection fraction ([facility] 06/21/2021    Malignant neoplasm of cecum ([facility] 02/19/2019    Anemia 11/02/2018    PAF (paroxysmal atrial fibrillation) (HCC)     Renal artery aneurysm ([facility] 07/15/2015    Spinal stenosis in cervical region 09/19/2012    Spinal stenosis, lumbar region, without neurogenic claudication 09/19/2012       Current Outpatient Medications   Medication Sig Dispense Refill    amLODIPine (NORVASC) 10 MG tablet 10 mg daily       Biotin 5000 MCG TABS Take by mouth daily      calcium carbonate (OSCAL) 500 MG TABS tablet Take 500 mg by mouth daily      chlordiazePOXIDE-clidinium (LIBRAX) 5-2.5 MG per capsule Take 1 capsule by mouth as needed.  metoprolol tartrate (LOPRESSOR) 25 MG tablet Take 25 mg by mouth 2 times daily      pramipexole (MIRAPEX) 0.5 MG tablet Take 0.5 mg by mouth 3 times daily      spironolactone (ALDACTONE) 25 MG tablet Take 1 tablet by mouth daily 30 tablet 1    traMADol (ULTRAM) 50 MG tablet Take 1 tablet by mouth every 8 hours as needed for Pain for up to 60 days. Take lowest dose possible to manage pain 90 tablet 1    ferrous sulfate (IRON 325) 325 (65 Fe) MG tablet Take 1 tablet by mouth daily (with breakfast) 30 tablet 3    miconazole (MICOTIN) 2 % powder Apply topically 2 times daily.  45 g 1    bumetanide (BUMEX) 2 MG tablet Take 1 tablet by mouth daily 30 tablet 3    potassium chloride (KLOR-CON M) 10 MEQ extended release tablet Take 1 tablet by mouth 2 times daily 60 tablet 3    glimepiride (AMARYL) 2 MG tablet Take 2 mg by mouth every morning (before breakfast)       DULoxetine (CYMBALTA) 30 MG extended release capsule Take 1 capsule by mouth daily 30 capsule 0    oxybutynin (DITROPAN) 5 MG tablet TAKE 1 TABLET BY MOUTH  DAILY WITH BREAKFAST 90 tablet 0    hydrOXYzine (ATARAX) 10 MG tablet Take 1 tablet by mouth nightly 90 tablet 0    simvastatin (ZOCOR) 20 MG tablet TAKE 1 TABLET BY MOUTH  DAILY WITH SUPPER 90 tablet 0    pantoprazole (PROTONIX) 40 MG tablet TAKE ONE TABLET BY MOUTH TWO TIMES A DAY BEFORE MEALS 60 tablet 2    rivaroxaban (XARELTO) 20 MG TABS tablet Take 1 tablet by mouth daily (with breakfast) 90 tablet 1    metFORMIN (GLUCOPHAGE) 500 MG tablet TAKE 2 TABLETS BY MOUTH  TWICE A  tablet 2    dicyclomine (BENTYL) 10 MG capsule TAKE 1 CAPSULE BY MOUTH  DAILY AS NEEDED 90 capsule 1     No current facility-administered medications for this visit.         Allergies   Allergen Reactions    Benadryl [Diphenhydramine]      hyper    Fish-Derived Products Rash    Iodine Rash       Vitals:    21 1312   BP: (!) 142/84   Pulse: 155   Resp: 16   Weight: 188 lb 3.2 oz (85.4 kg)   Height: 5' 3\" (1.6 m)       Social History     Socioeconomic History    Marital status:      Spouse name: Not on file    Number of children: Not on file    Years of education: Not on file    Highest education level: Not on file   Occupational History    Not on file   Tobacco Use    Smoking status: Former Smoker     Packs/day: 2.00     Years: 15.00     Pack years: 30.00     Quit date: 3/1/1997     Years since quittin.3    Smokeless tobacco: Never Used   Vaping Use    Vaping Use: Never used   Substance and Sexual Activity    Alcohol use: Yes     Comment: Holidays    Drug use: No    Sexual activity: Not on file   Other Topics Concern    Not on file   Social History Narrative    Not on file     Social Determinants of Health     Financial Resource Strain:     Difficulty of Paying Living Expenses:    Food Insecurity:     Worried About Running Out of Food in the Last Year:     [address] in the Last Year:    Transportation Needs:     Lack of Transportation (Medical):  Lack of Transportation (Non-Medical):    Physical Activity:     Days of Exercise per Week:     Minutes of Exercise per Session:    Stress:     Feeling of Stress :    Social Connections:     Frequency of Communication with Friends and Family:     Frequency of Social Gatherings with Friends and Family:     Attends Restoration Services:     Active Member of Clubs or Organizations:     Attends Club or Organization Meetings:     Marital Status:    Intimate Partner Violence:     Fear of Current or Ex-Partner:     Emotionally Abused:     Physically Abused:     Sexually Abused:        Family History   Problem Relation Age of Onset    Diabetes Mother     Stroke Father     Diabetes Sister     High Blood Pressure Sister     Diabetes Brother     High Blood Pressure Brother     Cancer Brother     Heart Disease Brother          SUBJECTIVE: Kylie Ybarra presents to the office today for re-evaluation of cardiac diagnoses. DR Enriquez Heads PATIENT. She complains of dyspnea, fatigue and orthopnea and denies   chest pain, lower extremity edema, palpitations and syncope. She ran out of metolazone, but is taking her loop diuretic. Stopped her anticoagulation in anticipation of dental work this Saturday. Review of Systems:   Heart: as above   Lungs: as above   Eyes: denies changes in vision or discharge. Ears: denies changes in hearing or pain. Nose: denies epistaxis or masses   Throat: denies sore throat or trouble swallowing. Neuro: denies numbness, tingling, tremors. Skin: denies rashes or itching. : denies hematuria, dysuria   GI: denies vomiting, diarrhea   Psych: denies mood changed, anxiety, depression. all others negative.     Physical Exam   BP (!) 142/84   Pulse 155   Resp 16   Ht 5' 3\" (1.6 m)   Wt 188 lb 3.2 oz (85.4 kg)   LMP  (LMP Unknown)   BMI 33.34 kg/m²   Constitutional: Oriented to person, place, and time. Well-developed and well-nourished. No distress. Head: Normocephalic and atraumatic. Eyes: EOM are normal. Pupils are equal, round, and reactive to light. Neck: Normal range of motion. Neck supple. No hepatojugular reflux and no JVD present. Carotid bruit is not present. No tracheal deviation present. No thyromegaly present. Cardiovascular: rapid rate, irregular rhythm, normal heart sounds and intact distal pulses. Exam reveals no gallop and no friction rub. No murmur heard. Pulmonary/Chest: Effort normal and breath sounds normal. No respiratory distress. No wheezes. No rales. No tenderness. Abdominal: Soft. Bowel sounds are normal. No distension and no mass. No tenderness. No rebound and no guarding. Musculoskeletal: Normal range of motion. No edema and no tenderness. Neurological: Alert and oriented to person, place, and time. Skin: Skin is warm and dry. No rash noted. Not diaphoretic. No erythema. Psychiatric: Normal mood and affect. Behavior is normal.     EKG:  atrial fibrillation, rate 155 BPM, NT, these findings are new since last EKG. ASSESSMENT AND PLAN:  Patient Active Problem List   Diagnosis    Spinal stenosis in cervical region    Spinal stenosis, lumbar region, without neurogenic claudication    Essential hypertension    Mixed hyperlipidemia    DM (diabetes mellitus) ([address]    Renal artery aneurysm ([address]    PAF (paroxysmal atrial fibrillation) (HCC)    Anemia    Malignant neoplasm of cecum (HCC)    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) ([address]    Chronic heart failure with preserved ejection fraction [facility])     Patient with recent hospital stay for SOB, improved with IV diuresis, with drop in BNP from 2018 to 603.   Imaging never did show pulmonary congestion, but ascites noted on CT scan. Seemingly in NSR during the admission and on initial OP f/u with dr [place] Miranda 6/15  Now dyspneic again, in AF with RVR, off anticoagulation  Have advised her to present to ED for admission for rate control, possible ROGELIO guided DCCV (missed doses of NOAC) if no spontaneous conversion to sinus  Consider AAD drug therapy for AF  Can investigate the need for possible paracentesis as well.      Madhuri Pierce M.D  [facility]

## 2021-06-21 NOTE — LETTER
Beneficiary Notification Letter  BPCI Advanced     Your Doctor or [address],    We wanted to let you know that your health care provider, [address] [place] [address] Matthew, has volunteered to take part in our [facility] for [facility] Medicaid Services (CMS) Bundled Payments for [facility] (BPCI Advanced). This doesnt change your Medicare rights or benefits and you dont need to do anything. What are bundled payments? A bundled payment combines, or bundles together, payments that Medicare makes to your health care providers for the many different kinds of medical services you might get in a specific time period. In BPCI Advanced, this time period could include a hospital inpatient stay or outpatient procedure, plus 90 days. Why would Medicare bundle payments? Bundled payments are thought of as a value-based way to pay because health care providers are responsible for both the quality and cost of medical care they give. This is a relatively new way of paying health care providers compared to thefee-for-service way Medicare has traditionally paid, where providers are paid separately for each service they provide. Bundled payments encourage these providers to work together to provide better, more coordinated care during your hospital stay, or outpatient procedure, and through your recovery. What does BPCI Advance mean for me? Youre more likely to get even better care when hospitals, doctors, and other health care providers work together. In BPCI Advanced, hospitals, doctors, and other health care providers may be rewarded for providing better, more coordinated health care. Medicare will watch BPCI Advanced participants closely to make sure that you and other patients keep getting efficient, high quality care. What do I need to know about BPCI Advanced?   Whats most important for you to know is that your Medicare rights and benefits wont change because your health care provider is participating in [facility]. Medicare will keep covering all of your medically necessary services. Even though Medicare will pay your doctor in a different way under BPCI Advanced, how much you have to pay wont change. Health care providers and suppliers who are enrolled in Medicare will submit their Medicare claims like they always have. Youll have all the same Medicare rights and protections, including the right to choose which hospital, doctor, or other health care provider you see. If you dont want to get care from a health care provider whos participating in [address], then youll have to choose a different health care provider whos not participating in the Model. How can I give feedback about my health care? Medicare might ask you to take a voluntary survey about the services and care you received from [address] [place] [address] during your hospital stay or outpatient procedure and for a specific period of time afterwards. You can decide whether you want to take the voluntary survey, but if you do, itll help Medicare make BPCI Advanced and the care of other Medicare patients better. If you have concerns or complaints about your care, you can:   · Talk to your doctor or health care provider. · Contact your Beneficiary and Family Centered Care Quality Improvement   Organization [facility]). You can get your BF[employer]-QIOs phone number  at  Medicare.gov/contacts or by calling 1-800-MEDICARE. TTY users can call  8-888.915.2962. Where can I learn more about BPCI Advanced? Learn more about BPCI Advanced at https://innovation.cms.gov/initiatives/bpci-advanced/:  · A list of all the hospitals and physician group practices in the country participating in [address] [place] [address]. · All of the inpatient and outpatient Clinical Episodes that are currently included under BPCI Advanced.  A Clinical Episode is a grouping of medical conditions or diagnoses that are included in the [address].

## 2021-06-22 ENCOUNTER — APPOINTMENT (OUTPATIENT)
Dept: ULTRASOUND IMAGING | Age: 81
DRG: 286 | End: 2021-06-22
Payer: MEDICARE

## 2021-06-22 LAB
ALBUMIN SERPL-MCNC: 4 G/DL (ref 3.5–5.2)
ALP BLD-CCNC: 68 U/L (ref 35–104)
ALT SERPL-CCNC: 9 U/L (ref 0–32)
ANION GAP SERPL CALCULATED.3IONS-SCNC: 18 MMOL/L (ref 7–16)
ANISOCYTOSIS: ABNORMAL
AST SERPL-CCNC: 18 U/L (ref 0–31)
BASOPHILS ABSOLUTE: 0 E9/L (ref 0–0.2)
BASOPHILS RELATIVE PERCENT: 0.5 % (ref 0–2)
BILIRUB SERPL-MCNC: 1.2 MG/DL (ref 0–1.2)
BUN BLDV-MCNC: 26 MG/DL (ref 6–23)
CALCIUM SERPL-MCNC: 8.8 MG/DL (ref 8.6–10.2)
CHLORIDE BLD-SCNC: 96 MMOL/L (ref 98–107)
CK MB: 4.2 NG/ML (ref 0–4.3)
CO2: 25 MMOL/L (ref 22–29)
CREAT SERPL-MCNC: 1 MG/DL (ref 0.5–1)
EKG ATRIAL RATE: 156 BPM
EKG Q-T INTERVAL: 314 MS
EKG QRS DURATION: 68 MS
EKG QTC CALCULATION (BAZETT): 497 MS
EKG R AXIS: 22 DEGREES
EKG T AXIS: -21 DEGREES
EKG VENTRICULAR RATE: 151 BPM
EOSINOPHILS ABSOLUTE: 0.4 E9/L (ref 0.05–0.5)
EOSINOPHILS RELATIVE PERCENT: 3.5 % (ref 0–6)
GFR AFRICAN AMERICAN: >60
GFR NON-AFRICAN AMERICAN: 53 ML/MIN/1.73
GLUCOSE BLD-MCNC: 139 MG/DL (ref 74–99)
HCT VFR BLD CALC: 32.7 % (ref 34–48)
HEMOGLOBIN: 9.7 G/DL (ref 11.5–15.5)
HYPOCHROMIA: ABNORMAL
LYMPHOCYTES ABSOLUTE: 2.05 E9/L (ref 1.5–4)
LYMPHOCYTES RELATIVE PERCENT: 18.3 % (ref 20–42)
MAGNESIUM: 1.6 MG/DL (ref 1.6–2.6)
MCH RBC QN AUTO: 23.7 PG (ref 26–35)
MCHC RBC AUTO-ENTMCNC: 29.7 % (ref 32–34.5)
MCV RBC AUTO: 79.8 FL (ref 80–99.9)
METER GLUCOSE: 133 MG/DL (ref 74–99)
METER GLUCOSE: 134 MG/DL (ref 74–99)
METER GLUCOSE: 187 MG/DL (ref 74–99)
METER GLUCOSE: 191 MG/DL (ref 74–99)
MONOCYTES ABSOLUTE: 0.34 E9/L (ref 0.1–0.95)
MONOCYTES RELATIVE PERCENT: 2.6 % (ref 2–12)
NEUTROPHILS ABSOLUTE: 8.66 E9/L (ref 1.8–7.3)
NEUTROPHILS RELATIVE PERCENT: 75.7 % (ref 43–80)
OVALOCYTES: ABNORMAL
PDW BLD-RTO: 23.9 FL (ref 11.5–15)
PLATELET # BLD: 329 E9/L (ref 130–450)
PMV BLD AUTO: 9.7 FL (ref 7–12)
POIKILOCYTES: ABNORMAL
POLYCHROMASIA: ABNORMAL
POTASSIUM SERPL-SCNC: 3.2 MMOL/L (ref 3.5–5)
RBC # BLD: 4.1 E12/L (ref 3.5–5.5)
SODIUM BLD-SCNC: 139 MMOL/L (ref 132–146)
TARGET CELLS: ABNORMAL
TOTAL CK: 54 U/L (ref 20–180)
TOTAL PROTEIN: 7.6 G/DL (ref 6.4–8.3)
TROPONIN, HIGH SENSITIVITY: 52 NG/L (ref 0–9)
WBC # BLD: 11.4 E9/L (ref 4.5–11.5)

## 2021-06-22 PROCEDURE — 82962 GLUCOSE BLOOD TEST: CPT

## 2021-06-22 PROCEDURE — 82550 ASSAY OF CK (CPK): CPT

## 2021-06-22 PROCEDURE — 2500000003 HC RX 250 WO HCPCS: Performed by: NURSE PRACTITIONER

## 2021-06-22 PROCEDURE — 2500000003 HC RX 250 WO HCPCS: Performed by: STUDENT IN AN ORGANIZED HEALTH CARE EDUCATION/TRAINING PROGRAM

## 2021-06-22 PROCEDURE — 82553 CREATINE MB FRACTION: CPT

## 2021-06-22 PROCEDURE — 83735 ASSAY OF MAGNESIUM: CPT

## 2021-06-22 PROCEDURE — 2580000003 HC RX 258: Performed by: INTERNAL MEDICINE

## 2021-06-22 PROCEDURE — 85025 COMPLETE CBC W/AUTO DIFF WBC: CPT

## 2021-06-22 PROCEDURE — 6370000000 HC RX 637 (ALT 250 FOR IP): Performed by: INTERNAL MEDICINE

## 2021-06-22 PROCEDURE — 36415 COLL VENOUS BLD VENIPUNCTURE: CPT

## 2021-06-22 PROCEDURE — 2140000000 HC CCU INTERMEDIATE R&B

## 2021-06-22 PROCEDURE — 6360000002 HC RX W HCPCS: Performed by: NURSE PRACTITIONER

## 2021-06-22 PROCEDURE — 99223 1ST HOSP IP/OBS HIGH 75: CPT | Performed by: INTERNAL MEDICINE

## 2021-06-22 PROCEDURE — 93010 ELECTROCARDIOGRAM REPORT: CPT | Performed by: INTERNAL MEDICINE

## 2021-06-22 PROCEDURE — 84484 ASSAY OF TROPONIN QUANT: CPT

## 2021-06-22 PROCEDURE — APPSS180 APP SPLIT SHARED TIME > 60 MINUTES: Performed by: NURSE PRACTITIONER

## 2021-06-22 PROCEDURE — 76705 ECHO EXAM OF ABDOMEN: CPT

## 2021-06-22 PROCEDURE — 80053 COMPREHEN METABOLIC PANEL: CPT

## 2021-06-22 RX ORDER — BUMETANIDE 0.25 MG/ML
1 INJECTION, SOLUTION INTRAMUSCULAR; INTRAVENOUS EVERY 12 HOURS
Status: DISCONTINUED | OUTPATIENT
Start: 2021-06-22 | End: 2021-06-24

## 2021-06-22 RX ORDER — POTASSIUM CHLORIDE 20 MEQ/1
20 TABLET, EXTENDED RELEASE ORAL ONCE
Status: COMPLETED | OUTPATIENT
Start: 2021-06-22 | End: 2021-06-22

## 2021-06-22 RX ORDER — LEVALBUTEROL INHALATION SOLUTION 0.63 MG/3ML
0.63 SOLUTION RESPIRATORY (INHALATION) EVERY 8 HOURS PRN
Status: DISCONTINUED | OUTPATIENT
Start: 2021-06-22 | End: 2021-06-30 | Stop reason: HOSPADM

## 2021-06-22 RX ADMIN — PANTOPRAZOLE SODIUM 40 MG: 40 TABLET, DELAYED RELEASE ORAL at 06:27

## 2021-06-22 RX ADMIN — INSULIN LISPRO 1 UNITS: 100 INJECTION, SOLUTION INTRAVENOUS; SUBCUTANEOUS at 16:40

## 2021-06-22 RX ADMIN — TRAMADOL HYDROCHLORIDE 50 MG: 50 TABLET, FILM COATED ORAL at 12:55

## 2021-06-22 RX ADMIN — DEXTROSE MONOHYDRATE 15 MG/HR: 50 INJECTION, SOLUTION INTRAVENOUS at 05:20

## 2021-06-22 RX ADMIN — BUMETANIDE 1 MG: 0.25 INJECTION INTRAMUSCULAR; INTRAVENOUS at 09:36

## 2021-06-22 RX ADMIN — SODIUM CHLORIDE, PRESERVATIVE FREE 10 ML: 5 INJECTION INTRAVENOUS at 20:41

## 2021-06-22 RX ADMIN — POTASSIUM CHLORIDE 20 MEQ: 1500 TABLET, EXTENDED RELEASE ORAL at 07:52

## 2021-06-22 RX ADMIN — PRAMIPEXOLE DIHYDROCHLORIDE 0.5 MG: 0.25 TABLET ORAL at 07:52

## 2021-06-22 RX ADMIN — DEXTROSE MONOHYDRATE 15 MG/HR: 50 INJECTION, SOLUTION INTRAVENOUS at 13:03

## 2021-06-22 RX ADMIN — DEXTROSE MONOHYDRATE 15 MG/HR: 50 INJECTION, SOLUTION INTRAVENOUS at 19:14

## 2021-06-22 RX ADMIN — FERROUS SULFATE TAB 325 MG (65 MG ELEMENTAL FE) 325 MG: 325 (65 FE) TAB at 07:52

## 2021-06-22 RX ADMIN — ENOXAPARIN SODIUM 80 MG: 80 INJECTION SUBCUTANEOUS at 08:54

## 2021-06-22 RX ADMIN — TRAMADOL HYDROCHLORIDE 50 MG: 50 TABLET, FILM COATED ORAL at 22:08

## 2021-06-22 RX ADMIN — INSULIN LISPRO 1 UNITS: 100 INJECTION, SOLUTION INTRAVENOUS; SUBCUTANEOUS at 11:36

## 2021-06-22 RX ADMIN — PRAMIPEXOLE DIHYDROCHLORIDE 0.5 MG: 0.25 TABLET ORAL at 20:40

## 2021-06-22 RX ADMIN — SPIRONOLACTONE 25 MG: 25 TABLET ORAL at 07:52

## 2021-06-22 RX ADMIN — BUMETANIDE 1 MG: 0.25 INJECTION INTRAMUSCULAR; INTRAVENOUS at 21:39

## 2021-06-22 RX ADMIN — POTASSIUM CHLORIDE 20 MEQ: 1500 TABLET, EXTENDED RELEASE ORAL at 20:40

## 2021-06-22 RX ADMIN — HYDROXYZINE HYDROCHLORIDE 10 MG: 10 TABLET ORAL at 20:39

## 2021-06-22 RX ADMIN — AMLODIPINE BESYLATE 10 MG: 10 TABLET ORAL at 07:52

## 2021-06-22 RX ADMIN — POTASSIUM CHLORIDE 20 MEQ: 1500 TABLET, EXTENDED RELEASE ORAL at 15:54

## 2021-06-22 RX ADMIN — ENOXAPARIN SODIUM 80 MG: 80 INJECTION SUBCUTANEOUS at 20:39

## 2021-06-22 RX ADMIN — PRAMIPEXOLE DIHYDROCHLORIDE 0.5 MG: 0.25 TABLET ORAL at 12:56

## 2021-06-22 RX ADMIN — TRAMADOL HYDROCHLORIDE 50 MG: 50 TABLET, FILM COATED ORAL at 06:28

## 2021-06-22 ASSESSMENT — PAIN SCALES - GENERAL
PAINLEVEL_OUTOF10: 0
PAINLEVEL_OUTOF10: 0
PAINLEVEL_OUTOF10: 8
PAINLEVEL_OUTOF10: 0
PAINLEVEL_OUTOF10: 0
PAINLEVEL_OUTOF10: 9
PAINLEVEL_OUTOF10: 10
PAINLEVEL_OUTOF10: 0
PAINLEVEL_OUTOF10: 0

## 2021-06-22 ASSESSMENT — PAIN DESCRIPTION - PROGRESSION: CLINICAL_PROGRESSION: GRADUALLY WORSENING

## 2021-06-22 ASSESSMENT — PAIN DESCRIPTION - LOCATION: LOCATION: FOOT

## 2021-06-22 ASSESSMENT — PAIN DESCRIPTION - DESCRIPTORS: DESCRIPTORS: ACHING;DISCOMFORT;SORE

## 2021-06-22 ASSESSMENT — PAIN DESCRIPTION - FREQUENCY: FREQUENCY: INTERMITTENT

## 2021-06-22 ASSESSMENT — PAIN DESCRIPTION - PAIN TYPE: TYPE: CHRONIC PAIN

## 2021-06-22 ASSESSMENT — PAIN - FUNCTIONAL ASSESSMENT: PAIN_FUNCTIONAL_ASSESSMENT: PREVENTS OR INTERFERES SOME ACTIVE ACTIVITIES AND ADLS

## 2021-06-22 ASSESSMENT — PAIN DESCRIPTION - ORIENTATION: ORIENTATION: RIGHT;LEFT

## 2021-06-22 ASSESSMENT — PAIN DESCRIPTION - ONSET: ONSET: GRADUAL

## 2021-06-22 NOTE — PATIENT CARE CONFERENCE
P Quality Flow/Interdisciplinary Rounds Progress Note        Quality Flow Rounds held on June 22, 2021    Disciplines Attending:  Bedside Nurse, ,  and Nursing Unit Alondra Nascimento was admitted on 6/21/2021  3:23 PM    Anticipated Discharge Date:  Expected Discharge Date: 06/24/21    Disposition:    Valentin Score:  Valentin Scale Score: 20    Readmission Risk              Risk of Unplanned Readmission:  28           Discussed patient goal for the day, patient clinical progression, and barriers to discharge.   The following Goal(s) of the Day/Commitment(s) have been identified:  Labs - Report Results      Nicolás Jansen RN  June 22, 2021

## 2021-06-22 NOTE — CONSULTS
I independently interviewed and examined the patient. I have reviewed the above documentation completed by the ZAHRA. Please see my additional contributions to the HPI, physical exam, and assessment / medical decision making. Reason for consult: A. fib with RVR    She was previously known to Dr. Stephany Milan service at [facility] ([facility] cardiology    HPI, pH, FH, SH, and ROS were reviewed and agree with above     is a 19-year-old  female with history of hyperlipidemia, hypertension, type 2 diabetes, cirrhosis of the liver secondary to [place], HFpEF, paroxysmal atrial fibrillation on chronic anticoagulation therapy with Xarelto, spinal stenosis iodine allergy and remote history of tobacco use and has been off Xarelto for the past few days for dental procedure presented to the hospital with a progressively increasing dyspnea for couple of weeks and also A. fib with RVR. She was evaluated by Dr. Abbe Dominique in the office and was noted to have A. fib with RVR with heart rate in the 120s with orthopnea, PND along with abdominal bloating. She is also complaining of constant left-sided chest pain since yesterday pain is mainly localized left-sided chest, 9 out of 10 and gets better with the pain medication. Pain gets worse on deep breathing and also on pressing her left side of the chest.  She denies any sweating, nausea, dizziness lightheadedness syncope or presyncope. She did notice increased swelling in her legs did not notice any weight gain but hospital scale showed 10 pound weight gain. She denies any abdominal bloating, early satiety but did notice distention of the abdomen and she thinks she has more fluid in her belly.   She never had a acetic fluid tapped in the past.      Review of Systems:  Cardiac: As per HPI  General: No fever, chills  Pulmonary: As per HPI  GI: No nausea, vomiting  Musculoskeletal: QUINTERO x 4, no focal motor deficits  Skin: Intact, no rashes  Neuro/Psych: No headache or seizures    Physical Exam:  BP (!) 113/56   Pulse 119   Temp 97.2 °F (36.2 °C) (Temporal)   Resp 18   Ht 5' 3\" (1.6 m)   Wt 182 lb 6.4 oz (82.7 kg)   LMP  (LMP Unknown)   SpO2 94%   BMI 32.31 kg/m²   Appearance: Awake, alert, no acute respiratory distress  Skin: Intact, no rash  Head: Normocephalic, atraumatic  ENMT: MMM, no rhinorrhea  Neck: Supple, no carotid bruits, JVD is elevated  Lungs: She has bibasilar Rales. Cardiac: Irregularly irregular rate and rhythm tachycardic +S1S2, no murmurs apparent  Abdomen: Soft, +bowel sounds  Extremities: Moves all extremities x 4, trace lower extremity edema  Neurologic: No focal motor deficits apparent, normal mood and affect    Telemetry findings reviewed: Atrial fibrillation with RVR with heart rate in the 120s, no new tachy/bradyarrhythmias overnight    EKG: Atrial fibrillation with RVR nonspecific ST-T changes, low voltage QRS complex, abnormal EKG. Vitals and labs were reviewed: Blood pressure 113/56 heart rate of 119, O2 sats 94% 2 L    Labs-potassium 3.2 BUN/creatinine 33/1.2, proBNP 5183, high-sensitivity troponin 43 liver function normal.  H&H 9.7/30.7. TSH 2.2.    1. TTE 01/02/2021 (Dr. Randolph Dillon): Left ventricular internal dimensions were normal in diastole and systole. Borderline concentric left ventricular hypertrophy. No regional WMA. Normal LVEF. Doppler evidence of stage II diastolic dysfunction. The left atrium is severely dilated. Mild mitral annular calcification. Mild mitral regurgitation is present. The aortic valve appears mildly sclerotic. Mild TR. Pulmonary hypertension is mild. There is a trivial posterior pericardial effusion noted. 2. Cardiac cath ~2012  No. significant abnormality per patient report NA.     Assessment:  · Paroxysmal atrial fibrillation with RVR  · [id number] score-5, on Xarelto, currently off Xarelto for few days  · Acute on chronic HFpEF  · Hypertension, well controlled  · Hyperlipidemia  · Type 2 diabetes  · History of iodine allergy  · Remote history of tobacco use  · Cirrhosis of liver secondary to Kaila Borrego  · History of diagnoses of the colon status post right hemicolectomy    Plan:   · Start her on Bumex 1 mg IV every 12 hours with close monitoring of renal functions and electrolytes. · Strict input output charting  · Cardiac diet and restrict daily salt intake less than 2 g a day. · Continue Lovenox for now and will consider changing to oral anticoagulation therapy with Xarelto after cardioversion. · Keep n.p.o. after midnight scheduled for ROGELIO guided cardioversion in a.m.'s. She needs ROGELIO due to high [id number] score and also interruptions in her anticoagulation therapy. · Continue rest of the home medications. Thank you for consulting us and will be following with you for any further recommendations. Leonor Lindsey MD, Sary Diehl.   [facility]) Cardiology

## 2021-06-22 NOTE — CARE COORDINATION
Transition of care: Cardio and pulmonary consulted. Iv diltiazem cont. Met with pt and pt's , Anika Vega, in room. Pt lives with her  in a 1 story home. Independent with ADLs. Anika Vega provides transportation. DME- [employer],cane and a glucometer. Discharge plan is to return home when medically ready. Voiced no needs at present. PCP is Dr. Jaylon Mcdonnell and pharmacy is [facility] in [place].  Sw/cm will follow

## 2021-06-22 NOTE — CONSULTS
headache or seizures     Physical Exam:  BP (!) 113/56   Pulse 119   Temp 97.2 °F (36.2 °C) (Temporal)   Resp 18   Ht 5' 3\" (1.6 m)   Wt 182 lb 6.4 oz (82.7 kg)   LMP  (LMP Unknown)   SpO2 94%   BMI 32.31 kg/m²   Appearance: Awake, alert, no acute respiratory distress  Skin: Intact, no rash  Head: Normocephalic, atraumatic  ENMT: MMM, no rhinorrhea  Neck: Supple, no carotid bruits, JVD is elevated  Lungs: She has bibasilar Rales. Cardiac: Irregularly irregular rate and rhythm tachycardic +S1S2, no murmurs apparent  Abdomen: Soft, +bowel sounds  Extremities: Moves all extremities x 4, trace lower extremity edema  Neurologic: No focal motor deficits apparent, normal mood and affect     Telemetry findings reviewed: Atrial fibrillation with RVR with heart rate in the 120s, no new tachy/bradyarrhythmias overnight     EKG: Atrial fibrillation with RVR nonspecific ST-T changes, low voltage QRS complex, abnormal EKG.    Vitals and labs were reviewed: Blood pressure 113/56 heart rate of 119, O2 sats 94% 2 L     Labs-potassium 3.2 BUN/creatinine 33/1.2, proBNP 5183, high-sensitivity troponin 43 liver function normal.  H&H 9.7/30.7. TSH 2.2.     1.  TTE 01/02/2021 (Dr. Edwards Right ventricular internal dimensions were normal in diastole and systole.  Borderline concentric left ventricular hypertrophy.  No regional WMA.  Normal LVEF.  Doppler evidence of stage II diastolic dysfunction.  The left atrium is severely dilated.  Mild mitral annular calcification.  Mild mitral regurgitation is present.  The aortic valve appears mildly sclerotic.  Mild TR.  Pulmonary hypertension is mild.  There is a trivial posterior pericardial effusion noted.     2. Cardiac cath ~2012  No. significant abnormality per patient report NA.     Assessment:  · Paroxysmal atrial fibrillation with RVR  · [id number] score-5, on Xarelto, currently off Xarelto for few days  · Acute on chronic HFpEF  · Hypertension, well controlled  · Hyperlipidemia  · Type 2 diabetes  · History of iodine allergy  · Remote history of tobacco use  · Cirrhosis of liver secondary to [facility]  · History of diagnoses of the colon status post right hemicolectomy     Plan:   · Start her on Bumex 1 mg IV every 12 hours with close monitoring of renal functions and electrolytes. · Strict input output charting  · Cardiac diet and restrict daily salt intake less than 2 g a day. · Continue Lovenox for now and will consider changing to oral anticoagulation therapy with Xarelto after cardioversion. · Keep n.p.o. after midnight scheduled for ROGELIO guided cardioversion in a.m.'s. She needs ROGELIO due to high [id number] score and also interruptions in her anticoagulation therapy. · Continue rest of the home medications.        Thank you for consulting us and will be following with you for any further recommendations. Brenna Espinal MD, [facility]. [facility]) Cardiology                      Inpatient Cardiology Consultation      Reason for Consult:  Atrial Fibrillation with RVR     Consulting Physician: Dr. Aurelia Woodard     Requesting Physician:  Dr. Jovanni Chandler    Date of Consultation: 6/22/2021    HISTORY OF PRESENT ILLNESS:   Ms. Tesfaye Purvis is an 80year old  female who follows with Dr. Yarelis Lang. She was most recently seen in the office with Dr. Kristina Gerard on 06/21/2021. 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 Ms. Tesfaye Purvis presented to [facility] ED on 06/21/2021 with complaints of chest pain, epigastric pain, and dyspnea. She states that prior to presentation she had taken herself off Xarelto for the past 2 days as she was scheduled to have teeth extractions. She states over the course of the past several weeks she has been having continuous dyspnea that is worsened with exertion.   She states that she is also been having orthopnea, PND over the course of the past month with associated edema to BLE. She denies change in diet. States compliance with her medications to include Lasix 20 mg twice daily. She states that she has been sleeping upright in a reclining chair over the past month. She states that on 06/21/2021 she had sudden onset of midsternal and epigastric pain. She states that these pains have been continuous and unchanged with exertion or with eating or anorexia. She states that prior to presentation she had feelings of palpitations and her heart racing without associated dizziness. She was found to have Atrial Fibrillation with RVR during her office visit with Dr. Pastora Toledo, subsequently she was instructed to come to the ED for further management. Upon arrival to the ED her VS were 97.2-748-/84-94 on RA. EKG Atrial Fibrillation with RVR. INR 1.5. WBC 12.9.  H&H 10.4/35. 7. proBNP 5183. Potassium 3.2. Troponin of 47. CXR unremarkable. Magnesium 2. Her case was discussed with Dr. Emma Post and she did receive digoxin 500 mcg IV, Cardizem 10 mg IV bolus, and she was placed on a Cardizem continuous infusion. In addition she received NS, Magnesium Sulfate 2 g IV, Lopressor 5 mg IV, and K-Dur 40 mEq. She was admitted to a telemetry monitored unit for further evaluation. Pulmonology was consulted. Cardiology was consulted for management of Atrial Fibrillation with RVR. Please note: past medical records were reviewed per electronic medical record (EMR) - see detailed reports under Past Medical/ Surgical History. Past Medical and Surgical History:    2. Lexisacn MPS 10/15/2012: Very mildly positive myocardial perfusion study for the stress-induced myocardial ischemia, and the expected distribution of the RCA. LVEF is 61% and normal  3. Cardiac cath ~2012  No. significant abnormality per patient report NA.   4. TTE 01/02/2021 (Dr. Octavio Gamez): Left ventricular internal dimensions were normal in diastole and systole. Borderline concentric left ventricular hypertrophy. No regional WMA. Normal LVEF. Doppler evidence of stage II diastolic dysfunction. The left atrium is severely dilated. Mild mitral annular calcification. Mild mitral regurgitation is present. The aortic valve appears mildly sclerotic. Mild TR. Pulmonary hypertension is mild. There is a trivial posterior pericardial effusion noted. 5. HTN  6. Obesity  7. HLD  8. DM  9. PAF  10. Chronic anticoagulation with Xarelto   11. Iodine Allergy (urticaria, and rash reaction)  12. HFpEF  13. GERD  14. 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 Remote tobacco abuse  16. Cervical Spinal stenosis s/p diskectomy and [place] stenosis without radiculopathy. Follows with Dr. Keisha Tavarez. 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 Arthritis  19. Adenocarcinoma of cecum s/p right hemicolectomy 02/19/2019  20. S/p Appendectomy, bilateral breast reduction, carpal tunnel release, cataract removal, cholecystectomy, cervical discectomy (C3-C6), hysterectomy, LLE tibia and fibula fracture surgical repair in 2015   21. Abdominal CT 12/2020: Moderate ascites. Cirrhosis. Bibasilar atelectasis lung bases  22. Abdominal US 12/31/2020: No ascites identified for paracentesis  23. Hospitalized 05/28 through 06/03/2021 with dyspnea. proBNP 1329 he had improvement with IV diuresis. Echo showed LVEF of 65% with stage II diastolic dysfunction  24. OP cardiac assessment 06/10/2021: Persistent dyspnea with rales. Zaroxolyn 5 mg daily added to Bumex. 25. OP cardiac assessment 06/15/2021: 10 pound weight loss. Continue Zaroxolyn and Bumex. Check BMP and proBNP. 26. Completion of Covid vaccine (Moderna) second dose on 02/19/2021. Medications Prior to admit:  Prior to Admission medications    Medication Sig Start Date End Date Taking?  Authorizing Provider   amLODIPine (NORVASC) 10 MG tablet 10 mg daily  4/19/21  Yes Historical Provider, MD   Biotin 5000 MCG mL/hr, Intravenous, PRN  traMADol (ULTRAM) tablet 50 mg, 50 mg, Oral, Q8H PRN    Allergies:  Benadryl [diphenhydramine], Fish-derived products, and Iodine    Social History:    Quit smoking cigarettes 24 years ago. Prior to that smoked 2 packs/day for 15 years. Denies alcohol and illicit drug use. Caffeine intake includes coffee and tea daily. Family History:   Please note this information was not obtained at this time, as it is limited in nature due to the patient's advanced age. REVIEW OF SYSTEMS:     · Constitutional: Denies fevers, chills, night sweats, and fatigue  · HEENT: Denies headaches, nose bleeds, and blurred vision,oral pain, abscess or lesion. · Musculoskeletal: Denies falls, complains of pain to BLE with ambulation and also complains of edema to BLE. · Neurological: Complains of dizziness and lightheadedness without associated syncope or fall, denies numbness and tingling  · Cardiovascular: Complains of chest pain, palpitations, and feelings of heart racing. · Respiratory: Complains of BAINS, orthopnea and PND  · Gastrointestinal: Denies heartburn, nausea/vomiting, diarrhea and constipation, black/bloody, and tarry stools. · Genitourinary: Denies dysuria and hematuria  · Hematologic: Denies excessive bruising or bleeding  · Lymphatic: Denies lumps and bumps to neck, axilla, breast, and groin  · Endocrine: Denies excessive thirst. Denies intolerance to hot and cold  · GYN: Postmenopausal state; Denies vaginal bleeding. · Psychiatric: Denies anxiety and depression. PHYSICAL EXAM:   BP (!) 113/56   Pulse 119   Temp 97.2 °F (36.2 °C) (Temporal)   Resp 18   Ht 5' 3\" (1.6 m)   Wt 182 lb 6.4 oz (82.7 kg)   LMP  (LMP Unknown)   SpO2 94%   BMI 32.31 kg/m²   CONST:  Well developed, obese, elderly female who appears stated age.  Awake, alert, cooperative, no apparent distress  HEENT:   Head- Normocephalic, atraumatic   Eyes- Conjunctivae pink, anicteric  Throat- Oral mucosa pink and moist  Neck-  No stridor, trachea midline, no jugular venous distention. No adenopathy. Short, thick neck  CHEST: Chest symmetrical and non-tender to palpation. No accessory muscle use or intercostal retractions  RESPIRATORY: Lung sounds -scattered crackles throughout fields   CARDIOVASCULAR:     No carotid bruit  Heart Inspection- shows no noted pulsations  Heart Palpation- no heaves or thrills; PMI is non-displaced   Heart Ausculation- Irregular rate and rhythm, no murmur. No s3,  or rub   PV: Bilateral lower extremity edema 1+ pitting, right greater than left. No varicosities. Pedal pulses palpable, no clubbing or cyanosis   ABDOMEN: Soft, obese, non-tender to light palpation. Bowel sounds present. No palpable masses no organomegaly; no abdominal bruit  MS: Good muscle strength and tone. No atrophy or abnormal movements. : Deferred  SKIN: Warm and dry no statis dermatitis or ulcers   NEURO / PSYCH: Oriented to person, place and time. Speech clear and appropriate. Follows all commands.  Pleasant affect     DATA:    ECG: As above  Tele strips: As above  Diagnostic:      Intake/Output Summary (Last 24 hours) at 6/22/2021 0736  Last data filed at 6/22/2021 0533  Gross per 24 hour   Intake 301.01 ml   Output 675 ml   Net -373.99 ml       Labs:   CBC:   Recent Labs     06/21/21  1539 06/22/21  0503   WBC 12.9* 11.4   HGB 10.4* 9.7*   HCT 35.7 32.7*    329     BMP:   Recent Labs     06/21/21  1539      K 3.2*   CO2 26   BUN 33*   CREATININE 1.2*   LABGLOM 43   CALCIUM 9.1     Mag:   Recent Labs     06/21/21  1539 06/21/21  1927   MG 1.2* 2.0   TSH:   Recent Labs     06/21/21  1927   TSH 2.250     HgA1c:   Lab Results   Component Value Date    LABA1C 7.4 03/19/2020   PT/INR:   Recent Labs     06/21/21  1539   PROTIME 16.4*   INR 1.5     FASTING LIPID PANEL:  Lab Results   Component Value Date    CHOL 155 10/15/2012    HDL 15 06/11/2019    LDLCALC 50 10/31/2017    TRIG 205 06/11/2019     LIVER PROFILE:  Recent Labs     06/21/21  1539   AST 19   ALT 11   LABALBU 4.2     CXR 06/21/2021:   Nonspecific streaky pulmonary opacities in the lungs, which are stable as of 05/29/2021. Suella Dawson may be infectious/inflammatory in etiology or related to chronic scarring.     IMPRESSION and PLAN to follow per Dr. Kat Merlos     Electronically signed by TEMO Langford CNP on 6/22/2021 at 7:36 AM

## 2021-06-22 NOTE — H&P
Amna Franklin is a 80 y.o. female  Chief Complaint   Patient presents with    Chest Pain     left sided non radiating chest pain with shortness of breath starting this morning, hx a fib, sent by  for RVR. HPI  As above, this am she continues to have some sob but chest pain is a little better. She has a good appetite and sleeps well. She denies any n/v/d/c, fever/chills, or sweats. She does admit she has been sob for some time. No current facility-administered medications on file prior to encounter. Current Outpatient Medications on File Prior to Encounter   Medication Sig Dispense Refill    amLODIPine (NORVASC) 10 MG tablet 10 mg daily       Biotin 5000 MCG TABS Take by mouth daily      calcium carbonate (OSCAL) 500 MG TABS tablet Take 500 mg by mouth daily      chlordiazePOXIDE-clidinium (LIBRAX) 5-2.5 MG per capsule Take 1 capsule by mouth as needed.  metoprolol tartrate (LOPRESSOR) 25 MG tablet Take 25 mg by mouth 2 times daily      pramipexole (MIRAPEX) 0.5 MG tablet Take 0.5 mg by mouth 3 times daily      spironolactone (ALDACTONE) 25 MG tablet Take 1 tablet by mouth daily 30 tablet 1    fluticasone (FLONASE) 50 MCG/ACT nasal spray 2 sprays by Nasal route daily      furosemide (LASIX) 20 MG tablet Take 20 mg by mouth 2 times daily      potassium chloride (KLOR-CON M) 20 MEQ extended release tablet Take 20 mEq by mouth 2 times daily      traMADol (ULTRAM) 50 MG tablet Take 1 tablet by mouth every 8 hours as needed for Pain for up to 60 days. Take lowest dose possible to manage pain 90 tablet 1    ferrous sulfate (IRON 325) 325 (65 Fe) MG tablet Take 1 tablet by mouth daily (with breakfast) 30 tablet 3    miconazole (MICOTIN) 2 % powder Apply topically 2 times daily.  45 g 1    glimepiride (AMARYL) 2 MG tablet Take 2 mg by mouth every morning (before breakfast)       oxybutynin (DITROPAN) 5 MG tablet TAKE 1 TABLET BY MOUTH  DAILY WITH BREAKFAST 90 tablet 0    hydrOXYzine HEMORRHAGE performed by Benigno Guevara MD at [facility] Marital status:      Spouse name: Not on file    Number of children: Not on file    Years of education: Not on file    Highest education level: Not on file   Occupational History    Not on file   Tobacco Use    Smoking status: Former Smoker     Packs/day: 2.00     Years: 15.00     Pack years: 30.00     Quit date: 3/1/1997     Years since quittin.3    Smokeless tobacco: Never Used   Vaping Use    Vaping Use: Never used   Substance and Sexual Activity    Alcohol use: Yes     Comment: Holidays    Drug use: No    Sexual activity: Not on file   Other Topics Concern    Not on file   Social History Narrative    Not on file     Social Determinants of Health     Financial Resource Strain:     Difficulty of Paying Living Expenses:    Food Insecurity:     Worried About Running Out of Food in the Last Year:     [address] in the Last Year:    Transportation Needs:     Lack of Transportation (Medical):      Lack of Transportation (Non-Medical):    Physical Activity:     Days of Exercise per Week:     Minutes of Exercise per Session:    Stress:     Feeling of Stress :    Social Connections:     Frequency of Communication with Friends and Family:     Frequency of Social Gatherings with Friends and Family:     Attends Baptism Services:     Active Member of Clubs or Organizations:     Attends Club or Organization Meetings:     Marital Status:    Intimate Partner Violence:     Fear of Current or Ex-Partner:     Emotionally Abused:     Physically Abused:     Sexually Abused:      Family History   Problem Relation Age of Onset    Diabetes Mother     Stroke Father     Diabetes Sister     High Blood Pressure Sister     Diabetes Brother     High Blood Pressure Brother     Cancer Brother     Heart Disease Brother          ROS  Patient positive for  SOB, chest pain   Patient denies any fever, chills, night sweats, weight changes   Denies headache, visual changes,   Denies dysphagia, odynophagia dysphonia,   Denies cough, sputum production,   Denies  pressure, orthopnea, palpitations,   Denies abd pain, N/V/D/C/melena, hematochezia,   Denies urinary frequency, urgency, dysuria, hematuria,   Denies any acute muscle aches, paresthesias, weakness, seizure, syncopal episodes, Denies depression, anxiety. OBJECTIVE  Vitals:    06/22/21 0723   BP: (!) 113/56   Pulse: 119   Resp: 18   Temp: 97.2 °F (36.2 °C)   SpO2: 94%     Gen: AO3, NAD  Head: AT/NC, PERRL, EOMIx2, no icterus, conjunctival injection  Neck: No JVD, carotid bruits, LAD, thyroid non-palpable  Heart: Jeppie Fryer with no murmurs, rubs, gallops  Lungs: CTA B/L, no W/R/R  Abd: soft, NT, ND, BS+, no G/R, no HSM  Ext: No C/C/E, pulses intact distally B/L  Neuro: CN 2-12 grossly intact with no focal deficits    ASSESSMENT  1. Atrial fibrillation with RVR (HCC)    2. Chest pain, unspecified type    3.  DM2  PLAN  I will consult Cardiology and Pulm to see  She may need O2 at home

## 2021-06-22 NOTE — CONSULTS
Pooja Berkowitz M.D.,[facility]  Mindy Minor D.O., GUANACO., Nilay Barbosa M.D. Brad Mishra M.D., Gold Gonzalez M.D. Daniel Aleman D.O. Patient:  William Melchor 80 y.o. female MRN: [id number]     Date of Service: 6/22/2021      PULMONARY CONSULTATION    Reason for Consultation: Dyspnea  Referring Physician: Dr. Herrera Eastern with the referring physician will be sent via the electronic medical record. Chief Complaint: Shortness of breath    CODE STATUS: Full    SUBJECTIVE:  HPI:  William Melchor is a 80 y.o.  female who we are asked to evaluate for shortness of breath. Her past medical history includes paroxysmal A. fib on Xarelto, hypertension, hyperlipidemia, diabetes mellitus type 2, AMES-liver cirrhosis, spinal stenosis, renal artery aneurysm, anemia, malignant neoplasm of the cecum. She is a former smoker cigarettes 2 packs/day for 15 years equal 30-pack-year smoking history. She has not followed with a pulmonologist in the past.  Takes no medication at home for her breathing. She denies history of sleep apnea. Does admit to 2-3 pillow orthopnea sleeping upright in a recliner. She presented to the ED at [facility] on 6/21/2021 with complaint of shortness of breath, chest pain, and palpitations. She states that her symptoms started approximately 3 days prior and continued to worsen with exertion. She normally takes Xarelto for A. fib but held it for the last day due to upcoming dental procedure planned. She visited her cardiology office Dr. Lio Andres and was found to have A. fib RVR 160s and sent directly to the ED for further evaluation. Admission EKG with A. fib heart rate 151, x-ray with nonspecific streaky opacities in the lower lobes grossly stable from prior study May 2021. No pulmonary vascular congestion or pleural effusions. No infiltrates. Most recent CTA chest 12/5/2020 with no PE.   Mild mediastinal lymphadenopathy UPPER GASTROINTESTINAL ENDOSCOPY  2020    EGD CONTROL HEMORRHAGE performed by Sandrine Bae MD at [address] [place] [address] History   Problem Relation Age of Onset    Diabetes Mother     Stroke Father     Diabetes Sister     High Blood Pressure Sister     Diabetes Brother     High Blood Pressure Brother     Cancer Brother     Heart Disease Brother        Social History:   Social History     Socioeconomic History    Marital status:      Spouse name: Not on file    Number of children: Not on file    Years of education: Not on file    Highest education level: Not on file   Occupational History    Not on file   Tobacco Use    Smoking status: Former Smoker     Packs/day: 2.00     Years: 15.00     Pack years: 30.00     Quit date: 3/1/1997     Years since quittin.3    Smokeless tobacco: Never Used   Vaping Use    Vaping Use: Never used   Substance and Sexual Activity    Alcohol use: Yes     Comment: Holidays    Drug use: No    Sexual activity: Not on file   Other Topics Concern    Not on file   Social History Narrative    Not on file     Social Determinants of Health     Financial Resource Strain:     Difficulty of Paying Living Expenses:    Food Insecurity:     Worried About Running Out of Food in the Last Year:     Ran Out of Food in the Last Year:    Transportation Needs:     Lack of Transportation (Medical):     Lack of Transportation (Non-Medical):    Physical Activity:     Days of Exercise per Week:     Minutes of Exercise per Session:    Stress:     Feeling of Stress :    Social Connections:     Frequency of Communication with Friends and Family:     Frequency of Social Gatherings with Friends and Family:     Attends [employer] Services:      Active Member of Clubs or Organizations:     Attends Club or Organization Meetings:     Marital Status:    Intimate Partner Violence:     Fear of Current or Ex-Partner:     Emotionally Abused:     Physically Abused:     Sexually Abused:      Smoking pain  ferrous sulfate (IRON 325) 325 (65 Fe) MG tablet, Take 1 tablet by mouth daily (with breakfast)  miconazole (MICOTIN) 2 % powder, Apply topically 2 times daily. glimepiride (AMARYL) 2 MG tablet, Take 2 mg by mouth every morning (before breakfast)   oxybutynin (DITROPAN) 5 MG tablet, TAKE 1 TABLET BY MOUTH  DAILY WITH BREAKFAST  hydrOXYzine (ATARAX) 10 MG tablet, Take 1 tablet by mouth nightly  simvastatin (ZOCOR) 20 MG tablet, TAKE 1 TABLET BY MOUTH  DAILY WITH SUPPER  pantoprazole (PROTONIX) 40 MG tablet, TAKE ONE TABLET BY MOUTH TWO TIMES A DAY BEFORE MEALS  rivaroxaban (XARELTO) 20 MG TABS tablet, Take 1 tablet by mouth daily (with breakfast)  metFORMIN (GLUCOPHAGE) 500 MG tablet, TAKE 2 TABLETS BY MOUTH  TWICE A DAY    CURRENT MEDS :  Scheduled Meds:   enoxaparin  1 mg/kg Subcutaneous BID    bumetanide  1 mg Intravenous Q12H    amLODIPine  10 mg Oral Daily    ferrous sulfate  325 mg Oral Daily with breakfast    hydrOXYzine  10 mg Oral Nightly    pantoprazole  40 mg Oral QAM AC    potassium chloride  20 mEq Oral BID    pramipexole  0.5 mg Oral TID    spironolactone  25 mg Oral Daily    sodium chloride flush  5-40 mL Intravenous 2 times per day    insulin lispro  0-6 Units Subcutaneous TID WC    insulin lispro  0-3 Units Subcutaneous Nightly       Continuous Infusions:   dilTIAZem 15 mg/hr (06/22/21 0520)    sodium chloride      dextrose         Allergies   Allergen Reactions    Benadryl [Diphenhydramine]      hyper    Fish-Derived Products Rash    Iodine Rash       REVIEW OF SYSTEMS:  Constitutional: Denies fever, weight loss, night sweats, and fatigue  Skin: Denies pigmentation, dark lesions, and rashes   HEENT: Denies hearing loss, tinnitus, ear drainage, epistaxis, sore throat, and hoarseness.   Cardiovascular: chest discomfort and palpitations 2-3 pillow orthopnea, leg swelling  Respiratory: dyspnea worse with exertion  Gastrointestinal: Denies nausea, vomiting, poor appetite, diarrhea, heartburn or reflux  Genitourinary: Denies dysuria, frequency, urgency or hematuria  Musculoskeletal: Denies myalgias, muscle weakness, and bone pain  Neurological: Denies dizziness, vertigo, headache, and focal weakness  Psychological: Denies anxiety and depression  Endocrine: Denies heat intolerance and cold intolerance  Hematopoietic/Lymphatic: Denies bleeding problems and blood transfusions    OBJECTIVE:   BP (!) 113/56   Pulse 119   Temp 97.2 °F (36.2 °C) (Temporal)   Resp 18   Ht 5' 3\" (1.6 m)   Wt 182 lb 6.4 oz (82.7 kg)   LMP  (LMP Unknown)   SpO2 94%   BMI 32.31 kg/m²   SpO2 Readings from Last 1 Encounters:   06/22/21 94%        I/O:    Intake/Output Summary (Last 24 hours) at 6/22/2021 1028  Last data filed at 6/22/2021 0819  Gross per 24 hour   Intake 301.01 ml   Output 825 ml   Net -523.99 ml     Vent Information  SpO2: 94 %        Physical Exam:  General: The patient is lying in bed comfortably without any distress. Breathing is not labored  HEENT: Pupils are equal round and reactive to light, there are no oral lesions and no post-nasal drip   Neck: supple without adenopathy  Cardiovascular: regular rate and rhythm without murmur or gallop  Respiratory: DIMINISHED basilar crackles to auscultation bilaterally without wheezing . Air entry is symmetric  Abdomen: ASCITES, non-tender, non-distended, normal bowel sounds  Extremities: warm,  no clubbing trace lower extremity edema  Skin: no rash or lesion  Neurologic: CN II-XII grossly intact, no focal deficits    Pulmonary Function Testing none on file      Imaging personally reviewed:      FINDINGS:   The heart is mildly enlarged. Streaky pulmonary opacities in the lower lobes   are nonspecific and are grossly stable as of 05/29/2021. No evidence of   pulmonary vascular congestion. No pneumothorax or pleural effusion is seen. Impression   1. Nonspecific streaky pulmonary opacities in the lungs, which are stable as   of 05/29/2021.   They may be infectious/inflammatory in etiology or related to   chronic scarring. cta chest dec 2020  1. No pulmonary embolism is seen. 2.  No acute cardiopulmonary abnormality. 3. Cardiomegaly. No pulmonary edema or pleural effusion. 4. Mild mediastinal lymphadenopathy, which may be reactive. Given patient's   history of cecal adenocarcinoma, a metastatic etiology cannot be excluded. 5. Irregular liver contour indicating cirrhosis. Small volume perihepatic   ascites. 6. Reflux of contrast into the hepatic veins may signify right heart failure. Echo: 1/2/21  Summary   Left ventricular internal dimensions were normal in diastole and systole. Borderline concentric left ventricular hypertrophy. No regional wall motion abnormalities seen. Normal left ventricular ejection fraction. There is doppler evidence of stage II diastolic dysfunction. The left atrium is severely dilated. Mild mitral annular calcification. Mild mitral regurgitation is present. The aortic valve appears mildly sclerotic. Mild tricuspid regurgitation. Pulmonary hypertension is mild . There is a trivial posterior pericardial effusion noted. Labs:  Lab Results   Component Value Date    WBC 11.4 06/22/2021    HGB 9.7 06/22/2021    HCT 32.7 06/22/2021    MCV 79.8 06/22/2021    MCH 23.7 06/22/2021    MCHC 29.7 06/22/2021    RDW 23.9 06/22/2021     06/22/2021    MPV 9.7 06/22/2021     Lab Results   Component Value Date     06/21/2021    K 3.2 06/21/2021    CL 92 06/21/2021    CO2 26 06/21/2021    BUN 33 06/21/2021    CREATININE 1.2 06/21/2021    LABALBU 4.2 06/21/2021    CALCIUM 9.1 06/21/2021    GFRAA 52 06/21/2021    LABGLOM 43 06/21/2021     Lab Results   Component Value Date    PROTIME 16.4 06/21/2021    INR 1.5 06/21/2021     Recent Labs     06/21/21  1539   PROBNP 5,183*     No results for input(s): TROPONINI in the last 72 hours. No results for input(s): PROCAL in the last 72 hours.   This [employer] has not been configured with any valid records. Micro:  No results for input(s): CULTRESP in the last 72 hours. No results for input(s): LABGRAM in the last 72 hours. No results for input(s): LEGUR in the last 72 hours. No results for input(s): STREPNEUMAGU in the last 72 hours. No results for input(s): LP1UAG in the last 72 hours. 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 GI prophylaxis  Recommend OP PFTs and OP sleep study testing once recovered. Thank you for allowing me to participate in the care of Tamela Escalera. Please feel free to call with questions. This plan of care was reviewed in collaboration with Dr. Courtney Goddard    Electronically signed by TEMO Morales CNP on 6/22/2021 at 10:28 AM      Note: This report was completed utilizing computer voice recognition software. Every effort has been made to ensure accuracy, however; inadvertent computerized transcription errors may be present      I personally saw, examined, and cared for the patient. Labs, medications, radiographs reviewed. I agree with history exam and plans detailed in NP note. Rate control   Diuresis   Nabil Betancourt M.D.    Pulmonary/Critical Care Medicine

## 2021-06-22 NOTE — PLAN OF CARE
Problem: Cardiac:  Goal: Hemodynamic stability will improve  Description: Hemodynamic stability will improve  Outcome: Met This Shift     Problem: Fluid Volume:  Goal: Ability to achieve and maintain adequate urine output will improve  Description: Ability to achieve and maintain adequate urine output will improve  Outcome: Met This Shift

## 2021-06-22 NOTE — PROGRESS NOTES
Travis Manzano was ordered chlordiazePOXIDE-clidinium (Librax) which is a nonformulary medication. Nurse is going to check with patient to see if home supply of this medication will be brought in to the hospital for inpatient use. A pharmacist will follow-up with the nurse of the patient to assess the capability of the patient to bring in the medication. If it is determined that the patient cannot supply the medication and it is not available to be dispensed from the pharmacy, a call will be placed to the ordering provider to discuss alternative options.      Rey Lopez, PharmD  06/21/21 8:21 PM

## 2021-06-23 ENCOUNTER — ANESTHESIA (OUTPATIENT)
Dept: CARDIAC CATH/INVASIVE PROCEDURES | Age: 81
DRG: 286 | End: 2021-06-23
Payer: MEDICARE

## 2021-06-23 ENCOUNTER — ANESTHESIA EVENT (OUTPATIENT)
Dept: CARDIAC CATH/INVASIVE PROCEDURES | Age: 81
DRG: 286 | End: 2021-06-23
Payer: MEDICARE

## 2021-06-23 ENCOUNTER — APPOINTMENT (OUTPATIENT)
Dept: CARDIAC CATH/INVASIVE PROCEDURES | Age: 81
DRG: 286 | End: 2021-06-23
Payer: MEDICARE

## 2021-06-23 VITALS
RESPIRATION RATE: 22 BRPM | OXYGEN SATURATION: 96 % | DIASTOLIC BLOOD PRESSURE: 53 MMHG | SYSTOLIC BLOOD PRESSURE: 111 MMHG

## 2021-06-23 LAB
ANION GAP SERPL CALCULATED.3IONS-SCNC: 13 MMOL/L (ref 7–16)
BUN BLDV-MCNC: 18 MG/DL (ref 6–23)
CALCIUM SERPL-MCNC: 9.2 MG/DL (ref 8.6–10.2)
CHLORIDE BLD-SCNC: 93 MMOL/L (ref 98–107)
CO2: 30 MMOL/L (ref 22–29)
CREAT SERPL-MCNC: 1 MG/DL (ref 0.5–1)
GFR AFRICAN AMERICAN: >60
GFR NON-AFRICAN AMERICAN: 53 ML/MIN/1.73
GLUCOSE BLD-MCNC: 139 MG/DL (ref 74–99)
LV EF: 63 %
LVEF MODALITY: NORMAL
METER GLUCOSE: 112 MG/DL (ref 74–99)
METER GLUCOSE: 134 MG/DL (ref 74–99)
METER GLUCOSE: 163 MG/DL (ref 74–99)
METER GLUCOSE: 166 MG/DL (ref 74–99)
POTASSIUM SERPL-SCNC: 3 MMOL/L (ref 3.5–5)
SODIUM BLD-SCNC: 136 MMOL/L (ref 132–146)

## 2021-06-23 PROCEDURE — 6360000002 HC RX W HCPCS: Performed by: NURSE ANESTHETIST, CERTIFIED REGISTERED

## 2021-06-23 PROCEDURE — 93321 DOPPLER ECHO F-UP/LMTD STD: CPT

## 2021-06-23 PROCEDURE — 93312 ECHO TRANSESOPHAGEAL: CPT

## 2021-06-23 PROCEDURE — 2709999900 HC NON-CHARGEABLE SUPPLY

## 2021-06-23 PROCEDURE — 6370000000 HC RX 637 (ALT 250 FOR IP): Performed by: INTERNAL MEDICINE

## 2021-06-23 PROCEDURE — 82962 GLUCOSE BLOOD TEST: CPT

## 2021-06-23 PROCEDURE — 2500000003 HC RX 250 WO HCPCS: Performed by: NURSE PRACTITIONER

## 2021-06-23 PROCEDURE — 2580000003 HC RX 258: Performed by: INTERNAL MEDICINE

## 2021-06-23 PROCEDURE — 93005 ELECTROCARDIOGRAM TRACING: CPT | Performed by: INTERNAL MEDICINE

## 2021-06-23 PROCEDURE — 3700000000 HC ANESTHESIA ATTENDED CARE

## 2021-06-23 PROCEDURE — 2580000003 HC RX 258: Performed by: NURSE ANESTHETIST, CERTIFIED REGISTERED

## 2021-06-23 PROCEDURE — 99233 SBSQ HOSP IP/OBS HIGH 50: CPT | Performed by: INTERNAL MEDICINE

## 2021-06-23 PROCEDURE — 2140000000 HC CCU INTERMEDIATE R&B

## 2021-06-23 PROCEDURE — 92960 CARDIOVERSION ELECTRIC EXT: CPT | Performed by: INTERNAL MEDICINE

## 2021-06-23 PROCEDURE — 3700000001 HC ADD 15 MINUTES (ANESTHESIA)

## 2021-06-23 PROCEDURE — 6360000002 HC RX W HCPCS: Performed by: INTERNAL MEDICINE

## 2021-06-23 PROCEDURE — 93320 DOPPLER ECHO COMPLETE: CPT | Performed by: INTERNAL MEDICINE

## 2021-06-23 PROCEDURE — 2500000003 HC RX 250 WO HCPCS: Performed by: STUDENT IN AN ORGANIZED HEALTH CARE EDUCATION/TRAINING PROGRAM

## 2021-06-23 PROCEDURE — B246ZZ4 ULTRASONOGRAPHY OF RIGHT AND LEFT HEART, TRANSESOPHAGEAL: ICD-10-PCS | Performed by: INTERNAL MEDICINE

## 2021-06-23 PROCEDURE — 6360000002 HC RX W HCPCS: Performed by: NURSE PRACTITIONER

## 2021-06-23 PROCEDURE — 93312 ECHO TRANSESOPHAGEAL: CPT | Performed by: INTERNAL MEDICINE

## 2021-06-23 PROCEDURE — 93325 DOPPLER ECHO COLOR FLOW MAPG: CPT | Performed by: INTERNAL MEDICINE

## 2021-06-23 PROCEDURE — 36415 COLL VENOUS BLD VENIPUNCTURE: CPT

## 2021-06-23 PROCEDURE — 93325 DOPPLER ECHO COLOR FLOW MAPG: CPT

## 2021-06-23 PROCEDURE — 80048 BASIC METABOLIC PNL TOTAL CA: CPT

## 2021-06-23 RX ORDER — DIGOXIN 0.25 MG/ML
250 INJECTION INTRAMUSCULAR; INTRAVENOUS ONCE
Status: COMPLETED | OUTPATIENT
Start: 2021-06-23 | End: 2021-06-23

## 2021-06-23 RX ORDER — POTASSIUM CHLORIDE 7.45 MG/ML
10 INJECTION INTRAVENOUS
Status: COMPLETED | OUTPATIENT
Start: 2021-06-23 | End: 2021-06-23

## 2021-06-23 RX ORDER — DILTIAZEM HYDROCHLORIDE 120 MG/1
120 CAPSULE, COATED, EXTENDED RELEASE ORAL DAILY
Status: DISCONTINUED | OUTPATIENT
Start: 2021-06-23 | End: 2021-06-23

## 2021-06-23 RX ORDER — SODIUM CHLORIDE 9 MG/ML
INJECTION, SOLUTION INTRAVENOUS CONTINUOUS PRN
Status: DISCONTINUED | OUTPATIENT
Start: 2021-06-23 | End: 2021-06-23 | Stop reason: SDUPTHER

## 2021-06-23 RX ORDER — MAGNESIUM SULFATE 1 G/100ML
1000 INJECTION INTRAVENOUS ONCE
Status: COMPLETED | OUTPATIENT
Start: 2021-06-23 | End: 2021-06-23

## 2021-06-23 RX ORDER — PROPOFOL 10 MG/ML
INJECTION, EMULSION INTRAVENOUS PRN
Status: DISCONTINUED | OUTPATIENT
Start: 2021-06-23 | End: 2021-06-23 | Stop reason: SDUPTHER

## 2021-06-23 RX ADMIN — SODIUM CHLORIDE, PRESERVATIVE FREE 10 ML: 5 INJECTION INTRAVENOUS at 20:53

## 2021-06-23 RX ADMIN — BUMETANIDE 1 MG: 0.25 INJECTION INTRAMUSCULAR; INTRAVENOUS at 20:52

## 2021-06-23 RX ADMIN — TRAMADOL HYDROCHLORIDE 50 MG: 50 TABLET, FILM COATED ORAL at 20:52

## 2021-06-23 RX ADMIN — FERROUS SULFATE TAB 325 MG (65 MG ELEMENTAL FE) 325 MG: 325 (65 FE) TAB at 08:13

## 2021-06-23 RX ADMIN — DEXTROSE MONOHYDRATE 15 MG/HR: 50 INJECTION, SOLUTION INTRAVENOUS at 07:34

## 2021-06-23 RX ADMIN — MAGNESIUM SULFATE HEPTAHYDRATE 1000 MG: 1 INJECTION, SOLUTION INTRAVENOUS at 12:10

## 2021-06-23 RX ADMIN — PRAMIPEXOLE DIHYDROCHLORIDE 0.5 MG: 0.25 TABLET ORAL at 20:52

## 2021-06-23 RX ADMIN — DIGOXIN 250 MCG: 0.25 INJECTION INTRAMUSCULAR; INTRAVENOUS at 09:40

## 2021-06-23 RX ADMIN — ENOXAPARIN SODIUM 80 MG: 80 INJECTION SUBCUTANEOUS at 08:14

## 2021-06-23 RX ADMIN — SODIUM CHLORIDE: 9 INJECTION, SOLUTION INTRAVENOUS at 15:02

## 2021-06-23 RX ADMIN — POTASSIUM CHLORIDE 20 MEQ: 1500 TABLET, EXTENDED RELEASE ORAL at 20:52

## 2021-06-23 RX ADMIN — METOPROLOL TARTRATE 25 MG: 25 TABLET, FILM COATED ORAL at 20:52

## 2021-06-23 RX ADMIN — POTASSIUM CHLORIDE 10 MEQ: 10 INJECTION, SOLUTION INTRAVENOUS at 12:23

## 2021-06-23 RX ADMIN — ONDANSETRON 4 MG: 4 TABLET, ORALLY DISINTEGRATING ORAL at 09:05

## 2021-06-23 RX ADMIN — TRAMADOL HYDROCHLORIDE 50 MG: 50 TABLET, FILM COATED ORAL at 08:19

## 2021-06-23 RX ADMIN — POTASSIUM CHLORIDE 20 MEQ: 1500 TABLET, EXTENDED RELEASE ORAL at 08:14

## 2021-06-23 RX ADMIN — DEXTROSE MONOHYDRATE 150 MG: 50 INJECTION, SOLUTION INTRAVENOUS at 17:41

## 2021-06-23 RX ADMIN — RIVAROXABAN 15 MG: 15 TABLET, FILM COATED ORAL at 17:49

## 2021-06-23 RX ADMIN — PROPOFOL 200 MG: 10 INJECTION, EMULSION INTRAVENOUS at 15:07

## 2021-06-23 RX ADMIN — POTASSIUM CHLORIDE 10 MEQ: 10 INJECTION, SOLUTION INTRAVENOUS at 13:30

## 2021-06-23 RX ADMIN — AMIODARONE HYDROCHLORIDE 1 MG/MIN: 50 INJECTION, SOLUTION INTRAVENOUS at 17:51

## 2021-06-23 RX ADMIN — INSULIN LISPRO 1 UNITS: 100 INJECTION, SOLUTION INTRAVENOUS; SUBCUTANEOUS at 20:53

## 2021-06-23 RX ADMIN — DEXTROSE MONOHYDRATE 15 MG/HR: 50 INJECTION, SOLUTION INTRAVENOUS at 01:09

## 2021-06-23 RX ADMIN — SPIRONOLACTONE 25 MG: 25 TABLET ORAL at 08:13

## 2021-06-23 RX ADMIN — AMLODIPINE BESYLATE 10 MG: 10 TABLET ORAL at 08:13

## 2021-06-23 RX ADMIN — HYDROXYZINE HYDROCHLORIDE 10 MG: 10 TABLET ORAL at 20:52

## 2021-06-23 RX ADMIN — PANTOPRAZOLE SODIUM 40 MG: 40 TABLET, DELAYED RELEASE ORAL at 06:10

## 2021-06-23 RX ADMIN — PRAMIPEXOLE DIHYDROCHLORIDE 0.5 MG: 0.25 TABLET ORAL at 08:13

## 2021-06-23 RX ADMIN — BUMETANIDE 1 MG: 0.25 INJECTION INTRAMUSCULAR; INTRAVENOUS at 08:14

## 2021-06-23 ASSESSMENT — PAIN DESCRIPTION - PAIN TYPE
TYPE: CHRONIC PAIN

## 2021-06-23 ASSESSMENT — PAIN - FUNCTIONAL ASSESSMENT: PAIN_FUNCTIONAL_ASSESSMENT: PREVENTS OR INTERFERES SOME ACTIVE ACTIVITIES AND ADLS

## 2021-06-23 ASSESSMENT — PAIN DESCRIPTION - ORIENTATION
ORIENTATION: LOWER

## 2021-06-23 ASSESSMENT — PAIN DESCRIPTION - LOCATION
LOCATION: BACK

## 2021-06-23 ASSESSMENT — PAIN DESCRIPTION - FREQUENCY
FREQUENCY: INTERMITTENT
FREQUENCY: INTERMITTENT

## 2021-06-23 ASSESSMENT — PAIN DESCRIPTION - ONSET: ONSET: GRADUAL

## 2021-06-23 ASSESSMENT — PAIN SCALES - GENERAL
PAINLEVEL_OUTOF10: 8
PAINLEVEL_OUTOF10: 0
PAINLEVEL_OUTOF10: 0
PAINLEVEL_OUTOF10: 9
PAINLEVEL_OUTOF10: 8
PAINLEVEL_OUTOF10: 4
PAINLEVEL_OUTOF10: 0

## 2021-06-23 ASSESSMENT — PAIN DESCRIPTION - PROGRESSION: CLINICAL_PROGRESSION: GRADUALLY WORSENING

## 2021-06-23 ASSESSMENT — PAIN DESCRIPTION - DESCRIPTORS
DESCRIPTORS: ACHING;DISCOMFORT;SORE
DESCRIPTORS: ACHING;DISCOMFORT;SORE

## 2021-06-23 NOTE — ANESTHESIA PRE PROCEDURE
oxybutynin (DITROPAN) 5 MG tablet TAKE 1 TABLET BY MOUTH  DAILY WITH BREAKFAST 5/21/20  Yes Hervey Nyhan, MD   hydrOXYzine (ATARAX) 10 MG tablet Take 1 tablet by mouth nightly 5/8/20  Yes Hervey Nyhan, MD   simvastatin (ZOCOR) 20 MG tablet TAKE 1 TABLET BY MOUTH  DAILY WITH SUPPER 4/23/20  Yes Hervey Nyhan, MD   pantoprazole (PROTONIX) 40 MG tablet TAKE ONE TABLET BY MOUTH TWO TIMES A DAY BEFORE MEALS 4/20/20  Yes Hervey Nyhan, MD   rivaroxaban (XARELTO) 20 MG TABS tablet Take 1 tablet by mouth daily (with breakfast) 3/16/20  Yes Hervey Nyhan, MD   metFORMIN (GLUCOPHAGE) 500 MG tablet TAKE 2 TABLETS BY MOUTH  TWICE A DAY 2/6/20  Yes Hervey Nyhan, MD       Current medications:    Current Facility-Administered Medications   Medication Dose Route Frequency Provider Last Rate Last Admin    magnesium sulfate 1000 mg in dextrose 5% 100 mL IVPB  1,000 mg Intravenous Once Graham Rosenberg  mL/hr at 06/23/21 1210 1,000 mg at 06/23/21 1210    potassium chloride 10 mEq/100 mL IVPB (Peripheral Line)  10 mEq Intravenous Q1H Graham Rosenberg  mL/hr at 06/23/21 1223 10 mEq at 06/23/21 1223    enoxaparin (LOVENOX) injection 80 mg  1 mg/kg Subcutaneous BID TEMO Juarez CNP   80 mg at 06/23/21 0814    bumetanide (BUMEX) injection 1 mg  1 mg Intravenous Q12H TEMO Juarez CNP   1 mg at 06/23/21 0814    levalbuterol (XOPENEX) nebulization 0.63 mg  0.63 mg Nebulization Q8H PRN TEMO Alonso CNP        dilTIAZem 100 mg in dextrose 5 % 100 mL infusion (ADD-[place])  5-15 mg/hr Intravenous Continuous Armond Miranda MD 15 mL/hr at 06/23/21 0734 15 mg/hr at 06/23/21 0734    amLODIPine (NORVASC) tablet 10 mg  10 mg Oral Daily Breonna Omer DO   10 mg at 06/23/21 0813    chlordiazePOXIDE-clidinium (LIBRAX) 5-2.5 MG per capsule 1 capsule  1 capsule Oral BID PRN Breonna Omer DO        ferrous sulfate (IRON 325) tablet 325 mg  325 mg Oral Daily with  traMADol (ULTRAM) tablet 50 mg  50 mg Oral Q8H PRN Clint Dozier, DO   50 mg at 06/23/21 4884       Allergies:     Allergies   Allergen Reactions    Iodine Shortness Of Breath     SOB, Rash    Benadryl [Diphenhydramine]      hyper    Fish-Derived Products Rash       Problem List:    Patient Active Problem List   Diagnosis Code    Spinal stenosis in cervical region M48.02    Spinal stenosis, lumbar region, without neurogenic claudication M48.061    Essential hypertension I10    Mixed hyperlipidemia E78.2    DM (diabetes mellitus) ([facility] E11.9    Renal artery aneurysm (HCC) I72.2    PAF (paroxysmal atrial fibrillation) (HCC) I48.0    Anemia D64.9    Malignant neoplasm of cecum (HCC) C18.0    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) K75.81, K74.60    Chronic heart failure with preserved ejection fraction (HCC) I50.32    Atrial fibrillation with RVR (HCC) I48.91       Past Medical History:        Diagnosis Date    Adenocarcinoma of cecum ([facility] 01/2019    Anemia     Arm numbness     Arthritis     Blood transfusion     Cervical spinal stenosis     Cirrhosis of liver (HCC)     Diabetes mellitus (HCC)     Fatty liver     GERD (gastroesophageal reflux disease)     Hyperlipidemia     Hypertension     Low back pain     Lumbar stenosis     Neck pain     Obesity (BMI 30.0-34.9) 10/14/2012    PAF (paroxysmal atrial fibrillation) (HCC)     [facility] 7/15/2015       Past Surgical History:        Procedure Laterality Date    APPENDECTOMY      BACK SURGERY      BREAST SURGERY      reduction    CARPAL TUNNEL RELEASE      CATARACT REMOVAL  10/2016    CERVICAL DISCECTOMY  01/19/2007    ACDF C3-4, C4-5, C5-6 Dr. Barb Nieves      left lower leg    HEMICOLECTOMY N/A 2/19/2019    DIAGNOSTIC LAPAROSCOPY, LYSIS OF ADHESIONS, OPEN RIGHT HEMICOLECTOMY performed by Raul Gardner MD at [id number] LAPAROSCOPY  2017    [facility]. Dr.Joseph Albert Saldana UPPER GASTROINTESTINAL ENDOSCOPY N/A 2018    EGD BIOPSY performed by Sunita Estrada MD at  [facility] N/A 2020    EGD BIOPSY performed by Oskar Gibbs MD at  [facility]  2020    EGD CONTROL HEMORRHAGE performed by Oskar Gibbs MD at [facility] History:    Social History     Tobacco Use    Smoking status: Former Smoker     Packs/day: 2.00     Years: 15.00     Pack years: 30.00     Quit date: 3/1/1997     Years since quittin.3    Smokeless tobacco: Never Used   Substance Use Topics    Alcohol use: Yes     Comment: Holidays                                Counseling given: Not Answered      Vital Signs (Current):   Vitals:    21 0353 21 0604 21 0731 21 1125   BP: 133/78  131/64 126/60   Pulse: 78 133 138 132   Resp: 16  16    Temp: 36.9 °C (98.4 °F)  36.8 °C (98.2 °F) 35 °C (95 °F)   TempSrc: Temporal  Oral    SpO2: 94%  95%    Weight:       Height:                                                  BP Readings from Last 3 Encounters:   21 126/60   21 (!) 142/84   21 112/72       NPO Status:                                                    > 8 hours                             BMI:   Wt Readings from Last 3 Encounters:   21 185 lb 3.2 oz (84 kg)   21 188 lb 3.2 oz (85.4 kg)   21 173 lb (78.5 kg)     Body mass index is 32.81 kg/m².     CBC:   Lab Results   Component Value Date    WBC 11.4 2021    RBC 4.10 2021    HGB 9.7 2021    HCT 32.7 2021    MCV 79.8 2021    RDW 23.9 2021     2021       CMP:   Lab Results   Component Value Date     2021    K 3.0 2021    K 3.2 2021    CL 93 2021    CO2 30 2021    BUN 18 2021    CREATININE 1.0 2021    GFRAA >60 2021    LABGLOM 53 06/23/2021    GLUCOSE 139 06/23/2021    PROT 7.6 06/22/2021    CALCIUM 9.2 06/23/2021    BILITOT 1.2 06/22/2021    ALKPHOS 68 06/22/2021    AST 18 06/22/2021    ALT 9 06/22/2021       POC Tests: No results for input(s): POCGLU, POCNA, POCK, POCCL, POCBUN, POCHEMO, POCHCT in the last 72 hours. Coags:   Lab Results   Component Value Date    PROTIME 16.4 06/21/2021    INR 1.5 06/21/2021    APTT 34.0 12/28/2017       HCG (If Applicable): No results found for: PREGTESTUR, PREGSERUM, HCG, HCGQUANT     ABGs: No results found for: PHART, PO2ART, DOL0HXF, NMO0VCI, BEART, Y0YBZNNR     Type & Screen (If Applicable):  No results found for: LABABO, LABRH    Drug/Infectious Status (If Applicable):  No results found for: HIV, HEPCAB    COVID-19 Screening (If Applicable):   Lab Results   Component Value Date    COVID19 Not Detected 12/31/2020    COVID19 Not Detected 12/05/2020           Anesthesia Evaluation  Patient summary reviewed and Nursing notes reviewed  Airway: Mallampati: II  TM distance: <3 FB   Neck ROM: full  Mouth opening: < 3 FB Dental:    (+) lower dentures      Pulmonary:normal exam  breath sounds clear to auscultation                            ROS comment: On 2L NC   Cardiovascular:    (+) hypertension:, dysrhythmias: atrial fibrillation,         Rhythm: irregular  Rate: abnormal                    Neuro/Psych:   Negative Neuro/Psych ROS              GI/Hepatic/Renal:   (+) GERD: well controlled, liver disease (non alcoholic fatty liver):,           Endo/Other:    (+) Diabetes, . ROS comment: Malignant neoplasm of cecum s/p hemicolectomy   Abdominal:   (+) obese,         Vascular: negative vascular ROS. Anesthesia Plan      MAC     ASA 3       Induction: intravenous. Anesthetic plan and risks discussed with patient. Plan discussed with attending.                   TEMO Landrum - SREEKANTH   6/23/2021

## 2021-06-23 NOTE — PROGRESS NOTES
INPATIENT CARDIOLOGY FOLLOW-UP    Name: Laura Lerma    Age: 80 y.o. Date of Admission: 6/21/2021  3:23 PM    Date of Service: 6/23/2021    Chief Complaint: Follow-up for CANDY herron with RVR    Interim History:  No new overnight cardiac complaints. Potassium pills caused nausea. Currently with no complaints of CP, SOB, palpitations, dizziness, or lightheadedness. AF on telemetry. Review of Systems:   Cardiac: As per HPI  General: No fever, chills  Pulmonary: As per HPI  HEENT: No visual disturbances, difficult swallowing  GI: No nausea, vomiting  Endocrine: No thyroid disease or DM  Musculoskeletal: QUINTERO x 4, no focal motor deficits  Skin: Intact, no rashes  Neuro/Psych: No headache or seizures    Problem List:  Patient Active Problem List   Diagnosis    Spinal stenosis in cervical region    Spinal stenosis, lumbar region, without neurogenic claudication    Essential hypertension    Mixed hyperlipidemia    DM [facility]diabetes mellitus) (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)    Chronic heart failure with preserved ejection fraction (HCC)    Atrial fibrillation with RVR (HCC)       Allergies:   Allergies   Allergen Reactions    Iodine Shortness Of Breath     SOB, Rash    Benadryl [Diphenhydramine]      hyper    Fish-Derived Products Rash       Current Medications:  Current Facility-Administered Medications   Medication Dose Route Frequency Provider Last Rate Last Admin    digoxin (LANOXIN) injection 250 mcg  250 mcg Intravenous Once Arie Holley MD        enoxaparin (LOVENOX) injection 80 mg  1 mg/kg Subcutaneous BID TEMO Kaplan CNP   80 mg at 06/23/21 0814    bumetanide (BUMEX) injection 1 mg  1 mg Intravenous Q12H TEMO Kaplan CNP   1 mg at 06/23/21 0814    levalbuterol (XOPENEX) nebulization 0.63 mg  0.63 mg Nebulization Q8H PRN TEMO Evans - CNP        dilTIAZem 100 mg in dextrose 5 % 100 mL infusion ([facility])  5-15 mg/hr Intravenous Continuous Flaquita Aguilar MD 15 mL/hr at 06/23/21 0734 15 mg/hr at 06/23/21 0734    amLODIPine (NORVASC) tablet 10 mg  10 mg Oral Daily Rozetta Muck, DO   10 mg at 06/23/21 0813    chlordiazePOXIDE-clidinium (LIBRAX) 5-2.5 MG per capsule 1 capsule  1 capsule Oral BID PRN Rozetta Muck, DO        ferrous sulfate (IRON 325) tablet 325 mg  325 mg Oral Daily with breakfast Rozetta Muck, DO   325 mg at 06/23/21 0813    hydrOXYzine (ATARAX) tablet 10 mg  10 mg Oral Nightly Rozetta Muck, DO   10 mg at 06/22/21 2039    pantoprazole (PROTONIX) tablet 40 mg  40 mg Oral QAM AC Rozetta Muck, DO   40 mg at 06/23/21 6207    potassium chloride (KLOR-CON M) extended release tablet 20 mEq  20 mEq Oral BID Rozetta Muck, DO   20 mEq at 06/23/21 9802    pramipexole (MIRAPEX) tablet 0.5 mg  0.5 mg Oral TID Rozetta Muck, DO   0.5 mg at 06/23/21 0813    spironolactone (ALDACTONE) tablet 25 mg  25 mg Oral Daily Rozetta Muck, DO   25 mg at 06/23/21 0813    sodium chloride flush 0.9 % injection 5-40 mL  5-40 mL Intravenous 2 times per day Rozetta Muck, DO   10 mL at 06/22/21 2041    sodium chloride flush 0.9 % injection 5-40 mL  5-40 mL Intravenous PRN Rozetta Muck, DO        0.9 % sodium chloride infusion  25 mL Intravenous PRN Rozetta Muck, DO        ondansetron (ZOFRAN-ODT) disintegrating tablet 4 mg  4 mg Oral Q8H PRN Rozetta Muck, DO   4 mg at 06/23/21 8324    Or    ondansetron (ZOFRAN) injection 4 mg  4 mg Intravenous Q6H PRN Rozetta Muck, DO        polyethylene glycol (GLYCOLAX) packet 17 g  17 g Oral Daily PRN Rozetta Muck, DO        acetaminophen (TYLENOL) tablet 650 mg  650 mg Oral Q6H PRN Rozetta Muck, DO        Or    acetaminophen (TYLENOL) suppository 650 mg  650 mg Rectal Q6H PRN Rozetta Muck, DO        insulin lispro (HUMALOG) injection vial 0-6 Units  0-6 Units Subcutaneous TID WC Charity Loth, DO   1 Units at 06/22/21 1640    insulin lispro (HUMALOG) injection vial 0-3 Units  0-3 Units Subcutaneous Nightly Charity Loth, DO        glucose (GLUTOSE) 40 % oral gel 15 g  15 g Oral PRN Charity Loth, DO        dextrose 50 % IV solution  12.5 g Intravenous PRN Charity Loth, DO        glucagon (rDNA) injection 1 mg  1 mg Intramuscular PRN Charity Loth, DO        dextrose 5 % solution  100 mL/hr Intravenous PRN Charity Loth, DO        traMADol Wen Horn) tablet 50 mg  50 mg Oral Q8H PRN Charity Loth, DO   50 mg at 06/23/21 0819      dilTIAZem 15 mg/hr (06/23/21 0734)    sodium chloride      dextrose         Physical Exam:  /64   Pulse 138   Temp 98.2 °F (36.8 °C) (Oral)   Resp 16   Ht 5' 3\" (1.6 m)   Wt 185 lb 3.2 oz (84 kg)   LMP  (LMP Unknown)   SpO2 95%   BMI 32.81 kg/m²   Wt Readings from Last 3 Encounters:   06/23/21 185 lb 3.2 oz (84 kg)   06/21/21 188 lb 3.2 oz (85.4 kg)   06/17/21 173 lb (78.5 kg)     Appearance: Awake, alert, no acute respiratory distress  Skin: Intact, no rash  Head: Normocephalic, atraumatic  Eyes: EOMI, no conjunctival erythema  ENMT: No pharyngeal erythema, MMM, no rhinorrhea  Neck: Supple, no elevated JVP, no carotid bruits  Lungs: Clear to auscultation bilaterally. No wheezes, rales, or rhonchi. Cardiac: Regular rate and rhythm, +S1S2, no murmurs apparent  Abdomen: Soft, nontender, +bowel sounds  Extremities: Moves all extremities x 4, no lower extremity edema  Neurologic: No focal motor deficits apparent, normal mood and affect  Peripheral Pulses: Intact posterior tibial pulses bilaterally    Intake/Output:    Intake/Output Summary (Last 24 hours) at 6/23/2021 0932  Last data filed at 6/23/2021 9419  Gross per 24 hour   Intake 1019.85 ml   Output 3075 ml   Net -2055.15 ml     No intake/output data recorded.     Laboratory Tests:  Recent Labs     06/21/21  1539 06/22/21  0998 06/23/21  0641    139 136   K 3.2* 3.2* 3.0*   CL 92* 96* 93*   CO2 26 25 30*   BUN 33* 26* 18   CREATININE 1.2* 1.0 1.0   GLUCOSE 132* 139* 139*   CALCIUM 9.1 8.8 9.2     Lab Results   Component Value Date    MG 1.6 06/22/2021     Recent Labs     06/21/21  1539 06/22/21  0941   ALKPHOS 74 68   ALT 11 9   AST 19 18   PROT 8.0 7.6   BILITOT 0.7 1.2   LABALBU 4.2 4.0     Recent Labs     06/21/21  1539 06/22/21  0503   WBC 12.9* 11.4   RBC 4.53 4.10   HGB 10.4* 9.7*   HCT 35.7 32.7*   MCV 78.8* 79.8*   MCH 23.0* 23.7*   MCHC 29.1* 29.7*   RDW 24.1* 23.9*    329   MPV 9.2 9.7     Lab Results   Component Value Date    CKTOTAL 54 06/22/2021    CKMB 4.2 06/22/2021    TROPONINI 0.03 12/05/2020    TROPONINI 0.01 12/28/2017    TROPONINI <0.01 12/28/2017     Lab Results   Component Value Date    INR 1.5 06/21/2021    INR 2.1 05/29/2021    INR 1.9 12/31/2020    PROTIME 16.4 (H) 06/21/2021    PROTIME 22.4 (H) 05/29/2021    PROTIME 21.1 (H) 12/31/2020     Lab Results   Component Value Date    TSH 2.250 06/21/2021     Lab Results   Component Value Date    LABA1C 7.4 03/19/2020     No results found for: EAG  Lab Results   Component Value Date    CHOL 155 10/15/2012     Lab Results   Component Value Date    TRIG 205 06/11/2019    TRIG 162 (H) 10/15/2012     Lab Results   Component Value Date    HDL 15 (A) 06/11/2019    HDL 55 10/31/2017    HDL 49.0 10/15/2012     Lab Results   Component Value Date    LDLCALC 50 10/31/2017    LDLCALC 74 10/15/2012    LDLCHOLESTEROL 0 06/11/2019     Lab Results   Component Value Date    LABVLDL 33 10/31/2017     Lab Results   Component Value Date    CHOLHDLRATIO 0 06/11/2019       Cardiac Tests:  Telemetry findings reviewed: Atrial fibrillation with RVR with heart rate in the 130s, no new tachy/bradyarrhythmias overnight     EKG: Atrial fibrillation with RVR nonspecific ST-T changes, low voltage QRS complex, abnormal EKG.      Vitals and labs were reviewed: Blood pressure 113/56 heart rate of 119, O2 sats 94% 2 L     Labs-potassium 3.2 BUN/creatinine 33/1.2, proBNP 5183, high-sensitivity troponin 43 liver function normal.  H&H 9.7/30.7. TSH 2.2. K 3.0, mag 1.6     1. TTE 01/02/2021 (Dr. Barone Ran ventricular internal dimensions were normal in diastole and systole.  Borderline concentric left ventricular hypertrophy.  No regional WMA.  Normal LVEF.  Doppler evidence of stage II diastolic dysfunction.  The left atrium is severely dilated.  Mild mitral annular calcification.  Mild mitral regurgitation is present.  The aortic valve appears mildly sclerotic.  Mild TR.  Pulmonary hypertension is mild.  There is a trivial posterior pericardial effusion noted.     2. Cardiac cath ~2012  No. significant abnormality per patient report NA.     Assessment:  · Paroxysmal atrial fibrillation with RVR, not controlled on cardizem. Patient for ROGELIO guided cardioversion today  · AVL2LW2-TGVe score-5, on Xarelto, currently off Xarelto for few days  · Acute on chronic HFpEF  · Hypertension, well controlled  · Hyperlipidemia  · Type 2 diabetes  · History of iodine allergy  · Remote history of tobacco use  · Cirrhosis of liver secondary to [place]  · History of diagnoses of the colon status post right hemicolectomy  · Low Mag and K , will replace     Plan:   · conitnue Bumex 1 mg IV every 12 hours x 24 hours and change it in AM with close monitoring of renal functions and electrolytes. · Give mag 1 g IV and 2 runs of K  · Strict input output charting  · Cardiac diet and restrict daily salt intake less than 2 g a day. · Change Lovenox to Xarelto 20 mg po daily. · Underwent successful ROGELIO guided DCCV to NSR  · Continue rest of the home medications. Bia Harris MD., Urszula Peres.   [facility]) Cardiology

## 2021-06-23 NOTE — PLAN OF CARE
Problem: Cardiac:  Goal: Ability to maintain an adequate cardiac output will improve  Description: Ability to maintain an adequate cardiac output will improve  6/23/2021 0920 by Amina Beal RN  Outcome: Met This Shift     Problem: Skin Integrity:  Goal: Will show no infection signs and symptoms  Description: Will show no infection signs and symptoms  Outcome: Met This Shift

## 2021-06-23 NOTE — PATIENT CARE CONFERENCE
[facility] Quality Flow/Interdisciplinary Rounds Progress Note        Quality Flow Rounds held on June 23, 2021    Disciplines Attending:  Bedside Nurse, ,  and Nursing Unit Alondra Nascimento was admitted on 6/21/2021  3:23 PM    Anticipated Discharge Date:  Expected Discharge Date: 06/24/21    Disposition:    Valentin Score:  Valentin Scale Score: 18    Readmission Risk              Risk of Unplanned Readmission:  24           Discussed patient goal for the day, patient clinical progression, and barriers to discharge.   The following Goal(s) of the Day/Commitment(s) have been identified:  Diagnostics - Report Results      Damien Nichols RN  June 23, 2021

## 2021-06-23 NOTE — ANESTHESIA POSTPROCEDURE EVALUATION
Department of Anesthesiology  Postprocedure Note    Patient: Rajani Stout  MRN: [id number]  YOB: 1940  Date of evaluation: 6/23/2021  Time:  4:41 PM     Procedure Summary     Date: 06/23/21 Room / Location: [facility] CATH LAB; [facility] ECHO    Anesthesia Start: 1501 Anesthesia Stop: 6569    Procedure: SEY ROGELIO CARDIOVERSION W/ ANES Diagnosis:       Nonalcoholic steatohepatitis (AMES)      Unspecified cirrhosis of liver    Scheduled Providers: Reyes Adhikari DO Responsible Provider: Reyes Adhikari DO    Anesthesia Type: MAC ASA Status: 3          Anesthesia Type: MAC    Gonzalez Phase I:      Gonzalez Phase II:      Last vitals: Reviewed and per EMR flowsheets.        Anesthesia Post Evaluation    Patient location during evaluation: bedside  Patient participation: complete - patient cannot participate  Level of consciousness: awake and alert  Airway patency: patent  Nausea & Vomiting: no nausea and no vomiting  Complications: no  Cardiovascular status: blood pressure returned to baseline  Respiratory status: acceptable  Hydration status: euvolemic

## 2021-06-23 NOTE — PLAN OF CARE
Problem: Falls - Risk of:  Goal: Will remain free from falls  Description: Will remain free from falls  Outcome: Met This Shift  Goal: Absence of physical injury  Description: Absence of physical injury  Outcome: Met This Shift     Problem: Cardiac:  Goal: Ability to maintain an adequate cardiac output will improve  Description: Ability to maintain an adequate cardiac output will improve  Outcome: Met This Shift     Problem: Fluid Volume:  Goal: Ability to achieve and maintain adequate urine output will improve  Description: Ability to achieve and maintain adequate urine output will improve  Outcome: Met This Shift     Problem: Respiratory:  Goal: Respiratory status will improve  Description: Respiratory status will improve  Outcome: Met This Shift

## 2021-06-23 NOTE — PROGRESS NOTES
Subjective: The patient is awake and alert, c/o sob and needing to sit up to catch her breath. She does deny chest pain. Objective:    /78   Pulse 133   Temp 98.4 °F (36.9 °C) (Temporal)   Resp 16   Ht 5' 3\" (1.6 m)   Wt 185 lb 3.2 oz (84 kg)   LMP  (LMP Unknown)   SpO2 94%   BMI 32.81 kg/m²   HEENT no adenopathy no bruits  Heart: Irr Irr   Lungs:  CTA bilaterally, no wheeze, rales or rhonchi  Abd: bowel sounds present, nontender, nondistended, no masses  Extrem:  No clubbing, cyanosis, or edema  WBC/Hgb/Hct/Plts:  11.4/9.7/32.7/329 (06/22 8098) basic metabolic panel     Assessment:    Patient Active Problem List   Diagnosis    Spinal stenosis in cervical region    Spinal stenosis, lumbar region, without neurogenic claudication    Essential hypertension    Mixed hyperlipidemia    DM (diabetes mellitus) (HCC)    Renal artery aneurysm (HCC)    PAF (paroxysmal atrial fibrillation) (HCC)    Anemia    Malignant neoplasm of cecum (HCC)    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

## 2021-06-23 NOTE — PROCEDURES
Preprocedure diagnosis:  Paroxysmal atrial fibrillation  Procedures, cardioversion elective arrhythmia external    Preprocedure diagnosis: A. Fib/flutter    Prior tests anticoagulated    Primary procedure  Written informed consent was obtained, the ROGELIO was performed under stable fasting condition, self-adhesive anterior-posterior defibrillation pads were applied, the defibrillator was programmed to deliver energy in a synchronized fashion with a EKG. The patient was set up for monitoring of surface EKG and pulse oximetry continuously. Blood pressure was monitored and with automatic cuff measurements. Supplemental oxygen was administered. The procedure was performed under anesthesia by anesthesiology. After ensuring adequate anesthesia, DC CV was performed by delivering 200 J of direct current delivered in a synchronized fashion. Result: Successful cardioversion to sinus rhythm, confirmed on 12-lead EKG. Complication: None    Patient was monitored until awake and vitals remained stable. Eleanor Mendoza M.D.   [facility] cardiology

## 2021-06-24 LAB
ANION GAP SERPL CALCULATED.3IONS-SCNC: 11 MMOL/L (ref 7–16)
BUN BLDV-MCNC: 14 MG/DL (ref 6–23)
CALCIUM SERPL-MCNC: 9.8 MG/DL (ref 8.6–10.2)
CHLORIDE BLD-SCNC: 90 MMOL/L (ref 98–107)
CO2: 34 MMOL/L (ref 22–29)
CREAT SERPL-MCNC: 1 MG/DL (ref 0.5–1)
EKG ATRIAL RATE: 87 BPM
EKG P AXIS: 71 DEGREES
EKG P-R INTERVAL: 174 MS
EKG Q-T INTERVAL: 330 MS
EKG QRS DURATION: 66 MS
EKG QTC CALCULATION (BAZETT): 397 MS
EKG R AXIS: 1 DEGREES
EKG T AXIS: -61 DEGREES
EKG VENTRICULAR RATE: 87 BPM
GFR AFRICAN AMERICAN: >60
GFR NON-AFRICAN AMERICAN: 53 ML/MIN/1.73
GLUCOSE BLD-MCNC: 215 MG/DL (ref 74–99)
MAGNESIUM: 1.2 MG/DL (ref 1.6–2.6)
METER GLUCOSE: 134 MG/DL (ref 74–99)
METER GLUCOSE: 159 MG/DL (ref 74–99)
METER GLUCOSE: 184 MG/DL (ref 74–99)
METER GLUCOSE: 238 MG/DL (ref 74–99)
POTASSIUM SERPL-SCNC: 3.5 MMOL/L (ref 3.5–5)
PRO-BNP: 1765 PG/ML (ref 0–450)
SODIUM BLD-SCNC: 135 MMOL/L (ref 132–146)

## 2021-06-24 PROCEDURE — 2500000003 HC RX 250 WO HCPCS: Performed by: NURSE PRACTITIONER

## 2021-06-24 PROCEDURE — 83735 ASSAY OF MAGNESIUM: CPT

## 2021-06-24 PROCEDURE — 6370000000 HC RX 637 (ALT 250 FOR IP): Performed by: INTERNAL MEDICINE

## 2021-06-24 PROCEDURE — 6360000002 HC RX W HCPCS: Performed by: INTERNAL MEDICINE

## 2021-06-24 PROCEDURE — 2580000003 HC RX 258: Performed by: INTERNAL MEDICINE

## 2021-06-24 PROCEDURE — 99233 SBSQ HOSP IP/OBS HIGH 50: CPT | Performed by: INTERNAL MEDICINE

## 2021-06-24 PROCEDURE — 83880 ASSAY OF NATRIURETIC PEPTIDE: CPT

## 2021-06-24 PROCEDURE — 2700000000 HC OXYGEN THERAPY PER DAY

## 2021-06-24 PROCEDURE — 6360000002 HC RX W HCPCS: Performed by: NURSE PRACTITIONER

## 2021-06-24 PROCEDURE — 80048 BASIC METABOLIC PNL TOTAL CA: CPT

## 2021-06-24 PROCEDURE — 2140000000 HC CCU INTERMEDIATE R&B

## 2021-06-24 PROCEDURE — 36415 COLL VENOUS BLD VENIPUNCTURE: CPT

## 2021-06-24 PROCEDURE — 93010 ELECTROCARDIOGRAM REPORT: CPT | Performed by: INTERNAL MEDICINE

## 2021-06-24 PROCEDURE — 99223 1ST HOSP IP/OBS HIGH 75: CPT | Performed by: INTERNAL MEDICINE

## 2021-06-24 PROCEDURE — 82962 GLUCOSE BLOOD TEST: CPT

## 2021-06-24 RX ORDER — MAGNESIUM SULFATE IN WATER 40 MG/ML
2000 INJECTION, SOLUTION INTRAVENOUS ONCE
Status: COMPLETED | OUTPATIENT
Start: 2021-06-24 | End: 2021-06-24

## 2021-06-24 RX ORDER — BUMETANIDE 1 MG/1
1 TABLET ORAL DAILY
Status: DISCONTINUED | OUTPATIENT
Start: 2021-06-25 | End: 2021-06-30 | Stop reason: HOSPADM

## 2021-06-24 RX ORDER — METOPROLOL TARTRATE 50 MG/1
50 TABLET, FILM COATED ORAL 2 TIMES DAILY
Status: DISCONTINUED | OUTPATIENT
Start: 2021-06-24 | End: 2021-06-30 | Stop reason: HOSPADM

## 2021-06-24 RX ADMIN — INSULIN LISPRO 2 UNITS: 100 INJECTION, SOLUTION INTRAVENOUS; SUBCUTANEOUS at 11:55

## 2021-06-24 RX ADMIN — AMIODARONE HYDROCHLORIDE 0.5 MG/MIN: 50 INJECTION, SOLUTION INTRAVENOUS at 05:44

## 2021-06-24 RX ADMIN — MAGNESIUM SULFATE HEPTAHYDRATE 2000 MG: 40 INJECTION, SOLUTION INTRAVENOUS at 12:53

## 2021-06-24 RX ADMIN — SPIRONOLACTONE 25 MG: 25 TABLET ORAL at 08:31

## 2021-06-24 RX ADMIN — PANTOPRAZOLE SODIUM 40 MG: 40 TABLET, DELAYED RELEASE ORAL at 05:44

## 2021-06-24 RX ADMIN — METOPROLOL TARTRATE 25 MG: 25 TABLET, FILM COATED ORAL at 08:32

## 2021-06-24 RX ADMIN — HYDROXYZINE HYDROCHLORIDE 10 MG: 10 TABLET ORAL at 21:12

## 2021-06-24 RX ADMIN — INSULIN LISPRO 1 UNITS: 100 INJECTION, SOLUTION INTRAVENOUS; SUBCUTANEOUS at 17:01

## 2021-06-24 RX ADMIN — RIVAROXABAN 15 MG: 15 TABLET, FILM COATED ORAL at 17:01

## 2021-06-24 RX ADMIN — FERROUS SULFATE TAB 325 MG (65 MG ELEMENTAL FE) 325 MG: 325 (65 FE) TAB at 08:31

## 2021-06-24 RX ADMIN — TRAMADOL HYDROCHLORIDE 50 MG: 50 TABLET, FILM COATED ORAL at 05:57

## 2021-06-24 RX ADMIN — TRAMADOL HYDROCHLORIDE 50 MG: 50 TABLET, FILM COATED ORAL at 23:53

## 2021-06-24 RX ADMIN — POTASSIUM CHLORIDE 20 MEQ: 1500 TABLET, EXTENDED RELEASE ORAL at 21:12

## 2021-06-24 RX ADMIN — PRAMIPEXOLE DIHYDROCHLORIDE 0.5 MG: 0.25 TABLET ORAL at 08:31

## 2021-06-24 RX ADMIN — METOPROLOL TARTRATE 50 MG: 50 TABLET, FILM COATED ORAL at 21:12

## 2021-06-24 RX ADMIN — TRAMADOL HYDROCHLORIDE 50 MG: 50 TABLET, FILM COATED ORAL at 15:20

## 2021-06-24 RX ADMIN — PRAMIPEXOLE DIHYDROCHLORIDE 0.5 MG: 0.25 TABLET ORAL at 21:14

## 2021-06-24 RX ADMIN — SODIUM CHLORIDE, PRESERVATIVE FREE 10 ML: 5 INJECTION INTRAVENOUS at 21:12

## 2021-06-24 RX ADMIN — POTASSIUM CHLORIDE 20 MEQ: 1500 TABLET, EXTENDED RELEASE ORAL at 08:32

## 2021-06-24 RX ADMIN — INSULIN LISPRO 1 UNITS: 100 INJECTION, SOLUTION INTRAVENOUS; SUBCUTANEOUS at 21:18

## 2021-06-24 RX ADMIN — BUMETANIDE 1 MG: 0.25 INJECTION INTRAMUSCULAR; INTRAVENOUS at 08:32

## 2021-06-24 RX ADMIN — PRAMIPEXOLE DIHYDROCHLORIDE 0.5 MG: 0.25 TABLET ORAL at 15:20

## 2021-06-24 RX ADMIN — SODIUM CHLORIDE, PRESERVATIVE FREE 10 ML: 5 INJECTION INTRAVENOUS at 08:33

## 2021-06-24 RX ADMIN — AMLODIPINE BESYLATE 10 MG: 10 TABLET ORAL at 08:31

## 2021-06-24 ASSESSMENT — PAIN SCALES - GENERAL
PAINLEVEL_OUTOF10: 6
PAINLEVEL_OUTOF10: 9
PAINLEVEL_OUTOF10: 10
PAINLEVEL_OUTOF10: 10
PAINLEVEL_OUTOF10: 0

## 2021-06-24 ASSESSMENT — PAIN DESCRIPTION - ORIENTATION
ORIENTATION: LOWER
ORIENTATION: LOWER

## 2021-06-24 ASSESSMENT — PAIN DESCRIPTION - LOCATION
LOCATION: BACK
LOCATION: BACK

## 2021-06-24 ASSESSMENT — PAIN DESCRIPTION - DESCRIPTORS: DESCRIPTORS: ACHING;BURNING;CRAMPING

## 2021-06-24 ASSESSMENT — PAIN DESCRIPTION - ONSET: ONSET: ON-GOING

## 2021-06-24 ASSESSMENT — PAIN DESCRIPTION - PROGRESSION: CLINICAL_PROGRESSION: NOT CHANGED

## 2021-06-24 ASSESSMENT — PAIN DESCRIPTION - FREQUENCY: FREQUENCY: INTERMITTENT

## 2021-06-24 ASSESSMENT — PAIN - FUNCTIONAL ASSESSMENT: PAIN_FUNCTIONAL_ASSESSMENT: ACTIVITIES ARE NOT PREVENTED

## 2021-06-24 ASSESSMENT — PAIN DESCRIPTION - PAIN TYPE
TYPE: CHRONIC PAIN
TYPE: CHRONIC PAIN

## 2021-06-24 NOTE — PATIENT CARE CONFERENCE
P Quality Flow/Interdisciplinary Rounds Progress Note        Quality Flow Rounds held on June 24, 2021    Disciplines Attending:  Bedside Nurse, ,  and Nursing Unit Alondra Nascimento was admitted on 6/21/2021  3:23 PM    Anticipated Discharge Date:  Expected Discharge Date: 06/25/21    Disposition:    Valentin Score:  Valentin Scale Score: 18    Readmission Risk              Risk of Unplanned Readmission:  25           Discussed patient goal for the day, patient clinical progression, and barriers to discharge.   The following Goal(s) of the Day/Commitment(s) have been identified:  remain in sinus marta Dominguez RN  June 24, 2021

## 2021-06-24 NOTE — PROGRESS NOTES
Subjective: The patient is awake and alert. No problems overnight. Denies chest pain, angina. Denies abdominal pain. Tolerating diet. No nausea or vomiting. She continues to have palpitations and sob. Objective:  Pt is aox3 in nad   /63   Pulse 124   Temp 98.6 °F (37 °C) (Oral)   Resp 18   Ht 5' 3\" (1.6 m)   Wt 180 lb 3.2 oz (81.7 kg)   LMP  (LMP Unknown)   SpO2 94%   BMI 31.92 kg/m²   HEENT no adenopathy no bruits  Heart:  Irr Irr  no murmurs, gallops, or rubs.   Lungs:  CTA bilaterally, no wheeze, rales or rhonchi  Abd: bowel sounds present, nontender, nondistended, no masses  Extrem:  No clubbing, cyanosis, or edema  WBC/Hgb/Hct/Plts:  11.4/9.7/32.7/329 (06/22 8835) basic metabolic panel     Assessment:    Patient Active Problem List   Diagnosis    Spinal stenosis in cervical region    Spinal stenosis, lumbar region, without neurogenic claudication    Essential hypertension    Mixed hyperlipidemia    DM (diabetes mellitus) ([address]    Renal artery aneurysm (HCC)    PAF (paroxysmal atrial fibrillation) (HCC)    Anemia    Malignant neoplasm of cecum (HCC)    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

## 2021-06-24 NOTE — PROGRESS NOTES
INPATIENT CARDIOLOGY FOLLOW-UP    Name: Diego Ellison    Age: 80 y.o. Date of Admission: 6/21/2021  3:23 PM    Date of Service: 6/24/2021    Chief Complaint: Follow-up for CANDY herron with RVR    Interim History:  No new overnight cardiac complaints. She is feeling palpitations and tachycardia. Currently with no complaints of CP, SOB, palpitations, dizziness, or lightheadedness. AF on telemetry. Review of Systems:   Cardiac: As per HPI  General: No fever, chills  Pulmonary: As per HPI  HEENT: No visual disturbances, difficult swallowing  GI: No nausea, vomiting  Endocrine: No thyroid disease or DM  Musculoskeletal: QUINTERO x 4, no focal motor deficits  Skin: Intact, no rashes  Neuro/Psych: No headache or seizures    Problem List:  Patient Active Problem List   Diagnosis    Spinal stenosis in cervical region    Spinal stenosis, lumbar region, without neurogenic claudication    Essential hypertension    Mixed hyperlipidemia    DM (diabetes mellitus) ([facility] [address]    Renal artery aneurysm (HCC)    PAF (paroxysmal atrial fibrillation) (HCC)    Anemia    Malignant neoplasm of cecum (HCC)    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)    Chronic heart failure with preserved ejection fraction (HCC)    Atrial fibrillation with RVR (HCC)       Allergies:   Allergies   Allergen Reactions    Iodine Shortness Of Breath     SOB, Rash    Benadryl [Diphenhydramine]      hyper    Fish-Derived Products Rash       Current Medications:  Current Facility-Administered Medications   Medication Dose Route Frequency Provider Last Rate Last Admin    amiodarone (CORDARONE) 450 mg in dextrose 5 % 250 mL infusion  0.5 mg/min Intravenous Continuous Philippe Ayala MD 16.7 mL/hr at 06/24/21 0544 0.5 mg/min at 06/24/21 0544    metoprolol tartrate (LOPRESSOR) tablet 25 mg  25 mg Oral BID Philippe Ayala MD   25 mg at 06/24/21 9379    rivaroxaban (XARELTO) tablet 15 mg  15 mg Oral Daily Philippe Ayala lispro (HUMALOG) injection vial 0-6 Units  0-6 Units Subcutaneous TID WC Annie Jarredsas, DO   1 Units at 06/22/21 1640    insulin lispro (HUMALOG) injection vial 0-3 Units  0-3 Units Subcutaneous Nightly Annie Jarredsas, DO   1 Units at 06/23/21 2053    glucose (GLUTOSE) 40 % oral gel 15 g  15 g Oral PRN Annie [place], DO        dextrose 50 % IV solution  12.5 g Intravenous PRN [facility]        glucagon (rDNA) injection 1 mg  1 mg Intramuscular PRN [facility], [facility]        dextrose 5 % solution  100 mL/hr Intravenous PRN Annie Jarredsas, DO        traMADol Rabia Lv) tablet 50 mg  50 mg Oral Q8H PRN Annie Jarredsas, DO   50 mg at 06/24/21 0557      amiodarone 450mg/250ml D5W infusion 0.5 mg/min (06/24/21 0544)    sodium chloride      dextrose         Physical Exam:  BP (!) 113/59   Pulse 135   Temp 98.3 °F (36.8 °C) (Temporal)   Resp 18   Ht 5' 3\" (1.6 m)   Wt 180 lb 3.2 oz (81.7 kg)   LMP  (LMP Unknown)   SpO2 94%   BMI 31.92 kg/m²   Wt Readings from Last 3 Encounters:   06/24/21 180 lb 3.2 oz (81.7 kg)   06/21/21 188 lb 3.2 oz (85.4 kg)   06/17/21 173 lb (78.5 kg)     Appearance: Awake, alert, no acute respiratory distress  Skin: Intact, no rash  Head: Normocephalic, atraumatic  Eyes: EOMI, no conjunctival erythema  ENMT: No pharyngeal erythema, MMM, no rhinorrhea  Neck: Supple, no elevated JVP, no carotid bruits  Lungs: Clear to auscultation bilaterally. No wheezes, rales, or rhonchi.   Cardiac: Regular rate and rhythm, +S1S2, no murmurs apparent  Abdomen: Soft, nontender, +bowel sounds  Extremities: Moves all extremities x 4, no lower extremity edema  Neurologic: No focal motor deficits apparent, normal mood and affect  Peripheral Pulses: Intact posterior tibial pulses bilaterally    Intake/Output:    Intake/Output Summary (Last 24 hours) at 6/24/2021 0948  Last data filed at 6/24/2021 0648  Gross per 24 hour   Intake 1487.08 ml   Output 2540 ml   Net -1052.92 ml     No intake/output data recorded.     Laboratory Tests:  Recent Labs     06/21/21  1539 06/22/21  0941 06/23/21  0641    139 136   K 3.2* 3.2* 3.0*   CL 92* 96* 93*   CO2 26 25 30*   BUN 33* 26* 18   CREATININE 1.2* 1.0 1.0   GLUCOSE 132* 139* 139*   CALCIUM 9.1 8.8 9.2     Lab Results   Component Value Date    MG 1.6 06/22/2021     Recent Labs     06/21/21  1539 06/22/21  0941   ALKPHOS 74 68   ALT 11 9   AST 19 18   PROT 8.0 7.6   BILITOT 0.7 1.2   LABALBU 4.2 4.0     Recent Labs     06/21/21  1539 06/22/21  0503   WBC 12.9* 11.4   RBC 4.53 4.10   HGB 10.4* 9.7*   HCT 35.7 32.7*   MCV 78.8* 79.8*   MCH 23.0* 23.7*   MCHC 29.1* 29.7*   RDW 24.1* 23.9*    329   MPV 9.2 9.7     Lab Results   Component Value Date    CKTOTAL 54 06/22/2021    CKMB 4.2 06/22/2021    TROPONINI 0.03 12/05/2020    TROPONINI 0.01 12/28/2017    TROPONINI <0.01 12/28/2017     Lab Results   Component Value Date    INR 1.5 06/21/2021    INR 2.1 05/29/2021    INR 1.9 12/31/2020    PROTIME 16.4 (H) 06/21/2021    PROTIME 22.4 (H) 05/29/2021    PROTIME 21.1 (H) 12/31/2020     Lab Results   Component Value Date    TSH 2.250 06/21/2021     Lab Results   Component Value Date    LABA1C 7.4 03/19/2020     No results found for: EAG  Lab Results   Component Value Date    CHOL 155 10/15/2012     Lab Results   Component Value Date    TRIG 205 06/11/2019    TRIG 162 (H) 10/15/2012     Lab Results   Component Value Date    HDL 15 (A) 06/11/2019    HDL 55 10/31/2017    HDL 49.0 10/15/2012     Lab Results   Component Value Date    LDLCALC 50 10/31/2017    LDLCALC 74 10/15/2012    LDLCHOLESTEROL 0 06/11/2019     Lab Results   Component Value Date    LABVLDL 33 10/31/2017     Lab Results   Component Value Date    CHOLHDLRATIO 0 06/11/2019       Cardiac Tests:  Telemetry findings reviewed: Atrial fibrillation with RVR with heart rate in the 130s, no new tachy/bradyarrhythmias overnight     EKG: Atrial fibrillation with RVR nonspecific ST-T changes, low voltage QRS complex, abnormal EKG.    Vitals and labs were reviewed: Blood pressure 113/59 heart rate of 135, O2 sats 94% 2 L     Labs-potassium 3.2 BUN/creatinine 33/1.2, proBNP 5183, high-sensitivity troponin 43 liver function normal.  H&H 9.7/30.7. TSH 2.2. K 3.0, mag 1.6     1. TTE 01/02/2021 (Dr. Wanda Ramosr ventricular internal dimensions were normal in diastole and systole.  Borderline concentric left ventricular hypertrophy.  No regional WMA.  Normal LVEF.  Doppler evidence of stage II diastolic dysfunction.  The left atrium is severely dilated.  Mild mitral annular calcification.  Mild mitral regurgitation is present.  The aortic valve appears mildly sclerotic.  Mild TR.  Pulmonary hypertension is mild.  There is a trivial posterior pericardial effusion noted.     2. Cardiac cath ~2012  No. significant abnormality per patient report NA.      TTE-6/3/2021:  Normal left ventricle size and systolic function. Ejection fraction is visually estimated at 60-65%. Atrial fibrillation may   affect the evaluation of LV systolic function. No regional wall motion abnormalities seen. No evidence of patent foramen ovale on bubble study. Normal right ventricular size and function. Mild mitral regurgitation is present. No hemodynamically significant aortic stenosis is present. Mild to moderate tricuspid regurgitation. RVSP is 36 mmHg. Normal estimated PA systolic pressure. No evidence for hemodynamically significant pericardial effusion.     Assessment:  · Paroxysmal atrial fibrillation with RVR, not controlled on cardizem.  Underwent successful ROGELIO guided DCCV on 6/23/21 and went to back to AF RVR few hours later, initiated on IV amiodarone still tachy  · [id number] score-5, on Xarelto 15 mg p.o. daily  · Acute on chronic HFpEF, euvolemic  · Hypertension, well controlled  · Hyperlipidemia  · Type 2 diabetes  · History of iodine allergy  · Remote history of tobacco use  · Cirrhosis of liver secondary to [place]  · History of diagnoses of the colon status post right hemicolectomy  · Low Mag and K , will replace, labs today are pending      Plan:   · Change Bumex to 1 mg p.o. daily. · ROGELIO results were reviewed and has normal LV function with mild to moderate TR RVSP 36 and mild TR. · Continue cardiac diet and restrict daily salt intake less than 2 g a day. · Continue Xarelto 15 mg p.o. daily  · Discussed with the patient about doing another cardioversion in a.m. She already ate breakfast today. We will keep her n.p.o. and schedule for this cardioversion tomorrow morning. · We will also obtain EP consult regarding recurrent proximal A. fib and failed cardioversion. · Continue rest of the home medications. Discussed with  from the EP service and will schedule her for a Lexiscan nuclear stress test in a.m. to rule out ischemia after the cardioversion. Corey Cline MD., Yeyo Rogel.   [facility]) Cardiology

## 2021-06-24 NOTE — CONSULTS
[address] [facility],[address] and [facility]  Consultation Report  PATIENT: [place] RECORD NUMBER: [id number]  DATE OF SERVICE:  6/24/2021  ATTENDING ELECTROPHYSIOLOGIST: Guerda Cantu MD  PRIMARY ELECTROPHYSIOLOGIST: Guerda Cantu MD  REFERRING PHYSICIAN: No ref. provider found and Garima Adhikari MD  CHIEF COMPLAINT: Paroxysmal atrial fibrillation    HPI: This is a 80 y.o. 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 The patient has history of paroxysmal atrial fibrillation, HFpEF, hypertension, hyperlipidemia, diabetes mellitus, cirrhosis of liver secondary to AMES and spinal stenosis. The patient reports history of PAF since 12/2017. She was seen by Dr. Bay Brown and was noted to be in AF with RVR at 160 bpm. She was transferred to ED for further evaluation. She was started on IV Cardizem and IV Digoxin. She missed 2 doses of Xarelto for dental work and subsequently underwent successful ROGELIO and DC-cardioversion on 6/23/21. She was noted to be back in AF with RVR on 16.52 PM of 6/23/21. The patient was started on IV Amiodarone bolus and then IV drip. Currently she is in AF with -140 bpm. Cardiac electrophysiology service is consulted for recurrent AF with RVR.     Patient Active Problem List    Diagnosis Date Noted  Essential hypertension 10/14/2012     Priority: High    Mixed hyperlipidemia 10/14/2012     Priority: High     Overview Note:     replace inactive diagnosis      DM (diabetes mellitus) ([facility] 10/14/2012     Priority: High    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) ([address] 2021    Chronic heart failure with preserved ejection fraction ([address] 2021    Atrial fibrillation with RVR ([facility] 2021    Malignant neoplasm of cecum ([facility] 2019    Anemia 2018    PAF (paroxysmal atrial fibrillation) (HCC)     Renal artery aneurysm ([facility] 07/15/2015    Spinal stenosis in cervical region 2012    Spinal stenosis, lumbar region, without neurogenic claudication 2012       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[address] 2019    Anemia     Arm numbness     Arthritis     Blood transfusion     Cervical spinal stenosis     Cirrhosis of liver (HCC)     Diabetes mellitus ([address]     Fatty liver     GERD (gastroesophageal reflux disease)     Hyperlipidemia     Hypertension     Low back pain     Lumbar stenosis     Neck pain     Obesity (BMI 30.0-34.9) 10/14/2012    PAF (paroxysmal atrial fibrillation) (HCC)     Renal artery aneurysm ([facility] 7/15/2015       Family History   Problem Relation Age of Onset    Diabetes Mother     Stroke Father     Diabetes Sister     High Blood Pressure Sister     Diabetes Brother     High Blood Pressure Brother     Cancer Brother     Heart Disease Brother        Social History     Tobacco Use    Smoking status: Former Smoker     Packs/day: 2.00     Years: 15.00     Pack years: 30.00     Quit date: 3/1/1997     Years since quittin.3    Smokeless tobacco: Never Used   Substance Use Topics    Alcohol use: Yes     Comment: Holidays       Current Facility-Administered Medications   Medication Dose Route Frequency Provider Last Rate Last Admin    amiodarone (CORDARONE) 450 mg in dextrose 5 % 250 mL infusion  0.5 mg/min Intravenous Continuous Christian Cronin MD 16.7 mL/hr at 06/24/21 0544 0.5 mg/min at 06/24/21 0544    [START ON 6/25/2021] bumetanide (BUMEX) tablet 1 mg  1 mg Oral Daily Christian Cronin MD        metoprolol tartrate (LOPRESSOR) tablet 25 mg  25 mg Oral BID Christian Cronin MD   25 mg at 06/24/21 6591    rivaroxaban (XARELTO) tablet 15 mg  15 mg Oral Daily Christian Cronin MD   15 mg at 06/23/21 1749    levalbuterol (XOPENEX) nebulization 0.63 mg  0.63 mg Nebulization Q8H PRN TEMO Burnett - CNP        amLODIPine (NORVASC) tablet 10 mg  10 mg Oral Daily Lisa Louder, DO   10 mg at 06/24/21 0831    chlordiazePOXIDE-clidinium (LIBRAX) 5-2.5 MG per capsule 1 capsule  1 capsule Oral BID PRN Lisa Louder, DO        ferrous sulfate (IRON 325) tablet 325 mg  325 mg Oral Daily with breakfast Lisa Louder, DO   325 mg at 06/24/21 0831    hydrOXYzine (ATARAX) tablet 10 mg  10 mg Oral Nightly Lisa Louder, DO   10 mg at 06/23/21 2052    pantoprazole (PROTONIX) tablet 40 mg  40 mg Oral QAM AC Lisa Louder, DO   40 mg at 06/24/21 0544    potassium chloride (KLOR-CON M) extended release tablet 20 mEq  20 mEq Oral BID Lisa Louder, DO   20 mEq at 06/24/21 9414    pramipexole (MIRAPEX) tablet 0.5 mg  0.5 mg Oral TID Lisa Louder, DO   0.5 mg at 06/24/21 0831    spironolactone (ALDACTONE) tablet 25 mg  25 mg Oral Daily Lisa Louder, DO   25 mg at 06/24/21 0831    sodium chloride flush 0.9 % injection 5-40 mL  5-40 mL Intravenous 2 times per day Lisa Louder, DO   10 mL at 06/24/21 3779    sodium chloride flush 0.9 % injection 5-40 mL  5-40 mL Intravenous PRN Lisa Louder, DO        0.9 % sodium chloride infusion  25 mL Intravenous PRN Lisa Louder, DO        ondansetron (ZOFRAN-ODT) disintegrating tablet 4 mg  4 mg Oral Q8H PRN Lisa Louder, DO   4 mg at 06/23/21 7054    Or    ondansetron (ZOFRAN) injection 4 mg  4 mg Intravenous Q6H PRN Lisa Louder, DO        polyethylene glycol (GLYCOLAX) packet 17 g  17 g Oral Daily PRN Myrla Francoise, DO        acetaminophen (TYLENOL) tablet 650 mg  650 mg Oral Q6H PRN Myrla Francoise, DO        Or    acetaminophen (TYLENOL) suppository 650 mg  650 mg Rectal Q6H PRN Myrla Francoise, DO        insulin lispro (HUMALOG) injection vial 0-6 Units  0-6 Units Subcutaneous TID WC Myrla Francoise, DO   1 Units at 06/22/21 1640    insulin lispro (HUMALOG) injection vial 0-3 Units  0-3 Units Subcutaneous Nightly Myrla Francoise, DO   1 Units at 06/23/21 2053    glucose (GLUTOSE) 40 % oral gel 15 g  15 g Oral PRN Myrla Francoise, DO        dextrose 50 % IV solution  12.5 g Intravenous PRN Myrla Francoise, DO        glucagon (rDNA) injection 1 mg  1 mg Intramuscular PRN Myrla Francoise, DO        dextrose 5 % solution  100 mL/hr Intravenous PRN Myrla Francoise, DO        traMADol [place] Fare) tablet 50 mg  50 mg Oral Q8H PRN Myrla Francoise, DO   50 mg at 06/24/21 4136        Allergies   Allergen Reactions    Iodine Shortness Of Breath     SOB, Rash    Benadryl [Diphenhydramine]      hyper    Fish-Derived Products Rash     ROS:   Constitutional: Negative for fever. Positive for activity change and negative for appetite change. HENT: Negative for epistaxis. Eyes: Negative for diploplia, blurred vision. Respiratory: Negative for cough and chest tightness. Positive for shortness of breath and negative for wheezing. Cardiovascular: pertinent positives in HPI  Gastrointestinal: Negative for abdominal pain and blood in stool.    All other review of systems are negative     PHYSICAL EXAM:   Vitals:    06/24/21 0345 06/24/21 0458 06/24/21 0705 06/24/21 0754   BP: (!) 112/57  112/63 (!) 113/59   Pulse: 125  124 135   Resp: 18  18    Temp: 98.5 °F (36.9 °C)  98.6 °F (37 °C) 98.3 °F (36.8 °C)   TempSrc: Oral  Oral Temporal   SpO2: 94%  94% 94%   Weight:  180 lb 3.2 oz (81.7 kg)     Height:          Constitutional: Well-developed, no acute distress  Eyes: conjunctivae normal, no xanthelasma   Ears, Nose, Throat: oral mucosa moist, no cyanosis   CV: no JVD. IRIR. No murmurs, rubs, or gallops. PMI is nondisplaced  Lungs: clear to auscultation bilaterally, normal respiratory effort without used of accessory muscles  Abdomen: soft, non-tender, bowel sounds present, no masses or hepatomegaly   Musculoskeletal: no digital clubbing, 1+ edema of LEs  Skin: warm, no rashes     I have personally reviewed the laboratory, cardiac diagnostic and radiographic testing as outlined below:    Data:    Recent Labs     06/21/21  1539 06/22/21  0503   WBC 12.9* 11.4   HGB 10.4* 9.7*   HCT 35.7 32.7*    329     Recent Labs     06/21/21  1539 06/22/21  0941 06/23/21  0641    139 136   K 3.2* 3.2* 3.0*   CL 92* 96* 93*   CO2 26 25 30*   BUN 33* 26* 18   CREATININE 1.2* 1.0 1.0   CALCIUM 9.1 8.8 9.2      Lab Results   Component Value Date    MG 1.6 06/22/2021     Recent Labs     06/21/21  1927   TSH 2.250     Recent Labs     06/21/21  1539   INR 1.5       CXR 6/21/21:   FINDINGS:   The heart is mildly enlarged.  Streaky pulmonary opacities in the lower lobes   are nonspecific and are grossly stable as of 05/29/2021.  No evidence of   pulmonary vascular congestion.   No pneumothorax or pleural effusion is seen.           Impression   1. Nonspecific streaky pulmonary opacities in the lungs, which are stable as   of 05/29/2021. Sanjuana Davy may be infectious/inflammatory in etiology or related to   chronic scarring. Telemetry: -140 BPM. AF since 6/23/21 on 16.52 PM    EKG 6/23/21: NSR 87 bpm, PACs, PVCs, old IMI, QTc 397 ms. Please see scan in Cardiology. ROGELIO 6/23/21:  Findings      Left Ventricle   Normal left ventricle size and systolic function. Ejection fraction is visually estimated at 60-65%. Atrial fibrillation may   affect the evaluation of LV systolic function. No regional wall motion abnormalities seen. Normal left ventricle wall thickness. Right Ventricle   Normal right ventricular size and function. Left Atrium   Moderately dilated left atrium. No evidence of thrombus within left atrium or appendage. Agitated saline injected for shunt evaluation. No evidence of patent foramen ovale on bubble study. Right Atrium   Normal right atrium size. Mitral Valve   Normal mitral valve structure and function. Mild mitral regurgitation is present. No evidence of mitral valve stenosis. Tricuspid Valve   The tricuspid valve appears structurally normal.   Mild to moderate tricuspid regurgitation. RVSP is 36 mmHg. Normal estimated PA systolic pressure. Aortic Valve   Structurally normal aortic valve. The aortic valve is trileaflet. No hemodynamically significant aortic stenosis is present. No evidence of aortic valve regurgitation. Pulmonic Valve   Pulmonic valve is structurally normal.   Physiologic and/or trace pulmonic regurgitation present. No evidence of pulmonic valve stenosis. Pericardial Effusion   No evidence for hemodynamically significant pericardial effusion. Pleural Effusion   No evidence of pleural effusion. Aorta   Normal aortic root and ascending aorta. Miscellaneous   The inferior vena cava diameter is normal with normal respiratory   variation. Conclusions      Summary   Normal left ventricle size and systolic function. Ejection fraction is visually estimated at 60-65%. Atrial fibrillation may   affect the evaluation of LV systolic function. No regional wall motion abnormalities seen. No evidence of patent foramen ovale on bubble study. Normal right ventricular size and function. Mild mitral regurgitation is present. No hemodynamically significant aortic stenosis is present. Mild to moderate tricuspid regurgitation. RVSP is 36 mmHg. Normal estimated PA systolic pressure. No evidence for hemodynamically significant pericardial effusion.       Signature ----------------------------------------------------------------   Electronically signed by Beto Montenegro MD(Interpreting   physician) on 06/23/2021 07:40 PM   ----------------------------------------------------------------    Echocardiogram 1/2/21:   Findings      Left Ventricle   Left ventricular internal dimensions were normal in diastole and systole. Borderline concentric left ventricular hypertrophy. No regional wall motion abnormalities seen. Normal left ventricular ejection fraction. Ejection fraction is visually estimated at 65%. There is doppler evidence of stage II diastolic dysfunction. Right Ventricle   Normal right ventricular size and function. Left Atrium   The left atrium is severely dilated. Interatrial septum appears intact. Right Atrium   Normal right atrium size. Mitral Valve   Structurally normal mitral valve. Mild mitral annular calcification. Mild mitral regurgitation is present. Tricuspid Valve   The tricuspid valve appears structurally normal.   Mild tricuspid regurgitation. RVSP is 40 mmHg. Pulmonary hypertension is mild . Aortic Valve   The aortic valve appears mildly sclerotic. Pulmonic Valve   The pulmonic valve was not well visualized. Pericardial Effusion   There is a trivial posterior pericardial effusion noted. Aorta   Aortic root dimension within normal limits. Conclusions      Summary   Left ventricular internal dimensions were normal in diastole and systole. Borderline concentric left ventricular hypertrophy. No regional wall motion abnormalities seen. Normal left ventricular ejection fraction. There is doppler evidence of stage II diastolic dysfunction. The left atrium is severely dilated. Mild mitral annular calcification. Mild mitral regurgitation is present. The aortic valve appears mildly sclerotic. Mild tricuspid regurgitation. Pulmonary hypertension is mild .    There is a trivial posterior pericardial effusion noted. Signature      ----------------------------------------------------------------   Electronically signed by Michelle Stephen MD(Interpreting   physician) on 01/02/2021 03:59 PM   ----------------------------------------------------------------    Stress Test 10/15/12:   Impression-    1. Very mildly positive myocardial perfusion study for the   stress-induced myocardial ischemia, in the expected distribution of   right coronary artery. 2. Left ventricular ejection fraction is 61% and normal.       If there are any physician concerns regarding this report, a   Radiologist can be reached by calling  the following number   [phone].            - CALUS Sheldon   Read By- Kylah Morejon   Released By- Kylah Morejon   Released Date Time- 10/15/12 5534       I have independently reviewed all of the ECGs and rhythm strips per above     Assessment/Plan: This is a 80 y.o. female with a history of   Patient Active Problem List   Diagnosis    Spinal stenosis in cervical region    Spinal stenosis, lumbar region, without neurogenic claudication    Essential hypertension    Mixed hyperlipidemia    DM [facility]di[facility]) [facility]    Renal artery aneurysm ([facility]    PAF (paroxysmal atrial fibrillation) (HCC)    Anemia    Malignant neoplasm of cecum (HCC)    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)    Chronic heart failure with preserved ejection fraction ([address]    Atrial fibrillation with RVR ([facility]    who presents with recurrent paroxysmal atrial fibrillation. 1. Paroxysmal atrial fibrillation  - [id number] of 6 (female, age, HF, HTN, DM)  - First diagnosed 12/27/17.  - Recurrent AF 6/21/21.  - ROGELIO and successful DC-cardioversion on 6/23/21.   - Noted to be back in AF with RVR on 16.52 PM of 6/23/21.   - Currently on IV Amiodarone and oral Lopressor for rhythm and rate control, respectively. - On Xarelto for stroke risk reduction.  - Now in AF with RVR.  - Education on importance of well controlled HTN (goal BP < 130/80), adequate weight control (goal BMI of < 27), physical activity consisting of moderate cardiopulmonary exercise up to a goal of 250 min/wk, daily compliance with CPAP in treating sleep apnea, smoking cessation and limited ETOH intake. 2. HFpEF  - Currently compensated. - On Bumex and Aldactone. 3. Hypertension  - Controlled. - On Norvasc, Bumex, Aldactone and Lopressor    4. Hyperlipidemia  - On Protonix. 5. Diabetes mellitus  - On Insulin. 6. Cirrhosis of liver   - Secondary to AMES     7. Spinal stenosis. Recommendations:    1. Continue IV Amiodarone drip at 0.5 mg/min overnight. 2. Increase Lopressor to 50 mg bid and consider reduce Norvasc dose if BP is low. 3. Continue Xarelto 15 mg daily. 4. Agree of repeating DC-cardioversion tomorrow. If fails, will need oral loading for 2 to 3 weeks before repeat another DC-cardioversion  5. Consider ischemic work up due to multiple CAD risk factors with recurrent HF and refractory AF. Will defer this to Cardiology. I have spent a total of 90 minutes with the patient and the family reviewing the above stated recommendations. And a total of >50% of that time involved face-to-face time providing counseling and or coordination of care with the other providers, reviewing records/tests, counseling/education of the patient, ordering medications/tests/procedures, coordinating care, and documenting clinical information in the EHR. Thank you for allowing me to participate in your patient's care. Please call me if there are any questions or concerns.       Ralph North MD  Cardiac Electrophysiology  [address]  [facility] [place]  10:51 AM  6/24/2021

## 2021-06-24 NOTE — CARE COORDINATION
SOCIAL WORK/CASEMANAGEMENT TRANSITION OF CARE [id number] Tracy Rhoades, [address], [place], [phone]): I met with pt in the room this a.m. she is on 1l o2 nc and wants it for home. I told her she must qualify for home o2 and we will check her on discharge. I offered a dme list but she declined. Pt has no preference to dme co so she agreed to [facility] dme. Pt had cardioversion yesterday that was successful but went back into afib and is to go again for a dccv again tomorrow if needed. PT said her  will be able to meet all of her needs when I offered [facility] and she declined. Tacos/luke to follow.  Sujey Person, ADRIAN  6/24/2021

## 2021-06-24 NOTE — PROGRESS NOTES
Malcolm Young M.D.,[facility]  Birdie Salvador D.O., F.A.C.O.I., Jennifer Rachel M.D. Nemo Clifford M.D., Simin Villar M.D. Sylvia Redd D.O. Daily Pulmonary Progress Note    Patient:  Silvia Paris 80 y.o. female MRN: [id number]     Date of Service: 6/24/2021      Synopsis     We are following patient for dyspnea    \"CC\" SOB  Code status:FULL      Subjective      Patient was seen and examined. sitting up in chair no complaints. Oxygen at 2 liters NC. Feeling short of breath. Worse with exertion. Cardioversion unsuccessful, re attempt tomorrow per cardiology. Daytime hypersomnolence. Review of Systems:     Constitutional: Denies fever, weight loss, night sweats, and fatigue  Skin: Denies pigmentation, dark lesions, and rashes   HEENT: Denies hearing loss, tinnitus, ear drainage, epistaxis, sore throat, and hoarseness.   Cardiovascular: chest discomfort and palpitations 2-3 pillow orthopnea, leg swelling  Respiratory: dyspnea worse with exertion  Gastrointestinal: Denies nausea, vomiting, poor appetite, diarrhea, heartburn or reflux  Genitourinary: Denies dysuria, frequency, urgency or hematuria  Musculoskeletal: Denies myalgias, muscle weakness, and bone pain  Neurological: Denies dizziness, vertigo, headache, and focal weakness  Psychological: Denies anxiety and depression  Endocrine: Denies heat intolerance and cold intolerance  Hematopoietic/Lymphatic: Denies bleeding problems and blood transfusions  24-hour events:  None     Objective   Vitals: BP (!) 113/59   Pulse 135   Temp 98.3 °F (36.8 °C) (Temporal)   Resp 18   Ht 5' 3\" (1.6 m)   Wt 180 lb 3.2 oz (81.7 kg)   LMP  (LMP Unknown)   SpO2 94%   BMI 31.92 kg/m²     I/O:    Intake/Output Summary (Last 24 hours) at 6/24/2021 1702  Last data filed at 6/24/2021 1336  Gross per 24 hour   Intake 1607.08 ml   Output 1940 ml   Net -332.92 ml       Vent Information  SpO2: 94 %                CURRENT MEDS :  Scheduled Meds:   [START ON 6/25/2021] bumetanide  1 mg Oral Daily    metoprolol tartrate  50 mg Oral BID    rivaroxaban  15 mg Oral Daily    amLODIPine  10 mg Oral Daily    ferrous sulfate  325 mg Oral Daily with breakfast    hydrOXYzine  10 mg Oral Nightly    pantoprazole  40 mg Oral QAM AC    potassium chloride  20 mEq Oral BID    pramipexole  0.5 mg Oral TID    spironolactone  25 mg Oral Daily    sodium chloride flush  5-40 mL Intravenous 2 times per day    insulin lispro  0-6 Units Subcutaneous TID WC    insulin lispro  0-3 Units Subcutaneous Nightly       Physical Exam:   General: The patient is lying in bed comfortably without any distress. Breathing is not labored  HEENT: Pupils are equal round and reactive to light, there are no oral lesions and no post-nasal drip   Neck: supple without adenopathy  Cardiovascular: regular rate and rhythm without murmur or gallop  Respiratory: DIMINISHED basilar crackles to auscultation bilaterally without wheezing . Air entry is symmetric  Abdomen: ASCITES, non-tender, non-distended, normal bowel sounds  Extremities: warm,  no clubbing trace lower extremity edema  Skin: no rash or lesion  Neurologic: CN II-XII grossly intact, no focal deficits       Pertinent/ New Labs and Imaging Studies     Imaging Personally Reviewed:  FINDINGS:   The heart is mildly enlarged. Streaky pulmonary opacities in the lower lobes   are nonspecific and are grossly stable as of 05/29/2021. No evidence of   pulmonary vascular congestion. No pneumothorax or pleural effusion is seen. Impression   1. Nonspecific streaky pulmonary opacities in the lungs, which are stable as   of 05/29/2021. They may be infectious/inflammatory in etiology or related to   chronic scarring. cta chest dec 2020  1. No pulmonary embolism is seen. 2.  No acute cardiopulmonary abnormality. 3. Cardiomegaly. No pulmonary edema or pleural effusion.    4. Mild mediastinal lymphadenopathy, which may be reactive. Given patient's   history of cecal adenocarcinoma, a metastatic etiology cannot be excluded. 5. Irregular liver contour indicating cirrhosis. Small volume perihepatic   ascites. 6. Reflux of contrast into the hepatic veins may signify right heart failure. Echo: 1/2/21  Summary   Left ventricular internal dimensions were normal in diastole and systole. Borderline concentric left ventricular hypertrophy. No regional wall motion abnormalities seen. Normal left ventricular ejection fraction. There is doppler evidence of stage II diastolic dysfunction. The left atrium is severely dilated. Mild mitral annular calcification. Mild mitral regurgitation is present. The aortic valve appears mildly sclerotic. Mild tricuspid regurgitation. Pulmonary hypertension is mild . There is a trivial posterior pericardial effusion noted. Labs:  Lab Results   Component Value Date    WBC 11.4 06/22/2021    HGB 9.7 06/22/2021    HCT 32.7 06/22/2021    MCV 79.8 06/22/2021    MCH 23.7 06/22/2021    MCHC 29.7 06/22/2021    RDW 23.9 06/22/2021     06/22/2021    MPV 9.7 06/22/2021     Lab Results   Component Value Date     06/24/2021    K 3.5 06/24/2021    K 3.2 06/21/2021    CL 90 06/24/2021    CO2 34 06/24/2021    BUN 14 06/24/2021    CREATININE 1.0 06/24/2021    LABALBU 4.0 06/22/2021    CALCIUM 9.8 06/24/2021    GFRAA >60 06/24/2021    LABGLOM 53 06/24/2021     Lab Results   Component Value Date    PROTIME 16.4 06/21/2021    INR 1.5 06/21/2021     Recent Labs     06/24/21  0514   PROBNP 1,765*     No results for input(s): PROCAL in the last 72 hours. This SmartLink has not been configured with any valid records. Micro:  No results for input(s): CULTRESP in the last 72 hours. No results for input(s): LABGRAM in the last 72 hours. No results for input(s): LEGUR in the last 72 hours. No results for input(s): STREPNEUMAGU in the last 72 hours.   No results for input(s): LP1UAG in the last 72 hours. 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 NPO after MN for repeat ROGELIO/Cardioversion,. Stress test  Bronchodilators Xopenex as needed  Continue  incentive spirometer  Xarelto daily   GI prophylaxis  Recommend OP PFTs and OP sleep study testing once recovered      This plan of care was reviewed in collaboration with Dr. Lonnie Bermudez  Electronically signed by TEMO Tan CNP on 6/24/2021 at 5:02 PM      I personally saw, examined, and cared for the patient. Labs, medications, radiographs reviewed. I agree with history exam and plans detailed in NP note. Jade Sheehan M.D.    Pulmonary/Critical Care Medicine

## 2021-06-25 ENCOUNTER — ANESTHESIA (OUTPATIENT)
Dept: CARDIAC CATH/INVASIVE PROCEDURES | Age: 81
DRG: 286 | End: 2021-06-25
Payer: MEDICARE

## 2021-06-25 ENCOUNTER — APPOINTMENT (OUTPATIENT)
Dept: NON INVASIVE DIAGNOSTICS | Age: 81
DRG: 286 | End: 2021-06-25
Payer: MEDICARE

## 2021-06-25 ENCOUNTER — APPOINTMENT (OUTPATIENT)
Dept: NUCLEAR MEDICINE | Age: 81
DRG: 286 | End: 2021-06-25
Payer: MEDICARE

## 2021-06-25 ENCOUNTER — ANESTHESIA EVENT (OUTPATIENT)
Dept: CARDIAC CATH/INVASIVE PROCEDURES | Age: 81
DRG: 286 | End: 2021-06-25
Payer: MEDICARE

## 2021-06-25 VITALS
RESPIRATION RATE: 19 BRPM | SYSTOLIC BLOOD PRESSURE: 107 MMHG | OXYGEN SATURATION: 96 % | DIASTOLIC BLOOD PRESSURE: 55 MMHG

## 2021-06-25 LAB
LV EF: 63 %
LVEF MODALITY: NORMAL
METER GLUCOSE: 136 MG/DL (ref 74–99)
METER GLUCOSE: 150 MG/DL (ref 74–99)

## 2021-06-25 PROCEDURE — 6370000000 HC RX 637 (ALT 250 FOR IP): Performed by: NURSE PRACTITIONER

## 2021-06-25 PROCEDURE — 2580000003 HC RX 258

## 2021-06-25 PROCEDURE — 6360000002 HC RX W HCPCS: Performed by: INTERNAL MEDICINE

## 2021-06-25 PROCEDURE — 6370000000 HC RX 637 (ALT 250 FOR IP): Performed by: INTERNAL MEDICINE

## 2021-06-25 PROCEDURE — 92960 CARDIOVERSION ELECTRIC EXT: CPT | Performed by: INTERNAL MEDICINE

## 2021-06-25 PROCEDURE — 99233 SBSQ HOSP IP/OBS HIGH 50: CPT | Performed by: INTERNAL MEDICINE

## 2021-06-25 PROCEDURE — 2700000000 HC OXYGEN THERAPY PER DAY

## 2021-06-25 PROCEDURE — 93017 CV STRESS TEST TRACING ONLY: CPT

## 2021-06-25 PROCEDURE — 82962 GLUCOSE BLOOD TEST: CPT

## 2021-06-25 PROCEDURE — 93005 ELECTROCARDIOGRAM TRACING: CPT | Performed by: INTERNAL MEDICINE

## 2021-06-25 PROCEDURE — 93016 CV STRESS TEST SUPVJ ONLY: CPT | Performed by: INTERNAL MEDICINE

## 2021-06-25 PROCEDURE — 2709999900 HC NON-CHARGEABLE SUPPLY

## 2021-06-25 PROCEDURE — 78452 HT MUSCLE IMAGE SPECT MULT: CPT | Performed by: INTERNAL MEDICINE

## 2021-06-25 PROCEDURE — 2580000003 HC RX 258: Performed by: INTERNAL MEDICINE

## 2021-06-25 PROCEDURE — 78452 HT MUSCLE IMAGE SPECT MULT: CPT

## 2021-06-25 PROCEDURE — 93018 CV STRESS TEST I&R ONLY: CPT | Performed by: INTERNAL MEDICINE

## 2021-06-25 PROCEDURE — 2140000000 HC CCU INTERMEDIATE R&B

## 2021-06-25 PROCEDURE — 5A2204Z RESTORATION OF CARDIAC RHYTHM, SINGLE: ICD-10-PCS | Performed by: INTERNAL MEDICINE

## 2021-06-25 PROCEDURE — 3430000000 HC RX DIAGNOSTIC RADIOPHARMACEUTICAL: Performed by: RADIOLOGY

## 2021-06-25 PROCEDURE — 6360000002 HC RX W HCPCS

## 2021-06-25 PROCEDURE — A9500 TC99M SESTAMIBI: HCPCS | Performed by: RADIOLOGY

## 2021-06-25 RX ORDER — AMIODARONE HYDROCHLORIDE 200 MG/1
200 TABLET ORAL DAILY
Status: DISCONTINUED | OUTPATIENT
Start: 2021-07-03 | End: 2021-06-30 | Stop reason: HOSPADM

## 2021-06-25 RX ORDER — PROPOFOL 10 MG/ML
INJECTION, EMULSION INTRAVENOUS PRN
Status: DISCONTINUED | OUTPATIENT
Start: 2021-06-25 | End: 2021-06-25 | Stop reason: SDUPTHER

## 2021-06-25 RX ORDER — SODIUM CHLORIDE 9 MG/ML
INJECTION, SOLUTION INTRAVENOUS CONTINUOUS PRN
Status: DISCONTINUED | OUTPATIENT
Start: 2021-06-25 | End: 2021-06-25 | Stop reason: SDUPTHER

## 2021-06-25 RX ORDER — AMIODARONE HYDROCHLORIDE 200 MG/1
400 TABLET ORAL 2 TIMES DAILY
Status: DISCONTINUED | OUTPATIENT
Start: 2021-06-25 | End: 2021-06-30 | Stop reason: HOSPADM

## 2021-06-25 RX ADMIN — Medication 29 MILLICURIE: at 12:13

## 2021-06-25 RX ADMIN — REGADENOSON 0.4 MG: 0.08 INJECTION, SOLUTION INTRAVENOUS at 11:54

## 2021-06-25 RX ADMIN — PRAMIPEXOLE DIHYDROCHLORIDE 0.5 MG: 0.25 TABLET ORAL at 20:36

## 2021-06-25 RX ADMIN — METOPROLOL TARTRATE 50 MG: 50 TABLET, FILM COATED ORAL at 20:35

## 2021-06-25 RX ADMIN — RIVAROXABAN 15 MG: 15 TABLET, FILM COATED ORAL at 18:30

## 2021-06-25 RX ADMIN — AMIODARONE HYDROCHLORIDE 400 MG: 200 TABLET ORAL at 20:35

## 2021-06-25 RX ADMIN — TRAMADOL HYDROCHLORIDE 50 MG: 50 TABLET, FILM COATED ORAL at 23:20

## 2021-06-25 RX ADMIN — SODIUM CHLORIDE: 9 INJECTION, SOLUTION INTRAVENOUS at 10:56

## 2021-06-25 RX ADMIN — PANTOPRAZOLE SODIUM 40 MG: 40 TABLET, DELAYED RELEASE ORAL at 06:18

## 2021-06-25 RX ADMIN — POTASSIUM CHLORIDE 20 MEQ: 1500 TABLET, EXTENDED RELEASE ORAL at 20:36

## 2021-06-25 RX ADMIN — AMIODARONE HYDROCHLORIDE 0.5 MG/MIN: 50 INJECTION, SOLUTION INTRAVENOUS at 02:05

## 2021-06-25 RX ADMIN — Medication 12 MILLICURIE: at 08:05

## 2021-06-25 RX ADMIN — HYDROXYZINE HYDROCHLORIDE 10 MG: 10 TABLET ORAL at 20:35

## 2021-06-25 RX ADMIN — SODIUM CHLORIDE, PRESERVATIVE FREE 10 ML: 5 INJECTION INTRAVENOUS at 20:35

## 2021-06-25 RX ADMIN — PROPOFOL 50 MG: 10 INJECTION, EMULSION INTRAVENOUS at 11:06

## 2021-06-25 ASSESSMENT — PAIN SCALES - GENERAL
PAINLEVEL_OUTOF10: 8
PAINLEVEL_OUTOF10: 0
PAINLEVEL_OUTOF10: 5

## 2021-06-25 ASSESSMENT — ENCOUNTER SYMPTOMS: RESPIRATORY NEGATIVE: 1

## 2021-06-25 ASSESSMENT — PAIN DESCRIPTION - PROGRESSION: CLINICAL_PROGRESSION: NOT CHANGED

## 2021-06-25 NOTE — PLAN OF CARE
Problem: Falls - Risk of:  Goal: Will remain free from falls  Description: Will remain free from falls  Outcome: Ongoing  Goal: Absence of physical injury  Description: Absence of physical injury  Outcome: Ongoing     Problem: Cardiac:  Goal: Ability to maintain an adequate cardiac output will improve  Description: Ability to maintain an adequate cardiac output will improve  Outcome: Ongoing  Goal: Hemodynamic stability will improve  Description: Hemodynamic stability will improve  Outcome: Ongoing     Problem: Fluid Volume:  Goal: Ability to achieve and maintain adequate urine output will improve  Description: Ability to achieve and maintain adequate urine output will improve  Outcome: Ongoing     Problem: Respiratory:  Goal: Respiratory status will improve  Description: Respiratory status will improve  Outcome: Ongoing     Problem: Skin Integrity:  Goal: Will show no infection signs and symptoms  Description: Will show no infection signs and symptoms  Outcome: Ongoing  Goal: Absence of new skin breakdown  Description: Absence of new skin breakdown  Outcome: Ongoing     Problem: Pain:  Goal: Pain level will decrease  Description: Pain level will decrease  Outcome: Ongoing  Goal: Control of acute pain  Description: Control of acute pain  Outcome: Ongoing  Goal: Control of chronic pain  Description: Control of chronic pain  Outcome: Ongoing

## 2021-06-25 NOTE — ANESTHESIA PRE PROCEDURE
Department of Anesthesiology  Preprocedure Note       Name:  Mario Beltrán   Age:  80 y.o.  :  1940                                          MRN:  [id number]         Date:  2021    Surgeon: Jerry Castillo    Procedure: DCCV    Medications prior to admission:   Prior to Admission medications    Medication Sig Start Date End Date Taking? Authorizing Provider   amLODIPine (NORVASC) 10 MG tablet 10 mg daily  21  Yes Historical Provider, MD   Biotin 5000 MCG TABS Take by mouth daily   Yes Historical Provider, MD   calcium carbonate (OSCAL) 500 MG TABS tablet Take 500 mg by mouth daily   Yes Historical Provider, MD   chlordiazePOXIDE-clidinium (LIBRAX) 5-2.5 MG per capsule Take 1 capsule by mouth as needed. Yes Historical Provider, MD   metoprolol tartrate (LOPRESSOR) 25 MG tablet Take 25 mg by mouth 2 times daily   Yes Historical Provider, MD   pramipexole (MIRAPEX) 0.5 MG tablet Take 0.5 mg by mouth 3 times daily   Yes Historical Provider, MD   spironolactone (ALDACTONE) 25 MG tablet Take 1 tablet by mouth daily 21  Yes Lizbet Granados MD   fluticasone (FLONASE) 50 MCG/ACT nasal spray 2 sprays by Nasal route daily   Yes Historical Provider, MD   furosemide (LASIX) 20 MG tablet Take 20 mg by mouth 2 times daily   Yes Historical Provider, MD   potassium chloride (KLOR-CON M) 20 MEQ extended release tablet Take 20 mEq by mouth 2 times daily   Yes Historical Provider, MD   traMADol (ULTRAM) 50 MG tablet Take 1 tablet by mouth every 8 hours as needed for Pain for up to 60 days. Take lowest dose possible to manage pain 21 Yes Pooja Richmond MD   ferrous sulfate (IRON 325) 325 (65 Fe) MG tablet Take 1 tablet by mouth daily (with breakfast) 6/3/21  Yes Fransisco Powell DO   miconazole (MICOTIN) 2 % powder Apply topically 2 times daily.  21  Yes Jovana London MD   glimepiride (AMARYL) 2 MG tablet Take 2 mg by mouth every morning (before breakfast)    Yes Historical Provider, MD   oxybutynin at 06/24/21 0831    hydrOXYzine (ATARAX) tablet 10 mg  10 mg Oral Nightly Lemon Drape, DO   10 mg at 06/24/21 2112    pantoprazole (PROTONIX) tablet 40 mg  40 mg Oral QAM AC Lemon Drape, DO   40 mg at 06/25/21 2433    potassium chloride (KLOR-CON M) extended release tablet 20 mEq  20 mEq Oral BID Lemon Drape, DO   20 mEq at 06/24/21 2112    pramipexole (MIRAPEX) tablet 0.5 mg  0.5 mg Oral TID Lemon Drape, DO   0.5 mg at 06/24/21 2114    spironolactone (ALDACTONE) tablet 25 mg  25 mg Oral Daily Lemon Drape, DO   25 mg at 06/24/21 0831    sodium chloride flush 0.9 % injection 5-40 mL  5-40 mL Intravenous 2 times per day Lemon Drape, DO   10 mL at 06/24/21 2112    sodium chloride flush 0.9 % injection 5-40 mL  5-40 mL Intravenous PRN Lemon Drape, DO        0.9 % sodium chloride infusion  25 mL Intravenous PRN Lemon Drape, DO        ondansetron (ZOFRAN-ODT) disintegrating tablet 4 mg  4 mg Oral Q8H PRN Lemon Drape, DO   4 mg at 06/23/21 5984    Or    ondansetron (ZOFRAN) injection 4 mg  4 mg Intravenous Q6H PRN Lemon Drape, DO        polyethylene glycol (GLYCOLAX) packet 17 g  17 g Oral Daily PRN Lemon Drape, DO        acetaminophen (TYLENOL) tablet 650 mg  650 mg Oral Q6H PRN Lemon Drape, DO        Or    acetaminophen (TYLENOL) suppository 650 mg  650 mg Rectal Q6H PRN Lemon Drape, DO        insulin lispro (HUMALOG) injection vial 0-6 Units  0-6 Units Subcutaneous TID WC Lemon Drape, DO   1 Units at 06/24/21 1701    insulin lispro (HUMALOG) injection vial 0-3 Units  0-3 Units Subcutaneous Nightly Lemon Drape, DO   1 Units at 06/24/21 2118    glucose (GLUTOSE) 40 % oral gel 15 g  15 g Oral PRN Lemon Drape, DO        dextrose 50 % IV solution  12.5 g Intravenous PRN Lemon Drape, DO        glucagon (rDNA) injection 1 mg  1 mg Intramuscular PRN Lemon Drape, DO        dextrose 5 % solution  100 mL/hr Intravenous PRN Lemon Drape, DO        traMADol HYSTERECTOMY      LAPAROSCOPY  2017    [facility]. Dr.Joseph Joe Vargas TRANSESOPHAGEAL ECHOCARDIOGRAM  2021    UPPER GASTROINTESTINAL ENDOSCOPY N/A 2018    EGD BIOPSY performed by Janny Teixeira MD at [place] [address] N/A 2020    EGD BIOPSY performed by Jonathan Young MD at [place] [address]  2020    EGD CONTROL HEMORRHAGE performed by Jonathan [facility] MD at [facility] History:    Social History     Tobacco Use    Smoking status: Former Smoker     Packs/day: 2.00     Years: 15.00     Pack years: 30.00     Quit date: 3/1/1997     Years since quittin.3    Smokeless tobacco: Never Used   Substance Use Topics    Alcohol use: Yes     Comment: Holidays                                Counseling given: Not Answered      Vital Signs (Current):   Vitals:    21 1520 21 2100 21 0615 21 0638   BP: 114/62 110/76 106/76    Pulse: 135 127 130    Resp: 16 17 18    Temp: 36.7 °C (98 °F) 36.1 °C (97 °F) 36.5 °C (97.7 °F)    TempSrc: Temporal Temporal Oral    SpO2: 95% 95% 92%    Weight:    184 lb 3.2 oz (83.6 kg)   Height:                                                  BP Readings from Last 3 Encounters:   21 106/76   21 (!) 111/53   21 (!) 142/84       NPO Status:                                                    > 8 hours                             BMI:   Wt Readings from Last 3 Encounters:   21 184 lb 3.2 oz (83.6 kg)   21 188 lb 3.2 oz (85.4 kg)   21 173 lb (78.5 kg)     Body mass index is 32.63 kg/m².     CBC:   Lab Results   Component Value Date    WBC 11.4 2021    RBC 4.10 2021    HGB 9.7 2021    HCT 32.7 2021    MCV 79.8 2021    RDW 23.9 2021     2021       CMP:   Lab Results   Component Value Date     2021    K 3.5 2021    K 3.2 2021    CL 90 2021    CO2 34 06/24/2021    BUN 14 06/24/2021    CREATININE 1.0 06/24/2021    GFRAA >60 06/24/2021    LABGLOM 53 06/24/2021    GLUCOSE 215 06/24/2021    PROT 7.6 06/22/2021    CALCIUM 9.8 06/24/2021    BILITOT 1.2 06/22/2021    ALKPHOS 68 06/22/2021    AST 18 06/22/2021    ALT 9 06/22/2021       POC Tests: No results for input(s): POCGLU, POCNA, POCK, POCCL, POCBUN, POCHEMO, POCHCT in the last 72 hours. Coags:   Lab Results   Component Value Date    PROTIME 16.4 06/21/2021    INR 1.5 06/21/2021    APTT 34.0 12/28/2017       HCG (If Applicable): No results found for: PREGTESTUR, PREGSERUM, HCG, HCGQUANT     ABGs: No results found for: PHART, PO2ART, OXA2DPA, NGT2WQJ, BEART, N2SEBYAS     Type & Screen (If Applicable):  No results found for: LABABO, LABRH    Drug/Infectious Status (If Applicable):  No results found for: HIV, HEPCAB    COVID-19 Screening (If Applicable):   Lab Results   Component Value Date    COVID19 Not Detected 12/31/2020    COVID19 Not Detected 12/05/2020           Anesthesia Evaluation  Patient summary reviewed  Airway: Mallampati: II  TM distance: <3 FB   Neck ROM: full  Mouth opening: < 3 FB Dental:    (+) lower dentures      Pulmonary:                             ROS comment: On 2L NC   Cardiovascular:    (+) hypertension:, dysrhythmias: atrial fibrillation,                   Neuro/Psych:   Negative Neuro/Psych ROS              GI/Hepatic/Renal:   (+) GERD: well controlled, liver disease (non alcoholic fatty liver):,           Endo/Other:    (+) Diabetes, . ROS comment: Malignant neoplasm of cecum s/p hemicolectomy   Abdominal:             Vascular: negative vascular ROS. Other Findings:             Anesthesia Plan      MAC     ASA 3       Induction: intravenous. Anesthetic plan and risks discussed with patient. Plan discussed with attending.                   TEMO Alves CRNA   6/25/2021      TEMO Alves CRNA  June 25, 2021  10:28 AM

## 2021-06-25 NOTE — PROCEDURES
Lexiscan Stress EKG Report:    Dx CP    Baseline EKG: normal sinus rhythm, nonspecific ST and T waves changes. Stress EKG: No ST-T changes. Arrhythmias: None. Symptoms: None. Summary:  Unremarkable lexiscan stress EKG. See separate report for stress perfusion results. Guicho Pablo D.O.   Cardiologist  Cardiology, [facility]

## 2021-06-25 NOTE — PROCEDURES
Preprocedure diagnosis:  Paroxysmal atrial fibrillation  Procedures, cardioversion elective arrhythmia external    Preprocedure diagnosis: A. Fib/flutter    Prior tests anticoagulated    Primary procedure  Written informed consent was obtained, the cardioversion was performed under stable fasting condition, self-adhesive anterior-posterior defibrillation pads were applied, the defibrillator was programmed to deliver energy in a synchronized fashion with a EKG. The patient was set up for monitoring of surface EKG and pulse oximetry continuously. Blood pressure was monitored and with automatic cuff measurements. Supplemental oxygen was administered. The procedure was performed under anesthesia by anesthesiology. After ensuring adequate anesthesia, DC CV was performed by delivering 200 J of direct current delivered in a synchronized fashion. Result: Successful cardioversion to sinus rhythm, confirmed on 12-lead EKG. Complication: None    Patient was monitored until awake and vitals remained stable. Bhavna Centeno M.D.   [facility] cardiology

## 2021-06-25 NOTE — ANESTHESIA POSTPROCEDURE EVALUATION
Department of Anesthesiology  Postprocedure Note    Patient: Mayra Gonzalez  MRN: [id number]  YOB: 1940  Date of evaluation: 6/25/2021  Time:  11:15 AM     Procedure Summary     Date: 06/25/21 Room / Location: [facility] CATH LAB    Anesthesia Start: 1059 Anesthesia Stop: 1112    Procedure: CARDIOVERSION WITH ANESTHESIA Diagnosis:     Scheduled Providers:  Responsible Provider: Armando Escalante MD    Anesthesia Type: MAC ASA Status: 3          Anesthesia Type: MAC    Gonzalez Phase I:      Gonzalez Phase II:      Last vitals: Reviewed and per EMR flowsheets.        Anesthesia Post Evaluation    Patient location during evaluation: PACU  Patient participation: complete - patient participated  Level of consciousness: awake  Pain score: 0  Airway patency: patent  Nausea & Vomiting: no nausea  Complications: no  Cardiovascular status: hemodynamically stable  Respiratory status: acceptable  Hydration status: stable

## 2021-06-25 NOTE — PROGRESS NOTES
INPATIENT CARDIOLOGY FOLLOW-UP    Name: Travis Manzano    Age: 80 y.o. Date of Admission: 6/21/2021  3:23 PM    Date of Service: 6/25/2021    Chief Complaint: Follow-up for CANDY herron with RVR    Interim History:  No new overnight cardiac complaints. She is feeling palpitations and tachycardia. Currently with no complaints of CP, SOB, palpitations, dizziness, or lightheadedness. AF on telemetry. Review of Systems:   Cardiac: As per HPI  General: No fever, chills  Pulmonary: As per HPI  HEENT: No visual disturbances, difficult swallowing  GI: No nausea, vomiting  Endocrine: No thyroid disease or DM  Musculoskeletal: QUINTERO x 4, no focal motor deficits  Skin: Intact, no rashes  Neuro/Psych: No headache or seizures    Problem List:  Patient Active Problem List   Diagnosis    Spinal stenosis in cervical region    Spinal stenosis, lumbar region, without neurogenic claudication    Essential hypertension    Mixed hyperlipidemia    DM (diabetes mellitus) ([facility] [address]    Renal artery aneurysm (HCC)    PAF (paroxysmal atrial fibrillation) (HCC)    Anemia    Malignant neoplasm of cecum (HCC)    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)    Chronic heart failure with preserved ejection fraction (HCC)    Atrial fibrillation with RVR (HCC)       Allergies:   Allergies   Allergen Reactions    Iodine Shortness Of Breath     SOB, Rash    Benadryl [Diphenhydramine]      hyper    Fish-Derived Products Rash       Current Medications:  Current Facility-Administered Medications   Medication Dose Route Frequency Provider Last Rate Last Admin    technetium sestamibi (CARDIOLITE) injection 33 millicurie  33 millicurie Intravenous ONCE PRN Ashu Vang II, MD        amiodarone (CORDARONE) 450 mg in dextrose 5 % 250 mL infusion  0.5 mg/min Intravenous Continuous Ramona Miranda MD 16.7 mL/hr at 06/25/21 0205 0.5 mg/min at 06/25/21 0205    bumetanide (BUMEX) tablet 1 mg  1 mg Oral Daily Abelardo PECK acetaminophen (TYLENOL) tablet 650 mg  650 mg Oral Q6H PRN Shaaron Fossa, DO        Or    acetaminophen (TYLENOL) suppository 650 mg  650 mg Rectal Q6H PRN Shaaron Fossa, DO        insulin lispro (HUMALOG) injection vial 0-6 Units  0-6 Units Subcutaneous TID WC Shaaron Fossa, DO   1 Units at 06/24/21 1701    insulin lispro (HUMALOG) injection vial 0-3 Units  0-3 Units Subcutaneous Nightly Shaaron Fossa, DO   1 Units at 06/24/21 2118    glucose (GLUTOSE) 40 % oral gel 15 g  15 g Oral PRN Shaaron Fossa, DO        dextrose 50 % IV solution  12.5 g Intravenous PRN Shaaron Fossa, DO        glucagon (rDNA) injection 1 mg  1 mg Intramuscular PRN Shaaron Fossa, DO        dextrose 5 % solution  100 mL/hr Intravenous PRN Shaaron Fossa, DO        traMADol Rosea Sat) tablet 50 mg  50 mg Oral Q8H PRN Shaaron Fossa, DO   50 mg at 06/24/21 7193      amiodarone 450mg/250ml D5W infusion 0.5 mg/min (06/25/21 0205)    sodium chloride      dextrose         Physical Exam:  BP (!) 108/59   Pulse 84   Temp 97.7 °F (36.5 °C) (Oral)   Resp 12   Ht 5' 3\" (1.6 m)   Wt 184 lb 3.2 oz (83.6 kg)   LMP  (LMP Unknown)   SpO2 97%   BMI 32.63 kg/m²   Wt Readings from Last 3 Encounters:   06/25/21 184 lb 3.2 oz (83.6 kg)   06/21/21 188 lb 3.2 oz (85.4 kg)   06/17/21 173 lb (78.5 kg)     Appearance: Awake, alert, no acute respiratory distress  Skin: Intact, no rash  Head: Normocephalic, atraumatic  Eyes: EOMI, no conjunctival erythema  ENMT: No pharyngeal erythema, MMM, no rhinorrhea  Neck: Supple, no elevated JVP, no carotid bruits  Lungs: Clear to auscultation bilaterally. No wheezes, rales, or rhonchi.   Cardiac: Regular rate and rhythm, +S1S2, no murmurs apparent  Abdomen: Soft, nontender, +bowel sounds  Extremities: Moves all extremities x 4, no lower extremity edema  Neurologic: No focal motor deficits apparent, normal mood and affect  Peripheral Pulses: Intact posterior tibial pulses bilaterally    Intake/Output:    Intake/Output Summary (Last 24 hours) at 6/25/2021 1116  Last data filed at 6/25/2021 1109  Gross per 24 hour   Intake 710 ml   Output 700 ml   Net 10 ml     I/O this shift: In: 50 [I.V.:50]  Out: 100 [Urine:100]    Laboratory Tests:  Recent Labs     06/23/21  0641 06/24/21  1038    135   K 3.0* 3.5   CL 93* 90*   CO2 30* 34*   BUN 18 14   CREATININE 1.0 1.0   GLUCOSE 139* 215*   CALCIUM 9.2 9.8     Lab Results   Component Value Date    MG 1.2 06/24/2021     No results for input(s): ALKPHOS, ALT, AST, PROT, BILITOT, BILIDIR, LABALBU in the last 72 hours. No results for input(s): WBC, RBC, HGB, HCT, MCV, MCH, MCHC, RDW, PLT, MPV in the last 72 hours.   Lab Results   Component Value Date    CKTOTAL 54 06/22/2021    CKMB 4.2 06/22/2021    TROPONINI 0.03 12/05/2020    TROPONINI 0.01 12/28/2017    TROPONINI <0.01 12/28/2017     Lab Results   Component Value Date    INR 1.5 06/21/2021    INR 2.1 05/29/2021    INR 1.9 12/31/2020    PROTIME 16.4 (H) 06/21/2021    PROTIME 22.4 (H) 05/29/2021    PROTIME 21.1 (H) 12/31/2020     Lab Results   Component Value Date    TSH 2.250 06/21/2021     Lab Results   Component Value Date    LABA1C 7.4 03/19/2020     No results found for: EAG  Lab Results   Component Value Date    CHOL 155 10/15/2012     Lab Results   Component Value Date    TRIG 205 06/11/2019    TRIG 162 (H) 10/15/2012     Lab Results   Component Value Date    HDL 15 (A) 06/11/2019    HDL 55 10/31/2017    HDL 49.0 10/15/2012     Lab Results   Component Value Date    LDLCALC 50 10/31/2017    LDLCALC 74 10/15/2012    LDLCHOLESTEROL 0 06/11/2019     Lab Results   Component Value Date    LABVLDL 33 10/31/2017     Lab Results   Component Value Date    CHOLHDLRATIO 0 06/11/2019       Cardiac Tests:  Telemetry findings reviewed: Atrial fibrillation with RVR with heart rate in the 130s, no new tachy/bradyarrhythmias overnight     EKG: Atrial fibrillation with RVR nonspecific ST-T changes, low voltage QRS complex, abnormal EKG.    Vitals and labs were reviewed: Blood pressure 108/59 heart rate of 84, O2 sats 97 % 3 L     Labs-potassium 3.2 BUN/creatinine 33/1.2, proBNP 5183, high-sensitivity troponin 43 liver function normal.  H&H 9.7/30.7. TSH 2.2. K 3.0, mag 1.6     1. TTE 01/02/2021 (Dr. Arellano Span ventricular internal dimensions were normal in diastole and systole.  Borderline concentric left ventricular hypertrophy.  No regional WMA.  Normal LVEF.  Doppler evidence of stage II diastolic dysfunction.  The left atrium is severely dilated.  Mild mitral annular calcification.  Mild mitral regurgitation is present.  The aortic valve appears mildly sclerotic.  Mild TR.  Pulmonary hypertension is mild.  There is a trivial posterior pericardial effusion noted.     2. Cardiac cath ~2012  No. significant abnormality per patient report NA.      TTE-6/3/2021:  Normal left ventricle size and systolic function. Ejection fraction is visually estimated at 60-65%. Atrial fibrillation may   affect the evaluation of LV systolic function. No regional wall motion abnormalities seen. No evidence of patent foramen ovale on bubble study. Normal right ventricular size and function. Mild mitral regurgitation is present. No hemodynamically significant aortic stenosis is present. Mild to moderate tricuspid regurgitation. RVSP is 36 mmHg. Normal estimated PA systolic pressure. No evidence for hemodynamically significant pericardial effusion. Lexiscan nuclear stress test-6/25/2021:  1.  ECG during the infusion did not change. 2.  The myocardial perfusion imaging was abnormal.   3.  The abnormality was a large sized, moderate perfusion defect that   is completely reversible involving the distal anterior, anterolateral   and apical walls suggestive of ischemia. 4.  Overall left ventricular systolic function was normal with   regional wall motion abnormalities.    5. Tiburcio Padilla is transient ischemic dilatation. 6.  High risk general pharmacologic stress test.       [facility] you for sending your patient [facility] [address] [address] [facility], [place], [facility] [place], [address] [facility] cardiology.           Assessment:  · Paroxysmal atrial fibrillation with RVR, not controlled on cardizem. Underwent successful ROGELIO guided DCCV on 6/23/21 and went to back to AF RVR few hours later, initiated on IV amiodarone still tachy>> underwent successful cardioversion on 6/25/2021 and remains in sinus rhythm. · [id number] score-5, on Xarelto 15 mg p.o. daily  · Acute on chronic HFpEF, euvolemic  · Hypertension, well controlled  · Hyperlipidemia  · Type 2 diabetes  · History of iodine allergy  · Remote history of tobacco use  · Cirrhosis of liver secondary to [place]  · History of diagnoses of the colon status post right hemicolectomy  · Low Mag and K , will replace, labs today are pending   · Abnormal stress test showing large area of moderate ischemia.     Plan:   · Change Bumex to 1 mg p.o. daily. · ROGELIO results were reviewed and has normal LV function with mild to moderate TR RVSP 36 and mild TR. · Continue cardiac diet and restrict daily salt intake less than 2 g a day. · Continue Xarelto 15 mg p.o. daily  · Underwent successful ROGELIO guided cardioversion. We will change IV amiodarone to p.o. 400 mg twice daily for a week and then 200 mg p.o. daily. · Stress test results were reviewed she needs cardiac cath to rule out obstructive CAD. Will discuss with the patient. · Check BMP and CBC in a.m.  · continue rest of the home medications. Irene Winter MD., Jef Millan.   [facility]) Cardiology

## 2021-06-25 NOTE — PROGRESS NOTES
[facility],[address] and [address] [facility] Electrophysiology  Inpatient Progress Note  PATIENT: [place] RECORD NUMBER: [id number]  DATE OF SERVICE:  6/25/2021   PRIMARY ELECTROPHYSIOLOGIST: Kavita Marques MD  REFERRING PHYSICIAN: No ref. provider found and Zeeshan Hale MD  CHIEF COMPLAINT: Paroxysmal atrial fibrillation    HPI: This is a 80 y.o. female with a history of   Patient Active Problem List   Diagnosis    Spinal stenosis in cervical region    Spinal stenosis, lumbar region, without neurogenic claudication    Essential hypertension    Mixed hyperlipidemia    DM (diabetes mellitus) ([address]    Renal artery aneurysm ([address]    PAF [facility]ox[facility]mal [facility]) [facility]    Anemia    Malignant neoplasm of cecum ([address]    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) ([address]    Chronic heart failure with preserved ejection fraction ([address]    Atrial fibrillation with RVR ([facility]   who presented to the hospital on 6/21/21 complaining of shortness of breath and palpitations for 2 to 3 weeks. The patient has history of paroxysmal atrial fibrillation, HFpEF, hypertension, hyperlipidemia, diabetes mellitus, cirrhosis of liver secondary to AMES and spinal stenosis. The patient reports history of PAF since 12/2017. She was seen by Dr. Ike Bates and was noted to be in AF with RVR at 160 bpm. She was transferred to ED for further evaluation. She was started on IV Cardizem and IV Digoxin. She missed 2 doses of Xarelto for dental work and subsequently underwent successful ROGELIO and DC-cardioversion on 6/23/21. She was noted to be back in AF with RVR on 16.52 PM of 6/23/21. The patient was started on IV Amiodarone bolus and then IV drip. Currently she is in AF with -140 bpm. Cardiac electrophysiology service is consulted for recurrent AF with RVR.    06/25/2021 Martha Montoya is seen in the stress lab. She remained in AF with plan for CV per Dr Niki Chau. In review of Epic notes, she was successfully cardioverted from AF to [facility] Estrella Per cardiology, plan for initiation of PO amiodarone load and discontinuation of IV amiodarone. She offered no cardiac complaints.      Patient Active Problem List    Diagnosis Date Noted    Essential hypertension 10/14/2012     Priority: High    Mixed hyperlipidemia 10/14/2012     Priority: High     Overview Note:     replace inactive diagnosis      DM (diabetes mellitus) ([facility] 10/14/2012     Priority: High    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) ([address] 06/21/2021    Chronic heart failure with preserved ejection fraction ([address] 06/21/2021    Atrial fibrillation with RVR ([address] 06/21/2021    Malignant neoplasm of cecum ([facility] 02/19/2019    Anemia 11/02/2018    PAF (paroxysmal atrial fibrillation) (HCC)     Renal artery aneurysm ([address] 07/15/2015    Spinal stenosis in cervical region 09/19/2012    Spinal stenosis, lumbar region, without neurogenic claudication 09/19/2012       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[address] 01/2019    Anemia     Arm numbness     Arthritis     Blood transfusion     Cervical spinal stenosis     Cirrhosis of liver (HCC)     Diabetes mellitus (HCC)     Fatty liver     GERD (gastroesophageal reflux disease)     Hyperlipidemia     Hypertension     Low back pain     Lumbar stenosis     Neck pain     Obesity (BMI 30.0-34.9) 10/14/2012    PAF (paroxysmal atrial fibrillation) (HCC)     Renal artery aneurysm ([address] 7/15/2015       Family History   Problem Relation Age of Onset    Diabetes Mother     Stroke Father     Diabetes Sister     High Blood Pressure Sister     Diabetes Brother     High Blood Pressure Brother     Cancer Brother     Heart Disease Brother        Social History     Tobacco Use    Smoking status: Former Smoker     Packs/day: 2.00     Years: 15.00     Pack years: 30.00     Quit date: 3/1/1997     Years since quitting: 24.3    Smokeless tobacco: Never Used   Substance Use Topics    Alcohol use: Yes     Comment: Holidays       Current Facility-Administered Medications   Medication Dose Route Frequency Provider Last Rate Last Admin    amiodarone (CORDARONE) 450 mg in dextrose 5 % 250 mL infusion  0.5 mg/min Intravenous Continuous Jacki Healy MD 16.7 mL/hr at 06/25/21 0205 0.5 mg/min at 06/25/21 0205    bumetanide (BUMEX) tablet 1 mg  1 mg Oral Daily Jacki Healy MD        metoprolol tartrate (LOPRESSOR) tablet 50 mg  50 mg Oral BID Christian Cobian MD   50 mg at 06/24/21 2112    rivaroxaban (XARELTO) tablet 15 mg  15 mg Oral Daily Jacki Healy MD   15 mg at 06/24/21 1701    levalbuterol (XOPENEX) nebulization 0.63 mg  0.63 mg Nebulization Q8H PRN TEMO Bloom - CNP        amLODIPine (NORVASC) tablet 10 mg  10 mg Oral Daily Nicholos Crumble, DO   10 mg at 06/24/21 0831    chlordiazePOXIDE-clidinium (LIBRAX) 5-2.5 MG per capsule 1 capsule  1 capsule Oral BID PRN Nikitaolos Crumble, DO        ferrous sulfate (IRON 325) tablet 325 mg  325 mg Oral Daily with breakfast Nicholos Crumble, DO   325 mg at 06/24/21 0831    hydrOXYzine (ATARAX) tablet 10 mg  10 mg Oral Nightly Nicholos Crumble, DO   10 mg at 06/24/21 2112    pantoprazole (PROTONIX) tablet 40 mg  40 mg Oral QAM AC Nikitaolos Crumble, DO   40 mg at 06/25/21 9670    potassium chloride (KLOR-CON M) extended release tablet 20 mEq  20 mEq Oral BID Nikitaolos Crumble, DO   20 mEq at 06/24/21 2112    pramipexole (MIRAPEX) tablet 0.5 mg  0.5 mg Oral TID Nikitaolos Crumble, DO   0.5 mg at 06/24/21 2114    spironolactone (ALDACTONE) tablet 25 mg  25 mg Oral Daily Nikitaolos Crumble, DO   25 mg at 06/24/21 0831    sodium chloride flush 0.9 % injection 5-40 mL  5-40 mL Intravenous 2 times per day Anai Priceumble, DO   10 mL at 06/24/21 2112    sodium chloride flush 0.9 % injection 5-40 mL  5-40 mL Intravenous PRN Anai Priceumble, DO        0.9 % sodium chloride infusion  25 mL Intravenous PRN Myrla Francoise, DO        ondansetron (ZOFRAN-ODT) disintegrating tablet 4 mg  4 mg Oral Q8H PRN Myrla Francoise, DO   4 mg at 06/23/21 3355    Or    ondansetron (ZOFRAN) injection 4 mg  4 mg Intravenous Q6H PRN Myrla Francoise, DO        polyethylene glycol (GLYCOLAX) packet 17 g  17 g Oral Daily PRN Myrla Francoise, DO        acetaminophen (TYLENOL) tablet 650 mg  650 mg Oral Q6H PRN Myrla Francoise, DO        Or    acetaminophen (TYLENOL) suppository 650 mg  650 mg Rectal Q6H PRN Myrla Francoise, DO        insulin lispro (HUMALOG) injection vial 0-6 Units  0-6 Units Subcutaneous TID WC Myrla Francoise, DO   1 Units at 06/24/21 1701    insulin lispro (HUMALOG) injection vial 0-3 Units  0-3 Units Subcutaneous Nightly Myrla Francoise, DO   1 Units at 06/24/21 2118    glucose (GLUTOSE) 40 % oral gel 15 g  15 g Oral PRN Myrla Francoise, DO        dextrose 50 % IV solution  12.5 g Intravenous PRN Myrla Francoise, DO        glucagon (rDNA) injection 1 mg  1 mg Intramuscular PRN Myrla Francoise, DO        dextrose 5 % solution  100 mL/hr Intravenous PRN Myrla Francoise, DO        traMADol [place] Fare) tablet 50 mg  50 mg Oral Q8H PRN Myrla Francoise, DO   50 mg at 06/24/21 1315        Allergies   Allergen Reactions    Iodine Shortness Of Breath     SOB, Rash    Benadryl [Diphenhydramine]      hyper    Fish-Derived Products Rash     Review of Systems   Constitutional: Positive for activity change. Respiratory: Negative. Cardiovascular: Positive for palpitations. Neurological: Negative. All other systems reviewed and are negative.         PHYSICAL EXAM:   Vitals:    06/24/21 2100 06/25/21 0615 06/25/21 0638 06/25/21 1112   BP: 110/76 106/76  (!) 108/59   Pulse: 127 130  84   Resp: 17 18  12   Temp: 97 °F (36.1 °C) 97.7 °F (36.5 °C)     TempSrc: Temporal Oral     SpO2: 95% 92%  97%   Weight:   184 lb 3.2 oz (83.6 kg)    Height:          PHYSICAL EXAM:  Vitals:    06/24/21 2100 06/25/21 0615 06/25/21 0638 06/25/21 1112   BP: 110/76 106/76  (!) 108/59   Pulse: 127 130  84   Resp: 17 18  12   Temp: 97 °F (36.1 °C) 97.7 °F (36.5 °C)     TempSrc: Temporal Oral     SpO2: 95% 92%  97%   Weight:   184 lb 3.2 oz (83.6 kg)    Height:         Constitutional: Oriented to person, place, and time. Well-developed and cooperative. Head: Normocephalic and atraumatic. Eyes: Conjunctivae are normal.    Neck: No hepatojugular reflux and no JVD present. Carotid bruit is not present. Cardiovascular: S1 normal, S2 normal and intact distal pulses. An irregular rhythm present. PMI is not displaced. Pulmonary/Chest: Effort normal and breath sounds with bilateral wheeze. No respiratory distress. Abdominal: Soft. No tenderness. Musculoskeletal: Normal range of motion of all extremities, no muscle weakness. Neurological: Alert and oriented to person, place, and time. Skin: Skin is warm and dry. No bruising, no ecchymosis and no rash noted. Extremity: No clubbing or cyanosis. No edema. Psychiatric: Normal mood and affect. Thought content normal.    I have personally reviewed the laboratory, cardiac diagnostic and radiographic testing as outlined below:    Data:    No results for input(s): WBC, HGB, HCT, PLT in the last 72 hours. Recent Labs     06/23/21  0641 06/24/21  1038    135   K 3.0* 3.5   CL 93* 90*   CO2 30* 34*   BUN 18 14   CREATININE 1.0 1.0   CALCIUM 9.2 9.8      Lab Results   Component Value Date    MG 1.2 06/24/2021     No results for input(s): TSH in the last 72 hours. No results for input(s): INR in the last 72 hours. CXR 6/21/21:   FINDINGS:   The heart is mildly enlarged.  Streaky pulmonary opacities in the lower lobes   are nonspecific and are grossly stable as of 05/29/2021.  No evidence of   pulmonary vascular congestion.   No pneumothorax or pleural effusion is seen.           Impression   1.  Nonspecific streaky pulmonary opacities in the lungs, which are stable as   of 05/29/2021Danell Novel may be infectious/inflammatory in etiology or related to   chronic scarring. Telemetry: -140 BPM. AF since 6/23/21 on 16.52 PM  Telemetry 6/25/21: AF (seen in stress lab), HRs 110-140 bpm    EKG 6/23/21: NSR 87 bpm, PACs, PVCs, old IMI, QTc 397 ms. Please see scan in Cardiology. ROGELIO 6/23/21:  Findings      Left Ventricle   Normal left ventricle size and systolic function. Ejection fraction is visually estimated at 60-65%. Atrial fibrillation may   affect the evaluation of LV systolic function. No regional wall motion abnormalities seen. Normal left ventricle wall thickness. Right Ventricle   Normal right ventricular size and function. Left Atrium   Moderately dilated left atrium. No evidence of thrombus within left atrium or appendage. Agitated saline injected for shunt evaluation. No evidence of patent foramen ovale on bubble study. Right Atrium   Normal right atrium size. Mitral Valve   Normal mitral valve structure and function. Mild mitral regurgitation is present. No evidence of mitral valve stenosis. Tricuspid Valve   The tricuspid valve appears structurally normal.   Mild to moderate tricuspid regurgitation. RVSP is 36 mmHg. Normal estimated PA systolic pressure. Aortic Valve   Structurally normal aortic valve. The aortic valve is trileaflet. No hemodynamically significant aortic stenosis is present. No evidence of aortic valve regurgitation. Pulmonic Valve   Pulmonic valve is structurally normal.   Physiologic and/or trace pulmonic regurgitation present. No evidence of pulmonic valve stenosis. Pericardial Effusion   No evidence for hemodynamically significant pericardial effusion. Pleural Effusion   No evidence of pleural effusion. Aorta   Normal aortic root and ascending aorta. Miscellaneous   The inferior vena cava diameter is normal with normal respiratory   variation.       Conclusions Summary   Normal left ventricle size and systolic function. Ejection fraction is visually estimated at 60-65%. Atrial fibrillation may   affect the evaluation of LV systolic function. No regional wall motion abnormalities seen. No evidence of patent foramen ovale on bubble study. Normal right ventricular size and function. Mild mitral regurgitation is present. No hemodynamically significant aortic stenosis is present. Mild to moderate tricuspid regurgitation. RVSP is 36 mmHg. Normal estimated PA systolic pressure. No evidence for hemodynamically significant pericardial effusion. Signature      ----------------------------------------------------------------   Electronically signed by Maryanne Orozco MD(Interpreting   physician) on 06/23/2021 07:40 PM   ----------------------------------------------------------------    Echocardiogram 1/2/21:   Findings      Left Ventricle   Left ventricular internal dimensions were normal in diastole and systole. Borderline concentric left ventricular hypertrophy. No regional wall motion abnormalities seen. Normal left ventricular ejection fraction. Ejection fraction is visually estimated at 65%. There is doppler evidence of stage II diastolic dysfunction. Right Ventricle   Normal right ventricular size and function. Left Atrium   The left atrium is severely dilated. Interatrial septum appears intact. Right Atrium   Normal right atrium size. Mitral Valve   Structurally normal mitral valve. Mild mitral annular calcification. Mild mitral regurgitation is present. Tricuspid Valve   The tricuspid valve appears structurally normal.   Mild tricuspid regurgitation. RVSP is 40 mmHg. Pulmonary hypertension is mild . Aortic Valve   The aortic valve appears mildly sclerotic. Pulmonic Valve   The pulmonic valve was not well visualized.       Pericardial Effusion   There is a trivial posterior pericardial effusion noted. Aorta   Aortic root dimension within normal limits. Conclusions      Summary   Left ventricular internal dimensions were normal in diastole and systole. Borderline concentric left ventricular hypertrophy. No regional wall motion abnormalities seen. Normal left ventricular ejection fraction. There is doppler evidence of stage II diastolic dysfunction. The left atrium is severely dilated. Mild mitral annular calcification. Mild mitral regurgitation is present. The aortic valve appears mildly sclerotic. Mild tricuspid regurgitation. Pulmonary hypertension is mild . There is a trivial posterior pericardial effusion noted. Signature      ----------------------------------------------------------------   Electronically signed by Romeo Burr MD(Interpreting   physician) on 01/02/2021 03:59 PM   ----------------------------------------------------------------    Stress Test 10/15/12:   Impression-    1. Very mildly positive myocardial perfusion study for the   stress-induced myocardial ischemia, in the expected distribution of   right coronary artery. 2. Left ventricular ejection fraction is 61% and normal.       If there are any physician concerns regarding this report, a   Radiologist can be reached by calling  the following number   [phone].            - CLAUS Sheldon   Read By- Kylah Morejon   Released By- Kylah Morejon   Released Date Time- 10/15/12 8495       I have independently reviewed all of the ECGs and rhythm strips per above     Assessment/Plan:  This is a 80 y.o. female with a history of   Patient Active Problem List   Diagnosis    Spinal stenosis in cervical region    Spinal stenosis, lumbar region, without neurogenic claudication    Essential hypertension    Mixed hyperlipidemia    DM (diabetes mellitus) ([address]    Renal artery aneurysm ([address]    PAF (paroxysmal atrial fibrillation) ([address]    Anemia    Malignant neoplasm of cecum (HCC)    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)    Chronic heart failure with preserved ejection fraction (HCC)    Atrial fibrillation with RVR ([address]    who presents with recurrent paroxysmal atrial fibrillation. 1. Paroxysmal atrial fibrillation  - [id number] of 6 (female, age, HF, HTN, DM)  - First diagnosed 12/27/17.  - Recurrent AF 6/21/21.  - ROGELIO and successful DC-cardioversion on 6/23/21.   - Noted to be back in AF with RVR on 16.52 PM of 6/23/21.   - Currently on IV Amiodarone and oral Lopressor for rhythm and rate control, respectively. - On Xarelto for stroke risk reduction.  - Now in AF with RVR.  - Education on importance of well controlled HTN (goal BP < 130/80), adequate weight control (goal BMI of < 27), physical activity consisting of moderate cardiopulmonary exercise up to a goal of 250 min/wk, daily compliance with CPAP in treating sleep apnea, smoking cessation and limited ETOH intake. - 6/25/21: plan per Dr Salena Giraldo >> repeat CV, start PO amiodarone load with discontinuation of IV amiodarone. 2. HFpEF  - Currently compensated. - On Bumex and Aldactone. 3. Hypertension  - Controlled. - On Norvasc, Bumex, Aldactone and Lopressor    4. Hyperlipidemia  - On Protonix. 5. Diabetes mellitus  - On Insulin. 6. Cirrhosis of liver   - Secondary to AMES     7. Spinal stenosis. Recommendations:    1. Plan to start PO amiodarone with discontinuation of IV Amiodarone as per Cardiology. Discussed with Dr Salena Giraldo. 2. Continue lopressor 50 mg BID for now to aid in HR control. If BP too low consider reducing amlodipine dose. 3. Continue Xarelto 15 mg daily. 4. Stress test results pending. 5. Continue care per Primary team/Consultants. 6. EP will sign off. Please call with concerns. Thank you for allowing me to participate in their care.     TEMO Thomas - NP - discussed with Dr Ralph Lopez Electrophysiology  [facility]) Physicians  [facility] [place] [place] [facility]  12:34 PM  6/25/2021    Attending Physician's Statement    Patient seen with the ANP. Agree with the findings, assessment and plan. Management plan was discussed. I have personally interviewed the patient, independently performed a focused cardiac examination, reviewed the pertinent laboratory and diagnostic testing with the patient and directly participated in the medical decision-making as noted above with the following additions:     Remains in AF with -140 bpm on IV Amiodarone drip at 0.5 mg/min this morning. Underwent successful DC-cardioversion and back in NSR. Anticipates nuclear stress test today. Physical exam showed no JVD, IRIR, tachycardic, no murmur, clear lungs bilaterally, no edema. Plan to switch IV Amiodarone to oral Amiodarone. Continue Lopressor and Xarelto. Await for nuclear stress test result. EP will sign off. Please call for any questions or concerns.      Robin Wood MD  Cardiac Electrophysiology  [address]   [facility] [place]: [place] [facility]  1:04 PM  6/25/2021

## 2021-06-25 NOTE — PROGRESS NOTES
Subjective: The patient is awake and alert. No problems overnight. Denies chest pain, angina. Denies abdominal pain. Tolerating diet. No nausea or vomiting. She does continue to c/o SOB. Objective:  Pt is aox3 in nad   /76   Pulse 130   Temp 97.7 °F (36.5 °C) (Oral)   Resp 18   Ht 5' 3\" (1.6 m)   Wt 184 lb 3.2 oz (83.6 kg)   LMP  (LMP Unknown)   SpO2 92%   BMI 32.63 kg/m²   HEENT no adenopathy no bruits  Heart:  Irr Irr  no murmurs, gallops, or rubs.   Lungs:  CTA bilaterally, no wheeze, rales or rhonchi  Abd: bowel sounds present, nontender, nondistended, no masses  Extrem:  No clubbing, cyanosis, or edema    basic metabolic panel     Assessment:    Patient Active Problem List   Diagnosis    Spinal stenosis in cervical region    Spinal stenosis, lumbar region, without neurogenic claudication    Essential hypertension    Mixed hyperlipidemia    DM (diabetes mellitus) ([address]    Renal artery aneurysm ([address]    PAF (paroxysmal atrial fibrillation) (HCC)    Anemia    Malignant neoplasm of cecum (HCC)    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) ([address]    Chronic heart failure with preserved ejection fraction ([address]    Atrial fibrillation with RVR ([address]       Plan:    Repeat cardioversion this am  Cont to monitor Gourmant, DO  6:42 AM  6/25/2021

## 2021-06-26 LAB
ANION GAP SERPL CALCULATED.3IONS-SCNC: 13 MMOL/L (ref 7–16)
BUN BLDV-MCNC: 11 MG/DL (ref 6–23)
CALCIUM SERPL-MCNC: 9.7 MG/DL (ref 8.6–10.2)
CHLORIDE BLD-SCNC: 92 MMOL/L (ref 98–107)
CO2: 32 MMOL/L (ref 22–29)
CREAT SERPL-MCNC: 0.8 MG/DL (ref 0.5–1)
EKG ATRIAL RATE: 101 BPM
EKG P AXIS: 17 DEGREES
EKG P-R INTERVAL: 166 MS
EKG Q-T INTERVAL: 308 MS
EKG QRS DURATION: 68 MS
EKG QTC CALCULATION (BAZETT): 399 MS
EKG R AXIS: -4 DEGREES
EKG T AXIS: -68 DEGREES
EKG VENTRICULAR RATE: 101 BPM
GFR AFRICAN AMERICAN: >60
GFR NON-AFRICAN AMERICAN: >60 ML/MIN/1.73
GLUCOSE BLD-MCNC: 111 MG/DL (ref 74–99)
HCT VFR BLD CALC: 37 % (ref 34–48)
HEMOGLOBIN: 11.2 G/DL (ref 11.5–15.5)
MCH RBC QN AUTO: 23.9 PG (ref 26–35)
MCHC RBC AUTO-ENTMCNC: 30.3 % (ref 32–34.5)
MCV RBC AUTO: 79.1 FL (ref 80–99.9)
METER GLUCOSE: 125 MG/DL (ref 74–99)
METER GLUCOSE: 169 MG/DL (ref 74–99)
METER GLUCOSE: 175 MG/DL (ref 74–99)
METER GLUCOSE: 201 MG/DL (ref 74–99)
METER GLUCOSE: 228 MG/DL (ref 74–99)
PDW BLD-RTO: 23.8 FL (ref 11.5–15)
PLATELET # BLD: 340 E9/L (ref 130–450)
PMV BLD AUTO: 9.4 FL (ref 7–12)
POTASSIUM SERPL-SCNC: 3.6 MMOL/L (ref 3.5–5)
RBC # BLD: 4.68 E12/L (ref 3.5–5.5)
SODIUM BLD-SCNC: 137 MMOL/L (ref 132–146)
WBC # BLD: 14.8 E9/L (ref 4.5–11.5)

## 2021-06-26 PROCEDURE — 6370000000 HC RX 637 (ALT 250 FOR IP): Performed by: INTERNAL MEDICINE

## 2021-06-26 PROCEDURE — 36415 COLL VENOUS BLD VENIPUNCTURE: CPT

## 2021-06-26 PROCEDURE — 99233 SBSQ HOSP IP/OBS HIGH 50: CPT | Performed by: INTERNAL MEDICINE

## 2021-06-26 PROCEDURE — 2140000000 HC CCU INTERMEDIATE R&B

## 2021-06-26 PROCEDURE — 6370000000 HC RX 637 (ALT 250 FOR IP): Performed by: NURSE PRACTITIONER

## 2021-06-26 PROCEDURE — 85027 COMPLETE CBC AUTOMATED: CPT

## 2021-06-26 PROCEDURE — 82962 GLUCOSE BLOOD TEST: CPT

## 2021-06-26 PROCEDURE — 93010 ELECTROCARDIOGRAM REPORT: CPT | Performed by: INTERNAL MEDICINE

## 2021-06-26 PROCEDURE — 80048 BASIC METABOLIC PNL TOTAL CA: CPT

## 2021-06-26 PROCEDURE — 2580000003 HC RX 258: Performed by: INTERNAL MEDICINE

## 2021-06-26 RX ORDER — DIPHENHYDRAMINE HCL 25 MG
50 TABLET ORAL EVERY 6 HOURS PRN
Status: DISCONTINUED | OUTPATIENT
Start: 2021-06-28 | End: 2021-06-30 | Stop reason: HOSPADM

## 2021-06-26 RX ORDER — DIPHENHYDRAMINE HCL 25 MG
50 TABLET ORAL EVERY 6 HOURS PRN
Status: DISCONTINUED | OUTPATIENT
Start: 2021-06-27 | End: 2021-06-30 | Stop reason: HOSPADM

## 2021-06-26 RX ORDER — PREDNISONE 20 MG/1
40 TABLET ORAL DAILY
Status: COMPLETED | OUTPATIENT
Start: 2021-06-27 | End: 2021-06-27

## 2021-06-26 RX ORDER — SODIUM CHLORIDE 9 MG/ML
INJECTION, SOLUTION INTRAVENOUS CONTINUOUS
Status: DISCONTINUED | OUTPATIENT
Start: 2021-06-28 | End: 2021-06-28 | Stop reason: SDUPTHER

## 2021-06-26 RX ADMIN — ACETAMINOPHEN 650 MG: 325 TABLET ORAL at 18:05

## 2021-06-26 RX ADMIN — BUMETANIDE 1 MG: 1 TABLET ORAL at 08:47

## 2021-06-26 RX ADMIN — POTASSIUM CHLORIDE 20 MEQ: 1500 TABLET, EXTENDED RELEASE ORAL at 08:46

## 2021-06-26 RX ADMIN — INSULIN LISPRO 1 UNITS: 100 INJECTION, SOLUTION INTRAVENOUS; SUBCUTANEOUS at 17:01

## 2021-06-26 RX ADMIN — AMLODIPINE BESYLATE 10 MG: 10 TABLET ORAL at 08:47

## 2021-06-26 RX ADMIN — FERROUS SULFATE TAB 325 MG (65 MG ELEMENTAL FE) 325 MG: 325 (65 FE) TAB at 08:46

## 2021-06-26 RX ADMIN — POTASSIUM CHLORIDE 20 MEQ: 1500 TABLET, EXTENDED RELEASE ORAL at 20:04

## 2021-06-26 RX ADMIN — METOPROLOL TARTRATE 50 MG: 50 TABLET, FILM COATED ORAL at 08:46

## 2021-06-26 RX ADMIN — PANTOPRAZOLE SODIUM 40 MG: 40 TABLET, DELAYED RELEASE ORAL at 05:32

## 2021-06-26 RX ADMIN — HYDROXYZINE HYDROCHLORIDE 10 MG: 10 TABLET ORAL at 20:03

## 2021-06-26 RX ADMIN — PRAMIPEXOLE DIHYDROCHLORIDE 0.5 MG: 0.25 TABLET ORAL at 20:04

## 2021-06-26 RX ADMIN — AMIODARONE HYDROCHLORIDE 400 MG: 200 TABLET ORAL at 08:46

## 2021-06-26 RX ADMIN — TRAMADOL HYDROCHLORIDE 50 MG: 50 TABLET, FILM COATED ORAL at 19:02

## 2021-06-26 RX ADMIN — AMIODARONE HYDROCHLORIDE 400 MG: 200 TABLET ORAL at 20:03

## 2021-06-26 RX ADMIN — PRAMIPEXOLE DIHYDROCHLORIDE 0.5 MG: 0.25 TABLET ORAL at 14:31

## 2021-06-26 RX ADMIN — INSULIN LISPRO 1 UNITS: 100 INJECTION, SOLUTION INTRAVENOUS; SUBCUTANEOUS at 08:52

## 2021-06-26 RX ADMIN — SODIUM CHLORIDE, PRESERVATIVE FREE 10 ML: 5 INJECTION INTRAVENOUS at 08:47

## 2021-06-26 RX ADMIN — INSULIN LISPRO 1 UNITS: 100 INJECTION, SOLUTION INTRAVENOUS; SUBCUTANEOUS at 20:02

## 2021-06-26 RX ADMIN — METOPROLOL TARTRATE 50 MG: 50 TABLET, FILM COATED ORAL at 20:04

## 2021-06-26 RX ADMIN — SPIRONOLACTONE 25 MG: 25 TABLET ORAL at 08:47

## 2021-06-26 RX ADMIN — PRAMIPEXOLE DIHYDROCHLORIDE 0.5 MG: 0.25 TABLET ORAL at 08:47

## 2021-06-26 RX ADMIN — SODIUM CHLORIDE, PRESERVATIVE FREE 10 ML: 5 INJECTION INTRAVENOUS at 20:03

## 2021-06-26 RX ADMIN — INSULIN LISPRO 2 UNITS: 100 INJECTION, SOLUTION INTRAVENOUS; SUBCUTANEOUS at 11:31

## 2021-06-26 RX ADMIN — TRAMADOL HYDROCHLORIDE 50 MG: 50 TABLET, FILM COATED ORAL at 10:07

## 2021-06-26 ASSESSMENT — PAIN SCALES - GENERAL
PAINLEVEL_OUTOF10: 8
PAINLEVEL_OUTOF10: 9
PAINLEVEL_OUTOF10: 9
PAINLEVEL_OUTOF10: 0
PAINLEVEL_OUTOF10: 8
PAINLEVEL_OUTOF10: 8

## 2021-06-26 ASSESSMENT — PAIN DESCRIPTION - DESCRIPTORS
DESCRIPTORS: ACHING;DULL;DISCOMFORT
DESCRIPTORS: ACHING;DISCOMFORT;DULL

## 2021-06-26 ASSESSMENT — PAIN DESCRIPTION - ORIENTATION
ORIENTATION: LOWER
ORIENTATION: LOWER

## 2021-06-26 ASSESSMENT — PAIN DESCRIPTION - ONSET
ONSET: ON-GOING
ONSET: ON-GOING

## 2021-06-26 ASSESSMENT — PAIN DESCRIPTION - PAIN TYPE
TYPE: CHRONIC PAIN

## 2021-06-26 ASSESSMENT — PAIN DESCRIPTION - LOCATION
LOCATION: BACK

## 2021-06-26 ASSESSMENT — PAIN DESCRIPTION - PROGRESSION
CLINICAL_PROGRESSION: NOT CHANGED

## 2021-06-26 NOTE — PROGRESS NOTES
INPATIENT CARDIOLOGY FOLLOW-UP    Name: Pawan Oliver    Age: 80 y.o. Date of Admission: 6/21/2021  3:23 PM    Date of Service: 6/26/2021    Chief Complaint: Follow-up for paroxysmal atrial fibrillation, suspected coronary atherosclerosis with high risk myocardial perfusion imaging study, hypertension, moderate obesity    Interim History: Patient presently remains compensated from a cardiovascular standpoint with no recurrent atrial arrhythmias or additional symptoms. Her perfusion imaging findings were again discussed including that of the high risk nature of her perfusion abnormality and recommendation of coronary angiography. Review of Systems: The remainder of a complete multisystem review including consitutional, central nervous, respiratory, circulatory, gastrointestinal, genitourinary, endocrinologic, hematologic, musculoskeletal and psychiatric are negative. 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)    Chronic heart failure with preserved ejection fraction (HCC)    Atrial fibrillation with RVR (HCC)       Allergies:   Allergies   Allergen Reactions    Iodine Shortness Of Breath     SOB, Rash    Benadryl [Diphenhydramine]      hyper    Fish-Derived Products Rash       Current Medications:  Current Facility-Administered Medications   Medication Dose Route Frequency Provider Last Rate Last Admin    amiodarone (CORDARONE) tablet 400 mg  400 mg Oral BID TEMO Ashley CNP   400 mg at 06/26/21 0846    [START ON 7/3/2021] amiodarone (CORDARONE) tablet 200 mg  200 mg Oral Daily Sreedhar Santiago, APRN - CNP        bumetanide (BUMEX) tablet 1 mg  1 mg Oral Daily Juwan Adair MD   1 mg at 06/26/21 0847    metoprolol tartrate (LOPRESSOR) tablet 50 mg  50 mg Oral BID Nimco Tyson MD   50 mg at 06/26/21 0846    rivaroxaban (XARELTO) tablet 15 mg  15 mg Oral Daily Juwan Adair MD   15 mg at 06/25/21 1830    levalbuterol (XOPENEX) nebulization 0.63 mg  0.63 mg Nebulization Q8H PRN Josh Amato, APRN - CNP        amLODIPine (NORVASC) tablet 10 mg  10 mg Oral Daily Myrla Francoise, DO   10 mg at 06/26/21 0847    chlordiazePOXIDE-clidinium (LIBRAX) 5-2.5 MG per capsule 1 capsule  1 capsule Oral BID PRN Myrla Francoise, DO        ferrous sulfate (IRON 325) tablet 325 mg  325 mg Oral Daily with breakfast Myrla Francoise, DO   325 mg at 06/26/21 0846    hydrOXYzine (ATARAX) tablet 10 mg  10 mg Oral Nightly Myrla Francoise, DO   10 mg at 06/25/21 2035    pantoprazole (PROTONIX) tablet 40 mg  40 mg Oral QAM AC Myrla Francoise, DO   40 mg at 06/26/21 0532    potassium chloride (KLOR-CON M) extended release tablet 20 mEq  20 mEq Oral BID Myrla Francoise, DO   20 mEq at 06/26/21 9661    pramipexole (MIRAPEX) tablet 0.5 mg  0.5 mg Oral TID Myrla Francoise, DO   0.5 mg at 06/26/21 7469    spironolactone (ALDACTONE) tablet 25 mg  25 mg Oral Daily Myrla Francoise, DO   25 mg at 06/26/21 0847    sodium chloride flush 0.9 % injection 5-40 mL  5-40 mL Intravenous 2 times per day Myrla Francoise, DO   10 mL at 06/26/21 0847    sodium chloride flush 0.9 % injection 5-40 mL  5-40 mL Intravenous PRN Myrla Francoise, DO        0.9 % sodium chloride infusion  25 mL Intravenous PRN Myrla Francoise, DO        ondansetron (ZOFRAN-ODT) disintegrating tablet 4 mg  4 mg Oral Q8H PRN Myrla Francoise, DO   4 mg at 06/23/21 2721    Or    ondansetron (ZOFRAN) injection 4 mg  4 mg Intravenous Q6H PRN Myrla Francoise, DO        polyethylene glycol (GLYCOLAX) packet 17 g  17 g Oral Daily PRN Myrla Francoise, DO  acetaminophen (TYLENOL) tablet 650 mg  650 mg Oral Q6H PRN Terrial Night, DO        Or    acetaminophen (TYLENOL) suppository 650 mg  650 mg Rectal Q6H PRN Terrial Night, DO        insulin lispro (HUMALOG) injection vial 0-6 Units  0-6 Units Subcutaneous TID WC Terrial Night, DO   2 Units at 06/26/21 1131    insulin lispro (HUMALOG) injection vial 0-3 Units  0-3 Units Subcutaneous Nightly Terrial Night, DO   1 Units at 06/24/21 2118    glucose (GLUTOSE) 40 % oral gel 15 g  15 g Oral PRN Terrial Night, DO        dextrose 50 % IV solution  12.5 g Intravenous PRN Terrial Night, DO        glucagon (rDNA) injection 1 mg  1 mg Intramuscular PRN Terrial Night, DO        dextrose 5 % solution  100 mL/hr Intravenous PRN Terrial Night, DO        traMADol Lam Petra) tablet 50 mg  50 mg Oral Q8H PRN Terrial Night, DO   50 mg at 06/26/21 1007      sodium chloride      dextrose         Physical Exam:  /63   Pulse 87   Temp 98.2 °F (36.8 °C) (Temporal)   Resp 16   Ht 5' 3\" (1.6 m)   Wt 178 lb 9.6 oz (81 kg)   LMP  (LMP Unknown)   SpO2 93%   BMI 31.64 kg/m²   Weight change: -5 lb 9.6 oz (-2.54 kg)  Wt Readings from Last 3 Encounters:   06/26/21 178 lb 9.6 oz (81 kg)   06/21/21 188 lb 3.2 oz (85.4 kg)   06/17/21 173 lb (78.5 kg)     The patient is awake, alert and in no discomfort or distress. No gross musculoskeletal deformity is present. No significant skin or nail changes are present. Gross examination of head, eyes, nose and throat are negative. Jugular venous pressure is normal and no carotid bruits are present. Normal respiratory effort is noted with no accessory muscle usage present. Lung fields are clear to ascultation. Cardiac examination is notable for a regular rate and rhythm with no palpable thrill. No gallop rhythm or cardiac murmur are identified. A benign abdominal examination is present with the exception of mild obesity no masses or organomegaly.  Intact pulses are present throughout all extremities and no peripheral edema is present. No focal neurologic deficits are present. Intake/Output:    Intake/Output Summary (Last 24 hours) at 6/26/2021 1254  Last data filed at 6/26/2021 1058  Gross per 24 hour   Intake 560 ml   Output 1900 ml   Net -1340 ml     I/O this shift:  In: 320 [P.O.:320]  Out: 300 [Urine:300]    Laboratory Tests:  Lab Results   Component Value Date    CREATININE 0.8 06/26/2021    BUN 11 06/26/2021     06/26/2021    K 3.6 06/26/2021    CL 92 (L) 06/26/2021    CO2 32 (H) 06/26/2021     No results for input(s): CKTOTAL, CKMB in the last 72 hours. Invalid input(s): TROPONONI  No results found for: BNP  Lab Results   Component Value Date    WBC 14.8 06/26/2021    RBC 4.68 06/26/2021    HGB 11.2 06/26/2021    HCT 37.0 06/26/2021    MCV 79.1 06/26/2021    MCH 23.9 06/26/2021    MCHC 30.3 06/26/2021    RDW 23.8 06/26/2021     06/26/2021    MPV 9.4 06/26/2021     No results for input(s): ALKPHOS, ALT, AST, PROT, BILITOT, BILIDIR, LABALBU in the last 72 hours.   Lab Results   Component Value Date    MG 1.2 06/24/2021     Lab Results   Component Value Date    PROTIME 16.4 06/21/2021    INR 1.5 06/21/2021     Lab Results   Component Value Date    TSH 2.250 06/21/2021     No components found for: CHLPL  Lab Results   Component Value Date    TRIG 205 06/11/2019    TRIG 162 (H) 10/15/2012     Lab Results   Component Value Date    HDL 15 (A) 06/11/2019    HDL 55 10/31/2017    HDL 49.0 10/15/2012     Lab Results   Component Value Date    LDLCALC 50 10/31/2017    [address] 10/15/2012       Cardiac Tests:  Telemetry findings reviewed: sinus rhythm with occasional ventricular ectopy, no new tachy/bradyarrhythmias overnight  Last stress test: A gated vasodilator myocardial perfusion imaging study demonstrates evidence of a large size moderate intensity reversible anterior, anterolateral and apical perfusion abnormality with associated regional wall motion apneas and transient ischemic dilatation with overall normal left ventricular systolic function suggestive of postischemic stunning      ASSESSMENT / PLAN: On a clinical basis, the patient presently appears compensated and is maintained sinus rhythm with no active anginal symptomatology in the face of her high risk myocardial perfusion imaging study. This was further discussed with her at time of evaluation as well as that of the rationale for coronary angiography in spite of the needs of interruption of anticoagulation in spite of her recent electrical cardioversion with the procedure extensively discussed including that of proposed benefits, risks and alternatives. She is agreeable in proceeding and at present her oral anticoagulation will be withheld with needs of dose modification of her rivaroxaban following resumption of therapy on the basis of her present creatinine clearance. On the basis of her contrast allergy contrast premedication will be administered. In the interim, continue aggressive medical management will be maintained with additional needs of appropriate lifestyle modification to achieve weight reduction to benefit diastolic cardiac performance and reduce risk of the development of obstructive sleep apnea with associated adverse cardiovascular effects including that related to recurrent atrial arrhythmias. Aggressive risk factor modification of blood pressure, diabetes and serum lipids will be essential to reducing risk of future atherosclerotic development. Note: This report was completed utilizing computer voice recognition software. Every effort has been made to ensure accuracy, however; inadvertent computerized transcription errors may be present. Oz León.  Danuta Greene, [facility]

## 2021-06-26 NOTE — PLAN OF CARE
Problem: Falls - Risk of:  Goal: Will remain free from falls  Description: Will remain free from falls  Outcome: Met This Shift     Problem: Falls - Risk of:  Goal: Absence of physical injury  Description: Absence of physical injury  Outcome: Met This Shift     Problem: Cardiac:  Goal: Ability to maintain an adequate cardiac output will improve  Description: Ability to maintain an adequate cardiac output will improve  Outcome: Met This Shift     Problem: Cardiac:  Goal: Hemodynamic stability will improve  Description: Hemodynamic stability will improve  Outcome: Met This Shift

## 2021-06-26 NOTE — PROGRESS NOTES
Subjective: The patient is awake and alert. No problems overnight. Denies chest pain, angina, and dyspnea. Denies abdominal pain. Tolerating diet. No nausea or vomiting. She states she is feeling better and not SOB. Objective:  Pt is resting in nad   /62   Pulse 92   Temp 98 °F (36.7 °C) (Temporal)   Resp 16   Ht 5' 3\" (1.6 m)   Wt 184 lb 3.2 oz (83.6 kg)   LMP  (LMP Unknown)   SpO2 98%   BMI 32.63 kg/m²   HEENT no adenopathy no bruits  Heart:  RRR, no murmurs, gallops, or rubs.   Lungs:  CTA bilaterally, no wheeze, rales or rhonchi  Abd: bowel sounds present, nontender, nondistended, no masses  Extrem:  No clubbing, cyanosis, or edema  WBC/Hgb/Hct/Plts:  14.8/11.2/37.0/340 (06/26 1658) basic metabolic panel     Assessment:    Patient Active Problem List   Diagnosis    Spinal stenosis in cervical region    Spinal stenosis, lumbar region, without neurogenic claudication    Essential hypertension    Mixed hyperlipidemia    DM (diabetes mellitus) ([address]    Renal artery aneurysm ([address]    PAF (paroxysmal atrial fibrillation) (HCC)    Anemia    Malignant neoplasm of cecum ([address]    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) ([address]    Chronic heart failure with preserved ejection fraction ([address]    Atrial fibrillation with RVR ([address]       Plan:  Monitor HR  Medications adjusted per Cardiology team  Increase activity to see how HR is affected         Kahlil Elizabeth DO  6:29 AM  6/26/2021

## 2021-06-26 NOTE — PLAN OF CARE
Problem: Falls - Risk of:  Goal: Will remain free from falls  Description: Will remain free from falls  6/26/2021 3590 by Josephine Dodd RN  Outcome: Met This Shift     Problem: Falls - Risk of:  Goal: Absence of physical injury  Description: Absence of physical injury  6/26/2021 7465 by Josephine Dodd RN  Outcome: Met This Shift     Problem: Cardiac:  Goal: Ability to maintain an adequate cardiac output will improve  Description: Ability to maintain an adequate cardiac output will improve  6/26/2021 0822 by Josephine Dodd RN  Outcome: Met This Shift     Problem: Cardiac:  Goal: Hemodynamic stability will improve  Description: Hemodynamic stability will improve  6/26/2021 0822 by Josephine Dodd RN  Outcome: Met This Shift

## 2021-06-27 LAB
METER GLUCOSE: 123 MG/DL (ref 74–99)
METER GLUCOSE: 147 MG/DL (ref 74–99)
METER GLUCOSE: 186 MG/DL (ref 74–99)
METER GLUCOSE: 208 MG/DL (ref 74–99)

## 2021-06-27 PROCEDURE — 6370000000 HC RX 637 (ALT 250 FOR IP): Performed by: INTERNAL MEDICINE

## 2021-06-27 PROCEDURE — 2580000003 HC RX 258: Performed by: INTERNAL MEDICINE

## 2021-06-27 PROCEDURE — 2140000000 HC CCU INTERMEDIATE R&B

## 2021-06-27 PROCEDURE — 6370000000 HC RX 637 (ALT 250 FOR IP): Performed by: NURSE PRACTITIONER

## 2021-06-27 PROCEDURE — 82962 GLUCOSE BLOOD TEST: CPT

## 2021-06-27 PROCEDURE — 99233 SBSQ HOSP IP/OBS HIGH 50: CPT | Performed by: INTERNAL MEDICINE

## 2021-06-27 PROCEDURE — 6360000002 HC RX W HCPCS: Performed by: INTERNAL MEDICINE

## 2021-06-27 RX ADMIN — TRAMADOL HYDROCHLORIDE 50 MG: 50 TABLET, FILM COATED ORAL at 02:50

## 2021-06-27 RX ADMIN — AMIODARONE HYDROCHLORIDE 400 MG: 200 TABLET ORAL at 08:33

## 2021-06-27 RX ADMIN — POTASSIUM CHLORIDE 20 MEQ: 1500 TABLET, EXTENDED RELEASE ORAL at 08:33

## 2021-06-27 RX ADMIN — TRAMADOL HYDROCHLORIDE 50 MG: 50 TABLET, FILM COATED ORAL at 20:52

## 2021-06-27 RX ADMIN — SODIUM CHLORIDE, PRESERVATIVE FREE 10 ML: 5 INJECTION INTRAVENOUS at 08:33

## 2021-06-27 RX ADMIN — INSULIN LISPRO 2 UNITS: 100 INJECTION, SOLUTION INTRAVENOUS; SUBCUTANEOUS at 11:44

## 2021-06-27 RX ADMIN — PREDNISONE 40 MG: 20 TABLET ORAL at 22:11

## 2021-06-27 RX ADMIN — SODIUM CHLORIDE, PRESERVATIVE FREE 10 ML: 5 INJECTION INTRAVENOUS at 22:12

## 2021-06-27 RX ADMIN — HYDROCORTISONE SODIUM SUCCINATE 100 MG: 100 INJECTION, POWDER, FOR SOLUTION INTRAMUSCULAR; INTRAVENOUS at 22:12

## 2021-06-27 RX ADMIN — POTASSIUM CHLORIDE 20 MEQ: 1500 TABLET, EXTENDED RELEASE ORAL at 20:52

## 2021-06-27 RX ADMIN — BUMETANIDE 1 MG: 1 TABLET ORAL at 08:33

## 2021-06-27 RX ADMIN — PANTOPRAZOLE SODIUM 40 MG: 40 TABLET, DELAYED RELEASE ORAL at 05:57

## 2021-06-27 RX ADMIN — PRAMIPEXOLE DIHYDROCHLORIDE 0.5 MG: 0.25 TABLET ORAL at 20:51

## 2021-06-27 RX ADMIN — METOPROLOL TARTRATE 50 MG: 50 TABLET, FILM COATED ORAL at 20:52

## 2021-06-27 RX ADMIN — PRAMIPEXOLE DIHYDROCHLORIDE 0.5 MG: 0.25 TABLET ORAL at 14:30

## 2021-06-27 RX ADMIN — INSULIN LISPRO 1 UNITS: 100 INJECTION, SOLUTION INTRAVENOUS; SUBCUTANEOUS at 17:07

## 2021-06-27 RX ADMIN — METOPROLOL TARTRATE 50 MG: 50 TABLET, FILM COATED ORAL at 08:33

## 2021-06-27 RX ADMIN — SPIRONOLACTONE 25 MG: 25 TABLET ORAL at 08:33

## 2021-06-27 RX ADMIN — HYDROXYZINE HYDROCHLORIDE 10 MG: 10 TABLET ORAL at 20:52

## 2021-06-27 RX ADMIN — AMIODARONE HYDROCHLORIDE 400 MG: 200 TABLET ORAL at 20:52

## 2021-06-27 RX ADMIN — FERROUS SULFATE TAB 325 MG (65 MG ELEMENTAL FE) 325 MG: 325 (65 FE) TAB at 08:32

## 2021-06-27 RX ADMIN — TRAMADOL HYDROCHLORIDE 50 MG: 50 TABLET, FILM COATED ORAL at 10:44

## 2021-06-27 RX ADMIN — PRAMIPEXOLE DIHYDROCHLORIDE 0.5 MG: 0.25 TABLET ORAL at 08:32

## 2021-06-27 RX ADMIN — AMLODIPINE BESYLATE 10 MG: 10 TABLET ORAL at 08:33

## 2021-06-27 RX ADMIN — INSULIN LISPRO 1 UNITS: 100 INJECTION, SOLUTION INTRAVENOUS; SUBCUTANEOUS at 20:57

## 2021-06-27 ASSESSMENT — PAIN - FUNCTIONAL ASSESSMENT
PAIN_FUNCTIONAL_ASSESSMENT: ACTIVITIES ARE NOT PREVENTED

## 2021-06-27 ASSESSMENT — PAIN SCALES - GENERAL
PAINLEVEL_OUTOF10: 10
PAINLEVEL_OUTOF10: 6
PAINLEVEL_OUTOF10: 7
PAINLEVEL_OUTOF10: 0
PAINLEVEL_OUTOF10: 3
PAINLEVEL_OUTOF10: 9
PAINLEVEL_OUTOF10: 7

## 2021-06-27 ASSESSMENT — PAIN DESCRIPTION - PAIN TYPE
TYPE: CHRONIC PAIN
TYPE: CHRONIC PAIN

## 2021-06-27 ASSESSMENT — PAIN DESCRIPTION - ORIENTATION
ORIENTATION: LOWER

## 2021-06-27 ASSESSMENT — PAIN DESCRIPTION - DESCRIPTORS
DESCRIPTORS: ACHING;DISCOMFORT;SORE

## 2021-06-27 ASSESSMENT — PAIN DESCRIPTION - LOCATION
LOCATION: BACK

## 2021-06-27 ASSESSMENT — PAIN DESCRIPTION - PROGRESSION
CLINICAL_PROGRESSION: NOT CHANGED

## 2021-06-27 ASSESSMENT — PAIN DESCRIPTION - ONSET
ONSET: ON-GOING

## 2021-06-27 ASSESSMENT — PAIN DESCRIPTION - FREQUENCY
FREQUENCY: CONTINUOUS

## 2021-06-27 NOTE — PROGRESS NOTES
INPATIENT CARDIOLOGY FOLLOW-UP    Name: Martha Montoya    Age: 80 y.o. Date of Admission: 6/21/2021  3:23 PM    Date of Service: 6/27/2021    Chief Complaint: Follow-up for paroxysmal atrial fibrillation, suspected coronary atherosclerosis, hypertension, moderate obesity    Interim History: The patient remains compensated from a cardiovascular standpoint with maintenance of sinus rhythm and no additional cardiovascular symptomatology. Review of Systems: The remainder of a complete multisystem review including consitutional, central nervous, respiratory, circulatory, gastrointestinal, genitourinary, endocrinologic, hematologic, musculoskeletal and psychiatric are negative. 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)    Chronic heart failure with preserved ejection fraction (HCC)    Atrial fibrillation with RVR (HCC)       Allergies:   Allergies   Allergen Reactions    Iodine Shortness Of Breath     SOB, Rash    Benadryl [Diphenhydramine]      hyper    Fish-Derived Products Rash       Current Medications:  Current Facility-Administered Medications   Medication Dose Route Frequency Provider Last Rate Last Admin    diphenhydrAMINE (BENADRYL) tablet 50 mg  50 mg Oral Q6H PRN Elmira Lopez MD        predniSONE (DELTASONE) tablet 40 mg  40 mg Oral Daily Elmira Lopez MD        [START ON 6/28/2021] diphenhydrAMINE (BENADRYL) tablet 50 mg  50 mg Oral Q6H PRN Elmira Lopez MD        [START ON 6/28/2021] hydrocortisone sodium succinate PF (SOLU-CORTEF) injection 100 mg  100 mg Intravenous Once Guido Dorsey MD       [facility] Natalie ON 6/28/2021] 0.9 % sodium chloride infusion   Intravenous Continuous Guido Dorsey MD        amiodarone (CORDARONE) tablet 400 mg  400 mg Oral BID TEMO Urban CNP   400 mg at 06/26/21 2003    [START ON 7/3/2021] amiodarone (CORDARONE) tablet 200 mg  200 mg Oral Daily TEMO Urban CNP        bumetanide (BUMEX) tablet 1 mg  1 mg Oral Daily Malik Valentin MD   1 mg at 06/26/21 0847    metoprolol tartrate (LOPRESSOR) tablet 50 mg  50 mg Oral BID Carly David MD   50 mg at 06/26/21 2004    [Held by provider] rivaroxaban (XARELTO) tablet 15 mg  15 mg Oral Daily Malik Valentin MD   15 mg at 06/25/21 1830    levalbuterol (XOPENEX) nebulization 0.63 mg  0.63 mg Nebulization Q8H PRN TEMO López CNP        amLODIPine (NORVASC) tablet 10 mg  10 mg Oral Daily Sulaiman Bustillos, DO   10 mg at 06/26/21 0847    chlordiazePOXIDE-clidinium (LIBRAX) 5-2.5 MG per capsule 1 capsule  1 capsule Oral BID PRN Sulaiman Bustillos, DO        ferrous sulfate (IRON 325) tablet 325 mg  325 mg Oral Daily with breakfast Sulaiman Bustillos, DO   325 mg at 06/26/21 0846    hydrOXYzine (ATARAX) tablet 10 mg  10 mg Oral Nightly Sulaiman Bustillos, DO   10 mg at 06/26/21 2003    pantoprazole (PROTONIX) tablet 40 mg  40 mg Oral QAM AC Sulaiman Bustillos, DO   40 mg at 06/27/21 0557    potassium chloride (KLOR-CON M) extended release tablet 20 mEq  20 mEq Oral BID Sulaiman Bustillos, DO   20 mEq at 06/26/21 2004    pramipexole (MIRAPEX) tablet 0.5 mg  0.5 mg Oral TID Sulaiman Bustillos, DO   0.5 mg at 06/26/21 2004    spironolactone (ALDACTONE) tablet 25 mg  25 mg Oral Daily Sulaiman Bustillos, DO   25 mg at 06/26/21 0847    sodium chloride flush 0.9 % injection 5-40 mL  5-40 mL Intravenous 2 times per day Sulaiman Bustillos, DO   10 mL at 06/26/21 2003    sodium chloride flush 0.9 % injection 5-40 mL  5-40 mL Intravenous PRN Sulaiman Bustillos DO        0.9 % sodium chloride infusion  25 mL Intravenous PRN [facility], DO        ondansetron (ZOFRAN-ODT) disintegrating tablet 4 mg  4 mg Oral Q8H PRN Gearold Moh, DO   4 mg at 06/23/21 7215    Or    ondansetron (ZOFRAN) injection 4 mg  4 mg Intravenous Q6H PRN [facility], DO        polyethylene glycol (GLYCOLAX) packet 17 g  17 g Oral Daily PRN Gearold Moh, DO        acetaminophen (TYLENOL) tablet 650 mg  650 mg Oral Q6H PRN Gearold [facility], DO   650 mg at 06/26/21 1805    Or    acetaminophen (TYLENOL) suppository 650 mg  650 mg Rectal Q6H PRN [facility], DO        insulin lispro (HUMALOG) injection vial 0-6 Units  0-6 Units Subcutaneous TID WC Gearold Moh, DO   1 Units at 06/26/21 1701    insulin lispro (HUMALOG) injection vial 0-3 Units  0-3 Units Subcutaneous Nightly [facility], DO   1 Units at 06/26/21 2002    glucose (GLUTOSE) 40 % oral gel 15 g  15 g Oral PRN [facility], DO        dextrose 50 % IV solution  12.5 g Intravenous PRN [facility], DO        glucagon (rDNA) injection 1 mg  1 mg Intramuscular PRN [facility], DO        dextrose 5 % solution  100 mL/hr Intravenous PRN [facility], DO        traMADol Chitra Yusuf) tablet 50 mg  50 mg Oral Q8H PRN [facility], DO   50 mg at 06/27/21 0250      [START ON 6/28/2021] sodium chloride      sodium chloride      dextrose         Physical Exam:  /60   Pulse 77   Temp 98.3 °F (36.8 °C) (Temporal)   Resp 16   Ht 5' 3\" (1.6 m)   Wt 177 lb 3.2 oz (80.4 kg)   LMP  (LMP Unknown)   SpO2 96%   BMI 31.39 kg/m²   Weight change: -1 lb 6.4 oz (-0.635 kg)  Wt Readings from Last 3 Encounters:   06/27/21 177 lb 3.2 oz (80.4 kg)   06/21/21 188 lb 3.2 oz (85.4 kg)   06/17/21 173 lb (78.5 kg)     The patient is awake, alert and in no discomfort or distress. No gross musculoskeletal deformity is present. No significant skin or nail changes are present. Gross examination of head, eyes, nose and throat are negative.  Jugular venous pressure is normal and no Cardiac Tests:  Telemetry findings reviewed: Sinus rhythm with occasional ventricular ectopy, no new tachy/bradyarrhythmias overnight      ASSESSMENT / PLAN: On a clinical basis, the patient remains compensated from a cardiovascular standpoint with no recurrent arrhythmias or ischemic symptomatology. Her previous reported intolerance of diphenhydramine (Benadryl) was discussed and she presently reports no intolerance with contrast premedication ordered in preparation for angiography. In addition, her diuretics will be withheld on the day of the procedure in light of her chronic kidney disease to reduce risk of contrast nephropathy. Anticoagulation has been withheld and will be resumed at appropriate dosing for her creatinine clearance following the completion of her angiographic procedure. Ongoing appropriate lifestyle modification to achieve weight reduction to benefit diastolic cardiac performance in addition to that of aggressive risk factor modification of blood pressure, diabetes and serum lipids will remain essential to reducing risk of future atherosclerotic development. Note: This report was completed utilizing computer voice recognition software. Every effort has been made to ensure accuracy, however; inadvertent computerized transcription errors may be present. Sofia Marinelli.  Sheldon Rhodes, [facility] Cardiology

## 2021-06-27 NOTE — PLAN OF CARE
Problem: Falls - Risk of:  Goal: Will remain free from falls  Description: Will remain free from falls  6/27/2021 1026 by Annette Guerrero RN  Outcome: Met This Shift     Problem: Falls - Risk of:  Goal: Absence of physical injury  Description: Absence of physical injury  6/27/2021 1026 by Annette Guerrero RN  Outcome: Met This Shift     Problem: Cardiac:  Goal: Ability to maintain an adequate cardiac output will improve  Description: Ability to maintain an adequate cardiac output will improve  Outcome: Met This Shift     Problem: Cardiac:  Goal: Hemodynamic stability will improve  Description: Hemodynamic stability will improve  Outcome: Met This Shift

## 2021-06-27 NOTE — PROGRESS NOTES
Subjective: The patient is awake and alert. No problems overnight. Denies chest pain, angina, and dyspnea. Denies abdominal pain. Tolerating diet. No nausea or vomiting. She got up more in room yesterday. Objective:  Pt is resting in nad   BP (!) 118/56   Pulse 81   Temp 97.7 °F (36.5 °C) (Temporal)   Resp 16   Ht 5' 3\" (1.6 m)   Wt 177 lb 3.2 oz (80.4 kg)   LMP  (LMP Unknown)   SpO2 92%   BMI 31.39 kg/m²   HEENT no adenopathy no bruits  Heart:  RRR, no murmurs, gallops, or rubs. Lungs:  CTA bilaterally, no wheeze, rales or rhonchi  Abd: bowel sounds present, nontender, nondistended, no masses  Extrem:  No clubbing, cyanosis, or edema  WBC/Hgb/Hct/Plts:  14.8/11.2/37.0/340 (06/26 2009) basic metabolic panel     Assessment:    Patient Active Problem List   Diagnosis    Spinal stenosis in cervical region    Spinal stenosis, lumbar region, without neurogenic claudication    Essential hypertension    Mixed hyperlipidemia    DM (diabetes mellitus) ([address]    Renal artery aneurysm ([address]    PAF (paroxysmal atrial fibrillation) (HCC)    Anemia    Malignant neoplasm of cecum (HCC)    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) ([address]    Chronic heart failure with preserved ejection fraction ([address]    Atrial fibrillation with RVR ([address]       Plan:     For Cardiac cath in am      Charity Eid DO  6:05 AM  6/27/2021

## 2021-06-27 NOTE — PLAN OF CARE
Problem: Falls - Risk of:  Goal: Will remain free from falls  Description: Will remain free from falls  6/27/2021 1026 by Leidy Garcia RN  Outcome: Met This Shift     Problem: Falls - Risk of:  Goal: Absence of physical injury  Description: Absence of physical injury  6/27/2021 1026 by Leidy Garcia RN  Outcome: Met This Shift     Problem: Cardiac:  Goal: Ability to maintain an adequate cardiac output will improve  Description: Ability to maintain an adequate cardiac output will improve  Outcome: Met This Shift     Problem: Cardiac:  Goal: Hemodynamic stability will improve  Description: Hemodynamic stability will improve  Outcome: Met This Shift     Problem: Fluid Volume:  Goal: Ability to achieve and maintain adequate urine output will improve  Description: Ability to achieve and maintain adequate urine output will improve  Outcome: Met This Shift     Problem: Skin Integrity:  Goal: Will show no infection signs and symptoms  Description: Will show no infection signs and symptoms  6/27/2021 1611 by Leidy Garcia RN  Outcome: Met This Shift     Problem: Skin Integrity:  Goal: Absence of new skin breakdown  Description: Absence of new skin breakdown  6/27/2021 1611 by Leidy Garcia RN  Outcome: Met This Shift     Problem: Pain:  Goal: Pain level will decrease  Description: Pain level will decrease  Outcome: Met This Shift     Problem: Pain:  Goal: Control of acute pain  Description: Control of acute pain  Outcome: Met This Shift     Problem: Pain:  Goal: Control of chronic pain  Description: Control of chronic pain  Outcome: Met This Shift

## 2021-06-27 NOTE — PLAN OF CARE
Problem: Falls - Risk of:  Goal: Will remain free from falls  Description: Will remain free from falls  Outcome: Met This Shift  Goal: Absence of physical injury  Description: Absence of physical injury  Outcome: Met This Shift     Problem: Skin Integrity:  Goal: Will show no infection signs and symptoms  Description: Will show no infection signs and symptoms  6/27/2021 0457 by Montana Barnett RN  Outcome: Met This Shift  6/26/2021 1727 by Babatunde Grider RN  Outcome: Met This Shift  Goal: Absence of new skin breakdown  Description: Absence of new skin breakdown  6/27/2021 0457 by Montana Barnett RN  Outcome: Met This Shift  6/26/2021 1727 by Babatunde Grider RN  Outcome: Met This Shift     Problem: Pain:  Goal: Pain level will decrease  Description: Pain level will decrease  6/26/2021 1727 by Babatunde Grider RN  Outcome: Met This Shift  Goal: Control of acute pain  Description: Control of acute pain  6/26/2021 1727 by Babatunde Grider RN  Outcome: Met This Shift  Goal: Control of chronic pain  Description: Control of chronic pain  6/26/2021 1727 by Babatunde Grider RN  Outcome: Met This Shift

## 2021-06-28 ENCOUNTER — APPOINTMENT (OUTPATIENT)
Dept: INTERVENTIONAL RADIOLOGY/VASCULAR | Age: 81
DRG: 286 | End: 2021-06-28
Payer: MEDICARE

## 2021-06-28 ENCOUNTER — APPOINTMENT (OUTPATIENT)
Dept: ULTRASOUND IMAGING | Age: 81
DRG: 286 | End: 2021-06-28
Payer: MEDICARE

## 2021-06-28 PROBLEM — E43 SEVERE PROTEIN-CALORIE MALNUTRITION (HCC): Chronic | Status: ACTIVE | Noted: 2021-06-28

## 2021-06-28 LAB
ABO/RH: NORMAL
ANION GAP SERPL CALCULATED.3IONS-SCNC: 11 MMOL/L (ref 7–16)
ANTIBODY SCREEN: NORMAL
BUN BLDV-MCNC: 18 MG/DL (ref 6–23)
CALCIUM SERPL-MCNC: 9.5 MG/DL (ref 8.6–10.2)
CHLORIDE BLD-SCNC: 92 MMOL/L (ref 98–107)
CO2: 30 MMOL/L (ref 22–29)
CREAT SERPL-MCNC: 0.9 MG/DL (ref 0.5–1)
GFR AFRICAN AMERICAN: >60
GFR NON-AFRICAN AMERICAN: >60 ML/MIN/1.73
GLUCOSE BLD-MCNC: 214 MG/DL (ref 74–99)
METER GLUCOSE: 163 MG/DL (ref 74–99)
METER GLUCOSE: 167 MG/DL (ref 74–99)
METER GLUCOSE: 233 MG/DL (ref 74–99)
POTASSIUM SERPL-SCNC: 4.5 MMOL/L (ref 3.5–5)
SODIUM BLD-SCNC: 133 MMOL/L (ref 132–146)

## 2021-06-28 PROCEDURE — 2140000000 HC CCU INTERMEDIATE R&B

## 2021-06-28 PROCEDURE — 6370000000 HC RX 637 (ALT 250 FOR IP): Performed by: INTERNAL MEDICINE

## 2021-06-28 PROCEDURE — 2580000003 HC RX 258: Performed by: INTERNAL MEDICINE

## 2021-06-28 PROCEDURE — 6370000000 HC RX 637 (ALT 250 FOR IP): Performed by: NURSE PRACTITIONER

## 2021-06-28 PROCEDURE — 2500000003 HC RX 250 WO HCPCS: Performed by: INTERNAL MEDICINE

## 2021-06-28 PROCEDURE — 6360000002 HC RX W HCPCS

## 2021-06-28 PROCEDURE — C1887 CATHETER, GUIDING: HCPCS

## 2021-06-28 PROCEDURE — 36415 COLL VENOUS BLD VENIPUNCTURE: CPT

## 2021-06-28 PROCEDURE — 2500000003 HC RX 250 WO HCPCS

## 2021-06-28 PROCEDURE — 93458 L HRT ARTERY/VENTRICLE ANGIO: CPT

## 2021-06-28 PROCEDURE — 80048 BASIC METABOLIC PNL TOTAL CA: CPT

## 2021-06-28 PROCEDURE — C1894 INTRO/SHEATH, NON-LASER: HCPCS

## 2021-06-28 PROCEDURE — 99024 POSTOP FOLLOW-UP VISIT: CPT | Performed by: INTERNAL MEDICINE

## 2021-06-28 PROCEDURE — 93880 EXTRACRANIAL BILAT STUDY: CPT

## 2021-06-28 PROCEDURE — 86850 RBC ANTIBODY SCREEN: CPT

## 2021-06-28 PROCEDURE — 82962 GLUCOSE BLOOD TEST: CPT

## 2021-06-28 PROCEDURE — 87081 CULTURE SCREEN ONLY: CPT

## 2021-06-28 PROCEDURE — B215YZZ FLUOROSCOPY OF LEFT HEART USING OTHER CONTRAST: ICD-10-PCS | Performed by: INTERNAL MEDICINE

## 2021-06-28 PROCEDURE — 6360000002 HC RX W HCPCS: Performed by: INTERNAL MEDICINE

## 2021-06-28 PROCEDURE — 93880 EXTRACRANIAL BILAT STUDY: CPT | Performed by: RADIOLOGY

## 2021-06-28 PROCEDURE — 4A023N7 MEASUREMENT OF CARDIAC SAMPLING AND PRESSURE, LEFT HEART, PERCUTANEOUS APPROACH: ICD-10-PCS | Performed by: INTERNAL MEDICINE

## 2021-06-28 PROCEDURE — 94729 DIFFUSING CAPACITY: CPT

## 2021-06-28 PROCEDURE — C1769 GUIDE WIRE: HCPCS

## 2021-06-28 PROCEDURE — 2709999900 HC NON-CHARGEABLE SUPPLY

## 2021-06-28 PROCEDURE — 93458 L HRT ARTERY/VENTRICLE ANGIO: CPT | Performed by: INTERNAL MEDICINE

## 2021-06-28 PROCEDURE — 93922 UPR/L XTREMITY ART 2 LEVELS: CPT

## 2021-06-28 PROCEDURE — 86900 BLOOD TYPING SEROLOGIC ABO: CPT

## 2021-06-28 PROCEDURE — 86901 BLOOD TYPING SEROLOGIC RH(D): CPT

## 2021-06-28 PROCEDURE — 94375 RESPIRATORY FLOW VOLUME LOOP: CPT

## 2021-06-28 RX ORDER — SODIUM CHLORIDE 9 MG/ML
25 INJECTION, SOLUTION INTRAVENOUS PRN
Status: DISCONTINUED | OUTPATIENT
Start: 2021-06-28 | End: 2021-06-30 | Stop reason: HOSPADM

## 2021-06-28 RX ORDER — ACETAMINOPHEN 325 MG/1
650 TABLET ORAL EVERY 4 HOURS PRN
Status: DISCONTINUED | OUTPATIENT
Start: 2021-06-28 | End: 2021-06-30 | Stop reason: HOSPADM

## 2021-06-28 RX ORDER — PREDNISONE 20 MG/1
40 TABLET ORAL ONCE
Status: COMPLETED | OUTPATIENT
Start: 2021-06-28 | End: 2021-06-28

## 2021-06-28 RX ORDER — SODIUM CHLORIDE 0.9 % (FLUSH) 0.9 %
5-40 SYRINGE (ML) INJECTION EVERY 12 HOURS SCHEDULED
Status: DISCONTINUED | OUTPATIENT
Start: 2021-06-28 | End: 2021-06-30 | Stop reason: HOSPADM

## 2021-06-28 RX ORDER — SODIUM CHLORIDE 9 MG/ML
INJECTION, SOLUTION INTRAVENOUS CONTINUOUS
Status: ACTIVE | OUTPATIENT
Start: 2021-06-28 | End: 2021-06-28

## 2021-06-28 RX ORDER — SODIUM CHLORIDE 0.9 % (FLUSH) 0.9 %
5-40 SYRINGE (ML) INJECTION PRN
Status: DISCONTINUED | OUTPATIENT
Start: 2021-06-28 | End: 2021-06-30 | Stop reason: HOSPADM

## 2021-06-28 RX ADMIN — POTASSIUM CHLORIDE 20 MEQ: 1500 TABLET, EXTENDED RELEASE ORAL at 11:49

## 2021-06-28 RX ADMIN — ENOXAPARIN SODIUM 80 MG: 80 INJECTION SUBCUTANEOUS at 20:54

## 2021-06-28 RX ADMIN — ACETAMINOPHEN 650 MG: 325 TABLET ORAL at 23:12

## 2021-06-28 RX ADMIN — TRAMADOL HYDROCHLORIDE 50 MG: 50 TABLET, FILM COATED ORAL at 19:17

## 2021-06-28 RX ADMIN — HYDROCORTISONE SODIUM SUCCINATE 100 MG: 100 INJECTION, POWDER, FOR SOLUTION INTRAMUSCULAR; INTRAVENOUS at 07:41

## 2021-06-28 RX ADMIN — PRAMIPEXOLE DIHYDROCHLORIDE 0.5 MG: 0.25 TABLET ORAL at 15:17

## 2021-06-28 RX ADMIN — METOPROLOL TARTRATE 50 MG: 50 TABLET, FILM COATED ORAL at 11:49

## 2021-06-28 RX ADMIN — MICONAZOLE NITRATE: 20.6 POWDER TOPICAL at 21:00

## 2021-06-28 RX ADMIN — SODIUM CHLORIDE: 9 INJECTION, SOLUTION INTRAVENOUS at 11:47

## 2021-06-28 RX ADMIN — INSULIN LISPRO 2 UNITS: 100 INJECTION, SOLUTION INTRAVENOUS; SUBCUTANEOUS at 17:11

## 2021-06-28 RX ADMIN — METOPROLOL TARTRATE 50 MG: 50 TABLET, FILM COATED ORAL at 20:55

## 2021-06-28 RX ADMIN — DIPHENHYDRAMINE HYDROCHLORIDE 50 MG: 25 TABLET ORAL at 07:43

## 2021-06-28 RX ADMIN — PANTOPRAZOLE SODIUM 40 MG: 40 TABLET, DELAYED RELEASE ORAL at 05:14

## 2021-06-28 RX ADMIN — TRAMADOL HYDROCHLORIDE 50 MG: 50 TABLET, FILM COATED ORAL at 05:14

## 2021-06-28 RX ADMIN — FERROUS SULFATE TAB 325 MG (65 MG ELEMENTAL FE) 325 MG: 325 (65 FE) TAB at 11:49

## 2021-06-28 RX ADMIN — PRAMIPEXOLE DIHYDROCHLORIDE 0.5 MG: 0.25 TABLET ORAL at 20:55

## 2021-06-28 RX ADMIN — SODIUM CHLORIDE, PRESERVATIVE FREE 10 ML: 5 INJECTION INTRAVENOUS at 20:56

## 2021-06-28 RX ADMIN — POTASSIUM CHLORIDE 20 MEQ: 1500 TABLET, EXTENDED RELEASE ORAL at 20:54

## 2021-06-28 RX ADMIN — HYDROXYZINE HYDROCHLORIDE 10 MG: 10 TABLET ORAL at 20:55

## 2021-06-28 RX ADMIN — INSULIN LISPRO 1 UNITS: 100 INJECTION, SOLUTION INTRAVENOUS; SUBCUTANEOUS at 11:56

## 2021-06-28 RX ADMIN — SODIUM CHLORIDE: 9 INJECTION, SOLUTION INTRAVENOUS at 05:14

## 2021-06-28 RX ADMIN — AMIODARONE HYDROCHLORIDE 400 MG: 200 TABLET ORAL at 11:49

## 2021-06-28 RX ADMIN — AMIODARONE HYDROCHLORIDE 400 MG: 200 TABLET ORAL at 20:55

## 2021-06-28 RX ADMIN — SPIRONOLACTONE 25 MG: 25 TABLET ORAL at 11:48

## 2021-06-28 RX ADMIN — PREDNISONE 40 MG: 20 TABLET ORAL at 07:41

## 2021-06-28 RX ADMIN — AMLODIPINE BESYLATE 10 MG: 10 TABLET ORAL at 11:48

## 2021-06-28 RX ADMIN — INSULIN LISPRO 1 UNITS: 100 INJECTION, SOLUTION INTRAVENOUS; SUBCUTANEOUS at 20:55

## 2021-06-28 ASSESSMENT — PAIN DESCRIPTION - LOCATION: LOCATION: BACK

## 2021-06-28 ASSESSMENT — PAIN DESCRIPTION - ORIENTATION: ORIENTATION: LOWER

## 2021-06-28 ASSESSMENT — PAIN DESCRIPTION - FREQUENCY: FREQUENCY: CONTINUOUS

## 2021-06-28 ASSESSMENT — PAIN SCALES - GENERAL
PAINLEVEL_OUTOF10: 9
PAINLEVEL_OUTOF10: 9
PAINLEVEL_OUTOF10: 10
PAINLEVEL_OUTOF10: 0

## 2021-06-28 ASSESSMENT — PAIN DESCRIPTION - PAIN TYPE: TYPE: CHRONIC PAIN

## 2021-06-28 ASSESSMENT — PAIN DESCRIPTION - DESCRIPTORS: DESCRIPTORS: DISCOMFORT

## 2021-06-28 NOTE — PLAN OF CARE
Problem: Falls - Risk of:  Goal: Will remain free from falls  Description: Will remain free from falls  Outcome: Met This Shift  Goal: Absence of physical injury  Description: Absence of physical injury  Outcome: Met This Shift     Problem: Fluid Volume:  Goal: Ability to achieve and maintain adequate urine output will improve  Description: Ability to achieve and maintain adequate urine output will improve  6/27/2021 1611 by Gordon Whelan RN  Outcome: Met This Shift     Problem: Skin Integrity:  Goal: Will show no infection signs and symptoms  Description: Will show no infection signs and symptoms  6/28/2021 0449 by Jayla Castellanos RN  Outcome: Met This Shift  6/27/2021 1611 by Gordon Whelan RN  Outcome: Met This Shift  Goal: Absence of new skin breakdown  Description: Absence of new skin breakdown  6/27/2021 1611 by Gordon Whelan RN  Outcome: Met This Shift     Problem: Pain:  Goal: Pain level will decrease  Description: Pain level will decrease  6/27/2021 1611 by Gordon Whelan RN  Outcome: Met This Shift  Goal: Control of acute pain  Description: Control of acute pain  6/27/2021 1611 by Gordon Whelan RN  Outcome: Met This Shift  Goal: Control of chronic pain  Description: Control of chronic pain  6/27/2021 1611 by Gordon Whelan RN  Outcome: Met This Shift

## 2021-06-28 NOTE — PROGRESS NOTES
Resp 16   Ht 5' 3\" (1.6 m)   Wt 176 lb 6.4 oz (80 kg)   LMP  (LMP Unknown)   SpO2 91%   BMI 31.25 kg/m²     I/O:    Intake/Output Summary (Last 24 hours) at 6/28/2021 1616  Last data filed at 6/28/2021 1553  Gross per 24 hour   Intake 480 ml   Output 2000 ml   Net -1520 ml       Vent Information  SpO2: 91 %                CURRENT MEDS :  Scheduled Meds:   sodium chloride flush  5-40 mL Intravenous 2 times per day    enoxaparin  1 mg/kg Subcutaneous BID    miconazole   Topical BID    amiodarone  400 mg Oral BID    [START ON 7/3/2021] amiodarone  200 mg Oral Daily    bumetanide  1 mg Oral Daily    metoprolol tartrate  50 mg Oral BID    amLODIPine  10 mg Oral Daily    ferrous sulfate  325 mg Oral Daily with breakfast    hydrOXYzine  10 mg Oral Nightly    pantoprazole  40 mg Oral QAM AC    potassium chloride  20 mEq Oral BID    pramipexole  0.5 mg Oral TID    spironolactone  25 mg Oral Daily    insulin lispro  0-6 Units Subcutaneous TID WC    insulin lispro  0-3 Units Subcutaneous Nightly       Physical Exam:   General: The patient is lying in bed comfortably without any distress. Breathing is not labored  HEENT: Pupils are equal round and reactive to light, there are no oral lesions and no post-nasal drip   Neck: supple without adenopathy  Cardiovascular: regular rate and rhythm without murmur or gallop  Respiratory: DIMINISHED  to auscultation bilaterally without wheezing . Air entry is symmetric  Abdomen: ASCITES, non-tender, non-distended, normal bowel sounds  Extremities: warm,  no clubbing trace lower extremity edema  Skin: no rash or lesion  Neurologic: CN II-XII grossly intact, no focal deficits       Pertinent/ New Labs and Imaging Studies     Imaging Personally Reviewed:  FINDINGS:   The heart is mildly enlarged. Streaky pulmonary opacities in the lower lobes   are nonspecific and are grossly stable as of 05/29/2021. No evidence of   pulmonary vascular congestion.    No pneumothorax or pleural effusion is seen. Impression   1. Nonspecific streaky pulmonary opacities in the lungs, which are stable as   of 05/29/2021. They may be infectious/inflammatory in etiology or related to   chronic scarring. cta chest dec 2020  1. No pulmonary embolism is seen. 2.  No acute cardiopulmonary abnormality. 3. Cardiomegaly. No pulmonary edema or pleural effusion. 4. Mild mediastinal lymphadenopathy, which may be reactive. Given patient's   history of cecal adenocarcinoma, a metastatic etiology cannot be excluded. 5. Irregular liver contour indicating cirrhosis. Small volume perihepatic   ascites. 6. Reflux of contrast into the hepatic veins may signify right heart failure. Echo: 1/2/21  Summary   Left ventricular internal dimensions were normal in diastole and systole. Borderline concentric left ventricular hypertrophy. No regional wall motion abnormalities seen. Normal left ventricular ejection fraction. There is doppler evidence of stage II diastolic dysfunction. The left atrium is severely dilated. Mild mitral annular calcification. Mild mitral regurgitation is present. The aortic valve appears mildly sclerotic. Mild tricuspid regurgitation. Pulmonary hypertension is mild . There is a trivial posterior pericardial effusion noted.               Labs:  Lab Results   Component Value Date    WBC 14.8 06/26/2021    HGB 11.2 06/26/2021    HCT 37.0 06/26/2021    MCV 79.1 06/26/2021    MCH 23.9 06/26/2021    MCHC 30.3 06/26/2021    RDW 23.8 06/26/2021     06/26/2021    MPV 9.4 06/26/2021     Lab Results   Component Value Date     06/28/2021    K 4.5 06/28/2021    K 3.2 06/21/2021    CL 92 06/28/2021    CO2 30 06/28/2021    BUN 18 06/28/2021    CREATININE 0.9 06/28/2021    LABALBU 4.0 06/22/2021    CALCIUM 9.5 06/28/2021    GFRAA >60 06/28/2021    LABGLOM >60 06/28/2021     Lab Results   Component Value Date    PROTIME 16.4 06/21/2021 INR 1.5 06/21/2021     No results for input(s): PROBNP in the last 72 hours. No results for input(s): PROCAL in the last 72 hours. This SmartLink has not been configured with any valid records. Assessment:    1. Acute respiratory failure with hypoxia  2. Paroxysmal A fib RVR-home Xarelto  3. Exacerbation of CHF with preserved EF 65%, stage II DD  4. Question of underlying sleep apnea syndrome  5. Daytime hypersomnolence  6. AMES, liver cirrhosis with ascites  7. Dyspnea with exertion-multifactorial 2/2 a fib rvr, ascites  8. 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 HTN  10. HLD  11. DM II       Plan:   1. Now on room air  2. Will need ambulatory O2 testing prior to dc  3. Diuresis bumex 1 mg po daily, mgmt of A fib, CHF per cardiology  4. ABD US completed with no evidence of ascites. 5. Repeat cardioversion successful, ,. Stress test results see above  6. Bronchodilators Xopenex as needed  7. Continue  incentive spirometer  8. Xarelto daily   9. GI prophylaxis  10. Anticipating CABG  11. Recommend OP PFTs and OP sleep study testing once recovered    This plan of care was reviewed in collaboration with Dr. Donta Chiu  Electronically signed by TEMO Gallegos CNP on 6/28/2021 at 4:16 PM    I personally saw, examined, and cared for the patient. Labs, medications, radiographs reviewed. I agree with history exam and plans detailed in NP note.   Oseas Henderson MD

## 2021-06-28 NOTE — CARE COORDINATION
Weston Yang is no longer included within the BPCI-A Medical Bundle due to a non-qualifying DRG (811), changed from DRG (292), [place] [facility] [place]. 6/28/2021, Sonia Padron RN   Episode resolved.

## 2021-06-28 NOTE — CARE COORDINATION
Met with patient and  at bedside. Pt for cardiac cath today, no longer on O2. Plans to discharge home with ; voiced no needs. Previously declined home care.     [facility] [phone]

## 2021-06-28 NOTE — PROCEDURES
and a Vasc Band was applied over the right radial artery with  good hemostasis. The patient tolerated the procedure very well and no  complications were noted. No significant blood loss occurred during the  procedure. FINDINGS:  HEMODYNAMICS:  Aortic pressure 95/51 mmHg. Left ventricular end-diastolic pressure 18 mmHg. CORONARY ANATOMY:  LEFT MAIN:  It is a long and large artery, which is calcified at the  distal end. There was 50%-60% hazy distal discrete stenosis. LAD:  It is a large artery wrapping around the apex and giving rise to a  small diagonal branch and several septal perforators. The LAD was  heavily calcified in its proximal to mid segment. There was 40%-50%  proximal to mid LAD stenosis. LEFT CIRCUMFLEX:  It is medium in size and giving rise to an obtuse  marginal branch. There was 30% mid OM stenosis. RAMUS BRANCH:  It is fair in size and bifurcating with 30%-40% proximal  stenosis. RCA:  It is a large dominant artery giving rise to 2 RV marginal  branches, a PDA and a PLV branch. The RCA was angulated and   calcified in its mid segment. There was 30%-40% discrete mid RCA  stenosis and 30% discrete distal stenosis. LEFT VENTRICULOGRAM:  Revealed an ejection fraction of 55%-60%. No  mitral regurgitation was noted. IMPRESSION:  1.  50%-60% discrete calcified distal left main stenosis. 2.  40%-50% proximal to mid heavily calcified LAD stenosis. 3.  30% discrete mid circumflex stenosis. 4.  30%-40% proximal ramus stenosis. 5.  30%-40% discrete mid RCA stenosis and 30% distal stenosis. 6.  LVEDP 18 mmHg. 7.  LV ejection fraction of 55%-60%. RECOMMENDATIONS:  1. Aggressive medical therapy. 2.  Consult CT surgery for CABG. PROCEDURE START TIME:  09:53 a.m. PROCEDURE END TIME:  10:23 a.m. CONTRAST VOLUME:  90 mL of Isovue. FLUOROSCOPY TIME:  4.7 minutes. CONSCIOUS SEDATION:  1 mg of intravenous Versed and 50 mcg of  intravenous fentanyl.         BALDEV Colten Jacques MD    D: 06/28/2021 10:45:30       T: 06/28/2021 10:48:57     [email or web address]  Job#: [id number]     Doc#: [id number]    CC:

## 2021-06-28 NOTE — PROGRESS NOTES
Comprehensive Nutrition Assessment    Type and Reason for Visit:  Initial, RD Nutrition Re-Screen/LOS    Nutrition Recommendations/Plan: Continue Current Diet, Start Oral Nutrition Supplement    Nutrition Assessment:  Pt admit w/ afib RVR. Noted hx DM, CHF, & nonalcoholic liver cirrhosis. Noted malnutrition. PO intake variable average 50-75%. Will add Ensure HP BID & continue to monitor. Malnutrition Assessment:  Malnutrition Status:  Severe malnutrition    Context:  Chronic Illness     Findings of the 6 clinical characteristics of malnutrition:  Energy Intake: 75% or less estimated energy requirements for 1 month or longer  Weight Loss:   15.8% x 5 mon     Body Fat Loss:  No significant body fat loss     Muscle Mass Loss:   Mild muscle mass loss    Fluid Accumulation:  No significant fluid accumulation     Strength:  Not Performed    Estimated Daily Nutrient Needs:  Energy (kcal):  MSJ 1232 x 1.2 SF= 9806-4008; Weight Used for Energy Requirements:  Current     Protein (g):  80-95 (1.5-1.8 g/kg); Weight Used for Protein Requirements:  Ideal        Fluid (ml/day):  3186-2868    Nutrition Related Findings:  A&Ox4, -I/O's, trace edema, active BS      Wounds:  None       Current Nutrition Therapies:    ADULT DIET; Regular; Low Fat/Low Chol/High Fiber/2 gm Na    Anthropometric Measures:  · Height: 5' 3\" (160 cm)  · Current Body Weight: 176 lb (79.8 kg) (6/27 actual)   · Admission Body Weight: 182 lb (82.6 kg) (6/21 actual)    · Usual Body Weight: 209 lb (94.8 kg) (1/2021 EMR actual)     · Ideal Body Weight: 115 lbs; % Ideal Body Weight 153 %   · BMI: 31.2 BMI Categories: Obese Class 1 (BMI 30.0-34. 9)       Nutrition Diagnosis:   · Moderate malnutrition, In context of chronic illness related to catabolic illness as evidenced by poor intake prior to admission, weight loss (15.8% x 5 mon)    Nutrition Interventions:    Nutrition Education/Counseling:  Education not indicated   Coordination of Nutrition Care: Continue to monitor while inpatient    Goals:  Pt to consume >75% meals/ONS       Nutrition Monitoring and Evaluation:   Food/Nutrient Intake Outcomes:  Food and Nutrient Intake, Supplement Intake  Physical Signs/Symptoms Outcomes:  Nutrition Focused Physical Findings, Biochemical Data, Skin, Weight, GI Status, Fluid Status or Edema     Discharge Planning:     Too soon to determine     Electronically signed by Annmarie Ramos RD, LD on 6/28/21 at 4:20 PM EDT    Contact: Ext [phone]

## 2021-06-28 NOTE — PROGRESS NOTES
Physician Progress Note      PATIENT:               Lindsay Ly  CSN #:                  [id number]  :                       1940  ADMIT DATE:       2021 3:23 PM  [address] [place] DATE:  RESPONDING  PROVIDER #:        Jeanette Berman MD          QUERY TEXT:    Patient admitted with Afib. Noted documentation of acute respiratory failure   in pulmonary consult on . In order to support the diagnosis of acute   respiratory failure, please include additional clinical indicators in your   documentation. Or please document if the diagnosis of acute respiratory   failure has been ruled out after further study. The medical record reflects the following:  Risk Factors: Afib, SOB  Clinical Indicators: Per Consult note: Acute respiratory failure with hypoxia,   Oxygen therapy 2 liters NC, VS Findings RR 16-24 SpO2 94% RA 96% 2LNC  Treatment: Supplemental oxygen at [facility] [address], Pulse ox monitoring    Acute Respiratory Failure Clinical Indicators per 3[employer] MS-DRG Training Guide and   Quick Reference Guide:  pO2 < 60 mmHg or SpO2 (pulse oximetry) < 91% breathing room air  pCO2 > 50 and pH < 7.35  P/F ratio (pO2 / FIO2) < 300  pO2 decrease or pCO2 increase by 10 mmHg from baseline (if known)  Supplemental oxygen of 40% or more  Presence of respiratory distress, tachypnea, dyspnea, shortness of breath,   wheezing  Unable to speak in complete sentences  Use of accessory muscles to breathe  Extreme anxiety and feeling of impending doom  Tripod position  Confusion/altered mental status/obtunded      Thank you  Dheeraj IVEYN, RN, CCDS  Clinical Documentation Improvement  Options provided:  -- Acute Respiratory Failure as evidenced by, Please document evidence.   -- Acute Respiratory Failure ruled out after study  -- Other - I will add my own diagnosis  -- Disagree - Not applicable / Not valid  -- Disagree - Clinically unable to determine / Unknown  -- Refer to Clinical Documentation Reviewer    PROVIDER RESPONSE TEXT:    Acute Respiratory Failure has been ruled out after study.     Query created by: Luis Carl on 1/42/7140 6:08 AM      Electronically signed by:  Carmina Melton MD 6/28/2021 1:00 PM

## 2021-06-28 NOTE — PATIENT CARE CONFERENCE
[facility] Quality Flow/Interdisciplinary Rounds Progress Note        Quality Flow Rounds held on June 28, 2021    Disciplines Attending:  Bedside Nurse, ,  and Nursing Unit Alondra Nascimento was admitted on 6/21/2021  3:23 PM    Anticipated Discharge Date:  Expected Discharge Date: 06/25/21    Disposition:    Valentin Score:  Valentin Scale Score: 17    Readmission Risk              Risk of Unplanned Readmission:  27           Discussed patient goal for the day, patient clinical progression, and barriers to discharge.   The following Goal(s) of the Day/Commitment(s) have been identified:  give prep for iodine allergy      Awilda Mccauley RN  June 28, 2021

## 2021-06-28 NOTE — PROGRESS NOTES
Subjective: The patient is awake and alert. No problems overnight. Denies chest pain, angina, and dyspnea. Denies abdominal pain. Tolerating diet. No nausea or vomiting. Objective:  Pt is aox3 in nad   /70   Pulse 75   Temp 97.9 °F (36.6 °C) (Temporal)   Resp 18   Ht 5' 3\" (1.6 m)   Wt 176 lb 6.4 oz (80 kg)   LMP  (LMP Unknown)   SpO2 93%   BMI 31.25 kg/m²   HEENT no adenopathy no bruits  Heart:  RRR, no murmurs, gallops, or rubs. 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

## 2021-06-28 NOTE — PROGRESS NOTES
INPATIENT CARDIOLOGY FOLLOW-UP    Name: Radha Gonzalez    Age: 80 y.o. Date of Admission: 6/21/2021  3:23 PM    Date of Service: 6/28/2021    Chief Complaint: Follow-up for paroxysmal atrial fibrillation, suspected coronary atherosclerosis, hypertension, moderate obesity    Interim History:       Review of Systems: The remainder of a complete multisystem review including consitutional, central nervous, respiratory, circulatory, gastrointestinal, genitourinary, endocrinologic, hematologic, musculoskeletal and psychiatric are negative. 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)    Chronic heart failure with preserved ejection fraction (HCC)    Atrial fibrillation with RVR (HCC)       Allergies:   Allergies   Allergen Reactions    Iodine Shortness Of Breath     SOB, Rash    Benadryl [Diphenhydramine]      hyper    Fish-Derived Products Rash       Current Medications:  Current Facility-Administered Medications   Medication Dose Route Frequency Provider Last Rate Last Admin    hydrocortisone sodium succinate PF (SOLU-CORTEF) injection 100 mg  100 mg Intravenous Once Leo Anderson MD        predniSONE (DELTASONE) tablet 40 mg  40 mg Oral Once Leo Anderson MD        diphenhydrAMINE (BENADRYL) tablet 50 mg  50 mg Oral Q6H PRN Leo Anderson MD        diphenhydrAMINE (BENADRYL) tablet 50 mg  50 mg Oral Q6H PRN Leo Anderson MD        0.9 % sodium chloride infusion   Intravenous Continuous Leo Anderson MD 50 mL/hr at 06/28/21 0514 New Bag at 06/28/21 0514    amiodarone (CORDARONE) tablet 400 mg  400 mg Oral BID TEMO Burger CNP   400 mg at 06/27/21 2052    [START ON 7/3/2021] amiodarone (CORDARONE) tablet 200 mg  200 mg Oral Daily TEMO Burger CNP        [Held by provider] bumetanide (BUMEX) tablet 1 mg  1 mg Oral Daily Ellen Dawson MD   1 mg at 06/27/21 0779    metoprolol tartrate (LOPRESSOR) tablet 50 mg  50 mg Oral BID Estela James MD   50 mg at 06/27/21 2052    [Held by provider] rivaroxaban (XARELTO) tablet 15 mg  15 mg Oral Daily Ellen Dawson MD   15 mg at 06/25/21 1830    levalbuterol (XOPENEX) nebulization 0.63 mg  0.63 mg Nebulization Q8H PRN TEMO Hendrix - ANCA        amLODIPine (NORVASC) tablet 10 mg  10 mg Oral Daily Skippy Brice, DO   10 mg at 06/27/21 4938    chlordiazePOXIDE-clidinium (LIBRAX) 5-2.5 MG per capsule 1 capsule  1 capsule Oral BID PRN Skippy Arenas, DO        ferrous sulfate (IRON 325) tablet 325 mg  325 mg Oral Daily with breakfast Skippy Brice, DO   325 mg at 06/27/21 5700    hydrOXYzine (ATARAX) tablet 10 mg  10 mg Oral Nightly Skippy Brice, DO   10 mg at 06/27/21 2052    pantoprazole (PROTONIX) tablet 40 mg  40 mg Oral QAM AC Skippy Arenas, DO   40 mg at 06/28/21 0514    potassium chloride (KLOR-CON M) extended release tablet 20 mEq  20 mEq Oral BID Skippy Brice, DO   20 mEq at 06/27/21 2052    pramipexole (MIRAPEX) tablet 0.5 mg  0.5 mg Oral TID Skippy Arenas, DO   0.5 mg at 06/27/21 2051    spironolactone (ALDACTONE) tablet 25 mg  25 mg Oral Daily Skippy Brice, DO   25 mg at 06/27/21 5618    sodium chloride flush 0.9 % injection 5-40 mL  5-40 mL Intravenous 2 times per day Skippy Arenas, DO   10 mL at 06/27/21 2212    sodium chloride flush 0.9 % injection 5-40 mL  5-40 mL Intravenous PRN Skippy Brice, DO        0.9 % sodium chloride infusion  25 mL Intravenous PRN Skippy Brice, DO        ondansetron (ZOFRAN-ODT) disintegrating tablet 4 mg  4 mg Oral Q8H PRN Skippy Brice, DO   4 mg examination is notable for a regular rate and rhythm with no palpable thrill. No gallop rhythm or cardiac murmur are identified. A benign abdominal examination is present with the exception of obesity and no masses or organomegaly. Intact pulses are present throughout all extremities and no peripheral edema is present. No focal neurologic deficits are present. Intake/Output:    Intake/Output Summary (Last 24 hours) at 6/28/2021 0655  Last data filed at 6/28/2021 0631  Gross per 24 hour   Intake 300 ml   Output 1725 ml   Net -1425 ml     I/O this shift: In: 0   Out: 800 [Urine:800]    Laboratory Tests:  Lab Results   Component Value Date    CREATININE 0.8 06/26/2021    BUN 11 06/26/2021     06/26/2021    K 3.6 06/26/2021    CL 92 (L) 06/26/2021    CO2 32 (H) 06/26/2021     No results for input(s): CKTOTAL, CKMB in the last 72 hours. Invalid input(s): TROPONONI  No results found for: BNP  Lab Results   Component Value Date    WBC 14.8 06/26/2021    RBC 4.68 06/26/2021    HGB 11.2 06/26/2021    HCT 37.0 06/26/2021    MCV 79.1 06/26/2021    MCH 23.9 06/26/2021    MCHC 30.3 06/26/2021    RDW 23.8 06/26/2021     06/26/2021    MPV 9.4 06/26/2021     No results for input(s): ALKPHOS, ALT, AST, PROT, BILITOT, BILIDIR, LABALBU in the last 72 hours.   Lab Results   Component Value Date    MG 1.2 06/24/2021     Lab Results   Component Value Date    PROTIME 16.4 06/21/2021    INR 1.5 06/21/2021     Lab Results   Component Value Date    TSH 2.250 06/21/2021     No components found for: CHLPL  Lab Results   Component Value Date    TRIG 205 06/11/2019    TRIG 162 (H) 10/15/2012     Lab Results   Component Value Date    HDL 15 (A) 06/11/2019    HDL 55 10/31/2017    HDL 49.0 10/15/2012     Lab Results   Component Value Date    LDLCALC 50 10/31/2017    [address] 10/15/2012       Cardiac Tests:  Telemetry findings reviewed: sinus rhythm, no new tachy/bradyarrhythmias overnight      ASSESSMENT / PLAN: On a clinical basis, the patient presently remains compensated from a cardiovascular standpoint with no recurrent arrhythmias and plans of coronary angiography later this morning in follow-up of her high risk myocardial perfusion imaging study. Appropriate contrast premedication has been administered on the basis of her previous contrast allergy. In light of chronic kidney disease, her diuretics have been withheld to reduce risk of contrast nephropathy with appropriate hydration administered. Following her angiographic procedure, her oral anticoagulation will be resumed with dose modification based on her present creatinine clearance in addition to that of antiplatelet therapy as appropriate based on the findings of her coronary anatomy. Continued ongoing lifestyle modification to achieve weight reduction will benefit diastolic cardiac performance as well as reduce risk of the development of obstructive sleep apnea further adversely affecting diastolic cardiac performance and her risk of recurrent atrial arrhythmias. Continue aggressive risk factor modification of blood pressure, diabetes and serum lipids will remain essential to reducing risk of future atherosclerotic development. In addition, ongoing monitoring of her hepatic transaminase levels and thyroid function will be necessary in the face of present anticipated long-term amiodarone administration. Note: This report was completed utilizing computer voice recognition software. Every effort has been made to ensure accuracy, however; inadvertent computerized transcription errors may be present. Zbigniew Sidhu.  Darshan Eli, [facility] Cardiology

## 2021-06-29 ENCOUNTER — APPOINTMENT (OUTPATIENT)
Dept: CT IMAGING | Age: 81
DRG: 286 | End: 2021-06-29
Payer: MEDICARE

## 2021-06-29 LAB
ANION GAP SERPL CALCULATED.3IONS-SCNC: 15 MMOL/L (ref 7–16)
BACTERIA: ABNORMAL /HPF
BILIRUBIN URINE: NEGATIVE
BLOOD, URINE: NEGATIVE
BUN BLDV-MCNC: 27 MG/DL (ref 6–23)
CALCIUM SERPL-MCNC: 9.3 MG/DL (ref 8.6–10.2)
CHLORIDE BLD-SCNC: 94 MMOL/L (ref 98–107)
CLARITY: CLEAR
CO2: 27 MMOL/L (ref 22–29)
COLOR: YELLOW
CREAT SERPL-MCNC: 1.2 MG/DL (ref 0.5–1)
EPITHELIAL CELLS, UA: ABNORMAL /HPF
GFR AFRICAN AMERICAN: 52
GFR NON-AFRICAN AMERICAN: 43 ML/MIN/1.73
GLUCOSE BLD-MCNC: 124 MG/DL (ref 74–99)
GLUCOSE URINE: NEGATIVE MG/DL
HBA1C MFR BLD: 6.2 % (ref 4–5.6)
HCT VFR BLD CALC: 36.7 % (ref 34–48)
HEMOGLOBIN: 10.9 G/DL (ref 11.5–15.5)
KETONES, URINE: NEGATIVE MG/DL
LEUKOCYTE ESTERASE, URINE: ABNORMAL
MCH RBC QN AUTO: 23.7 PG (ref 26–35)
MCHC RBC AUTO-ENTMCNC: 29.7 % (ref 32–34.5)
MCV RBC AUTO: 79.8 FL (ref 80–99.9)
METER GLUCOSE: 110 MG/DL (ref 74–99)
METER GLUCOSE: 138 MG/DL (ref 74–99)
METER GLUCOSE: 142 MG/DL (ref 74–99)
METER GLUCOSE: 228 MG/DL (ref 74–99)
NITRITE, URINE: NEGATIVE
PDW BLD-RTO: 24.6 FL (ref 11.5–15)
PH UA: 6 (ref 5–9)
PLATELET # BLD: 350 E9/L (ref 130–450)
PMV BLD AUTO: 9.2 FL (ref 7–12)
POTASSIUM SERPL-SCNC: 4.6 MMOL/L (ref 3.5–5)
PROTEIN UA: NEGATIVE MG/DL
RBC # BLD: 4.6 E12/L (ref 3.5–5.5)
RBC UA: ABNORMAL /HPF (ref 0–2)
SODIUM BLD-SCNC: 136 MMOL/L (ref 132–146)
SPECIFIC GRAVITY UA: 1.01 (ref 1–1.03)
UROBILINOGEN, URINE: 0.2 E.U./DL
WBC # BLD: 20.4 E9/L (ref 4.5–11.5)
WBC UA: ABNORMAL /HPF (ref 0–5)

## 2021-06-29 PROCEDURE — 85027 COMPLETE CBC AUTOMATED: CPT

## 2021-06-29 PROCEDURE — 6370000000 HC RX 637 (ALT 250 FOR IP): Performed by: NURSE PRACTITIONER

## 2021-06-29 PROCEDURE — 6370000000 HC RX 637 (ALT 250 FOR IP): Performed by: INTERNAL MEDICINE

## 2021-06-29 PROCEDURE — 6360000002 HC RX W HCPCS: Performed by: INTERNAL MEDICINE

## 2021-06-29 PROCEDURE — 2580000003 HC RX 258: Performed by: INTERNAL MEDICINE

## 2021-06-29 PROCEDURE — 71250 CT THORAX DX C-: CPT

## 2021-06-29 PROCEDURE — 81001 URINALYSIS AUTO W/SCOPE: CPT

## 2021-06-29 PROCEDURE — 80048 BASIC METABOLIC PNL TOTAL CA: CPT

## 2021-06-29 PROCEDURE — 87088 URINE BACTERIA CULTURE: CPT

## 2021-06-29 PROCEDURE — 99233 SBSQ HOSP IP/OBS HIGH 50: CPT | Performed by: INTERNAL MEDICINE

## 2021-06-29 PROCEDURE — 99222 1ST HOSP IP/OBS MODERATE 55: CPT | Performed by: THORACIC SURGERY (CARDIOTHORACIC VASCULAR SURGERY)

## 2021-06-29 PROCEDURE — 36415 COLL VENOUS BLD VENIPUNCTURE: CPT

## 2021-06-29 PROCEDURE — 2140000000 HC CCU INTERMEDIATE R&B

## 2021-06-29 PROCEDURE — 82962 GLUCOSE BLOOD TEST: CPT

## 2021-06-29 PROCEDURE — 83036 HEMOGLOBIN GLYCOSYLATED A1C: CPT

## 2021-06-29 RX ORDER — ROSUVASTATIN CALCIUM 20 MG/1
20 TABLET, COATED ORAL NIGHTLY
Status: DISCONTINUED | OUTPATIENT
Start: 2021-06-29 | End: 2021-06-30 | Stop reason: HOSPADM

## 2021-06-29 RX ADMIN — AMLODIPINE BESYLATE 10 MG: 10 TABLET ORAL at 08:43

## 2021-06-29 RX ADMIN — RIVAROXABAN 15 MG: 15 TABLET, FILM COATED ORAL at 18:46

## 2021-06-29 RX ADMIN — POTASSIUM CHLORIDE 20 MEQ: 1500 TABLET, EXTENDED RELEASE ORAL at 08:42

## 2021-06-29 RX ADMIN — MICONAZOLE NITRATE: 20.6 POWDER TOPICAL at 21:28

## 2021-06-29 RX ADMIN — INSULIN LISPRO 3 UNITS: 100 INJECTION, SOLUTION INTRAVENOUS; SUBCUTANEOUS at 11:10

## 2021-06-29 RX ADMIN — ACETAMINOPHEN 650 MG: 325 TABLET ORAL at 18:46

## 2021-06-29 RX ADMIN — METOPROLOL TARTRATE 50 MG: 50 TABLET, FILM COATED ORAL at 21:29

## 2021-06-29 RX ADMIN — METOPROLOL TARTRATE 50 MG: 50 TABLET, FILM COATED ORAL at 08:43

## 2021-06-29 RX ADMIN — POTASSIUM CHLORIDE 20 MEQ: 1500 TABLET, EXTENDED RELEASE ORAL at 21:29

## 2021-06-29 RX ADMIN — PRAMIPEXOLE DIHYDROCHLORIDE 0.5 MG: 0.25 TABLET ORAL at 08:43

## 2021-06-29 RX ADMIN — PRAMIPEXOLE DIHYDROCHLORIDE 0.5 MG: 0.25 TABLET ORAL at 14:15

## 2021-06-29 RX ADMIN — FERROUS SULFATE TAB 325 MG (65 MG ELEMENTAL FE) 325 MG: 325 (65 FE) TAB at 08:43

## 2021-06-29 RX ADMIN — TRAMADOL HYDROCHLORIDE 50 MG: 50 TABLET, FILM COATED ORAL at 21:29

## 2021-06-29 RX ADMIN — PANTOPRAZOLE SODIUM 40 MG: 40 TABLET, DELAYED RELEASE ORAL at 07:05

## 2021-06-29 RX ADMIN — SODIUM CHLORIDE, PRESERVATIVE FREE 10 ML: 5 INJECTION INTRAVENOUS at 21:29

## 2021-06-29 RX ADMIN — SODIUM CHLORIDE, PRESERVATIVE FREE 10 ML: 5 INJECTION INTRAVENOUS at 08:44

## 2021-06-29 RX ADMIN — HYDROXYZINE HYDROCHLORIDE 10 MG: 10 TABLET ORAL at 21:29

## 2021-06-29 RX ADMIN — PRAMIPEXOLE DIHYDROCHLORIDE 0.5 MG: 0.25 TABLET ORAL at 21:28

## 2021-06-29 RX ADMIN — ENOXAPARIN SODIUM 80 MG: 80 INJECTION SUBCUTANEOUS at 08:44

## 2021-06-29 RX ADMIN — AMIODARONE HYDROCHLORIDE 400 MG: 200 TABLET ORAL at 08:43

## 2021-06-29 RX ADMIN — MICONAZOLE NITRATE: 20.6 POWDER TOPICAL at 08:00

## 2021-06-29 RX ADMIN — ROSUVASTATIN 20 MG: 20 TABLET, FILM COATED ORAL at 21:29

## 2021-06-29 RX ADMIN — BUMETANIDE 1 MG: 1 TABLET ORAL at 08:43

## 2021-06-29 RX ADMIN — INSULIN LISPRO 1 UNITS: 100 INJECTION, SOLUTION INTRAVENOUS; SUBCUTANEOUS at 08:43

## 2021-06-29 RX ADMIN — AMIODARONE HYDROCHLORIDE 400 MG: 200 TABLET ORAL at 21:29

## 2021-06-29 RX ADMIN — SPIRONOLACTONE 25 MG: 25 TABLET ORAL at 08:43

## 2021-06-29 RX ADMIN — TRAMADOL HYDROCHLORIDE 50 MG: 50 TABLET, FILM COATED ORAL at 04:24

## 2021-06-29 RX ADMIN — TRAMADOL HYDROCHLORIDE 50 MG: 50 TABLET, FILM COATED ORAL at 13:22

## 2021-06-29 ASSESSMENT — PAIN DESCRIPTION - FREQUENCY
FREQUENCY: CONTINUOUS
FREQUENCY: CONTINUOUS

## 2021-06-29 ASSESSMENT — PAIN DESCRIPTION - ORIENTATION
ORIENTATION: LOWER
ORIENTATION: RIGHT

## 2021-06-29 ASSESSMENT — PAIN SCALES - GENERAL
PAINLEVEL_OUTOF10: 9
PAINLEVEL_OUTOF10: 9
PAINLEVEL_OUTOF10: 3
PAINLEVEL_OUTOF10: 0
PAINLEVEL_OUTOF10: 8
PAINLEVEL_OUTOF10: 0

## 2021-06-29 ASSESSMENT — PAIN - FUNCTIONAL ASSESSMENT: PAIN_FUNCTIONAL_ASSESSMENT: ACTIVITIES ARE NOT PREVENTED

## 2021-06-29 ASSESSMENT — PAIN DESCRIPTION - ONSET: ONSET: ON-GOING

## 2021-06-29 ASSESSMENT — PAIN DESCRIPTION - LOCATION
LOCATION: HIP
LOCATION: BACK

## 2021-06-29 ASSESSMENT — PAIN DESCRIPTION - DESCRIPTORS
DESCRIPTORS: ACHING;DISCOMFORT
DESCRIPTORS: ACHING;DISCOMFORT;DULL

## 2021-06-29 ASSESSMENT — PAIN DESCRIPTION - PAIN TYPE
TYPE: CHRONIC PAIN
TYPE: CHRONIC PAIN

## 2021-06-29 ASSESSMENT — PAIN DESCRIPTION - PROGRESSION: CLINICAL_PROGRESSION: NOT CHANGED

## 2021-06-29 NOTE — PLAN OF CARE
Problem: Falls - Risk of:  Goal: Will remain free from falls  Description: Will remain free from falls  Outcome: Met This Shift  Goal: Absence of physical injury  Description: Absence of physical injury  Outcome: Met This Shift     Problem: Cardiac:  Goal: Ability to maintain an adequate cardiac output will improve  Description: Ability to maintain an adequate cardiac output will improve  Outcome: Met This Shift  Goal: Hemodynamic stability will improve  Description: Hemodynamic stability will improve  Outcome: Met This Shift     Problem: Fluid Volume:  Goal: Ability to achieve and maintain adequate urine output will improve  Description: Ability to achieve and maintain adequate urine output will improve  Outcome: Met This Shift     Problem: Respiratory:  Goal: Respiratory status will improve  Description: Respiratory status will improve  Outcome: Met This Shift     Problem: Skin Integrity:  Goal: Will show no infection signs and symptoms  Description: Will show no infection signs and symptoms  Outcome: Met This Shift  Goal: Absence of new skin breakdown  Description: Absence of new skin breakdown  Outcome: Met This Shift     Problem: Pain:  Goal: Pain level will decrease  Description: Pain level will decrease  Outcome: Met This Shift  Goal: Control of acute pain  Description: Control of acute pain  Outcome: Met This Shift

## 2021-06-29 NOTE — CONSULTS
CTS Consult    Patient name: Janice Fox    Reason for consult:  CAD    Primary Care Physician: Arun Van MD    Date of service: 6/29/2021    Chief Complaint:  SOB    HPI:  81 yo female with hx of afib for approximately 3 years who presented after being evaluated by her cardiologist as an outpatient. During that visit, she was noted to be in afib with RVR and had a HR in the 150s. She subsequently was admitted and underwent treatment and workup which included a high sensitivity troponin. This was mildly elevated. She underwent stress test which was positive. LHC revealed LM CAD. She denies chest pain and states she feels much better. Allergies:    Allergies   Allergen Reactions    Iodine Shortness Of Breath     SOB, Rash    Benadryl [Diphenhydramine]      hyper    Fish-Derived Products Rash       Home medications:    Current Facility-Administered Medications   Medication Dose Route Frequency Provider Last Rate Last Admin    rosuvastatin (CRESTOR) tablet 20 mg  20 mg Oral Nightly Franky Westbrook MD        sodium chloride flush 0.9 % injection 5-40 mL  5-40 mL Intravenous 2 times per day Jinny Calderon MD   10 mL at 06/29/21 0844    sodium chloride flush 0.9 % injection 5-40 mL  5-40 mL Intravenous PRN Jinny Calderon MD        0.9 % sodium chloride infusion  25 mL Intravenous PRN Jinny Calderon MD        acetaminophen (TYLENOL) tablet 650 mg  650 mg Oral Q4H PRN Jinny Calderon MD   650 mg at 06/28/21 2312    enoxaparin (LOVENOX) injection 80 mg  1 mg/kg Subcutaneous BID Jinny Calderon MD   80 mg at 06/29/21 0844    miconazole (MICOTIN) 2 % powder   Topical BID Arun Van MD   Given at 06/29/21 0800    diphenhydrAMINE (BENADRYL) tablet 50 mg  50 mg Oral Q6H PRN Franky Westbrook MD        diphenhydrAMINE (BENADRYL) tablet 50 mg  50 mg Oral Q6H PRN Franky Westbrook MD   50 mg at 06/28/21 0743    amiodarone (CORDARONE) tablet 400 mg  400 mg Oral BID TEMO Fields - CNP   400 mg at 06/29/21 0843    [START ON 7/3/2021] amiodarone (CORDARONE) tablet 200 mg  200 mg Oral Daily Sreedhar Santiago APRN - CNP        bumetanide (BUMEX) tablet 1 mg  1 mg Oral Daily Juwan Adair MD   1 mg at 06/29/21 0843    metoprolol tartrate (LOPRESSOR) tablet 50 mg  50 mg Oral BID Nimco Tyson MD   50 mg at 06/29/21 0843    levalbuterol (XOPENEX) nebulization 0.63 mg  0.63 mg Nebulization Q8H PRN Josh Amato APRN - CNP        amLODIPine (NORVASC) tablet 10 mg  10 mg Oral Daily Myrla Francoise, DO   10 mg at 06/29/21 0843    chlordiazePOXIDE-clidinium (LIBRAX) 5-2.5 MG per capsule 1 capsule  1 capsule Oral BID PRN Myrla Francoise, DO        ferrous sulfate (IRON 325) tablet 325 mg  325 mg Oral Daily with breakfast Myrla Francoise, DO   325 mg at 06/29/21 0843    hydrOXYzine (ATARAX) tablet 10 mg  10 mg Oral Nightly Myrla Francoise, DO   10 mg at 06/28/21 2055    pantoprazole (PROTONIX) tablet 40 mg  40 mg Oral QAM AC Myrla Francoise, DO   40 mg at 06/29/21 2417    potassium chloride (KLOR-CON M) extended release tablet 20 mEq  20 mEq Oral BID Myrla Francoise, DO   20 mEq at 06/29/21 4986    pramipexole (MIRAPEX) tablet 0.5 mg  0.5 mg Oral TID Myrla Francoise, DO   0.5 mg at 06/29/21 4266    spironolactone (ALDACTONE) tablet 25 mg  25 mg Oral Daily Myrla Francoise, DO   25 mg at 06/29/21 0843    ondansetron (ZOFRAN-ODT) disintegrating tablet 4 mg  4 mg Oral Q8H PRN Myrla Francoise, DO   4 mg at 06/23/21 0319    Or    ondansetron (ZOFRAN) injection 4 mg  4 mg Intravenous Q6H PRN Myrla Farncoise, DO        polyethylene glycol (GLYCOLAX) packet 17 g  17 g Oral Daily PRN Myrla Francoise, DO        acetaminophen (TYLENOL) suppository 650 mg  650 mg Rectal Q6H PRN Garima Owusu DO        insulin lispro (HUMALOG) injection vial 0-6 Units  0-6 Units Subcutaneous TID  Garima Owusu DO   3 Units at 06/29/21 1110    insulin lispro (HUMALOG) injection vial 0-3 Units  0-3 Units Subcutaneous Nightly Rozetta Muck, DO   1 Units at 06/28/21 2055    glucose (GLUTOSE) 40 % oral gel 15 g  15 g Oral PRN Rozetta Muck, DO        dextrose 50 % IV solution  12.5 g Intravenous PRN Rozetta Muck, DO        glucagon (rDNA) injection 1 mg  1 mg Intramuscular PRN Rozetta Muck, DO        dextrose 5 % solution  100 mL/hr Intravenous PRN Rozetta Muck, DO        traMADol Elfida Reining) tablet 50 mg  50 mg Oral Q8H PRN Rozetta Muck, DO   50 mg at 06/29/21 0424       Past Medical History:  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[facility] 01/2019    Anemia     Arm numbness     Arthritis     Blood transfusion     Cervical spinal stenosis     Cirrhosis of liver (HCC)     Diabetes mellitus (HCC)     Fatty liver     GERD (gastroesophageal reflux disease)     Hyperlipidemia     Hypertension     Low back pain     Lumbar stenosis     Neck pain     Obesity (BMI 30.0-34.9) 10/14/2012    PAF (paroxysmal atrial fibrillation) (HCC)     Renal artery aneurysm ([facility] 7/15/2015       Past Surgical History:  Past Surgical History:   Procedure Laterality Date    APPENDECTOMY      BACK SURGERY      BREAST SURGERY      reduction    CARDIAC CATHETERIZATION  06/28/2021    DR Rachel Hernandez    CARDIOVERSION  06/23/2021    CARPAL TUNNEL RELEASE      CATARACT REMOVAL  10/2016    CERVICAL DISCECTOMY  01/19/2007    ACDF C3-4, C4-5, C5-6 Dr. Maldonado Wang      left lower leg    HEMICOLECTOMY N/A 2/19/2019    DIAGNOSTIC LAPAROSCOPY, LYSIS OF ADHESIONS, OPEN RIGHT HEMICOLECTOMY performed by Norma Kemp MD at [address] [facility]  05/17/2017    [facility].  Dr.Joseph Gonzalez Jump TRANSESOPHAGEAL ECHOCARDIOGRAM  06/23/2021    UPPER GASTROINTESTINAL ENDOSCOPY N/A 11/5/2018    EGD BIOPSY performed by Alem Koo MD at [place] [address] 12/7/2020    EGD BIOPSY performed by Maki Ayers MD at [address] [place] [address] UPPER GASTROINTESTINAL ENDOSCOPY  2020    EGD CONTROL HEMORRHAGE performed by Cole Mukherjee MD at [facility] History:  Social History     Socioeconomic History    Marital status:      Spouse name: Not on file    Number of children: Not on file    Years of education: Not on file    Highest education level: Not on file   Occupational History    Not on file   Tobacco Use    Smoking status: Former Smoker     Packs/day: 2.00     Years: 15.00     Pack years: 30.00     Quit date: 3/1/1997     Years since quittin.3    Smokeless tobacco: Never Used   Vaping Use    Vaping Use: Never used   Substance and Sexual Activity    Alcohol use: Yes     Comment: Holidays    Drug use: No    Sexual activity: Not on file   Other Topics Concern    Not on file   Social History Narrative    Not on file     Social Determinants of Health     Financial Resource Strain:     Difficulty of Paying Living Expenses:    Food Insecurity:     Worried About Running Out of Food in the Last Year:     [address] in the Last Year:    Transportation Needs:     Lack of Transportation (Medical):      Lack of Transportation (Non-Medical):    Physical Activity:     Days of Exercise per Week:     Minutes of Exercise per Session:    Stress:     Feeling of Stress :    Social Connections:     Frequency of Communication with Friends and Family:     Frequency of Social Gatherings with Friends and Family:     Attends Scientologist Services:     Active Member of Clubs or Organizations:     Attends Club or Organization Meetings:     Marital Status:    Intimate Partner Violence:     Fear of Current or Ex-Partner:     Emotionally Abused:     Physically Abused:     Sexually Abused:        Family History:  Family History   Problem Relation Age of Onset    Diabetes Mother     Stroke Father     Diabetes Sister     High Blood Pressure Sister     Diabetes Brother     High Blood Pressure Brother     Cancer Brother     Heart Disease Brother        Review of Systems:  Constitutional: Denies fevers, chills, or weight loss. HEENT: Denies visual changes or hearing loss. Heart: As per HPI. Lungs: Denies shortness of breath, cough, or wheezing. Gastrointestinal: Denies nausea, vomiting, constipation, or diarrhea. Genitourinary: dysuria or hematuria. Psychiatric: Patient denies anxiety or depression. Neurologic: Patient denies weakness of the extremities, dizziness, or headaches. All other ROS checked and found to be negative. Labs:  Recent Labs     06/29/21  0504   WBC 20.4*   HGB 10.9*   HCT 36.7         Recent Labs     06/28/21  0500 06/29/21  0504   BUN 18 27*   CREATININE 0.9 1.2*       Objective:  Vitals BP (!) 114/55   Pulse 71   Temp 98.1 °F (36.7 °C) (Temporal)   Resp 16   Ht 5' 3\" (1.6 m)   Wt 177 lb 12.8 oz (80.6 kg)   LMP  (LMP Unknown)   SpO2 97%   BMI 31.50 kg/m²   General Appearance: Pleasant 80y.o. year old female who appears stated age. Communicates well, no acute distress. HEENT: Head is normocephalic, atraumatic. EOMs intact, PERRL. Trachea midline. Lungs: Normal respiratory rate and normal effort. She is not in respiratory distress. Breath sounds clear to auscultation. No wheezes. Heart: Normal rate. Regular rhythm. S1 normal and S2 normal. Positive for murmur. Chest: Symmetric chest wall expansion. Extremities: Normal range of motion. Neurological: Patient is alert and oriented to person, place and time. Patient has normal reflexes. Skin: Warm and dry. Abdomen: Abdomen is soft and non-distended. Bowel sounds are normal. There is no abdominal tenderness tenderness. There is no guarding. There is no mass. Pulses: Distal pulses are intact. Skin: Warm and dry without lesions. [facility]  CORONARY ANATOMY:  LEFT MAIN:  It is a long and large artery, which is calcified at the  distal end.   There was 50%-60% hazy distal discrete stenosis.     LAD:  It is a large artery wrapping around the apex and giving rise to a  small diagonal branch and several septal perforators. The LAD was  heavily calcified in its proximal to mid segment. There was 40%-50%  proximal to mid LAD stenosis.     LEFT CIRCUMFLEX:  It is medium in size and giving rise to an obtuse  marginal branch. There was 30% mid OM stenosis.     RAMUS BRANCH:  It is fair in size and bifurcating with 30%-40% proximal  stenosis.     RCA:  It is a large dominant artery giving rise to 2 RV marginal  branches, a PDA and a PLV branch. The RCA was angulated and   calcified in its mid segment. There was 30%-40% discrete mid RCA  stenosis and 30% discrete distal stenosis.     LEFT VENTRICULOGRAM:  Revealed an ejection fraction of 55%-60%. No  mitral regurgitation was noted.     IMPRESSION:  1.  50%-60% discrete calcified distal left main stenosis. 2.  40%-50% proximal to mid heavily calcified LAD stenosis. 3.  30% discrete mid circumflex stenosis. 4.  30%-40% proximal ramus stenosis. 5.  30%-40% discrete mid RCA stenosis and 30% distal stenosis. 6.  LVEDP 18 mmHg. 7.  LV ejection fraction of 55%-60%.     RECOMMENDATIONS:  1. Aggressive medical therapy. 2.  Consult CT surgery for CABG. Assessment/Plan  CABG was recommended, likely LAD, Ramus, and OM. All risks, benefits, alternatives and potential complications explained thoroughly including, but not limited to, bleeding, infection, lung injury, kidney injury, stroke, heart attack, prolonged ventilation, wound complication, need for re-operation, and death, and the patient agrees to proceed. Will obtain preop studies and plan for CABG electively. She would like to come to the office next week and discuss surgery with her  and we can schedule it at that time as well.         Electronically signed by Larisa Gibson DO on 6/29/2021 at 12:33 PM

## 2021-06-29 NOTE — PLAN OF CARE
Problem: Falls - Risk of:  Goal: Will remain free from falls  Description: Will remain free from falls  6/29/2021 1144 by Massimo Cantor RN  Outcome: Met This Shift  6/29/2021 0755 by Skyler Bateman RN  Outcome: Met This Shift     Problem: Skin Integrity:  Goal: Will show no infection signs and symptoms  Description: Will show no infection signs and symptoms  6/29/2021 1144 by Massimo Cantor RN  Outcome: Met This Shift  6/29/2021 0755 by Skyler Bateman RN  Outcome: Met This Shift     Problem: Pain:  Goal: Pain level will decrease  Description: Pain level will decrease  6/29/2021 1144 by Massimo Cantor RN  Outcome: Met This Shift  6/29/2021 0755 by Skyler Bateman RN  Outcome: Met This Shift

## 2021-06-29 NOTE — PROGRESS NOTES
Subjective: The patient is awake and alert. No problems overnight. Denies chest pain, angina, and dyspnea. Denies abdominal pain. Tolerating diet. No nausea or vomiting. Objective:  Pt resting in nad  BP (!) 117/55   Pulse 82   Temp 97.6 °F (36.4 °C)   Resp 20   Ht 5' 3\" (1.6 m)   Wt 177 lb 12.8 oz (80.6 kg)   LMP  (LMP Unknown)   SpO2 93%   BMI 31.50 kg/m²   HEENT no adenopathy no bruits  Heart:  RRR, no murmurs, gallops, or rubs.   Lungs:  CTA bilaterally, no wheeze, rales or rhonchi  Abd: bowel sounds present, nontender, nondistended, no masses  Extrem:  No clubbing, cyanosis, or edema  WBC/Hgb/Hct/Plts:  20.4/10.9/36.7/350 (06/29 1281) basic metabolic panel     Assessment:    Patient Active Problem List   Diagnosis    Spinal stenosis in cervical region    Spinal stenosis, lumbar region, without neurogenic claudication    Essential hypertension    Mixed hyperlipidemia    DM (diabetes mellitus) ([address]    Renal artery aneurysm ([address]    PAF (paroxysmal atrial fibrillation) (HCC)    Anemia    Malignant neoplasm of cecum (HCC)    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

## 2021-06-29 NOTE — PATIENT CARE CONFERENCE
P Quality Flow/Interdisciplinary Rounds Progress Note        Quality Flow Rounds held on June 29, 2021    Disciplines Attending:  Bedside Nurse, ,  and Nursing Unit Alondra Nascimento was admitted on 6/21/2021  3:23 PM    Anticipated Discharge Date:  Expected Discharge Date: 06/25/21    Disposition:    Valentin Score:  Valentin Scale Score: 19    Readmission Risk              Risk of Unplanned Readmission:  30           Discussed patient goal for the day, patient clinical progression, and barriers to discharge.   The following Goal(s) of the Day/Commitment(s) have been identified:  [address], RN  June 29, 2021

## 2021-06-29 NOTE — CARE COORDINATION
Patient's creatinine elevated (1.2 today), up from 0.9, WBCs elevated (20.4), CT of chest pending. Awaiting CTS consult. Plan is for patient to return home with ; no needs anticipated.     Adrián Solorzano [phone]

## 2021-06-29 NOTE — PROGRESS NOTES
Amanda Mendez M.D.,[facility]  Elkin Merchant D.O., F.CANDYC.OJacobI., Adán Byers M.D. Angelo Karimi M.D., Chava Bo M.D. Luh Suarez D.O. Daily Pulmonary Progress Note    Patient:  Rosa Reyes 80 y.o. female MRN: [id number]     Date of Service: 6/29/2021      Synopsis     We are following patient for dyspnea    \"CC\" SOB  Code status:FULL      Subjective      Patient was seen and examined lying in bed with no complaints. Now on room air, less short of breath. Ambulating in room.  at bedside. Denies cough. Second cardioversion was successful 6/28 remains in sinus rhythm. CT surgery evaluation, will need CABG due to MVCAD findings on left heart catheterization. Review of Systems:     Constitutional: Denies fever, weight loss, night sweats, and fatigue  Skin: Denies pigmentation, dark lesions, and rashes   HEENT: Denies hearing loss, tinnitus, ear drainage, epistaxis, sore throat, and hoarseness.   Cardiovascular: Denies pain  Respiratory: dyspnea worse with exertion  Gastrointestinal: Denies nausea, vomiting, poor appetite, diarrhea, heartburn or reflux  Genitourinary: Denies dysuria, frequency, urgency or hematuria  Musculoskeletal: Denies myalgias, muscle weakness, and bone pain  Neurological: Denies dizziness, vertigo, headache, and focal weakness  Psychological: Denies anxiety and depression  Endocrine: Denies heat intolerance and cold intolerance  Hematopoietic/Lymphatic: Denies bleeding problems and blood transfusions  24-hour events:  None     Objective   Vitals: BP (!) 114/55   Pulse 71   Temp 98.1 °F (36.7 °C) (Temporal)   Resp 16   Ht 5' 3\" (1.6 m)   Wt 177 lb 12.8 oz (80.6 kg)   LMP  (LMP Unknown)   SpO2 97%   BMI 31.50 kg/m²     I/O:    Intake/Output Summary (Last 24 hours) at 6/29/2021 1435  Last data filed at 6/29/2021 1129  Gross per 24 hour   Intake 1150 ml   Output 1850 ml   Net -700 ml       Vent Information  SpO2: 97 % embolism is seen. 2.  No acute cardiopulmonary abnormality. 3. Cardiomegaly. No pulmonary edema or pleural effusion. 4. Mild mediastinal lymphadenopathy, which may be reactive. Given patient's   history of cecal adenocarcinoma, a metastatic etiology cannot be excluded. 5. Irregular liver contour indicating cirrhosis. Small volume perihepatic   ascites. 6. Reflux of contrast into the hepatic veins may signify right heart failure. Echo: 1/2/21  Summary   Left ventricular internal dimensions were normal in diastole and systole. Borderline concentric left ventricular hypertrophy. No regional wall motion abnormalities seen. Normal left ventricular ejection fraction. There is doppler evidence of stage II diastolic dysfunction. The left atrium is severely dilated. Mild mitral annular calcification. Mild mitral regurgitation is present. The aortic valve appears mildly sclerotic. Mild tricuspid regurgitation. Pulmonary hypertension is mild . There is a trivial posterior pericardial effusion noted. Left heart cath  6/28/21   IMPRESSION:  1.  50%-60% discrete calcified distal left main stenosis. 2.  40%-50% proximal to mid heavily calcified LAD stenosis. 3.  30% discrete mid circumflex stenosis. 4.  30%-40% proximal ramus stenosis. 5.  30%-40% discrete mid RCA stenosis and 30% distal stenosis. 6.  LVEDP 18 mmHg. 7.  LV ejection fraction of 55%-60%. She will now undergo CABG.            Labs:  Lab Results   Component Value Date    WBC 20.4 06/29/2021    HGB 10.9 06/29/2021    HCT 36.7 06/29/2021    MCV 79.8 06/29/2021    MCH 23.7 06/29/2021    MCHC 29.7 06/29/2021    RDW 24.6 06/29/2021     06/29/2021    MPV 9.2 06/29/2021     Lab Results   Component Value Date     06/29/2021    K 4.6 06/29/2021    K 3.2 06/21/2021    CL 94 06/29/2021    CO2 27 06/29/2021    BUN 27 06/29/2021    CREATININE 1.2 06/29/2021    LABALBU 4.0 06/22/2021    CALCIUM 9.3 06/29/2021 GFRAA 52 06/29/2021    LABGLOM 43 06/29/2021     Lab Results   Component Value Date    PROTIME 16.4 06/21/2021    INR 1.5 06/21/2021     No results for input(s): PROBNP in the last 72 hours. No results for input(s): PROCAL in the last 72 hours. This SmartLink has not been configured with any valid records. Assessment:    1. Acute respiratory failure with hypoxia  2. Multivessel CAD   3. Paroxysmal A fib RVR-home Xarelto  4. Successful cardioversion 6/28/21  5. Exacerbation of CHF with preserved EF 65%, stage II DD  6. Question of underlying sleep apnea syndrome  7. Daytime hypersomnolence  8. AMES, liver cirrhosis with ascites- US of abd with no evidence of ascites  9. Dyspnea with exertion-multifactorial 2/2 a fib rvr  10. 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 HTN  12. HLD  13. DM II       Plan:   1. Now on room air SPO2 97% at rest  2. Diuresis bumex 1 mg po daily, mgmt of A fib, CHF per cardiology  3. Remains in sinus rhythm. CT surgery recommendations for CABG due to MVCAD findings on LHC. Will follow with surgery team as OP to decide on timing of surgery, pre op work up including pfts. 4. Bronchodilators Xopenex as needed  5. Continue  incentive spirometer  6. Xarelto daily   7. GI prophylaxis  8. Recommend OP sleep study testing once recovered  9. Ok to dc from a pulmonary perspective when ok with all others    Patient considered high risk for post op pulmonary complications based on ARISCAT pre op risk assessment, 74.1% post op complication rate. She is cleared for surgery from a pulmonary perspective. Recommend post op early mobilization with PT/OT, hyperinflation techniques with ezpap and incentive spirometer, limit sedating medications. This plan of care was reviewed in collaboration with Dr. Starr Jamison  Electronically signed by TEMO Bains CNP on 6/29/2021 at 2:35 PM       I personally saw, examined, and cared for the patient.  Labs, medications, radiographs reviewed. I agree with history exam and plans detailed in NP note.   Ivone Olmedo MD

## 2021-06-29 NOTE — PROGRESS NOTES
INPATIENT CARDIOLOGY FOLLOW-UP    Name: Jena Chung    Age: 80 y.o. Date of Admission: 6/21/2021  3:23 PM    Date of Service: 6/29/2021    Chief Complaint: Follow-up for coronary atherosclerosis, paroxysmal atrial fibrillation, hypertension, moderate obesity    Interim History: The patient present remains compensated from a cardiovascular standpoint and maintaining of sinus rhythm. Her coronary angiography results were reviewed and discussed with the interventional service with recommendations of potential surgical intervention and assessment of the cardiothoracic surgical service pending. Review of Systems: The remainder of a complete multisystem review including consitutional, central nervous, respiratory, circulatory, gastrointestinal, genitourinary, endocrinologic, hematologic, musculoskeletal and psychiatric are negative. 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)    Chronic heart failure with preserved ejection fraction (HCC)    Atrial fibrillation with RVR (HCC)    Severe protein-calorie malnutrition (HCC)    Abnormal nuclear stress test       Allergies:   Allergies   Allergen Reactions    Iodine Shortness Of Breath     SOB, Rash    Benadryl [Diphenhydramine]      hyper    Fish-Derived Products Rash       Current Medications:  Current Facility-Administered Medications   Medication Dose Route Frequency Provider Last Rate Last Admin    sodium chloride flush 0.9 % injection 5-40 mL  5-40 mL Intravenous 2 times per day Ross Piedra MD   10 mL at 06/28/21 2056    sodium chloride flush 0.9 % injection 5-40 mL  5-40 mL Intravenous PRN Gamal Urbina MD        0.9 % sodium chloride infusion  25 mL Intravenous PRN Gamal Urbina MD        acetaminophen (TYLENOL) tablet 650 mg  650 mg Oral Q4H PRN Gamal Urbina MD   650 mg at 06/28/21 2312    enoxaparin (LOVENOX) injection 80 mg  1 mg/kg Subcutaneous BID Gamal Urbina MD   80 mg at 06/28/21 2054    miconazole (MICOTIN) 2 % powder   Topical BID Chalino Wong MD   Given at 06/28/21 2100    diphenhydrAMINE (BENADRYL) tablet 50 mg  50 mg Oral Q6H PRN Connie Alvarado MD        diphenhydrAMINE (BENADRYL) tablet 50 mg  50 mg Oral Q6H PRN Connie Alvarado MD   50 mg at 06/28/21 1942    amiodarone (CORDARONE) tablet 400 mg  400 mg Oral BID TEMO Boston CNP   400 mg at 06/28/21 2055    [START ON 7/3/2021] amiodarone (CORDARONE) tablet 200 mg  200 mg Oral Daily TEMO Boston - CNP        bumetanide (BUMEX) tablet 1 mg  1 mg Oral Daily Gutierrez Mota MD   1 mg at 06/27/21 5362    metoprolol tartrate (LOPRESSOR) tablet 50 mg  50 mg Oral BID Shyanne Marshall MD   50 mg at 06/28/21 2055    levalbuterol (XOPENEX) nebulization 0.63 mg  0.63 mg Nebulization Q8H PRN TEMO Choudhury CNP        amLODIPine (NORVASC) tablet 10 mg  10 mg Oral Daily Pattricia Fillers, DO   10 mg at 06/28/21 1148    chlordiazePOXIDE-clidinium (LIBRAX) 5-2.5 MG per capsule 1 capsule  1 capsule Oral BID PRN Pattricia Fillers, DO        ferrous sulfate (IRON 325) tablet 325 mg  325 mg Oral Daily with breakfast Pattricia Fillers, DO   325 mg at 06/28/21 1149    hydrOXYzine (ATARAX) tablet 10 mg  10 mg Oral Nightly Pattricia Fillers, DO   10 mg at 06/28/21 2055    pantoprazole (PROTONIX) tablet 40 mg  40 mg Oral QAM AC Pattricia Fillers, DO   40 mg at 06/28/21 0514    potassium chloride (KLOR-CON M) extended release tablet 20 mEq  20 mEq Oral BID Pattricia Fillers, DO   20 mEq at 06/28/21 2054    pramipexole (MIRAPEX) tablet 0.5 mg  0.5 mg Oral TID Pattricia Fillers, DO 0.5 mg at 06/28/21 2055    spironolactone (ALDACTONE) tablet 25 mg  25 mg Oral Daily Silvia [place], DO   25 mg at 06/28/21 1148    ondansetron (ZOFRAN-ODT) disintegrating tablet 4 mg  4 mg Oral Q8H PRN Silvia [place], DO   4 mg at 06/23/21 1101    Or    ondansetron (ZOFRAN) injection 4 mg  4 mg Intravenous Q6H PRN Silvia [place], DO        polyethylene glycol (GLYCOLAX) packet 17 g  17 g Oral Daily PRN Silvia [place], DO        acetaminophen (TYLENOL) suppository 650 mg  650 mg Rectal Q6H PRN Silvia [place], DO        insulin lispro (HUMALOG) injection vial 0-6 Units  0-6 Units Subcutaneous TID WC Silvia [place], DO   2 Units at 06/28/21 1711    insulin lispro (HUMALOG) injection vial 0-3 Units  0-3 Units Subcutaneous Nightly Silvia [place], DO   1 Units at 06/28/21 2055    glucose (GLUTOSE) 40 % oral gel 15 g  15 g Oral PRN Silvia [place], DO        dextrose 50 % IV solution  12.5 g Intravenous PRN Silvia [place], DO        glucagon (rDNA) injection 1 mg  1 mg Intramuscular PRN Silvia [place], DO        dextrose 5 % solution  100 mL/hr Intravenous PRN Silvia [place], DO        traMADol Everrett Mouse) tablet 50 mg  50 mg Oral Q8H PRN Silvia [place], DO   50 mg at 06/29/21 0424      sodium chloride      dextrose         Physical Exam:  BP (!) 117/55   Pulse 82   Temp 97.6 °F (36.4 °C)   Resp 20   Ht 5' 3\" (1.6 m)   Wt 177 lb 12.8 oz (80.6 kg)   LMP  (LMP Unknown)   SpO2 93%   BMI 31.50 kg/m²   Weight change: 1 lb 6.4 oz (0.635 kg)  Wt Readings from Last 3 Encounters:   06/29/21 177 lb 12.8 oz (80.6 kg)   06/21/21 188 lb 3.2 oz (85.4 kg)   06/17/21 173 lb (78.5 kg)     The patient is awake, alert and in no discomfort or distress. No gross musculoskeletal deformity is present. No significant skin or nail changes are present. Gross examination of head, eyes, nose and throat are negative. Jugular venous pressure is normal and no carotid bruits are present.  Normal respiratory effort is noted with no accessory muscle usage present. Lung fields are clear to ascultation. Cardiac examination is notable for a regular rate and rhythm with no palpable thrill. No gallop rhythm or cardiac murmur are identified. A benign abdominal examination is present with the exception of obesity and no masses or organomegaly. Intact pulses are present throughout all extremities and no peripheral edema is present. No focal neurologic deficits are present. Intake/Output:    Intake/Output Summary (Last 24 hours) at 6/29/2021 0650  Last data filed at 6/29/2021 0415  Gross per 24 hour   Intake 1090 ml   Output 1450 ml   Net -360 ml     I/O this shift:  In: 120 [P.O.:120]  Out: 550 [Urine:550]    Laboratory Tests:  Lab Results   Component Value Date    CREATININE 0.9 06/28/2021    BUN 18 06/28/2021     06/28/2021    K 4.5 06/28/2021    CL 92 (L) 06/28/2021    CO2 30 (H) 06/28/2021     No results for input(s): CKTOTAL, CKMB in the last 72 hours. Invalid input(s): TROPONONI  No results found for: BNP  Lab Results   Component Value Date    WBC 20.4 06/29/2021    RBC 4.60 06/29/2021    HGB 10.9 06/29/2021    HCT 36.7 06/29/2021    MCV 79.8 06/29/2021    MCH 23.7 06/29/2021    MCHC 29.7 06/29/2021    RDW 24.6 06/29/2021     06/29/2021    MPV 9.2 06/29/2021     No results for input(s): ALKPHOS, ALT, AST, PROT, BILITOT, BILIDIR, LABALBU in the last 72 hours.   Lab Results   Component Value Date    MG 1.2 06/24/2021     Lab Results   Component Value Date    PROTIME 16.4 06/21/2021    INR 1.5 06/21/2021     Lab Results   Component Value Date    TSH 2.250 06/21/2021     No components found for: CHLPL  Lab Results   Component Value Date    TRIG 205 06/11/2019    TRIG 162 (H) 10/15/2012     Lab Results   Component Value Date    HDL 15 (A) 06/11/2019    HDL 55 10/31/2017    HDL 49.0 10/15/2012     Lab Results   Component Value Date    LDLCALC 50 10/31/2017    [address] 10/15/2012       Cardiac Tests:  Telemetry findings reviewed: sinus rhythm, no new tachy/bradyarrhythmias overnight  Last cardiac catheterization: Coronary angiography demonstrates evidence of a 50 to 60% calcified distal left main trunk stenosis with moderate disease of the proximal and mid left anterior descending and angiographically insignificant disease of the circumflex and right coronary distribution with normal left ventricular systolic function    ASSESSMENT / PLAN: On a clinical basis, the patient present remains compensated from a cardiovascular standpoint with no anginal symptomatology following coronary angiography and maintenance of sinus rhythm. Surgical evaluation is pending in the face of her left main trunk stenosis and high risk perfusion imaging study with additional recommendations pending and present plans of maintenance of low molecular weight heparin pending her surgical assessment for embolic protection in the face of her paroxysmal atrial fibrillation. Additional pulmonary recommendations have been reviewed. Continued appropriate lifestyle modification to achieve weight reduction will benefit diastolic cardiac performance and assist in management of potential obstructive sleep apnea both to reduce risk of further adverse effects on diastolic heart failure and recurrent atrial arrhythmias. Aggressive risk factor modification blood pressure, diabetes and serum lipids will be essential to reducing risk of future atherosclerotic development. Note: This report was completed utilizing computer voice recognition software. Every effort has been made to ensure accuracy, however; inadvertent computerized transcription errors may be present. Ras Kasper.  Amparo Smith, [address] [facility]

## 2021-06-30 VITALS
SYSTOLIC BLOOD PRESSURE: 113 MMHG | TEMPERATURE: 97.4 F | BODY MASS INDEX: 31.04 KG/M2 | HEART RATE: 58 BPM | OXYGEN SATURATION: 95 % | WEIGHT: 175.2 LBS | DIASTOLIC BLOOD PRESSURE: 59 MMHG | RESPIRATION RATE: 16 BRPM | HEIGHT: 63 IN

## 2021-06-30 LAB
ANION GAP SERPL CALCULATED.3IONS-SCNC: 11 MMOL/L (ref 7–16)
BUN BLDV-MCNC: 22 MG/DL (ref 6–23)
CALCIUM SERPL-MCNC: 9.3 MG/DL (ref 8.6–10.2)
CHLORIDE BLD-SCNC: 98 MMOL/L (ref 98–107)
CO2: 27 MMOL/L (ref 22–29)
CREAT SERPL-MCNC: 1 MG/DL (ref 0.5–1)
GFR AFRICAN AMERICAN: >60
GFR NON-AFRICAN AMERICAN: 53 ML/MIN/1.73
GLUCOSE BLD-MCNC: 104 MG/DL (ref 74–99)
METER GLUCOSE: 128 MG/DL (ref 74–99)
METER GLUCOSE: 151 MG/DL (ref 74–99)
MRSA CULTURE ONLY: NORMAL
POTASSIUM SERPL-SCNC: 4.6 MMOL/L (ref 3.5–5)
SODIUM BLD-SCNC: 136 MMOL/L (ref 132–146)

## 2021-06-30 PROCEDURE — 6370000000 HC RX 637 (ALT 250 FOR IP): Performed by: INTERNAL MEDICINE

## 2021-06-30 PROCEDURE — 99232 SBSQ HOSP IP/OBS MODERATE 35: CPT | Performed by: INTERNAL MEDICINE

## 2021-06-30 PROCEDURE — 36415 COLL VENOUS BLD VENIPUNCTURE: CPT

## 2021-06-30 PROCEDURE — 80048 BASIC METABOLIC PNL TOTAL CA: CPT

## 2021-06-30 PROCEDURE — 2580000003 HC RX 258: Performed by: INTERNAL MEDICINE

## 2021-06-30 PROCEDURE — 82962 GLUCOSE BLOOD TEST: CPT

## 2021-06-30 PROCEDURE — 6370000000 HC RX 637 (ALT 250 FOR IP): Performed by: NURSE PRACTITIONER

## 2021-06-30 RX ORDER — METOPROLOL TARTRATE 50 MG/1
50 TABLET, FILM COATED ORAL 2 TIMES DAILY
Qty: 60 TABLET | Refills: 3 | Status: SHIPPED | OUTPATIENT
Start: 2021-06-30

## 2021-06-30 RX ORDER — BUMETANIDE 1 MG/1
1 TABLET ORAL DAILY
Qty: 30 TABLET | Refills: 3 | Status: SHIPPED | OUTPATIENT
Start: 2021-06-30 | End: 2021-08-20

## 2021-06-30 RX ORDER — AMIODARONE HYDROCHLORIDE 200 MG/1
200 TABLET ORAL DAILY
Qty: 30 TABLET | Refills: 0 | Status: ON HOLD | OUTPATIENT
Start: 2021-07-03 | End: 2021-09-16 | Stop reason: HOSPADM

## 2021-06-30 RX ADMIN — SODIUM CHLORIDE, PRESERVATIVE FREE 10 ML: 5 INJECTION INTRAVENOUS at 09:00

## 2021-06-30 RX ADMIN — FERROUS SULFATE TAB 325 MG (65 MG ELEMENTAL FE) 325 MG: 325 (65 FE) TAB at 08:59

## 2021-06-30 RX ADMIN — SPIRONOLACTONE 25 MG: 25 TABLET ORAL at 08:59

## 2021-06-30 RX ADMIN — PRAMIPEXOLE DIHYDROCHLORIDE 0.5 MG: 0.25 TABLET ORAL at 08:59

## 2021-06-30 RX ADMIN — AMLODIPINE BESYLATE 10 MG: 10 TABLET ORAL at 09:00

## 2021-06-30 RX ADMIN — BUMETANIDE 1 MG: 1 TABLET ORAL at 09:00

## 2021-06-30 RX ADMIN — PANTOPRAZOLE SODIUM 40 MG: 40 TABLET, DELAYED RELEASE ORAL at 06:23

## 2021-06-30 RX ADMIN — MICONAZOLE NITRATE: 20.6 POWDER TOPICAL at 09:01

## 2021-06-30 RX ADMIN — AMIODARONE HYDROCHLORIDE 400 MG: 200 TABLET ORAL at 08:59

## 2021-06-30 RX ADMIN — METOPROLOL TARTRATE 50 MG: 50 TABLET, FILM COATED ORAL at 08:59

## 2021-06-30 RX ADMIN — TRAMADOL HYDROCHLORIDE 50 MG: 50 TABLET, FILM COATED ORAL at 06:23

## 2021-06-30 RX ADMIN — POTASSIUM CHLORIDE 20 MEQ: 1500 TABLET, EXTENDED RELEASE ORAL at 09:00

## 2021-06-30 ASSESSMENT — PAIN - FUNCTIONAL ASSESSMENT: PAIN_FUNCTIONAL_ASSESSMENT: ACTIVITIES ARE NOT PREVENTED

## 2021-06-30 ASSESSMENT — PAIN SCALES - GENERAL
PAINLEVEL_OUTOF10: 7
PAINLEVEL_OUTOF10: 5
PAINLEVEL_OUTOF10: 9

## 2021-06-30 ASSESSMENT — PAIN DESCRIPTION - PAIN TYPE
TYPE: CHRONIC PAIN
TYPE: CHRONIC PAIN

## 2021-06-30 ASSESSMENT — PAIN DESCRIPTION - FREQUENCY: FREQUENCY: CONTINUOUS

## 2021-06-30 ASSESSMENT — PAIN DESCRIPTION - LOCATION
LOCATION: FOOT
LOCATION: FOOT;LEG

## 2021-06-30 ASSESSMENT — PAIN DESCRIPTION - PROGRESSION: CLINICAL_PROGRESSION: NOT CHANGED

## 2021-06-30 ASSESSMENT — PAIN DESCRIPTION - ONSET: ONSET: ON-GOING

## 2021-06-30 ASSESSMENT — PAIN DESCRIPTION - ORIENTATION: ORIENTATION: RIGHT;LEFT

## 2021-06-30 ASSESSMENT — PAIN DESCRIPTION - DESCRIPTORS: DESCRIPTORS: ACHING;DISCOMFORT;DULL

## 2021-06-30 NOTE — CARE COORDINATION
Patient to discharge home today,  to transport; voiced no needs.     Luly Ross [place] [phone]

## 2021-06-30 NOTE — PROGRESS NOTES
[facility]

## 2021-06-30 NOTE — PATIENT CARE CONFERENCE
P Quality Flow/Interdisciplinary Rounds Progress Note        Quality Flow Rounds held on June 30, 2021    Disciplines Attending:  Bedside Nurse, ,  and Nursing Unit Alondra Nascimento was admitted on 6/21/2021  3:23 PM    Anticipated Discharge Date:  Expected Discharge Date: 06/25/21    Disposition:    Valentin Score:  Valentin Scale Score: 20    Readmission Risk              Risk of Unplanned Readmission:  27           Discussed patient goal for the day, patient clinical progression, and barriers to discharge.   The following Goal(s) of the Day/Commitment(s) have been identified:  [facility]       Rajendra Reynoso RN  June 30, 2021

## 2021-06-30 NOTE — PROGRESS NOTES
The patient presently remains compensated from a cardiovascular standpoint with maintenance of sinus rhythm. Her cardiothoracic surgical consult was reviewed along with interim additional assessment and plans for outpatient reassessment next week for surgical planning. Her renal function has returned to baseline values with resumption of diuretics acceptable and ongoing needs of monitoring her volume status as well as that of renal function and electrolytes. Her oral anticoagulation has been resumed with appropriate dose modification for her creatinine clearance. On long-term basis, appropriate lifestyle modification will benefit diastolic cardiac performance with recommended assessment for the presence or absence of obstructive sleep apnea following hospital discharge in her recovery from cardiac surgery to assist in management of both diastolic heart failure and her atrial arrhythmias. She is presently stable and compensated from a cardiovascular standpoint for discharge with arrangements previously made for follow-up with the cardiothoracic surgical service to plan revascularization and following surgery, follow-up with her primary cardiologist Joby Ohara and the electrophysiology service will be necessary.

## 2021-06-30 NOTE — PLAN OF CARE
Problem: Falls - Risk of:  Goal: Will remain free from falls  Description: Will remain free from falls  6/30/2021 1004 by JANES Cronin  Outcome: Met This Shift  6/30/2021 0658 by Orlando Baptiste RN  Outcome: Met This Shift     Problem: Cardiac:  Goal: Ability to maintain an adequate cardiac output will improve  Description: Ability to maintain an adequate cardiac output will improve  6/30/2021 1004 by JANES Cronin  Outcome: Met This Shift  6/30/2021 0658 by Orlando Baptiste RN  Outcome: Met This Shift     Problem: Skin Integrity:  Goal: Will show no infection signs and symptoms  Description: Will show no infection signs and symptoms  6/30/2021 1004 by JANES Cronin  Outcome: Met This Shift  6/30/2021 0658 by Orlando Baptiste RN  Outcome: Met This Shift     Problem: Pain:  Goal: Pain level will decrease  Description: Pain level will decrease  6/30/2021 1004 by JANES Cronin  Outcome: Met This Shift  6/30/2021 0658 by Orlando Baptiste RN  Outcome: Met This Shift

## 2021-06-30 NOTE — PROGRESS NOTES
CLINICAL PHARMACY NOTE: MEDS TO BEDS    Total # of Prescriptions Filled: 4   The following medications were delivered to the patient:  · Amiodarone 200  · Bumex 1  · Xarelto 15  · Metoprolol Tartrate 50    Additional Documentation:

## 2021-06-30 NOTE — PROGRESS NOTES
Subjective: The patient is awake and alert. No problems overnight. Denies chest pain, angina, and dyspnea. Denies abdominal pain. Tolerating diet. No nausea or vomiting. Objective:  Pt is resting in nad   BP (!) 111/59   Pulse 66   Temp 97.9 °F (36.6 °C) (Temporal)   Resp 16   Ht 5' 3\" (1.6 m)   Wt 175 lb 3.2 oz (79.5 kg)   LMP  (LMP Unknown)   SpO2 95%   BMI 31.04 kg/m²   HEENT no adenopathy no bruits  Heart:  RRR, no murmurs, gallops, or rubs. Lungs:  CTA bilaterally, no wheeze, rales or rhonchi  Abd: bowel sounds present, nontender, nondistended, no masses  Extrem:  No clubbing, cyanosis, or edema  WBC/Hgb/Hct/Plts:  20.4/10.9/36.7/350 (06/29 8489) basic metabolic panel     Assessment:    Patient Active Problem List   Diagnosis    Spinal stenosis in cervical region    Spinal stenosis, lumbar region, without neurogenic claudication    Essential hypertension    Mixed hyperlipidemia    DM (diabetes mellitus) ([address]    [facility] artery [facility] ([facility] [address] [facility]    PAF (paroxysmal atrial fibrillation) (HCC)    Anemia    Malignant neoplasm of cecum (HCC)    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

## 2021-07-01 LAB — URINE CULTURE, ROUTINE: NORMAL

## 2021-07-02 ENCOUNTER — CARE COORDINATION (OUTPATIENT)
Dept: CASE MANAGEMENT | Age: 81
End: 2021-07-02

## 2021-07-02 NOTE — CARE COORDINATION
Hellen 45 Transitions Initial Follow Up Call    Call within 2 business days of discharge: Yes    Patient:  Corky Patient : 1940   MRN: [id number]  Reason for Admission: AFIB with RVR  Discharge Date: 21 RARS: Readmission Risk Score: 27      Last Discharge [address]       Complaint Diagnosis Description Type Department Provider    21 Chest Pain Atrial fibrillation with RVR ([facility] [address] . .. ED to Hosp-Admission (Discharged) (ADMITTED) CAIO [address] [facility] Surinder Anand MD; Jaida Finney. .. Transitions of Care Initial Call        Challenges to be reviewed by the provider   Additional needs identified to be addressed with provider: No  none             Method of communication with provider : none      Advance Care Planning:   Does patient have an Advance Directive:  decision maker reviewed and current. Was this a readmission? Yes  Patient stated reason for admission: short of breath  Patients top risk factors for readmission: medical condition-AFib with RVR, CHF, DM and polypharmacy    Care Transition Nurse (CTN) contacted the patient by telephone to perform post hospital discharge assessment. Verified name and  with patient as identifiers. Provided introduction to self, and explanation of the CTN role. CTN reviewed discharge instructions, medical action plan and red flags with patient who verbalized understanding. Patient given an opportunity to ask questions and does not have any further questions or concerns at this time. Were discharge instructions available to patient? Yes. Reviewed appropriate site of care based on symptoms and resources available to patient including: PCP and Specialist. The patient agrees to contact the PCP office for questions related to their healthcare. Medication reconciliation was not  performed with patient.    Patient is agreeable to a call from [facility] to set up a telephone appointment to review medications-CTN will route to [facility] pharmacy. Patient verbalizes understanding of administration of home medications. Patient confirms she has new medications of amiodarone and bumex per AVS. . Non-face-to-face services provided:  Scheduled appointment with PCP-7/5/21  Scheduled appointment with Specialist-7/6/21 cardio surg. 7/15/21 neurosurgery  Obtained and reviewed discharge summary and/or continuity of care documents  Establishment or re-establishment of referrals-to send to [address] 24 Hour Call    Do you have any ongoing symptoms?: No  Do you have a copy of your discharge instructions?: Yes  Do you have all of your prescriptions and are they filled?: Yes  Have you been contacted by a [facility] Pharmacist?: No  Have you scheduled your follow up appointment?: Yes  How are you going to get to your appointment?: Car - family or friend to transport (Comment: patient's )  Were you discharged with any Home Care or Post Acute Services: No  Post Acute Services: [facility] Transitions Interventions  No Identified Needs    Pharmacist: Completed         -Spoke with patient for initial BPCI care transition call post hospital discharge. Explained the role of Care Transition Nurse and the BPCI-A program, patient is agreeable to follow up post discharge from the hospital.  -Patient denies chest pain or SOB, HR 80 on her fit bit today. Weight today 174 pounds(hospital discharge weight 175 pounds.)  CTN encouraged patient to continue to weigh daily in am after first urination and log readings/bring to provider appointments. -CTN explained that a member of the Care Transition Central Team will be contacting her for further follow up calls, patient is in agreement and denies any other needs or concerns at this time. -CTN will hand off to the Care Transition Central team to follow.            Follow Up  Future Appointments   Date Time Provider Delia Galindo   7/6/2021  1:30 PM Renita Underwood DO CARDIO SURG [facility] 7/15/2021 12:00 PM Steve Mackey MD AFLBP[facility]ING FLACO Palmer RN

## 2021-07-06 ENCOUNTER — INITIAL CONSULT (OUTPATIENT)
Dept: CARDIOTHORACIC SURGERY | Age: 81
End: 2021-07-06
Payer: MEDICARE

## 2021-07-06 VITALS
HEART RATE: 79 BPM | SYSTOLIC BLOOD PRESSURE: 126 MMHG | DIASTOLIC BLOOD PRESSURE: 63 MMHG | BODY MASS INDEX: 30.12 KG/M2 | WEIGHT: 170 LBS | HEIGHT: 63 IN

## 2021-07-06 DIAGNOSIS — I25.10 CAD IN NATIVE ARTERY: ICD-10-CM

## 2021-07-06 DIAGNOSIS — I48.0 PAF (PAROXYSMAL ATRIAL FIBRILLATION) (HCC): Primary | ICD-10-CM

## 2021-07-06 PROCEDURE — G8427 DOCREV CUR MEDS BY ELIG CLIN: HCPCS | Performed by: THORACIC SURGERY (CARDIOTHORACIC VASCULAR SURGERY)

## 2021-07-06 PROCEDURE — 1036F TOBACCO NON-USER: CPT | Performed by: THORACIC SURGERY (CARDIOTHORACIC VASCULAR SURGERY)

## 2021-07-06 PROCEDURE — 1090F PRES/ABSN URINE INCON ASSESS: CPT | Performed by: THORACIC SURGERY (CARDIOTHORACIC VASCULAR SURGERY)

## 2021-07-06 PROCEDURE — G8417 CALC BMI ABV UP PARAM F/U: HCPCS | Performed by: THORACIC SURGERY (CARDIOTHORACIC VASCULAR SURGERY)

## 2021-07-06 PROCEDURE — 94726 PLETHYSMOGRAPHY LUNG VOLUMES: CPT | Performed by: INTERNAL MEDICINE

## 2021-07-06 PROCEDURE — 4040F PNEUMOC VAC/ADMIN/RCVD: CPT | Performed by: THORACIC SURGERY (CARDIOTHORACIC VASCULAR SURGERY)

## 2021-07-06 PROCEDURE — 94010 BREATHING CAPACITY TEST: CPT | Performed by: INTERNAL MEDICINE

## 2021-07-06 PROCEDURE — 99214 OFFICE O/P EST MOD 30 MIN: CPT | Performed by: THORACIC SURGERY (CARDIOTHORACIC VASCULAR SURGERY)

## 2021-07-06 PROCEDURE — 94729 DIFFUSING CAPACITY: CPT | Performed by: INTERNAL MEDICINE

## 2021-07-06 PROCEDURE — 1111F DSCHRG MED/CURRENT MED MERGE: CPT | Performed by: THORACIC SURGERY (CARDIOTHORACIC VASCULAR SURGERY)

## 2021-07-06 PROCEDURE — G8399 PT W/DXA RESULTS DOCUMENT: HCPCS | Performed by: THORACIC SURGERY (CARDIOTHORACIC VASCULAR SURGERY)

## 2021-07-06 PROCEDURE — 1123F ACP DISCUSS/DSCN MKR DOCD: CPT | Performed by: THORACIC SURGERY (CARDIOTHORACIC VASCULAR SURGERY)

## 2021-07-06 NOTE — PROGRESS NOTES
Subjective:      Patient ID: Fredrick Esteban is a 80 y.o. female. Chief Complaint   Patient presents with    Consultation     discuss antoine SOFIA   Is an 77-year-old female presenting to the office to continue discussion regarding possible surgical intervention. 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 She is a former cigarette smoker quitting in 1997. She reports alcohol use, but states she only drinks on holidays. She was initially seen during her hospital admission due to A. fib with RVR. During her work-up she had a mildly elevated high-sensitivity troponin. She underwent a stress test which was positive and a left heart cath revealed CAD. She currently denies any chest pain, SOB at rest, lightheadedness or dizziness, syncopal episodes, or any additional major complaints. Review of Systems  Constitutional: Denies fevers, chills, or weight loss. HEENT: Denies visual changes or hearing loss. Heart: As per HPI. Lungs: Denies shortness of breath, cough, or wheezing. Gastrointestinal: Denies nausea, vomiting, constipation, or diarrhea. Genitourinary: dysuria or hematuria. Psychiatric: Patient denies anxiety or depression. Neurologic: Patient denies weakness of the extremities, dizziness, or headaches. All other ROS checked and found to be negative. Objective:   Physical Exam  General Appearance: Pleasant 80y.o. year old female who appears stated age. Communicates well, no acute distress. HEENT: Head is normocephalic, atraumatic. EOMs intact, PERRL. Trachea midline. Lungs: Normal respiratory rate and normal effort. She is not in respiratory distress. Breath sounds clear to auscultation. No wheezes. Heart: Normal rate. Regular rhythm.  S1 normal and S2 normal. Positive for murmur. Chest: Symmetric chest wall expansion. Extremities: Normal range of motion. Neurological: Patient is alert and oriented to person, place and time. Patient has normal reflexes. Skin: Warm and dry. Abdomen: Abdomen is soft and non-distended. Bowel sounds are normal. There is no abdominal tenderness tenderness. There is no guarding. There is no mass. Pulses: Distal pulses are intact. Skin: Warm and dry without lesions. [facility]  CORONARY ANATOMY:  LEFT MAIN:  It is a long and large artery, which is calcified at the  distal end.  There was 50%-60% hazy distal discrete stenosis.     LAD:  It is a large artery wrapping around the apex and giving rise to a  small diagonal branch and several septal perforators.  The LAD was  heavily calcified in its proximal to mid segment.  There was 40%-50%  proximal to mid LAD stenosis.     LEFT CIRCUMFLEX:  It is medium in size and giving rise to an obtuse  marginal branch.  There was 30% mid OM stenosis.     RAMUS BRANCH:  It is fair in size and bifurcating with 30%-40% proximal  stenosis.     RCA:  It is a large dominant artery giving rise to 2 RV marginal  branches, a PDA and a PLV branch.  The RCA was angulated and   calcified in its mid segment.  There was 30%-40% discrete mid RCA  stenosis and 30% discrete distal stenosis.     LEFT VENTRICULOGRAM:  Revealed an ejection fraction of 55%-60%.  No  mitral regurgitation was noted.     IMPRESSION:  1.  50%-60% discrete calcified distal left main stenosis. 2.  40%-50% proximal to mid heavily calcified LAD stenosis. 3.  30% discrete mid circumflex stenosis. 4.  30%-40% proximal ramus stenosis. 5.  30%-40% discrete mid RCA stenosis and 30% distal stenosis. 6.  LVEDP 18 mmHg. 7.  LV ejection fraction of 55%-60%.     RECOMMENDATIONS:  1.  Aggressive medical therapy. 2.  Consult CT surgery for CABG.     Assessment/Plan  CABG was recommended, likely LAD, Ramus, and OM.   All risks, benefits, alternatives and potential complications explained thoroughly including, but not limited to, bleeding, infection, lung injury, kidney injury, stroke, heart attack, prolonged ventilation, wound complication, need for re-operation, and death, and the patient agrees to proceed.        Last dose of xarelto on 7/18/21

## 2021-07-07 ENCOUNTER — TELEPHONE (OUTPATIENT)
Dept: PHARMACY | Facility: CLINIC | Age: 81
End: 2021-07-07

## 2021-07-07 ENCOUNTER — TELEPHONE (OUTPATIENT)
Dept: CARDIOTHORACIC SURGERY | Age: 81
End: 2021-07-07

## 2021-07-07 ENCOUNTER — PREP FOR PROCEDURE (OUTPATIENT)
Dept: CARDIOTHORACIC SURGERY | Age: 81
End: 2021-07-07

## 2021-07-07 DIAGNOSIS — I48.0 PAF (PAROXYSMAL ATRIAL FIBRILLATION) (HCC): ICD-10-CM

## 2021-07-07 DIAGNOSIS — Z01.818 PREOP TESTING: ICD-10-CM

## 2021-07-07 DIAGNOSIS — I25.10 CAD IN NATIVE ARTERY: Primary | ICD-10-CM

## 2021-07-07 RX ORDER — CHLORHEXIDINE GLUCONATE 4 G/100ML
SOLUTION TOPICAL ONCE
Status: CANCELLED | OUTPATIENT
Start: 2021-07-07 | End: 2021-07-07

## 2021-07-07 RX ORDER — CHLORHEXIDINE GLUCONATE 0.12 MG/ML
15 RINSE ORAL ONCE
Status: CANCELLED | OUTPATIENT
Start: 2021-07-07 | End: 2021-07-07

## 2021-07-07 RX ORDER — SODIUM CHLORIDE 0.9 % (FLUSH) 0.9 %
10 SYRINGE (ML) INJECTION EVERY 12 HOURS SCHEDULED
Status: CANCELLED | OUTPATIENT
Start: 2021-07-07

## 2021-07-07 RX ORDER — SODIUM CHLORIDE 9 MG/ML
25 INJECTION, SOLUTION INTRAVENOUS PRN
Status: CANCELLED | OUTPATIENT
Start: 2021-07-07

## 2021-07-07 RX ORDER — SODIUM CHLORIDE 0.9 % (FLUSH) 0.9 %
10 SYRINGE (ML) INJECTION PRN
Status: CANCELLED | OUTPATIENT
Start: 2021-07-07

## 2021-07-07 RX ORDER — SODIUM CHLORIDE 9 MG/ML
INJECTION, SOLUTION INTRAVENOUS CONTINUOUS
Status: CANCELLED | OUTPATIENT
Start: 2021-07-07

## 2021-07-07 NOTE — PROCEDURES
[address]                  [address] [facility],  [facility]                               PULMONARY FUNCTION    PATIENT NAME: Christopher Monsivais                  :        1940  MED REC NO:   [id number]                            ROOM:       65  ACCOUNT NO:   [de-identified]                           ADMIT DATE: 2021  PROVIDER:     Isa Martinez MD    DATE OF PROCEDURE:  2021    ID:  Weight 176. SPIROMETRY:  FVC 1.52 which is 62 of predicted, FEV1 1.14 which is 62 of  predicted. FEV1/FVC 75 which is 97 of predicted. MVV 59 which is 76 of  predicted. LUNG VOLUMES:  Slow vital capacity 1.30 which is 53 of predicted. ERV  0.08 which is 32 of predicted. DIFFUSION CAPACITY:  6.33 which is 27, corrects for alveolar volume 76. IMPRESSION:  This PFT is showing evidence of nonspecific spirometry with  moderate reduction in diffusion capacity. In the absence of full PFT, I  cannot decide if the patient has decrease in TLC and then restrictive  lung disease. Clinical and radiological correlation indicated. For the  most part, this is nonspecific spirometry with moderate reduction in  diffusion capacity when corrects for alveolar volume. Clinical and  radiological correlation indicated.         Sarina Dorado MD    D: 2021 14:58:07       T: 2021 15:04:17     [email or web address]  Job#: [id number]     Doc#: [id number]

## 2021-07-07 NOTE — TELEPHONE ENCOUNTER
Received a referral:  from Care Coordinator to review patients medications. Called patient to schedule a time to speak with a pharmacist over the telephone. Spoke to patient and advised them of the above message. Patient verified understanding and scheduled their appointment: 7/9 at 3PM    Patient had med review 6/9 but discharged on 6/30/21 with med changes. Olga Nava [facility].    [address] [facility], toll free: [phone], option 7

## 2021-07-07 NOTE — TELEPHONE ENCOUNTER
Informed pt of [email or web address] 7/16 arrive 8AM  Stop Xarelto 7/18  Has COVID vaccine

## 2021-07-07 NOTE — DISCHARGE SUMMARY
Physician Discharge Summary     Patient ID:  Aneita Buerger  [id number]  80 y.o.  1940    Admit date: 6/21/2021    Discharge date and time: 6/30/2021  2:12 PM     Admission Diagnoses: Atrial fibrillation with RVR ([facility] [address] [I48.91]    Discharge Diagnoses:   Patient Active Problem List   Diagnosis    Spinal stenosis in cervical region    Spinal stenosis, lumbar region, without neurogenic claudication    Essential hypertension    Mixed hyperlipidemia    DM (diabetes mellitus) ([facility] [address]    Renal artery aneurysm (HCC)    PAF (paroxysmal atrial fibrillation) (HCC)    Anemia    Malignant neoplasm of cecum (HCC)    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 She continued in A fib with RVR and had another cardioversion. She then underwent stress test that was positive. She was seen by CTS and advised she needed CABG. Pt underwent pre-CABG testing and was discharged to home to have at a later date. She tolerated all medications and treatments well. Discharge Exam:  See progress note from today    Disposition: Home in stable condition, follow up with CTS and Dr. Levi Duron in 2 days.     Garima Owusu DO  7/7/2021

## 2021-07-08 ENCOUNTER — CARE COORDINATION (OUTPATIENT)
Dept: CASE MANAGEMENT | Age: 81
End: 2021-07-08

## 2021-07-08 NOTE — CARE COORDINATION
Final  for admission 6/21/2021-6/30/2021. Patient no longer eligible for BPCI-A Bundle d/t excluded DRG. CTN signing off.

## 2021-07-08 NOTE — CARE COORDINATION
[facility] 45 Transitions Follow Up Call    2021    Patient: Diego Ellison  Patient : 1940   MRN: [id number]  Reason for Admission:   Discharge Date: 21 RARS: Readmission Risk Score: 27         Spoke with: Patient, Radha Pizano 6 Transitions Subsequent and Final Call    Subsequent and Final Calls  Do you have any ongoing symptoms?: No  Do you have any questions related to your medications?: No  Do you have any needs or concerns that I can assist you with?: No  Identified Barriers: None  Care Transitions Interventions  No Identified Needs    Pharmacist: Completed    Other Interventions:           Patient is pleasant in conversation.   -denies any C/O chest pain, palpitations, shortness of breath, lightheaded, or dizziness   -denies any swelling   -monitoring daily weight and reports minimal fluctuations   -reports appetite fluctuates   -normal bladder/bowel elimination   -taking prescribed medications as ordered   -scheduled for Pre-Admission testing on 2021; future surgery 2021   -Patient reports she continues to sleep in a recliner. Emotional support provided; discussed will continue to follow.      Follow Up  Future Appointments   Date Time Provider Delia Galindo   2021  3:00 PM SCHEDULE, [facility] Rx None   7/15/2021 12:00 PM MD CHRYSTAL Kapadia   2021  8:30 AM CAIO CHIU ROOM 1 CAIO Skaggs RN

## 2021-07-09 ENCOUNTER — SCHEDULED TELEPHONE ENCOUNTER (OUTPATIENT)
Dept: PHARMACY | Facility: CLINIC | Age: 81
End: 2021-07-09

## 2021-07-09 NOTE — TELEPHONE ENCOUNTER
[facility] CLINICAL PHARMACY REVIEW: Post-Discharge Transitions of Care (JHONY)  Subjective/Objective:  Zainab Birmingham is a 80 y.o. female. Patient was discharged from [facility] on 6/30/21 with a diagnosis of Afib with RVR. Patient outreach to review discharge medications and provide medication review and management. Spoke with patient. Allergies   Allergen Reactions    Iodine Shortness Of Breath     SOB, Rash    Benadryl [Diphenhydramine]      hyper    Fish-Derived Products Rash       Discharge Medications (as per discharging medication list found in AVS): There are NEW medications for you. START taking them after you leave the hospital:  Medication Sig comments    amiodarone (CORDARONE) 200 MG tablet Take 1 tablet by mouth daily - New medication, Pt reports taking as prescribed    bumetanide (BUMEX) 1 MG tablet Take 1 tablet by mouth daily - New medication, Pt reports taking as prescribed. - She is not a fan of having to urinate so often, but does realize that this is what it is supposed to do for her. You told us you were taking these medications at home, but the amount or how often you take this medication has CHANGED:  Medication Sig Comments    rivaroxaban (XARELTO) 15 MG TABS tablet Take 1 tablet by mouth daily - Dose reduced from 20mg daily to 15mg daily.  - Patient reports that she has and is currently taking    metoprolol tartrate (LOPRESSOR) 50 MG tablet Take 1 tablet by mouth 2 times daily - she was changed back to tartrate from succinate. She verbalized understanding and reports taking the right medication.        These are medications you told us you were taking at home, CONTINUE taking them after you leave the hospital:  Medication Sig    amLODIPine (NORVASC) 10 MG tablet 10 mg daily     Biotin 5000 MCG TABS Take by mouth daily    calcium carbonate (OSCAL) 500 MG TABS tablet Take 500 mg by mouth daily    chlordiazePOXIDE-clidinium (LIBRAX) 5-2.5 MG per capsule Take 1 capsule by mouth as needed.  pramipexole (MIRAPEX) 0.5 MG tablet Take 0.5 mg by mouth 3 times daily    spironolactone (ALDACTONE) 25 MG tablet Take 1 tablet by mouth daily    fluticasone (FLONASE) 50 MCG/ACT nasal spray 2 sprays by Nasal route daily    furosemide (LASIX) 20 MG tablet Take 20 mg by mouth 2 times daily    potassium chloride (KLOR-CON M) 20 MEQ extended release tablet Take 20 mEq by mouth 2 times daily    traMADol (ULTRAM) 50 MG tablet Take 1 tablet by mouth every 8 hours as needed for Pain for up to 60 days. Take lowest dose possible to manage pain    ferrous sulfate (IRON 325) 325 (65 Fe) MG tablet Take 1 tablet by mouth daily (with breakfast)    miconazole (MICOTIN) 2 % powder Apply topically 2 times daily.  glimepiride (AMARYL) 2 MG tablet Take 2 mg by mouth every morning (before breakfast)     oxybutynin (DITROPAN) 5 MG tablet TAKE 1 TABLET BY MOUTH  DAILY WITH BREAKFAST    hydrOXYzine (ATARAX) 10 MG tablet Take 1 tablet by mouth nightly    simvastatin (ZOCOR) 20 MG tablet TAKE 1 TABLET BY MOUTH  DAILY WITH SUPPER    pantoprazole (PROTONIX) 40 MG tablet TAKE ONE TABLET BY MOUTH TWO TIMES A DAY BEFORE MEALS    metFORMIN (GLUCOPHAGE) 500 MG tablet TAKE 2 TABLETS BY MOUTH  TWICE A DAY     No current facility-administered medications for this visit. These are the medications you have told us you were taking at home, STOP taking them after you leave the hospital:   N/a    Additional Medications:   N/a    CrCl cannot be calculated (Unknown ideal weight. ). Assessment/Plan:  - Medication reconciliation completed. Patient has filled new medications ordered after this hospital discharge and is taking medications as directed by discharging provider. - Instructions per discharge list provided except per below documentation.  Identified medication discrepancies/issues:   · none  · Medication list updated as appropriate/noted    - Identified Potential Medication Interactions: The following clinically significant interactions were identified via [employer] Interaction Analysis as category D or higher: Amiodarone and simvastatin- increased risk of side effects of statin. Patient reports no issues at this time    - Renal Dosing: No renal adjustments necessary.    - Patient is going to be readmitted for CABG on 7/20, so there are likely going to be more medication changes at that time. Will place on my followup list and reach out if necessary    - Follow up appointment(s):  Future Appointments   Date Time Provider Delia Galindo   7/15/2021 12:00 PM David Wadsworth MD AFLBP[facility]ING FLACO ESPINO   7/16/2021  8:30 AM CAIO CHIU ROOM 1 CAIO Ac, PharmD, [address]  Direct: [phone]  Department, toll free: [phone], option 800 [facility] Closed?: Yes    Time Spent (min): 30

## 2021-07-16 ENCOUNTER — HOSPITAL ENCOUNTER (OUTPATIENT)
Dept: PREADMISSION TESTING | Age: 81
Discharge: HOME | End: 2021-07-16
Payer: MEDICARE

## 2021-07-16 VITALS
DIASTOLIC BLOOD PRESSURE: 59 MMHG | HEART RATE: 83 BPM | RESPIRATION RATE: 12 BRPM | SYSTOLIC BLOOD PRESSURE: 118 MMHG | OXYGEN SATURATION: 90 % | HEIGHT: 63 IN | BODY MASS INDEX: 31.01 KG/M2 | WEIGHT: 175 LBS | TEMPERATURE: 98.2 F

## 2021-07-16 DIAGNOSIS — I25.10 CAD IN NATIVE ARTERY: ICD-10-CM

## 2021-07-16 DIAGNOSIS — Z01.818 PREOP TESTING: ICD-10-CM

## 2021-07-16 DIAGNOSIS — I48.0 PAF (PAROXYSMAL ATRIAL FIBRILLATION) (HCC): ICD-10-CM

## 2021-07-16 LAB
ABO/RH: NORMAL
ALBUMIN SERPL-MCNC: 4.3 G/DL (ref 3.5–5.2)
ALP BLD-CCNC: 77 U/L (ref 35–104)
ALT SERPL-CCNC: 16 U/L (ref 0–32)
ANION GAP SERPL CALCULATED.3IONS-SCNC: 16 MMOL/L (ref 7–16)
ANTIBODY SCREEN: NORMAL
APTT: 35.8 SEC (ref 24.5–35.1)
AST SERPL-CCNC: 20 U/L (ref 0–31)
BACTERIA: ABNORMAL /HPF
BILIRUB SERPL-MCNC: 0.7 MG/DL (ref 0–1.2)
BILIRUBIN URINE: NEGATIVE
BLOOD, URINE: NEGATIVE
BUN BLDV-MCNC: 39 MG/DL (ref 6–23)
CALCIUM SERPL-MCNC: 9.8 MG/DL (ref 8.6–10.2)
CHLORIDE BLD-SCNC: 85 MMOL/L (ref 98–107)
CLARITY: CLEAR
CO2: 35 MMOL/L (ref 22–29)
COLOR: YELLOW
CREAT SERPL-MCNC: 1.6 MG/DL (ref 0.5–1)
GFR AFRICAN AMERICAN: 37
GFR NON-AFRICAN AMERICAN: 31 ML/MIN/1.73
GLUCOSE BLD-MCNC: 176 MG/DL (ref 74–99)
GLUCOSE URINE: >=1000 MG/DL
HCT VFR BLD CALC: 37.3 % (ref 34–48)
HEMOGLOBIN: 11.4 G/DL (ref 11.5–15.5)
INR BLD: 1.6
KETONES, URINE: NEGATIVE MG/DL
LEUKOCYTE ESTERASE, URINE: ABNORMAL
MCH RBC QN AUTO: 24.8 PG (ref 26–35)
MCHC RBC AUTO-ENTMCNC: 30.6 % (ref 32–34.5)
MCV RBC AUTO: 81.1 FL (ref 80–99.9)
NITRITE, URINE: NEGATIVE
PDW BLD-RTO: 22.7 FL (ref 11.5–15)
PH UA: 6 (ref 5–9)
PLATELET # BLD: 316 E9/L (ref 130–450)
PMV BLD AUTO: 9.1 FL (ref 7–12)
POTASSIUM SERPL-SCNC: 3 MMOL/L (ref 3.5–5)
PROTEIN UA: NEGATIVE MG/DL
PROTHROMBIN TIME: 17.5 SEC (ref 9.3–12.4)
RBC # BLD: 4.6 E12/L (ref 3.5–5.5)
RBC UA: ABNORMAL /HPF (ref 0–2)
SODIUM BLD-SCNC: 136 MMOL/L (ref 132–146)
SPECIFIC GRAVITY UA: 1.01 (ref 1–1.03)
TOTAL PROTEIN: 8.3 G/DL (ref 6.4–8.3)
UROBILINOGEN, URINE: 0.2 E.U./DL
WBC # BLD: 12.4 E9/L (ref 4.5–11.5)
WBC UA: ABNORMAL /HPF (ref 0–5)

## 2021-07-16 PROCEDURE — 80053 COMPREHEN METABOLIC PANEL: CPT

## 2021-07-16 PROCEDURE — 36415 COLL VENOUS BLD VENIPUNCTURE: CPT

## 2021-07-16 PROCEDURE — 85027 COMPLETE CBC AUTOMATED: CPT

## 2021-07-16 PROCEDURE — 86901 BLOOD TYPING SEROLOGIC RH(D): CPT

## 2021-07-16 PROCEDURE — 87186 SC STD MICRODIL/AGAR DIL: CPT

## 2021-07-16 PROCEDURE — 85730 THROMBOPLASTIN TIME PARTIAL: CPT

## 2021-07-16 PROCEDURE — 87077 CULTURE AEROBIC IDENTIFY: CPT

## 2021-07-16 PROCEDURE — 85610 PROTHROMBIN TIME: CPT

## 2021-07-16 PROCEDURE — 81001 URINALYSIS AUTO W/SCOPE: CPT

## 2021-07-16 PROCEDURE — 86900 BLOOD TYPING SEROLOGIC ABO: CPT

## 2021-07-16 PROCEDURE — 87081 CULTURE SCREEN ONLY: CPT

## 2021-07-16 PROCEDURE — 86923 COMPATIBILITY TEST ELECTRIC: CPT

## 2021-07-16 PROCEDURE — 86850 RBC ANTIBODY SCREEN: CPT

## 2021-07-16 PROCEDURE — 87088 URINE BACTERIA CULTURE: CPT

## 2021-07-17 LAB — MRSA CULTURE ONLY: NORMAL

## 2021-07-18 LAB
ORGANISM: ABNORMAL
URINE CULTURE, ROUTINE: ABNORMAL

## 2021-07-19 ENCOUNTER — TELEPHONE (OUTPATIENT)
Dept: CARDIOTHORACIC SURGERY | Age: 81
End: 2021-07-19

## 2021-07-19 DIAGNOSIS — B95.2 ENTEROCOCCUS FAECALIS INFECTION: Primary | ICD-10-CM

## 2021-07-19 DIAGNOSIS — R79.89 HIGH SERUM CREATINE: ICD-10-CM

## 2021-07-19 NOTE — TELEPHONE ENCOUNTER
Pt notified surgery for tomorrow is cx. She needs to see nephrology and ID. She will need clearance from both offices prior to rescheduling. Referrals sent.

## 2021-07-21 ENCOUNTER — TELEPHONE (OUTPATIENT)
Dept: CARDIOTHORACIC SURGERY | Age: 81
End: 2021-07-21

## 2021-07-28 LAB
BLOOD BANK DISPENSE STATUS: NORMAL
BLOOD BANK PRODUCT CODE: NORMAL
BPU ID: NORMAL
DESCRIPTION BLOOD BANK: NORMAL

## 2021-08-05 PROBLEM — F11.20 OPIOID DEPENDENCE, UNCOMPLICATED (HCC): Status: ACTIVE | Noted: 2021-08-05

## 2021-08-05 PROBLEM — F11.29 OPIOID DEPENDENCE WITH UNSPECIFIED OPIOID-INDUCED DISORDER (HCC): Status: ACTIVE | Noted: 2021-08-05

## 2021-08-05 PROBLEM — F11.99 OPIOID USE, UNSPECIFIED WITH UNSPECIFIED OPIOID-INDUCED DISORDER (HCC): Status: ACTIVE | Noted: 2021-08-05

## 2021-08-05 NOTE — TELEPHONE ENCOUNTER
Pt informed surgery rescheduled to 8/25  [email or web address] 8/23  Arrive 7 AM   Stop Xarelto 8/23  COVID vaccine

## 2021-08-05 NOTE — TELEPHONE ENCOUNTER
We will put her on the schedule for 8/25 at 7am. Please send her to [facility] for labs prior to surgery.  Last dose of xarelto on 8/23

## 2021-08-06 NOTE — H&P
 CATARACT REMOVAL   10/2016    CERVICAL DISCECTOMY   2007     ACDF C3-4, C4-5, C5-6 Dr. Maldonado Wang         left lower leg    HEMICOLECTOMY N/A 2019     DIAGNOSTIC LAPAROSCOPY, LYSIS OF ADHESIONS, OPEN RIGHT HEMICOLECTOMY performed by Norma Kemp MD at [address]   2017     [facility]. Dr.Joseph Carlos Parker TRANSESOPHAGEAL ECHOCARDIOGRAM   2021    UPPER GASTROINTESTINAL ENDOSCOPY N/A 2018     EGD BIOPSY performed by Alem Koo MD at [place] [address] 2020     EGD BIOPSY performed by Maki Ayers MD at [place] [address]   2020     EGD CONTROL HEMORRHAGE performed by Maki Ayers MD at [address] [place] [address]            Tobacco Use    Smoking status: Former Smoker       Packs/day: 2.00       Years: 15.00       Pack years: 30.00       Quit date: 3/1/1997       Years since quittin.3    Smokeless tobacco: Never Used   Vaping Use    Vaping Use: Never used   Substance Use Topics    Alcohol use: Yes       Comment: Holidays    Drug use:  No      Family History         Family History   Problem Relation Age of Onset    Diabetes Mother      Stroke Father      Diabetes Sister      High Blood Pressure Sister      Diabetes Brother      High Blood Pressure Brother      Cancer Brother      Heart Disease Brother                 Allergies   Allergen Reactions    Iodine Shortness Of Breath       SOB, Rash    Benadryl [Diphenhydramine]         hyper    Fish-Derived Products Rash           Current Outpatient Medications   Medication Instructions    amiodarone (CORDARONE) 200 mg, Oral, DAILY    amLODIPine (NORVASC) 10 mg, DAILY    Biotin 5000 MCG TABS Oral, DAILY    bumetanide (BUMEX) 1 mg, Oral, DAILY    calcium carbonate (OSCAL) 500 mg, Oral, DAILY    chlordiazePOXIDE-clidinium (LIBRAX) respiratory distress. Breath sounds clear to auscultation. No wheezes. Heart: Normal rate. Regular rhythm. S1 normal and S2 normal. Positive for murmur. Chest: Symmetric chest wall expansion. Extremities: Normal range of motion.     Neurological: Patient is alert and oriented to person, place and time. Patient has normal reflexes. Skin: Warm and dry. Abdomen: Abdomen is soft and non-distended. Bowel sounds are normal. There is no abdominal tenderness tenderness. There is no guarding. There is no mass. Pulses: Distal pulses are intact.   Skin: Warm and dry without lesions.     Assessment/Plan     Per Dr. Bayron Mix:     CABG was recommended, likely LAD, Ramus, and OM.  All risks, benefits, alternatives and potential complications explained thoroughly including, but not limited to, bleeding, infection, lung injury, kidney injury, stroke, heart attack, prolonged ventilation, wound complication, need for re-operation, and death, and the patient agrees to proceed.       OR 8/25  Last dose of xarelto on 8/23/21

## 2021-08-16 ENCOUNTER — TELEPHONE (OUTPATIENT)
Dept: ADMINISTRATIVE | Age: 81
End: 2021-08-16

## 2021-08-16 NOTE — TELEPHONE ENCOUNTER
Please arrange to have patient follow-up in 4 to 6 weeks. If patient is having symptoms to call for earlier visit and if symptoms are significant enough to go to the emergency room.

## 2021-08-20 NOTE — PROGRESS NOTES
[address] PRE-ADMISSION TESTING GENERAL INSTRUCTIONS- [facility]-phone number:[phone]    GENERAL INSTRUCTIONS    [x] Nothing by mouth after midnight, including gum, candy, mints, or water. [x] You may brush your teeth, gargle, but do NOT swallow water. [x]Hibiclens shower  the night before and the morning of surgery. Do not use             Hibiclens on your face or head. [x]No smoking, chewing tobacco, illegal drugs, or alcohol within 24 hours of your surgery. [x] Jewelry, valuables or body piercing's should not be brought to the hospital. All body and/or tongue piercing's must be removed prior to arriving to hospital.  ALL hair pins must be removed. [x] Do not wear makeup, lotions, powders, deodorant. Nail polish as directed by the nurse. [x] Bring insurance card and photo ID. [x] Transfusion Bracelet: Please bring with you to hospital, day of surgery       PARKING INSTRUCTIONS:   [x] Arrival Time:__0500___________  · [x] Parking lot '\"I\"  is located on [facility] (the corner of [facility] and [facility]). To enter, press the button and the gate will lift. A free token will be provided to exit the lot. One car per patient is allowed to park in this lot. All other cars are to park on [address] [place] [address] either in the parking garage or the handicap lot. EDUCATION INSTRUCTIONS:        [x] Pre-admission Testing educational folder given  [x] Incentive Spirometry,coughing & deep breathing exercises reviewed. [x]Pain: Post-op pain is normal and to be expected. You will be asked to rate your pain from 0-10(a zero is not acceptable-education is needed). Your post-op pain goal is:  [x] Ask your nurse for your pain medication. MEDICATION INSTRUCTIONS:   [x]Bring a complete list of your medications, please write the last time you took the medicine, give this list to the nurse.    [x] Take the following medications the morning of surgery with 1-2 ounces of water: amiodarone, mirapex, 1/2 dose of metoprolol, norvasc, and protonix. [x] Stop herbal supplements and vitamins 5 days before your surgery. [x] DO NOT take any diabetic medicine the morning of surgery. Follow instructions for insulin the day before surgery. [x] If you are diabetic and your blood sugar is low or you feel symptomatic, you may drink 1-2 ounces of apple juice or take a glucose tablet. The morning of your procedure, you may call the pre-op area if you have concerns about your blood sugar [phone]. [x] Follow physician instructions regarding any blood thinners you may be taking. WHAT TO EXPECT:  [x] The day of surgery you will be greeted and checked in by the [facility] [employer] [facility].  In addition, you will be registered in the [facility] by a Patient Access Representative. Please bring your photo ID and insurance card. A nurse will greet you in accordance to the time you are needed in the pre-op area to prepare you for surgery. Please do not be discouraged if you are not greeted in the order you arrive as there are many variables that are involved in patient preparation. Your patience is greatly appreciated as you wait for your nurse. Please bring in items such as: books, magazines, newspapers, electronics, or any other items  to occupy your time in the waiting area. [x]  Delays may occur with surgery and staff will make a sincere effort to keep you informed of delays. If any delays occur with your procedure, we apologize ahead of time for your inconvenience as we recognize the value of your time.

## 2021-08-23 ENCOUNTER — HOSPITAL ENCOUNTER (OUTPATIENT)
Dept: PREADMISSION TESTING | Age: 81
Setting detail: SURGERY ADMIT
Discharge: HOME | DRG: 003 | End: 2021-08-23
Payer: MEDICARE

## 2021-08-23 VITALS
DIASTOLIC BLOOD PRESSURE: 61 MMHG | OXYGEN SATURATION: 95 % | HEIGHT: 62 IN | SYSTOLIC BLOOD PRESSURE: 127 MMHG | RESPIRATION RATE: 12 BRPM | BODY MASS INDEX: 31.28 KG/M2 | HEART RATE: 95 BPM | TEMPERATURE: 98.6 F | WEIGHT: 170 LBS

## 2021-08-23 DIAGNOSIS — Z01.812 PRE-OPERATIVE LABORATORY EXAMINATION: Primary | ICD-10-CM

## 2021-08-23 DIAGNOSIS — Z01.810 PRE-OPERATIVE CARDIOVASCULAR EXAMINATION: ICD-10-CM

## 2021-08-23 DIAGNOSIS — Z01.818 PRE-OP EVALUATION: ICD-10-CM

## 2021-08-23 LAB
ABO/RH: NORMAL
ALBUMIN SERPL-MCNC: 4.3 G/DL (ref 3.5–5.2)
ALP BLD-CCNC: 74 U/L (ref 35–104)
ALT SERPL-CCNC: 15 U/L (ref 0–32)
ANION GAP SERPL CALCULATED.3IONS-SCNC: 12 MMOL/L (ref 7–16)
ANTIBODY SCREEN: NORMAL
APTT: 28.1 SEC (ref 24.5–35.1)
AST SERPL-CCNC: 18 U/L (ref 0–31)
BILIRUB SERPL-MCNC: 0.4 MG/DL (ref 0–1.2)
BUN BLDV-MCNC: 21 MG/DL (ref 6–23)
CALCIUM SERPL-MCNC: 9.9 MG/DL (ref 8.6–10.2)
CHLORIDE BLD-SCNC: 97 MMOL/L (ref 98–107)
CO2: 29 MMOL/L (ref 22–29)
CREAT SERPL-MCNC: 1.2 MG/DL (ref 0.5–1)
GFR AFRICAN AMERICAN: 52
GFR NON-AFRICAN AMERICAN: 43 ML/MIN/1.73
GLUCOSE BLD-MCNC: 167 MG/DL (ref 74–99)
HCT VFR BLD CALC: 33.4 % (ref 34–48)
HEMOGLOBIN: 10.5 G/DL (ref 11.5–15.5)
INR BLD: 1.1
MCH RBC QN AUTO: 26.1 PG (ref 26–35)
MCHC RBC AUTO-ENTMCNC: 31.4 % (ref 32–34.5)
MCV RBC AUTO: 83.1 FL (ref 80–99.9)
PDW BLD-RTO: 17 FL (ref 11.5–15)
PLATELET # BLD: 242 E9/L (ref 130–450)
PMV BLD AUTO: 9.2 FL (ref 7–12)
POTASSIUM SERPL-SCNC: 3.9 MMOL/L (ref 3.5–5)
PROTHROMBIN TIME: 12.4 SEC (ref 9.3–12.4)
RBC # BLD: 4.02 E12/L (ref 3.5–5.5)
SODIUM BLD-SCNC: 138 MMOL/L (ref 132–146)
TOTAL PROTEIN: 8.1 G/DL (ref 6.4–8.3)
WBC # BLD: 10 E9/L (ref 4.5–11.5)

## 2021-08-23 PROCEDURE — 86901 BLOOD TYPING SEROLOGIC RH(D): CPT

## 2021-08-23 PROCEDURE — 80053 COMPREHEN METABOLIC PANEL: CPT

## 2021-08-23 PROCEDURE — 87081 CULTURE SCREEN ONLY: CPT

## 2021-08-23 PROCEDURE — P9016 RBC LEUKOCYTES REDUCED: HCPCS

## 2021-08-23 PROCEDURE — 86923 COMPATIBILITY TEST ELECTRIC: CPT

## 2021-08-23 PROCEDURE — 36415 COLL VENOUS BLD VENIPUNCTURE: CPT

## 2021-08-23 PROCEDURE — 86850 RBC ANTIBODY SCREEN: CPT

## 2021-08-23 PROCEDURE — 85610 PROTHROMBIN TIME: CPT

## 2021-08-23 PROCEDURE — 85730 THROMBOPLASTIN TIME PARTIAL: CPT

## 2021-08-23 PROCEDURE — 85027 COMPLETE CBC AUTOMATED: CPT

## 2021-08-23 PROCEDURE — 86900 BLOOD TYPING SEROLOGIC ABO: CPT

## 2021-08-23 PROCEDURE — 93005 ELECTROCARDIOGRAM TRACING: CPT | Performed by: PHYSICIAN ASSISTANT

## 2021-08-24 LAB — MRSA CULTURE ONLY: NORMAL

## 2021-08-25 ENCOUNTER — ANESTHESIA EVENT (OUTPATIENT)
Dept: OPERATING ROOM | Age: 81
DRG: 003 | End: 2021-08-25
Payer: MEDICARE

## 2021-08-25 ENCOUNTER — ANESTHESIA (OUTPATIENT)
Dept: OPERATING ROOM | Age: 81
DRG: 003 | End: 2021-08-25
Payer: MEDICARE

## 2021-08-25 ENCOUNTER — APPOINTMENT (OUTPATIENT)
Dept: GENERAL RADIOLOGY | Age: 81
DRG: 003 | End: 2021-08-25
Attending: THORACIC SURGERY (CARDIOTHORACIC VASCULAR SURGERY)
Payer: MEDICARE

## 2021-08-25 ENCOUNTER — HOSPITAL ENCOUNTER (INPATIENT)
Age: 81
LOS: 22 days | Discharge: NURSING, REHAB OR LONG-TERM CARE | DRG: 003 | End: 2021-09-16
Attending: THORACIC SURGERY (CARDIOTHORACIC VASCULAR SURGERY) | Admitting: THORACIC SURGERY (CARDIOTHORACIC VASCULAR SURGERY)
Payer: MEDICARE

## 2021-08-25 VITALS — DIASTOLIC BLOOD PRESSURE: 61 MMHG | TEMPERATURE: 97.3 F | OXYGEN SATURATION: 100 % | SYSTOLIC BLOOD PRESSURE: 116 MMHG

## 2021-08-25 DIAGNOSIS — I25.10 CAD IN NATIVE ARTERY: ICD-10-CM

## 2021-08-25 DIAGNOSIS — G89.18 ACUTE POST-OPERATIVE PAIN: ICD-10-CM

## 2021-08-25 DIAGNOSIS — I95.9 HYPOTENSION, UNSPECIFIED HYPOTENSION TYPE: Primary | ICD-10-CM

## 2021-08-25 DIAGNOSIS — Z01.812 PRE-OPERATIVE LABORATORY EXAMINATION: ICD-10-CM

## 2021-08-25 LAB
AADO2: 168.2 MMHG
AADO2: 97.7 MMHG
ACTIVATED CLOTTING TIME: 113 SECONDS (ref 99–130)
ACTIVATED CLOTTING TIME: 121 SECONDS (ref 99–130)
ACTIVATED CLOTTING TIME: 403 SECONDS (ref 99–130)
ACTIVATED CLOTTING TIME: 420 SECONDS (ref 99–130)
ACTIVATED CLOTTING TIME: 421 SECONDS (ref 99–130)
ACTIVATED CLOTTING TIME: 488 SECONDS (ref 99–130)
ACTIVATED CLOTTING TIME: 533 SECONDS (ref 99–130)
ANION GAP SERPL CALCULATED.3IONS-SCNC: 14 MMOL/L (ref 7–16)
ANION GAP: 12 MMOL/L (ref 7–16)
ANION GAP: 13 MMOL/L (ref 7–16)
ANION GAP: 14 MMOL/L (ref 7–16)
APTT: 23.2 SEC (ref 24.5–35.1)
B.E.: -1.9 MMOL/L (ref -3–3)
B.E.: -2.6 MMOL/L (ref -3–0)
B.E.: -2.6 MMOL/L (ref -3–3)
B.E.: -5.1 MMOL/L (ref -3–3)
B.E.: 0.7 MMOL/L (ref -3–0)
B.E.: 1.7 MMOL/L (ref -3–0)
BUN BLDV-MCNC: 15 MG/DL (ref 6–23)
CALCIUM SERPL-MCNC: 9.5 MG/DL (ref 8.6–10.2)
CARDIOPULMONARY BYPASS: YES
CHLORIDE BLD-SCNC: 99 MMOL/L (ref 98–107)
CO2: 23 MMOL/L (ref 22–29)
COHB: 0.2 % (ref 0–1.5)
COHB: 0.3 % (ref 0–1.5)
COHB: 0.5 % (ref 0–1.5)
CREAT SERPL-MCNC: 0.8 MG/DL (ref 0.5–1)
CRITICAL NOTIFICATION: YES
CRITICAL: ABNORMAL
DATE ANALYZED: ABNORMAL
DATE OF COLLECTION: ABNORMAL
DEVICE: ABNORMAL
EKG ATRIAL RATE: 90 BPM
EKG P AXIS: 53 DEGREES
EKG P-R INTERVAL: 172 MS
EKG Q-T INTERVAL: 398 MS
EKG QRS DURATION: 78 MS
EKG QTC CALCULATION (BAZETT): 486 MS
EKG R AXIS: 1 DEGREES
EKG T AXIS: -3 DEGREES
EKG VENTRICULAR RATE: 90 BPM
FIO2: 40 %
FIO2: 60 %
GFR AFRICAN AMERICAN: >60
GFR NON-AFRICAN AMERICAN: >60 ML/MIN/1.73
GFR, ESTIMATED: 53 ML/MIN/1.73
GFR, ESTIMATED: 53 ML/MIN/1.73
GFR, ESTIMATED: >60 ML/MIN/1.73
GLUCOSE BLD-MCNC: 165 MG/DL (ref 74–99)
GLUCOSE BLD-MCNC: 168 MG/DL (ref 74–99)
GLUCOSE BLD-MCNC: 219 MG/DL (ref 74–99)
GLUCOSE BLD-MCNC: 236 MG/DL (ref 74–99)
HCO3 ARTERIAL: 23.4 MMOL/L (ref 22–26)
HCO3 ARTERIAL: 24.8 MMOL/L (ref 22–26)
HCO3 ARTERIAL: 25.9 MMOL/L (ref 22–26)
HCO3: 21 MMOL/L (ref 22–26)
HCO3: 23.3 MMOL/L (ref 22–26)
HCO3: 24 MMOL/L (ref 22–26)
HCT (EST): 24 % (ref 34–48)
HCT (EST): 24 % (ref 34–48)
HCT (EST): 32 % (ref 34–48)
HCT VFR BLD CALC: 31.5 % (ref 34–48)
HEMOGLOBIN: 10 G/DL (ref 11.5–15.5)
HGB, (EST): 10.9 G/DL (ref 11.5–15.5)
HGB, (EST): 8.2 G/DL (ref 11.5–15.5)
HGB, (EST): 8.2 G/DL (ref 11.5–15.5)
HHB: 1.5 % (ref 0–5)
HHB: 2 % (ref 0–5)
HHB: 2.5 % (ref 0–5)
INR BLD: 1.3
LAB: ABNORMAL
MAGNESIUM: 2.1 MG/DL (ref 1.6–2.6)
MCH RBC QN AUTO: 25.9 PG (ref 26–35)
MCHC RBC AUTO-ENTMCNC: 31.7 % (ref 32–34.5)
MCV RBC AUTO: 81.6 FL (ref 80–99.9)
METER GLUCOSE: 135 MG/DL (ref 74–99)
METER GLUCOSE: 143 MG/DL (ref 74–99)
METER GLUCOSE: 162 MG/DL (ref 74–99)
METER GLUCOSE: 168 MG/DL (ref 74–99)
METER GLUCOSE: 172 MG/DL (ref 74–99)
METER GLUCOSE: 227 MG/DL (ref 74–99)
METER GLUCOSE: 248 MG/DL (ref 74–99)
METER GLUCOSE: 288 MG/DL (ref 74–99)
METHB: 0.1 % (ref 0–1.5)
METHB: 0.3 % (ref 0–1.5)
METHB: 0.4 % (ref 0–1.5)
MODE: ABNORMAL
MODE: ABNORMAL
MODE: AC
O2 SATURATION: 100 % (ref 92–98.5)
O2 SATURATION: 100 % (ref 92–98.5)
O2 SATURATION: 97.5 % (ref 92–98.5)
O2 SATURATION: 98 % (ref 92–98.5)
O2 SATURATION: 98.5 % (ref 92–98.5)
O2 SATURATION: 99.5 % (ref 92–98.5)
O2HB: 97.2 % (ref 94–97)
O2HB: 97.3 % (ref 94–97)
O2HB: 97.7 % (ref 94–97)
OPERATOR ID: 2856
OPERATOR ID: 2856
OPERATOR ID: ABNORMAL
PATIENT TEMP: 37 C
PCO2 ARTERIAL: 36.4 MMHG (ref 35–45)
PCO2 ARTERIAL: 37.8 MMHG (ref 35–45)
PCO2 ARTERIAL: 44.1 MMHG (ref 35–45)
PCO2: 43.1 MMHG (ref 35–45)
PCO2: 45.3 MMHG (ref 35–45)
PCO2: 46.1 MMHG (ref 35–45)
PDW BLD-RTO: 16.5 FL (ref 11.5–15)
PEEP/CPAP: 5 CMH2O
PEEP/CPAP: 5 CMH2O
PFO2: 3.13 MMHG/%
PFO2: 3.29 MMHG/%
PH BLOOD GAS: 7.3 (ref 7.35–7.45)
PH BLOOD GAS: 7.33 (ref 7.35–7.45)
PH BLOOD GAS: 7.44 (ref 7.35–7.45)
PH BLOOD GAS: 7.44 (ref 7.35–7.45)
PLATELET # BLD: 242 E9/L (ref 130–450)
PMV BLD AUTO: 8.8 FL (ref 7–12)
PO2 ARTERIAL: 182.1 MMHG (ref 60–80)
PO2 ARTERIAL: 520.8 MMHG (ref 60–80)
PO2 ARTERIAL: 599.9 MMHG (ref 60–80)
PO2: 125.4 MMHG (ref 75–100)
PO2: 161.7 MMHG (ref 75–100)
PO2: 197.2 MMHG (ref 75–100)
POC BUN: 14 MG/DL (ref 8–23)
POC BUN: 16 MG/DL (ref 8–23)
POC BUN: 17 MG/DL (ref 8–23)
POC CHLORIDE: 100 MMOL/L (ref 100–108)
POC CHLORIDE: 95 MMOL/L (ref 100–108)
POC CHLORIDE: 95 MMOL/L (ref 100–108)
POC CO2: 24.1 MMOL/L (ref 22–29)
POC CO2: 25.1 MMOL/L (ref 22–29)
POC CO2: 26.2 MMOL/L (ref 22–29)
POC CREATININE: 0.9 MG/DL (ref 0.5–1)
POC CREATININE: 1 MG/DL (ref 0.5–1)
POC CREATININE: 1 MG/DL (ref 0.5–1)
POC IONIZED CALCIUM: 1
POC IONIZED CALCIUM: 1.1
POC IONIZED CALCIUM: 1.4
POC LACTIC ACID: 2.5
POC LACTIC ACID: 3
POC LACTIC ACID: 4.9
POC SODIUM: 133 MMOL/L (ref 132–146)
POC SODIUM: 133 MMOL/L (ref 132–146)
POC SODIUM: 138 MMOL/L (ref 132–146)
POTASSIUM SERPL-SCNC: 3.1 MMOL/L (ref 3.5–5.5)
POTASSIUM SERPL-SCNC: 3.17 MMOL/L (ref 3.5–5)
POTASSIUM SERPL-SCNC: 3.4 MMOL/L (ref 3.5–5)
POTASSIUM SERPL-SCNC: 3.8 MMOL/L (ref 3.5–5.5)
POTASSIUM SERPL-SCNC: 3.94 MMOL/L (ref 3.5–5)
POTASSIUM SERPL-SCNC: 4.2 MMOL/L (ref 3.5–5.5)
PROTHROMBIN TIME: 14.5 SEC (ref 9.3–12.4)
PS: 12 CMH20
RBC # BLD: 3.86 E12/L (ref 3.5–5.5)
RI(T): 0.78
RI(T): 0.85
RR MECHANICAL: 12 B/MIN
SODIUM BLD-SCNC: 136 MMOL/L (ref 132–146)
SOURCE, BLOOD GAS: ABNORMAL
THB: 10 G/DL (ref 11.5–16.5)
THB: 11 G/DL (ref 11.5–16.5)
THB: 9.1 G/DL (ref 11.5–16.5)
TIME ANALYZED: 1242
TIME ANALYZED: 1436
TIME ANALYZED: 1707
VT MECHANICAL: 470 ML
WBC # BLD: 27.2 E9/L (ref 4.5–11.5)

## 2021-08-25 PROCEDURE — 6370000000 HC RX 637 (ALT 250 FOR IP): Performed by: THORACIC SURGERY (CARDIOTHORACIC VASCULAR SURGERY)

## 2021-08-25 PROCEDURE — 99291 CRITICAL CARE FIRST HOUR: CPT | Performed by: NURSE PRACTITIONER

## 2021-08-25 PROCEDURE — 06BQ4ZZ EXCISION OF LEFT SAPHENOUS VEIN, PERCUTANEOUS ENDOSCOPIC APPROACH: ICD-10-PCS | Performed by: THORACIC SURGERY (CARDIOTHORACIC VASCULAR SURGERY)

## 2021-08-25 PROCEDURE — 2580000003 HC RX 258: Performed by: THORACIC SURGERY (CARDIOTHORACIC VASCULAR SURGERY)

## 2021-08-25 PROCEDURE — 7100000001 HC PACU RECOVERY - ADDTL 15 MIN

## 2021-08-25 PROCEDURE — 94664 DEMO&/EVAL PT USE INHALER: CPT

## 2021-08-25 PROCEDURE — 6360000002 HC RX W HCPCS: Performed by: THORACIC SURGERY (CARDIOTHORACIC VASCULAR SURGERY)

## 2021-08-25 PROCEDURE — 82962 GLUCOSE BLOOD TEST: CPT

## 2021-08-25 PROCEDURE — 6360000002 HC RX W HCPCS: Performed by: NURSE PRACTITIONER

## 2021-08-25 PROCEDURE — 83735 ASSAY OF MAGNESIUM: CPT

## 2021-08-25 PROCEDURE — 84132 ASSAY OF SERUM POTASSIUM: CPT

## 2021-08-25 PROCEDURE — 6360000002 HC RX W HCPCS: Performed by: NURSE ANESTHETIST, CERTIFIED REGISTERED

## 2021-08-25 PROCEDURE — 2580000003 HC RX 258: Performed by: NURSE ANESTHETIST, CERTIFIED REGISTERED

## 2021-08-25 PROCEDURE — 3700000000 HC ANESTHESIA ATTENDED CARE: Performed by: THORACIC SURGERY (CARDIOTHORACIC VASCULAR SURGERY)

## 2021-08-25 PROCEDURE — P9045 ALBUMIN (HUMAN), 5%, 250 ML: HCPCS | Performed by: NURSE PRACTITIONER

## 2021-08-25 PROCEDURE — 2720000010 HC SURG SUPPLY STERILE: Performed by: THORACIC SURGERY (CARDIOTHORACIC VASCULAR SURGERY)

## 2021-08-25 PROCEDURE — 85347 COAGULATION TIME ACTIVATED: CPT

## 2021-08-25 PROCEDURE — 33518 CABG ARTERY-VEIN TWO: CPT | Performed by: THORACIC SURGERY (CARDIOTHORACIC VASCULAR SURGERY)

## 2021-08-25 PROCEDURE — C1889 IMPLANT/INSERT DEVICE, NOC: HCPCS | Performed by: THORACIC SURGERY (CARDIOTHORACIC VASCULAR SURGERY)

## 2021-08-25 PROCEDURE — 82803 BLOOD GASES ANY COMBINATION: CPT

## 2021-08-25 PROCEDURE — 85610 PROTHROMBIN TIME: CPT

## 2021-08-25 PROCEDURE — 37799 UNLISTED PX VASCULAR SURGERY: CPT

## 2021-08-25 PROCEDURE — 2000000000 HC ICU R&B

## 2021-08-25 PROCEDURE — 7100000000 HC PACU RECOVERY - FIRST 15 MIN

## 2021-08-25 PROCEDURE — 94002 VENT MGMT INPAT INIT DAY: CPT

## 2021-08-25 PROCEDURE — 6370000000 HC RX 637 (ALT 250 FOR IP): Performed by: NURSE ANESTHETIST, CERTIFIED REGISTERED

## 2021-08-25 PROCEDURE — 3600000008 HC SURGERY OHS BASE: Performed by: THORACIC SURGERY (CARDIOTHORACIC VASCULAR SURGERY)

## 2021-08-25 PROCEDURE — 2709999900 HC NON-CHARGEABLE SUPPLY: Performed by: THORACIC SURGERY (CARDIOTHORACIC VASCULAR SURGERY)

## 2021-08-25 PROCEDURE — C1713 ANCHOR/SCREW BN/BN,TIS/BN: HCPCS | Performed by: THORACIC SURGERY (CARDIOTHORACIC VASCULAR SURGERY)

## 2021-08-25 PROCEDURE — 85730 THROMBOPLASTIN TIME PARTIAL: CPT

## 2021-08-25 PROCEDURE — 85027 COMPLETE CBC AUTOMATED: CPT

## 2021-08-25 PROCEDURE — 02L73CK OCCLUSION OF LEFT ATRIAL APPENDAGE WITH EXTRALUMINAL DEVICE, PERCUTANEOUS APPROACH: ICD-10-PCS | Performed by: THORACIC SURGERY (CARDIOTHORACIC VASCULAR SURGERY)

## 2021-08-25 PROCEDURE — 94640 AIRWAY INHALATION TREATMENT: CPT

## 2021-08-25 PROCEDURE — 33533 CABG ARTERIAL SINGLE: CPT | Performed by: THORACIC SURGERY (CARDIOTHORACIC VASCULAR SURGERY)

## 2021-08-25 PROCEDURE — 021109W BYPASS CORONARY ARTERY, TWO ARTERIES FROM AORTA WITH AUTOLOGOUS VENOUS TISSUE, OPEN APPROACH: ICD-10-PCS | Performed by: THORACIC SURGERY (CARDIOTHORACIC VASCULAR SURGERY)

## 2021-08-25 PROCEDURE — 02100Z9 BYPASS CORONARY ARTERY, ONE ARTERY FROM LEFT INTERNAL MAMMARY, OPEN APPROACH: ICD-10-PCS | Performed by: THORACIC SURGERY (CARDIOTHORACIC VASCULAR SURGERY)

## 2021-08-25 PROCEDURE — P9045 ALBUMIN (HUMAN), 5%, 250 ML: HCPCS | Performed by: THORACIC SURGERY (CARDIOTHORACIC VASCULAR SURGERY)

## 2021-08-25 PROCEDURE — 02HV33Z INSERTION OF INFUSION DEVICE INTO SUPERIOR VENA CAVA, PERCUTANEOUS APPROACH: ICD-10-PCS | Performed by: ANESTHESIOLOGY

## 2021-08-25 PROCEDURE — 80048 BASIC METABOLIC PNL TOTAL CA: CPT

## 2021-08-25 PROCEDURE — B24BZZ4 ULTRASONOGRAPHY OF HEART WITH AORTA, TRANSESOPHAGEAL: ICD-10-PCS | Performed by: ANESTHESIOLOGY

## 2021-08-25 PROCEDURE — 2500000003 HC RX 250 WO HCPCS: Performed by: THORACIC SURGERY (CARDIOTHORACIC VASCULAR SURGERY)

## 2021-08-25 PROCEDURE — 2500000003 HC RX 250 WO HCPCS: Performed by: NURSE ANESTHETIST, CERTIFIED REGISTERED

## 2021-08-25 PROCEDURE — 93010 ELECTROCARDIOGRAM REPORT: CPT | Performed by: INTERNAL MEDICINE

## 2021-08-25 PROCEDURE — 2700000000 HC OXYGEN THERAPY PER DAY

## 2021-08-25 PROCEDURE — 99222 1ST HOSP IP/OBS MODERATE 55: CPT | Performed by: INTERNAL MEDICINE

## 2021-08-25 PROCEDURE — 71045 X-RAY EXAM CHEST 1 VIEW: CPT

## 2021-08-25 PROCEDURE — 33508 ENDOSCOPIC VEIN HARVEST: CPT | Performed by: THORACIC SURGERY (CARDIOTHORACIC VASCULAR SURGERY)

## 2021-08-25 PROCEDURE — 3700000001 HC ADD 15 MINUTES (ANESTHESIA): Performed by: THORACIC SURGERY (CARDIOTHORACIC VASCULAR SURGERY)

## 2021-08-25 PROCEDURE — 3600000018 HC SURGERY OHS ADDTL 15MIN: Performed by: THORACIC SURGERY (CARDIOTHORACIC VASCULAR SURGERY)

## 2021-08-25 PROCEDURE — 82805 BLOOD GASES W/O2 SATURATION: CPT

## 2021-08-25 PROCEDURE — 36415 COLL VENOUS BLD VENIPUNCTURE: CPT

## 2021-08-25 PROCEDURE — C1729 CATH, DRAINAGE: HCPCS | Performed by: THORACIC SURGERY (CARDIOTHORACIC VASCULAR SURGERY)

## 2021-08-25 DEVICE — PLATE BONE 1.8MM THICKNESS STRNL LCK 6 H CP TI: Type: IMPLANTABLE DEVICE | Site: STERNUM | Status: FUNCTIONAL

## 2021-08-25 DEVICE — PLATE LCK STRNL 8-H LAD T 1.8MM: Type: IMPLANTABLE DEVICE | Site: STERNUM | Status: FUNCTIONAL

## 2021-08-25 DEVICE — SCREW BNE L9MM DIA2.3MM THOR STRNL TI LOK DRL FREE LEV 1: Type: IMPLANTABLE DEVICE | Site: STERNUM | Status: FUNCTIONAL

## 2021-08-25 DEVICE — DEVICE OCCL CLP L40MM PLUNG GRP FLX SHFT FOR GILLINOV: Type: IMPLANTABLE DEVICE | Site: HEART | Status: FUNCTIONAL

## 2021-08-25 RX ORDER — CHLORHEXIDINE GLUCONATE 0.12 MG/ML
15 RINSE ORAL ONCE
Status: COMPLETED | OUTPATIENT
Start: 2021-08-25 | End: 2021-08-25

## 2021-08-25 RX ORDER — HEPARIN SODIUM 10000 [USP'U]/ML
INJECTION, SOLUTION INTRAVENOUS; SUBCUTANEOUS PRN
Status: DISCONTINUED | OUTPATIENT
Start: 2021-08-25 | End: 2021-08-25 | Stop reason: SDUPTHER

## 2021-08-25 RX ORDER — AMINOCAPROIC ACID 250 MG/ML
INJECTION, SOLUTION INTRAVENOUS PRN
Status: DISCONTINUED | OUTPATIENT
Start: 2021-08-25 | End: 2021-08-25

## 2021-08-25 RX ORDER — PROTAMINE SULFATE 10 MG/ML
INJECTION, SOLUTION INTRAVENOUS PRN
Status: DISCONTINUED | OUTPATIENT
Start: 2021-08-25 | End: 2021-08-25 | Stop reason: SDUPTHER

## 2021-08-25 RX ORDER — CHLORHEXIDINE GLUCONATE 0.12 MG/ML
15 RINSE ORAL 2 TIMES DAILY
Status: DISCONTINUED | OUTPATIENT
Start: 2021-08-25 | End: 2021-08-27

## 2021-08-25 RX ORDER — SODIUM CHLORIDE 9 MG/ML
INJECTION, SOLUTION INTRAVENOUS CONTINUOUS
Status: DISCONTINUED | OUTPATIENT
Start: 2021-08-25 | End: 2021-08-25

## 2021-08-25 RX ORDER — CALCIUM CHLORIDE 100 MG/ML
INJECTION INTRAVENOUS; INTRAVENTRICULAR PRN
Status: DISCONTINUED | OUTPATIENT
Start: 2021-08-25 | End: 2021-08-25 | Stop reason: SDUPTHER

## 2021-08-25 RX ORDER — AMINOCAPROIC ACID 250 MG/ML
INJECTION, SOLUTION INTRAVENOUS PRN
Status: DISCONTINUED | OUTPATIENT
Start: 2021-08-25 | End: 2021-08-25 | Stop reason: SDUPTHER

## 2021-08-25 RX ORDER — ASPIRIN 81 MG/1
81 TABLET ORAL DAILY
Status: DISCONTINUED | OUTPATIENT
Start: 2021-08-26 | End: 2021-09-02

## 2021-08-25 RX ORDER — ACETAMINOPHEN 650 MG/1
650 SUPPOSITORY RECTAL EVERY 4 HOURS PRN
Status: DISCONTINUED | OUTPATIENT
Start: 2021-08-25 | End: 2021-09-16 | Stop reason: HOSPADM

## 2021-08-25 RX ORDER — 0.9 % SODIUM CHLORIDE 0.9 %
250 INTRAVENOUS SOLUTION INTRAVENOUS CONTINUOUS PRN
Status: DISCONTINUED | OUTPATIENT
Start: 2021-08-25 | End: 2021-09-16 | Stop reason: HOSPADM

## 2021-08-25 RX ORDER — DEXTROSE MONOHYDRATE 25 G/50ML
12.5 INJECTION, SOLUTION INTRAVENOUS PRN
Status: DISCONTINUED | OUTPATIENT
Start: 2021-08-25 | End: 2021-09-16 | Stop reason: HOSPADM

## 2021-08-25 RX ORDER — ASPIRIN 300 MG/1
SUPPOSITORY RECTAL
Status: DISPENSED
Start: 2021-08-25 | End: 2021-08-26

## 2021-08-25 RX ORDER — AMIODARONE HYDROCHLORIDE 200 MG/1
200 TABLET ORAL DAILY
Status: DISCONTINUED | OUTPATIENT
Start: 2021-08-25 | End: 2021-08-26

## 2021-08-25 RX ORDER — ALBUMIN, HUMAN INJ 5% 5 %
25 SOLUTION INTRAVENOUS PRN
Status: DISCONTINUED | OUTPATIENT
Start: 2021-08-25 | End: 2021-09-16 | Stop reason: HOSPADM

## 2021-08-25 RX ORDER — MIDAZOLAM HYDROCHLORIDE 1 MG/ML
INJECTION INTRAMUSCULAR; INTRAVENOUS PRN
Status: DISCONTINUED | OUTPATIENT
Start: 2021-08-25 | End: 2021-08-25 | Stop reason: SDUPTHER

## 2021-08-25 RX ORDER — SODIUM CHLORIDE 9 MG/ML
25 INJECTION, SOLUTION INTRAVENOUS PRN
Status: DISCONTINUED | OUTPATIENT
Start: 2021-08-25 | End: 2021-08-30 | Stop reason: SDUPTHER

## 2021-08-25 RX ORDER — IPRATROPIUM BROMIDE AND ALBUTEROL SULFATE 2.5; .5 MG/3ML; MG/3ML
1 SOLUTION RESPIRATORY (INHALATION)
Status: DISCONTINUED | OUTPATIENT
Start: 2021-08-25 | End: 2021-09-16 | Stop reason: HOSPADM

## 2021-08-25 RX ORDER — SODIUM CHLORIDE 0.9 % (FLUSH) 0.9 %
10 SYRINGE (ML) INJECTION EVERY 12 HOURS SCHEDULED
Status: DISCONTINUED | OUTPATIENT
Start: 2021-08-25 | End: 2021-08-25 | Stop reason: HOSPADM

## 2021-08-25 RX ORDER — MAGNESIUM SULFATE IN WATER 40 MG/ML
2000 INJECTION, SOLUTION INTRAVENOUS PRN
Status: DISCONTINUED | OUTPATIENT
Start: 2021-08-25 | End: 2021-09-16 | Stop reason: HOSPADM

## 2021-08-25 RX ORDER — SODIUM CHLORIDE 9 MG/ML
10 INJECTION INTRAVENOUS DAILY
Status: COMPLETED | OUTPATIENT
Start: 2021-08-25 | End: 2021-08-25

## 2021-08-25 RX ORDER — ALBUMIN, HUMAN INJ 5% 5 %
25 SOLUTION INTRAVENOUS ONCE
Status: COMPLETED | OUTPATIENT
Start: 2021-08-25 | End: 2021-08-25

## 2021-08-25 RX ORDER — PANTOPRAZOLE SODIUM 40 MG/10ML
INJECTION, POWDER, LYOPHILIZED, FOR SOLUTION INTRAVENOUS
Status: DISPENSED
Start: 2021-08-25 | End: 2021-08-25

## 2021-08-25 RX ORDER — DEXTROSE MONOHYDRATE 50 MG/ML
100 INJECTION, SOLUTION INTRAVENOUS PRN
Status: DISCONTINUED | OUTPATIENT
Start: 2021-08-25 | End: 2021-09-16 | Stop reason: HOSPADM

## 2021-08-25 RX ORDER — SODIUM CHLORIDE 0.9 % (FLUSH) 0.9 %
10 SYRINGE (ML) INJECTION PRN
Status: DISCONTINUED | OUTPATIENT
Start: 2021-08-25 | End: 2021-08-30 | Stop reason: SDUPTHER

## 2021-08-25 RX ORDER — VECURONIUM BROMIDE 1 MG/ML
INJECTION, POWDER, LYOPHILIZED, FOR SOLUTION INTRAVENOUS PRN
Status: DISCONTINUED | OUTPATIENT
Start: 2021-08-25 | End: 2021-08-25 | Stop reason: SDUPTHER

## 2021-08-25 RX ORDER — POTASSIUM CHLORIDE 29.8 MG/ML
20 INJECTION INTRAVENOUS PRN
Status: DISCONTINUED | OUTPATIENT
Start: 2021-08-25 | End: 2021-09-16 | Stop reason: HOSPADM

## 2021-08-25 RX ORDER — SODIUM CHLORIDE 9 MG/ML
25 INJECTION, SOLUTION INTRAVENOUS PRN
Status: DISCONTINUED | OUTPATIENT
Start: 2021-08-25 | End: 2021-08-25 | Stop reason: HOSPADM

## 2021-08-25 RX ORDER — FENTANYL CITRATE 0.05 MG/ML
INJECTION, SOLUTION INTRAMUSCULAR; INTRAVENOUS PRN
Status: DISCONTINUED | OUTPATIENT
Start: 2021-08-25 | End: 2021-08-25 | Stop reason: SDUPTHER

## 2021-08-25 RX ORDER — PANTOPRAZOLE SODIUM 40 MG/10ML
40 INJECTION, POWDER, LYOPHILIZED, FOR SOLUTION INTRAVENOUS DAILY
Status: ACTIVE | OUTPATIENT
Start: 2021-08-25 | End: 2021-08-26

## 2021-08-25 RX ORDER — SODIUM CHLORIDE 0.9 % (FLUSH) 0.9 %
10 SYRINGE (ML) INJECTION PRN
Status: DISCONTINUED | OUTPATIENT
Start: 2021-08-25 | End: 2021-08-25 | Stop reason: HOSPADM

## 2021-08-25 RX ORDER — NICOTINE POLACRILEX 4 MG
15 LOZENGE BUCCAL PRN
Status: DISCONTINUED | OUTPATIENT
Start: 2021-08-25 | End: 2021-09-16 | Stop reason: HOSPADM

## 2021-08-25 RX ORDER — INSULIN GLARGINE 100 [IU]/ML
0.15 INJECTION, SOLUTION SUBCUTANEOUS NIGHTLY
Status: DISCONTINUED | OUTPATIENT
Start: 2021-08-26 | End: 2021-08-31

## 2021-08-25 RX ORDER — FENTANYL CITRATE 50 UG/ML
25 INJECTION, SOLUTION INTRAMUSCULAR; INTRAVENOUS
Status: DISCONTINUED | OUTPATIENT
Start: 2021-08-25 | End: 2021-08-30

## 2021-08-25 RX ORDER — SENNA AND DOCUSATE SODIUM 50; 8.6 MG/1; MG/1
1 TABLET, FILM COATED ORAL 2 TIMES DAILY
Status: DISCONTINUED | OUTPATIENT
Start: 2021-08-25 | End: 2021-09-02

## 2021-08-25 RX ORDER — PHENYLEPHRINE HYDROCHLORIDE 10 MG/ML
INJECTION INTRAVENOUS PRN
Status: DISCONTINUED | OUTPATIENT
Start: 2021-08-25 | End: 2021-08-25 | Stop reason: SDUPTHER

## 2021-08-25 RX ORDER — PROPOFOL 10 MG/ML
INJECTION, EMULSION INTRAVENOUS PRN
Status: DISCONTINUED | OUTPATIENT
Start: 2021-08-25 | End: 2021-08-25 | Stop reason: SDUPTHER

## 2021-08-25 RX ORDER — PROPOFOL 10 MG/ML
10 INJECTION, EMULSION INTRAVENOUS CONTINUOUS PRN
Status: DISCONTINUED | OUTPATIENT
Start: 2021-08-25 | End: 2021-08-27

## 2021-08-25 RX ORDER — ASPIRIN 300 MG/1
300 SUPPOSITORY RECTAL ONCE
Status: COMPLETED | OUTPATIENT
Start: 2021-08-25 | End: 2021-08-25

## 2021-08-25 RX ORDER — ONDANSETRON 2 MG/ML
4 INJECTION INTRAMUSCULAR; INTRAVENOUS EVERY 8 HOURS PRN
Status: DISCONTINUED | OUTPATIENT
Start: 2021-08-25 | End: 2021-09-16 | Stop reason: HOSPADM

## 2021-08-25 RX ORDER — OXYCODONE HYDROCHLORIDE 10 MG/1
10 TABLET ORAL EVERY 4 HOURS PRN
Status: DISCONTINUED | OUTPATIENT
Start: 2021-08-25 | End: 2021-08-30

## 2021-08-25 RX ORDER — ATORVASTATIN CALCIUM 10 MG/1
10 TABLET, FILM COATED ORAL NIGHTLY
Status: DISCONTINUED | OUTPATIENT
Start: 2021-08-25 | End: 2021-09-16

## 2021-08-25 RX ORDER — KETOROLAC TROMETHAMINE 30 MG/ML
15 INJECTION, SOLUTION INTRAMUSCULAR; INTRAVENOUS ONCE
Status: COMPLETED | OUTPATIENT
Start: 2021-08-25 | End: 2021-08-25

## 2021-08-25 RX ORDER — ACETAMINOPHEN 325 MG/1
650 TABLET ORAL EVERY 4 HOURS PRN
Status: DISCONTINUED | OUTPATIENT
Start: 2021-08-25 | End: 2021-09-16 | Stop reason: HOSPADM

## 2021-08-25 RX ORDER — OXYCODONE HYDROCHLORIDE 5 MG/1
5 TABLET ORAL EVERY 4 HOURS PRN
Status: DISCONTINUED | OUTPATIENT
Start: 2021-08-25 | End: 2021-08-30

## 2021-08-25 RX ORDER — PRAMIPEXOLE DIHYDROCHLORIDE 0.25 MG/1
0.5 TABLET ORAL 3 TIMES DAILY
Status: DISCONTINUED | OUTPATIENT
Start: 2021-08-25 | End: 2021-09-16 | Stop reason: HOSPADM

## 2021-08-25 RX ORDER — ALBUMIN, HUMAN INJ 5% 5 %
SOLUTION INTRAVENOUS
Status: DISPENSED
Start: 2021-08-25 | End: 2021-08-25

## 2021-08-25 RX ORDER — MEPERIDINE HYDROCHLORIDE 50 MG/ML
25 INJECTION INTRAMUSCULAR; INTRAVENOUS; SUBCUTANEOUS
Status: ACTIVE | OUTPATIENT
Start: 2021-08-25 | End: 2021-08-25

## 2021-08-25 RX ORDER — SODIUM CHLORIDE 9 MG/ML
30 INJECTION, SOLUTION INTRAVENOUS CONTINUOUS
Status: DISCONTINUED | OUTPATIENT
Start: 2021-08-25 | End: 2021-09-16 | Stop reason: HOSPADM

## 2021-08-25 RX ORDER — PROPOFOL 10 MG/ML
INJECTION, EMULSION INTRAVENOUS
Status: DISPENSED
Start: 2021-08-25 | End: 2021-08-25

## 2021-08-25 RX ORDER — SODIUM CHLORIDE, SODIUM LACTATE, POTASSIUM CHLORIDE, CALCIUM CHLORIDE 600; 310; 30; 20 MG/100ML; MG/100ML; MG/100ML; MG/100ML
INJECTION, SOLUTION INTRAVENOUS CONTINUOUS PRN
Status: DISCONTINUED | OUTPATIENT
Start: 2021-08-25 | End: 2021-08-25 | Stop reason: SDUPTHER

## 2021-08-25 RX ORDER — FENTANYL CITRATE 50 UG/ML
50 INJECTION, SOLUTION INTRAMUSCULAR; INTRAVENOUS
Status: DISCONTINUED | OUTPATIENT
Start: 2021-08-25 | End: 2021-08-30

## 2021-08-25 RX ORDER — PANTOPRAZOLE SODIUM 40 MG/1
40 TABLET, DELAYED RELEASE ORAL DAILY
Status: DISCONTINUED | OUTPATIENT
Start: 2021-08-26 | End: 2021-09-02

## 2021-08-25 RX ORDER — SODIUM CHLORIDE 9 MG/ML
INJECTION, SOLUTION INTRAVENOUS CONTINUOUS PRN
Status: DISCONTINUED | OUTPATIENT
Start: 2021-08-25 | End: 2021-08-25 | Stop reason: SDUPTHER

## 2021-08-25 RX ORDER — SODIUM CHLORIDE 0.9 % (FLUSH) 0.9 %
10 SYRINGE (ML) INJECTION EVERY 12 HOURS SCHEDULED
Status: DISCONTINUED | OUTPATIENT
Start: 2021-08-25 | End: 2021-08-30 | Stop reason: SDUPTHER

## 2021-08-25 RX ADMIN — SODIUM CHLORIDE: 9 INJECTION, SOLUTION INTRAVENOUS at 06:50

## 2021-08-25 RX ADMIN — PROTAMINE SULFATE 250 MG: 10 INJECTION, SOLUTION INTRAVENOUS at 10:42

## 2021-08-25 RX ADMIN — Medication 10 ML: at 21:00

## 2021-08-25 RX ADMIN — PROPOFOL 130 MG: 10 INJECTION, EMULSION INTRAVENOUS at 07:18

## 2021-08-25 RX ADMIN — MIDAZOLAM 2 MG: 1 INJECTION INTRAMUSCULAR; INTRAVENOUS at 07:00

## 2021-08-25 RX ADMIN — FENTANYL CITRATE 100 MCG: 50 INJECTION, SOLUTION INTRAMUSCULAR; INTRAVENOUS at 07:53

## 2021-08-25 RX ADMIN — SODIUM CHLORIDE, PRESERVATIVE FREE 10 ML: 5 INJECTION INTRAVENOUS at 12:46

## 2021-08-25 RX ADMIN — FENTANYL CITRATE 50 MCG: 50 INJECTION, SOLUTION INTRAMUSCULAR; INTRAVENOUS at 22:06

## 2021-08-25 RX ADMIN — FENTANYL CITRATE 25 MCG: 50 INJECTION, SOLUTION INTRAMUSCULAR; INTRAVENOUS at 16:36

## 2021-08-25 RX ADMIN — FENTANYL CITRATE 100 MCG: 50 INJECTION, SOLUTION INTRAMUSCULAR; INTRAVENOUS at 07:11

## 2021-08-25 RX ADMIN — ACETAMINOPHEN 650 MG: 325 TABLET ORAL at 23:00

## 2021-08-25 RX ADMIN — ATORVASTATIN CALCIUM 10 MG: 10 TABLET, FILM COATED ORAL at 22:10

## 2021-08-25 RX ADMIN — ALBUMIN (HUMAN) 25 G: 12.5 INJECTION, SOLUTION INTRAVENOUS at 12:48

## 2021-08-25 RX ADMIN — KETOROLAC TROMETHAMINE 15 MG: 30 INJECTION, SOLUTION INTRAMUSCULAR; INTRAVENOUS at 14:38

## 2021-08-25 RX ADMIN — INSULIN LISPRO 3 UNITS: 100 INJECTION, SOLUTION INTRAVENOUS; SUBCUTANEOUS at 12:43

## 2021-08-25 RX ADMIN — FENTANYL CITRATE 25 MCG: 50 INJECTION, SOLUTION INTRAMUSCULAR; INTRAVENOUS at 23:15

## 2021-08-25 RX ADMIN — DOCUSATE SODIUM 50 MG AND SENNOSIDES 8.6 MG 1 TABLET: 8.6; 5 TABLET, FILM COATED ORAL at 22:00

## 2021-08-25 RX ADMIN — MUPIROCIN: 20 OINTMENT TOPICAL at 22:00

## 2021-08-25 RX ADMIN — SODIUM CHLORIDE 10 ML: 9 INJECTION INTRAMUSCULAR; INTRAVENOUS; SUBCUTANEOUS at 12:44

## 2021-08-25 RX ADMIN — FENTANYL CITRATE 200 MCG: 50 INJECTION, SOLUTION INTRAMUSCULAR; INTRAVENOUS at 07:41

## 2021-08-25 RX ADMIN — HYDROCORTISONE SODIUM SUCCINATE 100 MG: 100 INJECTION, POWDER, FOR SOLUTION INTRAMUSCULAR; INTRAVENOUS at 07:32

## 2021-08-25 RX ADMIN — Medication 2000 MG: at 11:21

## 2021-08-25 RX ADMIN — FENTANYL CITRATE 50 MCG: 50 INJECTION, SOLUTION INTRAMUSCULAR; INTRAVENOUS at 12:58

## 2021-08-25 RX ADMIN — SODIUM CHLORIDE 30 ML/HR: 9 INJECTION, SOLUTION INTRAVENOUS at 12:40

## 2021-08-25 RX ADMIN — SODIUM CHLORIDE 4 UNITS/HR: 9 INJECTION, SOLUTION INTRAVENOUS at 09:21

## 2021-08-25 RX ADMIN — PHENYLEPHRINE HYDROCHLORIDE 100 MCG: 10 INJECTION, SOLUTION INTRAVENOUS at 07:39

## 2021-08-25 RX ADMIN — INSULIN HUMAN 6 UNITS: 100 INJECTION, SOLUTION PARENTERAL at 09:57

## 2021-08-25 RX ADMIN — PROPOFOL 10 MCG/KG/MIN: 10 INJECTION, EMULSION INTRAVENOUS at 12:30

## 2021-08-25 RX ADMIN — FENTANYL CITRATE 150 MCG: 50 INJECTION, SOLUTION INTRAMUSCULAR; INTRAVENOUS at 07:50

## 2021-08-25 RX ADMIN — POTASSIUM CHLORIDE 20 MEQ: 400 INJECTION, SOLUTION INTRAVENOUS at 14:38

## 2021-08-25 RX ADMIN — INSULIN LISPRO 3 UNITS: 100 INJECTION, SOLUTION INTRAVENOUS; SUBCUTANEOUS at 15:34

## 2021-08-25 RX ADMIN — ALBUMIN (HUMAN) 25 G: 12.5 INJECTION, SOLUTION INTRAVENOUS at 13:20

## 2021-08-25 RX ADMIN — PHENYLEPHRINE HYDROCHLORIDE 100 MCG: 10 INJECTION, SOLUTION INTRAVENOUS at 07:47

## 2021-08-25 RX ADMIN — Medication 2000 MG: at 07:24

## 2021-08-25 RX ADMIN — PHENYLEPHRINE HYDROCHLORIDE 200 MCG: 10 INJECTION, SOLUTION INTRAVENOUS at 08:53

## 2021-08-25 RX ADMIN — PHENYLEPHRINE HYDROCHLORIDE 200 MCG: 10 INJECTION, SOLUTION INTRAVENOUS at 07:17

## 2021-08-25 RX ADMIN — PHENYLEPHRINE HYDROCHLORIDE 100 MCG: 10 INJECTION, SOLUTION INTRAVENOUS at 07:35

## 2021-08-25 RX ADMIN — VECURONIUM BROMIDE 5 MG: 10 INJECTION, POWDER, LYOPHILIZED, FOR SOLUTION INTRAVENOUS at 08:37

## 2021-08-25 RX ADMIN — CEFAZOLIN 2000 MG: 10 INJECTION, POWDER, FOR SOLUTION INTRAVENOUS at 20:21

## 2021-08-25 RX ADMIN — AMINOCAPROIC ACID 2 G/HR: 250 INJECTION, SOLUTION INTRAVENOUS at 07:27

## 2021-08-25 RX ADMIN — ALBUMIN (HUMAN) 25 G: 12.5 INJECTION, SOLUTION INTRAVENOUS at 18:06

## 2021-08-25 RX ADMIN — FENTANYL CITRATE 100 MCG: 50 INJECTION, SOLUTION INTRAMUSCULAR; INTRAVENOUS at 07:00

## 2021-08-25 RX ADMIN — SODIUM CHLORIDE: 9 INJECTION, SOLUTION INTRAVENOUS at 05:54

## 2021-08-25 RX ADMIN — INSULIN HUMAN 4 UNITS: 100 INJECTION, SOLUTION PARENTERAL at 09:21

## 2021-08-25 RX ADMIN — FENTANYL CITRATE 25 MCG: 50 INJECTION, SOLUTION INTRAMUSCULAR; INTRAVENOUS at 20:09

## 2021-08-25 RX ADMIN — PHENYLEPHRINE HYDROCHLORIDE 100 MCG: 10 INJECTION, SOLUTION INTRAVENOUS at 08:01

## 2021-08-25 RX ADMIN — SODIUM CHLORIDE, POTASSIUM CHLORIDE, SODIUM LACTATE AND CALCIUM CHLORIDE: 600; 310; 30; 20 INJECTION, SOLUTION INTRAVENOUS at 10:44

## 2021-08-25 RX ADMIN — HEPARIN SODIUM 30000 UNITS: 10000 INJECTION INTRAVENOUS; SUBCUTANEOUS at 08:26

## 2021-08-25 RX ADMIN — 0.12% CHLORHEXIDINE GLUCONATE 15 ML: 1.2 RINSE ORAL at 12:46

## 2021-08-25 RX ADMIN — POTASSIUM CHLORIDE 20 MEQ: 400 INJECTION, SOLUTION INTRAVENOUS at 13:34

## 2021-08-25 RX ADMIN — ASPIRIN 300 MG: 300 SUPPOSITORY RECTAL at 12:47

## 2021-08-25 RX ADMIN — AMINOCAPROIC ACID 5000 MG: 250 INJECTION, SOLUTION INTRAVENOUS at 07:25

## 2021-08-25 RX ADMIN — FENTANYL CITRATE 50 MCG: 50 INJECTION, SOLUTION INTRAMUSCULAR; INTRAVENOUS at 07:32

## 2021-08-25 RX ADMIN — VECURONIUM BROMIDE 10 MG: 10 INJECTION, POWDER, LYOPHILIZED, FOR SOLUTION INTRAVENOUS at 07:18

## 2021-08-25 RX ADMIN — FENTANYL CITRATE 150 MCG: 50 INJECTION, SOLUTION INTRAMUSCULAR; INTRAVENOUS at 07:18

## 2021-08-25 RX ADMIN — CALCIUM CHLORIDE 1 G: 100 INJECTION, SOLUTION INTRAVENOUS at 10:50

## 2021-08-25 RX ADMIN — VECURONIUM BROMIDE 2 MG: 10 INJECTION, POWDER, LYOPHILIZED, FOR SOLUTION INTRAVENOUS at 10:56

## 2021-08-25 RX ADMIN — FENTANYL CITRATE 100 MCG: 50 INJECTION, SOLUTION INTRAMUSCULAR; INTRAVENOUS at 07:05

## 2021-08-25 RX ADMIN — MUPIROCIN: 20 OINTMENT TOPICAL at 13:04

## 2021-08-25 RX ADMIN — Medication 4 MCG/MIN: at 12:47

## 2021-08-25 RX ADMIN — PHENYLEPHRINE HYDROCHLORIDE 100 MCG: 10 INJECTION, SOLUTION INTRAVENOUS at 07:24

## 2021-08-25 RX ADMIN — FENTANYL CITRATE 50 MCG: 50 INJECTION, SOLUTION INTRAMUSCULAR; INTRAVENOUS at 07:56

## 2021-08-25 RX ADMIN — MIDAZOLAM 1 MG: 1 INJECTION INTRAMUSCULAR; INTRAVENOUS at 06:54

## 2021-08-25 RX ADMIN — IPRATROPIUM BROMIDE AND ALBUTEROL SULFATE 1 AMPULE: .5; 2.5 SOLUTION RESPIRATORY (INHALATION) at 16:17

## 2021-08-25 RX ADMIN — IPRATROPIUM BROMIDE AND ALBUTEROL SULFATE 1 AMPULE: .5; 2.5 SOLUTION RESPIRATORY (INHALATION) at 20:27

## 2021-08-25 RX ADMIN — SODIUM CHLORIDE, POTASSIUM CHLORIDE, SODIUM LACTATE AND CALCIUM CHLORIDE: 600; 310; 30; 20 INJECTION, SOLUTION INTRAVENOUS at 06:50

## 2021-08-25 RX ADMIN — Medication 0.04 MCG/KG/MIN: at 10:50

## 2021-08-25 RX ADMIN — PHENYLEPHRINE HYDROCHLORIDE 100 MCG: 10 INJECTION, SOLUTION INTRAVENOUS at 08:50

## 2021-08-25 RX ADMIN — 0.12% CHLORHEXIDINE GLUCONATE 15 ML: 1.2 RINSE ORAL at 05:54

## 2021-08-25 RX ADMIN — PROTAMINE SULFATE 25 MG: 10 INJECTION, SOLUTION INTRAVENOUS at 11:04

## 2021-08-25 RX ADMIN — PRAMIPEXOLE DIHYDROCHLORIDE 0.5 MG: 0.25 TABLET ORAL at 22:00

## 2021-08-25 ASSESSMENT — PULMONARY FUNCTION TESTS
PIF_VALUE: 1
PIF_VALUE: 1
PIF_VALUE: 2
PIF_VALUE: 24
PIF_VALUE: 1
PIF_VALUE: 24
PIF_VALUE: 25
PIF_VALUE: 24
PIF_VALUE: 1
PIF_VALUE: 26
PIF_VALUE: 25
PIF_VALUE: 1
PIF_VALUE: 25
PIF_VALUE: 28
PIF_VALUE: 25
PIF_VALUE: 25
PIF_VALUE: 24
PIF_VALUE: 25
PIF_VALUE: 1
PIF_VALUE: 25
PIF_VALUE: 25
PIF_VALUE: 26
PIF_VALUE: 24
PIF_VALUE: 24
PIF_VALUE: 23
PIF_VALUE: 1
PIF_VALUE: 1
PIF_VALUE: 25
PIF_VALUE: 0
PIF_VALUE: 22
PIF_VALUE: 1
PIF_VALUE: 24
PIF_VALUE: 26
PIF_VALUE: 22
PIF_VALUE: 31
PIF_VALUE: 23
PIF_VALUE: 26
PIF_VALUE: 24
PIF_VALUE: 23
PIF_VALUE: 26
PIF_VALUE: 20
PIF_VALUE: 1
PIF_VALUE: 25
PIF_VALUE: 1
PIF_VALUE: 5
PIF_VALUE: 26
PIF_VALUE: 24
PIF_VALUE: 24
PIF_VALUE: 25
PIF_VALUE: 1
PIF_VALUE: 3
PIF_VALUE: 29
PIF_VALUE: 41
PIF_VALUE: 23
PIF_VALUE: 25
PIF_VALUE: 24
PIF_VALUE: 1
PIF_VALUE: 0
PIF_VALUE: 1
PIF_VALUE: 33
PIF_VALUE: 25
PIF_VALUE: 1
PIF_VALUE: 26
PIF_VALUE: 1
PIF_VALUE: 0
PIF_VALUE: 1
PIF_VALUE: 24
PIF_VALUE: 1
PIF_VALUE: 23
PIF_VALUE: 30
PIF_VALUE: 25
PIF_VALUE: 26
PIF_VALUE: 1
PIF_VALUE: 23
PIF_VALUE: 0
PIF_VALUE: 24
PIF_VALUE: 23
PIF_VALUE: 1
PIF_VALUE: 23
PIF_VALUE: 25
PIF_VALUE: 23
PIF_VALUE: 25
PIF_VALUE: 25
PIF_VALUE: 1
PIF_VALUE: 21
PIF_VALUE: 0
PIF_VALUE: 26
PIF_VALUE: 26
PIF_VALUE: 25
PIF_VALUE: 25
PIF_VALUE: 17
PIF_VALUE: 1
PIF_VALUE: 1
PIF_VALUE: 25
PIF_VALUE: 24
PIF_VALUE: 1
PIF_VALUE: 24
PIF_VALUE: 24
PIF_VALUE: 23
PIF_VALUE: 25
PIF_VALUE: 25
PIF_VALUE: 21
PIF_VALUE: 26
PIF_VALUE: 1
PIF_VALUE: 28
PIF_VALUE: 1
PIF_VALUE: 1
PIF_VALUE: 0
PIF_VALUE: 25
PIF_VALUE: 24
PIF_VALUE: 25
PIF_VALUE: 24
PIF_VALUE: 25
PIF_VALUE: 1
PIF_VALUE: 24
PIF_VALUE: 25
PIF_VALUE: 24
PIF_VALUE: 1
PIF_VALUE: 0
PIF_VALUE: 24
PIF_VALUE: 23
PIF_VALUE: 24
PIF_VALUE: 21
PIF_VALUE: 0
PIF_VALUE: 1
PIF_VALUE: 23
PIF_VALUE: 1
PIF_VALUE: 1
PIF_VALUE: 23
PIF_VALUE: 1
PIF_VALUE: 1
PIF_VALUE: 22
PIF_VALUE: 24
PIF_VALUE: 25
PIF_VALUE: 1
PIF_VALUE: 30
PIF_VALUE: 25
PIF_VALUE: 1
PIF_VALUE: 22
PIF_VALUE: 1
PIF_VALUE: 24
PIF_VALUE: 26
PIF_VALUE: 30
PIF_VALUE: 24
PIF_VALUE: 24
PIF_VALUE: 25
PIF_VALUE: 23
PIF_VALUE: 1
PIF_VALUE: 0
PIF_VALUE: 26
PIF_VALUE: 24
PIF_VALUE: 24
PIF_VALUE: 25
PIF_VALUE: 1
PIF_VALUE: 26
PIF_VALUE: 1
PIF_VALUE: 25
PIF_VALUE: 24
PIF_VALUE: 1
PIF_VALUE: 25
PIF_VALUE: 25
PIF_VALUE: 22
PIF_VALUE: 1
PIF_VALUE: 22
PIF_VALUE: 25
PIF_VALUE: 24
PIF_VALUE: 24
PIF_VALUE: 25
PIF_VALUE: 24
PIF_VALUE: 26
PIF_VALUE: 1
PIF_VALUE: 24
PIF_VALUE: 23
PIF_VALUE: 24
PIF_VALUE: 25
PIF_VALUE: 24
PIF_VALUE: 23
PIF_VALUE: 23
PIF_VALUE: 25
PIF_VALUE: 25
PIF_VALUE: 24
PIF_VALUE: 25
PIF_VALUE: 1
PIF_VALUE: 23
PIF_VALUE: 20
PIF_VALUE: 22
PIF_VALUE: 0
PIF_VALUE: 1
PIF_VALUE: 26
PIF_VALUE: 1
PIF_VALUE: 24
PIF_VALUE: 22
PIF_VALUE: 25
PIF_VALUE: 26
PIF_VALUE: 26
PIF_VALUE: 25
PIF_VALUE: 1
PIF_VALUE: 26
PIF_VALUE: 25
PIF_VALUE: 1
PIF_VALUE: 0
PIF_VALUE: 24
PIF_VALUE: 1
PIF_VALUE: 25
PIF_VALUE: 31
PIF_VALUE: 1
PIF_VALUE: 2
PIF_VALUE: 24
PIF_VALUE: 25
PIF_VALUE: 31
PIF_VALUE: 25
PIF_VALUE: 1
PIF_VALUE: 25
PIF_VALUE: 26
PIF_VALUE: 17
PIF_VALUE: 25
PIF_VALUE: 26
PIF_VALUE: 1
PIF_VALUE: 24
PIF_VALUE: 29
PIF_VALUE: 27
PIF_VALUE: 26
PIF_VALUE: 1
PIF_VALUE: 0
PIF_VALUE: 27
PIF_VALUE: 25
PIF_VALUE: 23
PIF_VALUE: 30
PIF_VALUE: 26
PIF_VALUE: 26
PIF_VALUE: 22
PIF_VALUE: 26
PIF_VALUE: 25
PIF_VALUE: 26
PIF_VALUE: 1
PIF_VALUE: 1
PIF_VALUE: 25
PIF_VALUE: 25
PIF_VALUE: 26
PIF_VALUE: 26
PIF_VALUE: 1
PIF_VALUE: 25
PIF_VALUE: 22
PIF_VALUE: 26
PIF_VALUE: 1
PIF_VALUE: 24
PIF_VALUE: 1
PIF_VALUE: 1
PIF_VALUE: 0
PIF_VALUE: 24
PIF_VALUE: 23
PIF_VALUE: 14
PIF_VALUE: 1
PIF_VALUE: 24
PIF_VALUE: 25
PIF_VALUE: 26
PIF_VALUE: 25
PIF_VALUE: 0
PIF_VALUE: 25
PIF_VALUE: 29
PIF_VALUE: 2
PIF_VALUE: 1
PIF_VALUE: 1
PIF_VALUE: 25
PIF_VALUE: 26
PIF_VALUE: 25
PIF_VALUE: 1
PIF_VALUE: 24
PIF_VALUE: 1
PIF_VALUE: 25
PIF_VALUE: 25
PIF_VALUE: 22
PIF_VALUE: 31
PIF_VALUE: 1
PIF_VALUE: 27
PIF_VALUE: 1
PIF_VALUE: 1
PIF_VALUE: 26
PIF_VALUE: 26
PIF_VALUE: 25
PIF_VALUE: 2
PIF_VALUE: 24
PIF_VALUE: 24
PIF_VALUE: 27
PIF_VALUE: 24
PIF_VALUE: 25
PIF_VALUE: 25
PIF_VALUE: 24
PIF_VALUE: 23
PIF_VALUE: 25
PIF_VALUE: 1
PIF_VALUE: 25
PIF_VALUE: 23
PIF_VALUE: 25
PIF_VALUE: 22
PIF_VALUE: 25
PIF_VALUE: 25
PIF_VALUE: 1
PIF_VALUE: 24
PIF_VALUE: 1
PIF_VALUE: 26
PIF_VALUE: 1
PIF_VALUE: 24
PIF_VALUE: 26
PIF_VALUE: 1
PIF_VALUE: 25
PIF_VALUE: 25
PIF_VALUE: 24
PIF_VALUE: 25
PIF_VALUE: 25
PIF_VALUE: 26
PIF_VALUE: 26
PIF_VALUE: 25
PIF_VALUE: 24
PIF_VALUE: 1
PIF_VALUE: 23
PIF_VALUE: 25
PIF_VALUE: 1
PIF_VALUE: 25

## 2021-08-25 ASSESSMENT — PAIN DESCRIPTION - PROGRESSION
CLINICAL_PROGRESSION: RAPIDLY IMPROVING
CLINICAL_PROGRESSION: RAPIDLY IMPROVING
CLINICAL_PROGRESSION: RESOLVED
CLINICAL_PROGRESSION: RAPIDLY IMPROVING

## 2021-08-25 ASSESSMENT — PAIN - FUNCTIONAL ASSESSMENT
PAIN_FUNCTIONAL_ASSESSMENT: 0-10
PAIN_FUNCTIONAL_ASSESSMENT: PREVENTS OR INTERFERES SOME ACTIVE ACTIVITIES AND ADLS

## 2021-08-25 ASSESSMENT — ENCOUNTER SYMPTOMS: DYSPNEA ACTIVITY LEVEL: AFTER AMBULATING 1 FLIGHT OF STAIRS

## 2021-08-25 ASSESSMENT — PAIN SCALES - GENERAL
PAINLEVEL_OUTOF10: 0
PAINLEVEL_OUTOF10: 0
PAINLEVEL_OUTOF10: 5
PAINLEVEL_OUTOF10: 0
PAINLEVEL_OUTOF10: 5
PAINLEVEL_OUTOF10: 0
PAINLEVEL_OUTOF10: 7
PAINLEVEL_OUTOF10: 5
PAINLEVEL_OUTOF10: 7
PAINLEVEL_OUTOF10: 5
PAINLEVEL_OUTOF10: 0
PAINLEVEL_OUTOF10: 0
PAINLEVEL_OUTOF10: 5
PAINLEVEL_OUTOF10: 0

## 2021-08-25 ASSESSMENT — PAIN DESCRIPTION - DESCRIPTORS: DESCRIPTORS: ACHING;DISCOMFORT

## 2021-08-25 ASSESSMENT — PAIN DESCRIPTION - PAIN TYPE
TYPE: SURGICAL PAIN
TYPE: SURGICAL PAIN

## 2021-08-25 ASSESSMENT — PAIN DESCRIPTION - ONSET: ONSET: ON-GOING

## 2021-08-25 ASSESSMENT — PAIN DESCRIPTION - ORIENTATION
ORIENTATION: MID
ORIENTATION: MID

## 2021-08-25 ASSESSMENT — PAIN DESCRIPTION - LOCATION
LOCATION: CHEST
LOCATION: STERNUM;INCISION

## 2021-08-25 ASSESSMENT — PAIN DESCRIPTION - FREQUENCY: FREQUENCY: CONTINUOUS

## 2021-08-25 NOTE — BRIEF OP NOTE
Brief Postoperative Note      Patient: Silvia Paris  YOB: 1940  MRN: [id number]    Date of Procedure: 8/25/2021    Pre-Op Diagnosis: CORANARY ARTERY DISEASE    Post-Op Diagnosis: Same       Procedure(s):  CABG x3 (LIMA-LAD, SVG-Ramus, SVG-OM)  LAAL  EVH  KLS plating     Surgeon(s):  Tita Cox DO    Assistant:  Physician Assistant: GEORGIA Velasco; Re Gentile PA-C    Anesthesia: General    Estimated Blood Loss (mL): less than 50     Complications: None    Specimens:   * No specimens in log *    Implants:  Implant Name Type Inv. Item Serial No.  Lot No. LRB No. Used Action   DEVICE OCCL CLP L40MM PLUNG GRP FLX SHFT FOR GILLINOV  DEVICE OCCL CLP L40MM PLUNG GRP FLX SHFT FOR GILLINOV  [employer]-WD [id number] N/A 1 Implanted   PLATE BONE 2.3HY THICKNESS STRNL LCK 6 H CP TI  PLATE BONE 7.1UQ THICKNESS STRNL LCK 6 H CP TI  KLS SHAWANDA LP-WD  N/A 1 Implanted   PLATE LCK STRNL 8-H LAD T 1.8MM  PLATE LCK STRNL 8-H LAD T 1.8MM  KLS SHAWANDA LP-WD  N/A 1 Implanted   SCREW BNE L9MM DIA2. 3MM THOR STRNL TI MP DRL FREE LEV 1  SCREW BNE L9MM DIA2. 3MM THOR STRNL TI MP DRL FREE LEV 1  KLS SHAWANDA LP-WD  N/A 14 Implanted         Drains:   Chest Tube 1 Mediastinal 19 Mauritanian (Active)   Dressing Status Clean;Dry; Intact 08/25/21 1108   Dressing Type Dry dressing 08/25/21 1108       Chest Tube 2 Mediastinal 19 Mauritanian (Active)   Dressing Status Clean;Dry; Intact 08/25/21 1108   Dressing Type Dry dressing 08/25/21 1108       Chest Tube 3 Pleural 19 Mauritanian (Active)   Dressing Status Clean;Dry; Intact 08/25/21 1107   Dressing Type Dry dressing 08/25/21 1107       NG/OG/NJ/NE Tube Orogastric Center mouth (Active)       Urethral Catheter Temperature probe 16 fr (Active)     Electronically signed by Tita Cox DO on 8/25/2021 at 11:37 AM

## 2021-08-25 NOTE — ANESTHESIA PROCEDURE NOTES
Procedure Performed: ROGELIO     Start Time:        End Time:      Preanesthesia Checklist:  Patient identified, IV assessed, risks and benefits discussed, monitors and equipment assessed, procedure being performed at surgeon's request and anesthesia consent obtained. General Procedure Information  Diagnostic Indications for Echo:  assessment of surgical repair, hemodynamic monitoring and assessment of valve function  Physician Requesting Echo: Renita Underwood DO  Location performed:  OR  Intubated  Bite block placed  Heart visualized  Probe Insertion:  Easy  Probe Type:  3D and mulitplane  Modalities:  3D, color flow mapping, pulse wave Doppler and continuous wave Doppler    Echocardiographic and Doppler Measurements    Ventricles    Right Ventricle:  Cavity size normal.  Hypertrophy not present. Thrombus not present. Global function normal.    Left Ventricle:  Cavity size normal.  Hypertrophy not present. Thrombus not present. Global Function normal.  Ejection Fraction 65%. Ventricular Regional Function:  1- Basal Anteroseptal:  normal  2- Basal Anterior:  normal  3- Basal Anterolateral:  normal  4- Basal Inferolateral:  normal  5- Basal Inferior:  normal  6- Basal Inferoseptal:  normal  7- Mid Anteroseptal:  normal  8- Mid Anterior:  normal  9- Mid Anterolateral:  normal  10- Mid Inferolateral:  normal  11- Mid Inferior:  normal  12- Mid Inferoseptal:  normal  13- Apical Anterior:  normal  14- Apical Lateral:  normal  15- Apical Inferior:  normal  16- Apical Septal:  normal  17- Rives:  normal      Valves    Aortic Valve: Annulus normal.  Stenosis not present. Area: 1.68 cm². Regurgitation none. Leaflets normal.  Leaflet motions normal.      Mitral Valve: Annulus normal.  Stenosis not present. Regurgitation mild. Leaflets normal.  Leaflet motions normal.      Tricuspid Valve: Annulus normal.  Stenosis not present. Regurgitation mild.   Leaflets normal.  Leaflet motions normal.    Pulmonic Valve: Annulus normal.  Stenosis not present. Regurgitation none. Aorta    Ascending Aorta:  Size normal.  Dissection not present. Aortic Arch:  Size normal.  Dissection not present. Descending Aorta:  Size normal.  Dissection not present. Atria    Right Atrium:  Size normal.  Spontaneous echo contrast not present. Thrombus not present. Tumor not present. Device not present. Left Atrium:  Size normal.  Spontaneous echo contrast not present. Thrombus not present. Tumor not present. Device not present. Left atrial appendage normal.      Septa    Atrial Septum:  Intra-atrial septal morphology normal.      Ventricular Septum:  Intra-ventricular septum morphology normal.      Diastolic Function Measurements:  Diastolic Dysfunction Grade=  E= ms  A= ms  E/A Ratio= 0.8  DT= ms  S/D=   IVRT=    Other Findings  Pericardium:  normal  Pleural Effusion:  none  Pulmonary Arteries:  normal  Pulmonary Venous Flow:  normal    Anesthesia Information  Performed Personally  Anesthesiologist:  Morgan Johansen MD      Echocardiogram Comments:       No difficulty with probe insertion or manipulation  Pre:  Tally [place] II aorta. No WMA. Normal LV and RV size and function. Patient in NSR with mild LAE. No ZAHIDA clot seen. Mitral valve with mild central MR. Mild TR. Post:  Normal LV size and function. Normal RV size and function. LA occluder. Aortic repair intact. Mild MR and mild TR.   Reviewed with Dr. Roseanne Reeder

## 2021-08-25 NOTE — CONSULTS
Inpatient Cardiology Consultation Note     PATIENT IS BEING FOLLOWED FOR: S/p CABG x 3, Leigh Owens is a 80 y.o. female who is known to Dr. Stacie Back. 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 Patient was seen in consultation June 2021 during a hospitalization for chest pain, shortness of breath. She was found to be in atrial fibrillation with RVR, and cardiology was consulted. She was treated for acute on chronic HFpEF, and underwent successful ROGELIO-guided DCCV that was successful in cardioverting the patient to NSR. However, later the same evening, patient went back into Afib with RVR, she was initiated on amiodarone drip and EP was consulted. She underwent repeat DCCV with successful conversion to NSR. EP recommended ischemic evaluation in setting of recurrent HF and refractory Afib -- underwent stress testing + left heart catheterization (results as noted below). She was noted to have LM CAD on cardiac cath, and was evaluated by CT surgery, who recommended elective CABG. Today, patient underwent CABG x 3 (LIMA-LAD, SVG-Ramus, SVG-OM) and LAAL by Dr. James Vazquez. She was evaluated post-operatively. She remains sedated and intubated. Requiring 4 mcg Levophed. Chest tubes and pacer wires with pacer box noted. SUBJECTIVE: Patient sedated and intubated, remains hemodynamically stable on low dose Levophed drip  OBJECTIVE: No apparent distress     ROS:  Unable to obtain as patient is currently sedated and intubated.     PHYSICAL EXAM:   BP (!) 108/34   Pulse 100   Temp 97.2 °F (36.2 °C) (Bladder)   Resp 17   Ht 5' 2\" (1.575 m)   Wt 170 lb (77.1 kg)   LMP  (LMP Unknown)   SpO2 100%   BMI 31.09 kg/m²    B/P Range last 24 hours: Systolic (42UNG), GHW:005 , Min:95 , FAD:197    Diastolic (22OHY), NEN:02, Min:34, Max:76    CONST: Well developed, obese WF who appears stated age. Sedated, waking up but not yet following command,  intubated on vent. Gavino Hearn HEENT:   Head- Normocephalic, atraumatic. Orally intubated  (OT tube ). OG tube also in place. Neck-  No stridor, trachea midline. Right IJ TLC  CHEST: Chest symmetrical. No accessory muscle use or intercostal retractions. Midline sternotomy incision covered with gauze dressing. Chest tubes in place. Pacer wires connected to pacer box. RESPIRATORY:  Lung sounds - mechanically ventilated. CARDIOVASCULAR:     Heart Inspection- shows no noted pulsations  Heart Palpation- no heaves or thrills; PMI is non-displaced   Heart Ausculation- Regular rate and rhythm, no apparent murmur. PV: No lower extremity edema. Pedal pulses palpable, no clubbing or cyanosis. Right lower extremity with ACE wrap  ABDOMEN: Soft, non-tender to light palpation. Bowel sounds present. : Deferred.  Pino cath with yellow urine  SKIN: Warm and dry   NEURO / PSYCH: Sedated and intubated      Intake/Output Summary (Last 24 hours) at 8/25/2021 1257  Last data filed at 8/25/2021 1244  Gross per 24 hour   Intake 2085 ml   Output 1845 ml   Net 240 ml       Weight:   Wt Readings from Last 3 Encounters:   08/25/21 170 lb (77.1 kg)   08/23/21 170 lb (77.1 kg)   07/16/21 175 lb (79.4 kg)     Current Inpatient Medications:   amiodarone  200 mg Oral Daily    atorvastatin  10 mg Oral Nightly    pramipexole  0.5 mg Oral TID    sodium chloride flush  10 mL IntraVENous 2 times per day    [START ON 8/26/2021] aspirin  81 mg Oral Daily    chlorhexidine  15 mL Mouth/Throat BID    [START ON 8/26/2021] magnesium oxide  400 mg Oral Daily    mupirocin   Nasal BID    sennosides-docusate sodium  1 tablet Oral BID    [START ON 8/26/2021] pantoprazole  40 mg Oral Daily    pantoprazole  40 mg IntraVENous Daily    ceFAZolin (ANCEF) IVPB may   affect the evaluation of LV systolic function. No regional wall motion abnormalities seen. No evidence of patent foramen ovale on bubble study. Normal right ventricular size and function. Mild mitral regurgitation is present. No hemodynamically significant aortic stenosis is present. Mild to moderate tricuspid regurgitation. RVSP is 36 mmHg. Normal estimated PA systolic pressure. No evidence for hemodynamically significant pericardial effusion. Lexiscan Stress Test (6/25/2021): Impression:  1.  ECG during the infusion did not change. 2.  The myocardial perfusion imaging was abnormal.  3.  The abnormality was a large sized, moderate perfusion defect that  is completely reversible involving the distal anterior, anterolateral  and apical walls suggestive of ischemia. 4.  Overall left ventricular systolic function was normal with  regional wall motion abnormalities. 5.  There is transient ischemic dilatation. 6.  High risk general pharmacologic stress test.     Left heart catheterization ( Dr. Lori Merino ) 6/28/2021:  CORONARY ANATOMY:  LEFT MAIN:  It is a long and large artery, which is calcified at the  distal end. There was 50%-60% hazy distal discrete stenosis. LAD:  It is a large artery wrapping around the apex and giving rise to a  small diagonal branch and several septal perforators. The LAD was  heavily calcified in its proximal to mid segment. There was 40%-50%  proximal to mid LAD stenosis. LEFT CIRCUMFLEX:  It is medium in size and giving rise to an obtuse  marginal branch. There was 30% mid OM stenosis. RAMUS BRANCH:  It is fair in size and bifurcating with 30%-40% proximal  stenosis. RCA:  It is a large dominant artery giving rise to 2 RV marginal  branches, a PDA and a PLV branch. The RCA was angulated and   calcified in its mid segment. There was 30%-40% discrete mid RCA  stenosis and 30% discrete distal stenosis.   LEFT VENTRICULOGRAM:  Revealed an ejection fraction of 55%-60%. No  mitral regurgitation was noted. IMPRESSION:  1.  50%-60% discrete calcified distal left main stenosis. 2.  40%-50% proximal to mid heavily calcified LAD stenosis. 3.  30% discrete mid circumflex stenosis. 4.  30%-40% proximal ramus stenosis. 5.  30%-40% discrete mid RCA stenosis and 30% distal stenosis. 6.  LVEDP 18 mmHg. 7.  LV ejection fraction of 55%-60%. RECOMMENDATIONS:  1. Aggressive medical therapy. 2.  Consult CT surgery for CABG. ASSESSMENT:   1. Coronary artery disease s/p CABG x 3 (LIMA-LAD, SVG-Ramus, SVG-OM) and LAAL 8/25/2021 by Dr. Madison Jeffries. 2. Post-operative anemia, stable. 3. Post-operative hypotension, on pressor support. History of hypertension. 4. Paroxysmal atrial fibrillation, on oral Amiodarone prior to admission. Maintaining NSR during my post-op evaluation. 5. Chronic oral anticoagulation with Xarelto. 6. Chronic heart failure with preserved ejection fraction. Appears euvolemic. 7. Mild MR, Mild to moderate TR with normal LV function (EF 60-65%) on ROGELIO 6/2021.  8. Hyperlipidemia. 9. Diabetes mellitus. 10. Ex-smoker. 11. Liver cirrhosis secondary to AMES. 12. Spinal stenosis. 13. Iodine allergy (rash)      PLAN:  1. Supportive/critical care management post-op as per CT surgery / critical care team.   2. Resume home cardiac medications when tolerated. 3. Cardiology will see as needed. Please call with any questions or concerns.          Electronically signed by Kiet Vergara MD on 8/25/2021 at 12:57 PM

## 2021-08-25 NOTE — ANESTHESIA POSTPROCEDURE EVALUATION
Department of Anesthesiology  Postprocedure Note    Patient: Dwight Stauffer  MRN: [id number]  [place]: 1940  Date of evaluation: 8/25/2021  Time:  7:02 PM     Procedure Summary     Date: 08/25/21 Room / Location: [facility] / [facility]    Anesthesia Start: 9636 Anesthesia Stop: 5686    Procedure: CABG ROGELIO (N/A Chest) Diagnosis: [facility] ARTERY DISEASE)    Surgeons: Riacrda Michaels DO Responsible Provider: Tanesha Orozco MD    Anesthesia Type: general ASA Status: 4          Anesthesia Type: general    Gonzalez Phase I: Gonzalez Score: 10    Gonzalez Phase II:      Last vitals: Reviewed and per EMR flowsheets.        Anesthesia Post Evaluation    Patient location during evaluation: ICU  Patient participation: complete - patient participated  Level of consciousness: awake and alert  Airway patency: patent  Nausea & Vomiting: no nausea and no vomiting  Complications: no  Cardiovascular status: blood pressure returned to baseline and hemodynamically stable  Respiratory status: acceptable  Hydration status: euvolemic

## 2021-08-25 NOTE — ANESTHESIA PRE PROCEDURE
Department of Anesthesiology  Preprocedure Note       Name:  Pawan Oliver   Age:  80 y.o.  :  1940                                          MRN:  [id number]         Date:  2021      Surgeon: Martin Ornelas):  Nati Franklin DO    Procedure: Procedure(s):  CABG ROGELIO    Medications prior to admission:   Prior to Admission medications    Medication Sig Start Date End Date Taking? Authorizing Provider   amiodarone (CORDARONE) 200 MG tablet Take 1 tablet by mouth daily 7/3/21  Yes Anai Claudio DO   metoprolol tartrate (LOPRESSOR) 50 MG tablet Take 1 tablet by mouth 2 times daily 21  Yes Anai Claudio DO   amLODIPine (NORVASC) 10 MG tablet 10 mg daily  21  Yes Historical Provider, MD   Biotin 5000 MCG TABS Take by mouth daily   Yes Historical Provider, MD   calcium carbonate (OSCAL) 500 MG TABS tablet Take 500 mg by mouth daily   Yes Historical Provider, MD   pramipexole (MIRAPEX) 0.5 MG tablet Take 0.5 mg by mouth 3 times daily   Yes Historical Provider, MD   spironolactone (ALDACTONE) 25 MG tablet Take 1 tablet by mouth daily 21  Yes Diana Zuniga MD   fluticasone (FLONASE) 50 MCG/ACT nasal spray 2 sprays by Nasal route daily as needed    Yes Historical Provider, MD   furosemide (LASIX) 20 MG tablet Take 20 mg by mouth daily    Yes Historical Provider, MD   potassium chloride (KLOR-CON M) 20 MEQ extended release tablet Take 20 mEq by mouth 2 times daily   Yes Historical Provider, MD   ferrous sulfate (IRON 325) 325 (65 Fe) MG tablet Take 1 tablet by mouth daily (with breakfast) 6/3/21  Yes Anai Claudio DO   miconazole (MICOTIN) 2 % powder Apply topically 2 times daily.  21  Yes Enoch Anaya MD   glimepiride (AMARYL) 2 MG tablet Take 2 mg by mouth every morning (before breakfast)    Yes Historical Provider, MD   oxybutynin (DITROPAN) 5 MG tablet TAKE 1 TABLET BY MOUTH  DAILY WITH BREAKFAST 20  Yes Aubrie Gonzalez MD   simvastatin (ZOCOR) 20 MG tablet TAKE 1 TABLET BY MOUTH DAILY WITH SUPPER 4/23/20  Yes Aubrie Gonzalez MD   pantoprazole (PROTONIX) 40 MG tablet TAKE ONE TABLET BY MOUTH TWO TIMES A DAY BEFORE MEALS 4/20/20  Yes Narcisa Nieves MD   metFORMIN (GLUCOPHAGE) 500 MG tablet TAKE 2 TABLETS BY MOUTH  TWICE A DAY 2/6/20  Yes Aubrie Gonzalez MD   traMADol (ULTRAM) 50 MG tablet Take 1 tablet by mouth every 8 hours as needed for Pain for up to 60 days. Take lowest dose possible to manage pain 8/5/21 10/4/21  Ceasar Romo MD   rivaroxaban Floreen Press) 15 MG TABS tablet Take 1 tablet by mouth daily 6/30/21   Shalom Leyva DO   chlordiazePOXIDE-clidinium (LIBRAX) 5-2.5 MG per capsule Take 1 capsule by mouth as needed. Historical Provider, MD   hydrOXYzine (ATARAX) 10 MG tablet Take 1 tablet by mouth nightly 5/8/20   Narcisa Nieves MD       Current medications:    Current Facility-Administered Medications   Medication Dose Route Frequency Provider Last Rate Last Admin    0.9 % sodium chloride infusion   IntraVENous Continuous Edwin Princeer,  mL/hr at 08/25/21 0554 New Bag at 08/25/21 0554    0.9 % sodium chloride infusion  25 mL IntraVENous PRN Edwin Prinecer, DO        ceFAZolin (ANCEF) 2000 mg in sterile water 20 mL IV syringe  2,000 mg IntraVENous See Admin Instructions Edwin Princeer, DO        sodium chloride flush 0.9 % injection 10 mL  10 mL IntraVENous 2 times per day Edwin Princeer, DO        sodium chloride flush 0.9 % injection 10 mL  10 mL IntraVENous PRN Edwin Morillo, DO           Allergies:     Allergies   Allergen Reactions    Iodine Shortness Of Breath     SOB, Rash    Benadryl [Diphenhydramine]      hyper    Fish-Derived Products Rash       Problem List:    Patient Active Problem List   Diagnosis Code    Spinal stenosis in cervical region M48.02    Spinal stenosis, lumbar region, without neurogenic claudication M48.061    Essential hypertension I10    Mixed hyperlipidemia E78.2    DM (diabetes mellitus) ([facility] [address] E11.9    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58    CAD in native artery I25.10       Past Medical History:        Diagnosis Date    Adenocarcinoma of cecum ([facility] [address] 01/2019    Anemia     Arm numbness     Arthritis     Blood transfusion     Cervical spinal stenosis     Cirrhosis of liver (HCC)     Diabetes mellitus (HCC)     Fatty liver     GERD (gastroesophageal reflux disease)     Hyperlipidemia     Hypertension     Low back pain     Lumbar stenosis     Neck pain     Obesity (BMI 30.0-34.9) 10/14/2012    PAF (paroxysmal atrial fibrillation) (HCC)     Renal artery aneurysm ([facility] [address] 7/15/2015       Past Surgical History:        Procedure Laterality Date    APPENDECTOMY      BACK SURGERY      BREAST SURGERY      reduction    CARDIAC CATHETERIZATION  06/28/2021    DR Nilay Dempsey    CARDIOVERSION  06/23/2021    CARPAL TUNNEL RELEASE      CATARACT REMOVAL  10/2016    CERVICAL DISCECTOMY  01/19/2007    ACDF C3-4, C4-5, C5-6 Dr. Danica aGviria      left lower leg    HEMICOLECTOMY N/A 2/19/2019    DIAGNOSTIC LAPAROSCOPY, LYSIS OF ADHESIONS, OPEN RIGHT HEMICOLECTOMY performed by Emily Rm MD at [address] [place] [address]  05/17/2017    [facility].  Dr.Joseph Navneet Wahl TRANSESOPHAGEAL ECHOCARDIOGRAM  06/23/2021    UPPER GASTROINTESTINAL ENDOSCOPY N/A 11/5/2018    EGD BIOPSY performed by Lyric Dillard MD at [facility] N/A 2020    EGD BIOPSY performed by Nanette Hopkins MD at [place] [address]  2020    EGD CONTROL HEMORRHAGE performed by Nanette Hopkins MD at [facility] History:    Social History     Tobacco Use    Smoking status: Former Smoker     Packs/day: 2.00     Years: 15.00     Pack years: 30.00     Quit date: 3/1/1997     Years since quittin.5    Smokeless tobacco: Never Used   Substance Use Topics    Alcohol use: Not Currently                                Counseling given: Not Answered      Vital Signs (Current):   Vitals:    21 0528 21 0539   BP:  (!) 152/66   Pulse:  97   Resp:  20   Temp:  37.2 °C (99 °F)   TempSrc:  Temporal   SpO2:  94%   Weight: 170 lb (77.1 kg)    Height: 5' 2\" (1.575 m)                                               BP Readings from Last 3 Encounters:   21 (!) 152/66   21 127/61   21 (!) 118/59       NPO Status: Time of last liquid consumption: 1800                        Time of last solid consumption: 1800                        Date of last liquid consumption: 21                        Date of last solid food consumption: 21    BMI:   Wt Readings from Last 3 Encounters:   21 170 lb (77.1 kg)   21 170 lb (77.1 kg)   21 175 lb (79.4 kg)     Body mass index is 31.09 kg/m².     CBC:   Lab Results   Component Value Date    WBC 10.0 2021    RBC 4.02 2021    HGB 10.5 2021    HCT 33.4 2021    MCV 83.1 2021    RDW 17.0 2021     2021       CMP:   Lab Results   Component Value Date     2021    K 3.9 2021    K 3.2 2021    CL 97 2021    CO2 29 2021    BUN 21 2021    CREATININE 1.2 2021    GFRAA 52 2021    LABGLOM 43 2021    GLUCOSE 167 2021    PROT 8.1 2021    CALCIUM 9.9 08/23/2021    BILITOT 0.4 08/23/2021    ALKPHOS 74 08/23/2021    AST 18 08/23/2021    ALT 15 08/23/2021       POC Tests: No results for input(s): POCGLU, POCNA, POCK, POCCL, POCBUN, POCHEMO, POCHCT in the last 72 hours. Coags:   Lab Results   Component Value Date    PROTIME 12.4 08/23/2021    INR 1.1 08/23/2021    APTT 28.1 08/23/2021       HCG (If Applicable): No results found for: PREGTESTUR, PREGSERUM, HCG, HCGQUANT     ABGs: No results found for: PHART, PO2ART, FNX2QVU, TAD4QRD, BEART, F2WTPFKS     Type & Screen (If Applicable):  No results found for: LABABO, LABRH    Drug/Infectious Status (If Applicable):  No results found for: HIV, HEPCAB    COVID-19 Screening (If Applicable):   Lab Results   Component Value Date    COVID19 Not Detected 12/31/2020    COVID19 Not Detected 12/05/2020 6/25/21 NM MYOCARDIAL SPECT    Impression   1.  ECG during the infusion did not change. 2.  The myocardial perfusion imaging was abnormal.   3.  The abnormality was a large sized, moderate perfusion defect that   is completely reversible involving the distal anterior, anterolateral   and apical walls suggestive of ischemia. 4.  Overall left ventricular systolic function was normal with   regional wall motion abnormalities. 5.  There is transient ischemic dilatation. 6.  High risk general pharmacologic stress test.       Thank you for sending your patient to this [address], [place], [address] [facility], [address] [place] [address] cardiology.          6/28/21 US CAROTIDS    Impression   Atherosclerotic disease. Estimated stenosis by NASCET criteria in the proximal right carotid   artery is between 50% to 69% .    Estimated stenosis by NASCET criteria in the proximal left carotid   artery is between 0%  to 49 %         EKG 12 Lead  Order: [id number]  Status:  Preliminary result   Visible to patient:  No (not released) Next appt:  09/02/2021 at 11:30 AM in Neurology Hortencia Sinha MD) Dx: Pre-operative cardiovascular examination   0 Result Notes   Ref Range & Units 8/23/21 0729   Ventricular Rate BPM 90    Atrial Rate BPM 90    P-R Interval ms 172    QRS Duration ms 78    Q-T Interval ms 398    QTc Calculation (Bazett) ms 486    P Axis degrees 53    R Axis degrees 1    T Axis degrees -3    Resulting Agency  [facility]      Narrative & Impression    Normal sinus rhythm  Low voltage QRS  Possible Inferior infarct (cited on or before 23-AUG-2021)  Abnormal ECG  When compared with ECG of 25-JUN-2021 14:48,  Significant changes have occurred      Specimen Collected: 08/23/21 07:29           6/23/21 ECHO    Left Ventricle   Normal left ventricle size and systolic function. Ejection fraction is visually estimated at 60-65%. Atrial fibrillation may   affect the evaluation of LV systolic function. No regional wall motion abnormalities seen. Normal left ventricle wall thickness. Right Ventricle   Normal right ventricular size and function. Left Atrium   Moderately dilated left atrium. No evidence of thrombus within left atrium or appendage. Agitated saline injected for shunt evaluation. No evidence of patent foramen ovale on bubble study. Right Atrium   Normal right atrium size. Mitral Valve   Normal mitral valve structure and function. Mild mitral regurgitation is present. No evidence of mitral valve stenosis. Tricuspid Valve   The tricuspid valve appears structurally normal.   Mild to moderate tricuspid regurgitation. RVSP is 36 mmHg. Normal estimated PA systolic pressure. Aortic Valve   Structurally normal aortic valve. The aortic valve is trileaflet. No hemodynamically significant aortic stenosis is present. No evidence of aortic valve regurgitation. Pulmonic Valve   Pulmonic valve is structurally normal.   Physiologic and/or trace pulmonic regurgitation present. No evidence of pulmonic valve stenosis.       Pericardial Effusion   No evidence for hemodynamically significant pericardial effusion. Pleural Effusion   No evidence of pleural effusion. Aorta   Normal aortic root and ascending aorta. Miscellaneous   The inferior vena cava diameter is normal with normal respiratory   variation. Conclusions      Summary   Normal left ventricle size and systolic function. Ejection fraction is visually estimated at 60-65%. Atrial fibrillation may   affect the evaluation of LV systolic function. No regional wall motion abnormalities seen. No evidence of patent foramen ovale on bubble study. Normal right ventricular size and function. Mild mitral regurgitation is present. No hemodynamically significant aortic stenosis is present. Mild to moderate tricuspid regurgitation. RVSP is 36 mmHg. Normal estimated PA systolic pressure. No evidence for hemodynamically significant pericardial effusion. Signature      ----------------------------------------------------------------   Electronically signed by Pooja Spear MD(Interpreting   physician) on 06/23/2021 07:40 PM   ----------------------------------------------------------------    6/28/21 CARDIAC CATH    IMPRESSION:  1.  50%-60% discrete calcified distal left main stenosis. 2.  40%-50% proximal to mid heavily calcified LAD stenosis. 3.  30% discrete mid circumflex stenosis. 4.  30%-40% proximal ramus stenosis. 5.  30%-40% discrete mid RCA stenosis and 30% distal stenosis. 6.  LVEDP 18 mmHg. 7.  LV ejection fraction of 55%-60%.          Anesthesia Evaluation  Patient summary reviewed and Nursing notes reviewed no history of anesthetic complications:   Airway: Mallampati: II  TM distance: <3 FB   Neck ROM: full  Mouth opening: > = 3 FB Dental:      Comment: PT DENIES ANY LOOSE TEETH    Pulmonary:   (+) decreased breath sounds,                            ROS comment: Former smoker   Cardiovascular:  Exercise tolerance: poor (<4 METS),   (+) hypertension:, CAD:, dysrhythmias: atrial fibrillation, CHF:, BAINS: after ambulating 1 flight of stairs, hyperlipidemia      ECG reviewed  Rhythm: regular  Rate: normal  Echocardiogram reviewed  Stress test reviewed  Cleared by cardiology     Beta Blocker:  Dose within 24 Hrs         Neuro/Psych:   (+) psychiatric history (hx: Opioid dependence with unspecified opioid-induced disorder):             ROS comment: Lumbar stenosis   Low back pain  Neck pain  Cervical spine stenosis  (previous neck and back surgery) GI/Hepatic/Renal:   (+) GERD:, liver disease (fatty liver, AMES):,          ROS comment: HX: RENAL ARTERY ANEURSYM. Endo/Other:    (+) DiabetesType II DM, using insulin, blood dyscrasia: anemia, arthritis:., malignancy/cancer (adenocarcinoma of cecum). Pt had no PAT visit       Abdominal:   (+) obese,     Abdomen: soft. Vascular: negative vascular ROS. Other Findings:             Anesthesia Plan      general     ASA 4       Induction: intravenous. arterial line, BIS, central line, CVP and ROGELIO  MIPS: Postoperative ventilation. Anesthetic plan and risks discussed with patient and spouse. Use of blood products discussed with patient and spouse whom consented to blood products. Plan discussed with attending. TEMO Smith - CRNA   8/25/2021      DOS STAFF ADDENDUM:    Patient seen and chart reviewed. Physical exam and history updated as indicated. NPO status confirmed. Anesthesia options and plan discussed including risks benefits with patient/legal guardian and family as available. Concerns and questions addressed. Consent verbalized to proceed.   Anesthesia plan, options and intraoperative/postoperative concerns discussed with care team.    Fidelia Latham MD, MD  8/25/2021  9:14 AM

## 2021-08-25 NOTE — ANESTHESIA PROCEDURE NOTES
Arterial Line:    An arterial line was placed using surface landmarks, in the OR for the following indication(s): continuous blood pressure monitoring and blood sampling needed. A 20 gauge (size), 4.45 cm (length), Arrow (type) catheter was placed, Seldinger technique not used, into the right brachial artery, secured by Tegaderm and tape. Anesthesia type: Local  Local infiltration: Injection    Events:  patient tolerated procedure well with no complications.   Anesthesiologist: Lorraine Knowles MD  Resident/CRNA: TEMO Liriano CRNA  Performed: Resident/CRNA   Preanesthetic Checklist  Completed: patient identified, IV checked, site marked, risks and benefits discussed, surgical consent, monitors and equipment checked, pre-op evaluation, timeout performed, anesthesia consent given, oxygen available and patient being monitored

## 2021-08-25 NOTE — FLOWSHEET NOTE
Pt admitted to 3821 post acb x3 pt placed on monitor all hemodynamic lines leveled and calibrated adm bloodwork to lab cxr done hob elevated siderails elevated x4

## 2021-08-25 NOTE — PROGRESS NOTES
CVICU Admission Note    Name: Álvaro Bridges  MRN: [id number]    CC: Postoperative Critical Care Management     Indication for Surgery/Procedure: CAD, PAF  LVEF:  Normal    RVF:  Normal     Important/Relevant PMH/PSH: HTN, HPL, HFpEF, Right Carotid Artery stenosis (50-69%), DM, liver cirrhosis 2/2 AMES, GERD, h/o adenocarcinoma of cecum, arthritis, spinal stenosis, former smoker, obesity     Procedure/Surgeries: 8/25/2021 CABG x3 (LIMA-LAD, SVG-Ramus, SVG-OM), LAAL, EVH, KLS plating     Pacing wires: Ventricular       Physical Exam:    BP (!) 152/66   Pulse 97   Temp 99 °F (37.2 °C) (Temporal)   Resp 20   Ht 5' 2\" (1.575 m)   Wt 170 lb (77.1 kg)   LMP  (LMP Unknown)   SpO2 94%   BMI 31.09 kg/m²     Recent Labs     08/23/21  0730   WBC 10.0   RBC 4.02   HGB 10.5*   HCT 33.4*   MCV 83.1   MCH 26.1   MCHC 31.4*   RDW 17.0*      MPV 9.2     Recent Labs     08/23/21  0730 08/25/21  0917 08/25/21  1155     --   --    K 3.9   < > 3.1*   CL 97*  --   --    CO2 29  --   --    BUN 21  --   --    CREATININE 1.2*   < > 0.9   GLUCOSE 167*  --   --    CALCIUM 9.9  --   --     < > = values in this interval not displayed. Post operative CXR:      Atelectasis, No pneumothorax noted, Mildly increased vascular markings bilateral, No significant pleural effusion. ETT/lines/drains appear to be in proper position. Final Radiology report pending. General Appearance: Arrived to ICU intubated, sedated. On levophed gtt for hypotension. Eyes: PERRL  Pulmonary: Diminished bibasilar.   No wheezes, no accessory muscle use noted   Ventilator: Mode: AC/VC 12, 60% FiO2, 5 PEEP, 470 Vt   Cardiovascular: RRR, no heaves or thrills palpated   Telemetry: SR with 1st degree AV block   Abdomen: Soft, OG to LIWS  Extremities: Palpable pulses all extremities, no edema   Neurologic/Psych: Sedation   Skin: Warm and dry   Incision: MSI ZACHARY intact, RLE SVG sites with ace wrap, right groin C/D/I       Assessment/Plan: Day of Surgery     1. CAD, PAF S/p CABG x3 (LIMA-LAD, SVG-Ramus, SVG-OM)  LAAL  - ASA, Lipitor  - On Xarelto preop for PAF, resume per CTS   - Ancef  - Monitor chest tube output and hemodynamics closely    2. Acute Pulmonary Insufficiency Following Surgery    - Expected 2/2 surgery  - Intubated on ventilator  - Wean vent settings and extubate patient once awake, following commands, QUINTERO, and no signs of bleeding  - ABGs per protocol and PRN     3. Postoperative Hypotension   - Target MAP >70, post CPB requiring levophed gtt for hemodynamic support   - IVF resuscitation PRN, wean gtt as able     4. Acute Post Operative Pain   - PRN fentanyl for pain management until able to take PO     5. DM Type 2  - HgbA1C 6.2%  - SSI q 4 hours for glycemic control, start RHI infusion for BG >180 per protocol      6. Carotid Artery stenosis  - Right ICA 50-69%, maintain MAP >70 for cerebral perfusion       This patient has a high probability of sudden deterioration, which requires preparedness to urgently intervene. I participated in the decision making process and managed the direct care of the patient that required frequent assessment and treatment. I personally spent 41 minutes of critical care time treating the patient.          Electronically signed by TEMO Vega CNP on 8/25/2021 at 12:11 PM

## 2021-08-25 NOTE — PLAN OF CARE
Problem: Cardiac Output - Decreased:  Goal: Cardiac output within specified parameters  Description: Cardiac output within specified parameters  Outcome: Met This Shift  Note: Monitor vital signs adm meds as ordered monitor fluid balance and lab values     Problem: Gas Exchange - Impaired:  Goal: Levels of oxygenation will improve  Description: Levels of oxygenation will improve  Outcome: Met This Shift  Note: Wean and extubate pt as jesus monitor sao2 and abgs encourage pt to cough and deep breathe

## 2021-08-25 NOTE — PROGRESS NOTES
Patient was extubated to 6 liters/min via nasal cannula. Breath Sounds post extubation were Clear/dminished. Stridor was not present post extubation. SPO2 was 99%. Extubation order was reviewed with CNP and RN prior to extubation. Patient was extubated, cuff leak was present prior to extubation.      Performed by  Flower Wilhelm RCP

## 2021-08-25 NOTE — PLAN OF CARE
Problem: Falls - Risk of:  Goal: Will remain free from falls  Description: Will remain free from falls  Outcome: Met This Shift     Problem: Falls - Risk of:  Goal: Absence of physical injury  Description: Absence of physical injury  Outcome: Met This Shift     Problem: Activity Intolerance:  Goal: Able to perform prescribed physical activity  Description: Able to perform prescribed physical activity  Outcome: Met This Shift     Problem:  Activity Intolerance:  Goal: Ability to tolerate increased activity will improve  Description: Ability to tolerate increased activity will improve  Outcome: Met This Shift     Problem: Anxiety:  Goal: Level of anxiety will decrease  Description: Level of anxiety will decrease  Outcome: Met This Shift     Problem: Cardiac Output - Decreased:  Goal: Cardiac output within specified parameters  Description: Cardiac output within specified parameters  8/25/2021 1951 by Lauren Parsons RN  Outcome: Met This Shift     Problem: Cardiac Output - Decreased:  Goal: Hemodynamic stability will improve  Description: Hemodynamic stability will improve  Outcome: Met This Shift     Problem: Fluid Volume - Imbalance:  Goal: Ability to achieve a balanced intake and output will improve  Description: Ability to achieve a balanced intake and output will improve  Outcome: Met This Shift     Problem: Fluid Volume - Imbalance:  Goal: Chest tube drainage is within specified parameters  Description: Chest tube drainage is within specified parameters  Outcome: Met This Shift     Problem: Gas Exchange - Impaired:  Goal: Levels of oxygenation will improve  Description: Levels of oxygenation will improve  8/25/2021 1951 by Lauren Parsons RN  Outcome: Met This Shift     Problem: Gas Exchange - Impaired:  Goal: Ability to maintain adequate ventilation will improve  Description: Ability to maintain adequate ventilation will improve  Outcome: Met This Shift     Problem: Pain:  Goal: Pain level will decrease  Description: Pain level will decrease  Outcome: Met This Shift     Problem: Pain:  Goal: Control of acute pain  Description: Control of acute pain  Outcome: Met This Shift     Problem: Skin Integrity:  Goal: Will show no infection signs and symptoms  Description: Will show no infection signs and symptoms  Outcome: Met This Shift     Problem: Skin Integrity:  Goal: Absence of new skin breakdown  Description: Absence of new skin breakdown  Outcome: Met This Shift     Problem: Pain:  Goal: Pain level will decrease  Description: Pain level will decrease  Outcome: Met This Shift     Problem: Discharge Planning:  Goal: Discharged to appropriate level of care  Description: Discharged to appropriate level of care  Outcome: Not Met This Shift

## 2021-08-25 NOTE — PROGRESS NOTES
Pre-operative testing review:   --carotids with no significant stenosis on the left and 50-69% stenosis on the right  --raya with good flow bilaterally  --ct chest reviewed  --TTE with no significant LV dysfunction or valvular abnormalities  --pft with FEV1 62 percent of predicted and DLCO 27 percent of predicted  --hgA1c 6.2

## 2021-08-25 NOTE — ANESTHESIA PROCEDURE NOTES
Central Venous Line:    A central venous line was placed using ultrasound guidance and surface landmarks, in the OR for the following indication(s): central venous access and CVP monitoring. Sterility preparation included the following: hand hygiene performed prior to procedure, maximum sterile barriers used and sterile technique used to drape from head to toe. The patient was placed in Trendelenburg position. The right internal jugular vein was prepped. The site was prepped with Chloraprep. A 8.5 Fr (size), 10 (length), introducer slick was placed. During the procedure, the following specific steps were taken: target vein identified, needle advanced into vein and blood aspirated and guidewire advanced into vein. Intravenous verification was obtained by ultrasound and venous blood return. Post insertion care included: all ports aspirated, all ports flushed easily, guidewire removed intact, Biopatch applied, line sutured in place and dressing applied. During the procedure the patient experienced: patient tolerated procedure well with no complications.       Anesthesia type: local..No  Staffing  Performed: Anesthesiologist   Anesthesiologist: Fidelia Latham MD  Preanesthetic Checklist  Completed: patient identified, IV checked, site marked, risks and benefits discussed, surgical consent, monitors and equipment checked, pre-op evaluation, timeout performed, anesthesia consent given, oxygen available and patient being monitored

## 2021-08-25 NOTE — OP NOTE
[address]                  [address] [place],  [facility]                                OPERATIVE REPORT    PATIENT NAME: Maycol Salvador                  :        1940  MED REC NO:   [id number]                            ROOM:       382  ACCOUNT NO:   [de-identified]                           ADMIT DATE: 2021  PROVIDER:     Jayne Pierce DO    DATE OF PROCEDURE:  2021    PREOPERATIVE DIAGNOSES:  Severe multivessel coronary artery disease,  history of AFib. POSTOPERATIVE DIAGNOSES:  Severe multivessel coronary artery disease,  history of AFib. PROCEDURE PERFORMED:  1. Coronary artery bypass grafting x3 using left internal mammary  artery, left anterior descending artery, reverse saphenous vein graft to  the ramus intermedius and reverse saphenous vein graft to the obtuse  marginal branch of the circumflex. 2.  Left atrial appendage ligation using a 40-mm AtriCure appendage  clip. 3.  Lower extremity endoscopic vein harvest.  4.  Rigid sternal fixation with a KLS plating system. SURGEON:  Jayne Pierce DO    ASSISTANT:  Morro Enriquez MD (Dr. Fabienne Marie assisted with all critical portions  of the procedure.)    ADDITIONAL ASSISTANTS:  Telly Griffith [facility]). ANESTHESIA:  General.    ESTIMATED BLOOD LOSS:  Less than 50. COMPLICATIONS:  None. SPECIMENS:  None. DESCRIPTION OF THE PROCEDURE:  After informed and written consent had  been obtained, the patient was brought to the operating room, placed in  supine position on the operating table. Monitoring lines as well as  Pino catheter and transesophageal echo probe were inserted after  induction of anesthesia. The preoperative echo demonstrated normal  biventricular function and no valvular abnormalities. The surgical  timeout was held, preoperative antibiotics were administered. A  standard midline sternotomy incision was carried out.   Subcutaneous  tissue dissected, and the sternum was divided with a sternal saw. Simultaneously, lower extremity endoscopic vein harvest was carried out  in the usual fashion, and the left internal mammary artery was dissected  free from the undersurface of the left chest wall with a pedicled  technique. All branches were clipped and divided. The patient was  administered systemic heparin for an adequate ACT for bypass, and the  distal portion of left internal mammary artery was clipped and divided. It was noted to be small, however, adequate conduit for bypass. The  mammary retractor was removed. Standard sternal retractor was placed. The pericardium was opened. The ascending aorta was palpated and noted  to be free of atherosclerotic disease. Suitable areas for cannulation,  cross clamping and proximal anastomoses were apparent. Next, central  cannulation was commenced in the usual fashion with the ascending aorta,  right atrial cannulas, followed by insertion of antegrade and retrograde  cardioplegia lines. The ACT was verified. The patient was placed on  cardiopulmonary bypass. The distal targets were inspected and noted to  be adequate. Next, the harvested vein was also inspected and noted to  be adequate for conduit for bypass. Next, the crossclamp was applied. The heart was arrested with combination of antegrade and retrograde  cardioplegia. We achieved an excellent arrest and also administered  maintenance doses of cardioplegia every 15 minutes. First our attention  was turned to the lateral wall graft. There was one obtuse marginal  branch of the circumflex. After arteriotomy, an end-to-side vein graft  anastomosis was created here with a running 7-0 Prolene suture. This  was tested and had an excellent flow and also was hemostatic. Next, our  attention was turned to the ramus intermedius. After arteriotomy, an  end-to-side vein graft anastomosis was created here with running 7-0  Prolene suture as well.   This stable condition. Postoperative echo demonstrated once again normal  biventricular function and no valvular abnormalities. Cardiopulmonary bypass time was 174 minutes and the crossclamp time was  86 minutes.         Helder Gill DO    D: 08/25/2021 12:08:20       T: 08/25/2021 12:11:31     [email or web address]  Job#: [id number]     Doc#: [id number]    CC:

## 2021-08-26 ENCOUNTER — APPOINTMENT (OUTPATIENT)
Dept: GENERAL RADIOLOGY | Age: 81
DRG: 003 | End: 2021-08-26
Attending: THORACIC SURGERY (CARDIOTHORACIC VASCULAR SURGERY)
Payer: MEDICARE

## 2021-08-26 LAB
ANION GAP SERPL CALCULATED.3IONS-SCNC: 9 MMOL/L (ref 7–16)
BUN BLDV-MCNC: 11 MG/DL (ref 6–23)
CALCIUM SERPL-MCNC: 8.6 MG/DL (ref 8.6–10.2)
CHLORIDE BLD-SCNC: 104 MMOL/L (ref 98–107)
CO2: 26 MMOL/L (ref 22–29)
CREAT SERPL-MCNC: 0.9 MG/DL (ref 0.5–1)
GFR AFRICAN AMERICAN: >60
GFR NON-AFRICAN AMERICAN: >60 ML/MIN/1.73
GLUCOSE BLD-MCNC: 138 MG/DL (ref 74–99)
HCT VFR BLD CALC: 23.7 % (ref 34–48)
HCT VFR BLD CALC: 28.9 % (ref 34–48)
HEMOGLOBIN: 7.3 G/DL (ref 11.5–15.5)
HEMOGLOBIN: 9.1 G/DL (ref 11.5–15.5)
MAGNESIUM: 1.8 MG/DL (ref 1.6–2.6)
MAGNESIUM: 1.8 MG/DL (ref 1.6–2.6)
MAGNESIUM: 2.3 MG/DL (ref 1.6–2.6)
MCH RBC QN AUTO: 25.8 PG (ref 26–35)
MCHC RBC AUTO-ENTMCNC: 30.8 % (ref 32–34.5)
MCV RBC AUTO: 83.7 FL (ref 80–99.9)
METER GLUCOSE: 145 MG/DL (ref 74–99)
METER GLUCOSE: 167 MG/DL (ref 74–99)
METER GLUCOSE: 170 MG/DL (ref 74–99)
METER GLUCOSE: 171 MG/DL (ref 74–99)
METER GLUCOSE: 172 MG/DL (ref 74–99)
METER GLUCOSE: 183 MG/DL (ref 74–99)
PDW BLD-RTO: 16.6 FL (ref 11.5–15)
PLATELET # BLD: 189 E9/L (ref 130–450)
PMV BLD AUTO: 9.6 FL (ref 7–12)
POTASSIUM SERPL-SCNC: 3.9 MMOL/L (ref 3.5–5)
POTASSIUM SERPL-SCNC: 4 MMOL/L (ref 3.5–5)
RBC # BLD: 2.83 E12/L (ref 3.5–5.5)
SODIUM BLD-SCNC: 139 MMOL/L (ref 132–146)
WBC # BLD: 13.8 E9/L (ref 4.5–11.5)

## 2021-08-26 PROCEDURE — 6370000000 HC RX 637 (ALT 250 FOR IP): Performed by: THORACIC SURGERY (CARDIOTHORACIC VASCULAR SURGERY)

## 2021-08-26 PROCEDURE — 97166 OT EVAL MOD COMPLEX 45 MIN: CPT

## 2021-08-26 PROCEDURE — 80048 BASIC METABOLIC PNL TOTAL CA: CPT

## 2021-08-26 PROCEDURE — 85018 HEMOGLOBIN: CPT

## 2021-08-26 PROCEDURE — 71045 X-RAY EXAM CHEST 1 VIEW: CPT

## 2021-08-26 PROCEDURE — 36415 COLL VENOUS BLD VENIPUNCTURE: CPT

## 2021-08-26 PROCEDURE — 6370000000 HC RX 637 (ALT 250 FOR IP): Performed by: NURSE PRACTITIONER

## 2021-08-26 PROCEDURE — 97162 PT EVAL MOD COMPLEX 30 MIN: CPT

## 2021-08-26 PROCEDURE — 36430 TRANSFUSION BLD/BLD COMPNT: CPT

## 2021-08-26 PROCEDURE — 6360000002 HC RX W HCPCS: Performed by: NURSE PRACTITIONER

## 2021-08-26 PROCEDURE — 84132 ASSAY OF SERUM POTASSIUM: CPT

## 2021-08-26 PROCEDURE — 97535 SELF CARE MNGMENT TRAINING: CPT

## 2021-08-26 PROCEDURE — 2700000000 HC OXYGEN THERAPY PER DAY

## 2021-08-26 PROCEDURE — 83735 ASSAY OF MAGNESIUM: CPT

## 2021-08-26 PROCEDURE — 2580000003 HC RX 258: Performed by: THORACIC SURGERY (CARDIOTHORACIC VASCULAR SURGERY)

## 2021-08-26 PROCEDURE — 94640 AIRWAY INHALATION TREATMENT: CPT

## 2021-08-26 PROCEDURE — 6360000002 HC RX W HCPCS: Performed by: THORACIC SURGERY (CARDIOTHORACIC VASCULAR SURGERY)

## 2021-08-26 PROCEDURE — 2500000003 HC RX 250 WO HCPCS: Performed by: THORACIC SURGERY (CARDIOTHORACIC VASCULAR SURGERY)

## 2021-08-26 PROCEDURE — 97530 THERAPEUTIC ACTIVITIES: CPT

## 2021-08-26 PROCEDURE — 82962 GLUCOSE BLOOD TEST: CPT

## 2021-08-26 PROCEDURE — 37799 UNLISTED PX VASCULAR SURGERY: CPT

## 2021-08-26 PROCEDURE — 85027 COMPLETE CBC AUTOMATED: CPT

## 2021-08-26 PROCEDURE — 2000000000 HC ICU R&B

## 2021-08-26 PROCEDURE — 99024 POSTOP FOLLOW-UP VISIT: CPT | Performed by: NURSE PRACTITIONER

## 2021-08-26 PROCEDURE — 85014 HEMATOCRIT: CPT

## 2021-08-26 RX ORDER — AMIODARONE HYDROCHLORIDE 200 MG/1
200 TABLET ORAL ONCE
Status: COMPLETED | OUTPATIENT
Start: 2021-08-26 | End: 2021-08-26

## 2021-08-26 RX ORDER — AMIODARONE HYDROCHLORIDE 200 MG/1
400 TABLET ORAL 2 TIMES DAILY
Status: DISCONTINUED | OUTPATIENT
Start: 2021-08-26 | End: 2021-08-27

## 2021-08-26 RX ORDER — SODIUM CHLORIDE 9 MG/ML
INJECTION, SOLUTION INTRAVENOUS PRN
Status: DISCONTINUED | OUTPATIENT
Start: 2021-08-26 | End: 2021-09-15 | Stop reason: CLARIF

## 2021-08-26 RX ORDER — FUROSEMIDE 10 MG/ML
20 INJECTION INTRAMUSCULAR; INTRAVENOUS ONCE
Status: COMPLETED | OUTPATIENT
Start: 2021-08-26 | End: 2021-08-26

## 2021-08-26 RX ADMIN — ASPIRIN 81 MG: 81 TABLET, COATED ORAL at 08:13

## 2021-08-26 RX ADMIN — FENTANYL CITRATE 50 MCG: 50 INJECTION, SOLUTION INTRAMUSCULAR; INTRAVENOUS at 00:47

## 2021-08-26 RX ADMIN — Medication 10 ML: at 20:28

## 2021-08-26 RX ADMIN — MUPIROCIN: 20 OINTMENT TOPICAL at 20:29

## 2021-08-26 RX ADMIN — ACETAMINOPHEN 650 MG: 325 TABLET ORAL at 14:05

## 2021-08-26 RX ADMIN — MAGNESIUM SULFATE HEPTAHYDRATE 2000 MG: 40 INJECTION, SOLUTION INTRAVENOUS at 05:47

## 2021-08-26 RX ADMIN — OXYCODONE HYDROCHLORIDE 10 MG: 10 TABLET ORAL at 02:04

## 2021-08-26 RX ADMIN — INSULIN LISPRO 3 UNITS: 100 INJECTION, SOLUTION INTRAVENOUS; SUBCUTANEOUS at 07:43

## 2021-08-26 RX ADMIN — AMIODARONE HYDROCHLORIDE 200 MG: 200 TABLET ORAL at 10:55

## 2021-08-26 RX ADMIN — Medication 400 MG: at 08:13

## 2021-08-26 RX ADMIN — Medication 10 ML: at 08:14

## 2021-08-26 RX ADMIN — METOPROLOL TARTRATE 12.5 MG: 25 TABLET, FILM COATED ORAL at 12:19

## 2021-08-26 RX ADMIN — INSULIN LISPRO 3 UNITS: 100 INJECTION, SOLUTION INTRAVENOUS; SUBCUTANEOUS at 20:26

## 2021-08-26 RX ADMIN — INSULIN LISPRO 2 UNITS: 100 INJECTION, SOLUTION INTRAVENOUS; SUBCUTANEOUS at 16:36

## 2021-08-26 RX ADMIN — AMIODARONE HYDROCHLORIDE 200 MG: 200 TABLET ORAL at 08:13

## 2021-08-26 RX ADMIN — CEFAZOLIN 2000 MG: 10 INJECTION, POWDER, FOR SOLUTION INTRAVENOUS at 04:46

## 2021-08-26 RX ADMIN — FENTANYL CITRATE 25 MCG: 50 INJECTION, SOLUTION INTRAMUSCULAR; INTRAVENOUS at 07:47

## 2021-08-26 RX ADMIN — INSULIN LISPRO 3 UNITS: 100 INJECTION, SOLUTION INTRAVENOUS; SUBCUTANEOUS at 04:08

## 2021-08-26 RX ADMIN — OXYCODONE HYDROCHLORIDE 10 MG: 10 TABLET ORAL at 22:35

## 2021-08-26 RX ADMIN — CEFAZOLIN 2000 MG: 10 INJECTION, POWDER, FOR SOLUTION INTRAVENOUS at 20:26

## 2021-08-26 RX ADMIN — PRAMIPEXOLE DIHYDROCHLORIDE 0.5 MG: 0.25 TABLET ORAL at 14:19

## 2021-08-26 RX ADMIN — FENTANYL CITRATE 25 MCG: 50 INJECTION, SOLUTION INTRAMUSCULAR; INTRAVENOUS at 20:38

## 2021-08-26 RX ADMIN — ATORVASTATIN CALCIUM 10 MG: 10 TABLET, FILM COATED ORAL at 20:29

## 2021-08-26 RX ADMIN — PANTOPRAZOLE SODIUM 40 MG: 40 TABLET, DELAYED RELEASE ORAL at 08:13

## 2021-08-26 RX ADMIN — POTASSIUM CHLORIDE 20 MEQ: 400 INJECTION, SOLUTION INTRAVENOUS at 13:04

## 2021-08-26 RX ADMIN — SODIUM CHLORIDE 100 ML/HR: 9 INJECTION, SOLUTION INTRAVENOUS at 03:44

## 2021-08-26 RX ADMIN — OXYCODONE HYDROCHLORIDE 5 MG: 5 TABLET ORAL at 14:01

## 2021-08-26 RX ADMIN — IPRATROPIUM BROMIDE AND ALBUTEROL SULFATE 1 AMPULE: .5; 2.5 SOLUTION RESPIRATORY (INHALATION) at 10:15

## 2021-08-26 RX ADMIN — AMIODARONE HYDROCHLORIDE 400 MG: 200 TABLET ORAL at 20:29

## 2021-08-26 RX ADMIN — FUROSEMIDE 20 MG: 10 INJECTION, SOLUTION INTRAMUSCULAR; INTRAVENOUS at 11:53

## 2021-08-26 RX ADMIN — IPRATROPIUM BROMIDE AND ALBUTEROL SULFATE 1 AMPULE: .5; 2.5 SOLUTION RESPIRATORY (INHALATION) at 16:17

## 2021-08-26 RX ADMIN — INSULIN GLARGINE 12 UNITS: 100 INJECTION, SOLUTION SUBCUTANEOUS at 20:37

## 2021-08-26 RX ADMIN — PRAMIPEXOLE DIHYDROCHLORIDE 0.5 MG: 0.25 TABLET ORAL at 20:29

## 2021-08-26 RX ADMIN — METOPROLOL TARTRATE 12.5 MG: 25 TABLET, FILM COATED ORAL at 20:29

## 2021-08-26 RX ADMIN — INSULIN LISPRO 3 UNITS: 100 INJECTION, SOLUTION INTRAVENOUS; SUBCUTANEOUS at 11:52

## 2021-08-26 RX ADMIN — DOCUSATE SODIUM 50 MG AND SENNOSIDES 8.6 MG 1 TABLET: 8.6; 5 TABLET, FILM COATED ORAL at 20:28

## 2021-08-26 RX ADMIN — MUPIROCIN: 20 OINTMENT TOPICAL at 08:13

## 2021-08-26 RX ADMIN — OXYCODONE HYDROCHLORIDE 5 MG: 5 TABLET ORAL at 06:00

## 2021-08-26 RX ADMIN — POTASSIUM CHLORIDE 20 MEQ: 400 INJECTION, SOLUTION INTRAVENOUS at 05:45

## 2021-08-26 RX ADMIN — INSULIN LISPRO 3 UNITS: 100 INJECTION, SOLUTION INTRAVENOUS; SUBCUTANEOUS at 01:08

## 2021-08-26 RX ADMIN — IPRATROPIUM BROMIDE AND ALBUTEROL SULFATE 1 AMPULE: .5; 2.5 SOLUTION RESPIRATORY (INHALATION) at 21:01

## 2021-08-26 RX ADMIN — CEFAZOLIN 2000 MG: 10 INJECTION, POWDER, FOR SOLUTION INTRAVENOUS at 11:53

## 2021-08-26 RX ADMIN — DOCUSATE SODIUM 50 MG AND SENNOSIDES 8.6 MG 1 TABLET: 8.6; 5 TABLET, FILM COATED ORAL at 08:15

## 2021-08-26 RX ADMIN — PRAMIPEXOLE DIHYDROCHLORIDE 0.5 MG: 0.25 TABLET ORAL at 08:13

## 2021-08-26 ASSESSMENT — PAIN SCALES - GENERAL
PAINLEVEL_OUTOF10: 10
PAINLEVEL_OUTOF10: 7
PAINLEVEL_OUTOF10: 0
PAINLEVEL_OUTOF10: 0
PAINLEVEL_OUTOF10: 10
PAINLEVEL_OUTOF10: 6
PAINLEVEL_OUTOF10: 0
PAINLEVEL_OUTOF10: 0
PAINLEVEL_OUTOF10: 9
PAINLEVEL_OUTOF10: 0
PAINLEVEL_OUTOF10: 10
PAINLEVEL_OUTOF10: 0
PAINLEVEL_OUTOF10: 9
PAINLEVEL_OUTOF10: 5

## 2021-08-26 ASSESSMENT — PAIN DESCRIPTION - LOCATION
LOCATION: CHEST
LOCATION: CHEST

## 2021-08-26 ASSESSMENT — PAIN DESCRIPTION - DESCRIPTORS: DESCRIPTORS: ACHING;SORE

## 2021-08-26 ASSESSMENT — PAIN DESCRIPTION - FREQUENCY: FREQUENCY: CONTINUOUS

## 2021-08-26 ASSESSMENT — PAIN DESCRIPTION - ONSET
ONSET: SUDDEN
ONSET: ON-GOING

## 2021-08-26 ASSESSMENT — PAIN DESCRIPTION - ORIENTATION
ORIENTATION: MID
ORIENTATION: MID

## 2021-08-26 ASSESSMENT — PAIN DESCRIPTION - PROGRESSION
CLINICAL_PROGRESSION: RESOLVED
CLINICAL_PROGRESSION: RAPIDLY WORSENING
CLINICAL_PROGRESSION: GRADUALLY IMPROVING
CLINICAL_PROGRESSION: GRADUALLY IMPROVING
CLINICAL_PROGRESSION: RESOLVED
CLINICAL_PROGRESSION: RAPIDLY IMPROVING

## 2021-08-26 ASSESSMENT — PAIN DESCRIPTION - PAIN TYPE: TYPE: ACUTE PAIN;SURGICAL PAIN

## 2021-08-26 NOTE — PROGRESS NOTES
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, GERD, h/o adenocarcinoma of cecum, arthritis, spinal stenosis, former smoker, obesity      Procedure/Surgeries: 8/25/2021 CABG x3 (LIMA-LAD, SVG-Ramus, SVG-OM), LAAL, EVH, KLS plating      Pacing wires: Ventricular        Intake/Output Summary (Last 24 hours) at 8/26/2021 0813  Last data filed at 8/26/2021 0800  Gross per 24 hour   Intake 5753.55 ml   Output 3875 ml   Net 1878.55 ml       Recent Labs     08/25/21  1223 08/26/21  0400   WBC 27.2* 13.8*   HGB 10.0* 7.3*   HCT 31.5* 23.7*    189      Lab Results   Component Value Date     08/26/2021    K 3.9 08/26/2021    K 3.2 06/21/2021     08/26/2021    CO2 26 08/26/2021    BUN 11 08/26/2021    CREATININE 0.9 08/26/2021    GLUCOSE 138 08/26/2021    CALCIUM 8.6 08/26/2021         Physical Exam:    /64   Pulse 120   Temp 101.1 °F (38.4 °C) (Bladder)   Resp (!) 32   Ht 5' 2\" (1.575 m)   Wt 173 lb (78.5 kg)   LMP  (LMP Unknown)   SpO2 91%   BMI 31.64 kg/m²       CXR Findings:     Impression   1. Status post removal of the endotracheal tube and NG tube   2. Small bilateral pleural effusions   3. Bibasilar airspace disease favored to represent atelectasis. 4. There is no pneumothorax. - CXR personally viewed and interpreted by ICU Nurse Practitioner      General: Awake, alert. Weaned off levophed overnight. Tachycardic, sats 89% on 6L NC, increasing O2. Eyes: PERRL, anicteric   Pulmonary: Diminished bibasilar.  No wheezes, no accessory muscle use noted on 6 L O2 NC   Cardiovascular:  RRR, no heaves or thrills on palpation  Tele: ST   Abdomen: Soft, nontender, hypoactive BS   Extremities: Palpable pulses all extremities, trace edema Neurologic/Psych: A&Ox3, QUINTERO to command   Skin: Warm and dry  Incisions: MSI ZACHARY intact, RLE SVG sites well approximated with staples intact       Assessment/Plan: POD #1     1. CAD, PAF S/p CABG x3 (LIMA-LAD, SVG-Ramus, SVG-OM)  LAAL  - ASA, Lipitor  - On Xarelto preop for PAF, resume per CTS   - Ancef  - Continue to monitor chest tube output  - PO Amio added for afib prophylaxis      2. Acute Hypoxic Respiratory failure   - former smoker, 2/2 surgery   - Extubated DOS without difficulty, currently on 6 L NC, sats 89%- switching to HFNC  - Continue EZPAP q 4 hours, encourage IS, instructed to C&DB, OOBTC with PT/OT, increase activity as tolerated, wean O2 as able for sats >92%  -CXR with bilateral effusions, Lasix 20mgx1 today after transfusion     3. Postoperative Hypotension   - Post CPB required levophed to maintain MAP >70   - Weaned off levophed gtt overnight, monitor hemodynamics, start BB when able      4. Acute Post Operative Pain   - Pain control adequate, continue current regimen      5. DM Type 2  - HgbA1C 6.2%  - SSI q 4 hours, nightly lantus for glycemic control      6. Carotid Artery stenosis  - Right ICA 50-69%, maintain MAP >70 for cerebral perfusion      7. Acute blood loss anemia  - expected blood loss 2/2 surgery   - Hgb 7.3, transfuse one unit RBCs, follow with lasix 20mg x1  - Monitor response, maintain Hgb >8       VTE Prophylaxis: Pharmacologic/Mechanical:  Yes, SCDs   Line infection prevention: Can CVC or arterial line be removed: no  Continued need for urinary catheter:  Yes - clinical indication: Patient post major surgery requiring fluid balance and input and output measurement.     Dispo: CVICU until oxygenation improves     Electronically signed by TEMO Bardales CNP on 8/26/2021 at 8:13 AM

## 2021-08-26 NOTE — CARE COORDINATION
SOCIAL WORK/CASEMANAGEMENT TRANSITION OF CARE [id number] [place], [address], [place], [phone]): I called spouse and completed the assessment. Pt and spouse live in a ranch condo with 2 steps to enter. Pt was independent and active pta. She is not working. Spouse works part time. They have grown children and grandchildren. Pt is not active with [facility] but recently had [facility] and if needed again wants to use them. Pt has a cane and fww if needed. Pt is on 60l hf o2 presently. Went over need to ambulate 400' to go home then with hhc and 24/7 care from family. Also addressed morteza if needed. Taocs/luke to follow.  Henrene Kanner, ADRIAN  8/26/2021

## 2021-08-26 NOTE — PROGRESS NOTES
Physical Therapy  Physical Therapy Initial Assessment     Name: Radha Gonzalez  : [birth year]  MRN: [id number]      Date of Service: 2021    Evaluating PT:  Jinny Jo PT, DPT [id number]    Room #:  0140/3600-Z  Diagnosis:  CAD in native artery [I25.10]  PMHx/PSHx:  Arthritis, DM, HLD, HTN  Procedure/Surgery:   CABG x 3  Precautions:  Falls, Sternal, optiflow, chest tube x 2  Equipment Needs:  TBD    SUBJECTIVE:    Pt lives with  in a 1 story condo with 2 stairs to enter and 2 rail. Pt ambulated with [employer] or SPC and was independent PTA. OBJECTIVE:   Initial Evaluation  Date: 21 Treatment Short Term/ Long Term   Goals   AM-PAC 6 Clicks      Was pt agreeable to Eval/treatment? Yes     Does pt have pain?  10/10 surgical pain - RN aware     Bed Mobility  Rolling: NT  Supine to sit: MaxA x 2 with HOB elevated  Sit to supine: MaxA x 2  Scooting: MaxA  Graham   Transfers Sit to stand: NT  Stand to sit: NT  Stand pivot: NT  Mod Independent with [employer] if needed   Ambulation   NT  >200 feet with Mod Independent with WW if needed   Stair negotiation: ascended and descended NT  >4 steps with 1 rail Graham   ROM BUE:  Defer to OT note  BLE:  WFL     Strength BUE:  Defer to OT note  BLE:  4/5  Increase by 1/3 MMT grade   Balance Sitting EOB:  ModA dynamic  Dynamic Standing:  NT  Sitting EOB:  SBA  Dynamic Standing:  Graham no device     Pt is A & O x 4  CAM-ICU: NT  RASS: +1  Sensation:  No reported paresthesias  Edema:  None    Vitals:  Heart Rate at rest 103 bpm Heart Rate post session 103 bpm   SpO2 at rest 96% SpO2 post session 94%   Blood Pressure at rest 110/58 mmHg Blood Pressure post session 112/58 mmHg       Functional Status Score-Intensive Care Unit (FSS-ICU)   Rolling -/7   Supine to sit transfer 1/7   Unsupported sitting  3/7   Sit to stand transfers -/7   Ambulation -/7   Total  -/35     Therapeutic Exercises:  NA    Patient education  Pt educated on safety    Patient response to education: Pt verbalized understanding Pt demonstrated skill Pt requires further education in this area   x x x     ASSESSMENT:    Conditions Requiring Skilled Therapeutic Intervention:    [x]Decreased strength     []Decreased ROM  [x]Decreased functional mobility  [x]Decreased balance   [x]Decreased endurance   [x]Decreased posture  []Decreased sensation  []Decreased coordination   []Decreased vision  []Decreased safety awareness   [x]Increased pain       Comments:  NP reported pt was stable for session. Pt was in bed upon arrival, agreeable to initial evaluation. Pt was educated on sternal precautions and PLB prior to activity. pt was anxious throughout session which attributed to limited activity. Increased assistance provided for bed mobility due to deconditioning and precautions. Frequent cues, demonstration and reassurance required for PLB. Dizziness reported at EOB but BP remained WNL - monitored by arterial line. Pt deferred attempt to stand. Pt was left in bed with all needs met and call light in reach. All lines remained intact. Discussed with NP. Treatment:  Patient practiced and was instructed in the following treatment:     Bed mobility training - pt given verbal and tactile cues to facilitate proper sequencing and safety during supine>sit as well as provided with physical assistance.  Sitting EOB for >15 minutes for upright tolerance, postural awareness and BLE ROM   Skilled positioning - Pt placed in the chair position with pillows utilized to facilitate upright posture, joint and skin integrity, and interaction with environment. Pt's/ family goals   1. Return home    Prognosis is good for reaching above PT goals. Patient and or family understand(s) diagnosis, prognosis, and plan of care.   yes    PHYSICAL THERAPY PLAN OF CARE:    PT POC is established based on physician order and patient diagnosis     Referring provider/PT Order:  Laura Segura DO/08/26/21 0600 PT eval and treat  Diagnosis:  CAD in native artery [I25.10]  Specific instructions for next treatment:  Progress activity    Current Treatment Recommendations:     [x] Strengthening to improve independence with functional mobility   [] ROM to improve independence with functional mobility   [x] Balance Training to improve static/dynamic balance and to reduce fall risk  [x] Endurance Training to improve activity tolerance during functional mobility   [x] Transfer Training to improve safety and independence with all functional transfers   [x] Gait Training to improve gait mechanics, endurance and asses need for appropriate assistive device  [x] Stair Training in preparation for safe discharge home and/or into the community   [] Positioning to prevent skin breakdown and contractures  [x] Safety and Education Training   [x] Patient/Caregiver Education   [] HEP  [] Other     PT long term treatment goals are located in above grid    Frequency of treatments: 2-5x/week x 1-2 weeks. Time in  1255  Time out  1330    Total Treatment Time  25 minutes     Evaluation Time includes thorough review of current medical information, gathering information on past medical history/social history and prior level of function, completion of standardized testing/informal observation of tasks, assessment of data and education on plan of care and goals.     CPT codes:  [] Low Complexity PT evaluation 67218  [x] Moderate Complexity PT evaluation 49756  [] High Complexity PT evaluation 82265  [] PT Re-evaluation 64421  [] Gait training 52779 - minutes  [] Manual therapy 94708 - minutes  [x] Therapeutic activities 19814 25 minutes  [] Therapeutic exercises 68602 - minutes  [] Neuromuscular reeducation 28021 - minutes     Bahman PT, DPT  [id number]

## 2021-08-26 NOTE — PROGRESS NOTES
[address]     Date:2021                                                               Patient Name: Eliu Grace  MRN: [id number]  : 1940  Room: [address] [place] [address]    Evaluating OT: Gerald Dunn OTR/YUMIKO [id number]    Referring Provider: Tillie Frankel, PA   Specific Provider Orders/Date: OT eval and treat (21)      Diagnosis: CAD     Surgery/Procedures:  CABG x 3    Pertinent Medical History: Adenocarcinoma of cecum, Anemia, Arthritis, cervical spinal stenosis, blood transfusion, DM, cirrhosis of liver, HLD, HTN, LBP, Neck pain, PAF, Renal artery aneurysm      *Precautions:  Fall Risk, bed rail, sternal, optiflow, chest tubes x 2    Assessment of current deficits   [x]Functional mobility  [x]ADLs [x]Strength  []Cognition  [x]Functional transfers  [x]IADLs [x]Safety Awareness  [x]Endurance  []Fine Motor Coordination  [x]Balance       []Vision/perception  []Sensation    []Gross Motor Coordination [x]ROM  []Delirium                  [] Motor Control     []Communication     OT PLAN OF CARE   OT POC based on physician orders, patient diagnosis and results of clinical assessment.        Frequency/Duration: 1-3 days/wk for 1-2 weeks PRN     Specific OT Treatment to include:   ADL retraining/adapted techniques and AE recommendations to increase functional independence within precautions                    Energy conservation techniques to improve tolerance for selfcare routine   Functional transfer/mobility training/DME recommendations for increased independence, safety and fall prevention         Patient/family education to increase safety and functional independence within precautions             Environmental modifications for safe mobility and completion of ADLs                             Therapeutic activity to improve functional performance during ADLs Therapeutic exercise to improve tolerance and functional strength for ADLs   Balance retraining exercises/tasks for facilitation of postural control with dynamic challenges during ADLs . Recommended Adaptive Equipment:  LB dressing AE    Home Living: Pt lives with   in a 1 floor condo with 2 step(s) to enter and 2 rail(s)  Bathroom setup: walk in shower with seat and rail  Equipment owned: shower seat, [employer], [facility]    Prior Level of Function: King with ADLs; Assist with IADLs. [employer] for ambulation. Driving: no  Occupation: N/A    Pain Level: pt c/o 10/10 chest pain  this session    Cognition: A&O: 4/4    Follows 1-2 step commands appropriately. Memory: Good   Comprehension Good   Problem solving: Fair+/Good   Judgement/safety: Fair+/Good               Communication skills: WFL           Vision: WFL               Glasses:readers                                                   Hearing: WFL     RASS: 0  CAM-ICU: (NT) Delirium     UE Assessment:  Hand Dominance: Right [x]  Left []     ROM Strength STM goal: PRN   RUE  Grossly WFL within precautions Not formally tested; grossly WFL              WNL for ADLS     LUE Grossly WFL within precautions Not formally tested; grossly WFL              WNL for ADLS       Sensation: No c/o numbness or tingling in extremities   Tone: WNL   Edema: min B UE/LE     Functional Assessment: AM-PAC Daily Activity Raw Score: 9/24   Initial Eval Status  Date: 8/26 Treatment Status  Date: STG=LTG  Time Frame: 5-7 days   Feeding Min A  Set up   Bed level                       King  while seated up in chair to increase activity tolerance        Grooming Max A                       S; set up   while seated/standing sink level demonstrating G tolerance; G balance.      UB dressing/bathing Dep                       Min A   demonstrating G knowledge of precautions during tasks     LB dressing/bathing Dep    Max A  after instruction on LB dressing AE for safe reach within precautions                       Min A  using AE as needed for safe reach/ energy conservation       Toileting Min A                       Min A     Bed Mobility  Supine to sit: Max A+2    Sit to supine: Max A+2                       Min A  in prep of ADL tasks & transfers   Functional Transfers Sit to stand: NT                         S  sit<>stand/functional bathroom transfers using AD/DME as needed for balance and safety   Functional Mobility NT  no device                        S   functional/bathroom mobility using AD as needed & demonstrating G safety     Balance Sitting:     Static:  Min A    Dynamic:Min A  Standing: NT  S dynamic sitting balance; S dynamic standing balance  during ADL tasks & transfers   Endurance/Activity Tolerance   Fair tolerance with light activity. Pt limited by anxiety. Pt provided with encouragement and pt safety reassured. Good   tolerance with moderate activity/self care routine   Visual/  Perceptual               WFL                          Vitals:   HR at rest: 102 bpm HR at end of session: 104 bpm   Spo2 at rest:96% Spo2 at end of session 93%   BP at rest:96/53 mmHg BP at end of session 123/70 mmHg       Treatment: OT intervention provided this date includes:  Functional mobility: Instruction on sternal precautions to facilitate safe bed mobility & functional transfers. Pt required 2 person assist for safe mobility due to complexity of medical condition, medical lines and deconditioning. HOB elevated to assist.     ADL retraining: Instruction on adapted dressing techniques/equipment (reacher) to maintain sternal precautions during ADLs. Pt demo good understanding. Energy Conservation training: Education on postural awareness/positioning and breathing techniques to improve overall tolerance and participation in self care routine. Pt demonstrated fair- tolerance.   Review of recommended DME for fall prevention, bathroom safety & energy conservation. Pt/Family Education: Instruction with handouts on energy conservation and sternal precautions during functional activities for safe return home. Pt/family demonstrates fair understanding. Line management and environmental modifications made prior to and end of session to ensure patient safety and to increase efficiency of session. Skilled monitoring of HR, O2 saturation, blood pressure and patient's response to activity performed throughout session. Comments: OK from RN to see patient. Upon arrival, patient supine in bed, nervous about mobility. Pt instructed on breathing techniques and sternal precautions prior to EOB. At end of session, patient returned to bed with bed in partial chair position. Pt set up with meal tray. Call light within reach, all lines and tubes intact. Pt instructed on use of call light for assistance and fall prevention. Patient presents with decreased activity tolerance, dynamic balance, functional mobility and anxiety limiting completion of ADLs and safety. Pt can benefit from continued skilled OT to increase safety, functional independence and quality of life. Rehab Potential: Good for established goals    Patient / Family Goal: return to PLOF    Patient and/or family were instructed/educated on diagnosis, prognosis/goals and plan of care. Pt demonstrated F understanding. [] Malnutrition indicators have been identified and nursing has been notified to ensure a dietitian consult is ordered. Evaluation Complexity: Moderate    · History: Expanded chart review of consults, imaging, and psychosocial history related to current functional performance. · Exam: 5+ performance deficits identified limiting functional independence and safe return home   · Assistance/Modification: Min/mod assistance or modifications required to perform tasks. May have comorbidities that affect occupational performance.     Time In:1305              Time Out: 2555 Total Treatment Time: 25          Min Units   OT Eval Low 78129     OT Eval Medium 17412 X    OT Eval High 57004     OT Re-Eval [id number]     Therapeutic Ex (25) [id number]     Therapeutic Activities 22429 25 2   ADL/Self Care 43070     Orthotic Management 25035     Neuro Re-Ed 58306     Non-Billable Time       Evaluation time includes thorough review of current medical information, gathering information on past medical history/social history and prior level of function, completion of standardized testing/informal observation of tasks, assessment of data and development of POC/Goals.      Mary Benavides, OTR/L [id number]

## 2021-08-26 NOTE — PLAN OF CARE
Altered:  Goal: Absence of angina  Description: Absence of angina  Outcome: Met This Shift  Goal: Hemodynamic stability will improve  Description: Hemodynamic stability will improve  Outcome: Met This Shift  Goal: Will show no evidence of cardiac arrhythmias  Description: Will show no evidence of cardiac arrhythmias  Outcome: Met This Shift     Problem: Skin Integrity:  Goal: Will show no infection signs and symptoms  Description: Will show no infection signs and symptoms  Outcome: Met This Shift  Goal: Absence of new skin breakdown  Description: Absence of new skin breakdown  Outcome: Met This Shift     Problem: Pain:  Description: Pain management should include both nonpharmacologic and pharmacologic interventions.   Goal: Pain level will decrease  Description: Pain level will decrease  Outcome: Met This Shift  Goal: Control of acute pain  Description: Control of acute pain  Outcome: Met This Shift  Goal: Control of chronic pain  Description: Control of chronic pain  Outcome: Met This Shift     Problem: Musculor/Skeletal Functional Status  Goal: Highest potential functional level  Outcome: Met This Shift  Goal: Absence of falls  Outcome: Met This Shift

## 2021-08-27 ENCOUNTER — APPOINTMENT (OUTPATIENT)
Dept: GENERAL RADIOLOGY | Age: 81
DRG: 003 | End: 2021-08-27
Attending: THORACIC SURGERY (CARDIOTHORACIC VASCULAR SURGERY)
Payer: MEDICARE

## 2021-08-27 LAB
AADO2: 288.5 MMHG
AADO2: 309.8 MMHG
AADO2: 336.1 MMHG
ANION GAP SERPL CALCULATED.3IONS-SCNC: 9 MMOL/L (ref 7–16)
ANION GAP SERPL CALCULATED.3IONS-SCNC: 9 MMOL/L (ref 7–16)
B.E.: -0.2 MMOL/L (ref -3–3)
B.E.: -1.4 MMOL/L (ref -3–3)
B.E.: -1.5 MMOL/L (ref -3–3)
B.E.: 0.3 MMOL/L (ref -3–3)
BASOPHILS ABSOLUTE: 0.03 E9/L (ref 0–0.2)
BASOPHILS RELATIVE PERCENT: 0.2 % (ref 0–2)
BUN BLDV-MCNC: 13 MG/DL (ref 6–23)
BUN BLDV-MCNC: 19 MG/DL (ref 6–23)
CALCIUM SERPL-MCNC: 8.5 MG/DL (ref 8.6–10.2)
CALCIUM SERPL-MCNC: 8.7 MG/DL (ref 8.6–10.2)
CHLORIDE BLD-SCNC: 102 MMOL/L (ref 98–107)
CHLORIDE BLD-SCNC: 104 MMOL/L (ref 98–107)
CO2: 25 MMOL/L (ref 22–29)
CO2: 26 MMOL/L (ref 22–29)
COHB: 0.1 % (ref 0–1.5)
COHB: 0.1 % (ref 0–1.5)
COHB: 0.3 % (ref 0–1.5)
COHB: 0.3 % (ref 0–1.5)
CREAT SERPL-MCNC: 0.8 MG/DL (ref 0.5–1)
CREAT SERPL-MCNC: 1 MG/DL (ref 0.5–1)
CRITICAL: ABNORMAL
DATE ANALYZED: ABNORMAL
DATE OF COLLECTION: ABNORMAL
EOSINOPHILS ABSOLUTE: 0.06 E9/L (ref 0.05–0.5)
EOSINOPHILS RELATIVE PERCENT: 0.3 % (ref 0–6)
FIO2: 60 %
FIO2: 65 %
FIO2: 68 %
GFR AFRICAN AMERICAN: >60
GFR AFRICAN AMERICAN: >60
GFR NON-AFRICAN AMERICAN: 53 ML/MIN/1.73
GFR NON-AFRICAN AMERICAN: >60 ML/MIN/1.73
GLUCOSE BLD-MCNC: 143 MG/DL (ref 74–99)
GLUCOSE BLD-MCNC: 157 MG/DL (ref 74–99)
HCO3: 23.7 MMOL/L (ref 22–26)
HCO3: 24 MMOL/L (ref 22–26)
HCO3: 24.7 MMOL/L (ref 22–26)
HCO3: 25.8 MMOL/L (ref 22–26)
HCT VFR BLD CALC: 27.4 % (ref 34–48)
HCT VFR BLD CALC: 27.9 % (ref 34–48)
HEMOGLOBIN: 8.3 G/DL (ref 11.5–15.5)
HEMOGLOBIN: 8.6 G/DL (ref 11.5–15.5)
HHB: 4.2 % (ref 0–5)
HHB: 4.4 % (ref 0–5)
HHB: 5.9 % (ref 0–5)
HHB: 7.6 % (ref 0–5)
IMMATURE GRANULOCYTES #: 0.15 E9/L
IMMATURE GRANULOCYTES %: 0.9 % (ref 0–5)
LAB: ABNORMAL
LACTIC ACID: 1.7 MMOL/L (ref 0.5–2.2)
LYMPHOCYTES ABSOLUTE: 1.21 E9/L (ref 1.5–4)
LYMPHOCYTES RELATIVE PERCENT: 7 % (ref 20–42)
Lab: ABNORMAL
MAGNESIUM: 2 MG/DL (ref 1.6–2.6)
MCH RBC QN AUTO: 26 PG (ref 26–35)
MCH RBC QN AUTO: 26.2 PG (ref 26–35)
MCHC RBC AUTO-ENTMCNC: 30.3 % (ref 32–34.5)
MCHC RBC AUTO-ENTMCNC: 30.8 % (ref 32–34.5)
MCV RBC AUTO: 85.1 FL (ref 80–99.9)
MCV RBC AUTO: 85.9 FL (ref 80–99.9)
METER GLUCOSE: 145 MG/DL (ref 74–99)
METER GLUCOSE: 150 MG/DL (ref 74–99)
METER GLUCOSE: 156 MG/DL (ref 74–99)
METER GLUCOSE: 163 MG/DL (ref 74–99)
METER GLUCOSE: 166 MG/DL (ref 74–99)
METER GLUCOSE: 167 MG/DL (ref 74–99)
METHB: 0 % (ref 0–1.5)
METHB: 0 % (ref 0–1.5)
METHB: 0.2 % (ref 0–1.5)
METHB: 0.3 % (ref 0–1.5)
MODE: ABNORMAL
MONOCYTES ABSOLUTE: 0.95 E9/L (ref 0.1–0.95)
MONOCYTES RELATIVE PERCENT: 5.5 % (ref 2–12)
NEUTROPHILS ABSOLUTE: 14.85 E9/L (ref 1.8–7.3)
NEUTROPHILS RELATIVE PERCENT: 86.1 % (ref 43–80)
O2 SATURATION: 92.4 % (ref 92–98.5)
O2 SATURATION: 94.1 % (ref 92–98.5)
O2 SATURATION: 95.6 % (ref 92–98.5)
O2 SATURATION: 95.8 % (ref 92–98.5)
O2HB: 92.3 % (ref 94–97)
O2HB: 93.7 % (ref 94–97)
O2HB: 95.1 % (ref 94–97)
O2HB: 95.5 % (ref 94–97)
OPERATOR ID: 1926
OPERATOR ID: 1926
OPERATOR ID: 2577
OPERATOR ID: ABNORMAL
PATIENT TEMP: 37 C
PCO2: 41.6 MMHG (ref 35–45)
PCO2: 41.7 MMHG (ref 35–45)
PCO2: 42.8 MMHG (ref 35–45)
PCO2: 45.7 MMHG (ref 35–45)
PDW BLD-RTO: 16.2 FL (ref 11.5–15)
PDW BLD-RTO: 16.2 FL (ref 11.5–15)
PFO2: 1.28 MMHG/%
PFO2: 1.29 MMHG/%
PFO2: 1.35 MMHG/%
PH BLOOD GAS: 7.37 (ref 7.35–7.45)
PH BLOOD GAS: 7.39 (ref 7.35–7.45)
PLATELET # BLD: 167 E9/L (ref 130–450)
PLATELET # BLD: 198 E9/L (ref 130–450)
PMV BLD AUTO: 9.7 FL (ref 7–12)
PMV BLD AUTO: 9.9 FL (ref 7–12)
PO2: 67.3 MMHG (ref 75–100)
PO2: 77.2 MMHG (ref 75–100)
PO2: 86.8 MMHG (ref 75–100)
PO2: 87.7 MMHG (ref 75–100)
POTASSIUM SERPL-SCNC: 3.8 MMOL/L (ref 3.5–5)
POTASSIUM SERPL-SCNC: 3.87 MMOL/L (ref 3.5–5)
POTASSIUM SERPL-SCNC: 4.4 MMOL/L (ref 3.5–5)
RBC # BLD: 3.19 E12/L (ref 3.5–5.5)
RBC # BLD: 3.28 E12/L (ref 3.5–5.5)
RI(T): 3.53
RI(T): 3.74
RI(T): 3.87
SODIUM BLD-SCNC: 137 MMOL/L (ref 132–146)
SODIUM BLD-SCNC: 138 MMOL/L (ref 132–146)
SOURCE, BLOOD GAS: ABNORMAL
THB: 10 G/DL (ref 11.5–16.5)
THB: 10.1 G/DL (ref 11.5–16.5)
THB: 9.1 G/DL (ref 11.5–16.5)
THB: 9.1 G/DL (ref 11.5–16.5)
TIME ANALYZED: 1222
TIME ANALYZED: 2137
TIME ANALYZED: 2329
TIME ANALYZED: 551
WBC # BLD: 16 E9/L (ref 4.5–11.5)
WBC # BLD: 17.3 E9/L (ref 4.5–11.5)

## 2021-08-27 PROCEDURE — 2000000000 HC ICU R&B

## 2021-08-27 PROCEDURE — 99233 SBSQ HOSP IP/OBS HIGH 50: CPT | Performed by: NURSE PRACTITIONER

## 2021-08-27 PROCEDURE — 6370000000 HC RX 637 (ALT 250 FOR IP): Performed by: THORACIC SURGERY (CARDIOTHORACIC VASCULAR SURGERY)

## 2021-08-27 PROCEDURE — 97530 THERAPEUTIC ACTIVITIES: CPT

## 2021-08-27 PROCEDURE — 82805 BLOOD GASES W/O2 SATURATION: CPT

## 2021-08-27 PROCEDURE — 84132 ASSAY OF SERUM POTASSIUM: CPT

## 2021-08-27 PROCEDURE — 6370000000 HC RX 637 (ALT 250 FOR IP): Performed by: NURSE PRACTITIONER

## 2021-08-27 PROCEDURE — 85027 COMPLETE CBC AUTOMATED: CPT

## 2021-08-27 PROCEDURE — 2700000000 HC OXYGEN THERAPY PER DAY

## 2021-08-27 PROCEDURE — 6360000002 HC RX W HCPCS: Performed by: THORACIC SURGERY (CARDIOTHORACIC VASCULAR SURGERY)

## 2021-08-27 PROCEDURE — 36415 COLL VENOUS BLD VENIPUNCTURE: CPT

## 2021-08-27 PROCEDURE — 6360000002 HC RX W HCPCS: Performed by: NURSE PRACTITIONER

## 2021-08-27 PROCEDURE — 83605 ASSAY OF LACTIC ACID: CPT

## 2021-08-27 PROCEDURE — 37799 UNLISTED PX VASCULAR SURGERY: CPT

## 2021-08-27 PROCEDURE — 2500000003 HC RX 250 WO HCPCS: Performed by: THORACIC SURGERY (CARDIOTHORACIC VASCULAR SURGERY)

## 2021-08-27 PROCEDURE — 85025 COMPLETE CBC W/AUTO DIFF WBC: CPT

## 2021-08-27 PROCEDURE — 94660 CPAP INITIATION&MGMT: CPT

## 2021-08-27 PROCEDURE — 71045 X-RAY EXAM CHEST 1 VIEW: CPT

## 2021-08-27 PROCEDURE — 2580000003 HC RX 258: Performed by: THORACIC SURGERY (CARDIOTHORACIC VASCULAR SURGERY)

## 2021-08-27 PROCEDURE — P9047 ALBUMIN (HUMAN), 25%, 50ML: HCPCS | Performed by: NURSE PRACTITIONER

## 2021-08-27 PROCEDURE — 80048 BASIC METABOLIC PNL TOTAL CA: CPT

## 2021-08-27 PROCEDURE — 94640 AIRWAY INHALATION TREATMENT: CPT

## 2021-08-27 PROCEDURE — 82962 GLUCOSE BLOOD TEST: CPT

## 2021-08-27 PROCEDURE — 2580000003 HC RX 258

## 2021-08-27 PROCEDURE — 2580000003 HC RX 258: Performed by: NURSE PRACTITIONER

## 2021-08-27 PROCEDURE — 83735 ASSAY OF MAGNESIUM: CPT

## 2021-08-27 RX ORDER — FUROSEMIDE 10 MG/ML
40 INJECTION INTRAMUSCULAR; INTRAVENOUS ONCE
Status: COMPLETED | OUTPATIENT
Start: 2021-08-27 | End: 2021-08-27

## 2021-08-27 RX ORDER — MAGNESIUM SULFATE IN WATER 40 MG/ML
2000 INJECTION, SOLUTION INTRAVENOUS ONCE
Status: COMPLETED | OUTPATIENT
Start: 2021-08-27 | End: 2021-08-27

## 2021-08-27 RX ORDER — LORAZEPAM 2 MG/ML
0.5 INJECTION INTRAMUSCULAR
Status: DISCONTINUED | OUTPATIENT
Start: 2021-08-27 | End: 2021-08-30

## 2021-08-27 RX ORDER — ALBUMIN (HUMAN) 12.5 G/50ML
25 SOLUTION INTRAVENOUS ONCE
Status: COMPLETED | OUTPATIENT
Start: 2021-08-27 | End: 2021-08-27

## 2021-08-27 RX ORDER — MAGNESIUM SULFATE IN WATER 40 MG/ML
2000 INJECTION, SOLUTION INTRAVENOUS
Status: DISCONTINUED | OUTPATIENT
Start: 2021-08-27 | End: 2021-08-27

## 2021-08-27 RX ADMIN — IPRATROPIUM BROMIDE AND ALBUTEROL SULFATE 1 AMPULE: .5; 2.5 SOLUTION RESPIRATORY (INHALATION) at 11:45

## 2021-08-27 RX ADMIN — SODIUM CHLORIDE 30 ML/HR: 9 INJECTION, SOLUTION INTRAVENOUS at 06:28

## 2021-08-27 RX ADMIN — ACETAMINOPHEN 650 MG: 325 TABLET ORAL at 20:20

## 2021-08-27 RX ADMIN — INSULIN LISPRO 2 UNITS: 100 INJECTION, SOLUTION INTRAVENOUS; SUBCUTANEOUS at 20:01

## 2021-08-27 RX ADMIN — POTASSIUM CHLORIDE 20 MEQ: 400 INJECTION, SOLUTION INTRAVENOUS at 06:52

## 2021-08-27 RX ADMIN — INSULIN GLARGINE 12 UNITS: 100 INJECTION, SOLUTION SUBCUTANEOUS at 20:01

## 2021-08-27 RX ADMIN — CEFAZOLIN 2000 MG: 10 INJECTION, POWDER, FOR SOLUTION INTRAVENOUS at 04:30

## 2021-08-27 RX ADMIN — IPRATROPIUM BROMIDE AND ALBUTEROL SULFATE 1 AMPULE: .5; 2.5 SOLUTION RESPIRATORY (INHALATION) at 17:19

## 2021-08-27 RX ADMIN — AMIODARONE HYDROCHLORIDE 400 MG: 200 TABLET ORAL at 08:00

## 2021-08-27 RX ADMIN — Medication 10 ML: at 20:03

## 2021-08-27 RX ADMIN — Medication 400 MG: at 08:00

## 2021-08-27 RX ADMIN — PANTOPRAZOLE SODIUM 40 MG: 40 TABLET, DELAYED RELEASE ORAL at 08:00

## 2021-08-27 RX ADMIN — PRAMIPEXOLE DIHYDROCHLORIDE 0.5 MG: 0.25 TABLET ORAL at 20:01

## 2021-08-27 RX ADMIN — ASPIRIN 81 MG: 81 TABLET, COATED ORAL at 08:00

## 2021-08-27 RX ADMIN — ENOXAPARIN SODIUM 40 MG: 40 INJECTION SUBCUTANEOUS at 10:50

## 2021-08-27 RX ADMIN — OXYCODONE HYDROCHLORIDE 5 MG: 5 TABLET ORAL at 20:20

## 2021-08-27 RX ADMIN — MUPIROCIN: 20 OINTMENT TOPICAL at 20:02

## 2021-08-27 RX ADMIN — OXYCODONE HYDROCHLORIDE 10 MG: 10 TABLET ORAL at 14:08

## 2021-08-27 RX ADMIN — PRAMIPEXOLE DIHYDROCHLORIDE 0.5 MG: 0.25 TABLET ORAL at 08:01

## 2021-08-27 RX ADMIN — PIPERACILLIN AND TAZOBACTAM 3375 MG: 3; .375 INJECTION, POWDER, LYOPHILIZED, FOR SOLUTION INTRAVENOUS at 08:48

## 2021-08-27 RX ADMIN — INSULIN LISPRO 2 UNITS: 100 INJECTION, SOLUTION INTRAVENOUS; SUBCUTANEOUS at 05:01

## 2021-08-27 RX ADMIN — IPRATROPIUM BROMIDE AND ALBUTEROL SULFATE 1 AMPULE: .5; 2.5 SOLUTION RESPIRATORY (INHALATION) at 07:51

## 2021-08-27 RX ADMIN — MUPIROCIN: 20 OINTMENT TOPICAL at 08:00

## 2021-08-27 RX ADMIN — PRAMIPEXOLE DIHYDROCHLORIDE 0.5 MG: 0.25 TABLET ORAL at 14:12

## 2021-08-27 RX ADMIN — ACETAMINOPHEN 650 MG: 325 TABLET ORAL at 07:58

## 2021-08-27 RX ADMIN — MAGNESIUM SULFATE HEPTAHYDRATE 2000 MG: 40 INJECTION, SOLUTION INTRAVENOUS at 10:46

## 2021-08-27 RX ADMIN — FENTANYL CITRATE 25 MCG: 50 INJECTION, SOLUTION INTRAMUSCULAR; INTRAVENOUS at 03:16

## 2021-08-27 RX ADMIN — ATORVASTATIN CALCIUM 10 MG: 10 TABLET, FILM COATED ORAL at 20:00

## 2021-08-27 RX ADMIN — Medication 2 MCG/MIN: at 21:08

## 2021-08-27 RX ADMIN — SODIUM CHLORIDE: 9 INJECTION, SOLUTION INTRAVENOUS at 12:36

## 2021-08-27 RX ADMIN — FUROSEMIDE 40 MG: 10 INJECTION, SOLUTION INTRAMUSCULAR; INTRAVENOUS at 09:51

## 2021-08-27 RX ADMIN — POTASSIUM CHLORIDE 20 MEQ: 400 INJECTION, SOLUTION INTRAVENOUS at 17:17

## 2021-08-27 RX ADMIN — METOPROLOL TARTRATE 25 MG: 25 TABLET, FILM COATED ORAL at 08:00

## 2021-08-27 RX ADMIN — DOCUSATE SODIUM 50 MG AND SENNOSIDES 8.6 MG 1 TABLET: 8.6; 5 TABLET, FILM COATED ORAL at 08:00

## 2021-08-27 RX ADMIN — INSULIN LISPRO 3 UNITS: 100 INJECTION, SOLUTION INTRAVENOUS; SUBCUTANEOUS at 00:23

## 2021-08-27 RX ADMIN — ALBUMIN (HUMAN) 25 G: 0.25 INJECTION, SOLUTION INTRAVENOUS at 20:01

## 2021-08-27 RX ADMIN — SODIUM CHLORIDE: 9 INJECTION, SOLUTION INTRAVENOUS at 20:33

## 2021-08-27 RX ADMIN — Medication 10 ML: at 08:00

## 2021-08-27 RX ADMIN — ACETAMINOPHEN 650 MG: 325 TABLET ORAL at 16:27

## 2021-08-27 RX ADMIN — ALBUMIN (HUMAN) 25 G: 0.25 INJECTION, SOLUTION INTRAVENOUS at 17:20

## 2021-08-27 RX ADMIN — INSULIN LISPRO 2 UNITS: 100 INJECTION, SOLUTION INTRAVENOUS; SUBCUTANEOUS at 12:34

## 2021-08-27 RX ADMIN — IPRATROPIUM BROMIDE AND ALBUTEROL SULFATE 1 AMPULE: .5; 2.5 SOLUTION RESPIRATORY (INHALATION) at 20:22

## 2021-08-27 RX ADMIN — DOCUSATE SODIUM 50 MG AND SENNOSIDES 8.6 MG 1 TABLET: 8.6; 5 TABLET, FILM COATED ORAL at 20:00

## 2021-08-27 RX ADMIN — OXYCODONE HYDROCHLORIDE 10 MG: 10 TABLET ORAL at 07:58

## 2021-08-27 RX ADMIN — INSULIN LISPRO 2 UNITS: 100 INJECTION, SOLUTION INTRAVENOUS; SUBCUTANEOUS at 16:48

## 2021-08-27 RX ADMIN — AMIODARONE HYDROCHLORIDE 1 MG/MIN: 50 INJECTION, SOLUTION INTRAVENOUS at 11:07

## 2021-08-27 RX ADMIN — INSULIN LISPRO 2 UNITS: 100 INJECTION, SOLUTION INTRAVENOUS; SUBCUTANEOUS at 08:11

## 2021-08-27 RX ADMIN — PIPERACILLIN AND TAZOBACTAM 3375 MG: 3; .375 INJECTION, POWDER, LYOPHILIZED, FOR SOLUTION INTRAVENOUS at 16:11

## 2021-08-27 ASSESSMENT — PAIN SCALES - GENERAL
PAINLEVEL_OUTOF10: 0
PAINLEVEL_OUTOF10: 5
PAINLEVEL_OUTOF10: 5
PAINLEVEL_OUTOF10: 9
PAINLEVEL_OUTOF10: 0
PAINLEVEL_OUTOF10: 0
PAINLEVEL_OUTOF10: 6
PAINLEVEL_OUTOF10: 0
PAINLEVEL_OUTOF10: 2
PAINLEVEL_OUTOF10: 0
PAINLEVEL_OUTOF10: 10
PAINLEVEL_OUTOF10: 0

## 2021-08-27 ASSESSMENT — PAIN DESCRIPTION - PAIN TYPE
TYPE: CHRONIC PAIN
TYPE: SURGICAL PAIN

## 2021-08-27 ASSESSMENT — PAIN DESCRIPTION - LOCATION
LOCATION: CHEST;STERNUM
LOCATION: BACK

## 2021-08-27 ASSESSMENT — PAIN - FUNCTIONAL ASSESSMENT: PAIN_FUNCTIONAL_ASSESSMENT: PREVENTS OR INTERFERES SOME ACTIVE ACTIVITIES AND ADLS

## 2021-08-27 ASSESSMENT — PAIN DESCRIPTION - ORIENTATION
ORIENTATION: MID;LOWER
ORIENTATION: MID;LOWER

## 2021-08-27 ASSESSMENT — PAIN DESCRIPTION - FREQUENCY: FREQUENCY: CONTINUOUS

## 2021-08-27 ASSESSMENT — PAIN DESCRIPTION - DESCRIPTORS
DESCRIPTORS: CONSTANT;DISCOMFORT;SORE
DESCRIPTORS: ACHING;DISCOMFORT

## 2021-08-27 ASSESSMENT — PAIN DESCRIPTION - PROGRESSION: CLINICAL_PROGRESSION: NOT CHANGED

## 2021-08-27 ASSESSMENT — PAIN DESCRIPTION - ONSET: ONSET: ON-GOING

## 2021-08-27 NOTE — CONSULTS
PULMONARY CONSULTATION    Reason for Consultation:  Post operative respiratory failure with hypoxia   Referring Physician: Tita Cox DO    Communication with the referring physician will be sent via the electronic medical record. HPI:    The patient is an 80year old female with a history of chronic liver disease, atrial fibrillation, hypertension, diabetes, obesity, coronary artery disease who is now POD #2 CABG x 3, ZAHIDA, EVH, KLS plating. She has had persistent and worsening oxygen requirements for which we are asked to see her. She was recently placed on BiPAP 12/6 60% FiO2 with SpO2 95%. She was sitting up in the bed and was complaining of chest discomfort. She denies SOB. Does endorse a cough with sputum production. She has no chronic lung disease, home oxygen use, LOWELL, or nicotine dependency. Her preop PFTs did not reveal obstructive disease. She has had fevers Tm 100.8 and has been started on Zosyn. .  Mediastinal and pleural drains remain. Her course has been complicated by atrial fibrillation with RVR and she remains on an amiodarone infusion.       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[facility] [address] 01/2019    Anemia     Arm numbness     Arthritis     Blood transfusion     Cervical spinal stenosis     Cirrhosis of liver (HCC)     Diabetes mellitus (HCC)     Fatty liver     GERD (gastroesophageal reflux disease)     Hyperlipidemia     Hypertension     Low back pain     Lumbar stenosis     Neck pain     Obesity (BMI 30.0-34.9) 10/14/2012    PAF (paroxysmal atrial fibrillation) (HCC)     Renal artery aneurysm ([facility] [address] 7/15/2015       Past Surgical History:   Procedure Laterality Date    APPENDECTOMY      BACK SURGERY      BREAST SURGERY      reduction    CARDIAC CATHETERIZATION  06/28/2021    DR Isaias Gipson    CARDIOVERSION  06/23/2021    CARPAL TUNNEL RELEASE      CATARACT REMOVAL  10/2016    CERVICAL DISCECTOMY  01/19/2007    ACDF C3-4, C4-5, C5-6  Ying    CHOLECYSTECTOMY      CORONARY ARTERY BYPASS GRAFT N/A 2021    CABG ROGELIO performed by Tulio Ford DO at [place]      left lower leg    HEMICOLECTOMY N/A 2019    DIAGNOSTIC LAPAROSCOPY, LYSIS OF ADHESIONS, OPEN RIGHT HEMICOLECTOMY performed by Sameera Guerin MD at [address] [place] [address]  2017    [facility].  Dr.Joseph Honorio Sutton TRANSESOPHAGEAL ECHOCARDIOGRAM  2021    UPPER GASTROINTESTINAL ENDOSCOPY N/A 2018    EGD BIOPSY performed by Jose Aranda MD at [address] [facility] N/A 2020    EGD BIOPSY performed by Iesha Sharp MD at [address] [facility]  2020    EGD CONTROL HEMORRHAGE performed by Iesha Sharp MD at [place] [id number] History   Problem Relation Age of Onset    Diabetes Mother     Stroke Father     Diabetes Sister     High Blood Pressure Sister     Diabetes Brother     High Blood Pressure Brother     Cancer Brother     Heart Disease Brother        Social History     Socioeconomic History    Marital status:      Spouse name: Not on file    Number of children: Not on file    Years of education: Not on file    Highest education level: Not on file   Occupational History    Not on file   Tobacco Use    Smoking status: Former Smoker     Packs/day: 2.00     Years: 15.00     Pack years: 30.00     Quit date: 3/1/1997     Years since quittin.5    Smokeless tobacco: Never Used   Vaping Use    Vaping Use: Never used   Substance and Sexual Activity    Alcohol use: Not Currently    Drug use: No    Sexual activity: Not on file   Other Topics Concern    Not on file   Social History Narrative    Not on file     Social Determinants of Health     Financial Resource Strain:     Difficulty of Paying Living Expenses:    Food Insecurity:     Worried About Running Out of Food in the Last Year:     Ran Out of Food in the Last Year:    Transportation Needs:     Lack of Transportation (Medical):      Lack of Transportation (Non-Medical):    Physical Activity:     Days of Exercise per Week:     Minutes of Exercise per Session:    Stress:     Feeling of Stress :    Social Connections:     Frequency of Communication with Friends and Family:     Frequency of Social Gatherings with Friends and Family:     Attends [employer] Services:     Active Member of Clubs or Organizations:     Attends Club or Organization Meetings:     Marital Status:    Intimate Partner Violence:     Fear of Current or Ex-Partner:     Emotionally Abused:     Physically Abused:     Sexually Abused:        Current Facility-Administered Medications   Medication Dose Route Frequency Provider Last Rate Last Admin    piperacillin-tazobactam (ZOSYN) 3,375 mg in dextrose 5 % 100 mL IVPB extended infusion (mini-bag)  3,375 mg IntraVENous Q8H TEMO Cronin - CNP   Stopped at 08/27/21 1236    Post Zosyn Saline Infusion   IntraVENous Q8H Cristian Laws, RPH 12.5 mL/hr at 08/27/21 1236 New Bag at 08/27/21 1236    enoxaparin (LOVENOX) injection 40 mg  40 mg Subcutaneous Daily Nandini Pruitt, APRN - CNP   40 mg at 08/27/21 1050    metoprolol tartrate (LOPRESSOR) tablet 12.5 mg  12.5 mg Oral BID Nandini Pruitt, APRN - CNP        amiodarone (CORDARONE) 450 mg in dextrose 5 % 250 mL infusion  1 mg/min IntraVENous Continuous Nandini Edmond, APRN - CNP 33.3 mL/hr at 08/27/21 1107 1 mg/min at 08/27/21 1107    Followed by   [facility] amiodarone (CORDARONE) 450 mg in dextrose 5 % 250 mL infusion  0.5 mg/min IntraVENous Continuous Nandini Edmond, APRN - CNP        0.9 % sodium chloride infusion   IntraVENous PRN Leonor Rubinstein, APRN - CNP        insulin lispro (HUMALOG) injection vial 0-12 Units  0-12 Units Subcutaneous Q4H Nandini Pruitt APRN - CNP   2 Units at 08/27/21 1234    atorvastatin (LIPITOR) tablet 10 mg  10 mg Oral Nightly Tarun Sanchez DO   10 mg at 08/26/21 2029    pramipexole (MIRAPEX) tablet 0.5 mg  0.5 mg Oral TID Malena Roberts, DO   0.5 mg at 08/27/21 0801    0.9 % sodium chloride infusion  30 mL/hr IntraVENous Continuous Malena Roberts, DO 30 mL/hr at 08/27/21 0628 30 mL/hr at 08/27/21 4075    sodium chloride flush 0.9 % injection 10 mL  10 mL IntraVENous 2 times per day Malena Roberts, DO   10 mL at 08/27/21 0800    sodium chloride flush 0.9 % injection 10 mL  10 mL IntraVENous PRN Malena Roberts, DO   10 mL at 08/25/21 1246    0.9 % sodium chloride infusion  25 mL IntraVENous PRN Malena Roberts, DO        ondansetron [facility]) injection 4 mg  4 mg IntraVENous Q8H PRN Malena Roberts, DO        aspirin EC tablet 81 mg  81 mg Oral Daily Tarun Sanchez, DO   81 mg at 08/27/21 0800    acetaminophen (TYLENOL) tablet 650 mg  650 mg Oral Q4H PRN Malena Roberts, DO   650 mg at 08/27/21 0758    acetaminophen (TYLENOL) suppository 650 mg  650 mg Rectal Q4H PRN Malena Roberts, DO        oxyCODONE (ROXICODONE) immediate release tablet 5 mg  5 mg Oral Q4H PRN Malena Roberts, DO   5 mg at 08/26/21 1401    Or    oxyCODONE HCl (OXY-IR) immediate release tablet 10 mg  10 mg Oral Q4H PRN Malena Roberts, DO   10 mg at 08/27/21 0758    fentaNYL (SUBLIMAZE) injection 25 mcg  25 mcg IntraVENous Q1H PRN Malena Roberts, DO   25 mcg at 08/27/21 0316    Or    fentaNYL (SUBLIMAZE) injection 50 mcg  50 mcg IntraVENous Q1H PRN Malena Roberts, DO   50 mcg at 08/26/21 0047    magnesium oxide (MAG-OX) tablet 400 mg  400 mg Oral Daily Malena Roberts, DO   400 mg at 08/27/21 0800    mupirocin (BACTROBAN) 2 % ointment   Nasal BID Malena Roberts, DO   Given at 08/27/21 0800    sennosides-docusate sodium (SENOKOT-S) 8.6-50 MG tablet 1 tablet  1 tablet Oral BID Malena Roberts DO   1 tablet at 08/27/21 0800    magnesium hydroxide (MILK OF MAGNESIA) 400 MG/5ML suspension 30 mL  30 mL Oral Daily PRN Malena Roberts DO        pantoprazole (PROTONIX) tablet 40 mg  40 mg Oral Daily Galdino eLal Daniel, DO   40 mg at 08/27/21 0800    potassium chloride 20 mEq/50 mL IVPB (Central Line)  20 mEq IntraVENous PRN Derald Isle, DO   Stopped at 08/27/21 0753    magnesium sulfate 2000 mg in 50 mL IVPB premix  2,000 mg IntraVENous PRN Derald Isle, DO   Stopped at 08/26/21 0745    ipratropium-albuterol (DUONEB) nebulizer solution 1 ampule  1 ampule Inhalation Q4H WA Deryrn Kula, DO   1 ampule at 08/27/21 1145    albumin human 5 % IV solution 25 g  25 g IntraVENous PRN Derald Isle, DO   Stopped at 08/25/21 2227    0.9 % sodium chloride bolus  250 mL IntraVENous Continuous PRN Derald Isle, DO        norepinephrine (LEVOPHED) 16 mg in dextrose 5% 250 mL infusion  2 mcg/min IntraVENous Continuous PRN Deryrn Isle, DO   Stopped at 08/26/21 0647    clevidipine (CLEVIPREX) infusion  2 mg/hr IntraVENous Continuous PRN Joseald Isle, DO        insulin regular (HUMULIN R;NOVOLIN R) 100 Units in sodium chloride 0.9 % 100 mL infusion  1 Units/hr IntraVENous Continuous Derald Isle, DO        insulin glargine (LANTUS) injection vial 12 Units  0.15 Units/kg Subcutaneous Nightly Derald Kula, DO   12 Units at 08/26/21 2037    glucose (GLUTOSE) 40 % oral gel 15 g  15 g Oral PRN Derald Isle, DO        dextrose 50 % IV solution  12.5 g IntraVENous PRN Joseald Isle, DO        glucagon (rDNA) injection 1 mg  1 mg IntraMUSCular PRN Ligia Sanchez, DO        dextrose 5 % solution  100 mL/hr IntraVENous PRN Derald Isle, DO        calcium gluconate 1,000 mg in dextrose 5 % 100 mL IVPB  1,000 mg IntraVENous PRN Derald Isle, DO           Allergies   Allergen Reactions    Iodine Shortness Of Breath     SOB, Rash    Benadryl [Diphenhydramine]      hyper    Fish-Derived Products Rash       Review of Systems:  14 point ROS obtained and neg aside from outlined in the HPI    Physical Exam:  BP 95/79   Pulse 117   Temp 99 °F (37.2 °C) (Bladder)   Resp 22   Ht 5' 2\" (1.575 m)   Wt 191 lb 5.8 oz (86.8 kg)   LMP (LMP Unknown)   SpO2 93%   BMI 35.00 kg/m²    General: Lying in bed comfortably, no distress, breathing is not labored on BiPAP   HEENT: PERRL, bipap  Mask in place   Neck: supple, no adenopathy  CV: irregularly irregular   Lungs: decreased BS diffusely  Abd: soft, NT, ND, bowel sounds normal  Ext: warm, no edema, no clubbing, incision intact   Skin: no rashes  Neuro: CN II-XII grossly intact, no focal deficits      Oximetry:  95% on BiPAP 12/6 50% FiO2     Imaging personally reviewed:  CXR     Stable bilateral pleural effusions with bibasilar atelectasis or infiltrates         Labs:  Lab Results   Component Value Date    WBC 16.0 08/27/2021    HGB 8.3 08/27/2021    HCT 27.4 08/27/2021    MCV 85.9 08/27/2021    MCH 26.0 08/27/2021    MCHC 30.3 08/27/2021    RDW 16.2 08/27/2021     08/27/2021    MPV 9.9 08/27/2021     Lab Results   Component Value Date     08/27/2021    K 3.87 08/27/2021    K 3.2 06/21/2021     08/27/2021    CO2 25 08/27/2021    BUN 13 08/27/2021    CREATININE 0.8 08/27/2021    LABALBU 4.3 08/23/2021    CALCIUM 8.5 08/27/2021    GFRAA >60 08/27/2021    LABGLOM >60 08/27/2021     Lab Results   Component Value Date    PROTIME 14.5 08/25/2021    INR 1.3 08/25/2021       Assessment:  1. Post operative respiratory insufficiency with hypoxia  2. Left basilar atelectasis versus pneumonia  3. Pleural effusions  4. CAD s/p CABG x 3 8/25/21  5. Atrial fibrillation with RVR  6. Anxiety    Plan:  1. She is not tolerating NIV well due to significant anxiety. May need a low dose anxiolytic at some point. 2. I did take her FiO2 to 50% on the BiPAP and she tolerated this well with SpO2 94-97%. I therefore believe it is okay to resume AirVo day time and utilize the NIV nocturnally. Discussed with her. 3. EZPAP treatments throughout the day  4. Bronchodilators to assist with secretions  5. Consider assessing her swallow mechanism prior to additional oral intake  6.  Check respiratory culture  7. Agree with Zosyn for now  8. Incentive spirometry      Thank you for allowing me to participate in the care of Jena Chung.        Thao Purvis MD  8/27/2021 12:58 PM

## 2021-08-27 NOTE — PROGRESS NOTES
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, GERD, h/o adenocarcinoma of cecum, arthritis, spinal stenosis, former smoker, obesity      Procedure/Surgeries: 8/25/2021 CABG x3 (LIMA-LAD, SVG-Ramus, SVG-OM), LAAL, EVH, KLS plating      Pacing wires: Ventricular       Intake/Output Summary (Last 24 hours) at 8/27/2021 0751  Last data filed at 8/27/2021 0700  Gross per 24 hour   Intake 1996 ml   Output 2530 ml   Net -534 ml       Recent Labs     08/25/21  1223 08/25/21  1223 08/26/21  0400 08/26/21  1225 08/27/21  0445   WBC 27.2*  --  13.8*  --  16.0*   HGB 10.0*   < > 7.3* 9.1* 8.3*   HCT 31.5*   < > 23.7* 28.9* 27.4*     --  189  --  167    < > = values in this interval not displayed. Lab Results   Component Value Date     08/27/2021    K 3.8 08/27/2021    K 3.2 06/21/2021     08/27/2021    CO2 25 08/27/2021    BUN 13 08/27/2021    CREATININE 0.8 08/27/2021    GLUCOSE 143 08/27/2021    CALCIUM 8.5 08/27/2021         Physical Exam:    BP (!) 97/56   Pulse 108   Temp 100.2 °F (37.9 °C) (Bladder)   Resp 26   Ht 5' 2\" (1.575 m)   Wt 191 lb 5.8 oz (86.8 kg)   LMP  (LMP Unknown)   SpO2 96%   BMI 35.00 kg/m²       CXR Findings: 8/27/2021     FINDINGS:   Support tubes and line are stable.  Stable prominent cardiac silhouette and   central vessels.  Stable bilateral pleural effusions with bibasilar   atelectasis or infiltrates.  No pneumothorax.  No acute osseous abnormality.     - CXR personally viewed and interpreted by ICU Nurse Practitioner    General: Awake, alert. On AirVo, patient hospitalized for several days 60L @ 67% FiO2. Eyes: PERRL, anicteric   Pulmonary: Diminished bibasilar.  No wheezes, no accessory muscle use noted Cardiovascular:  RRR, no heaves or thrills on palpation  Tele: SR/ST  Abdomen: Soft, nontender, +BS   Extremities: Palpable pulses all extremities, trace edema   Neurologic/Psych: A&Ox3, QUINTERO to command   Skin: Warm and dry  Incisions: MSI ZACHARY intact, RLE SVG sites well approximated, staples intact       Assessment/Plan: POD #2    1. CAD, PAF S/p CABG x3 (LIMA-LAD, SVG-Ramus, SVG-OM)  LAAL  - ASA, Lipitor, BB   - On Xarelto preop for PAF, resume per CTS   - Chest tubes with 410cc from MS, 200cc from left pleural, continue to monitor output, remove when decreased output   - PO Amio for afib prophylaxis   - Lovenox added for VTE prophylaxis      2. Acute Hypoxic Respiratory failure   - former smoker, 2/2 surgery, extubated DOS  - Currently on AirVo 60L, 67% FiO2  -CXR with bilateral effusions L>R, Lasix 40mgx1 today, empiric zosyn started    - Continue EZPAP q 4 hours, encourage IS, instructed to C&DB, OOBTC with PT/OT, increase activity as tolerated, wean O2 as able for sats >92%     3. Postoperative Hypotension   - Post op required levophed, POD 1 started on low dose metoprolol, uptitrated POD2 with mild hypotension-- decreased dose back to 12.5mg with hold parameters      4. Acute Post Operative Pain   - Pain control adequate, continue current regimen      5. DM Type 2  - HgbA1C 6.2%  - SSI q 4 hours, nightly lantus for glycemic control      6. Carotid Artery stenosis  - Right ICA 50-69%, avoid hypotension      7. Acute blood loss anemia  - expected blood loss 2/2 surgery, s/p one unit RBCs 8/26  - Hgb 8.3  - Monitor, transfuse if needed to maintain Hgb >8      8.  Leukocytosis  - likely reactive, atelectasis  - WBC 16, increased from 13.8, Tmax 101.1 8/26 1300, low grade since  - Empiric Zosyn started, monitor closely       VTE Prophylaxis: Pharmacologic/Mechanical: Yes, SCDs, Lovenox   Line infection prevention: Can CVC or arterial line be removed: no  Continued need for urinary catheter:  Yes - clinical indication: Patient post major surgery requiring fluid balance and input and output measurement.     Dispo: CVICU until oxygenation improves     Electronically signed by TEMO Guzman CNP on 8/27/2021 at 7:51 AM

## 2021-08-27 NOTE — PLAN OF CARE
Problem: Falls - Risk of:  Goal: Will remain free from falls  Description: Will remain free from falls  Outcome: Met This Shift     Problem: Activity Intolerance:  Goal: Able to perform prescribed physical activity  Description: Able to perform prescribed physical activity  8/27/2021 1033 by Elías Quiroz RN  Outcome: Met This Shift     Problem:  Activity Intolerance:  Goal: Ability to tolerate increased activity will improve  Description: Ability to tolerate increased activity will improve  Outcome: Met This Shift     Problem: Anxiety:  Goal: Level of anxiety will decrease  Description: Level of anxiety will decrease  8/27/2021 1033 by Elías Quiroz RN  Outcome: Met This Shift     Problem: Cardiac Output - Decreased:  Goal: Cardiac output within specified parameters  Description: Cardiac output within specified parameters  Outcome: Met This Shift     Problem: Fluid Volume - Imbalance:  Goal: Ability to achieve a balanced intake and output will improve  Description: Ability to achieve a balanced intake and output will improve  8/27/2021 1033 by Elías Quiroz RN  Outcome: Met This Shift     Problem: Gas Exchange - Impaired:  Goal: Levels of oxygenation will improve  Description: Levels of oxygenation will improve  8/27/2021 1033 by Elías Quiroz RN  Outcome: Met This Shift     Problem: Pain:  Goal: Pain level will decrease  Description: Pain level will decrease  8/27/2021 1033 by Elías Quiroz RN  Outcome: Met This Shift     Problem: Pain:  Goal: Pain level will decrease  Description: Pain level will decrease  8/27/2021 1033 by Elías Quiroz RN  Outcome: Met This Shift

## 2021-08-27 NOTE — PROGRESS NOTES
OCCUPATIONAL THERAPY TREATMENT NOTE   [address] [address] [address]       Date:2021                                                               Patient Name: Aneita Buerger  MRN: [id number]  : 1940  Room: [address] [place] [address]    Evaluating OT: Ruddy Minor, OTR/L 0826     Referring Provider: GEORGIA Suarez   Specific Provider Orders/Date: OT eval and treat (21)        Diagnosis: CAD      Surgery/Procedures:  CABG x 3     Pertinent Medical History: Adenocarcinoma of cecum, Anemia, Arthritis, cervical spinal stenosis, blood transfusion, DM, cirrhosis of liver, HLD, HTN, LBP, Neck pain, PAF, Renal artery aneurysm       *Precautions:  Fall Risk, bed rail, sternal, optiflow/Bipap, chest tubes x 2     Assessment of current deficits   [x]? Functional mobility            [x]?ADLs           [x]? Strength                  []?Cognition  [x]? Functional transfers          [x]? IADLs          [x]? Safety Awareness   [x]? Endurance  []? Fine Motor Coordination   [x]? Balance       []? Vision/perception    []? Sensation      []? Gross Motor Coordination [x]? ROM           []? Delirium                  []? Motor Control     []? Communication      OT PLAN OF CARE   OT POC based on physician orders, patient diagnosis and results of clinical assessment.        Frequency/Duration: 1-3 days/wk for 1-2 weeks PRN     Specific OT Treatment to include:   ADL retraining/adapted techniques and AE recommendations to increase functional independence within precautions                    Energy conservation techniques to improve tolerance for selfcare routine   Functional transfer/mobility training/DME recommendations for increased independence, safety and fall prevention         Patient/family education to increase safety and functional independence within precautions             Environmental modifications for safe mobility and completion of ADLs                             Therapeutic activity to improve functional performance during ADLs                                         Therapeutic exercise to improve tolerance and functional strength for ADLs   Balance retraining exercises/tasks for facilitation of postural control with dynamic challenges during ADLs .       Recommended Adaptive Equipment:  LB dressing AE     Home Living: Pt lives with   in a 1 floor condo with 2 step(s) to enter and 2 rail(s)  Bathroom setup: walk in shower with seat and rail  Equipment owned: shower seat, [facility], [facility]     Prior Level of Function: [place] with ADLs; Assist with IADLs. [facility] for ambulation. Driving: no  Occupation: N/A     Pain Level: pt c/o 10/10 chest pain  this session     Cognition: A&O: 4/4    Follows 1-2 step commands appropriately. Memory: Good              Comprehension Good              Problem solving: Fair+/Good              Judgement/safety: Fair+/Good                 Communication skills: WFL              Vision: WFL                     Glasses:readers                                                        Hearing: WFL                RASS: 0  CAM-ICU: (NT) Delirium      UE Assessment:  Hand Dominance: Right [x]?   Left []?       ROM Strength STM goal: PRN   RUE  Grossly WFL within precautions Not formally tested; grossly WFL              WNL for ADLS      LUE Grossly WFL within precautions Not formally tested; grossly WFL              WNL for ADLS         Sensation: No c/o numbness or tingling in extremities   Tone: WNL   Edema: min B UE/LE     Functional Assessment: AM-PAC Daily Activity Raw Score: 9/24    Initial Eval Status  Date: 8/26 Treatment Status  Date: STG=LTG  Time Frame: 5-7 days   Feeding Min A  Set up   Bed level  NT  (on Bipap)                        King  while seated up in chair to increase activity tolerance         Grooming Max A Max A   Seated EOB wthi Graham for trunk support                      S; set up   while seated/standing sink level demonstrating G tolerance; G balance.      UB dressing/bathing Dep  NT                      Min A   demonstrating G knowledge of precautions during tasks      LB dressing/bathing Dep     Max A  after instruction on LB dressing AE for safe reach within precautions  Max A overall  Mod A using reacher to doff socks; pt demo fair recall. Required assist due to decreased functional strength. Min A  using AE as needed for safe reach/ energy conservation        Toileting Max A Max A                      Min A      Bed Mobility  Supine to sit: Max A+2     Sit to supine: Max A+2  Supine to sit: Max A+2    Sit to supine:   Max A+2                      Min A  in prep of ADL tasks & transfers   Functional Transfers Sit to stand: NT    Sit to stand: NT due to poor tolerance/respiratory status and increased anxiety this date                       S  sit<>stand/functional bathroom transfers using AD/DME as needed for balance and safety   Functional Mobility NT  no device NT                        S   functional/bathroom mobility using AD as needed & demonstrating G safety      Balance Sitting:     Static:  Min A    Dynamic:Min A  Standing: NT Sitting: Min A  Standing: NT  S dynamic sitting balance; S dynamic standing balance  during ADL tasks & transfers   Endurance/Activity Tolerance    Fair tolerance with light activity. Pt limited by anxiety. Pt provided with encouragement and pt safety reassured. Fair- tolerance with light activity. Pt sat EOB >10 min with encouragement. Pt is self limiting due to anxiety. Pt reporting she \"can't breathe\" throughout session while wearing Bipap mask. O2 saturation maintained >93%. Nurse/physician present.  Pt switched to optiflow with O2 saturation at 94-96%  Good   tolerance with moderate activity/self care routine   Visual/  Perceptual               WFL                                Vitals:   HR at rest: 113 bpm HR at end of session: 137 bpm   Spo2 at rest:93% Spo2 at end of session: 92%   BP at rest: 118/68 mmHg BP at end of session: 128/68 mmHg     Treatment: OT treatment provided this date   Bed mobility:Review of sternal precautions/medical lines prior to bed mobility to facilitate safe transfers and ADLS. Pt required 2 person assist for safe mobility due to complexity of medical condition, medical lines and deconditioning. HOB elevated to assist.   Balance retraining: Performed sitting balance ex's with instruction to facilitate righting reactions with postural changes during ADLS. Pt requires Min A EOB. ADL retraining: Instruction on adapted techniques/AE(reacher) to increase independence and safe reach during dressing/bathing activities. Pt demonstrated fair recall of tool use. Delirium Prevention: Environmental and sensory modifications assessed and implemented to decrease ICU acquired delirium and to improve overall orientation, mentation and pt interaction with family/staff. Line management and environmental modifications made prior to and end of session to ensure patient safety and to increase efficiency of session. Skilled monitoring of HR, O2 saturation, blood pressure and patient's response to activity performed throughout session. Comments: OK from RN to see patient. Upon arrival, patient sitting up in bed, agreeable to session. Pt demo fair- tolerance with encouragement; limiting self due to anxiety over breathing. Pt demo fair understanding of education/techniques. At end of session, patient returned to bed with bed placed in chair position to increase tolerance. Pillows placed under B UE/LE for edema control and comfort. Call light within reach, all lines and tubes intact. Pt instructed on use of call light for assistance and fall prevention. Overall, pt presents with decreased activity tolerance, dynamic balance, functional mobility and anxiety limiting completion of ADLs and safe return home.  Pt can benefit from continued skilled OT to increase safety, functional independence & quality of life. · Pt has made fair progress towards set goals.    · Continue with current plan of care       Time In:1300              Time Out: 1330         Total Treatment Time: 30      Treatment Charges: Mins Units   Ther Ex  [id number]     Manual Therapy [id number]     Thera Activities [id number] 00 2   ADL/Home Mgt [id number]     Neuro Re-ed [id number]     Orthotic manage/training  [id number]     Non-Billable Time         Lang Catron, OTR/L [id number]

## 2021-08-27 NOTE — PROGRESS NOTES
Physical Therapy  Physical Therapy Treatment Note     Name: Karla Marinelli  : [birth year]  MRN: [id number]      Date of Service: 2021    Evaluating PT:  Aurora Lee PT, DPT [id number]    Room #:  2518/1141-P  Diagnosis:  CAD in native artery [I25.10]  PMHx/PSHx:  Arthritis, DM, HLD, HTN  Procedure/Surgery:   CABG x 3  Precautions:  Falls, Sternal, optiflow vs Bipap, chest tube x 2  Equipment Needs:  TBD    SUBJECTIVE:    Pt lives with  in a 1 story condo with 2 stairs to enter and 2 rail. Pt ambulated with [employer] or SPC and was independent PTA. OBJECTIVE:   Initial Evaluation  Date: 21 Treatment  21 Short Term/ Long Term   Goals   AM-PAC 6 Clicks     Was pt agreeable to Eval/treatment? Yes Yes    Does pt have pain?  10/10 surgical pain - RN aware 10/10 surgical pain - RN aware    Bed Mobility  Rolling: NT  Supine to sit: MaxA x 2 with HOB elevated  Sit to supine: MaxA x 2  Scooting: MaxA Rolling: NT  Supine to sit: MaxA x 2 with HOB elevated  Sit to supine: MaxA x 2  Scooting: MaxA Graham   Transfers Sit to stand: NT  Stand to sit: NT  Stand pivot: NT NT Mod Independent with [employer] if needed   Ambulation   NT  >200 feet with Mod Independent with [employer] if needed   Stair negotiation: ascended and descended NT  >4 steps with 1 rail Graham   ROM BUE:  Defer to OT note  BLE:  WFL     Strength BUE:  Defer to OT note  BLE:  4/5  Increase by 1/3 MMT grade   Balance Sitting EOB:  ModA dynamic  Dynamic Standing:  NT Sitting EOB:  ModA dynamic  Dynamic Standing:  NT Sitting EOB:  SBA  Dynamic Standing:  Graham no device     Pt is A & O x 4  CAM-ICU: NT  RASS: +1  Sensation:  No reported paresthesias  Edema:  None    Vitals:  Heart Rate at rest 123 bpm Heart Rate post session 137 bpm   SpO2 at rest 94% SpO2 post session 92%   Blood Pressure at rest 120/69 mmHg Blood Pressure post session 128/68 mmHg       Functional Status Score-Intensive Care Unit (FSS-ICU)   Rolling    Supine to sit transfer  activities  30 minutes  [] Therapeutic exercises 63973 - minutes  [] Neuromuscular reeducation 60394 - minutes    Bhavik Arreola PT, DPT  [id number]

## 2021-08-27 NOTE — PROGRESS NOTES
Comprehensive Nutrition Assessment    Type and Reason for Visit:  Initial    Nutrition Recommendations/Plan: ADAT and start ONS w/ nutrition progression    Nutrition Assessment:  Pt s/p CABG X3 2/2 CAD. 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 NPO at this time. ADAT and initiate ONS w/ nutrition progression. Malnutrition Assessment:  Malnutrition Status: At risk for malnutrition (Comment)    Context:  Acute Illness     Findings of the 6 clinical characteristics of malnutrition:  Energy Intake:  Mild decrease in energy intake (Comment)  Weight Loss:  Unable to assess (CBW elevated)     Body Fat Loss:  No significant body fat loss     Muscle Mass Loss:  No significant muscle mass loss    Fluid Accumulation:  No significant fluid accumulation     Strength:  Not Performed    Estimated Daily Nutrient Needs:  Energy (kcal):  ; Weight Used for Energy Requirements:  Current     Protein (g):  75-90 (1.5-1.8); Weight Used for Protein Requirements:  Ideal        Fluid (ml/day):  per critical care; Method Used for Fluid Requirements:         Nutrition Related Findings:  Extubated, A&O X4, I&Os WDL, no edema, abd rounded, BS hypoactive, chest tube X3 to suction      Wounds:  Surgical Incision       Current Nutrition Therapies:    Diet NPO Exceptions are: Sips of Water with Meds    Anthropometric Measures:  · Height: 5' 2\" (157.5 cm)  · Current Body Weight: 173 lb (78.5 kg) (admit wt d/t CBW elevated 191#)   · Admission Body Weight: 173 lb (78.5 kg) (8/26 bed)    · Usual Body Weight: 209 lb (94.8 kg) (1/2 per EMR, noted 7/16 175#)     · Ideal Body Weight: 110 lbs; % Ideal Body Weight 157.3 %   · BMI: 31.6   · BMI Categories: Obese Class 1 (BMI 30.0-34. 9)       Nutrition Diagnosis:   · Increased nutrient needs related to increase demand for energy/nutrients as evidenced by wounds (s/p CABG X3)    Nutrition Interventions:   Nutrition Education/Counseling:  Education not appropriate Coordination of Nutrition Care:  Continue to monitor while inpatient    Goals:  Nutrition progression       Nutrition Monitoring and Evaluation:   Food/Nutrient Intake Outcomes:  Diet Advancement/Tolerance  Physical Signs/Symptoms Outcomes:  Biochemical Data, Chewing or Swallowing, GI Status, Fluid Status or Edema, Nutrition Focused Physical Findings, Skin, Weight     Discharge Planning:     Too soon to determine     Electronically signed by Savanna Mart RD, LD on 8/27/21 at 1:41 PM EDT    Contact: [phone]

## 2021-08-27 NOTE — PLAN OF CARE
Problem: Falls - Risk of:  Goal: Will remain free from falls  Description: Will remain free from falls  8/26/2021 1519 by Yasmeen Aviles RN  Outcome: Met This Shift  Goal: Absence of physical injury  Description: Absence of physical injury  8/26/2021 1519 by Yasmeen Aviles RN  Outcome: Met This Shift     Problem: Discharge Planning:  Goal: Discharged to appropriate level of care  Description: Discharged to appropriate level of care  8/27/2021 0506 by [address] RN  Outcome: Met This Shift  8/26/2021 1519 by Yasmeen Aviles RN  Outcome: Not Met This Shift     Problem:  Activity Intolerance:  Goal: Able to perform prescribed physical activity  Description: Able to perform prescribed physical activity  8/27/2021 0506 by [address] RN  Outcome: Met This Shift  8/26/2021 1519 by Yasmeen Aviles RN  Outcome: Not Met This Shift  Goal: Ability to tolerate increased activity will improve  Description: Ability to tolerate increased activity will improve  8/26/2021 1519 by Yasmeen Aviles RN  Outcome: Met This Shift     Problem: Anxiety:  Goal: Level of anxiety will decrease  Description: Level of anxiety will decrease  8/27/2021 0506 by [address] RN  Outcome: Met This Shift  8/26/2021 1519 by Yasmeen Aviles RN  Outcome: Met This Shift     Problem: Pain:  Goal: Pain level will decrease  Description: Pain level will decrease  8/27/2021 0506 by [address] RN  Outcome: Met This Shift  8/26/2021 1519 by Yasmeen Aviles RN  Outcome: Met This Shift

## 2021-08-27 NOTE — PROGRESS NOTES
08/26/21 2101   Treatment   Delivery Source Oxygen   Position Sitting   Tx Tolerance Poor  (patient said it was making her dizzy )   Duration 10   Is patient on O2? Y   Oxygen Therapy/Pulse Ox   O2 Therapy Oxygen humidified   Patient took about 3 minutes of the treatment and said she could not do that it made her dizzy, I tried lowering the flow she said it did not help. RN aware as well

## 2021-08-28 ENCOUNTER — APPOINTMENT (OUTPATIENT)
Dept: GENERAL RADIOLOGY | Age: 81
DRG: 003 | End: 2021-08-28
Attending: THORACIC SURGERY (CARDIOTHORACIC VASCULAR SURGERY)
Payer: MEDICARE

## 2021-08-28 LAB
AADO2: 352.5 MMHG
ANION GAP SERPL CALCULATED.3IONS-SCNC: 11 MMOL/L (ref 7–16)
B.E.: -0.1 MMOL/L (ref -3–3)
BACTERIA: NORMAL /HPF
BILIRUBIN URINE: NEGATIVE
BLOOD, URINE: ABNORMAL
BUN BLDV-MCNC: 24 MG/DL (ref 6–23)
CALCIUM SERPL-MCNC: 8.7 MG/DL (ref 8.6–10.2)
CHLORIDE BLD-SCNC: 101 MMOL/L (ref 98–107)
CLARITY: CLEAR
CO2: 24 MMOL/L (ref 22–29)
COHB: 0.3 % (ref 0–1.5)
COLOR: YELLOW
CREAT SERPL-MCNC: 1 MG/DL (ref 0.5–1)
CRITICAL: ABNORMAL
DATE ANALYZED: ABNORMAL
DATE OF COLLECTION: ABNORMAL
EPITHELIAL CELLS, UA: NORMAL /HPF
FIO2: 68 %
GFR AFRICAN AMERICAN: >60
GFR NON-AFRICAN AMERICAN: 53 ML/MIN/1.73
GLUCOSE BLD-MCNC: 169 MG/DL (ref 74–99)
GLUCOSE URINE: NEGATIVE MG/DL
HCO3: 24.3 MMOL/L (ref 22–26)
HCT VFR BLD CALC: 26.1 % (ref 34–48)
HEMOGLOBIN: 8 G/DL (ref 11.5–15.5)
HHB: 6.2 % (ref 0–5)
KETONES, URINE: ABNORMAL MG/DL
LAB: ABNORMAL
LEUKOCYTE ESTERASE, URINE: NEGATIVE
Lab: ABNORMAL
MAGNESIUM: 2.4 MG/DL (ref 1.6–2.6)
MCH RBC QN AUTO: 26.3 PG (ref 26–35)
MCHC RBC AUTO-ENTMCNC: 30.7 % (ref 32–34.5)
MCV RBC AUTO: 85.9 FL (ref 80–99.9)
METER GLUCOSE: 129 MG/DL (ref 74–99)
METER GLUCOSE: 136 MG/DL (ref 74–99)
METER GLUCOSE: 145 MG/DL (ref 74–99)
METER GLUCOSE: 146 MG/DL (ref 74–99)
METER GLUCOSE: 162 MG/DL (ref 74–99)
METER GLUCOSE: 163 MG/DL (ref 74–99)
METHB: 0.1 % (ref 0–1.5)
MODE: ABNORMAL
NITRITE, URINE: NEGATIVE
O2 SATURATION: 93.8 % (ref 92–98.5)
O2HB: 93.4 % (ref 94–97)
OPERATOR ID: ABNORMAL
PATIENT TEMP: 37 C
PCO2: 38.9 MMHG (ref 35–45)
PDW BLD-RTO: 16.6 FL (ref 11.5–15)
PFO2: 1.08 MMHG/%
PH BLOOD GAS: 7.41 (ref 7.35–7.45)
PH UA: 5.5 (ref 5–9)
PLATELET # BLD: 231 E9/L (ref 130–450)
PMV BLD AUTO: 9.9 FL (ref 7–12)
PO2: 73.3 MMHG (ref 75–100)
POTASSIUM SERPL-SCNC: 3.7 MMOL/L (ref 3.5–5)
PROTEIN UA: ABNORMAL MG/DL
RBC # BLD: 3.04 E12/L (ref 3.5–5.5)
RBC UA: NORMAL /HPF (ref 0–2)
RI(T): 4.81
SODIUM BLD-SCNC: 136 MMOL/L (ref 132–146)
SOURCE, BLOOD GAS: ABNORMAL
SPECIFIC GRAVITY UA: 1.02 (ref 1–1.03)
THB: 9.7 G/DL (ref 11.5–16.5)
TIME ANALYZED: 647
UROBILINOGEN, URINE: 0.2 E.U./DL
WBC # BLD: 16.8 E9/L (ref 4.5–11.5)
WBC UA: NORMAL /HPF (ref 0–5)

## 2021-08-28 PROCEDURE — 85027 COMPLETE CBC AUTOMATED: CPT

## 2021-08-28 PROCEDURE — 80048 BASIC METABOLIC PNL TOTAL CA: CPT

## 2021-08-28 PROCEDURE — 2580000003 HC RX 258

## 2021-08-28 PROCEDURE — 82805 BLOOD GASES W/O2 SATURATION: CPT

## 2021-08-28 PROCEDURE — 81001 URINALYSIS AUTO W/SCOPE: CPT

## 2021-08-28 PROCEDURE — 2580000003 HC RX 258: Performed by: NURSE PRACTITIONER

## 2021-08-28 PROCEDURE — 94640 AIRWAY INHALATION TREATMENT: CPT

## 2021-08-28 PROCEDURE — 71045 X-RAY EXAM CHEST 1 VIEW: CPT

## 2021-08-28 PROCEDURE — 2580000003 HC RX 258: Performed by: THORACIC SURGERY (CARDIOTHORACIC VASCULAR SURGERY)

## 2021-08-28 PROCEDURE — 6370000000 HC RX 637 (ALT 250 FOR IP): Performed by: THORACIC SURGERY (CARDIOTHORACIC VASCULAR SURGERY)

## 2021-08-28 PROCEDURE — 82962 GLUCOSE BLOOD TEST: CPT

## 2021-08-28 PROCEDURE — 6360000002 HC RX W HCPCS: Performed by: NURSE PRACTITIONER

## 2021-08-28 PROCEDURE — 2000000000 HC ICU R&B

## 2021-08-28 PROCEDURE — 83735 ASSAY OF MAGNESIUM: CPT

## 2021-08-28 PROCEDURE — 6360000002 HC RX W HCPCS: Performed by: THORACIC SURGERY (CARDIOTHORACIC VASCULAR SURGERY)

## 2021-08-28 PROCEDURE — 6370000000 HC RX 637 (ALT 250 FOR IP): Performed by: NURSE PRACTITIONER

## 2021-08-28 PROCEDURE — 37799 UNLISTED PX VASCULAR SURGERY: CPT

## 2021-08-28 RX ADMIN — ACETAMINOPHEN 650 MG: 325 TABLET ORAL at 17:34

## 2021-08-28 RX ADMIN — INSULIN LISPRO 2 UNITS: 100 INJECTION, SOLUTION INTRAVENOUS; SUBCUTANEOUS at 12:30

## 2021-08-28 RX ADMIN — OXYCODONE HYDROCHLORIDE 5 MG: 5 TABLET ORAL at 23:11

## 2021-08-28 RX ADMIN — PRAMIPEXOLE DIHYDROCHLORIDE 0.5 MG: 0.25 TABLET ORAL at 14:11

## 2021-08-28 RX ADMIN — ATORVASTATIN CALCIUM 10 MG: 10 TABLET, FILM COATED ORAL at 21:00

## 2021-08-28 RX ADMIN — IPRATROPIUM BROMIDE AND ALBUTEROL SULFATE 1 AMPULE: .5; 2.5 SOLUTION RESPIRATORY (INHALATION) at 08:24

## 2021-08-28 RX ADMIN — OXYCODONE HYDROCHLORIDE 5 MG: 5 TABLET ORAL at 17:33

## 2021-08-28 RX ADMIN — PANTOPRAZOLE SODIUM 40 MG: 40 TABLET, DELAYED RELEASE ORAL at 08:23

## 2021-08-28 RX ADMIN — MUPIROCIN: 20 OINTMENT TOPICAL at 21:00

## 2021-08-28 RX ADMIN — Medication 400 MG: at 08:23

## 2021-08-28 RX ADMIN — IPRATROPIUM BROMIDE AND ALBUTEROL SULFATE 1 AMPULE: .5; 2.5 SOLUTION RESPIRATORY (INHALATION) at 20:28

## 2021-08-28 RX ADMIN — PRAMIPEXOLE DIHYDROCHLORIDE 0.5 MG: 0.25 TABLET ORAL at 08:22

## 2021-08-28 RX ADMIN — SODIUM CHLORIDE: 9 INJECTION, SOLUTION INTRAVENOUS at 12:35

## 2021-08-28 RX ADMIN — AMIODARONE HYDROCHLORIDE 0.5 MG/MIN: 50 INJECTION, SOLUTION INTRAVENOUS at 06:50

## 2021-08-28 RX ADMIN — SODIUM CHLORIDE: 9 INJECTION, SOLUTION INTRAVENOUS at 20:54

## 2021-08-28 RX ADMIN — IPRATROPIUM BROMIDE AND ALBUTEROL SULFATE 1 AMPULE: .5; 2.5 SOLUTION RESPIRATORY (INHALATION) at 15:42

## 2021-08-28 RX ADMIN — PIPERACILLIN AND TAZOBACTAM 3375 MG: 3; .375 INJECTION, POWDER, LYOPHILIZED, FOR SOLUTION INTRAVENOUS at 01:39

## 2021-08-28 RX ADMIN — METOPROLOL TARTRATE 12.5 MG: 25 TABLET, FILM COATED ORAL at 21:32

## 2021-08-28 RX ADMIN — PIPERACILLIN AND TAZOBACTAM 3375 MG: 3; .375 INJECTION, POWDER, LYOPHILIZED, FOR SOLUTION INTRAVENOUS at 08:24

## 2021-08-28 RX ADMIN — METOPROLOL TARTRATE 12.5 MG: 25 TABLET, FILM COATED ORAL at 08:23

## 2021-08-28 RX ADMIN — PIPERACILLIN AND TAZOBACTAM 3375 MG: 3; .375 INJECTION, POWDER, LYOPHILIZED, FOR SOLUTION INTRAVENOUS at 16:09

## 2021-08-28 RX ADMIN — SODIUM CHLORIDE 30 ML/HR: 9 INJECTION, SOLUTION INTRAVENOUS at 20:55

## 2021-08-28 RX ADMIN — ASPIRIN 81 MG: 81 TABLET, COATED ORAL at 08:22

## 2021-08-28 RX ADMIN — INSULIN GLARGINE 12 UNITS: 100 INJECTION, SOLUTION SUBCUTANEOUS at 21:10

## 2021-08-28 RX ADMIN — INSULIN LISPRO 2 UNITS: 100 INJECTION, SOLUTION INTRAVENOUS; SUBCUTANEOUS at 08:07

## 2021-08-28 RX ADMIN — ENOXAPARIN SODIUM 40 MG: 40 INJECTION SUBCUTANEOUS at 08:30

## 2021-08-28 RX ADMIN — DOCUSATE SODIUM 50 MG AND SENNOSIDES 8.6 MG 1 TABLET: 8.6; 5 TABLET, FILM COATED ORAL at 21:00

## 2021-08-28 RX ADMIN — Medication 10 ML: at 21:01

## 2021-08-28 RX ADMIN — MUPIROCIN: 20 OINTMENT TOPICAL at 08:23

## 2021-08-28 RX ADMIN — PRAMIPEXOLE DIHYDROCHLORIDE 0.5 MG: 0.25 TABLET ORAL at 21:00

## 2021-08-28 RX ADMIN — IPRATROPIUM BROMIDE AND ALBUTEROL SULFATE 1 AMPULE: .5; 2.5 SOLUTION RESPIRATORY (INHALATION) at 11:30

## 2021-08-28 RX ADMIN — DOCUSATE SODIUM 50 MG AND SENNOSIDES 8.6 MG 1 TABLET: 8.6; 5 TABLET, FILM COATED ORAL at 08:23

## 2021-08-28 RX ADMIN — ACETAMINOPHEN 650 MG: 325 TABLET ORAL at 23:11

## 2021-08-28 RX ADMIN — Medication 10 ML: at 08:23

## 2021-08-28 RX ADMIN — LORAZEPAM 0.5 MG: 2 INJECTION INTRAMUSCULAR; INTRAVENOUS at 04:08

## 2021-08-28 RX ADMIN — LORAZEPAM 0.5 MG: 2 INJECTION INTRAMUSCULAR; INTRAVENOUS at 12:41

## 2021-08-28 RX ADMIN — INSULIN LISPRO 2 UNITS: 100 INJECTION, SOLUTION INTRAVENOUS; SUBCUTANEOUS at 21:09

## 2021-08-28 RX ADMIN — INSULIN LISPRO 2 UNITS: 100 INJECTION, SOLUTION INTRAVENOUS; SUBCUTANEOUS at 06:19

## 2021-08-28 RX ADMIN — AMIODARONE HYDROCHLORIDE 0.5 MG/MIN: 50 INJECTION, SOLUTION INTRAVENOUS at 21:43

## 2021-08-28 RX ADMIN — POTASSIUM CHLORIDE 20 MEQ: 400 INJECTION, SOLUTION INTRAVENOUS at 08:24

## 2021-08-28 ASSESSMENT — PAIN SCALES - GENERAL
PAINLEVEL_OUTOF10: 4
PAINLEVEL_OUTOF10: 0
PAINLEVEL_OUTOF10: 10
PAINLEVEL_OUTOF10: 0
PAINLEVEL_OUTOF10: 0
PAINLEVEL_OUTOF10: 10
PAINLEVEL_OUTOF10: 0
PAINLEVEL_OUTOF10: 4
PAINLEVEL_OUTOF10: 0
PAINLEVEL_OUTOF10: 0

## 2021-08-28 ASSESSMENT — PAIN DESCRIPTION - ORIENTATION
ORIENTATION: MID
ORIENTATION: MID

## 2021-08-28 ASSESSMENT — PAIN DESCRIPTION - DESCRIPTORS
DESCRIPTORS: ACHING
DESCRIPTORS: ACHING;CONSTANT;DISCOMFORT

## 2021-08-28 ASSESSMENT — PAIN - FUNCTIONAL ASSESSMENT: PAIN_FUNCTIONAL_ASSESSMENT: PREVENTS OR INTERFERES SOME ACTIVE ACTIVITIES AND ADLS

## 2021-08-28 ASSESSMENT — PAIN DESCRIPTION - FREQUENCY
FREQUENCY: INTERMITTENT
FREQUENCY: INTERMITTENT

## 2021-08-28 ASSESSMENT — PAIN DESCRIPTION - LOCATION
LOCATION: CHEST
LOCATION: CHEST

## 2021-08-28 ASSESSMENT — PAIN DESCRIPTION - PAIN TYPE
TYPE: ACUTE PAIN;SURGICAL PAIN
TYPE: SURGICAL PAIN

## 2021-08-28 ASSESSMENT — PAIN DESCRIPTION - ONSET: ONSET: GRADUAL

## 2021-08-28 ASSESSMENT — PAIN DESCRIPTION - PROGRESSION
CLINICAL_PROGRESSION: GRADUALLY WORSENING
CLINICAL_PROGRESSION: RAPIDLY WORSENING

## 2021-08-28 NOTE — PLAN OF CARE
Problem: Falls - Risk of:  Goal: Will remain free from falls  Description: Will remain free from falls  8/28/2021 1635 by Omar Olivarez RN  Outcome: Met This Shift  8/28/2021 0748 by Gwendolyn Garcia RN  Outcome: Met This Shift  Goal: Absence of physical injury  Description: Absence of physical injury  Outcome: Met This Shift     Problem: Cardiac Output - Decreased:  Goal: Cardiac output within specified parameters  Description: Cardiac output within specified parameters  Outcome: Met This Shift  Goal: Hemodynamic stability will improve  Description: Hemodynamic stability will improve  Outcome: Met This Shift     Problem: Fluid Volume - Imbalance:  Goal: Ability to achieve a balanced intake and output will improve  Description: Ability to achieve a balanced intake and output will improve  8/28/2021 1635 by Omar Olivarez RN  Outcome: Met This Shift  8/28/2021 0748 by Gwendolyn Garcia RN  Outcome: Met This Shift  Goal: Chest tube drainage is within specified parameters  Description: Chest tube drainage is within specified parameters  Outcome: Met This Shift     Problem: Pain:  Description: Pain management should include both nonpharmacologic and pharmacologic interventions.   Goal: Pain level will decrease  Description: Pain level will decrease  Outcome: Met This Shift  Goal: Control of acute pain  Description: Control of acute pain  Outcome: Met This Shift  Goal: Control of chronic pain  Description: Control of chronic pain  Outcome: Met This Shift     Problem: Tissue Perfusion - Cardiopulmonary, Altered:  Goal: Absence of angina  Description: Absence of angina  Outcome: Met This Shift  Goal: Hemodynamic stability will improve  Description: Hemodynamic stability will improve  Outcome: Met This Shift  Goal: Will show no evidence of cardiac arrhythmias  Description: Will show no evidence of cardiac arrhythmias  Outcome: Met This Shift     Problem: Tobacco Use:  Goal: Will participate in inpatient tobacco-use cessation counseling  Description: Will participate in inpatient tobacco-use cessation counseling  Outcome: Met This Shift     Problem: Skin Integrity:  Goal: Will show no infection signs and symptoms  Description: Will show no infection signs and symptoms  Outcome: Met This Shift  Goal: Absence of new skin breakdown  Description: Absence of new skin breakdown  Outcome: Met This Shift     Problem: Pain:  Description: Pain management should include both nonpharmacologic and pharmacologic interventions.   Goal: Pain level will decrease  Description: Pain level will decrease  Outcome: Met This Shift  Goal: Control of acute pain  Description: Control of acute pain  Outcome: Met This Shift  Goal: Control of chronic pain  Description: Control of chronic pain  Outcome: Met This Shift     Problem: Musculor/Skeletal Functional Status  Goal: Highest potential functional level  Outcome: Met This Shift  Goal: Absence of falls  Outcome: Met This Shift

## 2021-08-28 NOTE — PROGRESS NOTES
Date: 8/28/2021    Time: 1:50 AM    Patient Placed On BIPAP/CPAP/ Non-Invasive Ventilation? Yes    If no must comment. Facial area red/color change? No           If YES are Blister/Lesion present? No   If yes must notify nursing staff  BIPAP/CPAP skin barrier?   Yes    Skin barrier type:mepilexlite       Comments:        Wilfred Cherry RCP

## 2021-08-28 NOTE — PROGRESS NOTES
Pulmonary Subsequent Hospital F/U note    Patient is being followed for: post operative respiratory insufficiency with hypoxia     Interval HPI:  Remains on Airvo 70% FiO2 60LPM, however, SpO2 is 98%  She is very anxious  Feels warm and requests a fan  Tmax 100.8  No reported secretions  Nursing has informed me that she has been refusing some respiratory treatments etc     ROS:  +warm/fever, no chills   Denies sputum, hemoptysis  Denies chest pain, edema, palpitations  Denies nausea  No rash  No vomiting  No abdominal pain    Exam:  BP (!) 96/54   Pulse 124   Temp 100.8 °F (38.2 °C) (Bladder)   Resp (!) 31   Ht 5' 2\" (1.575 m)   Wt 191 lb 5.8 oz (86.8 kg)   LMP  (LMP Unknown)   SpO2 95%   BMI 35.00 kg/m²    General: Patient is resting comfortably, no distress, breathing is not labored on high flow oxygen  HEENT: PERRL, EOMI, MMM, no oral lesions  Neck: supple no adenopathy  CV: irregularly irregular   Lungs: bibasilar crackles present   Abd: soft, ND, NT, bowel sounds normal  Ext: warm, no edema    Data:    Oximetry:  SpO2 Readings from Last 1 Encounters:   08/28/21 95%       Imaging personally reviewed by myself:  CXR     Impression   Stable bilateral pleural effusions with bibasilar atelectasis or infiltrates         Pertinent labs reviewed and noted:  Lab Results   Component Value Date    WBC 16.8 08/28/2021    HGB 8.0 08/28/2021    HCT 26.1 08/28/2021    MCV 85.9 08/28/2021    MCH 26.3 08/28/2021    MCHC 30.7 08/28/2021    RDW 16.6 08/28/2021     08/28/2021    MPV 9.9 08/28/2021     Lab Results   Component Value Date     08/28/2021    K 3.7 08/28/2021    K 3.2 06/21/2021     08/28/2021    CO2 24 08/28/2021    BUN 24 08/28/2021    CREATININE 1.0 08/28/2021    LABALBU 4.3 08/23/2021    CALCIUM 8.7 08/28/2021    GFRAA >60 08/28/2021    LABGLOM 53 08/28/2021     Lab Results   Component Value Date    PROTIME 14.5 08/25/2021    INR 1.3 08/25/2021       Assessment:  1.  Post operative respiratory insufficiency with hypoxia  2. Left basilar atelectasis versus pneumonia  3. Pleural effusions  4. CAD s/p CABG x 3 8/25/21  5. Atrial fibrillation with RVR  6. Anxiety    Plan:  1. Day time Airvo and nocturnal NIV - her FiO2 can certainly be weaned at this point  2. EZPAP treatments, incentive spirometry  3. Bronchodilators for airway clearance  4. Awaiting respiratory culture, check U/A with fevers   5. Speech therapy assessment of swallowing  6. Continue Zosyn for now    I discussed the importance of her respiratory treatments etc with her and she is agreeable.      Electronically signed by Thao Purvis MD on 8/28/2021 at 12:53 PM

## 2021-08-28 NOTE — PROGRESS NOTES
Anxious. On Levo overnight- off now. Pulmonary input appreciated. Labs more or less ok. Aspiration? Supportive care.   Isak Diaz MD

## 2021-08-28 NOTE — PLAN OF CARE
Problem: Falls - Risk of:  Goal: Will remain free from falls  Description: Will remain free from falls  8/28/2021 0748 by Garrick Garcia RN  Outcome: Met This Shift  8/27/2021 2034 by Garrick Garcia RN  Outcome: Met This Shift     Problem: Discharge Planning:  Goal: Discharged to appropriate level of care  Description: Discharged to appropriate level of care  Outcome: Not Met This Shift     Problem:  Activity Intolerance:  Goal: Able to perform prescribed physical activity  Description: Able to perform prescribed physical activity  8/28/2021 0748 by Garrick Garcia RN  Outcome: Not Met This Shift  8/27/2021 2034 by Garrick Garcia RN  Outcome: Not Met This Shift  Goal: Ability to tolerate increased activity will improve  Description: Ability to tolerate increased activity will improve  Outcome: Not Met This Shift

## 2021-08-28 NOTE — PLAN OF CARE
Problem: Falls - Risk of:  Goal: Will remain free from falls  Description: Will remain free from falls  8/27/2021 2034 by Omero Chi RN  Outcome: Met This Shift  8/27/2021 1033 by Sathish Hu RN  Outcome: Met This Shift     Problem:  Activity Intolerance:  Goal: Able to perform prescribed physical activity  Description: Able to perform prescribed physical activity  8/27/2021 2034 by Omero Chi RN  Outcome: Not Met This Shift  8/27/2021 1033 by Sathish Hu RN  Outcome: Met This Shift  Goal: Ability to tolerate increased activity will improve  Description: Ability to tolerate increased activity will improve  8/27/2021 2034 by Omero Chi RN  Outcome: Not Met This Shift  8/27/2021 1033 by Sathish Hu RN  Outcome: Met This Shift     Problem: Cardiac Output - Decreased:  Goal: Cardiac output within specified parameters  Description: Cardiac output within specified parameters  8/27/2021 2034 by Omero Chi RN  Outcome: Not Met This Shift  8/27/2021 1033 by Sathish Hu RN  Outcome: Met This Shift  Goal: Hemodynamic stability will improve  Description: Hemodynamic stability will improve  Outcome: Not Met This Shift     Problem: Fluid Volume - Imbalance:  Goal: Chest tube drainage is within specified parameters  Description: Chest tube drainage is within specified parameters  Outcome: Met This Shift     Problem: Tissue Perfusion - Cardiopulmonary, Altered:  Goal: Absence of angina  Description: Absence of angina  Outcome: Met This Shift

## 2021-08-29 ENCOUNTER — APPOINTMENT (OUTPATIENT)
Dept: GENERAL RADIOLOGY | Age: 81
DRG: 003 | End: 2021-08-29
Attending: THORACIC SURGERY (CARDIOTHORACIC VASCULAR SURGERY)
Payer: MEDICARE

## 2021-08-29 LAB
AADO2: 370.1 MMHG
ANION GAP SERPL CALCULATED.3IONS-SCNC: 14 MMOL/L (ref 7–16)
B.E.: -3.4 MMOL/L (ref -3–3)
BUN BLDV-MCNC: 39 MG/DL (ref 6–23)
CALCIUM SERPL-MCNC: 8.5 MG/DL (ref 8.6–10.2)
CHLORIDE BLD-SCNC: 101 MMOL/L (ref 98–107)
CO2: 21 MMOL/L (ref 22–29)
COHB: 0 % (ref 0–1.5)
CREAT SERPL-MCNC: 1.1 MG/DL (ref 0.5–1)
CRITICAL: ABNORMAL
DATE ANALYZED: ABNORMAL
DATE OF COLLECTION: ABNORMAL
FIO2: 70 %
GFR AFRICAN AMERICAN: 58
GFR NON-AFRICAN AMERICAN: 48 ML/MIN/1.73
GLUCOSE BLD-MCNC: 139 MG/DL (ref 74–99)
HCO3: 21.8 MMOL/L (ref 22–26)
HCT VFR BLD CALC: 27.6 % (ref 34–48)
HEMOGLOBIN: 8.5 G/DL (ref 11.5–15.5)
HHB: 8.5 % (ref 0–5)
LAB: ABNORMAL
Lab: ABNORMAL
MAGNESIUM: 2.6 MG/DL (ref 1.6–2.6)
MCH RBC QN AUTO: 26.5 PG (ref 26–35)
MCHC RBC AUTO-ENTMCNC: 30.8 % (ref 32–34.5)
MCV RBC AUTO: 86 FL (ref 80–99.9)
METER GLUCOSE: 116 MG/DL (ref 74–99)
METER GLUCOSE: 130 MG/DL (ref 74–99)
METER GLUCOSE: 140 MG/DL (ref 74–99)
METER GLUCOSE: 146 MG/DL (ref 74–99)
METER GLUCOSE: 147 MG/DL (ref 74–99)
METER GLUCOSE: 154 MG/DL (ref 74–99)
METHB: 0.3 % (ref 0–1.5)
MODE: ABNORMAL
O2 SATURATION: 91.5 % (ref 92–98.5)
O2HB: 91.2 % (ref 94–97)
OPERATOR ID: 2577
PATIENT TEMP: 37 C
PCO2: 39.9 MMHG (ref 35–45)
PDW BLD-RTO: 16.7 FL (ref 11.5–15)
PFO2: 0.98 MMHG/%
PH BLOOD GAS: 7.36 (ref 7.35–7.45)
PLATELET # BLD: 283 E9/L (ref 130–450)
PMV BLD AUTO: 9.3 FL (ref 7–12)
PO2: 68.6 MMHG (ref 75–100)
POTASSIUM SERPL-SCNC: 4.5 MMOL/L (ref 3.5–5)
RBC # BLD: 3.21 E12/L (ref 3.5–5.5)
RI(T): 5.4
SODIUM BLD-SCNC: 136 MMOL/L (ref 132–146)
SOURCE, BLOOD GAS: ABNORMAL
THB: 10.3 G/DL (ref 11.5–16.5)
TIME ANALYZED: 539
WBC # BLD: 15.2 E9/L (ref 4.5–11.5)

## 2021-08-29 PROCEDURE — 85027 COMPLETE CBC AUTOMATED: CPT

## 2021-08-29 PROCEDURE — 6360000002 HC RX W HCPCS: Performed by: THORACIC SURGERY (CARDIOTHORACIC VASCULAR SURGERY)

## 2021-08-29 PROCEDURE — 36415 COLL VENOUS BLD VENIPUNCTURE: CPT

## 2021-08-29 PROCEDURE — 71045 X-RAY EXAM CHEST 1 VIEW: CPT

## 2021-08-29 PROCEDURE — 94640 AIRWAY INHALATION TREATMENT: CPT

## 2021-08-29 PROCEDURE — 82962 GLUCOSE BLOOD TEST: CPT

## 2021-08-29 PROCEDURE — 2700000000 HC OXYGEN THERAPY PER DAY

## 2021-08-29 PROCEDURE — 2580000003 HC RX 258: Performed by: THORACIC SURGERY (CARDIOTHORACIC VASCULAR SURGERY)

## 2021-08-29 PROCEDURE — 6360000002 HC RX W HCPCS: Performed by: NURSE PRACTITIONER

## 2021-08-29 PROCEDURE — 2000000000 HC ICU R&B

## 2021-08-29 PROCEDURE — 80048 BASIC METABOLIC PNL TOTAL CA: CPT

## 2021-08-29 PROCEDURE — 94660 CPAP INITIATION&MGMT: CPT

## 2021-08-29 PROCEDURE — 82805 BLOOD GASES W/O2 SATURATION: CPT

## 2021-08-29 PROCEDURE — 83735 ASSAY OF MAGNESIUM: CPT

## 2021-08-29 PROCEDURE — 37799 UNLISTED PX VASCULAR SURGERY: CPT

## 2021-08-29 PROCEDURE — 2580000003 HC RX 258: Performed by: NURSE PRACTITIONER

## 2021-08-29 PROCEDURE — 6370000000 HC RX 637 (ALT 250 FOR IP): Performed by: THORACIC SURGERY (CARDIOTHORACIC VASCULAR SURGERY)

## 2021-08-29 PROCEDURE — 6370000000 HC RX 637 (ALT 250 FOR IP): Performed by: NURSE PRACTITIONER

## 2021-08-29 PROCEDURE — 2580000003 HC RX 258

## 2021-08-29 RX ORDER — AMIODARONE HYDROCHLORIDE 50 MG/ML
INJECTION, SOLUTION INTRAVENOUS
Status: DISPENSED
Start: 2021-08-29 | End: 2021-08-29

## 2021-08-29 RX ADMIN — Medication 400 MG: at 09:18

## 2021-08-29 RX ADMIN — PRAMIPEXOLE DIHYDROCHLORIDE 0.5 MG: 0.25 TABLET ORAL at 09:18

## 2021-08-29 RX ADMIN — ONDANSETRON 4 MG: 2 INJECTION INTRAMUSCULAR; INTRAVENOUS at 01:32

## 2021-08-29 RX ADMIN — ASPIRIN 81 MG: 81 TABLET, COATED ORAL at 09:18

## 2021-08-29 RX ADMIN — AMIODARONE HYDROCHLORIDE 0.5 MG/MIN: 50 INJECTION, SOLUTION INTRAVENOUS at 10:47

## 2021-08-29 RX ADMIN — PIPERACILLIN AND TAZOBACTAM 3375 MG: 3; .375 INJECTION, POWDER, LYOPHILIZED, FOR SOLUTION INTRAVENOUS at 01:32

## 2021-08-29 RX ADMIN — Medication 10 ML: at 09:19

## 2021-08-29 RX ADMIN — PRAMIPEXOLE DIHYDROCHLORIDE 0.5 MG: 0.25 TABLET ORAL at 14:28

## 2021-08-29 RX ADMIN — LORAZEPAM 0.5 MG: 2 INJECTION INTRAMUSCULAR; INTRAVENOUS at 23:39

## 2021-08-29 RX ADMIN — DEXTROSE MONOHYDRATE 150 MG: 50 INJECTION, SOLUTION INTRAVENOUS at 11:33

## 2021-08-29 RX ADMIN — INSULIN LISPRO 2 UNITS: 100 INJECTION, SOLUTION INTRAVENOUS; SUBCUTANEOUS at 20:37

## 2021-08-29 RX ADMIN — IPRATROPIUM BROMIDE AND ALBUTEROL SULFATE 1 AMPULE: .5; 2.5 SOLUTION RESPIRATORY (INHALATION) at 08:34

## 2021-08-29 RX ADMIN — Medication 10 ML: at 20:38

## 2021-08-29 RX ADMIN — METOPROLOL TARTRATE 12.5 MG: 25 TABLET, FILM COATED ORAL at 09:19

## 2021-08-29 RX ADMIN — IPRATROPIUM BROMIDE AND ALBUTEROL SULFATE 1 AMPULE: .5; 2.5 SOLUTION RESPIRATORY (INHALATION) at 15:49

## 2021-08-29 RX ADMIN — MUPIROCIN: 20 OINTMENT TOPICAL at 20:38

## 2021-08-29 RX ADMIN — AMIODARONE HYDROCHLORIDE 1 MG/MIN: 50 INJECTION, SOLUTION INTRAVENOUS at 19:32

## 2021-08-29 RX ADMIN — ATORVASTATIN CALCIUM 10 MG: 10 TABLET, FILM COATED ORAL at 20:38

## 2021-08-29 RX ADMIN — SODIUM CHLORIDE: 9 INJECTION, SOLUTION INTRAVENOUS at 12:30

## 2021-08-29 RX ADMIN — PRAMIPEXOLE DIHYDROCHLORIDE 0.5 MG: 0.25 TABLET ORAL at 20:38

## 2021-08-29 RX ADMIN — DOCUSATE SODIUM 50 MG AND SENNOSIDES 8.6 MG 1 TABLET: 8.6; 5 TABLET, FILM COATED ORAL at 09:18

## 2021-08-29 RX ADMIN — PIPERACILLIN AND TAZOBACTAM 3375 MG: 3; .375 INJECTION, POWDER, LYOPHILIZED, FOR SOLUTION INTRAVENOUS at 15:51

## 2021-08-29 RX ADMIN — IPRATROPIUM BROMIDE AND ALBUTEROL SULFATE 1 AMPULE: .5; 2.5 SOLUTION RESPIRATORY (INHALATION) at 19:36

## 2021-08-29 RX ADMIN — PANTOPRAZOLE SODIUM 40 MG: 40 TABLET, DELAYED RELEASE ORAL at 09:18

## 2021-08-29 RX ADMIN — INSULIN LISPRO 2 UNITS: 100 INJECTION, SOLUTION INTRAVENOUS; SUBCUTANEOUS at 05:28

## 2021-08-29 RX ADMIN — INSULIN LISPRO 2 UNITS: 100 INJECTION, SOLUTION INTRAVENOUS; SUBCUTANEOUS at 11:54

## 2021-08-29 RX ADMIN — DOCUSATE SODIUM 50 MG AND SENNOSIDES 8.6 MG 1 TABLET: 8.6; 5 TABLET, FILM COATED ORAL at 20:38

## 2021-08-29 RX ADMIN — METOPROLOL TARTRATE 12.5 MG: 25 TABLET, FILM COATED ORAL at 20:38

## 2021-08-29 RX ADMIN — INSULIN GLARGINE 12 UNITS: 100 INJECTION, SOLUTION SUBCUTANEOUS at 20:37

## 2021-08-29 RX ADMIN — ACETAMINOPHEN 650 MG: 325 TABLET ORAL at 19:06

## 2021-08-29 RX ADMIN — ENOXAPARIN SODIUM 40 MG: 40 INJECTION SUBCUTANEOUS at 09:18

## 2021-08-29 RX ADMIN — SODIUM CHLORIDE 30 ML/HR: 9 INJECTION, SOLUTION INTRAVENOUS at 10:46

## 2021-08-29 RX ADMIN — SODIUM CHLORIDE: 9 INJECTION, SOLUTION INTRAVENOUS at 05:29

## 2021-08-29 RX ADMIN — INSULIN LISPRO 2 UNITS: 100 INJECTION, SOLUTION INTRAVENOUS; SUBCUTANEOUS at 01:46

## 2021-08-29 RX ADMIN — IPRATROPIUM BROMIDE AND ALBUTEROL SULFATE 1 AMPULE: .5; 2.5 SOLUTION RESPIRATORY (INHALATION) at 12:11

## 2021-08-29 RX ADMIN — LORAZEPAM 0.5 MG: 2 INJECTION INTRAMUSCULAR; INTRAVENOUS at 19:38

## 2021-08-29 RX ADMIN — PIPERACILLIN AND TAZOBACTAM 3375 MG: 3; .375 INJECTION, POWDER, LYOPHILIZED, FOR SOLUTION INTRAVENOUS at 09:18

## 2021-08-29 RX ADMIN — SODIUM CHLORIDE: 9 INJECTION, SOLUTION INTRAVENOUS at 20:00

## 2021-08-29 RX ADMIN — LORAZEPAM 0.5 MG: 2 INJECTION INTRAMUSCULAR; INTRAVENOUS at 09:42

## 2021-08-29 RX ADMIN — MUPIROCIN: 20 OINTMENT TOPICAL at 09:18

## 2021-08-29 ASSESSMENT — PAIN SCALES - GENERAL
PAINLEVEL_OUTOF10: 0

## 2021-08-29 ASSESSMENT — PULMONARY FUNCTION TESTS: PEFR_L/MIN: 31

## 2021-08-29 NOTE — PROGRESS NOTES
Pulmonary Subsequent Hospital F/U note    Patient is being followed for: post operative respiratory insufficiency with hypoxia     Interval HPI:  Remains on Airvo 68% FiO2 60LPM, however, SpO2 is 92%  She was just sedated with ativan  Did wear BiPAP for about 3.5 hours this afternoon  Last temp was 8/28 at [facility]  No reported secretions  She is requiring a lot of coaching with spirometry, EZPAP, therapy etc.    ROS:  Unable to obtain full ROS as she was just sedated     Exam:  /63   Pulse 122   Temp 99 °F (37.2 °C) (Bladder)   Resp 28   Ht 5' 2\" (1.575 m)   Wt 198 lb 3.1 oz (89.9 kg)   LMP  (LMP Unknown)   SpO2 92%   BMI 36.25 kg/m²    General: Patient is resting comfortably, no distress, breathing is not labored on high flow oxygen  HEENT: PERRL, EOMI, MMM, no oral lesions  Neck: supple no adenopathy  CV: irregularly irregular   Lungs: bibasilar crackles present   Abd: soft, ND, NT, bowel sounds normal  Ext: warm, no edema    Data:    Oximetry:  SpO2 Readings from Last 1 Encounters:   08/29/21 92%       Imaging personally reviewed by myself:  CXR  Bilateral effusions  Aeration is improved compared to previous imaging       Pertinent labs reviewed and noted:  Lab Results   Component Value Date    WBC 15.2 08/29/2021    HGB 8.5 08/29/2021    HCT 27.6 08/29/2021    MCV 86.0 08/29/2021    MCH 26.5 08/29/2021    MCHC 30.8 08/29/2021    RDW 16.7 08/29/2021     08/29/2021    MPV 9.3 08/29/2021     Lab Results   Component Value Date     08/29/2021    K 4.5 08/29/2021    K 3.2 06/21/2021     08/29/2021    CO2 21 08/29/2021    BUN 39 08/29/2021    CREATININE 1.1 08/29/2021    LABALBU 4.3 08/23/2021    CALCIUM 8.5 08/29/2021    GFRAA 58 08/29/2021    LABGLOM 48 08/29/2021     Lab Results   Component Value Date    PROTIME 14.5 08/25/2021    INR 1.3 08/25/2021       Assessment:  1. Post operative respiratory insufficiency with hypoxia  2. Left basilar atelectasis versus pneumonia  3.  Pleural effusions  4. CAD s/p CABG x 3 8/25/21  5. Atrial fibrillation with RVR  6. Anxiety    Plan:  1. Wean FiO2 as able on the airvo. I do feel the positive pressure via NIV will help inflate her lung fields. Discussed with nursing and they have been attempting to place this, however, the patient is quite resistant  2. EZPAP treatments, incentive spirometry  3. Bronchodilators for airway clearance  4. CXR with improved aeration  5. Speech therapy assessment of swallowing - pending   6. Continue Zosyn for now    Ultimately needs to participate more in therapy, EZPAP, incentive, NIV to move her respiratory status along. Ideally out to chair today or tomorrow to help facilitate lung expansion.      Electronically signed by Jael Felix MD on 8/29/2021 at 12:42 PM Ensure enlive 8oz po TID

## 2021-08-29 NOTE — PROGRESS NOTES
Patient does not want to go on the BIPAP. Patient states she is comfortable on the Airvo at this time.  SPO2 95% on the Airvo at 70% FIO2 and 60 LPM flow

## 2021-08-29 NOTE — PLAN OF CARE
Problem: Falls - Risk of:  Goal: Will remain free from falls  Description: Will remain free from falls  8/29/2021 0019 by Seda Miller RN  Outcome: Met This Shift  8/28/2021 1635 by Pari Starr RN  Outcome: Met This Shift  Goal: Absence of physical injury  Description: Absence of physical injury  8/29/2021 0019 by Seda Miller RN  Outcome: Met This Shift  8/28/2021 1635 by Pari Starr RN  Outcome: Met This Shift     Problem: Discharge Planning:  Goal: Discharged to appropriate level of care  Description: Discharged to appropriate level of care  8/29/2021 0019 by Seda Miller RN  Outcome: Ongoing  8/28/2021 1635 by Pari Starr RN  Outcome: Not Met This Shift     Problem:  Activity Intolerance:  Goal: Able to perform prescribed physical activity  Description: Able to perform prescribed physical activity  8/29/2021 0019 by Seda Miller RN  Outcome: Ongoing  8/28/2021 1635 by Pari Starr RN  Outcome: Not Met This Shift  Goal: Ability to tolerate increased activity will improve  Description: Ability to tolerate increased activity will improve  8/29/2021 0019 by Seda Miller RN  Outcome: Ongoing  8/28/2021 1635 by Pari Starr RN  Outcome: Not Met This Shift     Problem: Anxiety:  Goal: Level of anxiety will decrease  Description: Level of anxiety will decrease  8/29/2021 0019 by Seda Miller RN  Outcome: Met This Shift  8/28/2021 1635 by Pari Starr RN  Outcome: Not Met This Shift     Problem: Cardiac Output - Decreased:  Goal: Cardiac output within specified parameters  Description: Cardiac output within specified parameters  8/29/2021 0019 by Seda Miller RN  Outcome: Met This Shift  8/28/2021 1635 by Pari Starr RN  Outcome: Met This Shift  Goal: Hemodynamic stability will improve  Description: Hemodynamic stability will improve  8/29/2021 0019 by Seda Miller RN  Outcome: Met This Shift  8/28/2021 1635 by Pari Starr RN  Outcome: Met This Shift     Problem: RN  Outcome: Met This Shift  Goal: Will show no evidence of cardiac arrhythmias  Description: Will show no evidence of cardiac arrhythmias  8/28/2021 1635 by Roxane Epstein RN  Outcome: Met This Shift     Problem: Tobacco Use:  Goal: Will participate in inpatient tobacco-use cessation counseling  Description: Will participate in inpatient tobacco-use cessation counseling  8/28/2021 1635 by Roxane Epstein RN  Outcome: Met This Shift     Problem: Skin Integrity:  Goal: Will show no infection signs and symptoms  Description: Will show no infection signs and symptoms  8/29/2021 0019 by Tyra Retana RN  Outcome: Met This Shift  8/28/2021 1635 by Roxane Epstein RN  Outcome: Met This Shift  Goal: Absence of new skin breakdown  Description: Absence of new skin breakdown  8/29/2021 0019 by Tyra Retana RN  Outcome: Met This Shift  8/28/2021 1635 by Roxane Epstein RN  Outcome: Met This Shift     Problem: Pain:  Goal: Pain level will decrease  Description: Pain level will decrease  8/29/2021 0019 by Tyra Rteana RN  Outcome: Met This Shift  8/28/2021 1635 by Roxane Epstein RN  Outcome: Met This Shift  Goal: Control of acute pain  Description: Control of acute pain  8/29/2021 0019 by Tyra Retana RN  Outcome: Met This Shift  8/28/2021 1635 by Roxane Epstein RN  Outcome: Met This Shift  Goal: Control of chronic pain  Description: Control of chronic pain  8/28/2021 1635 by Roxane Epstein RN  Outcome: Met This Shift     Problem: Musculor/Skeletal Functional Status  Goal: Highest potential functional level  8/29/2021 0019 by Tyra Retana RN  Outcome: Ongoing  8/28/2021 1635 by Roxane Epstein RN  Outcome: Met This Shift  Goal: Absence of falls  8/29/2021 0019 by Tyra Retana RN  Outcome: Met This Shift  8/28/2021 1635 by Roxane Epstein RN  Outcome: Met This Shift     Problem: ABCDS Injury Assessment  Goal: Absence of physical injury  Outcome: Met This Shift

## 2021-08-29 NOTE — PROGRESS NOTES
Remains on HFNC   Appreciate pulmonary input  No pressors    Continue abx   Wean O2 and await improvement in her resp status

## 2021-08-29 NOTE — PROGRESS NOTES
08/29/21 1936   NIV Type   Skin Assessment Clean, dry, & intact   Skin Protection for O2 Device Yes   Orientation Middle   Location Nose   Equipment ID [id number]   NIV Started/Stopped On   Equipment Type [email or web address]   Mode Bilevel   Mask Type Full face mask   Mask Size Medium   Bonnet size Medium   Settings/Measurements   IPAP 16 cmH20   CPAP/EPAP 8 cmH2O   Resp (!) 33   FiO2  60 %   Vt Exhaled 431 mL   Minute Volume 12 Liters   Comfort Level Fair   Date: 8/29/2021    Time: 7:41 PM    Patient Placed On BIPAP/CPAP/ Non-Invasive Ventilation? Yes    If no must comment. Facial area red/color change? No           If YES are Blister/Lesion present? No   If yes must notify nursing staff  BIPAP/CPAP skin barrier?   Yes    Skin barrier type:mepilexlite       Comments:        Beronica Roland RCP

## 2021-08-29 NOTE — PLAN OF CARE
Problem: Cardiac Output - Decreased:  Goal: Cardiac output within specified parameters  Description: Cardiac output within specified parameters  8/29/2021 0745 by Sarah Cabrera RN  Outcome: Met This Shift  Note: Monitor vital signs adm meds as ordered monitor fluid balance and lab values  8/29/2021 0019 by Danica Gale RN  Outcome: Met This Shift     Problem: Gas Exchange - Impaired:  Goal: Levels of oxygenation will improve  Description: Levels of oxygenation will improve  8/29/2021 0745 by Sarah Cabrera RN  Outcome: Met This Shift  Note: Monitor sao2 and abgs encoutrage pt to cough and deep breathe encourage use of smi  8/29/2021 0019 by Danica Gale RN  Outcome: Ongoing

## 2021-08-30 ENCOUNTER — APPOINTMENT (OUTPATIENT)
Dept: GENERAL RADIOLOGY | Age: 81
DRG: 003 | End: 2021-08-30
Attending: THORACIC SURGERY (CARDIOTHORACIC VASCULAR SURGERY)
Payer: MEDICARE

## 2021-08-30 LAB
AADO2: 363.4 MMHG
ANION GAP SERPL CALCULATED.3IONS-SCNC: 15 MMOL/L (ref 7–16)
ANION GAP SERPL CALCULATED.3IONS-SCNC: 16 MMOL/L (ref 7–16)
B.E.: -5.3 MMOL/L (ref -3–3)
BLOOD BANK DISPENSE STATUS: NORMAL
BLOOD BANK PRODUCT CODE: NORMAL
BPU ID: NORMAL
BUN BLDV-MCNC: 55 MG/DL (ref 6–23)
BUN BLDV-MCNC: 56 MG/DL (ref 6–23)
CALCIUM SERPL-MCNC: 8.1 MG/DL (ref 8.6–10.2)
CALCIUM SERPL-MCNC: 8.4 MG/DL (ref 8.6–10.2)
CHLORIDE BLD-SCNC: 96 MMOL/L (ref 98–107)
CHLORIDE BLD-SCNC: 97 MMOL/L (ref 98–107)
CO2: 20 MMOL/L (ref 22–29)
CO2: 20 MMOL/L (ref 22–29)
COHB: 0.2 % (ref 0–1.5)
CREAT SERPL-MCNC: 1.2 MG/DL (ref 0.5–1)
CREAT SERPL-MCNC: 1.2 MG/DL (ref 0.5–1)
CRITICAL: ABNORMAL
DATE ANALYZED: ABNORMAL
DATE OF COLLECTION: ABNORMAL
DESCRIPTION BLOOD BANK: NORMAL
FIO2: 70 %
GFR AFRICAN AMERICAN: 52
GFR AFRICAN AMERICAN: 52
GFR NON-AFRICAN AMERICAN: 43 ML/MIN/1.73
GFR NON-AFRICAN AMERICAN: 43 ML/MIN/1.73
GLUCOSE BLD-MCNC: 136 MG/DL (ref 74–99)
GLUCOSE BLD-MCNC: 147 MG/DL (ref 74–99)
HCO3: 19.4 MMOL/L (ref 22–26)
HCT VFR BLD CALC: 27.2 % (ref 34–48)
HEMOGLOBIN: 8.2 G/DL (ref 11.5–15.5)
HHB: 5.7 % (ref 0–5)
LAB: ABNORMAL
Lab: ABNORMAL
MAGNESIUM: 2.6 MG/DL (ref 1.6–2.6)
MCH RBC QN AUTO: 26.2 PG (ref 26–35)
MCHC RBC AUTO-ENTMCNC: 30.1 % (ref 32–34.5)
MCV RBC AUTO: 86.9 FL (ref 80–99.9)
METER GLUCOSE: 119 MG/DL (ref 74–99)
METER GLUCOSE: 120 MG/DL (ref 74–99)
METER GLUCOSE: 121 MG/DL (ref 74–99)
METER GLUCOSE: 133 MG/DL (ref 74–99)
METER GLUCOSE: 163 MG/DL (ref 74–99)
METHB: 0.3 % (ref 0–1.5)
MODE: ABNORMAL
O2 SATURATION: 94.3 % (ref 92–98.5)
O2HB: 93.8 % (ref 94–97)
OPERATOR ID: 2577
PATIENT TEMP: 37 C
PCO2: 34.4 MMHG (ref 35–45)
PDW BLD-RTO: 16.8 FL (ref 11.5–15)
PEEP/CPAP: 8 CMH2O
PFO2: 1.16 MMHG/%
PH BLOOD GAS: 7.37 (ref 7.35–7.45)
PLATELET # BLD: 309 E9/L (ref 130–450)
PMV BLD AUTO: 9.8 FL (ref 7–12)
PO2: 81.2 MMHG (ref 75–100)
POTASSIUM SERPL-SCNC: 4 MMOL/L (ref 3.5–5)
POTASSIUM SERPL-SCNC: 4.4 MMOL/L (ref 3.5–5)
PS: 15 CMH20
RBC # BLD: 3.13 E12/L (ref 3.5–5.5)
RI(T): 4.48
SODIUM BLD-SCNC: 131 MMOL/L (ref 132–146)
SODIUM BLD-SCNC: 133 MMOL/L (ref 132–146)
SOURCE, BLOOD GAS: ABNORMAL
THB: 9.6 G/DL (ref 11.5–16.5)
TIME ANALYZED: 436
WBC # BLD: 16.1 E9/L (ref 4.5–11.5)

## 2021-08-30 PROCEDURE — 97535 SELF CARE MNGMENT TRAINING: CPT

## 2021-08-30 PROCEDURE — 36415 COLL VENOUS BLD VENIPUNCTURE: CPT

## 2021-08-30 PROCEDURE — 2700000000 HC OXYGEN THERAPY PER DAY

## 2021-08-30 PROCEDURE — 71045 X-RAY EXAM CHEST 1 VIEW: CPT

## 2021-08-30 PROCEDURE — 6370000000 HC RX 637 (ALT 250 FOR IP): Performed by: THORACIC SURGERY (CARDIOTHORACIC VASCULAR SURGERY)

## 2021-08-30 PROCEDURE — 2580000003 HC RX 258: Performed by: NURSE PRACTITIONER

## 2021-08-30 PROCEDURE — 97530 THERAPEUTIC ACTIVITIES: CPT

## 2021-08-30 PROCEDURE — 99233 SBSQ HOSP IP/OBS HIGH 50: CPT | Performed by: NURSE PRACTITIONER

## 2021-08-30 PROCEDURE — 80048 BASIC METABOLIC PNL TOTAL CA: CPT

## 2021-08-30 PROCEDURE — 83735 ASSAY OF MAGNESIUM: CPT

## 2021-08-30 PROCEDURE — 2580000003 HC RX 258: Performed by: THORACIC SURGERY (CARDIOTHORACIC VASCULAR SURGERY)

## 2021-08-30 PROCEDURE — 6370000000 HC RX 637 (ALT 250 FOR IP): Performed by: NURSE PRACTITIONER

## 2021-08-30 PROCEDURE — 2000000000 HC ICU R&B

## 2021-08-30 PROCEDURE — 92610 EVALUATE SWALLOWING FUNCTION: CPT | Performed by: SPEECH-LANGUAGE PATHOLOGIST

## 2021-08-30 PROCEDURE — 02HV33Z INSERTION OF INFUSION DEVICE INTO SUPERIOR VENA CAVA, PERCUTANEOUS APPROACH: ICD-10-PCS | Performed by: THORACIC SURGERY (CARDIOTHORACIC VASCULAR SURGERY)

## 2021-08-30 PROCEDURE — 94660 CPAP INITIATION&MGMT: CPT

## 2021-08-30 PROCEDURE — 76937 US GUIDE VASCULAR ACCESS: CPT

## 2021-08-30 PROCEDURE — 2500000003 HC RX 250 WO HCPCS: Performed by: NURSE PRACTITIONER

## 2021-08-30 PROCEDURE — 82805 BLOOD GASES W/O2 SATURATION: CPT

## 2021-08-30 PROCEDURE — 92526 ORAL FUNCTION THERAPY: CPT | Performed by: SPEECH-LANGUAGE PATHOLOGIST

## 2021-08-30 PROCEDURE — 6360000002 HC RX W HCPCS: Performed by: THORACIC SURGERY (CARDIOTHORACIC VASCULAR SURGERY)

## 2021-08-30 PROCEDURE — 85027 COMPLETE CBC AUTOMATED: CPT

## 2021-08-30 PROCEDURE — 82962 GLUCOSE BLOOD TEST: CPT

## 2021-08-30 PROCEDURE — C1751 CATH, INF, PER/CENT/MIDLINE: HCPCS

## 2021-08-30 PROCEDURE — 36569 INSJ PICC 5 YR+ W/O IMAGING: CPT

## 2021-08-30 PROCEDURE — 94640 AIRWAY INHALATION TREATMENT: CPT

## 2021-08-30 PROCEDURE — 37799 UNLISTED PX VASCULAR SURGERY: CPT

## 2021-08-30 PROCEDURE — 6360000002 HC RX W HCPCS: Performed by: NURSE PRACTITIONER

## 2021-08-30 PROCEDURE — 2580000003 HC RX 258

## 2021-08-30 RX ORDER — HEPARIN SODIUM (PORCINE) LOCK FLUSH IV SOLN 100 UNIT/ML 100 UNIT/ML
3 SOLUTION INTRAVENOUS EVERY 12 HOURS SCHEDULED
Status: DISCONTINUED | OUTPATIENT
Start: 2021-08-30 | End: 2021-09-09

## 2021-08-30 RX ORDER — BISACODYL 10 MG
10 SUPPOSITORY, RECTAL RECTAL 2 TIMES DAILY
Status: DISCONTINUED | OUTPATIENT
Start: 2021-08-30 | End: 2021-09-15

## 2021-08-30 RX ORDER — LORAZEPAM 2 MG/ML
0.5 INJECTION INTRAMUSCULAR EVERY 4 HOURS PRN
Status: DISCONTINUED | OUTPATIENT
Start: 2021-08-30 | End: 2021-09-15

## 2021-08-30 RX ORDER — OXYCODONE HYDROCHLORIDE 5 MG/1
5 TABLET ORAL EVERY 4 HOURS PRN
Status: DISCONTINUED | OUTPATIENT
Start: 2021-08-30 | End: 2021-09-02

## 2021-08-30 RX ORDER — SODIUM CHLORIDE 9 MG/ML
25 INJECTION, SOLUTION INTRAVENOUS PRN
Status: DISCONTINUED | OUTPATIENT
Start: 2021-08-30 | End: 2021-09-16 | Stop reason: HOSPADM

## 2021-08-30 RX ORDER — SODIUM CHLORIDE 0.9 % (FLUSH) 0.9 %
5-40 SYRINGE (ML) INJECTION EVERY 12 HOURS SCHEDULED
Status: DISCONTINUED | OUTPATIENT
Start: 2021-08-30 | End: 2021-09-16 | Stop reason: HOSPADM

## 2021-08-30 RX ORDER — LIDOCAINE HYDROCHLORIDE 10 MG/ML
5 INJECTION, SOLUTION EPIDURAL; INFILTRATION; INTRACAUDAL; PERINEURAL ONCE
Status: COMPLETED | OUTPATIENT
Start: 2021-08-30 | End: 2021-08-30

## 2021-08-30 RX ORDER — HEPARIN SODIUM (PORCINE) LOCK FLUSH IV SOLN 100 UNIT/ML 100 UNIT/ML
3 SOLUTION INTRAVENOUS PRN
Status: DISCONTINUED | OUTPATIENT
Start: 2021-08-30 | End: 2021-09-09

## 2021-08-30 RX ORDER — SODIUM CHLORIDE 0.9 % (FLUSH) 0.9 %
5-40 SYRINGE (ML) INJECTION PRN
Status: DISCONTINUED | OUTPATIENT
Start: 2021-08-30 | End: 2021-09-16 | Stop reason: HOSPADM

## 2021-08-30 RX ADMIN — Medication 400 MG: at 08:53

## 2021-08-30 RX ADMIN — SODIUM CHLORIDE 30 ML/HR: 9 INJECTION, SOLUTION INTRAVENOUS at 17:31

## 2021-08-30 RX ADMIN — POTASSIUM CHLORIDE 20 MEQ: 400 INJECTION, SOLUTION INTRAVENOUS at 16:34

## 2021-08-30 RX ADMIN — SODIUM CHLORIDE, PRESERVATIVE FREE 300 UNITS: 5 INJECTION INTRAVENOUS at 20:30

## 2021-08-30 RX ADMIN — IPRATROPIUM BROMIDE AND ALBUTEROL SULFATE 1 AMPULE: .5; 2.5 SOLUTION RESPIRATORY (INHALATION) at 08:07

## 2021-08-30 RX ADMIN — LORAZEPAM 0.5 MG: 2 INJECTION INTRAMUSCULAR; INTRAVENOUS at 03:21

## 2021-08-30 RX ADMIN — PRAMIPEXOLE DIHYDROCHLORIDE 0.5 MG: 0.25 TABLET ORAL at 13:09

## 2021-08-30 RX ADMIN — INSULIN GLARGINE 12 UNITS: 100 INJECTION, SOLUTION SUBCUTANEOUS at 20:30

## 2021-08-30 RX ADMIN — PRAMIPEXOLE DIHYDROCHLORIDE 0.5 MG: 0.25 TABLET ORAL at 08:53

## 2021-08-30 RX ADMIN — IPRATROPIUM BROMIDE AND ALBUTEROL SULFATE 1 AMPULE: .5; 2.5 SOLUTION RESPIRATORY (INHALATION) at 21:15

## 2021-08-30 RX ADMIN — AMIODARONE HYDROCHLORIDE 0.5 MG/MIN: 50 INJECTION, SOLUTION INTRAVENOUS at 13:28

## 2021-08-30 RX ADMIN — IPRATROPIUM BROMIDE AND ALBUTEROL SULFATE 1 AMPULE: .5; 2.5 SOLUTION RESPIRATORY (INHALATION) at 16:46

## 2021-08-30 RX ADMIN — Medication 10 ML: at 20:25

## 2021-08-30 RX ADMIN — CALCIUM GLUCONATE 1000 MG: 98 INJECTION, SOLUTION INTRAVENOUS at 17:31

## 2021-08-30 RX ADMIN — PRAMIPEXOLE DIHYDROCHLORIDE 0.5 MG: 0.25 TABLET ORAL at 20:25

## 2021-08-30 RX ADMIN — DOCUSATE SODIUM 50 MG AND SENNOSIDES 8.6 MG 1 TABLET: 8.6; 5 TABLET, FILM COATED ORAL at 20:25

## 2021-08-30 RX ADMIN — PIPERACILLIN AND TAZOBACTAM 3375 MG: 3; .375 INJECTION, POWDER, LYOPHILIZED, FOR SOLUTION INTRAVENOUS at 16:14

## 2021-08-30 RX ADMIN — ASPIRIN 81 MG: 81 TABLET, COATED ORAL at 08:54

## 2021-08-30 RX ADMIN — LORAZEPAM 0.5 MG: 2 INJECTION INTRAMUSCULAR; INTRAVENOUS at 20:26

## 2021-08-30 RX ADMIN — ATORVASTATIN CALCIUM 10 MG: 10 TABLET, FILM COATED ORAL at 20:25

## 2021-08-30 RX ADMIN — METOPROLOL TARTRATE 12.5 MG: 25 TABLET, FILM COATED ORAL at 08:53

## 2021-08-30 RX ADMIN — DOCUSATE SODIUM 50 MG AND SENNOSIDES 8.6 MG 1 TABLET: 8.6; 5 TABLET, FILM COATED ORAL at 08:53

## 2021-08-30 RX ADMIN — METOPROLOL TARTRATE 12.5 MG: 25 TABLET, FILM COATED ORAL at 20:25

## 2021-08-30 RX ADMIN — SODIUM CHLORIDE: 9 INJECTION, SOLUTION INTRAVENOUS at 06:30

## 2021-08-30 RX ADMIN — BISACODYL 10 MG: 10 SUPPOSITORY RECTAL at 20:25

## 2021-08-30 RX ADMIN — ENOXAPARIN SODIUM 40 MG: 40 INJECTION SUBCUTANEOUS at 08:52

## 2021-08-30 RX ADMIN — SODIUM CHLORIDE: 9 INJECTION, SOLUTION INTRAVENOUS at 13:05

## 2021-08-30 RX ADMIN — PIPERACILLIN AND TAZOBACTAM 3375 MG: 3; .375 INJECTION, POWDER, LYOPHILIZED, FOR SOLUTION INTRAVENOUS at 02:14

## 2021-08-30 RX ADMIN — CALCIUM GLUCONATE 1000 MG: 98 INJECTION, SOLUTION INTRAVENOUS at 08:52

## 2021-08-30 RX ADMIN — LIDOCAINE HYDROCHLORIDE 5 ML: 10 INJECTION, SOLUTION EPIDURAL; INFILTRATION; INTRACAUDAL; PERINEURAL at 16:25

## 2021-08-30 RX ADMIN — ACETAMINOPHEN 650 MG: 325 TABLET ORAL at 09:19

## 2021-08-30 RX ADMIN — PANTOPRAZOLE SODIUM 40 MG: 40 TABLET, DELAYED RELEASE ORAL at 08:53

## 2021-08-30 RX ADMIN — INSULIN LISPRO 2 UNITS: 100 INJECTION, SOLUTION INTRAVENOUS; SUBCUTANEOUS at 05:30

## 2021-08-30 RX ADMIN — Medication 10 ML: at 08:52

## 2021-08-30 RX ADMIN — PIPERACILLIN AND TAZOBACTAM 3375 MG: 3; .375 INJECTION, POWDER, LYOPHILIZED, FOR SOLUTION INTRAVENOUS at 08:52

## 2021-08-30 RX ADMIN — IPRATROPIUM BROMIDE AND ALBUTEROL SULFATE 1 AMPULE: .5; 2.5 SOLUTION RESPIRATORY (INHALATION) at 11:55

## 2021-08-30 RX ADMIN — SODIUM CHLORIDE: 9 INJECTION, SOLUTION INTRAVENOUS at 20:25

## 2021-08-30 ASSESSMENT — PAIN DESCRIPTION - DESCRIPTORS
DESCRIPTORS: ACHING;CONSTANT;DISCOMFORT
DESCRIPTORS: ACHING;CONSTANT;DISCOMFORT

## 2021-08-30 ASSESSMENT — PAIN - FUNCTIONAL ASSESSMENT
PAIN_FUNCTIONAL_ASSESSMENT: PREVENTS OR INTERFERES SOME ACTIVE ACTIVITIES AND ADLS
PAIN_FUNCTIONAL_ASSESSMENT: PREVENTS OR INTERFERES SOME ACTIVE ACTIVITIES AND ADLS

## 2021-08-30 ASSESSMENT — PAIN SCALES - GENERAL
PAINLEVEL_OUTOF10: 0
PAINLEVEL_OUTOF10: 0
PAINLEVEL_OUTOF10: 6
PAINLEVEL_OUTOF10: 0
PAINLEVEL_OUTOF10: 8
PAINLEVEL_OUTOF10: 8
PAINLEVEL_OUTOF10: 0
PAINLEVEL_OUTOF10: 9
PAINLEVEL_OUTOF10: 9
PAINLEVEL_OUTOF10: 0
PAINLEVEL_OUTOF10: 0

## 2021-08-30 ASSESSMENT — PAIN DESCRIPTION - PROGRESSION
CLINICAL_PROGRESSION: NOT CHANGED

## 2021-08-30 ASSESSMENT — PAIN DESCRIPTION - FREQUENCY
FREQUENCY: CONTINUOUS
FREQUENCY: CONTINUOUS

## 2021-08-30 ASSESSMENT — PAIN DESCRIPTION - LOCATION
LOCATION: CHEST;ABDOMEN
LOCATION: CHEST;ABDOMEN

## 2021-08-30 ASSESSMENT — PAIN DESCRIPTION - ORIENTATION
ORIENTATION: ANTERIOR;MID
ORIENTATION: ANTERIOR;MID

## 2021-08-30 ASSESSMENT — PAIN DESCRIPTION - ONSET
ONSET: ON-GOING
ONSET: ON-GOING

## 2021-08-30 ASSESSMENT — PAIN DESCRIPTION - PAIN TYPE
TYPE: ACUTE PAIN;SURGICAL PAIN
TYPE: ACUTE PAIN;SURGICAL PAIN

## 2021-08-30 NOTE — PROGRESS NOTES
08/29/21 2344   NIV Type   Skin Assessment Clean, dry, & intact   Skin Protection for O2 Device Yes   Orientation Middle   Location Nose   Intervention(s) Skin Barrier   NIV Started/Stopped On   Equipment Type [email or web address]   Mode Bilevel   Mask Type Full face mask   Mask Size Medium   Bonnet size Medium   Settings/Measurements   IPAP 15 cmH20   CPAP/EPAP 8 cmH2O   Resp 27   I Time/ I Time % 1 s   Vt Exhaled 455 mL   Minute Volume 10 Liters   Mask Leak (lpm) 37 lpm   Comfort Level Fair   SpO2 93   Date: 8/29/2021    Time: 11:46 PM    Patient Placed On BIPAP/CPAP/ Non-Invasive Ventilation? Yes    If no must comment. Facial area red/color change? No           If YES are Blister/Lesion present? No   If yes must notify nursing staff  BIPAP/CPAP skin barrier?   Yes    Skin barrier type:mepilexlite       Comments:        Russel Covington RCP

## 2021-08-30 NOTE — PROGRESS NOTES
SPEECH/LANGUAGE PATHOLOGY  CLINICAL ASSESSMENT OF SWALLOWING FUNCTION   and PLAN OF CARE    PATIENT NAME:  William Melchor  (female)     MRN:  [id number]    :  1940  (80 y.o.)  STATUS:  Inpatient: Room 3821/3821-A    TODAY'S DATE:  21 1300   SLP eval and treat Start: 21 1300, End: 21 1300, ONE TIME, Standing Count: 1 Occurrences, Lkuas Bahena MD  REASON FOR REFERRAL: assess swallowing   EVALUATING THERAPIST: SUN Rojas                 RESULTS:    DYSPHAGIA DIAGNOSIS:   Clinical indicators of questionable pharyngeal phase dysphagia    Pt would benefit from MBSS however is unable to be transported to [facility] at this time secondary to high O2 needs. NP reports desat during meal post op, resulting in extensive coughing with bipap placed; after bipap removed, it was noted that pt coughed up large chunk of chicken. Respiratory status has been compromised since         DIET RECOMMENDATIONS:   NPO: NPO excluding meds. Meds to be given with minimal water via cup/ pureed solids. FEEDING RECOMMENDATIONS:     Assistance level:  Not applicable      Compensatory strategies recommended: Not applicable      Discussed recommendations with nursing and/or faxed report to referring provider: Yes    SPEECH THERAPY  PLAN OF CARE   The dysphagia POC is established based on physician order, dysphagia diagnosis and results of clinical assessment     Skilled SLP intervention for dysphagia management on acute care 3-5 x per week until goals met, pt plateaus in function and/or discharged from hospital    Conditions Requiring Skilled Therapeutic Intervention for dysphagia:    Patient is performing below functional baseline d/t current acute condition, Multiple diagnoses, multiple medications, and increased dependency upon caregivers.     Specific dysphagia interventions to include:     Trials of upgraded diet/liquid   Therapeutic intervention to facilitate implementation of energy conservation techniques during intake to decrease potential for aspiration associated with compromised swallow/breathing sequence. Specific instructions for next treatment:  ongoing PO analysis to upgrade diet and evaluate tolerance of current PO recommendation  Patient Treatment Goals:    Short Term Goals:  Pt will participate in ongoing evaluation of swallow function to determine when PO diet can be safely initiated    Long Term Goals:   Pt will improve oropharyngeal swallow function to ensure airway protection during PO intake to maintain adequate nutrition/hydration and decrease signs/symptoms of aspiration to less than 1 x/day. OTHER RECOMMENDATIONS:   A Video Swallow Study (MBSS) is recommended and requires a physician order -- limited at this time secondary to oxygen needs      Patient/family Goal:    Did not state. Will further assess during treatment. Plan of care discussed with Patient and Family   The Patient and Family understand(s) the diagnosis, prognosis and plan of care     Rehabilitation Potential/Prognosis: good                    ADMITTING DIAGNOSIS: CAD in native artery [I25.10]    VISIT DIAGNOSIS:      PATIENT REPORT/COMPLAINT: denies dysphagia;  RN cleared patient for participation in assessment     yes     PRIOR LEVEL OF SWALLOW FUNCTION:    PAST HISTORY OF DYSPHAGIA?: none reported    Diet during hospital admission:  NPO: NPO excluding meds. Meds to be given with sips of water     PROCEDURE:  Consistencies Administered During the Evaluation   Liquids: thin liquid   Solids:  pureed foods      Method of Intake:   cup, spoon  Fed by clinician      Position:   Seated, upright    CLINICAL ASSESSMENT:  Oral Stage:        The oral stage of swallowing was within functional limits      Pharyngeal Stage:    Multiple swallows were noted after presentation of thin liquid  Increased respiratory rate noted after presentations of thin liquids and pureed solids- suspect that it was related to swallow apnea period   No other signs of aspiration were noted during this evaluation however, silent aspiration cannot be ruled out at bedside. If silent aspiration is suspected, a Videofluoroscopic Study of Swallowing (MBS) is recommended and requires a physician order. Cognition:   Within functional limits for this exam    Oral Peripheral Examination   Adequate lingual/labial strength     Current Respiratory Status    55L at 55% FiO2 heated high flow via NC     Parameters of Speech Production  Respiration:  Shortness of breath  Quality:   Harsh  Intensity: Within functional limits    Volitional Swallow: present     Volitional Cough:   present     Pain: No pain reported. EDUCATION:   The Speech Language Pathologist (SLP) completed education regarding results of evaluation and that intervention is warranted at this time. Learner: Patient and Significant Other  Education: Reviewed results and recommendations of this evaluation, Reviewed diet and strategies, Reviewed signs, symptoms and risks of aspiration, Reviewed recommendations for follow-up and Education Related to Potential Risks and Complications Due to Impairment/Illness/Injury  Evaluation of Education:  Verbalizes understanding    This plan may be re-evaluated and revised as warranted. Evaluation Time includes thorough review of current medical information, gathering information on past medical history/social history and prior level of function, completion of standardized testing/informal observation of tasks, assessment of data and education on plan of care and goals. [x]The admitting diagnosis and active problem list, have been reviewed prior to initiation of this evaluation.         ACTIVE PROBLEM LIST:   Patient Active Problem List   Diagnosis    Spinal stenosis in cervical region    Spinal stenosis, lumbar region, without neurogenic claudication    Essential hypertension    Mixed hyperlipidemia    DM (diabetes mellitus) ([facility] [address]   

## 2021-08-30 NOTE — PROGRESS NOTES
Vascular Access Procedure Note    Procedure Date:   8/30/2021    Pre-procedure Verification/Time-Out:  The proposed risks versus benefits of this procedure were discussed in detail by the physician with patient. written consent was obtained from the patient. Relevant documentation was reviewed prior to procedure including signed consent form and medications. All necessary equipment for procedure is available at time of procedure yes. An audible time out was done at 1530PM by team members, correctly identifying patients name, medical record number, correct side, correct site, and correct procedure to be performed with registered nurse members of the procedure team all in agreement.         Indication for Procedure:   Reason for Insertion: intravenous antibiotics    ASA Assessment (Required for Moderate & Deep Sedation):      Procedure:   Reason for Consultation: power PICC line    Clinician Performing Procedure:   Kristine Meza rn    Assistant:  none    Sedation:   Analgesia Used: lidocaine 1%    Procedure Details/Findings:  Catheter Stateless Size: 5.5  Lot Number: [id number]  Product #: [id number]  Expiration Date: 05/31/2022  Maximum Barrier Precautions: cap, eye shield, full body drape, gloves, gown, handwashing and mask  Skin Prep: chlorhexidine  Technique: modified seldinger and ultrasound guided with VPS  Attempts: 1  Exposed (cm): 0  Total (cm): 39  Placement Site: left brachial  Vessel Size: 0.58  Dressing: securement device, transparent dressing and biopatch  Blood Return: Yes   Ultrasound Guidance: Yes   Arm Circumference Mid-Bicep (cm): 28  Chest X-Ray Ordered: chest x-ray  End Placement: svc    Complications:   none     Post-operative Condition:  stable  Patient Tolerated Procedure: well     Comments/Post-operative Education:   Post Procedure Interventions: patient verbalized understanding of education    Earnest Sanches RN  08/30/21  4:09 PM

## 2021-08-30 NOTE — PROGRESS NOTES
Patient refuses bipap. Is sitting up in chair on [address] 55% and seems to be tolerating well.

## 2021-08-30 NOTE — PROGRESS NOTES
08/30/21 0356   NIV Type   $NIV $Daily Charge   Skin Assessment Clean, dry, & intact   Location Nose   Equipment ID [id number]   NIV Started/Stopped On   Mode Bilevel   Mask Type Full face mask   Mask Size Medium   Bonnet size Medium   Settings/Measurements   IPAP 15 cmH20   CPAP/EPAP 8 cmH2O   Resp 23   FiO2  60 %   I Time/ I Time % 1 s   Vt Exhaled 459 mL   Minute Volume 14 Liters   Mask Leak (lpm) 58 lpm   Comfort Level Fair   Using Accessory Muscles No   SpO2 96   Date: 8/30/2021    Time: 3:58 AM    Patient Placed On BIPAP/CPAP/ Non-Invasive Ventilation? Yes    If no must comment. Facial area red/color change? No           If YES are Blister/Lesion present? No   If yes must notify nursing staff  BIPAP/CPAP skin barrier? Yes    Skin barrier type:mepilexlite       Comments:Patient keeps taking off the mask and not wanting to wear for more than an hour, redirected to explain that she needs this machine to help her breathe.         NELSON VicenteP

## 2021-08-30 NOTE — PLAN OF CARE
Problem: Falls - Risk of:  Goal: Will remain free from falls  Description: Will remain free from falls  Outcome: Met This Shift  Goal: Absence of physical injury  Description: Absence of physical injury  Outcome: Met This Shift     Problem: Discharge Planning:  Goal: Discharged to appropriate level of care  Description: Discharged to appropriate level of care  Outcome: Not Met This Shift     Problem:  Activity Intolerance:  Goal: Able to perform prescribed physical activity  Description: Able to perform prescribed physical activity  Outcome: Not Met This Shift  Goal: Ability to tolerate increased activity will improve  Description: Ability to tolerate increased activity will improve  Outcome: Met This Shift     Problem: Anxiety:  Goal: Level of anxiety will decrease  Description: Level of anxiety will decrease  Outcome: Met This Shift     Problem: Cardiac Output - Decreased:  Goal: Cardiac output within specified parameters  Description: Cardiac output within specified parameters  Outcome: Met This Shift  Goal: Hemodynamic stability will improve  Description: Hemodynamic stability will improve  Outcome: Met This Shift     Problem: Fluid Volume - Imbalance:  Goal: Ability to achieve a balanced intake and output will improve  Description: Ability to achieve a balanced intake and output will improve  Outcome: Met This Shift  Goal: Chest tube drainage is within specified parameters  Description: Chest tube drainage is within specified parameters  Outcome: Met This Shift     Problem: Gas Exchange - Impaired:  Goal: Levels of oxygenation will improve  Description: Levels of oxygenation will improve  Outcome: Met This Shift  Goal: Ability to maintain adequate ventilation will improve  Description: Ability to maintain adequate ventilation will improve  Outcome: Met This Shift     Problem: Pain:  Goal: Pain level will decrease  Description: Pain level will decrease  Outcome: Met This Shift  Goal: Control of acute pain  Description: Control of acute pain  Outcome: Met This Shift  Goal: Control of chronic pain  Description: Control of chronic pain  Outcome: Met This Shift     Problem: Tissue Perfusion - Cardiopulmonary, Altered:  Goal: Absence of angina  Description: Absence of angina  Outcome: Met This Shift  Goal: Hemodynamic stability will improve  Description: Hemodynamic stability will improve  Outcome: Met This Shift  Goal: Will show no evidence of cardiac arrhythmias  Description: Will show no evidence of cardiac arrhythmias  Outcome: Met This Shift     Problem: Skin Integrity:  Goal: Will show no infection signs and symptoms  Description: Will show no infection signs and symptoms  Outcome: Met This Shift  Goal: Absence of new skin breakdown  Description: Absence of new skin breakdown  Outcome: Met This Shift     Problem: Pain:  Goal: Pain level will decrease  Description: Pain level will decrease  Outcome: Met This Shift     Problem: Musculor/Skeletal Functional Status  Goal: Highest potential functional level  Outcome: Not Met This Shift  Goal: Absence of falls  Outcome: Met This Shift     Problem: ABCDS Injury Assessment  Goal: Absence of physical injury  Outcome: Met This Shift

## 2021-08-30 NOTE — PROGRESS NOTES
OCCUPATIONAL THERAPY TREATMENT NOTE    [facility] [address] [address] [facility]  [address] [facility], [address] [place] [address]                                                               Patient Name: Brittney Ramírez  MRN: [id number]  : 1940  Room: [address] [place] [address]    Evaluating OT: Bob Sal, JANNETHR/L [id number]     Referring Provider: GEORGIA Preston   Specific Provider Orders/Date: OT eval and treat (21)        Diagnosis: CAD      Surgery/Procedures:  CABG x 3     Pertinent Medical History: Adenocarcinoma of cecum, Anemia, Arthritis, cervical spinal stenosis, blood transfusion, DM, cirrhosis of liver, HLD, HTN, LBP, Neck pain, PAF, Renal artery aneurysm       *Precautions:  Fall Risk, bed rail, sternal, optiflow/Bipap, chest tube     Assessment of current deficits   [x]? Functional mobility            [x]?ADLs           [x]? Strength                  []?Cognition  [x]? Functional transfers          [x]? IADLs          [x]? Safety Awareness   [x]? Endurance  []? Fine Motor Coordination   [x]? Balance       []? Vision/perception    []? Sensation      []? Gross Motor Coordination [x]? ROM           []? Delirium                  []? Motor Control     []? Communication      OT PLAN OF CARE   OT POC based on physician orders, patient diagnosis and results of clinical assessment.        Frequency/Duration: 1-3 days/wk for 1-2 weeks PRN     Specific OT Treatment to include:   ADL retraining/adapted techniques and AE recommendations to increase functional independence within precautions                    Energy conservation techniques to improve tolerance for selfcare routine   Functional transfer/mobility training/DME recommendations for increased independence, safety and fall prevention         Patient/family education to increase safety and functional independence within precautions             Environmental modifications for safe mobility and completion of ADLs                             Therapeutic activity to improve functional performance during ADLs                                         Therapeutic exercise to improve tolerance and functional strength for ADLs   Balance retraining exercises/tasks for facilitation of postural control with dynamic challenges during ADLs .       Recommended Adaptive Equipment:  LB dressing AE     Home Living: Pt lives with   in a 1 floor condo with 2 step(s) to enter and 2 rail(s)  Bathroom setup: walk in shower with seat and rail  Equipment owned: shower seat, [employer], [facility]     Prior Level of Function: King with ADLs; Assist with IADLs. [employer] for ambulation. Driving: no  Occupation: N/A     Pain Level: pt c/o 0/10 chest pain  this session     Cognition: A&O: 4/4    Follows 1-2 step commands appropriately. Memory: Good              Comprehension Good              Problem solving: Fair-  Pt requires frequent cues to maintain sternal precautions              Judgement/safety: Fair-                 Communication skills: WFL              Vision: WFL                     Glasses:readers                                                        Hearing: WFL                RASS: 0  CAM-ICU: (NT) Delirium      UE Assessment:  Hand Dominance: Right [x]?   Left []?       ROM Strength STM goal: PRN   RUE  Grossly WFL within precautions Not formally tested; grossly WFL              WNL for ADLS      LUE Grossly WFL within precautions Not formally tested; grossly WFL              WNL for ADLS         Sensation: No c/o numbness or tingling in extremities   Tone: WNL   Edema: min B UE/LE     Functional Assessment: AM-PAC Daily Activity Raw Score: 9/24    Initial Eval Status  Date: 8/26 Treatment Status  Date:8/30 STG=LTG  Time Frame: 5-7 days   Feeding Min A  Set up   Bed level  NT  (awaiting swallow evaluation)                        King  while seated up in chair to increase activity tolerance when cleared for diet      Grooming Max A Max A   Set up                      S; set up   while seated/standing sink level demonstrating G tolerance; G balance.      UB dressing/bathing Dep  NT                      Min A   demonstrating G knowledge of precautions during tasks      LB dressing/bathing Dep     Max A  after instruction on LB dressing AE for safe reach within precautions  Max A overall  Mod A using reacher to doff socks; Max A using sock aid to janna socks with mod cues for technique. Pt limited due to decreased UB strength. Min A  using AE as needed for safe reach/ energy conservation        Toileting Max A Dep  (using bed pain while seated in chair; Max to Dep to stand; Dep for hygiene)                      Min A      Bed Mobility  Supine to sit: Max A+2     Sit to supine: Max A+2  Supine to sit: Max A+2    Sit to supine:   NT                      Min A  in prep of ADL tasks & transfers   Functional Transfers Sit to stand: NT    Sit to stand:Dep from EOB; poor posture    SPT: Dep to bedside chair; 2nd person for safety & to monitor medical lines                      S  sit<>stand/functional bathroom transfers using AD/DME as needed for balance and safety   Functional Mobility NT  no device NT                        S   functional/bathroom mobility using AD as needed & demonstrating G safety      Balance Sitting:     Static:  Min A    Dynamic:Min A  Standing: NT Sitting: Mod to Min A EOB with initial c/o feeling dizzy. BP monitored: WFL throughout  Standing: Dep; forward flexed posture with assist to correct. S dynamic sitting balance; S dynamic standing balance  during ADL tasks & transfers   Endurance/Activity Tolerance    Fair tolerance with light activity. Pt limited by anxiety. Pt provided with encouragement and pt safety reassured. Fair- tolerance with light ADLs and transfers. Pt fatigues quickly with standing ex.      Good   tolerance with moderate activity/self care routine   Visual/  Perceptual [facility]                                Vitals:   HR at rest: 84 bpm HR at end of session: 84 bpm   Spo2 at rest:94% Spo2 at end of session: 94%   BP at rest: 103/58 mmHg BP at end of session: 110/44 mmHg     Treatment: OT treatment provided this date   Bed mobility:Review of sternal precautions/medical lines prior to bed mobility to facilitate safe transfers and ADLS. Pt required 2 person assist for safe mobility due to complexity of medical condition, medical lines and deconditioning. HOB elevated to assist. Pt required constant cues to maintain precautions. Balance retraining: Performed sitting/standing balance ex's with instruction to facilitate righting reactions with postural changes during ADLS. Balance improving EOB. Pt demo poor standing balance during transfers. ADL retraining: Instruction on adapted techniques/AE(reacher/sock aid) to increase independence and safe reach during dressing/bathing activities. Pt demonstrated fair- recall of tool use. Delirium Prevention: Environmental and sensory modifications assessed and implemented to decrease ICU acquired delirium and to improve overall orientation, mentation and pt interaction with family/staff. Line management and environmental modifications made prior to and end of session to ensure patient safety and to increase efficiency of session. Skilled monitoring of HR, O2 saturation, blood pressure and patient's response to activity performed throughout session. Comments: OK from RN to see patient. Upon arrival, patient sitting up in bed, agreeable to session. Pt reporting she is breathing better today. Pt demo fair tolerance with activity and transfer to chair today. Pt demo fair- understanding of education/techniques. At end of session, patient left seated in chair to increase activity tolerance; nurse aware. Pt instructed on use of call light for assistance and fall prevention. Overall, pt presents with decreased activity tolerance, dynamic balance, functional mobility and anxiety limiting completion of ADLs and safe return home. Pt can benefit from continued skilled OT to increase safety, functional independence & quality of life. · Pt has made fair progress towards set goals.    · Continue with current plan of care       Time In:0915              Time Out: 2916         Total Treatment Time: 23      Treatment Charges: Mins Units   Ther Ex  [id number]     Manual Therapy [id number]     Thera Activities 48304 15 1   ADL/Home Mgt 91715 8 1   Neuro Re-ed [id number]     Orthotic manage/training  [id number]     Non-Billable Time         Mario Moore, OTR/L [id number]

## 2021-08-30 NOTE — PROGRESS NOTES
Date: 8/30/2021    Time: 4:57 PM    Patient Placed On BIPAP/CPAP/ Non-Invasive Ventilation? Yes    If no must comment. Facial area red/color change? No           If YES are Blister/Lesion present? No   If yes must notify nursing staff  BIPAP/CPAP skin barrier?   Yes    Skin barrier type:mepilexlite       Comments:        Moises Martin RCP

## 2021-08-30 NOTE — CARE COORDINATION
SOCIAL WORK/CASEMANAGEMENT TRANSITION OF CARE [id number] Tracy Rhoades, [address], [facility] [phone]): pt remains on 60l hf o2 in cvic. Pod 5 cabg x3. Pt is on iv amiodarone, fluids and zosyn. Will continue to follow for discharge needs. PT and OT saw with ampac of 6/24. ADRIAN Solano  8/30/2021  Met with pt in the unit who was sitting up in the chair. Provided her with hhc and morteza lists. ADRIAN Washington  8/30/2021    The Plan for Transition of Care is related to the following treatment goals: morteza and hhc   The Patient and/or patient representative  was provided with a choice of provider and agrees   with the discharge plan. [x] Yes [] No  Freedom of choice list was provided with basic dialogue that supports the patient's individualized plan of care/goals, treatment preferences and shares the quality data associated with the providers.  [x] Yes [] No

## 2021-08-30 NOTE — PROGRESS NOTES
Assessment/Plan: POD #5    1. CAD, PAF S/p CABG x3 (LIMA-LAD, SVG-Ramus, SVG-OM)  LAAL  - ASA, Lipitor, BB   - MS chest tubes removed without difficulty, left pleural drain with 180cc/24 hours, obtain CXR- possible removal later today   - Lovenox for VTE prophylaxis until [facility] resumed   - PICC placement today, remove IJ once placed      2. Paroxysmal Atrial Fibrillation  - s/p LAAL   - On Xarelto preop for PAF, resume when okay with CTS   - post op with PAF, started on PO amio POD1, transitioned to Amiodarone infusion   - Decrease rate to 0.5mg/min, currently SR, monitor, uptitrate BB as able     3. Acute Hypoxic Respiratory failure   - former smoker, 2/2 surgery, extubated DOS,? Aspiration 8/27  - Currently on AirVo 60L, 60% FiO2, sats 95%  - Pulmonology following, appreciate recs  - Zosyn day 4   - Nocturnal BiPAP, EZPAP q 4 hours, instructed to C&DB, OOBTC with PT/OT, increase activity as tolerated, wean O2 as able for sats >92%     4. Postoperative Hypotension   - Post op required levophed, POD 1 started on low dose metoprolol  - Required levophed POD 2 overnight, hemodynamics stable on low dose metoprolol, monitor      5. Acute Post Operative Pain   - Pain control adequate     6. DM Type 2  - HgbA1C 6.2%  - SSI AC/HS, nightly lantus for glycemic control      7. Carotid Artery stenosis  - Right ICA 50-69%, avoid hypotension      8. Acute blood loss anemia  - expected blood loss 2/2 surgery, s/p one unit RBCs 8/26  - Hgb 8.2, stable   - Monitor, transfuse if needed to maintain Hgb >8      9. Leukocytosis  - WBC stable at 16, Tmax 101.1 8/29 @ 1900  - Sputum culture, obtain blood cultures if spikes fever again   - Continue Zosyn, day 4    10. Anxiety  - PRN Ativan 0.5mg q 4 hours     11. Elevated Serum Creatinine  - Baseline Scr 0.8-1.0  - Scr 1.2 this AM, adequate UOP overnight  - Recheck BMP later today, monitor UOP, labs, avoid hypotension    12.  Constipation  - Expected delay return of bowel function 2/2 decrease physical mobility, decrease PO intake   - +flatus, no postop BM, abdomen soft  - Increase bowel regimen     13. Dysphagia  - Suspected dysphagia, possible aspiration 8/27  - Speech Therapy to evaluate today      VTE Prophylaxis: Pharmacologic/Mechanical:  Yes, SCDs/Lovenox  Line infection prevention: Can CVC or arterial line be removed: Remove IJ once PICC placed  Continued need for urinary catheter:  Yes - clinical indication: Patient post major surgery requiring fluid balance and input and output measurement.     Dispo: CVICU until oxygenation improves     Electronically signed by TEMO Porter CNP on 8/30/2021 at 8:13 AM

## 2021-08-30 NOTE — PROGRESS NOTES
Pulmonary Subsequent Hospital F/U note    Patient is being followed for: post operative respiratory insufficiency with hypoxia     Interval HPI:  Remains on Airvo 57% FiO2 55LPM which is improved from yesterday  She is sitting up on the bedpan  Low grade temp 24 hours ago 100.4, currently afebrile   No reported secretions  She is requiring a lot of coaching with spirometry, EZPAP, therapy etc.  Converted to sinus rhythm    ROS:  Fatigued  Less dyspnea  No cough  No nausea or vomiting  Complaining of feeling warm    Exam:  BP (!) 86/56   Pulse 76   Temp 99.1 °F (37.3 °C) (Bladder)   Resp 26   Ht 5' 2\" (1.575 m)   Wt 200 lb 13.4 oz (91.1 kg)   LMP  (LMP Unknown)   SpO2 92%   BMI 36.73 kg/m²    General: Patient is resting comfortably, no distress, breathing is not labored on high flow oxygen  HEENT: PERRL, EOMI, MMM, no oral lesions  Neck: supple no adenopathy  CV: RRR without murmur   Lungs: bibasilar crackles present   Abd: soft, ND, NT, bowel sounds normal  Ext: warm, no edema    Data:    Oximetry:  SpO2 Readings from Last 1 Encounters:   08/30/21 92%       Imaging personally reviewed by myself:  CXR     Impression   1. Bibasilar airspace disease and small bilateral pleural effusions.             Pertinent labs reviewed and noted:  Lab Results   Component Value Date    WBC 16.1 08/30/2021    HGB 8.2 08/30/2021    HCT 27.2 08/30/2021    MCV 86.9 08/30/2021    MCH 26.2 08/30/2021    MCHC 30.1 08/30/2021    RDW 16.8 08/30/2021     08/30/2021    MPV 9.8 08/30/2021     Lab Results   Component Value Date     08/30/2021    K 4.4 08/30/2021    K 3.2 06/21/2021    CL 97 08/30/2021    CO2 20 08/30/2021    BUN 56 08/30/2021    CREATININE 1.2 08/30/2021    LABALBU 4.3 08/23/2021    CALCIUM 8.1 08/30/2021    GFRAA 52 08/30/2021    LABGLOM 43 08/30/2021     Lab Results   Component Value Date    PROTIME 14.5 08/25/2021    INR 1.3 08/25/2021       Assessment:  1.  Post operative respiratory insufficiency with hypoxia  2. Left basilar atelectasis versus pneumonia  3. Pleural effusions  4. CAD s/p CABG x 3 8/25/21  5. Atrial fibrillation with RVR - remains on an amiodarone infusion  6. Anxiety    Plan:  1. Wean FiO2 and liter flow on the airvo - has made some progress last 24 hours. Continue with nocturnal and cycling non invasive support as she tolerates   2. EZPAP treatments, incentive spirometry  3. Bronchodilators for airway clearance  4. Serial imaging   5. Awaiting speech therapy eval today  6. Zosyn day #4  7. If fevers again would check blood cultures etc   8.  Out of bed as much as possible      Electronically signed by Shasta Centeno MD on 8/30/2021 at 10:08 AM

## 2021-08-30 NOTE — PROGRESS NOTES
Physical Therapy  Physical Therapy Treatment Note     Name: Tamela Escalera  : [birth year]  MRN: [id number]      Date of Service: 2021    Evaluating PT:  Bhavik Arreola PT, DPT [id number]    Room #:  8435/7166-A  Diagnosis:  CAD in native artery [I25.10]  PMHx/PSHx:  Arthritis, DM, HLD, HTN  Procedure/Surgery:   CABG x 3  Precautions:  Falls, Sternal, optiflow vs Bipap, chest tube   Equipment Needs:  TBD    SUBJECTIVE:    Pt lives with  in a 1 story condo with 2 stairs to enter and 2 rail. Pt ambulated with [employer] or SPC and was independent PTA. OBJECTIVE:   Initial Evaluation  Date: 21 Treatment  21 Short Term/ Long Term   Goals   AM-PAC 6 Clicks     Was pt agreeable to Eval/treatment? Yes Yes    Does pt have pain?  10/10 surgical pain - RN aware 9/10 surgical pain - RN aware    Bed Mobility  Rolling: NT  Supine to sit: MaxA x 2 with HOB elevated  Sit to supine: MaxA x 2  Scooting: MaxA Rolling: NT  Supine to sit: MaxA x 2 with HOB elevated  Sit to supine: MaxA x 2  Scooting: MaxA Graham   Transfers Sit to stand: NT  Stand to sit: NT  Stand pivot: NT Sit to stand: Dep  Stand to sit: Dep  Stand pivot: Dep Mod Independent with [employer] if needed   Ambulation   NT NT >200 feet with Mod Independent with [employer] if needed   Stair negotiation: ascended and descended NT  >4 steps with 1 rail Graham   ROM BUE:  Defer to OT note  BLE:  WFL     Strength BUE:  Defer to OT note  BLE:  4/5  Increase by 1/3 MMT grade   Balance Sitting EOB:  ModA dynamic  Dynamic Standing:  NT Sitting EOB:  ModA dynamic  Dynamic Standing:  Dep no device Sitting EOB:  SBA  Dynamic Standing:  Graham no device     Pt is A & O x 4  CAM-ICU: NT  RASS: 0  Sensation:  No reported paresthesias  Edema:  None    Vitals:  Heart Rate at rest 84 bpm Heart Rate post session 85 bpm   SpO2 at rest 93% SpO2 post session 94%   Blood Pressure at rest 126/65 mmHg Blood Pressure post session 110/44 mmHg     Functional Status Score-Intensive Care Unit (FSS-ICU)   Rolling -/7   Supine to sit transfer 1/7   Unsupported sitting  3/7   Sit to stand transfers 1/7   Ambulation -/7   Total  5/35     Therapeutic Exercises:  NA    Patient education  Pt educated on safety    Patient response to education:   Pt verbalized understanding Pt demonstrated skill Pt requires further education in this area   x x x     ASSESSMENT:    Conditions Requiring Skilled Therapeutic Intervention:    [x]Decreased strength     []Decreased ROM  [x]Decreased functional mobility  [x]Decreased balance   [x]Decreased endurance   [x]Decreased posture  []Decreased sensation  []Decreased coordination   []Decreased vision  []Decreased safety awareness   [x]Increased pain       Comments:  NP reported pt was stable for session. Pt was in bed upon arrival, agreeable to treatment session. Reviewed sternal precautions prior to activity. Pt continues to be limited by severe deconditioning. Anxiety improved this session. Pt stood with flexed posture and was unable to achieve an erect posture. Pt was dependently assisted to chair. All needs met and call light in reach. All lines remained intact. Discussed session with RN. Treatment:  Patient practiced and was instructed in the following treatment:     Bed mobility training - pt given verbal and tactile cues to facilitate proper sequencing and safety during supine>sit as well as provided with physical assistance.  Sitting EOB for >8 minutes for upright tolerance, postural awareness and BLE ROM   Transfer training - pt was given verbal and tactile cues to facilitate proper hand placement, technique and safety during sit to stand, stand to sit and stand pivot transfers as well as provided with physical assistance to complete task. PLAN:    Patient is making minimal progress towards established goals. Will continue with current POC.       Time in  0905  Time out  0928    Total Treatment Time  23 minutes     CPT codes:  [] Gait training 76289 - minutes  [] Manual therapy 88587 - minutes  [x] Therapeutic activities 21824 23 minutes  [] Therapeutic exercises 99554 - minutes  [] Neuromuscular reeducation 52334 - minutes    Bhavik Arreola, PT, DPT  [id number]

## 2021-08-31 ENCOUNTER — APPOINTMENT (OUTPATIENT)
Dept: GENERAL RADIOLOGY | Age: 81
DRG: 003 | End: 2021-08-31
Attending: THORACIC SURGERY (CARDIOTHORACIC VASCULAR SURGERY)
Payer: MEDICARE

## 2021-08-31 LAB
ABO/RH: NORMAL
ANION GAP SERPL CALCULATED.3IONS-SCNC: 10 MMOL/L (ref 7–16)
ANTIBODY SCREEN: NORMAL
BLOOD BANK DISPENSE STATUS: NORMAL
BLOOD BANK PRODUCT CODE: NORMAL
BPU ID: NORMAL
BUN BLDV-MCNC: 47 MG/DL (ref 6–23)
CALCIUM SERPL-MCNC: 8.7 MG/DL (ref 8.6–10.2)
CHLORIDE BLD-SCNC: 100 MMOL/L (ref 98–107)
CO2: 21 MMOL/L (ref 22–29)
CREAT SERPL-MCNC: 1.1 MG/DL (ref 0.5–1)
DESCRIPTION BLOOD BANK: NORMAL
GFR AFRICAN AMERICAN: 58
GFR NON-AFRICAN AMERICAN: 48 ML/MIN/1.73
GLUCOSE BLD-MCNC: 129 MG/DL (ref 74–99)
HCT VFR BLD CALC: 25.1 % (ref 34–48)
HCT VFR BLD CALC: 29.6 % (ref 34–48)
HEMOGLOBIN: 7.1 G/DL (ref 11.5–15.5)
HEMOGLOBIN: 9.2 G/DL (ref 11.5–15.5)
MAGNESIUM: 2.5 MG/DL (ref 1.6–2.6)
MCH RBC QN AUTO: 27 PG (ref 26–35)
MCHC RBC AUTO-ENTMCNC: 28.3 % (ref 32–34.5)
MCV RBC AUTO: 95.4 FL (ref 80–99.9)
METER GLUCOSE: 127 MG/DL (ref 74–99)
METER GLUCOSE: 140 MG/DL (ref 74–99)
METER GLUCOSE: 144 MG/DL (ref 74–99)
METER GLUCOSE: 148 MG/DL (ref 74–99)
PDW BLD-RTO: 17.3 FL (ref 11.5–15)
PLATELET # BLD: 356 E9/L (ref 130–450)
PMV BLD AUTO: 10.3 FL (ref 7–12)
POTASSIUM SERPL-SCNC: 4.2 MMOL/L (ref 3.5–5)
RBC # BLD: 2.63 E12/L (ref 3.5–5.5)
SODIUM BLD-SCNC: 131 MMOL/L (ref 132–146)
WBC # BLD: 14.2 E9/L (ref 4.5–11.5)

## 2021-08-31 PROCEDURE — 2580000003 HC RX 258: Performed by: THORACIC SURGERY (CARDIOTHORACIC VASCULAR SURGERY)

## 2021-08-31 PROCEDURE — 86900 BLOOD TYPING SEROLOGIC ABO: CPT

## 2021-08-31 PROCEDURE — 97530 THERAPEUTIC ACTIVITIES: CPT

## 2021-08-31 PROCEDURE — 2580000003 HC RX 258: Performed by: NURSE PRACTITIONER

## 2021-08-31 PROCEDURE — 6370000000 HC RX 637 (ALT 250 FOR IP): Performed by: NURSE PRACTITIONER

## 2021-08-31 PROCEDURE — 86850 RBC ANTIBODY SCREEN: CPT

## 2021-08-31 PROCEDURE — 2000000000 HC ICU R&B

## 2021-08-31 PROCEDURE — 71045 X-RAY EXAM CHEST 1 VIEW: CPT

## 2021-08-31 PROCEDURE — 0B9F8ZZ DRAINAGE OF RIGHT LOWER LUNG LOBE, VIA NATURAL OR ARTIFICIAL OPENING ENDOSCOPIC: ICD-10-PCS | Performed by: INTERNAL MEDICINE

## 2021-08-31 PROCEDURE — 0B9D8ZZ DRAINAGE OF RIGHT MIDDLE LUNG LOBE, VIA NATURAL OR ARTIFICIAL OPENING ENDOSCOPIC: ICD-10-PCS | Performed by: INTERNAL MEDICINE

## 2021-08-31 PROCEDURE — 37799 UNLISTED PX VASCULAR SURGERY: CPT

## 2021-08-31 PROCEDURE — 32551 INSERTION OF CHEST TUBE: CPT

## 2021-08-31 PROCEDURE — 82962 GLUCOSE BLOOD TEST: CPT

## 2021-08-31 PROCEDURE — 85027 COMPLETE CBC AUTOMATED: CPT

## 2021-08-31 PROCEDURE — 6370000000 HC RX 637 (ALT 250 FOR IP): Performed by: THORACIC SURGERY (CARDIOTHORACIC VASCULAR SURGERY)

## 2021-08-31 PROCEDURE — 36415 COLL VENOUS BLD VENIPUNCTURE: CPT

## 2021-08-31 PROCEDURE — 85018 HEMOGLOBIN: CPT

## 2021-08-31 PROCEDURE — 6360000002 HC RX W HCPCS: Performed by: NURSE PRACTITIONER

## 2021-08-31 PROCEDURE — 94660 CPAP INITIATION&MGMT: CPT

## 2021-08-31 PROCEDURE — 83735 ASSAY OF MAGNESIUM: CPT

## 2021-08-31 PROCEDURE — 94640 AIRWAY INHALATION TREATMENT: CPT

## 2021-08-31 PROCEDURE — 86901 BLOOD TYPING SEROLOGIC RH(D): CPT

## 2021-08-31 PROCEDURE — 85014 HEMATOCRIT: CPT

## 2021-08-31 PROCEDURE — 2700000000 HC OXYGEN THERAPY PER DAY

## 2021-08-31 PROCEDURE — 80048 BASIC METABOLIC PNL TOTAL CA: CPT

## 2021-08-31 PROCEDURE — 2580000003 HC RX 258

## 2021-08-31 PROCEDURE — 6360000002 HC RX W HCPCS: Performed by: THORACIC SURGERY (CARDIOTHORACIC VASCULAR SURGERY)

## 2021-08-31 PROCEDURE — P9016 RBC LEUKOCYTES REDUCED: HCPCS

## 2021-08-31 PROCEDURE — 86923 COMPATIBILITY TEST ELECTRIC: CPT

## 2021-08-31 RX ORDER — BUMETANIDE 0.25 MG/ML
1 INJECTION, SOLUTION INTRAMUSCULAR; INTRAVENOUS ONCE
Status: DISCONTINUED | OUTPATIENT
Start: 2021-08-31 | End: 2021-09-06

## 2021-08-31 RX ORDER — SODIUM CHLORIDE 9 MG/ML
INJECTION, SOLUTION INTRAVENOUS PRN
Status: DISCONTINUED | OUTPATIENT
Start: 2021-08-31 | End: 2021-09-15 | Stop reason: CLARIF

## 2021-08-31 RX ORDER — AMIODARONE HYDROCHLORIDE 200 MG/1
400 TABLET ORAL 2 TIMES DAILY
Status: DISCONTINUED | OUTPATIENT
Start: 2021-08-31 | End: 2021-09-06

## 2021-08-31 RX ADMIN — Medication 10 ML: at 09:34

## 2021-08-31 RX ADMIN — SODIUM CHLORIDE, PRESERVATIVE FREE 300 UNITS: 5 INJECTION INTRAVENOUS at 09:30

## 2021-08-31 RX ADMIN — IPRATROPIUM BROMIDE AND ALBUTEROL SULFATE 1 AMPULE: .5; 2.5 SOLUTION RESPIRATORY (INHALATION) at 15:45

## 2021-08-31 RX ADMIN — AMIODARONE HYDROCHLORIDE 0.25 MG/MIN: 50 INJECTION, SOLUTION INTRAVENOUS at 21:14

## 2021-08-31 RX ADMIN — PIPERACILLIN AND TAZOBACTAM 3375 MG: 3; .375 INJECTION, POWDER, LYOPHILIZED, FOR SOLUTION INTRAVENOUS at 16:59

## 2021-08-31 RX ADMIN — PANTOPRAZOLE SODIUM 40 MG: 40 TABLET, DELAYED RELEASE ORAL at 09:36

## 2021-08-31 RX ADMIN — PRAMIPEXOLE DIHYDROCHLORIDE 0.5 MG: 0.25 TABLET ORAL at 20:20

## 2021-08-31 RX ADMIN — Medication 10 ML: at 20:20

## 2021-08-31 RX ADMIN — PIPERACILLIN AND TAZOBACTAM 3375 MG: 3; .375 INJECTION, POWDER, LYOPHILIZED, FOR SOLUTION INTRAVENOUS at 00:20

## 2021-08-31 RX ADMIN — AMIODARONE HYDROCHLORIDE 0.5 MG/MIN: 50 INJECTION, SOLUTION INTRAVENOUS at 06:26

## 2021-08-31 RX ADMIN — ENOXAPARIN SODIUM 40 MG: 40 INJECTION SUBCUTANEOUS at 09:30

## 2021-08-31 RX ADMIN — OXYCODONE 5 MG: 5 TABLET ORAL at 02:27

## 2021-08-31 RX ADMIN — INSULIN LISPRO 2 UNITS: 100 INJECTION, SOLUTION INTRAVENOUS; SUBCUTANEOUS at 21:37

## 2021-08-31 RX ADMIN — PRAMIPEXOLE DIHYDROCHLORIDE 0.5 MG: 0.25 TABLET ORAL at 09:39

## 2021-08-31 RX ADMIN — SODIUM CHLORIDE 30 ML/HR: 9 INJECTION, SOLUTION INTRAVENOUS at 09:46

## 2021-08-31 RX ADMIN — PIPERACILLIN AND TAZOBACTAM 3375 MG: 3; .375 INJECTION, POWDER, LYOPHILIZED, FOR SOLUTION INTRAVENOUS at 09:00

## 2021-08-31 RX ADMIN — LORAZEPAM 0.5 MG: 2 INJECTION INTRAMUSCULAR; INTRAVENOUS at 16:58

## 2021-08-31 RX ADMIN — INSULIN LISPRO 2 UNITS: 100 INJECTION, SOLUTION INTRAVENOUS; SUBCUTANEOUS at 07:12

## 2021-08-31 RX ADMIN — IPRATROPIUM BROMIDE AND ALBUTEROL SULFATE 1 AMPULE: .5; 2.5 SOLUTION RESPIRATORY (INHALATION) at 12:46

## 2021-08-31 RX ADMIN — DOCUSATE SODIUM 50 MG AND SENNOSIDES 8.6 MG 1 TABLET: 8.6; 5 TABLET, FILM COATED ORAL at 09:35

## 2021-08-31 RX ADMIN — OXYCODONE 5 MG: 5 TABLET ORAL at 23:32

## 2021-08-31 RX ADMIN — SODIUM CHLORIDE, PRESERVATIVE FREE 10 ML: 5 INJECTION INTRAVENOUS at 16:59

## 2021-08-31 RX ADMIN — PRAMIPEXOLE DIHYDROCHLORIDE 0.5 MG: 0.25 TABLET ORAL at 14:10

## 2021-08-31 RX ADMIN — AMIODARONE HYDROCHLORIDE 400 MG: 200 TABLET ORAL at 20:30

## 2021-08-31 RX ADMIN — Medication 400 MG: at 09:39

## 2021-08-31 RX ADMIN — SODIUM CHLORIDE: 9 INJECTION, SOLUTION INTRAVENOUS at 05:03

## 2021-08-31 RX ADMIN — METOPROLOL TARTRATE 12.5 MG: 25 TABLET, FILM COATED ORAL at 20:31

## 2021-08-31 RX ADMIN — ATORVASTATIN CALCIUM 10 MG: 10 TABLET, FILM COATED ORAL at 20:20

## 2021-08-31 RX ADMIN — ASPIRIN 81 MG: 81 TABLET, COATED ORAL at 09:39

## 2021-08-31 RX ADMIN — SODIUM CHLORIDE: 9 INJECTION, SOLUTION INTRAVENOUS at 21:00

## 2021-08-31 RX ADMIN — METOPROLOL TARTRATE 12.5 MG: 25 TABLET, FILM COATED ORAL at 09:36

## 2021-08-31 RX ADMIN — AMIODARONE HYDROCHLORIDE 400 MG: 200 TABLET ORAL at 09:36

## 2021-08-31 RX ADMIN — SODIUM CHLORIDE, PRESERVATIVE FREE 300 UNITS: 5 INJECTION INTRAVENOUS at 20:31

## 2021-08-31 RX ADMIN — SODIUM CHLORIDE: 9 INJECTION, SOLUTION INTRAVENOUS at 12:39

## 2021-08-31 RX ADMIN — BISACODYL 10 MG: 10 SUPPOSITORY RECTAL at 09:30

## 2021-08-31 ASSESSMENT — PAIN DESCRIPTION - ONSET
ONSET: ON-GOING
ONSET: ON-GOING

## 2021-08-31 ASSESSMENT — PAIN DESCRIPTION - LOCATION
LOCATION: CHEST
LOCATION: CHEST

## 2021-08-31 ASSESSMENT — PAIN DESCRIPTION - FREQUENCY
FREQUENCY: INTERMITTENT
FREQUENCY: INTERMITTENT

## 2021-08-31 ASSESSMENT — PAIN DESCRIPTION - ORIENTATION
ORIENTATION: ANTERIOR
ORIENTATION: ANTERIOR

## 2021-08-31 ASSESSMENT — PAIN SCALES - GENERAL
PAINLEVEL_OUTOF10: 0
PAINLEVEL_OUTOF10: 6
PAINLEVEL_OUTOF10: 0
PAINLEVEL_OUTOF10: 2
PAINLEVEL_OUTOF10: 0
PAINLEVEL_OUTOF10: 10
PAINLEVEL_OUTOF10: 0

## 2021-08-31 ASSESSMENT — PAIN DESCRIPTION - DESCRIPTORS
DESCRIPTORS: ACHING
DESCRIPTORS: ACHING

## 2021-08-31 ASSESSMENT — PAIN DESCRIPTION - PROGRESSION
CLINICAL_PROGRESSION: NOT CHANGED

## 2021-08-31 ASSESSMENT — PAIN DESCRIPTION - PAIN TYPE: TYPE: SURGICAL PAIN

## 2021-08-31 NOTE — PROGRESS NOTES
Comprehensive Nutrition Assessment    Type and Reason for Visit:  Reassess    Nutrition Recommendations/Plan: Continue NPO  Please consult for EN recs if needed/access obtained    Nutrition Assessment:  Pt remains nutritionally compromised admit w/ CAD s/p CABG x3. Noted postop resp insufficiency w/ SLP eval rec NPO. Pending bronch. Hx DM, liver cirrhosis, cecum CA. Will monitor nutrition progression. Malnutrition Assessment:  Malnutrition Status: At risk for malnutrition (Comment)    Context:  Acute Illness     Findings of the 6 clinical characteristics of malnutrition:  Energy Intake:  7 - 50% or less of estimated energy requirements for 5 or more days  Weight Loss:  Unable to assess (d/t wt fluctuations 2/2 fluid shifts)     Body Fat Loss:  No significant body fat loss     Muscle Mass Loss:  No significant muscle mass loss    Fluid Accumulation:  No significant fluid accumulation     Strength:  Not Performed    Estimated Daily Nutrient Needs:  Energy (kcal):  9170-1204 (MSJ 1203 x 1.2 SF); Weight Used for Energy Requirements:  Admission     Protein (g):  75-90 (1.5-1.8); Weight Used for Protein Requirements:  Ideal        Fluid (ml/day):  per critical care; Method Used for Fluid Requirements:         Nutrition Related Findings:  A&Ox4, active BS, abd soft/distention, CTx1, no edema, +I/Os, dysphagia s/p SLP eval, cirrhosis      Wounds:  Multiple, Surgical Incision       Current Nutrition Therapies:    Diet NPO Exceptions are: Sips of Water with Meds    Anthropometric Measures:  · Height: 5' 2\" (157.5 cm)  · Current Body Weight: 173 lb (78.5 kg) (using admit wt as CBW remains elevated w/ +fluids)   · Admission Body Weight: 173 lb (78.5 kg) (8/26 bed)    · Usual Body Weight:  (170-209# x8 mon back per EMR)     · Ideal Body Weight: 110 lbs; % Ideal Body Weight 157.3 %   · BMI: 31.6  · BMI Categories: Obese Class 1 (BMI 30.0-34. 9)       Nutrition Diagnosis:   · Increased nutrient needs related to increase demand for energy/nutrients as evidenced by wounds (s/p CABG X3)    Nutrition Interventions:   Food and/or Nutrient Delivery:  Continue NPO  Nutrition Education/Counseling:  Education not appropriate   Coordination of Nutrition Care:  Continue to monitor while inpatient    Goals:  nutrition progression       Nutrition Monitoring and Evaluation:   Food/Nutrient Intake Outcomes:  Diet Advancement/Tolerance  Physical Signs/Symptoms Outcomes:  Biochemical Data, Chewing or Swallowing, GI Status, Fluid Status or Edema, Nutrition Focused Physical Findings, Skin, Weight     Discharge Planning:     Too soon to determine     Electronically signed by Jayne Diaz MS, RD, LD on 8/31/21 at 11:48 AM EDT    Contact: [phone]

## 2021-08-31 NOTE — PLAN OF CARE
Problem: Falls - Risk of:  Goal: Will remain free from falls  Description: Will remain free from falls  8/31/2021 0332 by Gómez Ocasio RN  Outcome: Met This Shift  8/30/2021 1522 by Tyler Bullard RN  Outcome: Met This Shift  Goal: Absence of physical injury  Description: Absence of physical injury  8/30/2021 1522 by Tyler Bullard RN  Outcome: Met This Shift     Problem: Discharge Planning:  Goal: Discharged to appropriate level of care  Description: Discharged to appropriate level of care  8/30/2021 1522 by Tyler Bullard RN  Outcome: Not Met This Shift     Problem:  Activity Intolerance:  Goal: Able to perform prescribed physical activity  Description: Able to perform prescribed physical activity  8/31/2021 0332 by Gómez Ocasio RN  Outcome: Not Met This Shift  8/30/2021 1522 by Tyler Bullard RN  Outcome: Not Met This Shift  Goal: Ability to tolerate increased activity will improve  Description: Ability to tolerate increased activity will improve  8/30/2021 1522 by Tyler Bullard RN  Outcome: Met This Shift     Problem: Anxiety:  Goal: Level of anxiety will decrease  Description: Level of anxiety will decrease  8/31/2021 0332 by Gómez Ocasio RN  Outcome: Met This Shift  8/30/2021 1522 by Tyler Bullard RN  Outcome: Met This Shift     Problem: Cardiac Output - Decreased:  Goal: Cardiac output within specified parameters  Description: Cardiac output within specified parameters  8/31/2021 0332 by Gómez Ocasio RN  Outcome: Met This Shift  8/30/2021 1522 by Tyler Bullard RN  Outcome: Met This Shift  Goal: Hemodynamic stability will improve  Description: Hemodynamic stability will improve  8/30/2021 1522 by Tyler Bullard RN  Outcome: Met This Shift     Problem: Pain:  Goal: Pain level will decrease  Description: Pain level will decrease  8/31/2021 0332 by Gómez Ocasio RN  Outcome: Met This Shift  8/30/2021 1522 by Tyler Bullard RN  Outcome: Met This Shift  Goal: Control of acute pain  Description: Control of acute pain  8/30/2021 1522 by Tarun Chand RN  Outcome: Met This Shift  Goal: Control of chronic pain  Description: Control of chronic pain  8/30/2021 1522 by Tarun Chand RN  Outcome: Met This Shift     Problem: Tissue Perfusion - Cardiopulmonary, Altered:  Goal: Absence of angina  Description: Absence of angina  8/31/2021 0332 by Clara Dos Santos RN  Outcome: Met This Shift  8/30/2021 1522 by Tarun Chand RN  Outcome: Met This Shift

## 2021-08-31 NOTE — PROGRESS NOTES
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, GERD, h/o adenocarcinoma of cecum, arthritis, spinal stenosis, former smoker, obesity      Procedure/Surgeries: 8/25/2021 CABG x3 (LIMA-LAD, SVG-Ramus, SVG-OM), LAAL, EVH, KLS plating      Pacing wires: Ventricular         Intake/Output Summary (Last 24 hours) at 8/31/2021 0940  Last data filed at 8/31/2021 0700  Gross per 24 hour   Intake 1843.63 ml   Output 2170 ml   Net -326.37 ml       Recent Labs     08/29/21  0520 08/30/21  0425 08/31/21  0530   WBC 15.2* 16.1* 14.2*   HGB 8.5* 8.2* 7.1*   HCT 27.6* 27.2* 25.1*    309 356      Lab Results   Component Value Date     08/31/2021    K 4.2 08/31/2021    K 3.2 06/21/2021     08/31/2021    CO2 21 08/31/2021    BUN 47 08/31/2021    CREATININE 1.1 08/31/2021    GLUCOSE 129 08/31/2021    CALCIUM 8.7 08/31/2021         Physical Exam:    /68   Pulse 76   Temp 97.6 °F (36.4 °C) (Axillary)   Resp 23   Ht 5' 2\" (1.575 m)   Wt 196 lb 3.4 oz (89 kg)   LMP  (LMP Unknown)   SpO2 95%   BMI 35.89 kg/m²       CXR Findings: 8/31/2021      FINDINGS:   Technique and patient body habitus limits evaluation.  Left-sided chest tube is unchanged.  Bibasilar infiltrates and moderate right effusion are similar. Heart appears enlarged.  There is sternotomy. --- CXR personally viewed and interpreted by ICU Nurse Practitioner, agree with above findings     General: Awake, alert. No acute events overnight   Eyes: PERRL, anicteric   Pulmonary: Diminished bibasilar.  No wheezes, no accessory muscle use noted on airvo   Cardiovascular:  RRR, no heaves or thrills on palpation  Tele: SR  Abdomen: Soft, nontender, + BS, +BM  Extremities: Palpable pulses all extremities, trace edema   Neurologic/Psych: A&Ox3, QUINTERO to command   Skin: Warm and dry  Incisions: MSI with paras dressing intact, RLE SVG sites well approximated with staples intact      Assessment/Plan: POD #6  1. CAD, PAF S/p CABG x3 (LIMA-LAD, SVG-Ramus, SVG-OM)  LAAL  - ASA, Lipitor, BB   - left pleural drain remains to sxn, 450cc/24hrs    - Lovenox for VTE prophylaxis until [address] resumed   - PICC placed 8/30     2. Paroxysmal Atrial Fibrillation  - s/p LAAL   - On Xarelto preop for PAF, resume when okay with CTS   - post op with PAF, currently SR, change IV amio to po. BB      3. Acute Hypoxic Respiratory failure   - former smoker, 2/2 surgery, extubated DOS,? Aspiration 8/27  - Remains on AirVo 55L, 60% FiO2, sats 95%  -CXR looks worse with not much improvement in oxygen, discussed with pulmonary-- ultrasound done at bedside, not enough fluid for thoracentesis, plan for possible bronchoscopy tomorrow   - Zosyn day 5   - prn bipap, Ezpap      4. Acute Post Operative Pain   - Pain control adequate     5. DM Type 2  - HgbA1C 6.2%  - SSI. Hold lantus      7. Carotid Artery stenosis  - Right ICA 50-69%, avoid hypotension      8. Acute blood loss anemia  - expected blood loss 2/2 surgery, s/p one unit RBCs 8/26  - Hgb 7.1-transfuse 1prbc      9. Leukocytosis  - WBC downtrending 14K, Tmax 101.1 8/29 @ 1900  - Sputum culture, obtain blood cultures if spikes fever again   - Continue Zosyn, day 5     10. Anxiety  - PRN Ativan 0.5mg q 4 hours      11. Elevated Serum Creatinine  - Baseline Scr 0.8-1.0  - Scr 1.1 stable, transfuse 1 prbc today and follow with 1mg bumex      12. Constipation  - Expected delay return of bowel function 2/2 decrease physical mobility, decrease PO intake   - +flatus, +BM, abdomen soft, continue bowel regimen      13.  Dysphagia  - Suspected dysphagia, possible aspiration 8/27  - Speech Therapy recs NPO for now d/t hypoxia, will consider small bowel feeding tube         VTE Prophylaxis: Pharmacologic/Mechanical: Yes, SCDs/lovenox    Line infection prevention: Can CVC or arterial line be removed: No  Continued need for urinary catheter: Yes - clinical indication: Patient post major surgery requiring fluid balance and input and output measurement.     Dispo: CVICU     Electronically signed by TEMO Freire CNP on 8/31/2021 at 9:40 AM

## 2021-08-31 NOTE — PROGRESS NOTES
OCCUPATIONAL THERAPY TREATMENT NOTE   [address] [facility] [employer] [address] [address]       Date:2021                                                               Patient Name: Jena Chung  MRN: [id number]  : 1940  Room: [address] [place] [address]    Evaluating OT: Diego Leonardo OTR/L      Referring Provider: GEORGIA Cruz   Specific Provider Orders/Date: OT eval and treat (21)        Diagnosis: CAD      Surgery/Procedures:  CABG x 3     Pertinent Medical History: Adenocarcinoma of cecum, Anemia, Arthritis, cervical spinal stenosis, blood transfusion, DM, cirrhosis of liver, HLD, HTN, LBP, Neck pain, PAF, Renal artery aneurysm       *Precautions:  Fall Risk, bed rail, sternal, optiflow/Bipap, chest tube     Assessment of current deficits   [x]? Functional mobility            [x]?ADLs           [x]? Strength                  []?Cognition  [x]? Functional transfers          [x]? IADLs          [x]? Safety Awareness   [x]? Endurance  []? Fine Motor Coordination   [x]? Balance       []? Vision/perception    []? Sensation      []? Gross Motor Coordination [x]? ROM           []? Delirium                  []? Motor Control     []? Communication      OT PLAN OF CARE   OT POC based on physician orders, patient diagnosis and results of clinical assessment.        Frequency/Duration: 1-3 days/wk for 1-2 weeks PRN     Specific OT Treatment to include:   ADL retraining/adapted techniques and AE recommendations to increase functional independence within precautions                    Energy conservation techniques to improve tolerance for selfcare routine   Functional transfer/mobility training/DME recommendations for increased independence, safety and fall prevention         Patient/family education to increase safety and functional independence within precautions             Environmental modifications for safe mobility and completion of ADLs                             Therapeutic activity to improve functional performance during ADLs                                         Therapeutic exercise to improve tolerance and functional strength for ADLs   Balance retraining exercises/tasks for facilitation of postural control with dynamic challenges during ADLs .       Recommended Adaptive Equipment:  LB dressing AE     Home Living: Pt lives with   in a 1 floor condo with 2 step(s) to enter and 2 rail(s)  Bathroom setup: walk in shower with seat and rail  Equipment owned: shower seat, [employer], [facility]     Prior Level of Function: [place] with ADLs; Assist with IADLs. [employer] for ambulation. Driving: no  Occupation: N/A     Pain Level: pt c/o 0/10 chest pain  this session; pt reporting she is not feeling well. Currently receiving transfusion.     Cognition: A&O: 3/4    Follows 1-2 step commands appropriately. Memory: Good              Comprehension Good              Problem solving: Fair-  Pt requires frequent cues to maintain sternal precautions              Judgement/safety: Fair-                 Communication skills: WFL              Vision: WFL                     Glasses:readers                                                        Hearing: WFL                RASS: 0  CAM-ICU: (NT) Delirium      UE Assessment:  Hand Dominance: Right [x]?   Left []?       ROM Strength STM goal: PRN   RUE  Grossly WFL within precautions Not formally tested; grossly WFL              WNL for ADLS      LUE Grossly WFL within precautions Not formally tested; grossly WFL              WNL for ADLS         Sensation: No c/o numbness or tingling in extremities   Tone: WNL   Edema: min B UE/LE     Functional Assessment: AM-PAC Daily Activity Raw Score: 9/24    Initial Eval Status  Date: 8/26 Treatment Status  Date:8/31 STG=LTG  Time Frame: 5-7 days   Feeding Min A  Set up   Bed level  NT  NPO                      King  while seated up in chair to increase activity tolerance when cleared for diet      Grooming Max A Mod A  Set up/EOB                      S; set up   while seated/standing sink level demonstrating G tolerance; G balance.      UB dressing/bathing Dep NT                      Min A   demonstrating G knowledge of precautions during tasks      LB dressing/bathing Dep     Max A  after instruction on LB dressing AE for safe reach within precautions  Max A using  AE  *limited tolerance this pm to perform tasks                      Min A  using AE as needed for safe reach/ energy conservation        Toileting Max A Dep                        Min A      Bed Mobility  Supine to sit: Max A+2     Sit to supine: Max A+2  Supine to sit: Max A+2    Sit to supine:   NT                      Min A  in prep of ADL tasks & transfers   Functional Transfers Sit to stand: NT    Sit to stand:NT today due to decreased activity tolerance; pt declines- agreeable to sitting EOB to perform light activities                      S  sit<>stand/functional bathroom transfers using AD/DME as needed for balance and safety   Functional Mobility NT  no device NT                        S   functional/bathroom mobility using AD as needed & demonstrating G safety      Balance Sitting:     Static:  Min A    Dynamic:Min A  Standing: NT Sitting: SBA static; Min A dynamic    Standing: NT today S dynamic sitting balance; S dynamic standing balance  during ADL tasks & transfers   Endurance/Activity Tolerance    Fair tolerance with light activity. Pt limited by anxiety. Pt provided with encouragement and pt safety reassured. Fair tolerance with light ADLs and transfers.    Good   tolerance with moderate activity/self care routine   Visual/  Perceptual               WFL                                Vitals:   HR at rest: 77 bpm HR at end of session: 80 bpm   Spo2 at rest:96% Spo2 at end of session: 93%   BP at rest: 110/70 mmHg BP at end of session: 126/71 mmHg     Treatment: OT treatment provided this date   Bed mobility:Review of sternal precautions/medical lines prior to bed mobility to facilitate safe transfers and ADLS. Pt required 2 person assist for safe mobility due to complexity of medical condition, medical lines and deconditioning. HOB elevated to assist.   Balance retraining: Performed sitting balance ex's with instruction to facilitate righting reactions with postural changes during ADLS. Review of breathing techniques during sitting activities to increase tolerance. Delirium Prevention: Environmental and sensory modifications assessed and implemented to decrease ICU acquired delirium and to improve overall orientation, mentation and pt interaction with family/staff. Line management and environmental modifications made prior to and end of session to ensure patient safety and to increase efficiency of session. Skilled monitoring of HR, O2 saturation, blood pressure and patient's response to activity performed throughout session. Comments: OK from RN to see patient. Upon arrival, patient resting in bed, receiving blood. Pt cleared for session. Pt reporting not feeling well today and declines OOB activity to chair. Pt assisted to EOB to increase activity tolerance and improve breathing. Pt demo fair understanding of education/techniques. At end of session, patient left seated with bed in chair position. Pillows placed under B UE/LE for comfort and edema control. Pt instructed on use of call light for assistance and fall prevention. Overall, pt presents with decreased activity tolerance, dynamic balance, functional mobility and anxiety limiting completion of ADLs and safe return home. Pt can benefit from continued skilled OT to increase safety, functional independence & quality of life. · Pt has made fair progress towards set goals.    · Continue with current plan of care       Time In:1415             Time Out: 1440        Total Treatment Time: 25       Treatment Charges: Mins Units   Ther Ex Fifi 70 12098 25 2   ADL/Home Mgt 13734     Neuro Re-ed [id number]     Orthotic manage/training  06231     Non-Billable Time         Rosa Owusu, OTR/L [id number]

## 2021-08-31 NOTE — PLAN OF CARE
Problem: Falls - Risk of:  Goal: Will remain free from falls  Description: Will remain free from falls  8/31/2021 0720 by Saman Luciano RN  Outcome: Met This Shift  8/31/2021 0332 by Chencho Mcmillan RN  Outcome: Met This Shift  Goal: Absence of physical injury  Description: Absence of physical injury  Outcome: Met This Shift     Problem: Anxiety:  Goal: Level of anxiety will decrease  Description: Level of anxiety will decrease  8/31/2021 0720 by Samna Luciano RN  Outcome: Met This Shift  8/31/2021 0332 by Chencho Mcmillan RN  Outcome: Met This Shift     Problem: Cardiac Output - Decreased:  Goal: Cardiac output within specified parameters  Description: Cardiac output within specified parameters  8/31/2021 0332 by Chencho Mcmillan RN  Outcome: Met This Shift     Problem: Fluid Volume - Imbalance:  Goal: Ability to achieve a balanced intake and output will improve  Description: Ability to achieve a balanced intake and output will improve  8/31/2021 0332 by Chencho Mcmillan RN  Outcome: Met This Shift     Problem: Pain:  Goal: Pain level will decrease  Description: Pain level will decrease  8/31/2021 0720 by Saman Luciano RN  Outcome: Met This Shift  8/31/2021 0332 by Chencho Mcmillan RN  Outcome: Met This Shift     Problem: Tissue Perfusion - Cardiopulmonary, Altered:  Goal: Absence of angina  Description: Absence of angina  8/31/2021 0332 by Chencho Mcmillan RN  Outcome: Met This Shift     Problem: Skin Integrity:  Goal: Will show no infection signs and symptoms  Description: Will show no infection signs and symptoms  Outcome: Met This Shift  Goal: Absence of new skin breakdown  Description: Absence of new skin breakdown  Outcome: Met This Shift     Problem: Pain:  Goal: Pain level will decrease  Description: Pain level will decrease  8/31/2021 0720 by Saman Luciano RN  Outcome: Met This Shift  8/31/2021 0332 by Chencho Mcmillan RN  Outcome: Met This Shift  Goal: Control of acute pain  Description: Control of acute pain  Outcome: Met This Shift  Goal: Control of chronic pain  Description: Control of chronic pain  Outcome: Met This Shift

## 2021-08-31 NOTE — PROGRESS NOTES
Speech Language Pathology      NAME:  Jacob Ahmadi  :  1940  DATE: 2021  ROOM:  [address] [place] [address]-A    Spoke with NP, Pt still requiring increased O2 needs. Will hold PO trials today and follow along.      CAD in native artery [I25.10]

## 2021-08-31 NOTE — PROGRESS NOTES
[facility] tomorrow 11:30AM vs 2:30PM depending on scheduling/anesthesia availability.     Electronically signed by Corona Morales MD on 8/31/2021 at 12:59 PM

## 2021-08-31 NOTE — PROGRESS NOTES
Physical Therapy  Physical Therapy Treatment Note     Name: Warden Hogan  : [birth year]  MRN: [id number]      Date of Service: 2021    Evaluating PT:  Zulma Hahn, PT, DPT [id number]    Room #:  8073/3901-B  Diagnosis:  CAD in native artery [I25.10]  PMHx/PSHx:  Arthritis, DM, HLD, HTN  Procedure/Surgery:   CABG x 3  Precautions:  Falls, Sternal, optiflow vs Bipap, chest tube   Equipment Needs:  TBD    SUBJECTIVE:    Pt lives with  in a 1 story condo with 2 stairs to enter and 2 rail. Pt ambulated with [address] Flo or SPC and was independent PTA. OBJECTIVE:   Initial Evaluation  Date: 21 Treatment  21 Short Term/ Long Term   Goals   AM-PAC 6 Clicks     Was pt agreeable to Eval/treatment? Yes Yes    Does pt have pain?  10/10 surgical pain - RN aware 9/10 surgical pain - RN aware    Bed Mobility  Rolling: NT  Supine to sit: MaxA x 2 with HOB elevated  Sit to supine: MaxA x 2  Scooting: MaxA Rolling: NT  Supine to sit: MaxA x 2 with HOB elevated  Sit to supine: MaxA x 2  Scooting: MaxA Graham   Transfers Sit to stand: NT  Stand to sit: NT  Stand pivot: NT NT Mod Independent with [address] Flo if needed   Ambulation   NT NT >200 feet with Mod Independent with [address] Flo if needed   Stair negotiation: ascended and descended NT  >4 steps with 1 rail Graham   ROM BUE:  Defer to OT note  BLE:  WFL     Strength BUE:  Defer to OT note  BLE:  4/5  Increase by 1/3 MMT grade   Balance Sitting EOB:  ModA dynamic  Dynamic Standing:  NT Sitting EOB:  SBA static, Graham dynamic  Dynamic Standing:  Dep no device Sitting EOB:  SBA  Dynamic Standing:  Graham no device     Pt is A & O x 4  CAM-ICU: NT  RASS: 0  Sensation:  No reported paresthesias  Edema:  None    Vitals:  Heart Rate at rest 77 bpm Heart Rate post session 80 bpm   SpO2 at rest 96% SpO2 post session 94%   Blood Pressure at rest 110/70 mmHg Blood Pressure post session 126/71 mmHg     Functional Status Score-Intensive Care Unit (FSS-ICU)   Rolling -/7   Supine to sit transfer 1/7   Unsupported sitting  4/7   Sit to stand transfers -/7   Ambulation -/7   Total  -/35     Therapeutic Exercises:  NA    Patient education  Pt educated on safety    Patient response to education:   Pt verbalized understanding Pt demonstrated skill Pt requires further education in this area   x x x     ASSESSMENT:    Conditions Requiring Skilled Therapeutic Intervention:    [x]Decreased strength     []Decreased ROM  [x]Decreased functional mobility  [x]Decreased balance   [x]Decreased endurance   [x]Decreased posture  []Decreased sensation  []Decreased coordination   []Decreased vision  []Decreased safety awareness   [x]Increased pain       Comments:  NP reported pt was stable for session. Pt was in bed upon arrival, agreeable to treatment session with encouragement. Reviewed sternal precautions prior to activity. Pt adamantly refused attempt to transfer to chair today. Increased time spent at EOB. Flexed posture noted but improved balance. Fatigue and back pain limited further activity. Pt was left in bed with all needs met and call light in reach. All lines remained intact. Treatment:  Patient practiced and was instructed in the following treatment:     Bed mobility training - pt given verbal and tactile cues to facilitate proper sequencing and safety during supine>sit as well as provided with physical assistance.  Sitting EOB for >10 minutes for upright tolerance, postural awareness and BLE ROM   Skilled positioning - Pt placed in the chair position with pillows utilized to facilitate upright posture, joint and skin integrity, and interaction with environment. PLAN:    Pt is making minimal progress towards established goals. Will continue with current POC.       Time in  1410  Time out  1430    Total Treatment Time  20 minutes     CPT codes:  [] Gait training 71934 - minutes  [] Manual therapy [id number] - minutes  [x] Therapeutic activities 88542 20 minutes  [] Therapeutic exercises 18026 - minutes  [] Neuromuscular reeducation 29217 - minutes    Db Lloyd, PT, DPT  [id number]

## 2021-08-31 NOTE — PROGRESS NOTES
Pulmonary Subsequent Hospital F/U note    Patient is being followed for: post operative respiratory insufficiency with hypoxia     Interval HPI:  Remains on Airvo 58% FiO2 55LPM which is stable  Not really making much respiratory progress  Does have a cough but not expectorating  She is very weak  Bedside US of the chest with very small bilateral effusions R>L  She is afebrile     ROS:  Fatigued  No chest pain  No cough  No nausea or vomiting  Complaining of feeling warm    Exam:  /68   Pulse 75   Temp 97.9 °F (36.6 °C) (Axillary)   Resp 20   Ht 5' 2\" (1.575 m)   Wt 196 lb 3.4 oz (89 kg)   LMP  (LMP Unknown)   SpO2 97%   BMI 35.89 kg/m²    General: Patient is resting comfortably, no distress, breathing is not labored on high flow oxygen  HEENT: PERRL, EOMI, MMM, no oral lesions  Neck: supple no adenopathy  CV: RRR without murmur, chest incision is intact   Lungs: bibasilar crackles present, decreased BS at the R lung base   Abd: soft, ND, NT, bowel sounds normal  Ext: warm, no edema    Data:    Oximetry:  SpO2 Readings from Last 1 Encounters:   08/31/21 97%       Imaging personally reviewed by myself:  CXR  FINDINGS:   Technique and patient body habitus limits evaluation.  Left-sided chest tube   is unchanged.  Bibasilar infiltrates and moderate right effusion are similar.    Heart appears enlarged.  There is sternotomy.           Impression   No significant interval change           Pertinent labs reviewed and noted:  Lab Results   Component Value Date    WBC 14.2 08/31/2021    HGB 7.1 08/31/2021    HCT 25.1 08/31/2021    MCV 95.4 08/31/2021    MCH 27.0 08/31/2021    MCHC 28.3 08/31/2021    RDW 17.3 08/31/2021     08/31/2021    MPV 10.3 08/31/2021     Lab Results   Component Value Date     08/31/2021    K 4.2 08/31/2021    K 3.2 06/21/2021     08/31/2021    CO2 21 08/31/2021    BUN 47 08/31/2021    CREATININE 1.1 08/31/2021    LABALBU 4.3 08/23/2021    CALCIUM 8.7 08/31/2021    GFRAA 58 08/31/2021    LABGLOM 48 08/31/2021     Lab Results   Component Value Date    PROTIME 14.5 08/25/2021    INR 1.3 08/25/2021       Assessment:  1. Post operative respiratory insufficiency with hypoxia  2. Bilateral atelectasis/mucus plugging of bronchi  3. Pleural effusions - small on bedside US  4. CAD s/p CABG x 3 8/25/21  5. Atrial fibrillation with RVR - converted to sinus rhythm  6. Anxiety  7. Aspiration pneumonia     Plan:  1. Wean oxygen as able saturations 90% and above   2. EZPAP treatments, incentive spirometry  3. Bronchodilators for airway clearance  4. Serial imaging   5. Speech therapy has seen and recommends modified barium, remains NPO  6. Zosyn day #5/7  7. Bedside US without enough pleural fluid for thoracentesis  8.  Likely clean out bronchoscopy today or tomorrow pending scheduling/anesthesia     Electronically signed by Alvaro Garvey MD on 8/31/2021 at 10:25 AM

## 2021-09-01 ENCOUNTER — ANESTHESIA EVENT (OUTPATIENT)
Dept: ENDOSCOPY | Age: 81
DRG: 003 | End: 2021-09-01
Payer: MEDICARE

## 2021-09-01 ENCOUNTER — ANESTHESIA (OUTPATIENT)
Dept: ENDOSCOPY | Age: 81
DRG: 003 | End: 2021-09-01
Payer: MEDICARE

## 2021-09-01 ENCOUNTER — APPOINTMENT (OUTPATIENT)
Dept: GENERAL RADIOLOGY | Age: 81
DRG: 003 | End: 2021-09-01
Attending: THORACIC SURGERY (CARDIOTHORACIC VASCULAR SURGERY)
Payer: MEDICARE

## 2021-09-01 VITALS — OXYGEN SATURATION: 90 %

## 2021-09-01 LAB
ANION GAP SERPL CALCULATED.3IONS-SCNC: 13 MMOL/L (ref 7–16)
ANION GAP SERPL CALCULATED.3IONS-SCNC: 9 MMOL/L (ref 7–16)
BUN BLDV-MCNC: 39 MG/DL (ref 6–23)
BUN BLDV-MCNC: 41 MG/DL (ref 6–23)
CALCIUM SERPL-MCNC: 8.5 MG/DL (ref 8.6–10.2)
CALCIUM SERPL-MCNC: 8.6 MG/DL (ref 8.6–10.2)
CHLORIDE BLD-SCNC: 101 MMOL/L (ref 98–107)
CHLORIDE BLD-SCNC: 98 MMOL/L (ref 98–107)
CO2: 21 MMOL/L (ref 22–29)
CO2: 24 MMOL/L (ref 22–29)
CREAT SERPL-MCNC: 1 MG/DL (ref 0.5–1)
CREAT SERPL-MCNC: 1.1 MG/DL (ref 0.5–1)
GFR AFRICAN AMERICAN: 58
GFR AFRICAN AMERICAN: >60
GFR NON-AFRICAN AMERICAN: 48 ML/MIN/1.73
GFR NON-AFRICAN AMERICAN: 53 ML/MIN/1.73
GLUCOSE BLD-MCNC: 129 MG/DL (ref 74–99)
GLUCOSE BLD-MCNC: 97 MG/DL (ref 74–99)
HCT VFR BLD CALC: 29.3 % (ref 34–48)
HEMOGLOBIN: 9.1 G/DL (ref 11.5–15.5)
MAGNESIUM: 2.6 MG/DL (ref 1.6–2.6)
MCH RBC QN AUTO: 26.9 PG (ref 26–35)
MCHC RBC AUTO-ENTMCNC: 31.1 % (ref 32–34.5)
MCV RBC AUTO: 86.7 FL (ref 80–99.9)
METER GLUCOSE: 111 MG/DL (ref 74–99)
METER GLUCOSE: 125 MG/DL (ref 74–99)
METER GLUCOSE: 131 MG/DL (ref 74–99)
METER GLUCOSE: 89 MG/DL (ref 74–99)
PDW BLD-RTO: 16.2 FL (ref 11.5–15)
PLATELET # BLD: 373 E9/L (ref 130–450)
PMV BLD AUTO: 9.5 FL (ref 7–12)
POTASSIUM SERPL-SCNC: 3.7 MMOL/L (ref 3.5–5)
POTASSIUM SERPL-SCNC: 4.2 MMOL/L (ref 3.5–5)
POTASSIUM SERPL-SCNC: 4.3 MMOL/L (ref 3.5–5)
RBC # BLD: 3.38 E12/L (ref 3.5–5.5)
SODIUM BLD-SCNC: 132 MMOL/L (ref 132–146)
SODIUM BLD-SCNC: 134 MMOL/L (ref 132–146)
WBC # BLD: 17.5 E9/L (ref 4.5–11.5)

## 2021-09-01 PROCEDURE — 71045 X-RAY EXAM CHEST 1 VIEW: CPT

## 2021-09-01 PROCEDURE — 6360000002 HC RX W HCPCS: Performed by: NURSE PRACTITIONER

## 2021-09-01 PROCEDURE — 84132 ASSAY OF SERUM POTASSIUM: CPT

## 2021-09-01 PROCEDURE — 87070 CULTURE OTHR SPECIMN AEROBIC: CPT

## 2021-09-01 PROCEDURE — 80048 BASIC METABOLIC PNL TOTAL CA: CPT

## 2021-09-01 PROCEDURE — 97530 THERAPEUTIC ACTIVITIES: CPT

## 2021-09-01 PROCEDURE — 2500000003 HC RX 250 WO HCPCS

## 2021-09-01 PROCEDURE — 2580000003 HC RX 258: Performed by: NURSE PRACTITIONER

## 2021-09-01 PROCEDURE — 87206 SMEAR FLUORESCENT/ACID STAI: CPT

## 2021-09-01 PROCEDURE — 2700000000 HC OXYGEN THERAPY PER DAY

## 2021-09-01 PROCEDURE — 3609027000 HC BRONCHOSCOPY: Performed by: INTERNAL MEDICINE

## 2021-09-01 PROCEDURE — 6360000002 HC RX W HCPCS

## 2021-09-01 PROCEDURE — 6370000000 HC RX 637 (ALT 250 FOR IP): Performed by: THORACIC SURGERY (CARDIOTHORACIC VASCULAR SURGERY)

## 2021-09-01 PROCEDURE — 6370000000 HC RX 637 (ALT 250 FOR IP): Performed by: NURSE PRACTITIONER

## 2021-09-01 PROCEDURE — 36415 COLL VENOUS BLD VENIPUNCTURE: CPT

## 2021-09-01 PROCEDURE — 94640 AIRWAY INHALATION TREATMENT: CPT

## 2021-09-01 PROCEDURE — 6360000002 HC RX W HCPCS: Performed by: THORACIC SURGERY (CARDIOTHORACIC VASCULAR SURGERY)

## 2021-09-01 PROCEDURE — 87205 SMEAR GRAM STAIN: CPT

## 2021-09-01 PROCEDURE — 3700000000 HC ANESTHESIA ATTENDED CARE: Performed by: INTERNAL MEDICINE

## 2021-09-01 PROCEDURE — 99233 SBSQ HOSP IP/OBS HIGH 50: CPT | Performed by: NURSE PRACTITIONER

## 2021-09-01 PROCEDURE — 37799 UNLISTED PX VASCULAR SURGERY: CPT

## 2021-09-01 PROCEDURE — 82962 GLUCOSE BLOOD TEST: CPT

## 2021-09-01 PROCEDURE — 83735 ASSAY OF MAGNESIUM: CPT

## 2021-09-01 PROCEDURE — 2500000003 HC RX 250 WO HCPCS: Performed by: INTERNAL MEDICINE

## 2021-09-01 PROCEDURE — 2709999900 HC NON-CHARGEABLE SUPPLY: Performed by: INTERNAL MEDICINE

## 2021-09-01 PROCEDURE — 94660 CPAP INITIATION&MGMT: CPT

## 2021-09-01 PROCEDURE — 2580000003 HC RX 258

## 2021-09-01 PROCEDURE — 0BJ08ZZ INSPECTION OF TRACHEOBRONCHIAL TREE, VIA NATURAL OR ARTIFICIAL OPENING ENDOSCOPIC: ICD-10-PCS | Performed by: INTERNAL MEDICINE

## 2021-09-01 PROCEDURE — 36592 COLLECT BLOOD FROM PICC: CPT

## 2021-09-01 PROCEDURE — P9047 ALBUMIN (HUMAN), 25%, 50ML: HCPCS | Performed by: NURSE PRACTITIONER

## 2021-09-01 PROCEDURE — 3700000001 HC ADD 15 MINUTES (ANESTHESIA): Performed by: INTERNAL MEDICINE

## 2021-09-01 PROCEDURE — 2000000000 HC ICU R&B

## 2021-09-01 PROCEDURE — 85027 COMPLETE CBC AUTOMATED: CPT

## 2021-09-01 RX ORDER — ALBUMIN (HUMAN) 12.5 G/50ML
25 SOLUTION INTRAVENOUS ONCE
Status: COMPLETED | OUTPATIENT
Start: 2021-09-01 | End: 2021-09-01

## 2021-09-01 RX ORDER — SODIUM CHLORIDE 9 MG/ML
INJECTION, SOLUTION INTRAVENOUS CONTINUOUS PRN
Status: DISCONTINUED | OUTPATIENT
Start: 2021-09-01 | End: 2021-09-01 | Stop reason: SDUPTHER

## 2021-09-01 RX ORDER — MIDAZOLAM HYDROCHLORIDE 1 MG/ML
INJECTION INTRAMUSCULAR; INTRAVENOUS PRN
Status: DISCONTINUED | OUTPATIENT
Start: 2021-09-01 | End: 2021-09-01 | Stop reason: SDUPTHER

## 2021-09-01 RX ORDER — LIDOCAINE HYDROCHLORIDE 10 MG/ML
INJECTION, SOLUTION EPIDURAL; INFILTRATION; INTRACAUDAL; PERINEURAL PRN
Status: DISCONTINUED | OUTPATIENT
Start: 2021-09-01 | End: 2021-09-01 | Stop reason: ALTCHOICE

## 2021-09-01 RX ORDER — LIDOCAINE HYDROCHLORIDE 20 MG/ML
INJECTION, SOLUTION EPIDURAL; INFILTRATION; INTRACAUDAL; PERINEURAL PRN
Status: DISCONTINUED | OUTPATIENT
Start: 2021-09-01 | End: 2021-09-01 | Stop reason: SDUPTHER

## 2021-09-01 RX ORDER — KETAMINE HYDROCHLORIDE 10 MG/ML
INJECTION, SOLUTION INTRAMUSCULAR; INTRAVENOUS PRN
Status: DISCONTINUED | OUTPATIENT
Start: 2021-09-01 | End: 2021-09-01 | Stop reason: SDUPTHER

## 2021-09-01 RX ORDER — PROPOFOL 10 MG/ML
INJECTION, EMULSION INTRAVENOUS PRN
Status: DISCONTINUED | OUTPATIENT
Start: 2021-09-01 | End: 2021-09-01 | Stop reason: SDUPTHER

## 2021-09-01 RX ADMIN — PIPERACILLIN AND TAZOBACTAM 3375 MG: 3; .375 INJECTION, POWDER, LYOPHILIZED, FOR SOLUTION INTRAVENOUS at 00:05

## 2021-09-01 RX ADMIN — SODIUM CHLORIDE: 9 INJECTION, SOLUTION INTRAVENOUS at 04:11

## 2021-09-01 RX ADMIN — SODIUM CHLORIDE: 9 INJECTION, SOLUTION INTRAVENOUS at 13:01

## 2021-09-01 RX ADMIN — PHENYLEPHRINE HYDROCHLORIDE 100 MCG: 10 INJECTION INTRAVENOUS at 15:07

## 2021-09-01 RX ADMIN — ALBUMIN (HUMAN) 25 G: 25 SOLUTION INTRAVENOUS at 16:31

## 2021-09-01 RX ADMIN — IPRATROPIUM BROMIDE AND ALBUTEROL SULFATE 1 AMPULE: .5; 2.5 SOLUTION RESPIRATORY (INHALATION) at 08:03

## 2021-09-01 RX ADMIN — IPRATROPIUM BROMIDE AND ALBUTEROL SULFATE 1 AMPULE: .5; 2.5 SOLUTION RESPIRATORY (INHALATION) at 20:36

## 2021-09-01 RX ADMIN — LORAZEPAM 0.5 MG: 2 INJECTION INTRAMUSCULAR; INTRAVENOUS at 22:46

## 2021-09-01 RX ADMIN — PANTOPRAZOLE SODIUM 40 MG: 40 TABLET, DELAYED RELEASE ORAL at 08:36

## 2021-09-01 RX ADMIN — ENOXAPARIN SODIUM 40 MG: 40 INJECTION SUBCUTANEOUS at 08:36

## 2021-09-01 RX ADMIN — DOCUSATE SODIUM 50 MG AND SENNOSIDES 8.6 MG 1 TABLET: 8.6; 5 TABLET, FILM COATED ORAL at 21:11

## 2021-09-01 RX ADMIN — PIPERACILLIN AND TAZOBACTAM 3375 MG: 3; .375 INJECTION, POWDER, LYOPHILIZED, FOR SOLUTION INTRAVENOUS at 17:35

## 2021-09-01 RX ADMIN — OXYCODONE 5 MG: 5 TABLET ORAL at 03:39

## 2021-09-01 RX ADMIN — OXYCODONE 5 MG: 5 TABLET ORAL at 20:29

## 2021-09-01 RX ADMIN — PRAMIPEXOLE DIHYDROCHLORIDE 0.5 MG: 0.25 TABLET ORAL at 13:27

## 2021-09-01 RX ADMIN — KETAMINE HYDROCHLORIDE 50 MG: 10 INJECTION, SOLUTION INTRAMUSCULAR; INTRAVENOUS at 14:56

## 2021-09-01 RX ADMIN — PHENYLEPHRINE HYDROCHLORIDE 200 MCG: 10 INJECTION INTRAVENOUS at 14:57

## 2021-09-01 RX ADMIN — Medication 400 MG: at 09:25

## 2021-09-01 RX ADMIN — PRAMIPEXOLE DIHYDROCHLORIDE 0.5 MG: 0.25 TABLET ORAL at 21:11

## 2021-09-01 RX ADMIN — POTASSIUM CHLORIDE 20 MEQ: 400 INJECTION, SOLUTION INTRAVENOUS at 07:12

## 2021-09-01 RX ADMIN — LIDOCAINE HYDROCHLORIDE 50 MG: 20 INJECTION, SOLUTION EPIDURAL; INFILTRATION; INTRACAUDAL; PERINEURAL at 14:56

## 2021-09-01 RX ADMIN — PROPOFOL 30 MG: 10 INJECTION, EMULSION INTRAVENOUS at 14:56

## 2021-09-01 RX ADMIN — SODIUM CHLORIDE: 9 INJECTION, SOLUTION INTRAVENOUS at 14:53

## 2021-09-01 RX ADMIN — ATORVASTATIN CALCIUM 10 MG: 10 TABLET, FILM COATED ORAL at 21:10

## 2021-09-01 RX ADMIN — METOPROLOL TARTRATE 12.5 MG: 25 TABLET, FILM COATED ORAL at 08:36

## 2021-09-01 RX ADMIN — LORAZEPAM 0.5 MG: 2 INJECTION INTRAMUSCULAR; INTRAVENOUS at 03:39

## 2021-09-01 RX ADMIN — PHENYLEPHRINE HYDROCHLORIDE 200 MCG: 10 INJECTION INTRAVENOUS at 15:05

## 2021-09-01 RX ADMIN — PHENYLEPHRINE HYDROCHLORIDE 200 MCG: 10 INJECTION INTRAVENOUS at 15:01

## 2021-09-01 RX ADMIN — ASPIRIN 81 MG: 81 TABLET, COATED ORAL at 09:25

## 2021-09-01 RX ADMIN — SODIUM CHLORIDE: 9 INJECTION, SOLUTION INTRAVENOUS at 21:40

## 2021-09-01 RX ADMIN — AMIODARONE HYDROCHLORIDE 400 MG: 200 TABLET ORAL at 08:36

## 2021-09-01 RX ADMIN — AMIODARONE HYDROCHLORIDE 400 MG: 200 TABLET ORAL at 21:10

## 2021-09-01 RX ADMIN — OXYCODONE 5 MG: 5 TABLET ORAL at 13:27

## 2021-09-01 RX ADMIN — PRAMIPEXOLE DIHYDROCHLORIDE 0.5 MG: 0.25 TABLET ORAL at 09:25

## 2021-09-01 RX ADMIN — DOCUSATE SODIUM 50 MG AND SENNOSIDES 8.6 MG 1 TABLET: 8.6; 5 TABLET, FILM COATED ORAL at 08:36

## 2021-09-01 RX ADMIN — IPRATROPIUM BROMIDE AND ALBUTEROL SULFATE 1 AMPULE: .5; 2.5 SOLUTION RESPIRATORY (INHALATION) at 15:32

## 2021-09-01 RX ADMIN — SODIUM CHLORIDE, PRESERVATIVE FREE 300 UNITS: 5 INJECTION INTRAVENOUS at 08:35

## 2021-09-01 RX ADMIN — MIDAZOLAM 2 MG: 1 INJECTION INTRAMUSCULAR; INTRAVENOUS at 14:56

## 2021-09-01 RX ADMIN — METOPROLOL TARTRATE 12.5 MG: 25 TABLET, FILM COATED ORAL at 21:11

## 2021-09-01 RX ADMIN — PIPERACILLIN AND TAZOBACTAM 3375 MG: 3; .375 INJECTION, POWDER, LYOPHILIZED, FOR SOLUTION INTRAVENOUS at 08:43

## 2021-09-01 ASSESSMENT — PAIN DESCRIPTION - LOCATION
LOCATION: CHEST
LOCATION: GENERALIZED;CHEST

## 2021-09-01 ASSESSMENT — PAIN SCALES - GENERAL
PAINLEVEL_OUTOF10: 8
PAINLEVEL_OUTOF10: 0
PAINLEVEL_OUTOF10: 2
PAINLEVEL_OUTOF10: 6
PAINLEVEL_OUTOF10: 4

## 2021-09-01 ASSESSMENT — PAIN DESCRIPTION - PROGRESSION
CLINICAL_PROGRESSION: NOT CHANGED
CLINICAL_PROGRESSION: GRADUALLY WORSENING

## 2021-09-01 ASSESSMENT — PAIN DESCRIPTION - FREQUENCY
FREQUENCY: INTERMITTENT
FREQUENCY: INTERMITTENT

## 2021-09-01 ASSESSMENT — PAIN DESCRIPTION - PAIN TYPE
TYPE: SURGICAL PAIN
TYPE: SURGICAL PAIN;ACUTE PAIN

## 2021-09-01 ASSESSMENT — PAIN DESCRIPTION - ONSET
ONSET: UNABLE TO TELL
ONSET: ON-GOING

## 2021-09-01 ASSESSMENT — PAIN DESCRIPTION - DESCRIPTORS
DESCRIPTORS: ACHING;DULL;DISCOMFORT
DESCRIPTORS: ACHING

## 2021-09-01 ASSESSMENT — ENCOUNTER SYMPTOMS: DYSPNEA ACTIVITY LEVEL: AFTER AMBULATING 1 FLIGHT OF STAIRS

## 2021-09-01 ASSESSMENT — PAIN DESCRIPTION - ORIENTATION: ORIENTATION: ANTERIOR

## 2021-09-01 ASSESSMENT — LIFESTYLE VARIABLES: SMOKING_STATUS: 0

## 2021-09-01 NOTE — PROCEDURES
Bronchoscopy Procedure Note      Date of Procedure: 9/1/2021    Pre-op Diagnosis: mucus plugging of bronchi, respiratory insufficiency     Post-op Diagnosis: same     Bronchoscopist: Claudette Blackmon MD    Assistants - bronchoscopy nurse     Anesthesia: Conscious Sedation. See anesthesia record. Procedure: Flexible fiberoptic bronchoscopy with washing     Estimated Blood Loss: Minimal    Complications: None    Procedure: The bronchoscope was introduced through the mouth and advanced throughout the tracheobronchial tree. The vocal cords appeared normal.  The trachea was of normal caliber and the magno was normal.   There were diffuse scattered mucosal hemorrhages throughout the trachea. There were no endobronchial lesions. There were thick clear secretions occluding the RML and RLL bronchi. Airway irrigation was completed until the bronchial tree was patent. It is noteworthy that she has very small friable airways suspicious for chronic aspiration. The scope was withdrawn at the conclusion of the procedure. Patient tolerated the procedure well.     Claudette Blackmon MD   9/1/2021 3:06 PM

## 2021-09-01 NOTE — ANESTHESIA POSTPROCEDURE EVALUATION
Department of Anesthesiology  Postprocedure Note    Patient: Samantha Marcelino  MRN: [id number]  [place]: 1940  Date of evaluation: 9/1/2021  Time:  3:34 PM     Procedure Summary     Date: 09/01/21 Room / Location: [address] / [facility]    Anesthesia Start: 1202 Anesthesia Stop: 4980    Procedure: BRONCHOSCOPY DIAGNOSTIC OR CELL [address] ONLY (N/A ) Diagnosis: (/)    Surgeons: Marietta Deleon MD Responsible Provider: Sujatha Rosas DO    Anesthesia Type: MAC ASA Status: 4          Anesthesia Type: MAC    Gonzalez Phase I: Gonzalez Score: 10    Gonzalez Phase II:      Last vitals: Reviewed and per EMR flowsheets.        Anesthesia Post Evaluation    Patient location during evaluation: ICU  Patient participation: complete - patient participated  Level of consciousness: awake  Pain score: 3  Airway patency: patent  Nausea & Vomiting: no vomiting and no nausea  Complications: no  Cardiovascular status: tachycardic  Respiratory status: BIPAP (15/8/100%)  Hydration status: stable

## 2021-09-01 NOTE — PROGRESS NOTES
Physical Therapy  Physical Therapy Treatment Note     Name: Tamela Escalera  : [birth year]  MRN: [id number]      Date of Service: 2021    Evaluating PT:  Bhavik Arreola PT, DPT [id number]    Room #:  9378/5960-L  Diagnosis:  CAD in native artery [I25.10]  PMHx/PSHx:  Arthritis, DM, HLD, HTN  Procedure/Surgery:   CABG x 3  Precautions:  Falls, Sternal, optiflow vs Bipap, chest tube   Equipment Needs:  TBD    SUBJECTIVE:    Pt lives with  in a 1 story condo with 2 stairs to enter and 2 rail. Pt ambulated with [employer] or SPC and was independent PTA. OBJECTIVE:   Initial Evaluation  Date: 21 Treatment  21 Short Term/ Long Term   Goals   AM-PAC 6 Clicks     Was pt agreeable to Eval/treatment? Yes Yes    Does pt have pain?  10/10 surgical pain - RN aware 9/10 surgical pain - RN aware    Bed Mobility  Rolling: NT  Supine to sit: MaxA x 2 with HOB elevated  Sit to supine: MaxA x 2  Scooting: MaxA Rolling: NT  Supine to sit: ModA x 2 with HOB elevated  Sit to supine: MaxA x 2  Scooting: MaxA Graham   Transfers Sit to stand: NT  Stand to sit: NT  Stand pivot: NT Sit to stand: MaxA from elevated bed  Stand to sit: MaxA  Stand pivot: MaxA no device Mod Independent with WW if needed   Ambulation   NT NT >200 feet with Mod Independent with WW if needed   Stair negotiation: ascended and descended NT  >4 steps with 1 rail Graham   ROM BUE:  Defer to OT note  BLE:  WFL     Strength BUE:  Defer to OT note  BLE:  4/5  Increase by 1/3 MMT grade   Balance Sitting EOB:  ModA dynamic  Dynamic Standing:  NT Sitting EOB:  SBA static, Graham dynamic  Dynamic Standing:  MaxA no device Sitting EOB:  SBA  Dynamic Standing:  Graham no device     Pt is A & O x 4  CAM-ICU: NT  RASS: 0  Sensation:  No reported paresthesias  Edema:  None    Vitals:  Heart Rate at rest 129 bpm Heart Rate post session 116 bpm   SpO2 at rest 93% SpO2 post session 92%   Blood Pressure at rest 101/71 mmHg Blood Pressure post session 116/81 mmHg Functional Status Score-Intensive Care Unit (FSS-ICU)   Rolling -/7   Supine to sit transfer 2/7   Unsupported sitting  4/7   Sit to stand transfers 1/7   Ambulation -/7   Total  7/35     Therapeutic Exercises:  NA    Patient education  Pt educated on safety    Patient response to education:   Pt verbalized understanding Pt demonstrated skill Pt requires further education in this area   x x x     ASSESSMENT:    Conditions Requiring Skilled Therapeutic Intervention:    [x]Decreased strength     []Decreased ROM  [x]Decreased functional mobility  [x]Decreased balance   [x]Decreased endurance   [x]Decreased posture  []Decreased sensation  []Decreased coordination   []Decreased vision  []Decreased safety awareness   [x]Increased pain       Comments:  NP reported pt was stable for session. Pt was in bed upon arrival, agreeable to treatment session with encouragement. Reviewed sternal precautions prior to activity. Pt demonstrated improved mobility this session. Dizziness reported at EOB but improved with increased time. Pt stood with improved posture from previous session but still flexed. Shuffled steps completed to chair. Pt was left in chair with all needs met and call light in reach. All lines remained intact. RN notified. Treatment:  Patient practiced and was instructed in the following treatment:     Bed mobility training - pt given verbal and tactile cues to facilitate proper sequencing and safety during supine>sit as well as provided with physical assistance.  Sitting EOB for >10 minutes for upright tolerance, postural awareness and BLE ROM   Transfer training - pt was given verbal and tactile cues to facilitate proper hand placement, technique and safety during sit to stand, stand to sit and stand pivot transfers as well as provided with physical assistance to complete task.    Skilled positioning - Pt placed in the chair position with pillows utilized to facilitate upright posture, joint and skin integrity, and interaction with environment. PLAN:    Pt is making progress towards established goals. Will continue with current POC.       Time in  0845  Time out  0908    Total Treatment Time  23 minutes     CPT codes:  [] Gait training 08897 - minutes  [] Manual therapy 56418 - minutes  [x] Therapeutic activities 20880 23 minutes  [] Therapeutic exercises 84943 - minutes  [] Neuromuscular reeducation 41307 - minutes    Cristian Sanchez, PT, DPT  [id number]

## 2021-09-01 NOTE — PROGRESS NOTES
Date: 9/1/2021    Time: 3:21 PM    Patient Placed On BIPAP/CPAP/ Non-Invasive Ventilation? Yes    If no must comment. Facial area red/color change? No           If YES are Blister/Lesion present? No   If yes must notify nursing staff  BIPAP/CPAP skin barrier? Yes    Skin barrier type:mepilexlite     Comments: 15/8/100%    Marjan Bautista [employer]

## 2021-09-01 NOTE — ANESTHESIA PRE PROCEDURE
Department of Anesthesiology  Preprocedure Note       Name:  William Melchor   Age:  80 y.o.  :  1940                                          MRN:  [id number]         Date:  2021      Surgeon: Lisa Lowry):  Armond Gomez MD    Procedure: Procedure(s):  BRONCHOSCOPY    Medications prior to admission:   Prior to Admission medications    Medication Sig Start Date End Date Taking? Authorizing Provider   amiodarone (CORDARONE) 200 MG tablet Take 1 tablet by mouth daily 7/3/21  Yes oN Neumann, DO   metoprolol tartrate (LOPRESSOR) 50 MG tablet Take 1 tablet by mouth 2 times daily 21  Yes No Neumann, DO   amLODIPine (NORVASC) 10 MG tablet 10 mg daily  21  Yes Historical Provider, MD   Biotin 5000 MCG TABS Take by mouth daily   Yes Historical Provider, MD   calcium carbonate (OSCAL) 500 MG TABS tablet Take 500 mg by mouth daily   Yes Historical Provider, MD   pramipexole (MIRAPEX) 0.5 MG tablet Take 0.5 mg by mouth 3 times daily   Yes Historical Provider, MD   spironolactone (ALDACTONE) 25 MG tablet Take 1 tablet by mouth daily 21  Yes Jeromy Tate MD   fluticasone (FLONASE) 50 MCG/ACT nasal spray 2 sprays by Nasal route daily as needed    Yes Historical Provider, MD   furosemide (LASIX) 20 MG tablet Take 20 mg by mouth daily    Yes Historical Provider, MD   potassium chloride (KLOR-CON M) 20 MEQ extended release tablet Take 20 mEq by mouth 2 times daily   Yes Historical Provider, MD   ferrous sulfate (IRON 325) 325 (65 Fe) MG tablet Take 1 tablet by mouth daily (with breakfast) 6/3/21  Yes No Neumann,    miconazole (MICOTIN) 2 % powder Apply topically 2 times daily.  21  Yes Cornelia Duncan MD   glimepiride (AMARYL) 2 MG tablet Take 2 mg by mouth every morning (before breakfast)    Yes Historical Provider, MD   oxybutynin (DITROPAN) 5 MG tablet TAKE 1 TABLET BY MOUTH  DAILY WITH BREAKFAST 20  Yes Aubrie Gonzalez MD   simvastatin (ZOCOR) 20 MG tablet TAKE 1 TABLET BY MOUTH  DAILY WITH SUPPER 4/23/20  Yes Aubrie Gonzalez MD   pantoprazole (PROTONIX) 40 MG tablet TAKE ONE TABLET BY MOUTH TWO TIMES A DAY BEFORE MEALS 4/20/20  Yes Shauna Mandujano MD   metFORMIN (GLUCOPHAGE) 500 MG tablet TAKE 2 TABLETS BY MOUTH  TWICE A DAY 2/6/20  Yes Aubrie Gonzalez MD   traMADol (ULTRAM) 50 MG tablet Take 1 tablet by mouth every 8 hours as needed for Pain for up to 60 days. Take lowest dose possible to manage pain 8/5/21 10/4/21  Akiko Wilson MD   rivaroxaban Camille Shelia) 15 MG TABS tablet Take 1 tablet by mouth daily 6/30/21   Flakita Urena DO   chlordiazePOXIDE-clidinium (LIBRAX) 5-2.5 MG per capsule Take 1 capsule by mouth as needed.     Historical Provider, MD   hydrOXYzine (ATARAX) 10 MG tablet Take 1 tablet by mouth nightly 5/8/20   Shauna Mandujano MD       Current medications:    Current Facility-Administered Medications   Medication Dose Route Frequency Provider Last Rate Last Admin    0.9 % sodium chloride infusion   IntraVENous PRN TEMO Urbina CNP        bumetanide (BUMEX) injection 1 mg  1 mg IntraVENous Once TEMO Urbina CNP        amiodarone (CORDARONE) tablet 400 mg  400 mg Oral BID TEMO Urbina CNP   400 mg at 08/31/21 2030    amiodarone (CORDARONE) 450 mg in dextrose 5 % 250 mL infusion  0.25 mg/min IntraVENous Continuous Judith Amin MD 8.3 mL/hr at 08/31/21 2114 0.25 mg/min at 08/31/21 2114    sodium chloride flush 0.9 % injection 5-40 mL  5-40 mL IntraVENous 2 times per day TEMO De Leon CNP   10 mL at 08/31/21 2020    sodium chloride flush 0.9 % injection 5-40 mL  5-40 mL IntraVENous PRN TEMO De Leon CNP   10 mL at 08/31/21 1659    0.9 % sodium chloride infusion  25 mL IntraVENous PRN TEMO Cronin CNP        heparin flush 100 UNIT/ML injection 300 Units  3 mL IntraVENous 2 times per day TEMO De Leon CNP   300 Units at 08/31/21 2031    heparin flush 100 UNIT/ML injection 300 Units  3 mL IntraCATHeter PRN TEMO Perez - CNP        bisacodyl (DULCOLAX) suppository 10 mg  10 mg Rectal BID TEMO Perez - CNP   10 mg at 08/31/21 0930    insulin lispro (HUMALOG) injection vial 0-12 Units  0-12 Units SubCUTAneous 4x Daily AC & HS TEMO Cronin - CNP   2 Units at 08/31/21 2137    LORazepam (ATIVAN) injection 0.5 mg  0.5 mg IntraVENous Q4H PRN TEMO Perez - CNP   0.5 mg at 09/01/21 0339    oxyCODONE (ROXICODONE) immediate release tablet 5 mg  5 mg Oral Q4H PRN TEMO Perez - CNP   5 mg at 09/01/21 0339    piperacillin-tazobactam (ZOSYN) 3,375 mg in dextrose 5 % 100 mL IVPB extended infusion (mini-bag)  3,375 mg IntraVENous Q8H TEMO Cronin CNP   Stopped at 09/01/21 0411    Post Zosyn Saline Infusion   IntraVENous Q8H Shelley Zelaya, [address] [place] [address] at 09/01/21 0609    enoxaparin (LOVENOX) injection 40 mg  40 mg SubCUTAneous Daily TEMO Cronin CNP   40 mg at 08/31/21 0930    metoprolol tartrate (LOPRESSOR) tablet 12.5 mg  12.5 mg Oral BID TEMO Perez - CNP   12.5 mg at 08/31/21 2031    0.9 % sodium chloride infusion   IntraVENous PRN TEMO Perez CNP        atorvastatin (LIPITOR) tablet 10 mg  10 mg Oral Nightly Odin Barn, DO   10 mg at 08/31/21 2020    pramipexole (MIRAPEX) tablet 0.5 mg  0.5 mg Oral TID Odin Barn, DO   0.5 mg at 08/31/21 2020    0.9 % sodium chloride infusion  30 mL/hr IntraVENous Continuous Odin Barn, DO 30 mL/hr at 08/31/21 0946 30 mL/hr at 08/31/21 0946    ondansetron (ZOFRAN) injection 4 mg  4 mg IntraVENous Q8H PRN Odin Barn, DO   4 mg at 08/29/21 0132    aspirin EC tablet 81 mg  81 mg Oral Daily Odin Barn, DO   81 mg at 08/31/21 0939    acetaminophen (TYLENOL) tablet 650 mg  650 mg Oral Q4H PRN Odin Barn, DO   650 mg at 08/30/21 0919    acetaminophen (TYLENOL) suppository 650 mg  650 mg Rectal Q4H PRN Odin Barn, DO        magnesium oxide (MAG-OX) tablet 400 mg  400 mg Oral Daily Jessica Limerick, DO   400 mg at 08/31/21 6882    sennosides-docusate sodium (SENOKOT-S) 8.6-50 MG tablet 1 tablet  1 tablet Oral BID Jessica Limerick, DO   1 tablet at 08/31/21 0935    magnesium hydroxide (MILK OF MAGNESIA) 400 MG/5ML suspension 30 mL  30 mL Oral Daily PRN Jessica Limerick, DO        pantoprazole (PROTONIX) tablet 40 mg  40 mg Oral Daily Jessica Limerick, DO   40 mg at 08/31/21 0936    potassium chloride 20 mEq/50 mL IVPB (Central Line)  20 mEq IntraVENous PRN Jessica Limerick, DO 50 mL/hr at 09/01/21 0712 20 mEq at 09/01/21 3906    magnesium sulfate 2000 mg in 50 mL IVPB premix  2,000 mg IntraVENous PRN Jessica Limerick, DO   Stopped at 08/26/21 0745    ipratropium-albuterol (DUONEB) nebulizer solution 1 ampule  1 ampule Inhalation Q4H THALIA Sanchez, DO   1 ampule at 09/01/21 0803    albumin human 5 % IV solution 25 g  25 g IntraVENous PRN Jessica Limerick, DO   Stopped at 08/25/21 2227    0.9 % sodium chloride bolus  250 mL IntraVENous Continuous PRN Jessica Limerick, DO        norepinephrine (LEVOPHED) 16 mg in dextrose 5% 250 mL infusion  2 mcg/min IntraVENous Continuous PRN Jessica Limerick, DO   Stopped at 08/28/21 0645    glucose (GLUTOSE) 40 % oral gel 15 g  15 g Oral PRN Jessica Limerick, DO        dextrose 50 % IV solution  12.5 g IntraVENous PRN Jessica Limerick, DO        glucagon (rDNA) injection 1 mg  1 mg IntraMUSCular PRN Jessica Limerick, DO        dextrose 5 % solution  100 mL/hr IntraVENous PRN Jessica Limerick, DO        calcium gluconate 1,000 mg in dextrose 5 % 100 mL IVPB  1,000 mg IntraVENous PRN Jessica Limerick, DO   Stopped at 08/30/21 1836       Allergies:     Allergies   Allergen Reactions    Iodine Shortness Of Breath     SOB, Rash    Benadryl [Diphenhydramine]      hyper    Fish-Derived Products Rash       Problem List:    Patient Active Problem List   Diagnosis Code    Spinal stenosis in cervical region M48.02    Spinal stenosis, lumbar region, without neurogenic claudication M48.061    Essential hypertension I10    Mixed hyperlipidemia E78.2    DM (diabetes mellitus) ([facility] E11.9    Renal artery aneurysm (HCC) I72.2    PAF (paroxysmal atrial fibrillation) (HCC) I48.0    Anemia D64.9    Malignant neoplasm of cecum (HCC) C18.0    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) K75.81, K74.60    Chronic heart failure with preserved ejection fraction (HCC) I50.32    Atrial fibrillation with RVR (HCC) I48.91    Severe protein-calorie malnutrition (HCC) E43    Abnormal nuclear stress test R94.39    Opioid use, unspecified with unspecified opioid-induced disorder F11.99    Opioid dependence with unspecified opioid-induced disorder F11.29    Opioid dependence, uncomplicated G04.68    CAD in native artery I25.10    Pre-operative laboratory examination Z01.812       Past Medical History:        Diagnosis Date    Adenocarcinoma of cecum ([facility] 01/2019    Anemia     Arm numbness     Arthritis     Blood transfusion     Cervical spinal stenosis     Cirrhosis of liver (HCC)     Diabetes mellitus (HCC)     Fatty liver     GERD (gastroesophageal reflux disease)     Hyperlipidemia     Hypertension     Low back pain     Lumbar stenosis     Neck pain     Obesity (BMI 30.0-34.9) 10/14/2012    PAF (paroxysmal atrial fibrillation) (HCC)     Renal artery aneurysm ([facility] 7/15/2015       Past Surgical History:        Procedure Laterality Date    APPENDECTOMY      BACK SURGERY      BREAST SURGERY      reduction    CARDIAC CATHETERIZATION  06/28/2021    DR Soliz Chamber    CARDIOVERSION  06/23/2021    CARPAL TUNNEL RELEASE      CATARACT REMOVAL  10/2016    CERVICAL DISCECTOMY  01/19/2007    ACDF C3-4, C4-5, C5-6 Dr. Onesimo Hylton N/A 8/25/2021    [facility]      [facility] lower [facility]    HEMICOLECTOMY N/A 2/19/2019    DIAGNOSTIC LAPAROSCOPY, LYSIS OF ADHESIONS, OPEN RIGHT HEMICOLECTOMY performed by Emily Rm MD at [address] [place] [address]  2017    [facility]. Dr.Joseph Jory Marin    TRANSESOPHAGEAL ECHOCARDIOGRAM  2021    UPPER GASTROINTESTINAL ENDOSCOPY N/A 2018    EGD BIOPSY performed by Robert Mon MD at [address] [place] [address] N/A 2020    EGD BIOPSY performed by Toney Parsons MD at [address] [place] [address]  2020    EGD CONTROL HEMORRHAGE performed by Toney Parsons MD at [facility] History:    Social History     Tobacco Use    Smoking status: Former Smoker     Packs/day: 2.00     Years: 15.00     Pack years: 30.00     Quit date: 3/1/1997     Years since quittin.5    Smokeless tobacco: Never Used   Substance Use Topics    Alcohol use: Not Currently                                Counseling given: Not Answered      Vital Signs (Current):   Vitals:    21 0700 21 0803 21 0804 21 0806   BP: 103/70      Pulse: 114      Resp: 17 (!) 35 25 26   Temp:       TempSrc:       SpO2: 95% 93% 92% 91%   Weight:       Height:                                                  BP Readings from Last 3 Encounters:   21 103/70   21 116/61   21 127/61       NPO Status: Time of last liquid consumption: 1800                        Time of last solid consumption: 1800                        Date of last liquid consumption: 21                        Date of last solid food consumption: 21    BMI:   Wt Readings from Last 3 Encounters:   21 196 lb 3.4 oz (89 kg)   21 170 lb (77.1 kg)   21 175 lb (79.4 kg)     Body mass index is 35.89 kg/m².     CBC:   Lab Results   Component Value Date    WBC 17.5 2021    RBC 3.38 2021    HGB 9.1 2021    HCT 29.3 2021    MCV 86.7 2021    RDW 16.2 2021     2021       CMP:   Lab Results   Component Value Date     09/01/2021    K 3.7 09/01/2021    K 3.2 06/21/2021     09/01/2021    CO2 24 09/01/2021    BUN 39 09/01/2021    CREATININE 1.0 09/01/2021    GFRAA >60 09/01/2021    LABGLOM 53 09/01/2021    GLUCOSE 97 09/01/2021    PROT 8.1 08/23/2021    CALCIUM 8.6 09/01/2021    BILITOT 0.4 08/23/2021    ALKPHOS 74 08/23/2021    AST 18 08/23/2021    ALT 15 08/23/2021       POC Tests: No results for input(s): POCGLU, POCNA, POCK, POCCL, POCBUN, POCHEMO, POCHCT in the last 72 hours. Coags:   Lab Results   Component Value Date    PROTIME 14.5 08/25/2021    INR 1.3 08/25/2021    APTT 23.2 08/25/2021       HCG (If Applicable): No results found for: PREGTESTUR, PREGSERUM, HCG, HCGQUANT     ABGs:   Lab Results   Component Value Date    PO2ART 182.1 08/25/2021    BWD7WFM 44.1 08/25/2021    FMT1LKL 23.4 08/25/2021        Type & Screen (If Applicable):  No results found for: LABABO, LABRH    Drug/Infectious Status (If Applicable):  No results found for: HIV, HEPCAB    COVID-19 Screening (If Applicable):   Lab Results   Component Value Date    COVID19 Not Detected 12/31/2020    COVID19 Not Detected 12/05/2020       COMPARISON:   08/30/2021       HISTORY:   ORDERING SYSTEM PROVIDED HISTORY: post op open heart   TECHNOLOGIST PROVIDED HISTORY:   Reason for exam:->post op open heart   What reading provider will be dictating this exam?->CRC       FINDINGS:   Technique and patient body habitus limits evaluation.  Left-sided chest tube   is unchanged.  Bibasilar infiltrates and moderate right effusion are similar. Heart appears enlarged.  There is sternotomy.          Anesthesia Evaluation  Patient summary reviewed and Nursing notes reviewed no history of anesthetic complications:   Airway: Mallampati: III  TM distance: >3 FB   Neck ROM: limited  Mouth opening: > = 3 FB Dental:          Pulmonary:   (+) decreased breath sounds,      (-) not a current smoker (Quit 1997)                          ROS comment: Post operative respiratory insufficiency with hypoxia s/p CABG & ROGELIO 8/25/21:  Oxygen saturation 91% on RA. PE comment: Shallow and tachypneic Cardiovascular:  Exercise tolerance: poor (<4 METS),   (+) hypertension: mild, CAD: obstructive, CABG/stent:, dysrhythmias (PAF with RVR): atrial fibrillation, CHF: no interval change, BAINS: after ambulating 1 flight of stairs, hyperlipidemia      ECG reviewed  Rhythm: irregular  Rate: abnormal  Echocardiogram reviewed  Stress test reviewed       Beta Blocker:  Dose within 24 Hrs      ROS comment: Normal sinus rhythm  Low voltage QRS  Possible Inferior infarct (cited on or before 23-AUG-2021)  Abnormal ECG  When compared with ECG of 25-JUN-2021 14:48,  Significant changes have occurred    Summary  Normal left ventricle size and systolic function. Ejection fraction is visually estimated at 60-65%. Atrial fibrillation may affect the evaluation of LV systolic function. No regional wall motion abnormalities seen. No evidence of patent foramen ovale on bubble study. Normal right ventricular size and function. Mild mitral regurgitation is present. No hemodynamically significant aortic stenosis is present. Mild to moderate tricuspid regurgitation. RVSP is 36 mmHg. Normal estimated PA systolic pressure. No evidence for hemodynamically significant pericardial effusion. Neuro/Psych:   (+) psychiatric history: stable with treatmentdepression/anxiety             GI/Hepatic/Renal:   (+) GERD:, liver disease (AMES with cirrhosis):, morbid obesity          Endo/Other:    (+) DiabetesType II DM, , blood dyscrasia (Xarelto LD 8/20/21): anticoagulation therapy and anemia, arthritis: OA., malignancy/cancer (Colon (cecal adenocarcinoma)). Pt had no PAT visit        ROS comment: Spinal stenosis with chronic pain and hx. Of opioid abuse. Anemia:  9.1/29.3    Pt.  Very hard of hearing Abdominal:   (+) obese,           Vascular:           ROS comment: Renal artery aneurysm. Other Findings: Pt. On 45L high flow nasal canula         6/25/21 NM MYOCARDIAL SPECT    Impression   1.  ECG during the infusion did not change. 2.  The myocardial perfusion imaging was abnormal.   3.  The abnormality was a large sized, moderate perfusion defect that   is completely reversible involving the distal anterior, anterolateral   and apical walls suggestive of ischemia. 4.  Overall left ventricular systolic function was normal with   regional wall motion abnormalities. 5.  There is transient ischemic dilatation. 6.  High risk general pharmacologic stress test.       Thank you for sending your patient to this [address], [place], [facility], [facility] cardiology.          6/28/21 US CAROTIDS    Impression   Atherosclerotic disease. Estimated stenosis by NASCET criteria in the proximal right carotid   artery is between 50% to 69% . Estimated stenosis by NASCET criteria in the proximal left carotid   artery is between 0%  to 49 %         EKG 12 Lead  Order: [id number]  Status:  Preliminary result   Visible to patient:  No (not released) Next appt:  09/02/2021 at 11:30 AM in Neurology Nisreen Sanchez MD) Dx:  Pre-operative cardiovascular examination   0 Result Notes   Ref Range & Units 8/23/21 0729   Ventricular Rate BPM 90    Atrial Rate BPM 90    P-R Interval ms 172    QRS Duration ms 78    Q-T Interval ms 398    QTc Calculation (Bazett) ms 486    P Axis degrees 53    R Axis degrees 1    T Axis degrees -3    Resulting Agency  [facility]      Narrative & Impression    Normal sinus rhythm  Low voltage QRS  Possible Inferior infarct (cited on or before 23-AUG-2021)  Abnormal ECG  When compared with ECG of 25-JUN-2021 14:48,  Significant changes have occurred      Specimen Collected: 08/23/21 07:29           6/23/21 ECHO    Left Ventricle   Normal left ventricle size and systolic function.    Ejection fraction is visually estimated at 60-65%. Atrial fibrillation may   affect the evaluation of LV systolic function. No regional wall motion abnormalities seen. Normal left ventricle wall thickness. Right Ventricle   Normal right ventricular size and function. Left Atrium   Moderately dilated left atrium. No evidence of thrombus within left atrium or appendage. Agitated saline injected for shunt evaluation. No evidence of patent foramen ovale on bubble study. Right Atrium   Normal right atrium size. Mitral Valve   Normal mitral valve structure and function. Mild mitral regurgitation is present. No evidence of mitral valve stenosis. Tricuspid Valve   The tricuspid valve appears structurally normal.   Mild to moderate tricuspid regurgitation. RVSP is 36 mmHg. Normal estimated PA systolic pressure. Aortic Valve   Structurally normal aortic valve. The aortic valve is trileaflet. No hemodynamically significant aortic stenosis is present. No evidence of aortic valve regurgitation. Pulmonic Valve   Pulmonic valve is structurally normal.   Physiologic and/or trace pulmonic regurgitation present. No evidence of pulmonic valve stenosis. Pericardial Effusion   No evidence for hemodynamically significant pericardial effusion. Pleural Effusion   No evidence of pleural effusion. Aorta   Normal aortic root and ascending aorta. Miscellaneous   The inferior vena cava diameter is normal with normal respiratory   variation. Conclusions      Summary   Normal left ventricle size and systolic function. Ejection fraction is visually estimated at 60-65%. Atrial fibrillation may   affect the evaluation of LV systolic function. No regional wall motion abnormalities seen. No evidence of patent foramen ovale on bubble study. Normal right ventricular size and function. Mild mitral regurgitation is present. No hemodynamically significant aortic stenosis is present.    Mild to moderate tricuspid regurgitation. RVSP is 36 mmHg. Normal estimated PA systolic pressure. No evidence for hemodynamically significant pericardial effusion. Signature      ----------------------------------------------------------------   Electronically signed by Hank Roberts MD(Interpreting   physician) on 06/23/2021 07:40 PM   ----------------------------------------------------------------    6/28/21 CARDIAC CATH    IMPRESSION:  1.  50%-60% discrete calcified distal left main stenosis. 2.  40%-50% proximal to mid heavily calcified LAD stenosis. 3.  30% discrete mid circumflex stenosis. 4.  30%-40% proximal ramus stenosis. 5.  30%-40% discrete mid RCA stenosis and 30% distal stenosis. 6.  LVEDP 18 mmHg. 7.  LV ejection fraction of 55%-60%. Anesthesia Plan      MAC     ASA 4     (Note copied from 8/25/21 CABG and ROGELIO with the necessary addendums/additions  Case being done bedside in the ICU)  Induction: intravenous. Anesthetic plan and risks discussed with patient. Plan discussed with SREEKANTH.         Javier Espinoza DO   9/1/2021

## 2021-09-01 NOTE — PLAN OF CARE
Problem: Falls - Risk of:  Goal: Will remain free from falls  Description: Will remain free from falls  Outcome: Met This Shift  Goal: Absence of physical injury  Description: Absence of physical injury  Outcome: Met This Shift     Problem: Discharge Planning:  Goal: Discharged to appropriate level of care  Description: Discharged to appropriate level of care  Outcome: Not Met This Shift     Problem:  Activity Intolerance:  Goal: Able to perform prescribed physical activity  Description: Able to perform prescribed physical activity  Outcome: Not Met This Shift  Goal: Ability to tolerate increased activity will improve  Description: Ability to tolerate increased activity will improve  Outcome: Not Met This Shift     Problem: Anxiety:  Goal: Level of anxiety will decrease  Description: Level of anxiety will decrease  Outcome: Not Met This Shift     Problem: Cardiac Output - Decreased:  Goal: Cardiac output within specified parameters  Description: Cardiac output within specified parameters  Outcome: Met This Shift  Goal: Hemodynamic stability will improve  Description: Hemodynamic stability will improve  Outcome: Met This Shift     Problem: Fluid Volume - Imbalance:  Goal: Ability to achieve a balanced intake and output will improve  Description: Ability to achieve a balanced intake and output will improve  Outcome: Not Met This Shift  Goal: Chest tube drainage is within specified parameters  Description: Chest tube drainage is within specified parameters  Outcome: Met This Shift     Problem: Gas Exchange - Impaired:  Goal: Levels of oxygenation will improve  Description: Levels of oxygenation will improve  Outcome: Not Met This Shift  Goal: Ability to maintain adequate ventilation will improve  Description: Ability to maintain adequate ventilation will improve  Outcome: Not Met This Shift     Problem: Pain:  Goal: Pain level will decrease  Description: Pain level will decrease  Outcome: Not Met This Shift  Goal:

## 2021-09-01 NOTE — PROGRESS NOTES
Pulmonary Subsequent Hospital F/U note    Patient is being followed for: post operative respiratory insufficiency with hypoxia     Interval HPI:  Remains on Airvo 43% FiO2 45LPM which is improving   Drowsy in the room   She is very weak  Bedside US of the chest done yesterday with very small bilateral effusions R>L  She is afebrile   For bronchoscopy today    ROS:  Fatigued  No chest pain  No current cough  No nausea or vomiting  Drowsy today    Exam:  BP 95/73   Pulse 119   Temp 98 °F (36.7 °C) (Temporal)   Resp 17   Ht 5' 2\" (1.575 m)   Wt 196 lb 3.4 oz (89 kg)   LMP  (LMP Unknown)   SpO2 92%   BMI 35.89 kg/m²    General: Patient is resting comfortably, no distress, breathing is not labored on high flow oxygen  HEENT: PERRL, EOMI, MMM, no oral lesions  Neck: supple no adenopathy  CV: RRR without murmur, chest incision is intact   Lungs: bibasilar crackles present, decreased BS at the R lung base   Abd: soft, ND, NT, bowel sounds normal  Ext: warm, no edema    Data:    Oximetry:  SpO2 Readings from Last 1 Encounters:   09/01/21 92%       Imaging personally reviewed by myself:  CXR     Impression   Right-sided moderate pleural effusion is unchanged.       Small to moderate left pleural effusions unchanged.             Pertinent labs reviewed and noted:  Lab Results   Component Value Date    WBC 17.5 09/01/2021    HGB 9.1 09/01/2021    HCT 29.3 09/01/2021    MCV 86.7 09/01/2021    MCH 26.9 09/01/2021    MCHC 31.1 09/01/2021    RDW 16.2 09/01/2021     09/01/2021    MPV 9.5 09/01/2021     Lab Results   Component Value Date     09/01/2021    K 4.2 09/01/2021    K 3.2 06/21/2021     09/01/2021    CO2 24 09/01/2021    BUN 39 09/01/2021    CREATININE 1.0 09/01/2021    LABALBU 4.3 08/23/2021    CALCIUM 8.6 09/01/2021    GFRAA >60 09/01/2021    LABGLOM 53 09/01/2021     Lab Results   Component Value Date    PROTIME 14.5 08/25/2021    INR 1.3 08/25/2021       Assessment:  1.  Post operative respiratory insufficiency with hypoxia  2. Bilateral atelectasis/mucus plugging of bronchi  3. Pleural effusions - small on bedside US  4. CAD s/p CABG x 3 8/25/21  5. Atrial fibrillation with RVR - converted to sinus rhythm  6. Anxiety  7. Aspiration pneumonia     Plan:  1. Wean oxygen as able saturations 90% and above - currently on 43% FiO2 and 45LPM  2. EZPAP treatments, incentive spirometry  3. Bronchodilators for airway clearance  4. Bronchoscopy today for clean out  5. Zosyn day #6/7  6. Bedside US without enough pleural fluid for thoracentesis  7. She needs nutrition. If unable to swallow today after the procedure I recommend an NGT for tube feeding.      Electronically signed by Jessica Arteaga MD on 9/1/2021 at 1:56 PM

## 2021-09-01 NOTE — CARE COORDINATION
POD 6 CABG x3. Pt is on heated O2 45 and Fio2 50%. Has contreras. 2 chest tubes removed and 1 remains. Therapy scores improved to 9/24. Pt was provided [facility] by previous staff. SW/CM following for discharge needs.    KISHAN MarshS.W.  [phone]

## 2021-09-01 NOTE — PROGRESS NOTES
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, GERD, h/o adenocarcinoma of cecum, arthritis, spinal stenosis, former smoker, obesity      Procedure/Surgeries: 8/25/2021 CABG x3 (LIMA-LAD, SVG-Ramus, SVG-OM), LAAL, EVH, KLS plating      Pacing wires: Ventricular        Intake/Output Summary (Last 24 hours) at 9/1/2021 0829  Last data filed at 9/1/2021 0700  Gross per 24 hour   Intake 1228.34 ml   Output 2090 ml   Net -861.66 ml       Recent Labs     08/30/21  0425 08/30/21  0425 08/31/21  0530 08/31/21  1700 09/01/21  0351   WBC 16.1*  --  14.2*  --  17.5*   HGB 8.2*   < > 7.1* 9.2* 9.1*   HCT 27.2*   < > 25.1* 29.6* 29.3*     --  356  --  373    < > = values in this interval not displayed. Lab Results   Component Value Date     09/01/2021    K 3.7 09/01/2021    K 3.2 06/21/2021     09/01/2021    CO2 24 09/01/2021    BUN 39 09/01/2021    CREATININE 1.0 09/01/2021    GLUCOSE 97 09/01/2021    CALCIUM 8.6 09/01/2021         Physical Exam:    /70   Pulse 114   Temp 97.6 °F (36.4 °C) (Oral)   Resp 26   Ht 5' 2\" (1.575 m)   Wt 196 lb 3.4 oz (89 kg)   LMP  (LMP Unknown)   SpO2 91%   BMI 35.89 kg/m²       CXR Findings:     - CXR personally viewed and interpreted by ICU Nurse Practitioner, radiology report pending     General: Awake, alert. On AirVo, down to 45L this morning. On BiPAP for a few hours yesterday evening, did not tolerate overnight. Eyes: PERRL, anicteric   Pulmonary: Diminished bibasilar.  No wheezes, no accessory muscle use noted   Cardiovascular:  IRR, no heaves or thrills on palpation  Tele: Afib   Abdomen: Soft, nontender, + BS +BM  Extremities: Palpable pulses all extremities, trace edema   Neurologic/Psych: A&Ox3, QUINTERO to command   Skin: Warm and dry  Incisions: MSI ZACHARY intact, RLE SVG sites well approximated, staples intact       Assessment/Plan: POD #7    1. CAD, PAF S/p CABG x3 (LIMA-LAD, SVG-Ramus, SVG-OM)  LAAL  - ASA, Lipitor, BB   - left pleural drain remains to sxn, 270cc/24hrs    - Lovenox for VTE prophylaxis until [address] resumed   - PICC placed 8/30     2. Paroxysmal Atrial Fibrillation  - s/p LAAL   - On Xarelto preop for PAF, resume when okay with CTS   - post op with PAF, currently Afib RVR, continue PO Amio, BB      3. Acute Hypoxic Respiratory failure   - former smoker, 2/2 surgery, extubated DOS,? Aspiration 8/27  - On AirVo 45L, 50% FiO2, sats 93%  - Ultrasound done at bedside per Pulmonary, not enough fluid for thoracentesis, plan for bronchoscopy today    - Zosyn day 6   - prn bipap as tolerated, Ezpap      4. Acute Post Operative Pain   - Pain control adequate     5. DM Type 2  - HgbA1C 6.2%  - SSI. Hold lantus       7. Carotid Artery stenosis  - Right ICA 50-69%, avoid hypotension      8. Acute blood loss anemia  - expected blood loss 2/2 surgery, s/p one unit RBCs 8/26, 8/31  - Hgb stable post transfusion, 9.1; monitor      9. Leukocytosis  - WBC up to 17.5K, Afebrile   - Sputum culture, obtain blood cultures if spikes fever again   - Continue Zosyn, day 6     10. Anxiety  - PRN Ativan 0.5mg q 4 hours      11. Elevated Serum Creatinine  - Resolved, monitor      12. Constipation  - Expected delay return of bowel function 2/2 decrease physical mobility, decrease PO intake   - +flatus, +BM, abdomen soft, continue bowel regimen      13.  Dysphagia/ At risk for malnutrition   - Suspected dysphagia, possible aspiration 8/27  - Speech Therapy recs NPO for now d/t hypoxia, MBS when able  - will place small bowel feeding tube after bronch, consult dietary for TF recs         VTE Prophylaxis: Pharmacologic/Mechanical:  Yes, SCDs, Lovenox  Line infection prevention: Can CVC or arterial line be removed: remove

## 2021-09-01 NOTE — CONSULTS
Comprehensive Nutrition Assessment    Type and Reason for Visit:  Consult    Nutrition Recommendations/Plan:   When TF access is obtained, recommend:  Diabetic 1.5 @ 40 ml/hr to provide 960 ml tv, 1440 kcals, 79 gm pro, 729 ml free water  Regimen will meet 100% estimated calorie & protein needs    Estimated Daily Nutrient Needs:  Energy (kcal):  4896-1655 (MSJ 1203 x 1.2 SF); Weight Used for Energy Requirements:  Admission     Protein (g):  75-90 (1.5-1.8);  Weight Used for Protein Requirements:  Ideal        Fluid (ml/day):  per critical care; Method Used for Fluid Requirements:         Electronically signed by Terrell Silva MS, RD, LD on 9/1/21 at 3:29 PM EDT    Contact: [phone]

## 2021-09-01 NOTE — PROGRESS NOTES
Speech Language Pathology      NAME:  Ranjith Bautista  :  1940  DATE: 2021  ROOM:  On license of [id number]    Pt NPO for bronch. Will re-assess tomorrow as able/ warranted.      CAD in native artery [I25.10]

## 2021-09-01 NOTE — PROGRESS NOTES
Therapeutic activity to improve functional performance during ADLs                                         Therapeutic exercise to improve tolerance and functional strength for ADLs   Balance retraining exercises/tasks for facilitation of postural control with dynamic challenges during ADLs .       Recommended Adaptive Equipment:  LB dressing AE     Home Living: Pt lives with   in a 1 floor condo with 2 step(s) to enter and 2 rail(s)  Bathroom setup: walk in shower with seat and rail  Equipment owned: shower seat, [employer], [facility]     Prior Level of Function: [place] with ADLs; Assist with IADLs. [employer] for ambulation. Driving: no  Occupation: N/A     Pain Level: pt c/o 0/10 chest pain  this session; pt reporting she is not feeling well. Currently receiving transfusion.     Cognition: A&O: 3/4    Follows 1-2 step commands appropriately. Memory: Good              Comprehension Good              Problem solving: Fair-  Pt requires frequent cues to maintain sternal precautions              Judgement/safety: Fair-                 Communication skills: WFL              Vision: WFL                     Glasses:readers                                                        Hearing: WFL                RASS: 0  CAM-ICU: (NT) Delirium      UE Assessment:  Hand Dominance: Right [x]?   Left []?       ROM Strength STM goal: PRN   RUE  Grossly WFL within precautions Not formally tested; grossly WFL              WNL for ADLS      LUE Grossly WFL within precautions Not formally tested; grossly WFL              WNL for ADLS         Sensation: No c/o numbness or tingling in extremities   Tone: WNL   Edema: min B UE/LE     Functional Assessment: AM-PAC Daily Activity Raw Score: 9/24    Initial Eval Status  Date: 8/26 Treatment Status  Date:8/31 STG=LTG  Time Frame: 5-7 days   Feeding Min A  Set up   Bed level  NT  NPO                      King  while seated up in chair to increase activity tolerance when cleared for diet      Grooming Max A Mod A  Set up/EOB                      S; set up   while seated/standing sink level demonstrating G tolerance; G balance.      UB dressing/bathing Dep NT                      Min A   demonstrating G knowledge of precautions during tasks      LB dressing/bathing Dep     Max A  after instruction on LB dressing AE for safe reach within precautions  Max A using  AE  Pt sat EOB to janna socks; educated on use of sock aid. Pt required Mod A due to decreased UE strength. Min A  using AE as needed for safe reach/ energy conservation        Toileting Max A Dep                        Min A      Bed Mobility  Supine to sit: Max A+2     Sit to supine: Max A+2  Supine to sit:   Mod A+2    Sit to supine:   NT                      Min A  in prep of ADL tasks & transfers   Functional Transfers Sit to stand: NT    Sit to stand: Max A    SPT to chair: Max A  *Pt fearful of falls                      S  sit<>stand/functional bathroom transfers using AD/DME as needed for balance and safety   Functional Mobility NT  no device See above                       S   functional/bathroom mobility using AD as needed & demonstrating G safety      Balance Sitting:     Static:  Min A    Dynamic:Min A  Standing: NT Sitting: SBA static;   Min A dynamic    Standing: Max A S dynamic sitting balance; S dynamic standing balance  during ADL tasks & transfers   Endurance/Activity Tolerance    Fair tolerance with light activity. Pt limited by anxiety. Pt provided with encouragement and pt safety reassured. Fair tolerance with light ADLs and transfers. Short breaks for breathing techniques. Pt c/o feeling warm in room. Provided with cold washcloth and ice packs to cool down.     *Pt c/o feeling light headed EOB: BP-119/80   Good   tolerance with moderate activity/self care routine   Visual/  Perceptual               WFL                                Vitals:   HR at rest: 123 bpm HR at end of session: 134 bpm   Spo2 at rest:93% Spo2 at end of session: 92%   BP at rest: 100/80 mmHg BP at end of session: 116/81 mmHg     Treatment: OT treatment provided this date   Bed mobility:Review of sternal precautions/medical lines prior to bed mobility to facilitate safe transfers and ADLS. Pt required 2 person assist for safe mobility due to complexity of medical condition, medical lines and deconditioning. HOB elevated to assist.    Balance retraining: Performed sitting balance ex's with instruction to facilitate righting reactions with postural changes during ADLS. Review of breathing techniques during sitting activities to increase tolerance. Pt performed light ADLs EOB. ADL retraing; Review of sternal precautions/energy conservation regarding self care routine. Pt practiced using sock aid with G understanding. Limited due to weakness. Delirium Prevention: Environmental and sensory modifications assessed and implemented to decrease ICU acquired delirium and to improve overall orientation, mentation and pt interaction with family/staff. Line management and environmental modifications made prior to and end of session to ensure patient safety and to increase efficiency of session. Skilled monitoring of HR, O2 saturation, blood pressure and patient's response to activity performed throughout session. Comments: OK from RN to see patient. Upon arrival, patient in bed, agreeable to session. Pt looking better this am.   Pt demo fair+ understanding of education/techniques. At end of session, patient left seated in chair to increase activity tolerance. Nurse aware. Pt instructed on use of call light for assistance and fall prevention. Overall, pt presents with decreased activity tolerance, dynamic balance, functional mobility and anxiety limiting completion of ADLs and safe return home. Pt can benefit from continued skilled OT to increase safety, functional independence & quality of life.      · Pt has made fair progress towards set goals.    · Continue with current plan of care       Time ZX:1909            Time Out: 1875     Total Treatment Time: 23       Treatment Charges: Mins Units   Ther Ex  [id number]     Manual Therapy Sydnie Ball [id number] [id number] [phone]   ADL/Home Mgt [id number]     Neuro Re-ed [id number]     Orthotic manage/training  [id number]     Non-Billable Time         Mary Benavides, OTR/L [id number]

## 2021-09-01 NOTE — PROGRESS NOTES
Per previous shift RN, patient went into a fib rvr around 1930 pm. See orders placed between [id number].

## 2021-09-01 NOTE — PLAN OF CARE
Problem: Falls - Risk of:  Goal: Will remain free from falls  Description: Will remain free from falls  9/1/2021 1136 by Cornelio Tony RN  Outcome: Ongoing  9/1/2021 0431 by Paz Gatica RN  Outcome: Met This Shift  Goal: Absence of physical injury  Description: Absence of physical injury  9/1/2021 1136 by Cornelio Tony RN  Outcome: Ongoing  9/1/2021 0431 by Paz Gatica RN  Outcome: Met This Shift     Problem: Discharge Planning:  Goal: Discharged to appropriate level of care  Description: Discharged to appropriate level of care  9/1/2021 1136 by Cornelio Tony RN  Outcome: Ongoing  9/1/2021 0431 by Paz Gatica RN  Outcome: Not Met This Shift     Problem:  Activity Intolerance:  Goal: Able to perform prescribed physical activity  Description: Able to perform prescribed physical activity  9/1/2021 1136 by Cornelio Tony RN  Outcome: Ongoing  9/1/2021 0431 by Paz Gatica RN  Outcome: Not Met This Shift  Goal: Ability to tolerate increased activity will improve  Description: Ability to tolerate increased activity will improve  9/1/2021 1136 by Cornelio Tony RN  Outcome: Ongoing  9/1/2021 0431 by Paz Gatica RN  Outcome: Not Met This Shift     Problem: Anxiety:  Goal: Level of anxiety will decrease  Description: Level of anxiety will decrease  9/1/2021 1136 by Cornelio Tony RN  Outcome: Ongoing  9/1/2021 0431 by Paz Gatica RN  Outcome: Not Met This Shift     Problem: Cardiac Output - Decreased:  Goal: Cardiac output within specified parameters  Description: Cardiac output within specified parameters  9/1/2021 1136 by Cornelio Tony RN  Outcome: Ongoing  9/1/2021 0431 by Paz Gatica RN  Outcome: Met This Shift  Goal: Hemodynamic stability will improve  Description: Hemodynamic stability will improve  9/1/2021 1136 by Cornelio Tony RN  Outcome: Ongoing  9/1/2021 0431 by Paz Gatica RN  Outcome: Met This Shift     Problem: Fluid Volume - Imbalance:  Goal: Ability to achieve a balanced intake and output will improve  Description: Ability to achieve a balanced intake and output will improve  9/1/2021 1136 by Mary Sabillon RN  Outcome: Ongoing  9/1/2021 0431 by Deya Reed RN  Outcome: Not Met This Shift  Goal: Chest tube drainage is within specified parameters  Description: Chest tube drainage is within specified parameters  9/1/2021 1136 by Mary Sabillon RN  Outcome: Ongoing  9/1/2021 0431 by Deya Reed RN  Outcome: Met This Shift     Problem: Gas Exchange - Impaired:  Goal: Levels of oxygenation will improve  Description: Levels of oxygenation will improve  9/1/2021 1136 by Mary Sabillon RN  Outcome: Ongoing  9/1/2021 0431 by Deya Reed RN  Outcome: Not Met This Shift  Goal: Ability to maintain adequate ventilation will improve  Description: Ability to maintain adequate ventilation will improve  9/1/2021 1136 by Mary Sabillon RN  Outcome: Ongoing  9/1/2021 0431 by Deya Reed RN  Outcome: Not Met This Shift     Problem: Pain:  Goal: Pain level will decrease  Description: Pain level will decrease  9/1/2021 1136 by Mary Sabillon RN  Outcome: Ongoing  9/1/2021 0431 by Deya Reed RN  Outcome: Not Met This Shift  Goal: Control of acute pain  Description: Control of acute pain  9/1/2021 1136 by Mary Sabillon RN  Outcome: Ongoing  9/1/2021 0431 by Deya Reed RN  Outcome: Not Met This Shift  Goal: Control of chronic pain  Description: Control of chronic pain  9/1/2021 1136 by Mary Sabillon RN  Outcome: Ongoing  9/1/2021 0431 by Deya Reed RN  Outcome: Not Met This Shift     Problem: Tissue Perfusion - Cardiopulmonary, Altered:  Goal: Absence of angina  Description: Absence of angina  9/1/2021 1136 by Mary Sabillon RN  Outcome: Ongoing  9/1/2021 0431 by Deya Reed RN  Outcome: Met This Shift  Goal: Hemodynamic stability will improve  Description: Hemodynamic stability will improve  9/1/2021 1136 by Jm Rodriguez RN  Outcome: Ongoing  9/1/2021 0431 by Dayne Botello RN  Outcome: Met This Shift     Problem: ABCDS Injury Assessment  Goal: Absence of physical injury  9/1/2021 1136 by Brendon Fallon RN  Outcome: Ongoing  9/1/2021 0431 by Dayne Botello RN  Outcome: Met This Shift

## 2021-09-02 ENCOUNTER — APPOINTMENT (OUTPATIENT)
Dept: GENERAL RADIOLOGY | Age: 81
DRG: 003 | End: 2021-09-02
Attending: THORACIC SURGERY (CARDIOTHORACIC VASCULAR SURGERY)
Payer: MEDICARE

## 2021-09-02 LAB
ANION GAP SERPL CALCULATED.3IONS-SCNC: 10 MMOL/L (ref 7–16)
ANION GAP SERPL CALCULATED.3IONS-SCNC: 12 MMOL/L (ref 7–16)
BUN BLDV-MCNC: 46 MG/DL (ref 6–23)
BUN BLDV-MCNC: 54 MG/DL (ref 6–23)
CALCIUM SERPL-MCNC: 8.6 MG/DL (ref 8.6–10.2)
CALCIUM SERPL-MCNC: 8.7 MG/DL (ref 8.6–10.2)
CHLORIDE BLD-SCNC: 100 MMOL/L (ref 98–107)
CHLORIDE BLD-SCNC: 100 MMOL/L (ref 98–107)
CO2: 24 MMOL/L (ref 22–29)
CO2: 25 MMOL/L (ref 22–29)
CREAT SERPL-MCNC: 1.3 MG/DL (ref 0.5–1)
CREAT SERPL-MCNC: 1.4 MG/DL (ref 0.5–1)
GFR AFRICAN AMERICAN: 44
GFR AFRICAN AMERICAN: 48
GFR NON-AFRICAN AMERICAN: 36 ML/MIN/1.73
GFR NON-AFRICAN AMERICAN: 39 ML/MIN/1.73
GLUCOSE BLD-MCNC: 140 MG/DL (ref 74–99)
GLUCOSE BLD-MCNC: 162 MG/DL (ref 74–99)
HCT VFR BLD CALC: 29.7 % (ref 34–48)
HEMOGLOBIN: 9 G/DL (ref 11.5–15.5)
MAGNESIUM: 2.9 MG/DL (ref 1.6–2.6)
MCH RBC QN AUTO: 26.9 PG (ref 26–35)
MCHC RBC AUTO-ENTMCNC: 30.3 % (ref 32–34.5)
MCV RBC AUTO: 88.9 FL (ref 80–99.9)
METER GLUCOSE: 101 MG/DL (ref 74–99)
METER GLUCOSE: 142 MG/DL (ref 74–99)
METER GLUCOSE: 387 MG/DL (ref 74–99)
PDW BLD-RTO: 17.1 FL (ref 11.5–15)
PLATELET # BLD: 369 E9/L (ref 130–450)
PMV BLD AUTO: 9.3 FL (ref 7–12)
POTASSIUM SERPL-SCNC: 4.2 MMOL/L (ref 3.5–5)
POTASSIUM SERPL-SCNC: 4.3 MMOL/L (ref 3.5–5)
RBC # BLD: 3.34 E12/L (ref 3.5–5.5)
SODIUM BLD-SCNC: 135 MMOL/L (ref 132–146)
SODIUM BLD-SCNC: 136 MMOL/L (ref 132–146)
WBC # BLD: 14.7 E9/L (ref 4.5–11.5)

## 2021-09-02 PROCEDURE — 2580000003 HC RX 258: Performed by: THORACIC SURGERY (CARDIOTHORACIC VASCULAR SURGERY)

## 2021-09-02 PROCEDURE — 6370000000 HC RX 637 (ALT 250 FOR IP): Performed by: NURSE PRACTITIONER

## 2021-09-02 PROCEDURE — 2700000000 HC OXYGEN THERAPY PER DAY

## 2021-09-02 PROCEDURE — 71045 X-RAY EXAM CHEST 1 VIEW: CPT

## 2021-09-02 PROCEDURE — 97530 THERAPEUTIC ACTIVITIES: CPT

## 2021-09-02 PROCEDURE — 36592 COLLECT BLOOD FROM PICC: CPT

## 2021-09-02 PROCEDURE — 85027 COMPLETE CBC AUTOMATED: CPT

## 2021-09-02 PROCEDURE — 94660 CPAP INITIATION&MGMT: CPT

## 2021-09-02 PROCEDURE — 2580000003 HC RX 258: Performed by: NURSE PRACTITIONER

## 2021-09-02 PROCEDURE — 97535 SELF CARE MNGMENT TRAINING: CPT

## 2021-09-02 PROCEDURE — 36415 COLL VENOUS BLD VENIPUNCTURE: CPT

## 2021-09-02 PROCEDURE — 2580000003 HC RX 258

## 2021-09-02 PROCEDURE — 6360000002 HC RX W HCPCS: Performed by: THORACIC SURGERY (CARDIOTHORACIC VASCULAR SURGERY)

## 2021-09-02 PROCEDURE — 6360000002 HC RX W HCPCS: Performed by: NURSE PRACTITIONER

## 2021-09-02 PROCEDURE — 94640 AIRWAY INHALATION TREATMENT: CPT

## 2021-09-02 PROCEDURE — 83735 ASSAY OF MAGNESIUM: CPT

## 2021-09-02 PROCEDURE — C9113 INJ PANTOPRAZOLE SODIUM, VIA: HCPCS | Performed by: NURSE PRACTITIONER

## 2021-09-02 PROCEDURE — 74230 X-RAY XM SWLNG FUNCJ C+: CPT

## 2021-09-02 PROCEDURE — 2000000000 HC ICU R&B

## 2021-09-02 PROCEDURE — 92526 ORAL FUNCTION THERAPY: CPT

## 2021-09-02 PROCEDURE — P9047 ALBUMIN (HUMAN), 25%, 50ML: HCPCS | Performed by: NURSE PRACTITIONER

## 2021-09-02 PROCEDURE — 6370000000 HC RX 637 (ALT 250 FOR IP): Performed by: THORACIC SURGERY (CARDIOTHORACIC VASCULAR SURGERY)

## 2021-09-02 PROCEDURE — 92611 MOTION FLUOROSCOPY/SWALLOW: CPT

## 2021-09-02 PROCEDURE — 80048 BASIC METABOLIC PNL TOTAL CA: CPT

## 2021-09-02 PROCEDURE — 2500000003 HC RX 250 WO HCPCS: Performed by: RADIOLOGY

## 2021-09-02 PROCEDURE — 82962 GLUCOSE BLOOD TEST: CPT

## 2021-09-02 RX ORDER — ASPIRIN 81 MG/1
81 TABLET, CHEWABLE ORAL DAILY
Status: DISCONTINUED | OUTPATIENT
Start: 2021-09-02 | End: 2021-09-16 | Stop reason: HOSPADM

## 2021-09-02 RX ORDER — OXYCODONE HYDROCHLORIDE 5 MG/1
5 TABLET ORAL EVERY 8 HOURS PRN
Status: DISCONTINUED | OUTPATIENT
Start: 2021-09-02 | End: 2021-09-15

## 2021-09-02 RX ORDER — DOCUSATE SODIUM 50 MG/5ML
100 LIQUID ORAL 2 TIMES DAILY
Status: DISCONTINUED | OUTPATIENT
Start: 2021-09-02 | End: 2021-09-16 | Stop reason: HOSPADM

## 2021-09-02 RX ORDER — ALBUMIN (HUMAN) 12.5 G/50ML
25 SOLUTION INTRAVENOUS ONCE
Status: COMPLETED | OUTPATIENT
Start: 2021-09-02 | End: 2021-09-02

## 2021-09-02 RX ORDER — PANTOPRAZOLE SODIUM 40 MG/10ML
40 INJECTION, POWDER, LYOPHILIZED, FOR SOLUTION INTRAVENOUS DAILY
Status: DISCONTINUED | OUTPATIENT
Start: 2021-09-02 | End: 2021-09-16 | Stop reason: HOSPADM

## 2021-09-02 RX ORDER — SODIUM CHLORIDE 9 MG/ML
10 INJECTION INTRAVENOUS DAILY
Status: DISCONTINUED | OUTPATIENT
Start: 2021-09-02 | End: 2021-09-16 | Stop reason: HOSPADM

## 2021-09-02 RX ADMIN — SODIUM CHLORIDE: 9 INJECTION, SOLUTION INTRAVENOUS at 12:56

## 2021-09-02 RX ADMIN — SODIUM CHLORIDE, PRESERVATIVE FREE 300 UNITS: 5 INJECTION INTRAVENOUS at 08:42

## 2021-09-02 RX ADMIN — AMIODARONE HYDROCHLORIDE 0.25 MG/MIN: 50 INJECTION, SOLUTION INTRAVENOUS at 00:30

## 2021-09-02 RX ADMIN — ENOXAPARIN SODIUM 40 MG: 40 INJECTION SUBCUTANEOUS at 08:41

## 2021-09-02 RX ADMIN — PRAMIPEXOLE DIHYDROCHLORIDE 0.5 MG: 0.25 TABLET ORAL at 21:18

## 2021-09-02 RX ADMIN — BARIUM SULFATE 15 ML: 400 PASTE ORAL at 14:27

## 2021-09-02 RX ADMIN — BISACODYL 10 MG: 10 SUPPOSITORY RECTAL at 21:15

## 2021-09-02 RX ADMIN — INSULIN LISPRO 10 UNITS: 100 INJECTION, SOLUTION INTRAVENOUS; SUBCUTANEOUS at 18:07

## 2021-09-02 RX ADMIN — METOPROLOL TARTRATE 12.5 MG: 25 TABLET, FILM COATED ORAL at 21:17

## 2021-09-02 RX ADMIN — ALBUMIN (HUMAN) 25 G: 0.25 INJECTION, SOLUTION INTRAVENOUS at 15:11

## 2021-09-02 RX ADMIN — PRAMIPEXOLE DIHYDROCHLORIDE 0.5 MG: 0.25 TABLET ORAL at 08:41

## 2021-09-02 RX ADMIN — PIPERACILLIN AND TAZOBACTAM 3375 MG: 3; .375 INJECTION, POWDER, LYOPHILIZED, FOR SOLUTION INTRAVENOUS at 00:29

## 2021-09-02 RX ADMIN — DOCUSATE SODIUM 100 MG: 50 LIQUID ORAL at 08:42

## 2021-09-02 RX ADMIN — OXYCODONE 5 MG: 5 TABLET ORAL at 02:28

## 2021-09-02 RX ADMIN — IPRATROPIUM BROMIDE AND ALBUTEROL SULFATE 1 AMPULE: .5; 2.5 SOLUTION RESPIRATORY (INHALATION) at 19:50

## 2021-09-02 RX ADMIN — IPRATROPIUM BROMIDE AND ALBUTEROL SULFATE 1 AMPULE: .5; 2.5 SOLUTION RESPIRATORY (INHALATION) at 07:46

## 2021-09-02 RX ADMIN — BENZOCAINE AND MENTHOL 1 LOZENGE: 15; 3.6 LOZENGE ORAL at 20:07

## 2021-09-02 RX ADMIN — PRAMIPEXOLE DIHYDROCHLORIDE 0.5 MG: 0.25 TABLET ORAL at 15:12

## 2021-09-02 RX ADMIN — DOCUSATE SODIUM 100 MG: 50 LIQUID ORAL at 21:16

## 2021-09-02 RX ADMIN — Medication 10 ML: at 21:17

## 2021-09-02 RX ADMIN — SODIUM CHLORIDE, PRESERVATIVE FREE 300 UNITS: 5 INJECTION INTRAVENOUS at 21:16

## 2021-09-02 RX ADMIN — METOPROLOL TARTRATE 12.5 MG: 25 TABLET, FILM COATED ORAL at 08:42

## 2021-09-02 RX ADMIN — BARIUM SULFATE 15 ML: 400 SUSPENSION ORAL at 14:27

## 2021-09-02 RX ADMIN — ASPIRIN 81 MG CHEWABLE TABLET 81 MG: 81 TABLET CHEWABLE at 08:42

## 2021-09-02 RX ADMIN — INSULIN LISPRO 2 UNITS: 100 INJECTION, SOLUTION INTRAVENOUS; SUBCUTANEOUS at 06:47

## 2021-09-02 RX ADMIN — INSULIN LISPRO 2 UNITS: 100 INJECTION, SOLUTION INTRAVENOUS; SUBCUTANEOUS at 11:37

## 2021-09-02 RX ADMIN — ATORVASTATIN CALCIUM 10 MG: 10 TABLET, FILM COATED ORAL at 21:14

## 2021-09-02 RX ADMIN — BISACODYL 10 MG: 10 SUPPOSITORY RECTAL at 08:42

## 2021-09-02 RX ADMIN — AMIODARONE HYDROCHLORIDE 400 MG: 200 TABLET ORAL at 21:14

## 2021-09-02 RX ADMIN — IPRATROPIUM BROMIDE AND ALBUTEROL SULFATE 1 AMPULE: .5; 2.5 SOLUTION RESPIRATORY (INHALATION) at 11:28

## 2021-09-02 RX ADMIN — PANTOPRAZOLE SODIUM 40 MG: 40 INJECTION, POWDER, FOR SOLUTION INTRAVENOUS at 08:41

## 2021-09-02 RX ADMIN — SODIUM CHLORIDE, PRESERVATIVE FREE 10 ML: 5 INJECTION INTRAVENOUS at 08:42

## 2021-09-02 RX ADMIN — PIPERACILLIN AND TAZOBACTAM 3375 MG: 3; .375 INJECTION, POWDER, LYOPHILIZED, FOR SOLUTION INTRAVENOUS at 08:42

## 2021-09-02 RX ADMIN — SENNOSIDES 5 ML: 8.8 SYRUP ORAL at 21:17

## 2021-09-02 RX ADMIN — BARIUM SULFATE 15 ML: 0.81 POWDER, FOR SUSPENSION ORAL at 14:27

## 2021-09-02 RX ADMIN — OXYCODONE 5 MG: 5 TABLET ORAL at 20:06

## 2021-09-02 RX ADMIN — AMIODARONE HYDROCHLORIDE 400 MG: 200 TABLET ORAL at 08:42

## 2021-09-02 RX ADMIN — SODIUM CHLORIDE: 9 INJECTION, SOLUTION INTRAVENOUS at 04:30

## 2021-09-02 ASSESSMENT — PAIN SCALES - GENERAL
PAINLEVEL_OUTOF10: 5
PAINLEVEL_OUTOF10: 0
PAINLEVEL_OUTOF10: 2
PAINLEVEL_OUTOF10: 0
PAINLEVEL_OUTOF10: 2
PAINLEVEL_OUTOF10: 5
PAINLEVEL_OUTOF10: 0

## 2021-09-02 ASSESSMENT — PAIN DESCRIPTION - FREQUENCY: FREQUENCY: INTERMITTENT

## 2021-09-02 ASSESSMENT — PAIN DESCRIPTION - LOCATION: LOCATION: CHEST;BACK;GENERALIZED

## 2021-09-02 ASSESSMENT — PAIN - FUNCTIONAL ASSESSMENT: PAIN_FUNCTIONAL_ASSESSMENT: PREVENTS OR INTERFERES SOME ACTIVE ACTIVITIES AND ADLS

## 2021-09-02 ASSESSMENT — PAIN DESCRIPTION - PAIN TYPE: TYPE: SURGICAL PAIN

## 2021-09-02 ASSESSMENT — PAIN DESCRIPTION - DESCRIPTORS: DESCRIPTORS: ACHING;DULL;SORE

## 2021-09-02 ASSESSMENT — PAIN DESCRIPTION - ONSET: ONSET: UNABLE TO TELL

## 2021-09-02 NOTE — PROGRESS NOTES
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, GERD, h/o adenocarcinoma of cecum, arthritis, spinal stenosis, former smoker, obesity      Procedure/Surgeries: 8/25/2021 CABG x3 (LIMA-LAD, SVG-Ramus, SVG-OM), LAAL, EVH, KLS plating      Pacing wires: Ventricular        Intake/Output Summary (Last 24 hours) at 9/2/2021 0827  Last data filed at 9/2/2021 0700  Gross per 24 hour   Intake 742.4 ml   Output 715 ml   Net 27.4 ml       Recent Labs     08/31/21  0530 08/31/21  0530 08/31/21  1700 09/01/21  0351 09/02/21  0530   WBC 14.2*  --   --  17.5* 14.7*   HGB 7.1*   < > 9.2* 9.1* 9.0*   HCT 25.1*   < > 29.6* 29.3* 29.7*     --   --  373 369    < > = values in this interval not displayed. Lab Results   Component Value Date     09/02/2021    K 4.3 09/02/2021    K 3.2 06/21/2021     09/02/2021    CO2 25 09/02/2021    BUN 46 09/02/2021    CREATININE 1.3 09/02/2021    GLUCOSE 140 09/02/2021    CALCIUM 8.7 09/02/2021         Physical Exam:    /60   Pulse 110   Temp 98 °F (36.7 °C) (Temporal)   Resp 17   Ht 5' 2\" (1.575 m)   Wt 195 lb 12.3 oz (88.8 kg)   LMP  (LMP Unknown)   SpO2 97%   BMI 35.81 kg/m²       CXR Findings:     FINDINGS:   Moderate-sized bibasilar pleural effusions and left lower lobe   atelectasis/infiltrate are unchanged.  Heart appears enlarged and there is   some cephalization of the pulmonary vascularity.       Support lines are appropriate.  There has been sternotomy.  There is no   pneumothorax. - CXR personally viewed and interpreted by ICU Nurse Practitioner    General: Awake, alert. On AirVo, down to 45L this morning. On BiPAP for a few hours yesterday evening, did not tolerate overnight.   Eyes: PERRL, anicteric   Pulmonary: Diminished bibasilar. No wheezes, no accessory muscle use noted   Cardiovascular:  IRR, no heaves or thrills on palpation  Tele: Afib   Abdomen: Soft, nontender, + BS +BM  Extremities: Palpable pulses all extremities, trace edema   Neurologic/Psych: A&Ox3, QUINTERO to command   Skin: Warm and dry  Incisions: MSI ZACHARY intact, RLE SVG sites well approximated, staples intact       Assessment/Plan: POD #8    1. CAD, PAF S/p CABG x3 (LIMA-LAD, SVG-Ramus, SVG-OM)  LAAL  - ASA, Lipitor, BB   - left pleural drain removed without difficulty, pt tolerated well   - Lovenox for VTE prophylaxis until [address] resumed   - PICC placed 8/30     2. Paroxysmal Atrial Fibrillation  - s/p LAAL   - On Xarelto preop for PAF, discuss starting heparin gtt with CTS   - post op with PAF, currently Afib RVR, on IV Amio @0.25mg/min, PO amio, BB      3. Acute Hypoxic Respiratory failure   - former smoker, 2/2 surgery, extubated DOS,? Aspiration 8/27  - On AirVo 450L, 60% FiO2 post bronchoscopy, Sats 97%  - s/p bronch 9/1, RLL, RML mucous plugging; f/u pathology   - Zosyn day 7  - prn bipap as tolerated, Ezpap      4. Acute Post Operative Pain   - Pain control adequate, decrease frequency of PRN narcotic due to drowsiness      5. DM Type 2  - HgbA1C 6.2%  - SSI. Lantus on hold     7. Carotid Artery stenosis  - Right ICA 50-69%, avoid hypotension      8. Acute blood loss anemia  - expected blood loss 2/2 surgery, s/p one unit RBCs 8/26, 8/31  - Hgb stable, 9; monitor      9. Leukocytosis  - WBC up to 14.7K, Afebrile   - f/u sputum culture, obtain blood cultures if spikes fever again   - Continue Zosyn, day 7     10. Anxiety  - PRN Ativan 0.5mg q 4 hours      11. Elevated Serum Creatinine  - Scr 1.3, UOP 0.3 ml/kg/hr overnight  - Recheck BMP later today   - Monitor UOP, labs, avoid hypotension     12.  Dysphagia/ At risk for malnutrition   - Suspected dysphagia, possible aspiration 8/27  - Speech Therapy recs NPO for now d/t hypoxia, MBS when hypoxia resolves  - Tube feed running via NG, tolerating   - Bronch 9/1 airways with evidence of chronic aspiration, consider PEG placement if needed, will discuss with CTS        VTE Prophylaxis: Pharmacologic/Mechanical:  Yes, SCDs, Lovenox  Line infection prevention: Can CVC or arterial line be removed: remove arterial line after bronch   Continued need for urinary catheter:  Yes - clinical indication: Patient post major surgery requiring fluid balance and input and output measurement.     Dispo: CVICU     Electronically signed by TEMO Esqueda CNP on 9/2/2021 at 8:27 AM

## 2021-09-02 NOTE — PROGRESS NOTES
Physical Therapy  Physical Therapy Treatment Note     Name: Iggy Mojica  : [birth year]  MRN: [id number]      Date of Service: 2021    Evaluating PT:  Rehan Salvador, PT, DPT [id number]    Room #:  7957/0121-W  Diagnosis:  CAD in native artery [I25.10]  PMHx/PSHx:  Arthritis, DM, HLD, HTN  Procedure/Surgery:   CABG x 3  Precautions:  Falls, Sternal, optiflow vs Bipap vs high flow,   Equipment Needs:  TBD    SUBJECTIVE:    Pt lives with  in a 1 story condo with 2 stairs to enter and 2 rail. Pt ambulated with [facility] or [facility] and was independent PTA. OBJECTIVE:   Initial Evaluation  Date: 21 Treatment  21 Short Term/ Long Term   Goals   AM-PAC 6 Clicks     Was pt agreeable to Eval/treatment? Yes Yes    Does pt have pain?  10/10 surgical pain - RN aware 9/10 surgical pain - RN aware    Bed Mobility  Rolling: NT  Supine to sit: MaxA x 2 with HOB elevated  Sit to supine: MaxA x 2  Scooting: MaxA Rolling: NT  Supine to sit: MaxA x 2 with HOB elevated  Sit to supine: MaxA x 2  Scooting: MaxA Graham   Transfers Sit to stand: NT  Stand to sit: NT  Stand pivot: NT Sit to stand: MaxA from elevated bed  Stand to sit: MaxA  Stand pivot: MaxA no device Mod Independent with WW if needed   Ambulation   NT NT >200 feet with Mod Independent with WW if needed   Stair negotiation: ascended and descended NT  >4 steps with 1 rail Graham   ROM BUE:  Defer to OT note  BLE:  WFL     Strength BUE:  Defer to OT note  BLE:  4/5  Increase by 1/3 MMT grade   Balance Sitting EOB:  ModA dynamic  Dynamic Standing:  NT Sitting EOB:  SBA static, Graham dynamic  Dynamic Standing:  MaxA no device Sitting EOB:  SBA  Dynamic Standing:  Graham no device     Pt is A & O x 4  CAM-ICU: NT  RASS: 0  Sensation:  No reported paresthesias  Edema:  None    Vitals:  Heart Rate at rest 94 bpm Heart Rate post session 116 bpm   SpO2 at rest 95% SpO2 post session 94%   Blood Pressure at rest 96/62 mmHg Blood Pressure post session 92/67 mmHg Functional Status Score-Intensive Care Unit (FSS-ICU)   Rolling -/7   Supine to sit transfer 1/7   Unsupported sitting  4/7   Sit to stand transfers 1/7   Ambulation -/7   Total  6/35     Therapeutic Exercises:  NA    Patient education  Pt educated on safety    Patient response to education:   Pt verbalized understanding Pt demonstrated skill Pt requires further education in this area   x x x     ASSESSMENT:    Conditions Requiring Skilled Therapeutic Intervention:    [x]Decreased strength     []Decreased ROM  [x]Decreased functional mobility  [x]Decreased balance   [x]Decreased endurance   [x]Decreased posture  []Decreased sensation  []Decreased coordination   []Decreased vision  []Decreased safety awareness   [x]Increased pain       Comments:  NP reported pt was stable for session. Pt was in bed upon arrival, agreeable to treatment session with encouragement. Reviewed sternal precautions prior to activity. Increased assistance provided for bed mobility. Dizziness reported upon sitting EOB but slowly improved with time. /71 at EOB. Pt stood with flexed posture and required increased assistance to reach chair. Pt was left in chair with all needs met and call light in reach. All lines remained intact. Treatment:  Patient practiced and was instructed in the following treatment:     Bed mobility training - pt given verbal and tactile cues to facilitate proper sequencing and safety during supine>sit as well as provided with physical assistance.  Sitting EOB for >10 minutes for upright tolerance, postural awareness and BLE ROM   Transfer training - pt was given verbal and tactile cues to facilitate proper hand placement, technique and safety during sit to stand, stand to sit and stand pivot transfers as well as provided with physical assistance to complete task.    Skilled positioning - Pt placed in the chair position with pillows utilized to facilitate upright posture, joint and skin integrity, and interaction with environment. PLAN:    Pt is making progress towards established goals. Will continue with current POC.       Time in  0950  Time out  1015    Total Treatment Time  25 minutes     CPT codes:  [] Gait training 39131 - minutes  [] Manual therapy 50245 - minutes  [x] Therapeutic activities 19209 25 minutes  [] Therapeutic exercises 89405 - minutes  [] Neuromuscular reeducation 91130 - minutes    Roosevelt Akhtar, PT, DPT  [id number]

## 2021-09-02 NOTE — PROGRESS NOTES
SPEECH/LANGUAGE PATHOLOGY  VIDEOFLUOROSCOPIC STUDY OF SWALLOWING (MBS)   and PLAN OF CARE    PATIENT NAME:  Nick Alarcon  (female)     MRN:  [id number]    :  1940  (80 y.o.)  STATUS:  Inpatient: Room 3821/3821-A    TODAY'S DATE:  2021  REFERRING PROVIDER:   Luc ANDRES   SPECIFIC PROVIDER ORDER: FL modified barium swallow with video  Date of order:  21   REASON FOR REFERRAL: dysphagia   EVALUATING THERAPIST: SUN Solorio      RESULTS:      DYSPHAGIA DIAGNOSIS:  minimal oral phase dysphagia duet missing dentition. DIET RECOMMENDATIONS:  Soft and bite size consistency solids (dysphagia 3) with  thin liquids advance to regular solid when family brings in dentures    FEEDING RECOMMENDATIONS:    Assistance level:  Set-up is required for all oral intake     Compensatory strategies recommended: No strategies are recommended at this time     Discussed recommendations with nursing and/or faxed report to referring provider: Yes    SPEECH THERAPY  PLAN OF CARE   The dysphagia POC is established based on physician order and dysphagia diagnosis    Skilled SLP intervention for dysphagia management on acute care 3-5 x per week until goals met, pt plateaus in function and/or discharged from hospital      Conditions Requiring Skilled Therapeutic Intervention for dysphagia:  Initiate oral feedings    SPECIFIC DYSPHAGIA INTERVENTIONS TO INCLUDE:     Meal time assessment for 1-2 sessions to provide diet modification and compensatory strategy implementation due initiation of oral diet    Specific instructions for next treatment:  ongoing PO analysis to upgrade diet and evaluate tolerance of current PO recommendation  Treatment Goals:    Short Term Goals:  Pt will complete PO trials of upgraded diet textures with SLP only to determine the least restrictive PO diet to maintain adequate nutrition/hydration with no more than 1 overt s/s of pen/asp.     Long Term Goals:   Pt will maintain adequate nutrition/hydration via PO intake of the least restrictive oral diet with implementation of safe swallow/ compensatory strategies and decrease signs/symptoms of aspiration to less than 1 x/day. Patient/family Goal:    To drink water    Plan of care discussed with Patient   The Patient understand(s) the diagnosis, prognosis and plan of care     Rehabilitation Potential/Prognosis: good                      ADMITTING DIAGNOSIS: CAD in native artery [I25.10]     VISIT DIAGNOSIS:         PATIENT REPORT/COMPLAINT: currently NPO.     PRIOR LEVEL OF SWALLOW FUNCTION:    Past History of Dysphagia?:  yes    Diet during hospital admission: NPO     PROCEDURE:  Consistencies Administered During the Evaluation   Liquids: thin liquid and nectar thick liquid   Solids:  pureed foods      Method of Intake:   cup, straw, spoon  Self fed, Fed by clinician      Position:   Seated, upright, Lateral plane    INSTRUMENTAL ASSESSMENT:    ORAL PREPARATION PHASE:    Slow Mastication    ORAL PHASE:   Impaired Mastication due to missing dentition    PHARYNGEAL PHASE:     ONSET TIME       Onset time of the pharyngeal swallow was adequate       PHARYNGEAL RESIDUALS        Vallecula/Pharyngeal Wall           No significant residuals were noted in the vallecula      Pyriform Sinuses      No significant residuals were noted in the pyriform sinuses     LARYNGEAL PENETRATION   Laryngeal penetration was not present during this evaluation    ASPIRATION  Aspiration was not present during this evaluation    PENETRATION-ASPIRATION SCALE (PAS):  THIN 1 = Material does not enter the airway  MILDLY THICK 1 = Material does not enter the airway  MODERATELY THICK item not administered  PUREE 1 = Material does not enter the airway  HARD SOLID item not administered       COMPENSATORY STRATEGIES    Compensatory strategies were not attempted      STRUCTURAL/FUNCTIONAL ANOMALIES   No structural/functional anomalies were noted    CERVICAL ESOPHAGEAL STAGE : The cervical esophagus appeared adequate          ___________    Cognition:   Confusion noted    Oral Peripheral Examination   Generalized oral weakness    Current Respiratory Status   6 liters nasal cannula     Parameters of Speech Production  Respiration:  Adequate for speech production  Quality:   Strained  Intensity: Within functional limits    Pain: No pain reported. EDUCATION:   The Speech Language Pathologist (SLP) completed education regarding results of evaluation and that intervention is warranted at this time. Learner: Patient  Education: Reviewed results and recommendations of this evaluation  Evaluation of Education:  [employer] understanding    This plan may be re-evaluated and revised as warranted. Evaluation Time includes thorough review of current medical information, gathering information on past medical history/social history and prior level of function, completion of standardized testing/informal observation of tasks, assessment of data and education on plan of care and goals. [x]The admitting diagnosis and active problem list, have been reviewed prior to initiation of this evaluation.     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

## 2021-09-02 NOTE — PROGRESS NOTES
OCCUPATIONAL THERAPY TREATMENT NOTE    [employer] [address] [address]       Date:2021                                                               Patient Name: Tamela Escalera  MRN: [id number]  : 1940  Room: [address] [place] [address]    Evaluating OT: Magi Cheung OTR/L 2100     Referring Provider: GEORGIA Lutz   Specific Provider Orders/Date: OT eval and treat (21)        Diagnosis: CAD      Surgery/Procedures:  CABG x 3     Pertinent Medical History: Adenocarcinoma of cecum, Anemia, Arthritis, cervical spinal stenosis, blood transfusion, DM, cirrhosis of liver, HLD, HTN, LBP, Neck pain, PAF, Renal artery aneurysm       *Precautions:  Fall Risk, bed rail, sternal, optiflow/Bipap, chest tube     Assessment of current deficits   [x]? Functional mobility            [x]?ADLs           [x]? Strength                  []?Cognition  [x]? Functional transfers          [x]? IADLs          [x]? Safety Awareness   [x]? Endurance  []? Fine Motor Coordination   [x]? Balance       []? Vision/perception    []? Sensation      []? Gross Motor Coordination [x]? ROM           []? Delirium                  []? Motor Control     []? Communication      OT PLAN OF CARE   OT POC based on physician orders, patient diagnosis and results of clinical assessment.        Frequency/Duration: 1-3 days/wk for 1-2 weeks PRN     Specific OT Treatment to include:   ADL retraining/adapted techniques and AE recommendations to increase functional independence within precautions                    Energy conservation techniques to improve tolerance for selfcare routine   Functional transfer/mobility training/DME recommendations for increased independence, safety and fall prevention         Patient/family education to increase safety and functional independence within precautions             Environmental modifications for safe mobility and completion of ADLs                             Therapeutic activity to improve functional performance during ADLs                                         Therapeutic exercise to improve tolerance and functional strength for ADLs   Balance retraining exercises/tasks for facilitation of postural control with dynamic challenges during ADLs .       Recommended Adaptive Equipment:  LB dressing AE     Home Living: Pt lives with   in a 1 floor condo with 2 step(s) to enter and 2 rail(s)  Bathroom setup: walk in shower with seat and rail  Equipment owned: shower seat, [employer], [facility]     Prior Level of Function: King with ADLs; Assist with IADLs. [employer] for ambulation. Driving: no  Occupation: N/A     Pain Level: pt c/o 0/10 chest pain  this session; pt reporting she is not feeling well. Currently receiving transfusion.     Cognition: A&O: 3/4    Follows 1-2 step commands appropriately. Memory: Good              Comprehension Good              Problem solving: Fair-  Pt requires frequent cues to maintain sternal precautions              Judgement/safety: Fair-                 Communication skills: WFL              Vision: WFL                     Glasses:readers                                                        Hearing: WFL                RASS: 0  CAM-ICU: (NT) Delirium      UE Assessment:  Hand Dominance: Right [x]?   Left []?       ROM Strength STM goal: PRN   RUE  Grossly WFL within precautions Not formally tested; grossly WFL              WNL for ADLS      LUE Grossly WFL within precautions Not formally tested; grossly WFL              WNL for ADLS         Sensation: No c/o numbness or tingling in extremities   Tone: WNL   Edema: min B UE/LE     Functional Assessment: AM-PAC Daily Activity Raw Score: 9/24    Initial Eval Status  Date: 8/26 Treatment Status  Date:9/2 STG=LTG  Time Frame: 5-7 days   Feeding Min A  Set up   Bed level  NT  NPO                      King  while seated up in chair to increase activity tolerance headed EOB: BP-106/71   Good   tolerance with moderate activity/self care routine   Visual/  Perceptual               WFL                                Vitals:   HR at rest: 118 bpm HR at end of session: 116 bpm   Spo2 at rest:94% Spo2 at end of session: 94%   BP at rest: 96/62 mmHg BP at end of session: 92/67 mmHg     Treatment: OT treatment provided this date   Bed mobility:Review of sternal precautions/medical lines prior to bed mobility to facilitate safe transfers and ADLS. Pt required 2 person assist for safe mobility due to complexity of medical condition, medical lines and deconditioning. HOB elevated to assist.    Balance retraining: Performed sitting balance ex's with instruction to facilitate righting reactions with postural changes during ADLS. Review of breathing techniques during sitting activities to increase tolerance. Pt performed light ADLs EOB with encouragement. ADL retraing; Review of sternal precautions/energy conservation regarding self care routine. Pt practiced using reacher and sock aid with Fair- understanding. Limited due to weakness and inattention this am.  Delirium Prevention: Environmental and sensory modifications assessed and implemented to decrease ICU acquired delirium and to improve overall orientation, mentation and pt interaction with family/staff. Line management and environmental modifications made prior to and end of session to ensure patient safety and to increase efficiency of session. Skilled monitoring of HR, O2 saturation, blood pressure and patient's response to activity performed throughout session. Comments: OK from RN to see patient. Upon arrival, patient in bed, requiring encouragement to participate. Benefits of OOB activity and ex reviewed with patient. Pt less focused today during session with increased anxiety. Pt fearfull of falls. Pt demo decreased understanding of education/techniques.   At end of session, patient left seated in chair to increase activity tolerance. Nurse aware. Pt instructed on use of call light for assistance and fall prevention. Overall, pt presents with decreased activity tolerance, dynamic balance, functional mobility and anxiety limiting completion of ADLs and safe return home. Pt can benefit from continued skilled OT to increase safety, functional independence & quality of life. · Pt has made fair progress towards set goals.    · Continue with current plan of care       Time KX:8523         Time Out: 1025     Total Treatment Time: 30       Treatment Charges: Mins Units   Ther Ex  38084     Manual Therapy 84870     Thera Activities 00194 15 1   ADL/Home Mgt 93814 15 1   Neuro Re-ed 63146     Orthotic manage/training  48452     Non-Billable Time         Ashley Kay, OTR/L 7951

## 2021-09-02 NOTE — PROGRESS NOTES
Pulmonary Subsequent Hospital F/U note    Patient is being followed for: post operative respiratory insufficiency with hypoxia     Interval HPI:  She was taken off of Airvo this AM and changed to high flow - oxygen has now been weaned to 6L and SpO2 remains at 98% - continue to wean  She feels less warm since stopping the  Heated high flow  CXR relatively unchanged  S/p bronchoscopy yesterday  NGT placed and tube feeding started       ROS:  Less warm  No chest pain  +cough, no sputum  No hemoptysis  No wheezing  No leg swelling     Exam:  BP 96/62   Pulse 97   Temp 98 °F (36.7 °C) (Temporal)   Resp 17   Ht 5' 2\" (1.575 m)   Wt 195 lb 12.3 oz (88.8 kg)   LMP  (LMP Unknown)   SpO2 95%   BMI 35.81 kg/m²    General: Patient is resting comfortably, no distress, breathing is not labored on current oxygen  HEENT: PERRL, EOMI, MMM, no oral lesions, NGT in place   Neck: supple no adenopathy  CV: RRR without murmur, chest incision is intact   Lungs: bibasilar crackles present, decreased BS at the lung bases, overall improved aeration  Abd: soft, ND, NT, bowel sounds normal  Ext: warm, no edema    Data:    Oximetry:  SpO2 Readings from Last 1 Encounters:   09/02/21 95%       Imaging personally reviewed by myself:  CXR  FINDINGS:   Moderate-sized bibasilar pleural effusions and left lower lobe   atelectasis/infiltrate are unchanged.  Heart appears enlarged and there is   some cephalization of the pulmonary vascularity.       Support lines are appropriate.  There has been sternotomy.  There is no   pneumothorax.           Impression   No interval change and findings suggest CHF         Pertinent labs reviewed and noted:  Lab Results   Component Value Date    WBC 14.7 09/02/2021    HGB 9.0 09/02/2021    HCT 29.7 09/02/2021    MCV 88.9 09/02/2021    MCH 26.9 09/02/2021    MCHC 30.3 09/02/2021    RDW 17.1 09/02/2021     09/02/2021    MPV 9.3 09/02/2021     Lab Results   Component Value Date     09/02/2021    K 4.3 09/02/2021    K 3.2 06/21/2021     09/02/2021    CO2 25 09/02/2021    BUN 46 09/02/2021    CREATININE 1.3 09/02/2021    LABALBU 4.3 08/23/2021    CALCIUM 8.7 09/02/2021    GFRAA 48 09/02/2021    LABGLOM 39 09/02/2021     Lab Results   Component Value Date    PROTIME 14.5 08/25/2021    INR 1.3 08/25/2021       Assessment:  1. Post operative respiratory insufficiency with hypoxia  2. Bilateral atelectasis/mucus plugging of bronchi s/p bronchoscopy 9/1/21  3. Pleural effusions - small on bedside US  4. CAD s/p CABG x 3 8/25/21  5. Atrial fibrillation with RVR   6. Anxiety  7. Aspiration pneumonia s/p treatment with Zosyn    Plan:  1. Taken off of heated high flow and placed on 6L NC   2. EZPAP treatments, incentive spirometry  3. Bronchodilators for airway clearance  4. S/p bronchoscopy with removal of mucus plugs in the RML and RLL - airways small and friable   5. Would stop Zosyn today if okay with the CTS team  6. Bedside US without enough pleural fluid for thoracentesis  7. Continue tube feeding. Now that she is on 6L NC I feel she would be stable to travel for modified barium swallow prior to considering PEG placement.      Electronically signed by Salty Meza MD on 9/2/2021 at 10:34 AM

## 2021-09-02 NOTE — PLAN OF CARE
JANES Kruse  Outcome: Ongoing  Goal: Control of chronic pain  9/2/2021 0105 by Sam Marlow RN  Outcome: Met This Shift  9/1/2021 1136 by Marie Purcell RN  Outcome: Ongoing     Problem: Tissue Perfusion - Cardiopulmonary, Altered:  Goal: Absence of angina  9/2/2021 0105 by Sam Marlow RN  Outcome: Met This Shift  9/1/2021 1136 by Marie Purcell RN  Outcome: Ongoing  Goal: Hemodynamic stability will improve  9/2/2021 0105 by Sam Marlow RN  Outcome: Met This Shift  9/1/2021 1136 by Marie Purcell RN  Outcome: Ongoing  Goal: Will show no evidence of cardiac arrhythmias  9/2/2021 0105 by Sam Marlow RN  Outcome: Met This Shift  9/1/2021 1136 by Marie Purcell RN  Outcome: Ongoing     Problem: Skin Integrity:  Goal: Will show no infection signs and symptoms  9/2/2021 0105 by Sam Marlow RN  Outcome: Met This Shift  9/1/2021 1136 by Marie Purcell RN  Outcome: Ongoing  Goal: Absence of new skin breakdown  9/2/2021 0105 by Sam Marlow RN  Outcome: Met This Shift  9/1/2021 1136 by Marie Purcell RN  Outcome: Ongoing     Problem: Pain:  Goal: Pain level will decrease  9/2/2021 0105 by Sam Marlow RN  Outcome: Met This Shift  9/1/2021 1136 by Marie Purcell RN  Outcome: Ongoing  Goal: Control of acute pain  9/2/2021 0105 by Sam Marlow RN  Outcome: Met This Shift  9/1/2021 1136 by Marie Purcell RN  Outcome: Ongoing  Goal: Control of chronic pain  9/2/2021 0105 by Sam Marlow RN  Outcome: Met This Shift  9/1/2021 1136 by Marie Purcell RN  Outcome: Ongoing     Problem: Musculor/Skeletal Functional Status  Goal: Highest potential functional level  9/2/2021 0105 by Sam Marlow RN  Outcome: Met This Shift  9/1/2021 1136 by Marie Purcell RN  Outcome: Ongoing  Goal: Absence of falls  9/2/2021 0105 by Sam Hermanns, RN  Outcome: Met This Shift  9/1/2021 1136 by Marie Purcell RN  Outcome: Ongoing     Problem: ABCDS Injury Assessment  Goal: Absence of physical injury  9/2/2021 0105 by Stephanie Lamas RN  Outcome: Met This Shift  9/1/2021 1136 by Zo Norwood RN  Outcome: Ongoing     Problem: Discharge Planning:  Goal: Discharged to appropriate level of care  9/2/2021 0105 by Stephanie Lamas RN  Outcome: Ongoing  9/1/2021 1136 by Zo Norwood RN  Outcome: Ongoing     Problem: Activity Intolerance:  Goal: Able to perform prescribed physical activity  9/2/2021 0105 by Stephanie Lamas RN  Outcome: Ongoing  9/1/2021 1136 by Zo Norwood RN  Outcome: Ongoing  Goal: Ability to tolerate increased activity will improve  9/2/2021 0105 by Stephanie Lamas RN  Outcome: Ongoing  9/1/2021 1136 by Zo Norwood RN  Outcome: Ongoing     Problem: Tobacco Use:  Goal: Will participate in inpatient tobacco-use cessation counseling  9/2/2021 0105 by Stephanie Lamas RN  Outcome: Completed  Note: Patient stopped smoking in 1997. This no longer applies.   9/1/2021 1136 by Zo Norwood RN  Outcome: Ongoing

## 2021-09-02 NOTE — PLAN OF CARE
Problem: Falls - Risk of:  Goal: Will remain free from falls  Description: Will remain free from falls  9/2/2021 0954 by Rita Ho RN  Outcome: Ongoing  9/2/2021 0105 by Aaron Ybarra RN  Outcome: Met This Shift  Goal: Absence of physical injury  Description: Absence of physical injury  9/2/2021 0954 by Rita Ho RN  Outcome: Ongoing  9/2/2021 0105 by Aaron Ybarra RN  Outcome: Met This Shift     Problem: Discharge Planning:  Goal: Discharged to appropriate level of care  Description: Discharged to appropriate level of care  9/2/2021 0954 by Rita Ho RN  Outcome: Ongoing  9/2/2021 0105 by Aaron Ybarra RN  Outcome: Ongoing     Problem:  Activity Intolerance:  Goal: Able to perform prescribed physical activity  Description: Able to perform prescribed physical activity  9/2/2021 0954 by Rita Ho RN  Outcome: Ongoing  9/2/2021 0105 by Aaron Ybarra RN  Outcome: Ongoing  Goal: Ability to tolerate increased activity will improve  Description: Ability to tolerate increased activity will improve  9/2/2021 0954 by Rita Ho RN  Outcome: Ongoing  9/2/2021 0105 by Aaron Ybarra RN  Outcome: Ongoing     Problem: Anxiety:  Goal: Level of anxiety will decrease  Description: Level of anxiety will decrease  9/2/2021 0954 by Rita Ho RN  Outcome: Ongoing  9/2/2021 0105 by Aaron Ybarra RN  Outcome: Met This Shift     Problem: Cardiac Output - Decreased:  Goal: Cardiac output within specified parameters  Description: Cardiac output within specified parameters  9/2/2021 0954 by Rita Ho RN  Outcome: Ongoing  9/2/2021 0105 by Aaron Ybarra RN  Outcome: Met This Shift  Goal: Hemodynamic stability will improve  Description: Hemodynamic stability will improve  9/2/2021 0954 by Rita Ho RN  Outcome: Ongoing  9/2/2021 0105 by Aaron Ybarra RN  Outcome: Met This Shift     Problem: Fluid Volume - Imbalance:  Goal: Ability to achieve a balanced intake and output will improve  Description: Ability to achieve a balanced intake and output will improve  9/2/2021 0954 by Brendon Fallon RN  Outcome: Ongoing  9/2/2021 0105 by Charles Green RN  Outcome: Met This Shift  Goal: Chest tube drainage is within specified parameters  Description: Chest tube drainage is within specified parameters  9/2/2021 0954 by Brendon Fallon RN  Outcome: Ongoing  9/2/2021 0105 by Charles Green RN  Outcome: Met This Shift     Problem: Gas Exchange - Impaired:  Goal: Levels of oxygenation will improve  Description: Levels of oxygenation will improve  9/2/2021 0954 by Brendon Fallon RN  Outcome: Ongoing  9/2/2021 0105 by Charles Green RN  Outcome: Met This Shift  Goal: Ability to maintain adequate ventilation will improve  Description: Ability to maintain adequate ventilation will improve  9/2/2021 0954 by Brendon Fallon RN  Outcome: Ongoing  9/2/2021 0105 by Charles Green RN  Outcome: Met This Shift     Problem: Pain:  Goal: Pain level will decrease  Description: Pain level will decrease  9/2/2021 0954 by Brendon Fallon RN  Outcome: Ongoing  9/2/2021 0105 by Charles Green RN  Outcome: Met This Shift  Goal: Control of acute pain  Description: Control of acute pain  9/2/2021 0954 by Brendon Fallon RN  Outcome: Ongoing  9/2/2021 0105 by Charles Green RN  Outcome: Met This Shift  Goal: Control of chronic pain  Description: Control of chronic pain  9/2/2021 0954 by Brendon Fallon RN  Outcome: Ongoing  9/2/2021 0105 by Charles Green RN  Outcome: Met This Shift     Problem: Tissue Perfusion - Cardiopulmonary, Altered:  Goal: Absence of angina  Description: Absence of angina  9/2/2021 0954 by Brendon Fallon RN  Outcome: Ongoing  9/2/2021 0105 by Charles Green RN  Outcome: Met This Shift  Goal: Hemodynamic stability will improve  Description: Hemodynamic stability will improve  9/2/2021 0954 by Brendon Fallon RN  Outcome: Ongoing  9/2/2021 0105 by Charles Green RN  Outcome:  Met This Shift  Goal: Will show no evidence of cardiac arrhythmias  Description: Will show no evidence of cardiac arrhythmias  9/2/2021 0954 by Shaun Stephens RN  Outcome: Ongoing  9/2/2021 0105 by Silvana Rea RN  Outcome: Met This Shift     Problem: Skin Integrity:  Goal: Will show no infection signs and symptoms  Description: Will show no infection signs and symptoms  9/2/2021 0954 by Shaun Stephens RN  Outcome: Ongoing  9/2/2021 0105 by Silvana Rea RN  Outcome: Met This Shift  Goal: Absence of new skin breakdown  Description: Absence of new skin breakdown  9/2/2021 0954 by Shaun Stephens RN  Outcome: Ongoing  9/2/2021 0105 by Silvana Rea RN  Outcome: Met This Shift     Problem: Pain:  Goal: Pain level will decrease  Description: Pain level will decrease  9/2/2021 0954 by Shaun Stephens RN  Outcome: Ongoing  9/2/2021 0105 by Silvana Rea RN  Outcome: Met This Shift  Goal: Control of acute pain  Description: Control of acute pain  9/2/2021 0954 by Shaun Stephens RN  Outcome: Ongoing  9/2/2021 0105 by Silvana Rea RN  Outcome: Met This Shift  Goal: Control of chronic pain  Description: Control of chronic pain  9/2/2021 0954 by Shaun Stephens RN  Outcome: Ongoing  9/2/2021 0105 by Silvana Rea RN  Outcome: Met This Shift     Problem: Musculor/Skeletal Functional Status  Goal: Highest potential functional level  9/2/2021 0954 by Shaun Stephens RN  Outcome: Ongoing  9/2/2021 0105 by Silvana Rea RN  Outcome: Met This Shift  Goal: Absence of falls  9/2/2021 0954 by Shaun Stephens RN  Outcome: Ongoing  9/2/2021 0105 by Silvana Rea RN  Outcome: Met This Shift     Problem: ABCDS Injury Assessment  Goal: Absence of physical injury  9/2/2021 0954 by Shaun Stephens RN  Outcome: Ongoing  9/2/2021 0105 by Silvana Rea RN  Outcome: Met This Shift

## 2021-09-03 ENCOUNTER — APPOINTMENT (OUTPATIENT)
Dept: ULTRASOUND IMAGING | Age: 81
DRG: 003 | End: 2021-09-03
Attending: THORACIC SURGERY (CARDIOTHORACIC VASCULAR SURGERY)
Payer: MEDICARE

## 2021-09-03 ENCOUNTER — APPOINTMENT (OUTPATIENT)
Dept: GENERAL RADIOLOGY | Age: 81
DRG: 003 | End: 2021-09-03
Attending: THORACIC SURGERY (CARDIOTHORACIC VASCULAR SURGERY)
Payer: MEDICARE

## 2021-09-03 LAB
ANION GAP SERPL CALCULATED.3IONS-SCNC: 13 MMOL/L (ref 7–16)
ANION GAP SERPL CALCULATED.3IONS-SCNC: 14 MMOL/L (ref 7–16)
BUN BLDV-MCNC: 63 MG/DL (ref 6–23)
BUN BLDV-MCNC: 65 MG/DL (ref 6–23)
CALCIUM SERPL-MCNC: 8.8 MG/DL (ref 8.6–10.2)
CALCIUM SERPL-MCNC: 9 MG/DL (ref 8.6–10.2)
CHLORIDE BLD-SCNC: 101 MMOL/L (ref 98–107)
CHLORIDE BLD-SCNC: 95 MMOL/L (ref 98–107)
CO2: 22 MMOL/L (ref 22–29)
CO2: 23 MMOL/L (ref 22–29)
CREAT SERPL-MCNC: 1.7 MG/DL (ref 0.5–1)
CREAT SERPL-MCNC: 1.8 MG/DL (ref 0.5–1)
GFR AFRICAN AMERICAN: 33
GFR AFRICAN AMERICAN: 35
GFR NON-AFRICAN AMERICAN: 27 ML/MIN/1.73
GFR NON-AFRICAN AMERICAN: 29 ML/MIN/1.73
GLUCOSE BLD-MCNC: 143 MG/DL (ref 74–99)
GLUCOSE BLD-MCNC: 94 MG/DL (ref 74–99)
GRAM STAIN ORDERABLE: NORMAL
HCT VFR BLD CALC: 28.7 % (ref 34–48)
HEMOGLOBIN: 8.6 G/DL (ref 11.5–15.5)
MAGNESIUM: 2.9 MG/DL (ref 1.6–2.6)
MCH RBC QN AUTO: 27 PG (ref 26–35)
MCHC RBC AUTO-ENTMCNC: 30 % (ref 32–34.5)
MCV RBC AUTO: 90 FL (ref 80–99.9)
METER GLUCOSE: 101 MG/DL (ref 74–99)
METER GLUCOSE: 131 MG/DL (ref 74–99)
METER GLUCOSE: 131 MG/DL (ref 74–99)
METER GLUCOSE: 154 MG/DL (ref 74–99)
METER GLUCOSE: 93 MG/DL (ref 74–99)
PDW BLD-RTO: 17.2 FL (ref 11.5–15)
PLATELET # BLD: 348 E9/L (ref 130–450)
PMV BLD AUTO: 9.1 FL (ref 7–12)
POTASSIUM SERPL-SCNC: 4.5 MMOL/L (ref 3.5–5)
POTASSIUM SERPL-SCNC: 4.7 MMOL/L (ref 3.5–5)
RBC # BLD: 3.19 E12/L (ref 3.5–5.5)
SODIUM BLD-SCNC: 131 MMOL/L (ref 132–146)
SODIUM BLD-SCNC: 137 MMOL/L (ref 132–146)
WBC # BLD: 15.8 E9/L (ref 4.5–11.5)

## 2021-09-03 PROCEDURE — 6360000002 HC RX W HCPCS: Performed by: NURSE PRACTITIONER

## 2021-09-03 PROCEDURE — 71045 X-RAY EXAM CHEST 1 VIEW: CPT

## 2021-09-03 PROCEDURE — 94660 CPAP INITIATION&MGMT: CPT

## 2021-09-03 PROCEDURE — 82436 ASSAY OF URINE CHLORIDE: CPT

## 2021-09-03 PROCEDURE — 6370000000 HC RX 637 (ALT 250 FOR IP): Performed by: NURSE PRACTITIONER

## 2021-09-03 PROCEDURE — 6370000000 HC RX 637 (ALT 250 FOR IP): Performed by: THORACIC SURGERY (CARDIOTHORACIC VASCULAR SURGERY)

## 2021-09-03 PROCEDURE — 97530 THERAPEUTIC ACTIVITIES: CPT

## 2021-09-03 PROCEDURE — P9045 ALBUMIN (HUMAN), 5%, 250 ML: HCPCS | Performed by: INTERNAL MEDICINE

## 2021-09-03 PROCEDURE — 36592 COLLECT BLOOD FROM PICC: CPT

## 2021-09-03 PROCEDURE — C9113 INJ PANTOPRAZOLE SODIUM, VIA: HCPCS | Performed by: NURSE PRACTITIONER

## 2021-09-03 PROCEDURE — 82570 ASSAY OF URINE CREATININE: CPT

## 2021-09-03 PROCEDURE — 83735 ASSAY OF MAGNESIUM: CPT

## 2021-09-03 PROCEDURE — 36415 COLL VENOUS BLD VENIPUNCTURE: CPT

## 2021-09-03 PROCEDURE — 84540 ASSAY OF URINE/UREA-N: CPT

## 2021-09-03 PROCEDURE — 2000000000 HC ICU R&B

## 2021-09-03 PROCEDURE — P9047 ALBUMIN (HUMAN), 25%, 50ML: HCPCS | Performed by: NURSE PRACTITIONER

## 2021-09-03 PROCEDURE — 2580000003 HC RX 258: Performed by: NURSE PRACTITIONER

## 2021-09-03 PROCEDURE — 80048 BASIC METABOLIC PNL TOTAL CA: CPT

## 2021-09-03 PROCEDURE — 84133 ASSAY OF URINE POTASSIUM: CPT

## 2021-09-03 PROCEDURE — 85027 COMPLETE CBC AUTOMATED: CPT

## 2021-09-03 PROCEDURE — 84300 ASSAY OF URINE SODIUM: CPT

## 2021-09-03 PROCEDURE — 6360000002 HC RX W HCPCS: Performed by: INTERNAL MEDICINE

## 2021-09-03 PROCEDURE — 82962 GLUCOSE BLOOD TEST: CPT

## 2021-09-03 PROCEDURE — 76604 US EXAM CHEST: CPT

## 2021-09-03 PROCEDURE — 2700000000 HC OXYGEN THERAPY PER DAY

## 2021-09-03 PROCEDURE — 94640 AIRWAY INHALATION TREATMENT: CPT

## 2021-09-03 RX ORDER — FERROUS SULFATE 325(65) MG
325 TABLET ORAL 2 TIMES DAILY WITH MEALS
Status: CANCELLED | OUTPATIENT
Start: 2021-09-03

## 2021-09-03 RX ORDER — MAGNESIUM SULFATE IN WATER 40 MG/ML
2000 INJECTION, SOLUTION INTRAVENOUS
Status: CANCELLED | OUTPATIENT
Start: 2021-09-03 | End: 2021-09-03

## 2021-09-03 RX ORDER — ASPIRIN 81 MG/1
81 TABLET ORAL DAILY
Status: CANCELLED | OUTPATIENT
Start: 2021-09-04

## 2021-09-03 RX ORDER — BISACODYL 10 MG
10 SUPPOSITORY, RECTAL RECTAL DAILY PRN
Status: CANCELLED | OUTPATIENT
Start: 2021-09-03

## 2021-09-03 RX ORDER — ACETAMINOPHEN 325 MG/1
650 TABLET ORAL EVERY 4 HOURS PRN
Status: CANCELLED | OUTPATIENT
Start: 2021-09-03

## 2021-09-03 RX ORDER — ASCORBIC ACID 500 MG
500 TABLET ORAL 2 TIMES DAILY
Status: CANCELLED | OUTPATIENT
Start: 2021-09-03

## 2021-09-03 RX ORDER — FOLIC ACID 1 MG/1
1 TABLET ORAL DAILY
Status: CANCELLED | OUTPATIENT
Start: 2021-09-03

## 2021-09-03 RX ORDER — ALBUMIN (HUMAN) 12.5 G/50ML
25 SOLUTION INTRAVENOUS ONCE
Status: COMPLETED | OUTPATIENT
Start: 2021-09-03 | End: 2021-09-03

## 2021-09-03 RX ORDER — POTASSIUM CHLORIDE 20 MEQ/1
20 TABLET, EXTENDED RELEASE ORAL PRN
Status: CANCELLED | OUTPATIENT
Start: 2021-09-03

## 2021-09-03 RX ORDER — ALBUMIN, HUMAN INJ 5% 5 %
25 SOLUTION INTRAVENOUS ONCE
Status: COMPLETED | OUTPATIENT
Start: 2021-09-03 | End: 2021-09-03

## 2021-09-03 RX ORDER — PANTOPRAZOLE SODIUM 40 MG/1
40 TABLET, DELAYED RELEASE ORAL DAILY
Status: CANCELLED | OUTPATIENT
Start: 2021-09-03

## 2021-09-03 RX ADMIN — ENOXAPARIN SODIUM 40 MG: 40 INJECTION SUBCUTANEOUS at 09:05

## 2021-09-03 RX ADMIN — Medication 10 ML: at 21:53

## 2021-09-03 RX ADMIN — ASPIRIN 81 MG CHEWABLE TABLET 81 MG: 81 TABLET CHEWABLE at 09:03

## 2021-09-03 RX ADMIN — SODIUM CHLORIDE, PRESERVATIVE FREE 10 ML: 5 INJECTION INTRAVENOUS at 09:06

## 2021-09-03 RX ADMIN — IPRATROPIUM BROMIDE AND ALBUTEROL SULFATE 1 AMPULE: .5; 2.5 SOLUTION RESPIRATORY (INHALATION) at 12:07

## 2021-09-03 RX ADMIN — HEPARIN 300 UNITS: 100 SYRINGE at 14:52

## 2021-09-03 RX ADMIN — SODIUM CHLORIDE, PRESERVATIVE FREE 300 UNITS: 5 INJECTION INTRAVENOUS at 22:10

## 2021-09-03 RX ADMIN — OXYCODONE 5 MG: 5 TABLET ORAL at 20:14

## 2021-09-03 RX ADMIN — OXYCODONE 5 MG: 5 TABLET ORAL at 09:03

## 2021-09-03 RX ADMIN — ATORVASTATIN CALCIUM 10 MG: 10 TABLET, FILM COATED ORAL at 21:52

## 2021-09-03 RX ADMIN — PRAMIPEXOLE DIHYDROCHLORIDE 0.5 MG: 0.25 TABLET ORAL at 14:48

## 2021-09-03 RX ADMIN — DOCUSATE SODIUM 100 MG: 50 LIQUID ORAL at 09:26

## 2021-09-03 RX ADMIN — BISACODYL 10 MG: 10 SUPPOSITORY RECTAL at 21:52

## 2021-09-03 RX ADMIN — PRAMIPEXOLE DIHYDROCHLORIDE 0.5 MG: 0.25 TABLET ORAL at 21:52

## 2021-09-03 RX ADMIN — IPRATROPIUM BROMIDE AND ALBUTEROL SULFATE 1 AMPULE: .5; 2.5 SOLUTION RESPIRATORY (INHALATION) at 09:43

## 2021-09-03 RX ADMIN — BENZOCAINE AND MENTHOL 1 LOZENGE: 15; 3.6 LOZENGE ORAL at 09:26

## 2021-09-03 RX ADMIN — INSULIN LISPRO 2 UNITS: 100 INJECTION, SOLUTION INTRAVENOUS; SUBCUTANEOUS at 22:19

## 2021-09-03 RX ADMIN — METOPROLOL TARTRATE 12.5 MG: 25 TABLET, FILM COATED ORAL at 09:03

## 2021-09-03 RX ADMIN — PRAMIPEXOLE DIHYDROCHLORIDE 0.5 MG: 0.25 TABLET ORAL at 09:05

## 2021-09-03 RX ADMIN — PANTOPRAZOLE SODIUM 40 MG: 40 INJECTION, POWDER, FOR SOLUTION INTRAVENOUS at 09:04

## 2021-09-03 RX ADMIN — LORAZEPAM 0.5 MG: 2 INJECTION INTRAMUSCULAR; INTRAVENOUS at 00:55

## 2021-09-03 RX ADMIN — DOCUSATE SODIUM 100 MG: 50 LIQUID ORAL at 21:52

## 2021-09-03 RX ADMIN — SODIUM CHLORIDE, PRESERVATIVE FREE 300 UNITS: 5 INJECTION INTRAVENOUS at 09:05

## 2021-09-03 RX ADMIN — AMIODARONE HYDROCHLORIDE 400 MG: 200 TABLET ORAL at 21:52

## 2021-09-03 RX ADMIN — ALBUMIN (HUMAN) 25 G: 0.25 INJECTION, SOLUTION INTRAVENOUS at 09:02

## 2021-09-03 RX ADMIN — METOPROLOL TARTRATE 12.5 MG: 25 TABLET, FILM COATED ORAL at 21:52

## 2021-09-03 RX ADMIN — Medication 400 MG: at 09:05

## 2021-09-03 RX ADMIN — ALBUMIN (HUMAN) 25 G: 12.5 INJECTION, SOLUTION INTRAVENOUS at 20:13

## 2021-09-03 RX ADMIN — IPRATROPIUM BROMIDE AND ALBUTEROL SULFATE 1 AMPULE: .5; 2.5 SOLUTION RESPIRATORY (INHALATION) at 15:27

## 2021-09-03 RX ADMIN — SENNOSIDES 5 ML: 8.8 SYRUP ORAL at 21:53

## 2021-09-03 RX ADMIN — AMIODARONE HYDROCHLORIDE 400 MG: 200 TABLET ORAL at 09:03

## 2021-09-03 ASSESSMENT — PAIN DESCRIPTION - PAIN TYPE
TYPE: SURGICAL PAIN
TYPE: SURGICAL PAIN

## 2021-09-03 ASSESSMENT — PAIN DESCRIPTION - FREQUENCY
FREQUENCY: CONTINUOUS
FREQUENCY: CONTINUOUS

## 2021-09-03 ASSESSMENT — PAIN SCALES - GENERAL
PAINLEVEL_OUTOF10: 4
PAINLEVEL_OUTOF10: 0
PAINLEVEL_OUTOF10: 10
PAINLEVEL_OUTOF10: 0
PAINLEVEL_OUTOF10: 4
PAINLEVEL_OUTOF10: 0
PAINLEVEL_OUTOF10: 10
PAINLEVEL_OUTOF10: 0
PAINLEVEL_OUTOF10: 10
PAINLEVEL_OUTOF10: 0

## 2021-09-03 ASSESSMENT — PAIN DESCRIPTION - LOCATION
LOCATION: CHEST
LOCATION: CHEST

## 2021-09-03 ASSESSMENT — PAIN DESCRIPTION - ORIENTATION
ORIENTATION: MID
ORIENTATION: MID

## 2021-09-03 ASSESSMENT — PAIN DESCRIPTION - DESCRIPTORS
DESCRIPTORS: ACHING;CONSTANT;DISCOMFORT
DESCRIPTORS: ACHING;CONSTANT;DISCOMFORT

## 2021-09-03 ASSESSMENT — PAIN DESCRIPTION - ONSET
ONSET: ON-GOING
ONSET: ON-GOING

## 2021-09-03 ASSESSMENT — PAIN DESCRIPTION - PROGRESSION
CLINICAL_PROGRESSION: GRADUALLY WORSENING
CLINICAL_PROGRESSION: GRADUALLY WORSENING

## 2021-09-03 NOTE — PROGRESS NOTES
Date: 9/3/2021    Time: 10:22 AM    Patient Placed On BIPAP/CPAP/ Non-Invasive Ventilation? Yes    If no must comment. Facial area red/color change? No           If YES are Blister/Lesion present? No   If yes must notify nursing staff  BIPAP/CPAP skin barrier?   Yes    Skin barrier type:mepilexlite       Comments:        Reyna Duran RCP

## 2021-09-03 NOTE — PROGRESS NOTES
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, GERD, h/o adenocarcinoma of cecum, arthritis, spinal stenosis, former smoker, obesity      Procedure/Surgeries: 8/25/2021 CABG x3 (LIMA-LAD, SVG-Ramus, SVG-OM), LAAL, EVH, KLS plating      Pacing wires: Ventricular         Intake/Output Summary (Last 24 hours) at 9/3/2021 0735  Last data filed at 9/3/2021 0700  Gross per 24 hour   Intake 1044.4 ml   Output 525 ml   Net 519.4 ml       Recent Labs     09/01/21  0351 09/02/21  0530 09/03/21  0545   WBC 17.5* 14.7* 15.8*   HGB 9.1* 9.0* 8.6*   HCT 29.3* 29.7* 28.7*    369 348      Lab Results   Component Value Date     09/03/2021    K 4.7 09/03/2021    K 3.2 06/21/2021     09/03/2021    CO2 23 09/03/2021    BUN 63 09/03/2021    CREATININE 1.7 09/03/2021    GLUCOSE 94 09/03/2021    CALCIUM 9.0 09/03/2021         Physical Exam:    BP (!) 115/44   Pulse 63   Temp 98.1 °F (36.7 °C) (Oral)   Resp 22   Ht 5' 2\" (1.575 m)   Wt 195 lb 12.3 oz (88.8 kg)   LMP  (LMP Unknown)   SpO2 96%   BMI 35.81 kg/m²       CXR Findings: 9/3/2021      Impression:     No interval change and findings of CHF    -CXR personally viewed and interpreted by ICU Nurse Practitioner, agree with above findings     General: Awake, alert. States she is feeling better today   Eyes: PERRL, anicteric   Pulmonary: Diminished bibasilar.  No wheezes, no accessory muscle use noted turned down to 4L NC  Cardiovascular:  RRR, no heaves or thrills on palpation  Tele: SR  Abdomen: Soft, nontender, +BS, +BM   Extremities: Palpable pulses all extremities, no edema   Neurologic/Psych: A&Ox3, QUINTERO to command   Skin: Warm and dry  Incisions: MSI GERRY well approximated, RLE SVG sites with staples intact       Assessment/Plan: POD #9  1. CAD, PAF S/p CABG x3 (LIMA-LAD, SVG-Ramus, SVG-OM)  LAAL  - ASA, Lipitor, BB   - All chest tubes have been removed   - Lovenox for VTE prophylaxis until [address] resumed   - PICC placed 8/30     2. Paroxysmal Atrial Fibrillation  - s/p LAAL   - On Xarelto preop for PAF, discuss starting heparin gtt with CTS   - post op with PAF, currently SR on PO amio, BB.    - restart [address] if patient goes back into afib per Dr Ronan Rodriguez     3. Acute Hypoxic Respiratory failure   - former smoker, 2/2 surgery, extubated DOS,? Aspiration 8/27  - postop course c/b hypoxia required prolonged high flow oxygen  - s/p bronch 9/1, RLL, RML mucous plugging; f/u pathology   -7 day course Zosyn completed   -Oxygenation improving, down to 4L NC  -IS, prn bipap, Ezpap, ambulate   -Pulmonary following       4. Acute Post Operative Pain   - prn oxycodone      5. DM Type 2  - HgbA1C 6.2%  - SSI.      7. Carotid Artery stenosis  - Right ICA 50-69%, avoid hypotension      8. Acute blood loss anemia  - expected blood loss 2/2 surgery, s/p one unit RBCs 8/26, 8/31  - Hgb stable; monitor      9. Leukocytosis  - [id number], Afebrile   - f/u cultures from bronch,  - completed 7 day course of Zosyn     10. Anxiety  - PRN Ativan 0.5mg q 4 hours      11. Elevated Serum Creatinine  - Scr 1.7, UOP 0.2 ml/kg/hr overnight, ordered albuminx1   - Recheck BMP later today. Keep contreras today    - Monitor UOP, labs, avoid hypotension     12.  Dysphagia/ At risk for malnutrition   - Suspected dysphagia, possible aspiration 8/27  - Bronch 9/1 airways with evidence of chronic aspiration   - MBS done 9/3, passed for soft and bite size consistency solids (dysphagia 3), thin liquids  - to bring in dentures and then diet can be advanced to regular solid as tolerated   -remove NGT        VTE Prophylaxis: Pharmacologic/Mechanical: Yes, SCDs/Lovenox    Line infection prevention: Can CVC or arterial line be removed: N/A  Continued need for urinary catheter: Yes - clinical indication: Patient post major surgery requiring fluid balance and input and output measurement.     Dispo: transfer to [facility] signed by TEMO Gao CNP on 9/3/2021 at 7:35 AM

## 2021-09-03 NOTE — CARE COORDINATION
Pt remains in [address] CABG x 3 on 08/25. FiO2 40%. Diet - low fat, low chol, high fiber/GAGE. PT treatment am-pac 6/24 and NT for ambulation. OT treatment am-pac 9/24. I placed a call to pt's , Claudette Frederic, UE#[phone] to go over discharge planning and rehab choices. Left voicemail requesting return call.  Sw/cm will follow

## 2021-09-03 NOTE — CONSULTS
Nephrology Consult Note  Patient's Name: Diego Ellison  7:22 PM  9/3/2021        Reason for Consult:  Acute kidney injury with oliguria  Requesting Physician:  Shayy Rodriguez MD    Chief Complaint:  CAD  History Obtained From:  EHR    History of Present Ilness:    Diego Ellison is a 80 y.o. female with h/o HFpEF, refractory A fib  s/p DCCV X 2, hypertension, hyperlipidemia and other medical problems listed below. She presented for elective CABG on 8/25 following ischemic work-up and LHC showing  MV CAD. 6/8/21. Procedure was performed on 8/25. Her postop course has been complicated by acute respiratory failure, atrial fibrillation with RVR and intermittent fevers. She has required Zosyn. Preop creatinine has been 1-1.2 which is her recent baseline. In the last 48 hours creatinine has shown a progressive increase to currently 1.8 mg/dL associated with decreasing urine output. She has received albumin bolus but without any significant improvement. Hence renal consult.     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[facility] [address] 01/2019    Anemia     Arm numbness     Arthritis     Blood transfusion     Cervical spinal stenosis     Cirrhosis of liver (HCC)     Diabetes mellitus (HCC)     Fatty liver     GERD (gastroesophageal reflux disease)     Hyperlipidemia     Hypertension     Low back pain     Lumbar stenosis     Neck pain     Obesity (BMI 30.0-34.9) 10/14/2012    PAF (paroxysmal atrial fibrillation) (HCC)     Renal artery aneurysm ([facility] [address] 7/15/2015       Past Surgical History:   Procedure Laterality Date    APPENDECTOMY      BACK SURGERY      BREAST SURGERY      reduction    BRONCHOSCOPY N/A 9/1/2021    BRONCHOSCOPY DIAGNOSTIC OR CELL [address] ONLY performed by Carla Gu MD at [address] [place] [address]  06/28/2021    DR Lori Merino    CARDIOVERSION  06/23/2021    CARPAL TUNNEL RELEASE      CATARACT REMOVAL  10/2016    CERVICAL DISCECTOMY 01/19/2007    ACDF C3-4, C4-5, C5-6 Dr. Rosa Duran N/A 8/25/2021    CABG ROGELIO performed by Hamzah Mon DO at [place]      left lower leg    HEMICOLECTOMY N/A 2/19/2019    DIAGNOSTIC LAPAROSCOPY, LYSIS OF ADHESIONS, OPEN RIGHT HEMICOLECTOMY performed by Gm Palmer MD at [address] [place] [address]  05/17/2017    [facility]. Dr.Joseph Meena Vigil TRANSESOPHAGEAL ECHOCARDIOGRAM  06/23/2021    UPPER GASTROINTESTINAL ENDOSCOPY N/A 11/5/2018    EGD BIOPSY performed by Ramy Brantley MD at [address] N/A 12/7/2020    EGD BI[facility]SY [facility] Candance Phoenix, MD [facility] [address] [facility]  12/7/2020    EGD CONTROL HEMORRHAGE performed by Candance Phoenix, MD at [place] [address] History   Problem Relation Age of Onset    Diabetes Mother     Stroke Father     Diabetes Sister     High Blood Pressure Sister     Diabetes Brother     High Blood Pressure Brother     Cancer Brother     Heart Disease Brother         reports that she quit smoking about 24 years ago. She has a 30.00 pack-year smoking history. She has never used smokeless tobacco. She reports previous alcohol use. She reports that she does not use drugs.     Allergies:  Iodine, Benadryl [diphenhydramine], and Fish-derived products    Current Medications:    albumin human 5 % IV solution 25 g, Once  aspirin chewable tablet 81 mg, Daily  pantoprazole (PROTONIX) injection 40 mg, Daily   And  sodium chloride (PF) 0.9 % injection 10 mL, Daily  docusate sodium (COLACE) 150 MG/15ML liquid 100 mg, BID  sennosides (SENOKOT) 8.8 MG/5ML syrup 5 mL, Nightly  oxyCODONE (ROXICODONE) immediate release tablet 5 mg, Q8H PRN  benzocaine-menthol (CEPACOL SORE THROAT) lozenge 1 lozenge, Q2H PRN  0.9 % sodium chloride infusion, PRN  bumetanide (BUMEX) injection 1 mg, Once  amiodarone (CORDARONE) tablet 400 mg, BID  sodium chloride flush 0.9 % injection 5-40 mL, 2 times per day  sodium chloride flush 0.9 % injection 5-40 mL, PRN  0.9 % sodium chloride infusion, PRN  heparin flush 100 UNIT/ML injection 300 Units, 2 times per day  heparin flush 100 UNIT/ML injection 300 Units, PRN  bisacodyl (DULCOLAX) suppository 10 mg, BID  insulin lispro (HUMALOG) injection vial 0-12 Units, 4x Daily AC & HS  LORazepam (ATIVAN) injection 0.5 mg, Q4H PRN  enoxaparin (LOVENOX) injection 40 mg, Daily  metoprolol tartrate (LOPRESSOR) tablet 12.5 mg, BID  0.9 % sodium chloride infusion, PRN  atorvastatin (LIPITOR) tablet 10 mg, Nightly  pramipexole (MIRAPEX) tablet 0.5 mg, TID  0.9 % sodium chloride infusion, Continuous  ondansetron (ZOFRAN) injection 4 mg, Q8H PRN  acetaminophen (TYLENOL) tablet 650 mg, Q4H PRN  acetaminophen (TYLENOL) suppository 650 mg, Q4H PRN  magnesium oxide (MAG-OX) tablet 400 mg, Daily  magnesium hydroxide (MILK OF MAGNESIA) 400 MG/5ML suspension 30 mL, Daily PRN  potassium chloride 20 mEq/50 mL IVPB (Central Line), PRN  magnesium sulfate 2000 mg in 50 mL IVPB premix, PRN  ipratropium-albuterol (DUONEB) nebulizer solution 1 ampule, Q4H WA  albumin human 5 % IV solution 25 g, PRN  0.9 % sodium chloride bolus, Continuous PRN  glucose (GLUTOSE) 40 % oral gel 15 g, PRN  dextrose 50 % IV solution, PRN  glucagon (rDNA) injection 1 mg, PRN  dextrose 5 % solution, PRN  calcium gluconate 1,000 mg in dextrose 5 % 100 mL IVPB, PRN        Review of Systems:   Pertinent items are noted in HPI. Physical exam:  Vitals:    09/03/21 1900   BP: (!) 112/55   Pulse: 64   Resp: 18   Temp:    SpO2: 97%          General: No distress. Alert. Eyes: PERRL. No sclera icterus. No conjunctival injection. ENT: No discharge. Pharynx clear. Neck: Trachea midline. Normal thyroid. Lungs: coarse breath sounds   CV: Regular rate. Regular rhythm. No murmur or rub. .   Abd: Non-tender. Non-distended. No masses. No organmegaly.  Normal bowel

## 2021-09-03 NOTE — PROGRESS NOTES
Physical Therapy  Physical Therapy Treatment Note     Name: Laura Lerma  : 89  MRN: [id number]      Date of Service: 9/3/2021    Evaluating PT:  Cristian Sanchez, PT, DPT [id number]    Room #:  1267/4562-T  Diagnosis:  CAD in native artery [I25.10]  PMHx/PSHx:  Arthritis, DM, HLD, HTN  Procedure/Surgery:   CABG x 3  Precautions:  Falls, Sternal, optiflow vs Bipap vs high flow,   Equipment Needs:  TBD    SUBJECTIVE:    Pt lives with  in a 1 story condo with 2 stairs to enter and 2 rail. Pt ambulated with [employer] or SPC and was independent PTA. OBJECTIVE:   Initial Evaluation  Date: 21 Treatment  9/3/21 Short Term/ Long Term   Goals   AM-PAC 6 Clicks 1/81 9/15    Was pt agreeable to Eval/treatment? Yes Yes    Does pt have pain?  10/10 surgical pain - RN aware 9/10 surgical pain - RN aware    Bed Mobility  Rolling: NT  Supine to sit: MaxA x 2 with HOB elevated  Sit to supine: MaxA x 2  Scooting: MaxA Rolling: NT  Supine to sit: MaxA x 2 with HOB elevated  Sit to supine: MaxA x 2  Scooting: MaxA Graham   Transfers Sit to stand: NT  Stand to sit: NT  Stand pivot: NT Sit to stand: MaxA from elevated bed  Stand to sit: Dep  Stand pivot: Dep no device Mod Independent with WW if needed   Ambulation   NT NT >200 feet with Mod Independent with [employer] if needed   Stair negotiation: ascended and descended NT  >4 steps with 1 rail Graham   ROM BUE:  Defer to OT note  BLE:  WFL     Strength BUE:  Defer to OT note  BLE:  4/5  Increase by 1/3 MMT grade   Balance Sitting EOB:  ModA dynamic  Dynamic Standing:  NT Sitting EOB:  SBA static, Graham dynamic  Dynamic Standing:  dep no device Sitting EOB:  SBA  Dynamic Standing:  Graham no device     Pt is A & O x 4  CAM-ICU: NT  RASS: 0  Sensation:  No reported paresthesias  Edema:  None    Vitals:  Heart Rate at rest 64 bpm Heart Rate post session 67 bpm   SpO2 at rest 90% SpO2 post session 97%   Blood Pressure at rest 106/49 mmHg Blood Pressure post session 109/88 mmHg utilized to facilitate upright posture, joint and skin integrity, and interaction with environment. PLAN:    Pt is making progress towards established goals. Will continue with current POC.       Time in  0950  Time out  1013    Total Treatment Time  23 minutes     CPT codes:  [] Gait training 81485 - minutes  [] Manual therapy 29365 - minutes  [x] Therapeutic activities 00757 23 minutes  [] Therapeutic exercises 42572 - minutes  [] Neuromuscular reeducation 56642 - minutes    Priscila Garcia, PT, DPT  [id number]

## 2021-09-03 NOTE — PLAN OF CARE
Problem: Falls - Risk of:  Goal: Will remain free from falls  Description: Will remain free from falls  Outcome: Met This Shift     Problem: Anxiety:  Goal: Level of anxiety will decrease  Description: Level of anxiety will decrease  Outcome: Met This Shift     Problem: Activity Intolerance:  Goal: Ability to tolerate increased activity will improve  Description: Ability to tolerate increased activity will improve  Outcome: Ongoing     Problem: Pain:  Description: Pain management should include both nonpharmacologic and pharmacologic interventions. Goal: Pain level will decrease  Description: Pain level will decrease  Outcome: Ongoing     Problem: Pain:  Description: Pain management should include both nonpharmacologic and pharmacologic interventions.   Goal: Pain level will decrease  Description: Pain level will decrease  Outcome: Ongoing

## 2021-09-03 NOTE — PROGRESS NOTES
OCCUPATIONAL THERAPY TREATMENT NOTE   [facility] [address] [address] [address]       Date:9/3/2021                                                               Patient Name: Nash Taylor  MRN: [id number]  : 1940  Room: [address] [place] [address]    Evaluating OT: Dayami George, OTR/L [id number]     Referring Provider: GEORGIA Jesus   Specific Provider Orders/Date: OT eval and treat (21)        Diagnosis: CAD      Surgery/Procedures:  CABG x 3     Pertinent Medical History: Adenocarcinoma of cecum, Anemia, Arthritis, cervical spinal stenosis, blood transfusion, DM, cirrhosis of liver, HLD, HTN, LBP, Neck pain, PAF, Renal artery aneurysm       *Precautions:  Fall Risk, bed rail, sternal, Bipap, O2, chest tube; *Per speech recommendations: Soft and bite size consistency solids (dysphagia 3) with  thin liquids advance to regular solid when family brings in dentures     Assessment of current deficits   [x]? Functional mobility            [x]?ADLs           [x]? Strength                  []?Cognition  [x]? Functional transfers          [x]? IADLs          [x]? Safety Awareness   [x]? Endurance  []? Fine Motor Coordination   [x]? Balance       []? Vision/perception    []? Sensation      []? Gross Motor Coordination [x]? ROM           []? Delirium                  []? Motor Control     []? Communication      OT PLAN OF CARE   OT POC based on physician orders, patient diagnosis and results of clinical assessment.        Frequency/Duration: 1-3 days/wk for 1-2 weeks PRN     Specific OT Treatment to include:   ADL retraining/adapted techniques and AE recommendations to increase functional independence within precautions                    Energy conservation techniques to improve tolerance for selfcare routine   Functional transfer/mobility training/DME recommendations for increased independence, safety and fall prevention         Patient/family education to increase safety and functional independence within precautions             Environmental modifications for safe mobility and completion of ADLs                             Therapeutic activity to improve functional performance during ADLs                                         Therapeutic exercise to improve tolerance and functional strength for ADLs   Balance retraining exercises/tasks for facilitation of postural control with dynamic challenges during ADLs .       Recommended Adaptive Equipment:  LB dressing AE     Home Living: Pt lives with   in a 1 floor condo with 2 step(s) to enter and 2 rail(s)  Bathroom setup: walk in shower with seat and rail  Equipment owned: shower seat, [address] [facility]     Prior Level of Function: King with ADLs; Assist with IADLs. [address]"" Flo for ambulation. Driving: no  Occupation: N/A     Pain Level: pt c/o 0/10 chest pain  this session; pt reporting she is not feeling well. Currently receiving transfusion.     Cognition: A&O: 3/4    Follows 1-2 step commands appropriately. Memory: Fair- (min confusion)               Comprehension: Fair -               Problem solving: Fair-  Pt requires frequent cues to maintain sternal precautions              Judgement/safety: Poor+                 Communication skills: WFL              Vision: WFL                     Glasses:readers                                                        Hearing: WFL                RASS: 0  CAM-ICU: (NT) Delirium      UE Assessment:  Hand Dominance: Right [x]?   Left []?       ROM Strength STM goal: PRN   RUE  Grossly WFL within precautions Not formally tested; grossly WFL              WNL for ADLS      LUE Grossly WFL within precautions Not formally tested; grossly WFL              WNL for ADLS         Sensation: No c/o numbness or tingling in extremities   Tone: WNL   Edema: min B UE/LE     Functional Assessment: AM-PAC Daily Activity Raw Score: 9/24    Initial Eval Status  Date: 8/26 Treatment Status  Date:9/3 STG=LTG  Time Frame: 5-7 days   Feeding Min A  Set up   Bed level  Min A; set up                      King  while seated up in chair to increase activity tolerance when cleared for diet      Grooming Max A Mod A  Set up/EOB                      S; set up   while seated/standing sink level demonstrating G tolerance; G balance.      UB dressing/bathing Dep NT  poor tolerance                       Min A   demonstrating G knowledge of precautions during tasks      LB dressing/bathing Dep     Max A  after instruction on LB dressing AE for safe reach within precautions 9/2- Max A using  AE  Pt sat EOB to janna pants to knee level using reacher with   Max A  (limited by catheter & weakness); donned  Socks with use of sock aid. Pt required Mod A due to decreased UE strength. 9/3- Dep sitting EOB due to poor activity tolerance                       Min A  using AE as needed for safe reach/ energy conservation        Toileting Max A Dep                        Min A      Bed Mobility  Supine to sit: Max A+2     Sit to supine: Max A+2  Supine to sit: Max A+2    Sit to supine:   NT                      Min A  in prep of ADL tasks & transfers   Functional Transfers Sit to stand: NT    Sit to stand: Max A    SPT to chair: Dep                        S  sit<>stand/functional bathroom transfers using AD/DME as needed for balance and safety   Functional Mobility NT  no device See above                       S   functional/bathroom mobility using AD as needed & demonstrating G safety      Balance Sitting:     Static:  Min A    Dynamic:Min A  Standing: NT Sitting: Mod A  *Pt fearful of falls; c/o feeling light headed. BP: 89/63    Standing: Max A/Dep S dynamic sitting balance; S dynamic standing balance  during ADL tasks & transfers   Endurance/Activity Tolerance    Fair tolerance with light activity. Pt limited by anxiety. Pt provided with encouragement and pt safety reassured.  Poor+ tolerance with light exercises; review of breathing techniques EOB- O2 increased per NP. Respiratory present at end of session to place pt on Bipap. Good   tolerance with moderate activity/self care routine   Visual/  Perceptual               WFL                                Vitals:   HR at rest: 64 bpm HR at end of session: 67 bpm   Spo2 at rest:89-90% Spo2 at end of session: 86%   BP at rest: 106/49 mmHg BP at end of session: 109/88 mmHg     Treatment: OT treatment provided this date   Bed mobility:Review of sternal precautions/medical lines prior to bed mobility to facilitate safe transfers and ADLS. Pt required 2 person assist for safe mobility due to complexity of medical condition, medical lines and deconditioning. HOB elevated to assist.    Balance retraining: Performed sitting balance ex's with instruction to facilitate righting reactions with postural changes during ADLS. Review of breathing techniques during sitting activities to increase tolerance. Pt performed light UB ex's within precautions with encouragement. Pt/family Education: Pt/ educated on sternal precautions and limitations. Delirium Prevention: Environmental and sensory modifications assessed and implemented to decrease ICU acquired delirium and to improve overall orientation, mentation and pt interaction with family/staff. Line management and environmental modifications made prior to and end of session to ensure patient safety and to increase efficiency of session. Skilled monitoring of HR, O2 saturation, blood pressure and patient's response to activity performed throughout session. Comments: OK from RN to see patient. Upon arrival, patient in bed, requiring encouragement to participate.  present. Pt demo decreased activity tolerance; decreased understanding of education/techniques. At end of session, patient left seated in chair. Respiratory present to place pt on Bipap.    Pt instructed on use of call light for assistance and fall prevention. Overall, pt presents with decreased activity tolerance, dynamic balance, functional mobility and anxiety limiting completion of ADLs and safe return home. Pt can benefit from continued skilled OT to increase safety, functional independence & quality of life. · Pt has made limited progress towards set goals due to SOB.    · Continue with current plan of care       Time In:1003     Time Out: 1020  Total Treatment Time: 17      Treatment Charges: Mins Units   Ther Ex  [id number]     Manual Therapy [id number]     Thera Activities [id number] 17 1   ADL/Home Mgt [id number]     Neuro Re-ed [id number]     Orthotic manage/training  20245     Non-Billable Time         Armando Crenshaw, OTR/L [id number]

## 2021-09-03 NOTE — PROGRESS NOTES
Date: 9/3/2021    Time: 3:29 PM    Patient Placed On BIPAP/CPAP/ Non-Invasive Ventilation? Yes    If no must comment. Facial area red/color change? No           If YES are Blister/Lesion present? No   If yes must notify nursing staff  BIPAP/CPAP skin barrier?   Yes    Skin barrier type:mepilexlite       Comments: patient placed on Bipap while sleeping         Kat Mistry

## 2021-09-03 NOTE — FLOWSHEET NOTE
Dr. Jose Luis Boudreaux notified on decreased urine output and increased bun/creat. Renal consult ordered. Dr. Mecca Minaya notified of consult via [employer] and will see patient.

## 2021-09-03 NOTE — PROGRESS NOTES
Pulmonary Subsequent Hospital F/U note    Patient is being followed for: post operative respiratory insufficiency with hypoxia     Interval HPI:  Discussed with nursing  This AM she was comfortable on 6L, however, then desaturated and I saw her on BiPAP 15/8 70% FiO2. She was sitting up in the chair, comfortable. SpO2 98%  NG has been removed.        ROS:  No chest pain  +cough, no sputum, no dyspnea  No hemoptysis  No wheezing  No leg swelling     Exam:  BP 94/61   Pulse 59   Temp 97 °F (36.1 °C) (Temporal)   Resp 21   Ht 5' 2\" (1.575 m)   Wt 195 lb 12.3 oz (88.8 kg)   LMP  (LMP Unknown)   SpO2 95%   BMI 35.81 kg/m²    General: Patient is resting comfortably, no distress, breathing is not labored on NIV  HEENT: PERRL, EOMI, MMM, no oral lesions, NGT in place   Neck: supple no adenopathy  CV: RRR without murmur, chest incision is intact   Lungs: bibasilar crackles present, decreased BS at the lung bases  Abd: soft, ND, NT, bowel sounds normal  Ext: warm, no edema    Data:    Oximetry:  SpO2 Readings from Last 1 Encounters:   09/03/21 95%       Imaging personally reviewed by myself:  CXR  FINDINGS:   This exam is somewhat limited by patient body habitus, as well as AP   portable, semi-upright technique.  Given these factors:       Multiple cardiac monitoring leads  overlie the patient's chest.  Multiple   sternotomy wires/sternal plasty devices, a probable left atrial appendage   closure device, a left-upper-extremity-approach PICC line, multiple surgical   clips scattered about the cardiomediastinum and left upper quadrant of the   abdomen, and components of prior instrumented fusion over the mid/lower   cervical spine, all appear grossly unchanged.       There are probable small/moderate bibasilar pleural effusions and equivalent   adjacent subsegmental atelectasis versus infiltrate, contributing to moderate   bilateral pulmonary hypoinflation.       There is at least mild cardiomegaly, and equivalent

## 2021-09-03 NOTE — PROGRESS NOTES
Speech Language Pathology      NAME:  Ranjith Bautista  :  1940  DATE: 9/3/2021  ROOM:  229/-I    Pt completed MBSS yesterday, diet initiated. Attempted ongoing dysphagia management however pt currently on BiPAP. Will re-attempt as able.      CAD in native artery [I25.10]

## 2021-09-04 ENCOUNTER — APPOINTMENT (OUTPATIENT)
Dept: GENERAL RADIOLOGY | Age: 81
DRG: 003 | End: 2021-09-04
Attending: THORACIC SURGERY (CARDIOTHORACIC VASCULAR SURGERY)
Payer: MEDICARE

## 2021-09-04 LAB
ANION GAP SERPL CALCULATED.3IONS-SCNC: 14 MMOL/L (ref 7–16)
B.E.: -7.7 MMOL/L (ref -3–3)
BUN BLDV-MCNC: 69 MG/DL (ref 6–23)
CALCIUM SERPL-MCNC: 9.2 MG/DL (ref 8.6–10.2)
CHLORIDE BLD-SCNC: 97 MMOL/L (ref 98–107)
CHLORIDE URINE RANDOM: <20 MMOL/L
CO2: 22 MMOL/L (ref 22–29)
COHB: 0.4 % (ref 0–1.5)
CREAT SERPL-MCNC: 1.9 MG/DL (ref 0.5–1)
CREATININE URINE: 107 MG/DL (ref 29–226)
CRITICAL: ABNORMAL
CULTURE, RESPIRATORY: ABNORMAL
CULTURE, RESPIRATORY: ABNORMAL
DATE ANALYZED: ABNORMAL
DATE OF COLLECTION: ABNORMAL
GFR AFRICAN AMERICAN: 31
GFR NON-AFRICAN AMERICAN: 25 ML/MIN/1.73
GLUCOSE BLD-MCNC: 138 MG/DL (ref 74–99)
HCO3: 17.4 MMOL/L (ref 22–26)
HCT VFR BLD CALC: 27.4 % (ref 34–48)
HEMOGLOBIN: 8.2 G/DL (ref 11.5–15.5)
HHB: 6.4 % (ref 0–5)
LAB: ABNORMAL
Lab: ABNORMAL
MAGNESIUM: 2.9 MG/DL (ref 1.6–2.6)
MCH RBC QN AUTO: 26.6 PG (ref 26–35)
MCHC RBC AUTO-ENTMCNC: 29.9 % (ref 32–34.5)
MCV RBC AUTO: 89 FL (ref 80–99.9)
METER GLUCOSE: 117 MG/DL (ref 74–99)
METER GLUCOSE: 128 MG/DL (ref 74–99)
METER GLUCOSE: 140 MG/DL (ref 74–99)
METER GLUCOSE: 145 MG/DL (ref 74–99)
METHB: 0.3 % (ref 0–1.5)
MODE: ABNORMAL
O2 SATURATION: 93.6 % (ref 92–98.5)
O2HB: 92.9 % (ref 94–97)
OPERATOR ID: 1632
ORGANISM: ABNORMAL
PATIENT TEMP: 37 C
PCO2: 34 MMHG (ref 35–45)
PDW BLD-RTO: 17.6 FL (ref 11.5–15)
PH BLOOD GAS: 7.33 (ref 7.35–7.45)
PLATELET # BLD: 313 E9/L (ref 130–450)
PMV BLD AUTO: 9.2 FL (ref 7–12)
PO2: 77.3 MMHG (ref 75–100)
POTASSIUM SERPL-SCNC: 4.5 MMOL/L (ref 3.5–5)
POTASSIUM, UR: 34.9 MMOL/L
RBC # BLD: 3.08 E12/L (ref 3.5–5.5)
SMEAR, RESPIRATORY: ABNORMAL
SODIUM BLD-SCNC: 133 MMOL/L (ref 132–146)
SODIUM URINE: <20 MMOL/L
SOURCE, BLOOD GAS: ABNORMAL
THB: 9.6 G/DL (ref 11.5–16.5)
TIME ANALYZED: 435
UREA NITROGEN, UR: 639 MG/DL (ref 800–1666)
WBC # BLD: 15.5 E9/L (ref 4.5–11.5)

## 2021-09-04 PROCEDURE — 83735 ASSAY OF MAGNESIUM: CPT

## 2021-09-04 PROCEDURE — 99024 POSTOP FOLLOW-UP VISIT: CPT | Performed by: THORACIC SURGERY (CARDIOTHORACIC VASCULAR SURGERY)

## 2021-09-04 PROCEDURE — 2000000000 HC ICU R&B

## 2021-09-04 PROCEDURE — P9045 ALBUMIN (HUMAN), 5%, 250 ML: HCPCS | Performed by: INTERNAL MEDICINE

## 2021-09-04 PROCEDURE — 36415 COLL VENOUS BLD VENIPUNCTURE: CPT

## 2021-09-04 PROCEDURE — 71045 X-RAY EXAM CHEST 1 VIEW: CPT

## 2021-09-04 PROCEDURE — 6370000000 HC RX 637 (ALT 250 FOR IP): Performed by: THORACIC SURGERY (CARDIOTHORACIC VASCULAR SURGERY)

## 2021-09-04 PROCEDURE — C9113 INJ PANTOPRAZOLE SODIUM, VIA: HCPCS | Performed by: NURSE PRACTITIONER

## 2021-09-04 PROCEDURE — 6370000000 HC RX 637 (ALT 250 FOR IP): Performed by: NURSE PRACTITIONER

## 2021-09-04 PROCEDURE — 80048 BASIC METABOLIC PNL TOTAL CA: CPT

## 2021-09-04 PROCEDURE — 85027 COMPLETE CBC AUTOMATED: CPT

## 2021-09-04 PROCEDURE — 2700000000 HC OXYGEN THERAPY PER DAY

## 2021-09-04 PROCEDURE — 2580000003 HC RX 258: Performed by: INTERNAL MEDICINE

## 2021-09-04 PROCEDURE — 94660 CPAP INITIATION&MGMT: CPT

## 2021-09-04 PROCEDURE — 2580000003 HC RX 258: Performed by: NURSE PRACTITIONER

## 2021-09-04 PROCEDURE — P9045 ALBUMIN (HUMAN), 5%, 250 ML: HCPCS | Performed by: THORACIC SURGERY (CARDIOTHORACIC VASCULAR SURGERY)

## 2021-09-04 PROCEDURE — 6360000002 HC RX W HCPCS: Performed by: NURSE PRACTITIONER

## 2021-09-04 PROCEDURE — 36592 COLLECT BLOOD FROM PICC: CPT

## 2021-09-04 PROCEDURE — 6360000002 HC RX W HCPCS: Performed by: INTERNAL MEDICINE

## 2021-09-04 PROCEDURE — 82962 GLUCOSE BLOOD TEST: CPT

## 2021-09-04 PROCEDURE — 82805 BLOOD GASES W/O2 SATURATION: CPT

## 2021-09-04 PROCEDURE — 6360000002 HC RX W HCPCS: Performed by: THORACIC SURGERY (CARDIOTHORACIC VASCULAR SURGERY)

## 2021-09-04 PROCEDURE — 94640 AIRWAY INHALATION TREATMENT: CPT

## 2021-09-04 RX ORDER — SODIUM CHLORIDE 9 MG/ML
INJECTION, SOLUTION INTRAVENOUS CONTINUOUS
Status: DISCONTINUED | OUTPATIENT
Start: 2021-09-04 | End: 2021-09-06

## 2021-09-04 RX ORDER — ALBUMIN, HUMAN INJ 5% 5 %
25 SOLUTION INTRAVENOUS ONCE
Status: COMPLETED | OUTPATIENT
Start: 2021-09-04 | End: 2021-09-04

## 2021-09-04 RX ADMIN — AMIODARONE HYDROCHLORIDE 400 MG: 200 TABLET ORAL at 21:02

## 2021-09-04 RX ADMIN — LORAZEPAM 0.5 MG: 2 INJECTION INTRAMUSCULAR; INTRAVENOUS at 00:30

## 2021-09-04 RX ADMIN — Medication 10 ML: at 20:42

## 2021-09-04 RX ADMIN — ALBUMIN (HUMAN) 25 G: 12.5 INJECTION, SOLUTION INTRAVENOUS at 07:52

## 2021-09-04 RX ADMIN — ACETAMINOPHEN 650 MG: 325 TABLET ORAL at 06:42

## 2021-09-04 RX ADMIN — DOCUSATE SODIUM 100 MG: 50 LIQUID ORAL at 09:02

## 2021-09-04 RX ADMIN — IPRATROPIUM BROMIDE AND ALBUTEROL SULFATE 1 AMPULE: .5; 2.5 SOLUTION RESPIRATORY (INHALATION) at 11:14

## 2021-09-04 RX ADMIN — SODIUM CHLORIDE, PRESERVATIVE FREE 300 UNITS: 5 INJECTION INTRAVENOUS at 08:56

## 2021-09-04 RX ADMIN — ASPIRIN 81 MG CHEWABLE TABLET 81 MG: 81 TABLET CHEWABLE at 08:41

## 2021-09-04 RX ADMIN — IPRATROPIUM BROMIDE AND ALBUTEROL SULFATE 1 AMPULE: .5; 2.5 SOLUTION RESPIRATORY (INHALATION) at 15:46

## 2021-09-04 RX ADMIN — PRAMIPEXOLE DIHYDROCHLORIDE 0.5 MG: 0.25 TABLET ORAL at 21:02

## 2021-09-04 RX ADMIN — OXYCODONE 5 MG: 5 TABLET ORAL at 20:42

## 2021-09-04 RX ADMIN — INSULIN LISPRO 2 UNITS: 100 INJECTION, SOLUTION INTRAVENOUS; SUBCUTANEOUS at 07:51

## 2021-09-04 RX ADMIN — BISACODYL 10 MG: 10 SUPPOSITORY RECTAL at 08:47

## 2021-09-04 RX ADMIN — SODIUM CHLORIDE: 9 INJECTION, SOLUTION INTRAVENOUS at 13:02

## 2021-09-04 RX ADMIN — SENNOSIDES 5 ML: 8.8 SYRUP ORAL at 21:03

## 2021-09-04 RX ADMIN — IPRATROPIUM BROMIDE AND ALBUTEROL SULFATE 1 AMPULE: .5; 2.5 SOLUTION RESPIRATORY (INHALATION) at 08:00

## 2021-09-04 RX ADMIN — Medication 10 ML: at 08:43

## 2021-09-04 RX ADMIN — PANTOPRAZOLE SODIUM 40 MG: 40 INJECTION, POWDER, FOR SOLUTION INTRAVENOUS at 08:43

## 2021-09-04 RX ADMIN — Medication 10 ML: at 10:29

## 2021-09-04 RX ADMIN — ENOXAPARIN SODIUM 40 MG: 40 INJECTION SUBCUTANEOUS at 08:43

## 2021-09-04 RX ADMIN — DOCUSATE SODIUM 100 MG: 50 LIQUID ORAL at 21:01

## 2021-09-04 RX ADMIN — METOPROLOL TARTRATE 12.5 MG: 25 TABLET, FILM COATED ORAL at 21:02

## 2021-09-04 RX ADMIN — AMIODARONE HYDROCHLORIDE 400 MG: 200 TABLET ORAL at 08:42

## 2021-09-04 RX ADMIN — PRAMIPEXOLE DIHYDROCHLORIDE 0.5 MG: 0.25 TABLET ORAL at 08:42

## 2021-09-04 RX ADMIN — ATORVASTATIN CALCIUM 10 MG: 10 TABLET, FILM COATED ORAL at 21:01

## 2021-09-04 RX ADMIN — ALBUMIN (HUMAN) 25 G: 12.5 INJECTION, SOLUTION INTRAVENOUS at 21:09

## 2021-09-04 RX ADMIN — IPRATROPIUM BROMIDE AND ALBUTEROL SULFATE 1 AMPULE: .5; 2.5 SOLUTION RESPIRATORY (INHALATION) at 20:26

## 2021-09-04 RX ADMIN — ACETAMINOPHEN 650 MG: 325 TABLET ORAL at 00:42

## 2021-09-04 RX ADMIN — PRAMIPEXOLE DIHYDROCHLORIDE 0.5 MG: 0.25 TABLET ORAL at 13:48

## 2021-09-04 RX ADMIN — ONDANSETRON 4 MG: 2 INJECTION INTRAMUSCULAR; INTRAVENOUS at 06:42

## 2021-09-04 RX ADMIN — INSULIN LISPRO 2 UNITS: 100 INJECTION, SOLUTION INTRAVENOUS; SUBCUTANEOUS at 12:44

## 2021-09-04 RX ADMIN — METOPROLOL TARTRATE 12.5 MG: 25 TABLET, FILM COATED ORAL at 08:41

## 2021-09-04 ASSESSMENT — PAIN SCALES - GENERAL
PAINLEVEL_OUTOF10: 0
PAINLEVEL_OUTOF10: 0
PAINLEVEL_OUTOF10: 4
PAINLEVEL_OUTOF10: 0
PAINLEVEL_OUTOF10: 10
PAINLEVEL_OUTOF10: 3
PAINLEVEL_OUTOF10: 0
PAINLEVEL_OUTOF10: 0
PAINLEVEL_OUTOF10: 3
PAINLEVEL_OUTOF10: 0
PAINLEVEL_OUTOF10: 1
PAINLEVEL_OUTOF10: 0
PAINLEVEL_OUTOF10: 0

## 2021-09-04 ASSESSMENT — PAIN DESCRIPTION - LOCATION
LOCATION: HEAD
LOCATION: CHEST

## 2021-09-04 ASSESSMENT — PAIN DESCRIPTION - ONSET
ONSET: GRADUAL
ONSET: ON-GOING

## 2021-09-04 ASSESSMENT — PAIN - FUNCTIONAL ASSESSMENT
PAIN_FUNCTIONAL_ASSESSMENT: ACTIVITIES ARE NOT PREVENTED
PAIN_FUNCTIONAL_ASSESSMENT: PREVENTS OR INTERFERES SOME ACTIVE ACTIVITIES AND ADLS

## 2021-09-04 ASSESSMENT — PAIN DESCRIPTION - ORIENTATION
ORIENTATION: MID
ORIENTATION: MID

## 2021-09-04 ASSESSMENT — PAIN DESCRIPTION - DESCRIPTORS
DESCRIPTORS: ACHING;CONSTANT;DISCOMFORT
DESCRIPTORS: ACHING;DULL;HEADACHE

## 2021-09-04 ASSESSMENT — PAIN DESCRIPTION - PAIN TYPE
TYPE: ACUTE PAIN
TYPE: ACUTE PAIN

## 2021-09-04 ASSESSMENT — PAIN DESCRIPTION - FREQUENCY
FREQUENCY: CONTINUOUS
FREQUENCY: INTERMITTENT

## 2021-09-04 ASSESSMENT — PAIN DESCRIPTION - PROGRESSION
CLINICAL_PROGRESSION: GRADUALLY WORSENING
CLINICAL_PROGRESSION: GRADUALLY WORSENING

## 2021-09-04 NOTE — PROGRESS NOTES
Date: 9/3/2021    Time: 10:47 PM    Patient Placed On BIPAP/CPAP/ Non-Invasive Ventilation? Yes    If no must comment. Facial area red/color change? No           If YES are Blister/Lesion present? No   If yes must notify nursing staff  BIPAP/CPAP skin barrier?   Yes    Skin barrier type:mepilexlite       Comments:        Lynn Heredia RCP

## 2021-09-04 NOTE — PLAN OF CARE
Problem: Falls - Risk of:  Goal: Will remain free from falls  Description: Will remain free from falls  9/4/2021 0739 by Maudie Fabry, RN  Outcome: Met This Shift  9/4/2021 0204 by Roberta Beauchamp RN  Outcome: Met This Shift     Problem: Falls - Risk of:  Goal: Absence of physical injury  Description: Absence of physical injury  9/4/2021 0739 by Maudie Fabry, RN  Outcome: Met This Shift     Problem: Falls - Risk of:  Goal: Will remain free from falls  Description: Will remain free from falls  9/4/2021 0739 by Maudie Fabry, RN  Outcome: Met This Shift     Problem: Anxiety:  Goal: Level of anxiety will decrease  Description: Level of anxiety will decrease  9/4/2021 0739 by Maudie Fabry, RN  Outcome: Met This Shift     Problem: Fluid Volume - Imbalance:  Goal: Ability to achieve a balanced intake and output will improve  Description: Ability to achieve a balanced intake and output will improve  9/4/2021 0739 by Maudie Fabry, RN  Outcome: Met This Shift     Problem: Gas Exchange - Impaired:  Goal: Levels of oxygenation will improve  Description: Levels of oxygenation will improve  9/4/2021 0739 by Maudie Fabry, RN  Outcome: Met This Shift

## 2021-09-04 NOTE — PROGRESS NOTES
Patient non compliant with Bipap. Continues to pull pull off mask and has already broken two mask pieces in the process. PRN ativan already given and not due at this time. Patient not tolerating 15 L high flow. Respiratory called for Airvo.

## 2021-09-04 NOTE — PROGRESS NOTES
Found off of bipap, RN aware and stated that patient was panicking and will place pt back on bipap once she is able

## 2021-09-04 NOTE — PROGRESS NOTES
[facility]  Nephrology Attending Progress Note  [place]. Noreen Desai MD        SUBJECTIVE:     9/3/21: Ranjith Bautista is a 80 y.o. female with h/o HFpEF, refractory A fib  s/p DCCV X 2, hypertension, hyperlipidemia and other medical problems listed below. She presented for elective CABG on 8/25 following ischemic work-up and LHC showing  MV CAD. 6/8/21. Procedure was performed on 8/25. Her postop course has been complicated by acute respiratory failure, atrial fibrillation with RVR and intermittent fevers. She has required Zosyn. Preop creatinine has been 1-1.2 which is her recent baseline. In the last 48 hours creatinine has shown a progressive increase to currently 1.8 mg/dL associated with decreasing urine output. She has received albumin bolus but without any significant improvement.   Hence renal consult.     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)    Chronic heart failure with preserved ejection fraction (HCC)    Atrial fibrillation with RVR (HCC)    Severe protein-calorie malnutrition (HCC)    Abnormal nuclear stress test    Opioid use, unspecified with unspecified opioid-induced disorder    Opioid dependence with unspecified opioid-induced disorder    Opioid dependence, uncomplicated    CAD in native artery    Pre-operative laboratory examination        PAST MEDICAL HISTORY:    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[address] 01/2019    Anemia     Arm numbness     Arthritis     95 %   09/03/21 1200 94/61 97 °F (36.1 °C) Temporal 59 16 97 %   @      Intake/Output Summary (Last 24 hours) at 9/4/2021 1119  Last data filed at 9/4/2021 1100  Gross per 24 hour   Intake 1900 ml   Output 370 ml   Net 1530 ml         Wt Readings from Last 3 Encounters:   09/03/21 195 lb 12.3 oz (88.8 kg)   08/23/21 170 lb (77.1 kg)   07/16/21 175 lb (79.4 kg)       Constitutional:  Pt is in no acute distress  Head: normocephalic, atraumatic  Neck: no JVD  Cardiovascular: regular rate and rhythm, no murmurs, gallops, or rubs  Respiratory:  No rales, rhochi, or wheezes  Gastrointestinal:  Soft, nontender, nondistended, bowel sounds x 4  Ext: no edema  Skin: dry, no rash      MEDS (scheduled):    aspirin  81 mg Oral Daily    pantoprazole  40 mg IntraVENous Daily    And    sodium chloride (PF)  10 mL IntraVENous Daily    docusate sodium  100 mg Oral BID    sennosides  5 mL Oral Nightly    bumetanide  1 mg IntraVENous Once    amiodarone  400 mg Oral BID    sodium chloride flush  5-40 mL IntraVENous 2 times per day    heparin flush  3 mL IntraVENous 2 times per day    bisacodyl  10 mg Rectal BID    insulin lispro  0-12 Units SubCUTAneous 4x Daily AC & HS    [Held by provider] enoxaparin  40 mg SubCUTAneous Daily    metoprolol tartrate  12.5 mg Oral BID    atorvastatin  10 mg Oral Nightly    pramipexole  0.5 mg Oral TID    magnesium oxide  400 mg Oral Daily    ipratropium-albuterol  1 ampule Inhalation Q4H WA       MEDS (infusions):   sodium chloride      sodium chloride      sodium chloride      sodium chloride 30 mL/hr (08/31/21 0946)    sodium chloride      dextrose         MEDS (prn):  oxyCODONE **OR** [DISCONTINUED] oxyCODONE, benzocaine-menthol, sodium chloride, sodium chloride flush, sodium chloride, heparin flush, LORazepam, sodium chloride, ondansetron, acetaminophen, acetaminophen, magnesium hydroxide, potassium chloride, magnesium sulfate, albumin human, sodium chloride, glucose, dextrose, glucagon (rDNA), dextrose, calcium gluconate IVPB    DATA:    Recent Labs     09/02/21  0530 09/03/21  0545 09/04/21  0450   WBC 14.7* 15.8* 15.5*   HGB 9.0* 8.6* 8.2*   HCT 29.7* 28.7* 27.4*   MCV 88.9 90.0 89.0    348 313     Recent Labs     09/02/21  0530 09/02/21  1511 09/03/21  0545 09/03/21  1451 09/04/21  0450      < > 137 131* 133   K 4.3   < > 4.7 4.5 4.5      < > 101 95* 97*   CO2 25   < > 23 22 22   BUN 46*   < > 63* 65* 69*   CREATININE 1.3*   < > 1.7* 1.8* 1.9*   LABGLOM 39   < > 29 27 25   GLUCOSE 140*   < > 94 143* 138*   CALCIUM 8.7   < > 9.0 8.8 9.2   MG 2.9*  --  2.9*  --  2.9*    < > = values in this interval not displayed. Lab Results   Component Value Date    LABALBU 4.3 08/23/2021    LABALBU 4.3 07/16/2021    LABALBU 4.0 06/22/2021     Lab Results   Component Value Date    TSH 2.250 06/21/2021       Iron Studies  No results found for: IRON, TIBC, FERRITIN  No results found for: TQLYVTJT34  No results found for: FOLATE    No results found for: VITD25  No results found for: PTH    No components found for: URIC    Lab Results   Component Value Date    COLORU Yellow 08/28/2021    LABPH 0 10/22/2019    NITRU Negative 08/28/2021    GLUCOSEU Negative 08/28/2021    KETUA TRACE 08/28/2021    UROBILINOGEN 0.2 08/28/2021    BILIRUBINUR Negative 08/28/2021       No results found for: Annemarie Fernandez      IMPRESSION/RECOMMENDATIONS:      1. Acute kidney injury stage I superimposed on CKD stage IIIa  BROOKE prerenal  FEurea 16% and FENa .04%, suggests intravascular volume depletion  Receiving albumin  Cr 1.1>1.9  Trial ivf low dose     2. CAD s/p CABG X 3 . 8/25     3. Acute postop respiratory failure  supplemental oxygen  bronchodilators  Pulm following  Asp pna     4. A fib RVR  currently controlled     5. Hypertension with CKD  BP controlled      Angeles Garcia.  Susie Sutton MD

## 2021-09-04 NOTE — PROGRESS NOTES
Lubna Bailey M.D.,[facility]  Latonya Becerril D.O., F.A.C.O.I., Mariza Candelario M.D. Seamus Art M.D. Juanita Nixon D.O. Daily Pulmonary Progress Note    Patient:  Jacob Ahmadi 80 y.o. female MRN: [id number]     Date of Service: 9/4/2021      Synopsis     We are following patient for postoperative respiratory insufficiency with hypoxia    \"CC\" shortness of breath    Code status: Full code      Subjective      Patient was seen and examined lying in bed on high flow nasal cannula. Today she is on 60 L at 85%. She said she wore the BiPAP overnight, but she is noncompliant with use and has either been removing mask or refusing to wear. Current saturation is 91%. Lung sounds are diminished in the bases. Chest x-ray appears worse today with increasing pulmonary edema and small to moderate bilateral pleural effusions. We will hold Lovenox for possible thoracentesis. Defer volume control to nephrology    Review of Systems:  Constitutional: Denies fever, weight loss, night sweats, and fatigue  Skin: Denies pigmentation, dark lesions, and rashes   HEENT: Denies hearing loss, tinnitus, ear drainage, epistaxis, sore throat, and hoarseness. Cardiovascular: Denies palpitations, chest pain, and chest pressure. Respiratory: Denies productive cough, dyspnea at rest, hemoptysis, apnea, and choking.   Gastrointestinal: Denies nausea, vomiting, poor appetite, diarrhea, heartburn or reflux  Genitourinary: Denies dysuria, frequency, urgency or hematuria  Musculoskeletal: Denies myalgias, muscle weakness, and bone pain  Neurological: Denies dizziness, vertigo, headache, and focal weakness  Psychological: Denies anxiety and depression  Endocrine: Denies heat intolerance and cold intolerance  Hematopoietic/Lymphatic: Denies bleeding problems and blood transfusions    24-hour events:  None    Objective   Vitals: BP (!) 101/49   Pulse 64   Temp 97.6 °F (36.4 °C) (Oral)   Resp 22   Ht 5' 2\" (1.575 m) Wt 195 lb 12.3 oz (88.8 kg)   LMP  (LMP Unknown)   SpO2 93%   BMI 35.81 kg/m²     I/O:    Intake/Output Summary (Last 24 hours) at 9/4/2021 0938  Last data filed at 9/4/2021 0900  Gross per 24 hour   Intake 1160 ml   Output 405 ml   Net 755 ml       Vent Information  $Ventilation: Off Vent  Skin Assessment: Clean, dry, & intact  Equipment ID: AIRVO  Equipment Changed: (S) Humidification  Vent Type: 980  Vent Mode: PS  Vt Ordered: 0 mL  Rate Set: 0 bmp  Peak Flow: 0 L/min  Pressure Support: (S) 5 cmH20  FiO2 : (S) 85 % (FiO2 titrated per SpO2 remaining 95-96%)  SpO2: 93 %  SpO2/FiO2 ratio: 106.67  Sensitivity: 3  PEEP/CPAP: 5  I Time/ I Time %: 1 s  Humidification Source: Heated wire  Humidification Temp: 31  Humidification Temp Measured: 31  Mask Type: Full face mask  Mask Size: Medium  Bonnet size: Medium       IPAP: 16 cmH20  CPAP/EPAP: 8 cmH2O     CURRENT MEDS :  Scheduled Meds:   aspirin  81 mg Oral Daily    pantoprazole  40 mg IntraVENous Daily    And    sodium chloride (PF)  10 mL IntraVENous Daily    docusate sodium  100 mg Oral BID    sennosides  5 mL Oral Nightly    bumetanide  1 mg IntraVENous Once    amiodarone  400 mg Oral BID    sodium chloride flush  5-40 mL IntraVENous 2 times per day    heparin flush  3 mL IntraVENous 2 times per day    bisacodyl  10 mg Rectal BID    insulin lispro  0-12 Units SubCUTAneous 4x Daily AC & HS    enoxaparin  40 mg SubCUTAneous Daily    metoprolol tartrate  12.5 mg Oral BID    atorvastatin  10 mg Oral Nightly    pramipexole  0.5 mg Oral TID    magnesium oxide  400 mg Oral Daily    ipratropium-albuterol  1 ampule Inhalation Q4H WA       Physical Exam:  General Appearance: appears comfortable in no acute distress. HEENT: Normocephalic atraumatic without obvious abnormality   Neck: Lips, mucosa, and tongue normal.  Supple, symmetrical, trachea midline, no adenopathy;thyroid:  no enlargement/tenderness/nodules or JVD.   Lung: Breath sounds CTA, diminished in the bases. Respirations   unlabored. Symmetrical expansion. Heart: RRR, normal S1, S2. No MRG  Abdomen: Soft, NT, ND. BS present x 4 quadrants. No bruit or organomegaly. Extremities: Pedal pulses 2+ symmetric b/l. Extremities normal, no cyanosis, clubbing, or edema. Musculokeletal: No joint swelling, no muscle tenderness. ROM normal in all joints of extremities. Neurologic: Mental status: Alert and Oriented X3 . Pertinent/ New Labs and Imaging Studies     Imaging Personally Reviewed:  9/4/2021 CXR  Diminished lung volume with worsening infiltrates which may represent edema   or pneumonia.       Small bilateral pleural effusions.       Continued follow-up recommended. ECHO  6/23/2021  Normal left ventricle size and systolic function. Ejection fraction is visually estimated at 60-65%. Atrial fibrillation may   affect the evaluation of LV systolic function. No regional wall motion abnormalities seen. No evidence of patent foramen ovale on bubble study. Normal right ventricular size and function. Mild mitral regurgitation is present. No hemodynamically significant aortic stenosis is present. Mild to moderate tricuspid regurgitation. RVSP is 36 mmHg. Normal estimated PA systolic pressure. No evidence for hemodynamically significant pericardial effusion.     Labs:  Lab Results   Component Value Date    WBC 15.5 09/04/2021    HGB 8.2 09/04/2021    HCT 27.4 09/04/2021    MCV 89.0 09/04/2021    MCH 26.6 09/04/2021    MCHC 29.9 09/04/2021    RDW 17.6 09/04/2021     09/04/2021    MPV 9.2 09/04/2021     Lab Results   Component Value Date     09/04/2021    K 4.5 09/04/2021    K 3.2 06/21/2021    CL 97 09/04/2021    CO2 22 09/04/2021    BUN 69 09/04/2021    CREATININE 1.9 09/04/2021    LABALBU 4.3 08/23/2021    CALCIUM 9.2 09/04/2021    GFRAA 31 09/04/2021    LABGLOM 25 09/04/2021     Lab Results   Component Value Date    PROTIME 14.5 08/25/2021    INR 1.3 08/25/2021     No results for input(s): PROBNP in the last 72 hours. No results for input(s): PROCAL in the last 72 hours. This SmartLink has not been configured with any valid records. Micro:  Recent Labs     09/01/21  1458   CULTRESP Oral Pharyngeal Ernestine absent  Growth present, evaluating for:  Yeast       No results for input(s): LABGRAM in the last 72 hours. No results for input(s): LEGUR in the last 72 hours. No results for input(s): STREPNEUMAGU in the last 72 hours. No results for input(s): LP1UAG in the last 72 hours. Assessment:    1. Post operative respiratory insufficiency with hypoxia  2. Bilateral atelectasis/mucus plugging of bronchi s/p bronchoscopy 9/1/21  3. Pleural effusions - small on bedside US  4. CAD s/p CABG x 3 8/25/21  5. Atrial fibrillation with RVR   6. Anxiety  7. Aspiration pneumonia s/p treatment with Zosyn  8. BROOKE secondary to intravascular volume depletion      Plan:   1. FiO2 weaned to 40% on NIV -currently on 60 L 85% high flow nasal cannula  2. EZPAP treatments, incentive spirometry  3. Bronchodilators  4. S/p bronchoscopy with removal of mucus plugs in the RML and RLL - airways small and friable   5. Completed 7 days of Zosyn  6. Repeat US of the R chest, small to moderate bilateral pleural effusion  7. Diet has been advanced   8. Diuresis limited as she appears intravascularly dry. 9.  Hold Lovenox for possible thoracentesis      This plan of care was reviewed in collaboration with Dr. Starr Todd  Electronically signed by TEMO Mckeon CNP on 9/4/2021 at 9:38 AM      I personally saw, examined, and cared for the patient. Labs, medications, radiographs reviewed. I agree with history exam and plans detailed in NP note. Did bedside ultrasound and pleural effusions are small. No significant fluid for thoracentesis at this time. Continue pulmonary recruitment maneuvers and airway clearance. Reviewed with spouse at bedside.     Sally Vela DO

## 2021-09-04 NOTE — PROGRESS NOTES
POD 10 CABG  %  CKD with increasing Cr   Nephrology and Pulm on board   ASA, Statin, BB  OAC if she goes back in afib   Continue ICU

## 2021-09-04 NOTE — PROGRESS NOTES
Date: 9/4/2021    Time: 11:31 AM    Patient Placed On BIPAP/CPAP/ Non-Invasive Ventilation? Yes    If no must comment. Facial area red/color change? No           If YES are Blister/Lesion present? No   If yes must notify nursing staff  BIPAP/CPAP skin barrier?   Yes    Skin barrier type:mepilexlite       Comments:        Melo Lynch RCP

## 2021-09-04 NOTE — PLAN OF CARE
Problem: Falls - Risk of:  Goal: Will remain free from falls  Description: Will remain free from falls  Outcome: Met This Shift  Goal: Absence of physical injury  Description: Absence of physical injury  Outcome: Met This Shift     Problem: Discharge Planning:  Goal: Discharged to appropriate level of care  Description: Discharged to appropriate level of care  Outcome: Ongoing     Problem:  Activity Intolerance:  Goal: Able to perform prescribed physical activity  Description: Able to perform prescribed physical activity  Outcome: Ongoing  Goal: Ability to tolerate increased activity will improve  Description: Ability to tolerate increased activity will improve  Outcome: Ongoing     Problem: Anxiety:  Goal: Level of anxiety will decrease  Description: Level of anxiety will decrease  Outcome: Ongoing     Problem: Cardiac Output - Decreased:  Goal: Cardiac output within specified parameters  Description: Cardiac output within specified parameters  Outcome: Met This Shift  Goal: Hemodynamic stability will improve  Description: Hemodynamic stability will improve  Outcome: Met This Shift     Problem: Fluid Volume - Imbalance:  Goal: Ability to achieve a balanced intake and output will improve  Description: Ability to achieve a balanced intake and output will improve  Outcome: Ongoing     Problem: Gas Exchange - Impaired:  Goal: Levels of oxygenation will improve  Description: Levels of oxygenation will improve  Outcome: Not Met This Shift  Goal: Ability to maintain adequate ventilation will improve  Description: Ability to maintain adequate ventilation will improve  Outcome: Ongoing     Problem: Pain:  Goal: Pain level will decrease  Description: Pain level will decrease  Outcome: Met This Shift  Goal: Control of acute pain  Description: Control of acute pain  Outcome: Met This Shift     Problem: Tissue Perfusion - Cardiopulmonary, Altered:  Goal: Hemodynamic stability will improve  Description: Hemodynamic stability

## 2021-09-04 NOTE — PROGRESS NOTES
Date: 9/4/2021    Time: 1:56 AM    Patient Placed On BIPAP/CPAP/ Non-Invasive Ventilation? Yes    If no must comment. Facial area red/color change? No           If YES are Blister/Lesion present? No   If yes must notify nursing staff  BIPAP/CPAP skin barrier?   Yes    Skin barrier type:mepilexlite       Comments:        Pooja Michele RCP

## 2021-09-04 NOTE — PROGRESS NOTES
Pt keeps ripping off bipap mask, changed to airvo 60L/100% to see if patient could tolerate this better, pt sp02 98%, NAD, will continue to monitor

## 2021-09-05 ENCOUNTER — APPOINTMENT (OUTPATIENT)
Dept: GENERAL RADIOLOGY | Age: 81
DRG: 003 | End: 2021-09-05
Attending: THORACIC SURGERY (CARDIOTHORACIC VASCULAR SURGERY)
Payer: MEDICARE

## 2021-09-05 ENCOUNTER — APPOINTMENT (OUTPATIENT)
Dept: ULTRASOUND IMAGING | Age: 81
DRG: 003 | End: 2021-09-05
Attending: THORACIC SURGERY (CARDIOTHORACIC VASCULAR SURGERY)
Payer: MEDICARE

## 2021-09-05 ENCOUNTER — ANESTHESIA (OUTPATIENT)
Dept: ICU | Age: 81
DRG: 003 | End: 2021-09-05
Payer: MEDICARE

## 2021-09-05 ENCOUNTER — ANESTHESIA EVENT (OUTPATIENT)
Dept: ICU | Age: 81
DRG: 003 | End: 2021-09-05
Payer: MEDICARE

## 2021-09-05 LAB
AADO2: 423.8 MMHG
AADO2: 463.8 MMHG
AADO2: 464.5 MMHG
AADO2: 510.1 MMHG
AADO2: 535.8 MMHG
ALBUMIN SERPL-MCNC: 4.3 G/DL (ref 3.5–5.2)
ALP BLD-CCNC: 84 U/L (ref 35–104)
ALT SERPL-CCNC: 118 U/L (ref 0–32)
ANION GAP SERPL CALCULATED.3IONS-SCNC: 15 MMOL/L (ref 7–16)
ANION GAP SERPL CALCULATED.3IONS-SCNC: 17 MMOL/L (ref 7–16)
AST SERPL-CCNC: 44 U/L (ref 0–31)
B.E.: -10.8 MMOL/L (ref -3–3)
B.E.: -9.6 MMOL/L (ref -3–3)
B.E.: -9.6 MMOL/L (ref -3–3)
B.E.: -9.7 MMOL/L (ref -3–3)
B.E.: -9.8 MMOL/L (ref -3–3)
BILIRUB SERPL-MCNC: 1.9 MG/DL (ref 0–1.2)
BUN BLDV-MCNC: 76 MG/DL (ref 6–23)
BUN BLDV-MCNC: 83 MG/DL (ref 6–23)
CALCIUM SERPL-MCNC: 8.5 MG/DL (ref 8.6–10.2)
CALCIUM SERPL-MCNC: 8.8 MG/DL (ref 8.6–10.2)
CHLORIDE BLD-SCNC: 97 MMOL/L (ref 98–107)
CHLORIDE BLD-SCNC: 98 MMOL/L (ref 98–107)
CO2: 18 MMOL/L (ref 22–29)
CO2: 19 MMOL/L (ref 22–29)
COHB: 0.3 % (ref 0–1.5)
COHB: 0.4 % (ref 0–1.5)
COHB: 0.4 % (ref 0–1.5)
COHB: 0.5 % (ref 0–1.5)
COHB: 0.6 % (ref 0–1.5)
CREAT SERPL-MCNC: 2.5 MG/DL (ref 0.5–1)
CREAT SERPL-MCNC: 2.7 MG/DL (ref 0.5–1)
CRITICAL: ABNORMAL
DATE ANALYZED: ABNORMAL
DATE OF COLLECTION: ABNORMAL
FIO2: 100 %
FIO2: 80 %
FIO2: 90 %
GFR AFRICAN AMERICAN: 20
GFR AFRICAN AMERICAN: 22
GFR NON-AFRICAN AMERICAN: 17 ML/MIN/1.73
GFR NON-AFRICAN AMERICAN: 18 ML/MIN/1.73
GLUCOSE BLD-MCNC: 112 MG/DL (ref 74–99)
GLUCOSE BLD-MCNC: 132 MG/DL (ref 74–99)
HCO3: 15.1 MMOL/L (ref 22–26)
HCO3: 15.7 MMOL/L (ref 22–26)
HCO3: 17 MMOL/L (ref 22–26)
HCO3: 17.6 MMOL/L (ref 22–26)
HCO3: 17.8 MMOL/L (ref 22–26)
HCT VFR BLD CALC: 27 % (ref 34–48)
HEMOGLOBIN: 7.9 G/DL (ref 11.5–15.5)
HHB: 3 % (ref 0–5)
HHB: 37.5 % (ref 0–5)
HHB: 38.2 % (ref 0–5)
HHB: 4 % (ref 0–5)
HHB: 9.9 % (ref 0–5)
LAB: ABNORMAL
MAGNESIUM: 2.9 MG/DL (ref 1.6–2.6)
MCH RBC QN AUTO: 26.8 PG (ref 26–35)
MCHC RBC AUTO-ENTMCNC: 29.3 % (ref 32–34.5)
MCV RBC AUTO: 91.5 FL (ref 80–99.9)
METER GLUCOSE: 117 MG/DL (ref 74–99)
METER GLUCOSE: 122 MG/DL (ref 74–99)
METER GLUCOSE: 144 MG/DL (ref 74–99)
METHB: 0 % (ref 0–1.5)
METHB: 0.1 % (ref 0–1.5)
METHB: 0.2 % (ref 0–1.5)
METHB: 0.3 % (ref 0–1.5)
METHB: 0.3 % (ref 0–1.5)
MODE: ABNORMAL
MODE: AC
O2 SATURATION: 61.5 % (ref 92–98.5)
O2 SATURATION: 62.3 % (ref 92–98.5)
O2 SATURATION: 90.1 % (ref 92–98.5)
O2 SATURATION: 96 % (ref 92–98.5)
O2 SATURATION: 97 % (ref 92–98.5)
O2HB: 60.9 % (ref 94–97)
O2HB: 62 % (ref 94–97)
O2HB: 89.6 % (ref 94–97)
O2HB: 95.2 % (ref 94–97)
O2HB: 96.6 % (ref 94–97)
OPERATOR ID: 1874
OPERATOR ID: ABNORMAL
PATIENT TEMP: 37 C
PCO2: 32.9 MMHG (ref 35–45)
PCO2: 33.7 MMHG (ref 35–45)
PCO2: 40.1 MMHG (ref 35–45)
PCO2: 44.7 MMHG (ref 35–45)
PCO2: 45.1 MMHG (ref 35–45)
PDW BLD-RTO: 17.6 FL (ref 11.5–15)
PEEP/CPAP: 5 CMH2O
PEEP/CPAP: 8 CMH2O
PFO2: 0.49 MMHG/%
PFO2: 0.49 MMHG/%
PFO2: 0.76 MMHG/%
PFO2: 1.15 MMHG/%
PFO2: 1.18 MMHG/%
PH BLOOD GAS: 7.21 (ref 7.35–7.45)
PH BLOOD GAS: 7.21 (ref 7.35–7.45)
PH BLOOD GAS: 7.24 (ref 7.35–7.45)
PH BLOOD GAS: 7.27 (ref 7.35–7.45)
PH BLOOD GAS: 7.3 (ref 7.35–7.45)
PIP: 16 CMH2O
PLATELET # BLD: 273 E9/L (ref 130–450)
PMV BLD AUTO: 9.7 FL (ref 7–12)
PO2: 118.5 MMHG (ref 75–100)
PO2: 39 MMHG (ref 75–100)
PO2: 39.3 MMHG (ref 75–100)
PO2: 68 MMHG (ref 75–100)
PO2: 92.1 MMHG (ref 75–100)
POTASSIUM SERPL-SCNC: 4.8 MMOL/L (ref 3.5–5)
POTASSIUM SERPL-SCNC: 5 MMOL/L (ref 3.5–5)
PS: 16 CMH20
RBC # BLD: 2.95 E12/L (ref 3.5–5.5)
RI(T): 11.8
RI(T): 11.91
RI(T): 4.52
RI(T): 4.6
RI(T): 7.5
RR MECHANICAL: 14 B/MIN
SODIUM BLD-SCNC: 132 MMOL/L (ref 132–146)
SODIUM BLD-SCNC: 132 MMOL/L (ref 132–146)
SOURCE, BLOOD GAS: ABNORMAL
THB: 9.2 G/DL (ref 11.5–16.5)
THB: 9.2 G/DL (ref 11.5–16.5)
THB: 9.3 G/DL (ref 11.5–16.5)
THB: 9.5 G/DL (ref 11.5–16.5)
THB: 9.8 G/DL (ref 11.5–16.5)
TIME ANALYZED: 1659
TIME ANALYZED: 1719
TIME ANALYZED: 1730
TIME ANALYZED: 2033
TIME ANALYZED: 437
TOTAL PROTEIN: 6.7 G/DL (ref 6.4–8.3)
TROPONIN, HIGH SENSITIVITY: 58 NG/L (ref 0–9)
VT MECHANICAL: 450 ML
WBC # BLD: 16.9 E9/L (ref 4.5–11.5)

## 2021-09-05 PROCEDURE — 2000000000 HC ICU R&B

## 2021-09-05 PROCEDURE — P9047 ALBUMIN (HUMAN), 25%, 50ML: HCPCS | Performed by: INTERNAL MEDICINE

## 2021-09-05 PROCEDURE — 6370000000 HC RX 637 (ALT 250 FOR IP): Performed by: INTERNAL MEDICINE

## 2021-09-05 PROCEDURE — 6370000000 HC RX 637 (ALT 250 FOR IP): Performed by: THORACIC SURGERY (CARDIOTHORACIC VASCULAR SURGERY)

## 2021-09-05 PROCEDURE — 31500 INSERT EMERGENCY AIRWAY: CPT

## 2021-09-05 PROCEDURE — 6360000002 HC RX W HCPCS

## 2021-09-05 PROCEDURE — 36592 COLLECT BLOOD FROM PICC: CPT

## 2021-09-05 PROCEDURE — 36620 INSERTION CATHETER ARTERY: CPT

## 2021-09-05 PROCEDURE — 0BH17EZ INSERTION OF ENDOTRACHEAL AIRWAY INTO TRACHEA, VIA NATURAL OR ARTIFICIAL OPENING: ICD-10-PCS | Performed by: ANESTHESIOLOGY

## 2021-09-05 PROCEDURE — 83735 ASSAY OF MAGNESIUM: CPT

## 2021-09-05 PROCEDURE — 36600 WITHDRAWAL OF ARTERIAL BLOOD: CPT

## 2021-09-05 PROCEDURE — 5A1955Z RESPIRATORY VENTILATION, GREATER THAN 96 CONSECUTIVE HOURS: ICD-10-PCS | Performed by: THORACIC SURGERY (CARDIOTHORACIC VASCULAR SURGERY)

## 2021-09-05 PROCEDURE — 6370000000 HC RX 637 (ALT 250 FOR IP): Performed by: NURSE PRACTITIONER

## 2021-09-05 PROCEDURE — 6360000002 HC RX W HCPCS: Performed by: NURSE PRACTITIONER

## 2021-09-05 PROCEDURE — 71045 X-RAY EXAM CHEST 1 VIEW: CPT

## 2021-09-05 PROCEDURE — 82962 GLUCOSE BLOOD TEST: CPT

## 2021-09-05 PROCEDURE — 31500 INSERT EMERGENCY AIRWAY: CPT | Performed by: ANESTHESIOLOGY

## 2021-09-05 PROCEDURE — 80053 COMPREHEN METABOLIC PANEL: CPT

## 2021-09-05 PROCEDURE — 82805 BLOOD GASES W/O2 SATURATION: CPT

## 2021-09-05 PROCEDURE — 2500000003 HC RX 250 WO HCPCS: Performed by: THORACIC SURGERY (CARDIOTHORACIC VASCULAR SURGERY)

## 2021-09-05 PROCEDURE — 2500000003 HC RX 250 WO HCPCS: Performed by: INTERNAL MEDICINE

## 2021-09-05 PROCEDURE — 94003 VENT MGMT INPAT SUBQ DAY: CPT

## 2021-09-05 PROCEDURE — 2500000003 HC RX 250 WO HCPCS

## 2021-09-05 PROCEDURE — C9113 INJ PANTOPRAZOLE SODIUM, VIA: HCPCS | Performed by: NURSE PRACTITIONER

## 2021-09-05 PROCEDURE — 36415 COLL VENOUS BLD VENIPUNCTURE: CPT

## 2021-09-05 PROCEDURE — 94640 AIRWAY INHALATION TREATMENT: CPT

## 2021-09-05 PROCEDURE — 99024 POSTOP FOLLOW-UP VISIT: CPT | Performed by: THORACIC SURGERY (CARDIOTHORACIC VASCULAR SURGERY)

## 2021-09-05 PROCEDURE — 85027 COMPLETE CBC AUTOMATED: CPT

## 2021-09-05 PROCEDURE — P9045 ALBUMIN (HUMAN), 5%, 250 ML: HCPCS | Performed by: THORACIC SURGERY (CARDIOTHORACIC VASCULAR SURGERY)

## 2021-09-05 PROCEDURE — 2700000000 HC OXYGEN THERAPY PER DAY

## 2021-09-05 PROCEDURE — 76770 US EXAM ABDO BACK WALL COMP: CPT

## 2021-09-05 PROCEDURE — 80048 BASIC METABOLIC PNL TOTAL CA: CPT

## 2021-09-05 PROCEDURE — 2580000003 HC RX 258: Performed by: NURSE PRACTITIONER

## 2021-09-05 PROCEDURE — 6360000002 HC RX W HCPCS: Performed by: INTERNAL MEDICINE

## 2021-09-05 PROCEDURE — 6360000002 HC RX W HCPCS: Performed by: THORACIC SURGERY (CARDIOTHORACIC VASCULAR SURGERY)

## 2021-09-05 PROCEDURE — 94660 CPAP INITIATION&MGMT: CPT

## 2021-09-05 PROCEDURE — 2580000003 HC RX 258: Performed by: INTERNAL MEDICINE

## 2021-09-05 RX ORDER — SUCCINYLCHOLINE CHLORIDE 20 MG/ML
20 INJECTION INTRAMUSCULAR; INTRAVENOUS ONCE
Status: DISCONTINUED | OUTPATIENT
Start: 2021-09-05 | End: 2021-09-05

## 2021-09-05 RX ORDER — FUROSEMIDE 10 MG/ML
INJECTION INTRAMUSCULAR; INTRAVENOUS
Status: COMPLETED
Start: 2021-09-05 | End: 2021-09-05

## 2021-09-05 RX ORDER — ETOMIDATE 2 MG/ML
20 INJECTION INTRAVENOUS ONCE
Status: COMPLETED | OUTPATIENT
Start: 2021-09-05 | End: 2021-09-05

## 2021-09-05 RX ORDER — SUCCINYLCHOLINE CHLORIDE 20 MG/ML
200 INJECTION INTRAMUSCULAR; INTRAVENOUS ONCE
Status: COMPLETED | OUTPATIENT
Start: 2021-09-05 | End: 2021-09-05

## 2021-09-05 RX ORDER — CHLORHEXIDINE GLUCONATE 0.12 MG/ML
15 RINSE ORAL 2 TIMES DAILY
Status: DISCONTINUED | OUTPATIENT
Start: 2021-09-05 | End: 2021-09-16 | Stop reason: HOSPADM

## 2021-09-05 RX ORDER — FUROSEMIDE 10 MG/ML
40 INJECTION INTRAMUSCULAR; INTRAVENOUS ONCE
Status: COMPLETED | OUTPATIENT
Start: 2021-09-05 | End: 2021-09-05

## 2021-09-05 RX ORDER — FENTANYL CITRATE 50 UG/ML
25 INJECTION, SOLUTION INTRAMUSCULAR; INTRAVENOUS
Status: DISCONTINUED | OUTPATIENT
Start: 2021-09-05 | End: 2021-09-06

## 2021-09-05 RX ORDER — ALBUMIN (HUMAN) 12.5 G/50ML
25 SOLUTION INTRAVENOUS ONCE
Status: COMPLETED | OUTPATIENT
Start: 2021-09-05 | End: 2021-09-05

## 2021-09-05 RX ORDER — PROPOFOL 10 MG/ML
5-80 INJECTION, EMULSION INTRAVENOUS CONTINUOUS
Status: DISCONTINUED | OUTPATIENT
Start: 2021-09-05 | End: 2021-09-06

## 2021-09-05 RX ORDER — SUCCINYLCHOLINE CHLORIDE 20 MG/ML
INJECTION INTRAMUSCULAR; INTRAVENOUS
Status: COMPLETED
Start: 2021-09-05 | End: 2021-09-05

## 2021-09-05 RX ORDER — 0.9 % SODIUM CHLORIDE 0.9 %
250 INTRAVENOUS SOLUTION INTRAVENOUS ONCE
Status: COMPLETED | OUTPATIENT
Start: 2021-09-05 | End: 2021-09-05

## 2021-09-05 RX ORDER — ETOMIDATE 2 MG/ML
INJECTION INTRAVENOUS
Status: COMPLETED
Start: 2021-09-05 | End: 2021-09-05

## 2021-09-05 RX ORDER — ALBUMIN (HUMAN) 12.5 G/50ML
25 SOLUTION INTRAVENOUS ONCE
Status: DISCONTINUED | OUTPATIENT
Start: 2021-09-05 | End: 2021-09-16 | Stop reason: HOSPADM

## 2021-09-05 RX ADMIN — Medication 2 MCG/MIN: at 21:20

## 2021-09-05 RX ADMIN — DOCUSATE SODIUM 100 MG: 50 LIQUID ORAL at 23:40

## 2021-09-05 RX ADMIN — ALBUMIN (HUMAN) 25 G: 12.5 INJECTION, SOLUTION INTRAVENOUS at 21:16

## 2021-09-05 RX ADMIN — SENNOSIDES 5 ML: 8.8 SYRUP ORAL at 23:41

## 2021-09-05 RX ADMIN — PRAMIPEXOLE DIHYDROCHLORIDE 0.5 MG: 0.25 TABLET ORAL at 23:37

## 2021-09-05 RX ADMIN — ATORVASTATIN CALCIUM 10 MG: 10 TABLET, FILM COATED ORAL at 23:36

## 2021-09-05 RX ADMIN — SODIUM BICARBONATE: 84 INJECTION, SOLUTION INTRAVENOUS at 19:02

## 2021-09-05 RX ADMIN — ETOMIDATE 20 MG: 2 INJECTION INTRAVENOUS at 18:42

## 2021-09-05 RX ADMIN — SODIUM CHLORIDE 250 ML: 9 INJECTION, SOLUTION INTRAVENOUS at 11:55

## 2021-09-05 RX ADMIN — SODIUM CHLORIDE, PRESERVATIVE FREE 300 UNITS: 5 INJECTION INTRAVENOUS at 09:00

## 2021-09-05 RX ADMIN — FUROSEMIDE 40 MG: 10 INJECTION INTRAMUSCULAR; INTRAVENOUS at 18:09

## 2021-09-05 RX ADMIN — ALBUMIN (HUMAN) 25 G: 0.25 INJECTION, SOLUTION INTRAVENOUS at 11:55

## 2021-09-05 RX ADMIN — ETOMIDATE 20 MG: 20 INJECTION, SOLUTION INTRAVENOUS at 18:42

## 2021-09-05 RX ADMIN — LORAZEPAM 0.5 MG: 2 INJECTION INTRAMUSCULAR; INTRAVENOUS at 02:13

## 2021-09-05 RX ADMIN — METOPROLOL TARTRATE 12.5 MG: 25 TABLET, FILM COATED ORAL at 08:59

## 2021-09-05 RX ADMIN — AMIODARONE HYDROCHLORIDE 400 MG: 200 TABLET ORAL at 08:59

## 2021-09-05 RX ADMIN — SUCCINYLCHOLINE CHLORIDE 200 MG: 20 INJECTION INTRAMUSCULAR; INTRAVENOUS at 18:40

## 2021-09-05 RX ADMIN — PANTOPRAZOLE SODIUM 40 MG: 40 INJECTION, POWDER, FOR SOLUTION INTRAVENOUS at 09:00

## 2021-09-05 RX ADMIN — 0.12% CHLORHEXIDINE GLUCONATE 15 ML: 1.2 RINSE ORAL at 23:42

## 2021-09-05 RX ADMIN — IPRATROPIUM BROMIDE AND ALBUTEROL SULFATE 1 AMPULE: .5; 2.5 SOLUTION RESPIRATORY (INHALATION) at 08:05

## 2021-09-05 RX ADMIN — IPRATROPIUM BROMIDE AND ALBUTEROL SULFATE 1 AMPULE: .5; 2.5 SOLUTION RESPIRATORY (INHALATION) at 19:27

## 2021-09-05 RX ADMIN — PROPOFOL 15 MCG/KG/MIN: 10 INJECTION, EMULSION INTRAVENOUS at 18:43

## 2021-09-05 RX ADMIN — ASPIRIN 81 MG CHEWABLE TABLET 81 MG: 81 TABLET CHEWABLE at 08:58

## 2021-09-05 RX ADMIN — Medication 10 ML: at 09:47

## 2021-09-05 RX ADMIN — SODIUM CHLORIDE, PRESERVATIVE FREE 10 ML: 5 INJECTION INTRAVENOUS at 09:00

## 2021-09-05 RX ADMIN — BISACODYL 10 MG: 10 SUPPOSITORY RECTAL at 21:43

## 2021-09-05 RX ADMIN — AMIODARONE HYDROCHLORIDE 400 MG: 200 TABLET ORAL at 23:40

## 2021-09-05 RX ADMIN — INSULIN LISPRO 2 UNITS: 100 INJECTION, SOLUTION INTRAVENOUS; SUBCUTANEOUS at 08:58

## 2021-09-05 RX ADMIN — FUROSEMIDE 40 MG: 10 INJECTION, SOLUTION INTRAMUSCULAR; INTRAVENOUS at 18:09

## 2021-09-05 RX ADMIN — SUCCINYLCHOLINE CHLORIDE 200 MG: 20 INJECTION, SOLUTION INTRAMUSCULAR; INTRAVENOUS at 18:40

## 2021-09-05 RX ADMIN — IPRATROPIUM BROMIDE AND ALBUTEROL SULFATE 1 AMPULE: .5; 2.5 SOLUTION RESPIRATORY (INHALATION) at 15:52

## 2021-09-05 RX ADMIN — LORAZEPAM 0.5 MG: 2 INJECTION INTRAMUSCULAR; INTRAVENOUS at 09:00

## 2021-09-05 RX ADMIN — IPRATROPIUM BROMIDE AND ALBUTEROL SULFATE 1 AMPULE: .5; 2.5 SOLUTION RESPIRATORY (INHALATION) at 12:03

## 2021-09-05 ASSESSMENT — PAIN DESCRIPTION - ONSET: ONSET: GRADUAL

## 2021-09-05 ASSESSMENT — PAIN SCALES - GENERAL
PAINLEVEL_OUTOF10: 0
PAINLEVEL_OUTOF10: 0
PAINLEVEL_OUTOF10: 6
PAINLEVEL_OUTOF10: 0
PAINLEVEL_OUTOF10: 0

## 2021-09-05 ASSESSMENT — PULMONARY FUNCTION TESTS
PIF_VALUE: 28
PIF_VALUE: 28
PIF_VALUE: 27
PIF_VALUE: 27
PIF_VALUE: 28
PIF_VALUE: 27
PIF_VALUE: 28
PIF_VALUE: 32
PIF_VALUE: 27
PIF_VALUE: 29
PIF_VALUE: 28
PIF_VALUE: 29
PIF_VALUE: 27
PIF_VALUE: 29

## 2021-09-05 ASSESSMENT — PAIN DESCRIPTION - PROGRESSION
CLINICAL_PROGRESSION: GRADUALLY IMPROVING

## 2021-09-05 ASSESSMENT — PAIN DESCRIPTION - DESCRIPTORS: DESCRIPTORS: ACHING;DULL;SORE

## 2021-09-05 ASSESSMENT — PAIN DESCRIPTION - ORIENTATION: ORIENTATION: MID

## 2021-09-05 ASSESSMENT — PAIN DESCRIPTION - FREQUENCY: FREQUENCY: INTERMITTENT

## 2021-09-05 ASSESSMENT — PAIN - FUNCTIONAL ASSESSMENT: PAIN_FUNCTIONAL_ASSESSMENT: PREVENTS OR INTERFERES SOME ACTIVE ACTIVITIES AND ADLS

## 2021-09-05 ASSESSMENT — PAIN DESCRIPTION - PAIN TYPE: TYPE: ACUTE PAIN

## 2021-09-05 ASSESSMENT — PAIN DESCRIPTION - LOCATION: LOCATION: STERNUM

## 2021-09-05 NOTE — FLOWSHEET NOTE
Spoke to Dr. Jeovanny Ceron about the patients current respiratory status and ABGs, He wants the patient to be intubated at this time. Consent was obtained by the  at bedside. Patient was intubated with anesthesia at [phone] with a size 8 ETT at 23 at the lip equal bilateral breath sounds. Thick secretions suctioned. OG inserted as well 50 at the lip. Xray was called for placement verification.

## 2021-09-05 NOTE — PROGRESS NOTES
Patient continues to rip off bipap when not restrained. She is unable to be verbally redirected at this time. For patient safety and vital oxygenation, bilateral soft wrist restraints continued at this time.

## 2021-09-05 NOTE — FLOWSHEET NOTE
notified about the change in patients respiratory status. Placing the patient back on the bipap. Grace Deleon also at bedside to assess.

## 2021-09-05 NOTE — PLAN OF CARE
Problem: Falls - Risk of:  Goal: Will remain free from falls  Description: Will remain free from falls  9/5/2021 0933 by Devon Dang RN  Outcome: Met This Shift     Problem: Non-Violent Restraints  Goal: No harm/injury to patient while restraints in use  Outcome: Met This Shift     Problem: Non-Violent Restraints  Goal: Patient's dignity will be maintained  Outcome: Met This Shift     Problem: Anxiety:  Goal: Level of anxiety will decrease  Description: Level of anxiety will decrease  Outcome: Ongoing     Problem: Fluid Volume - Imbalance:  Goal: Ability to achieve a balanced intake and output will improve  Description: Ability to achieve a balanced intake and output will improve  Outcome: Ongoing     Problem: Non-Violent Restraints  Goal: Removal from restraints as soon as assessed to be safe  Outcome: Ongoing

## 2021-09-05 NOTE — FLOWSHEET NOTE
Liz served  about the decreased urine output and bloody appearance. Order to irrigate the contreras. Flushed easy with no increase in urine output. Also notified her about the patient becoming more obtunded throughout the day.

## 2021-09-05 NOTE — PROGRESS NOTES
Pt sitting up in bed with Airvo N/C at 90% and 60 L. O2 saturation 88-90%. Pt encouraged to cough and deep breathe after receiving pain medication for c/o pain at 10/10. Pt removes O2 and SPO2 rapidly decreases to 50s and pt cyanotic. Bipap mask applied and pt's O2 saturation improves reading 94%. Pt frequently removes bipap mask and is becoming forgetful and confused. Bilateral wrist restraints applied and secured for pt safety and airway protection. Pt's , Aurora Carrasco notified and agrees that the treatment is best for pt's recovery. All immediate care needs met.

## 2021-09-05 NOTE — PLAN OF CARE
Pt requires increased oxygenation via bipap.  Will continue bilateral wrist restraints until no longer pulling at equipment or treatment no longer necessary

## 2021-09-05 NOTE — PROGRESS NOTES
Date: 9/4/2021    Time: 9:39 PM    Patient Placed On BIPAP/CPAP/ Non-Invasive Ventilation? No    If no must comment. Facial area red/color change? No           If YES are Blister/Lesion present? No   If yes must notify nursing staff  BIPAP/CPAP skin barrier?   Yes    Skin barrier type:mepilexlite       Comments:        Jannette Nicholson RCP

## 2021-09-05 NOTE — ANESTHESIA PROCEDURE NOTES
Airway  Date/Time: 9/5/2021 6:15 PM  Urgency: urgent    Airway not difficult    General Information and Staff    Patient location during procedure: ICU  Anesthesiologist: Shi Diehl DO  Resident/CRNA: TEMO Garcia CRNA  Performed: resident/CRNA     Consent for Airway (if performed for an anesthetic, see related documentation for consents)  Patient identity confirmed: per hospital policy  Consent: Verbal consent obtained (consent obtained from ). Written consent obtained. Risks and benefits: risks, benefits and alternatives were discussed  Consent given by: spouse      Code status verified:yes  Indications and Patient Condition  Indications for airway management: CNS depression and respiratory distress  Spontaneous ventilation: present  Sedation level: deep  Preoxygenated: yes  Patient position: sniffing  MILS not maintained throughout  Mask difficulty assessment: not attempted    Final Airway Details  Final airway type: endotracheal airway      Successful airway: ETT  Cuffed: yes   Successful intubation technique: video laryngoscopy  Facilitating devices/methods: intubating stylet  Endotracheal tube insertion site: oral  Blade type: CMAC. Blade size: D Blade.   ETT size (mm): 8.0  Cormack-Lehane Classification: grade I - full view of glottis  Placement verified by: chest auscultation, bronchoscopy and palpation of cuff   Measured from: lips  ETT to lips (cm): 23  Number of attempts at approach: 1  Ventilation between attempts: bag mask  Number of other approaches attempted: 0    Additional Comments  20mg Etomidate and 140mg succinylcholine given to facilitate intubation  no

## 2021-09-05 NOTE — PROGRESS NOTES
Date: 9/5/2021    Time: 12:29 AM    Patient Placed On BIPAP/CPAP/ Non-Invasive Ventilation? No    If no must comment. Facial area red/color change? No           If YES are Blister/Lesion present? No   If yes must notify nursing staff  BIPAP/CPAP skin barrier?   Yes    Skin barrier type:mepilexlite       Comments:16/8/90        Janelle Bolanos RCP

## 2021-09-05 NOTE — PROGRESS NOTES
On bipap overnight  Despite this, she has labored breathing, sating in 80s, PO2 68  Appears more obtunded today   NPO for now   Pulmonology following - mentioned possible thoracentesis   If decline continues, she may need re-intubation   Met and discussed with her  at bedside

## 2021-09-05 NOTE — PROGRESS NOTES
[facility]  Nephrology Attending Progress Note  Trina Gonzales. Kimberly Ojeda MD        SUBJECTIVE:     9/3/21: Russell Carpenter is a 80 y.o. female with h/o HFpEF, refractory A fib  s/p DCCV X 2, hypertension, hyperlipidemia and other medical problems listed below. She presented for elective CABG on 8/25 following ischemic work-up and LHC showing  MV CAD. 6/8/21. Procedure was performed on 8/25. Her postop course has been complicated by acute respiratory failure, atrial fibrillation with RVR and intermittent fevers. She has required Zosyn. Preop creatinine has been 1-1.2 which is her recent baseline. In the last 48 hours creatinine has shown a progressive increase to currently 1.8 mg/dL associated with decreasing urine output. She has received albumin bolus but without any significant improvement. Hence renal consult.     9/4/21: pt seen in icu, has low urine output, just received iv albumin    9/5/21: seen in icu, started on ns at 76 yesterday. Remains with low uo.        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)    Chronic heart failure with preserved ejection fraction (HCC)    Atrial fibrillation with RVR (HCC)    Severe protein-calorie malnutrition (HCC)    Abnormal nuclear stress test    Opioid use, unspecified with unspecified opioid-induced disorder    Opioid dependence with unspecified opioid-induced disorder    Opioid dependence, uncomplicated    CAD in native artery    Pre-operative laboratory examination        PAST MEDICAL HISTORY:    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[facility] [place] [facility] 01/2019    Anemia     Arm numbness     Arthritis     Blood transfusion     Cervical spinal stenosis     Cirrhosis of liver (HCC)     Diabetes mellitus (HCC)     Fatty liver     GERD (gastroesophageal reflux disease)     Hyperlipidemia     Hypertension     Low back pain     Lumbar stenosis     Neck pain     Obesity (BMI 30.0-34.9) 10/14/2012    PAF (paroxysmal atrial fibrillation) (HCC)     Renal artery aneurysm ([facility] [place] [facility] 7/15/2015       DIET:    ADULT TUBE FEEDING; Nasogastric; Diabetic; Continuous; 10; Yes; 10; Q 4 hours; 40; 30; Q 4 hours  ADULT DIET;  Regular; Low Fat/Low Chol/High Fiber/GAGE     PHYSICAL EXAM:     Patient Vitals for the past 24 hrs:   BP Temp Temp src Pulse Resp SpO2 Weight   09/05/21 1100 (!) 101/59 -- -- 56 14 100 % --   09/05/21 1019 -- -- -- -- 19 -- --   09/05/21 1000 118/60 -- -- 64 26 (!) 89 % --   09/05/21 0900 130/62 -- -- 62 23 92 % --   09/05/21 0859 130/62 -- -- 62 -- -- --   09/05/21 0811 -- -- -- -- 19 100 % --   09/05/21 0806 -- -- -- -- 17 -- --   09/05/21 0800 (!) 106/56 97.6 °F (36.4 °C) Oral 56 18 99 % --   09/05/21 0700 (!) 92/50 -- -- 55 12 100 % --   09/05/21 0600 (!) 107/52 -- -- 57 16 97 % --   09/05/21 0500 (!) 102/52 -- -- 57 14 100 % 203 lb 7.8 oz (92.3 kg)   09/05/21 0400 (!) 94/45 97.9 °F (36.6 °C) -- 57 15 97 % --   09/05/21 0300 (!) 96/51 -- -- 57 15 97 % --   09/05/21 0200 (!) 110/57 -- -- 60 15 98 % --   09/05/21 0100 (!) 107/57 -- -- 65 27 98 % --   09/05/21 0000 (!) 113/55 97.8 °F (36.6 °C) Axillary 67 24 96 % --   09/04/21 2300 112/65 -- -- 67 23 95 % --   09/04/21 2200 (!) 126/58 -- -- 67 18 95 % --   09/04/21 2102 (!) 128/56 -- -- 71 -- -- --   09/04/21 2100 (!) 128/56 -- -- 67 26 91 % --   09/04/21 2045 116/60 -- -- 70 26 -- --   09/04/21 2029 -- -- -- -- 20 (!) 89 % --   09/04/21 2028 -- -- -- -- 20 (!) 89 % --   09/04/21 2000 -- -- -- 71 -- 90 % --   09/04/21 1930 118/75 98.1 °F (36.7 °C) Oral 68 25 -- -- 09/04/21 1900 118/75 -- -- 68 26 91 % --   09/04/21 1800 115/64 98 °F (36.7 °C) Oral 68 27 91 % --   09/04/21 1700 108/63 -- -- 67 25 92 % --   09/04/21 1600 (!) 106/39 97.8 °F (36.6 °C) Oral 62 25 91 % --   09/04/21 1555 -- -- -- -- 18 92 % --   09/04/21 1549 -- -- -- -- 24 94 % --   09/04/21 1500 (!) 105/59 -- -- 65 26 92 % --   09/04/21 1400 (!) 89/59 97.9 °F (36.6 °C) Oral 64 24 92 % --   09/04/21 1352 -- -- -- -- 26 95 % --   09/04/21 1300 (!) 93/55 -- -- 63 21 94 % --   09/04/21 1200 (!) 103/57 97.8 °F (36.6 °C) Oral 61 24 97 % --   @      Intake/Output Summary (Last 24 hours) at 9/5/2021 1134  Last data filed at 9/5/2021 1100  Gross per 24 hour   Intake 1529 ml   Output 280 ml   Net 1249 ml         Wt Readings from Last 3 Encounters:   09/05/21 203 lb 7.8 oz (92.3 kg)   08/23/21 170 lb (77.1 kg)   07/16/21 175 lb (79.4 kg)       Constitutional:  Pt is in no acute distress  Head: normocephalic, atraumatic  Neck: no JVD  Cardiovascular: regular rate and rhythm, no murmurs, gallops, or rubs  Respiratory:  No rales, rhochi, or wheezes  Gastrointestinal:  Soft, nontender, nondistended, bowel sounds x 4  Ext: no edema  Skin: dry, no rash      MEDS (scheduled):    aspirin  81 mg Oral Daily    pantoprazole  40 mg IntraVENous Daily    And    sodium chloride (PF)  10 mL IntraVENous Daily    docusate sodium  100 mg Oral BID    sennosides  5 mL Oral Nightly    bumetanide  1 mg IntraVENous Once    amiodarone  400 mg Oral BID    sodium chloride flush  5-40 mL IntraVENous 2 times per day    heparin flush  3 mL IntraVENous 2 times per day    bisacodyl  10 mg Rectal BID    insulin lispro  0-12 Units SubCUTAneous 4x Daily AC & HS    enoxaparin  40 mg SubCUTAneous Daily    metoprolol tartrate  12.5 mg Oral BID    atorvastatin  10 mg Oral Nightly    pramipexole  0.5 mg Oral TID    magnesium oxide  400 mg Oral Daily    ipratropium-albuterol  1 ampule Inhalation Q4H WA       MEDS (infusions):   sodium chloride 75 mL/hr at 09/04/21 1302    sodium chloride      sodium chloride      sodium chloride      sodium chloride 30 mL/hr (08/31/21 0946)    sodium chloride      dextrose         MEDS (prn):  oxyCODONE **OR** [DISCONTINUED] oxyCODONE, benzocaine-menthol, sodium chloride, sodium chloride flush, sodium chloride, heparin flush, LORazepam, sodium chloride, ondansetron, acetaminophen, acetaminophen, magnesium hydroxide, potassium chloride, magnesium sulfate, albumin human, sodium chloride, glucose, dextrose, glucagon (rDNA), dextrose, calcium gluconate IVPB    DATA:    Recent Labs     09/03/21  0545 09/04/21  0450 09/05/21  0500   WBC 15.8* 15.5* 16.9*   HGB 8.6* 8.2* 7.9*   HCT 28.7* 27.4* 27.0*   MCV 90.0 89.0 91.5    313 273     Recent Labs     09/03/21  0545 09/03/21  0545 09/03/21  1451 09/04/21  0450 09/05/21  0500      < > 131* 133 132   K 4.7   < > 4.5 4.5 4.8      < > 95* 97* 98   CO2 23   < > 22 22 19*   BUN 63*   < > 65* 69* 76*   CREATININE 1.7*   < > 1.8* 1.9* 2.5*   LABGLOM 29   < > 27 25 18   GLUCOSE 94   < > 143* 138* 132*   CALCIUM 9.0   < > 8.8 9.2 8.8   MG 2.9*  --   --  2.9* 2.9*    < > = values in this interval not displayed. Lab Results   Component Value Date    LABALBU 4.3 08/23/2021    LABALBU 4.3 07/16/2021    LABALBU 4.0 06/22/2021     Lab Results   Component Value Date    TSH 2.250 06/21/2021       Iron Studies  No results found for: IRON, TIBC, FERRITIN  No results found for: THLLSYRN21  No results found for: FOLATE    No results found for: VITD25  No results found for: PTH    No components found for: URIC    Lab Results   Component Value Date    COLORU Yellow 08/28/2021    LABPH 0 10/22/2019    NITRU Negative 08/28/2021    GLUCOSEU Negative 08/28/2021    KETUA TRACE 08/28/2021    UROBILINOGEN 0.2 08/28/2021    BILIRUBINUR Negative 08/28/2021       No results found for: Meryle Ores      IMPRESSION/RECOMMENDATIONS:      1.   Acute kidney injury stage I superimposed on CKD stage IIIa  BROOKE prerenal  FEurea 16% and FENa .04%, volume depletion  Receiving albumin  Cr 1.1>1.9>2.5  Trial ivf low dose, inc rate  Will give albumin and bolus today  Check pola     2. CAD s/p CABG X 3 . 8/25     3. Acute postop respiratory failure  supplemental oxygen  bronchodilators  Pulm following  Asp pna     4. A fib RVR  currently controlled     5. Hypertension with CKD  BP low    Vianca Canales.  Joaquin Reynoso MD

## 2021-09-05 NOTE — FLOWSHEET NOTE
Patient was switched from bipap back to heated high flow. When preforming ROM and removed from restraints the patient was pulling her heated high flow off and dropped her Sp02. At this time bilateral soft wrist restraints are still indicated.

## 2021-09-06 ENCOUNTER — APPOINTMENT (OUTPATIENT)
Dept: GENERAL RADIOLOGY | Age: 81
DRG: 003 | End: 2021-09-06
Attending: THORACIC SURGERY (CARDIOTHORACIC VASCULAR SURGERY)
Payer: MEDICARE

## 2021-09-06 LAB
AADO2: 173.5 MMHG
AADO2: 282.1 MMHG
ALBUMIN SERPL-MCNC: 4 G/DL (ref 3.5–5.2)
ALP BLD-CCNC: 85 U/L (ref 35–104)
ALT SERPL-CCNC: 100 U/L (ref 0–32)
ANION GAP SERPL CALCULATED.3IONS-SCNC: 17 MMOL/L (ref 7–16)
ANION GAP SERPL CALCULATED.3IONS-SCNC: 20 MMOL/L (ref 7–16)
AST SERPL-CCNC: 37 U/L (ref 0–31)
B.E.: -7.6 MMOL/L (ref -3–3)
B.E.: -7.7 MMOL/L (ref -3–3)
BILIRUB SERPL-MCNC: 1.7 MG/DL (ref 0–1.2)
BILIRUBIN DIRECT: 0.8 MG/DL (ref 0–0.3)
BILIRUBIN, INDIRECT: 0.9 MG/DL (ref 0–1)
BUN BLDV-MCNC: 86 MG/DL (ref 6–23)
BUN BLDV-MCNC: 87 MG/DL (ref 6–23)
CALCIUM IONIZED: 1.15 MMOL/L (ref 1.15–1.33)
CALCIUM SERPL-MCNC: 8.7 MG/DL (ref 8.6–10.2)
CALCIUM SERPL-MCNC: 8.8 MG/DL (ref 8.6–10.2)
CHLORIDE BLD-SCNC: 96 MMOL/L (ref 98–107)
CHLORIDE BLD-SCNC: 98 MMOL/L (ref 98–107)
CO2: 17 MMOL/L (ref 22–29)
CO2: 18 MMOL/L (ref 22–29)
COHB: 0.2 % (ref 0–1.5)
COHB: 0.2 % (ref 0–1.5)
CORTISOL TOTAL: 11.97 MCG/DL (ref 2.68–18.4)
CREAT SERPL-MCNC: 2.8 MG/DL (ref 0.5–1)
CREAT SERPL-MCNC: 2.9 MG/DL (ref 0.5–1)
CRITICAL: ABNORMAL
CRITICAL: ABNORMAL
DATE ANALYZED: ABNORMAL
DATE ANALYZED: ABNORMAL
DATE OF COLLECTION: ABNORMAL
DATE OF COLLECTION: ABNORMAL
FIO2: 40 %
FIO2: 60 %
GFR AFRICAN AMERICAN: 19
GFR AFRICAN AMERICAN: 20
GFR NON-AFRICAN AMERICAN: 16 ML/MIN/1.73
GFR NON-AFRICAN AMERICAN: 16 ML/MIN/1.73
GLUCOSE BLD-MCNC: 105 MG/DL (ref 74–99)
GLUCOSE BLD-MCNC: 138 MG/DL (ref 74–99)
HCO3: 16.4 MMOL/L (ref 22–26)
HCO3: 16.9 MMOL/L (ref 22–26)
HCT VFR BLD CALC: 26.7 % (ref 34–48)
HCT VFR BLD CALC: 28.6 % (ref 34–48)
HEMOGLOBIN: 8.1 G/DL (ref 11.5–15.5)
HEMOGLOBIN: 8.7 G/DL (ref 11.5–15.5)
HHB: 3.6 % (ref 0–5)
HHB: 7.8 % (ref 0–5)
LAB: ABNORMAL
LAB: ABNORMAL
LACTIC ACID: 2.2 MMOL/L (ref 0.5–2.2)
Lab: ABNORMAL
Lab: ABNORMAL
MAGNESIUM: 2.8 MG/DL (ref 1.6–2.6)
MCH RBC QN AUTO: 26.4 PG (ref 26–35)
MCH RBC QN AUTO: 26.6 PG (ref 26–35)
MCHC RBC AUTO-ENTMCNC: 30.3 % (ref 32–34.5)
MCHC RBC AUTO-ENTMCNC: 30.4 % (ref 32–34.5)
MCV RBC AUTO: 86.9 FL (ref 80–99.9)
MCV RBC AUTO: 87.5 FL (ref 80–99.9)
METER GLUCOSE: 125 MG/DL (ref 74–99)
METER GLUCOSE: 167 MG/DL (ref 74–99)
METER GLUCOSE: 215 MG/DL (ref 74–99)
METHB: 0.1 % (ref 0–1.5)
METHB: 0.5 % (ref 0–1.5)
MODE: AC
MODE: AC
O2 SATURATION: 92.1 % (ref 92–98.5)
O2 SATURATION: 96.4 % (ref 92–98.5)
O2HB: 91.5 % (ref 94–97)
O2HB: 96.1 % (ref 94–97)
OPERATOR ID: 1874
OPERATOR ID: 882
PATIENT TEMP: 37 C
PATIENT TEMP: 37 C
PCO2: 28.1 MMHG (ref 35–45)
PCO2: 31.3 MMHG (ref 35–45)
PDW BLD-RTO: 17.6 FL (ref 11.5–15)
PDW BLD-RTO: 17.7 FL (ref 11.5–15)
PEEP/CPAP: 5 CMH2O
PEEP/CPAP: 5 CMH2O
PFO2: 1.61 MMHG/%
PFO2: 1.74 MMHG/%
PH BLOOD GAS: 7.35 (ref 7.35–7.45)
PH BLOOD GAS: 7.38 (ref 7.35–7.45)
PHOSPHORUS: 4.5 MG/DL (ref 2.5–4.5)
PLATELET # BLD: 310 E9/L (ref 130–450)
PLATELET # BLD: 397 E9/L (ref 130–450)
PMV BLD AUTO: 9.3 FL (ref 7–12)
PMV BLD AUTO: 9.4 FL (ref 7–12)
PO2: 69.4 MMHG (ref 75–100)
PO2: 96.3 MMHG (ref 75–100)
POTASSIUM REFLEX MAGNESIUM: 4.3 MMOL/L (ref 3.5–5)
POTASSIUM SERPL-SCNC: 4.4 MMOL/L (ref 3.5–5)
PROCALCITONIN: 0.31 NG/ML (ref 0–0.08)
RBC # BLD: 3.05 E12/L (ref 3.5–5.5)
RBC # BLD: 3.29 E12/L (ref 3.5–5.5)
RI(T): 2.5
RI(T): 2.93
RR MECHANICAL: 14 B/MIN
RR MECHANICAL: 14 B/MIN
SARS-COV-2, NAAT: NOT DETECTED
SODIUM BLD-SCNC: 131 MMOL/L (ref 132–146)
SODIUM BLD-SCNC: 135 MMOL/L (ref 132–146)
SOURCE, BLOOD GAS: ABNORMAL
SOURCE, BLOOD GAS: ABNORMAL
THB: 10.1 G/DL (ref 11.5–16.5)
THB: 9.3 G/DL (ref 11.5–16.5)
TIME ANALYZED: 1310
TIME ANALYZED: 509
TOTAL PROTEIN: 6.4 G/DL (ref 6.4–8.3)
VT MECHANICAL: 450 ML
VT MECHANICAL: 450 ML
WBC # BLD: 17.2 E9/L (ref 4.5–11.5)
WBC # BLD: 22.8 E9/L (ref 4.5–11.5)

## 2021-09-06 PROCEDURE — 6360000002 HC RX W HCPCS: Performed by: INTERNAL MEDICINE

## 2021-09-06 PROCEDURE — 94003 VENT MGMT INPAT SUBQ DAY: CPT

## 2021-09-06 PROCEDURE — 82962 GLUCOSE BLOOD TEST: CPT

## 2021-09-06 PROCEDURE — 85027 COMPLETE CBC AUTOMATED: CPT

## 2021-09-06 PROCEDURE — 6370000000 HC RX 637 (ALT 250 FOR IP): Performed by: THORACIC SURGERY (CARDIOTHORACIC VASCULAR SURGERY)

## 2021-09-06 PROCEDURE — 84145 PROCALCITONIN (PCT): CPT

## 2021-09-06 PROCEDURE — 6360000002 HC RX W HCPCS: Performed by: NURSE PRACTITIONER

## 2021-09-06 PROCEDURE — 2580000003 HC RX 258: Performed by: INTERNAL MEDICINE

## 2021-09-06 PROCEDURE — 99024 POSTOP FOLLOW-UP VISIT: CPT | Performed by: THORACIC SURGERY (CARDIOTHORACIC VASCULAR SURGERY)

## 2021-09-06 PROCEDURE — 94640 AIRWAY INHALATION TREATMENT: CPT

## 2021-09-06 PROCEDURE — 80048 BASIC METABOLIC PNL TOTAL CA: CPT

## 2021-09-06 PROCEDURE — 80076 HEPATIC FUNCTION PANEL: CPT

## 2021-09-06 PROCEDURE — 82533 TOTAL CORTISOL: CPT

## 2021-09-06 PROCEDURE — 6370000000 HC RX 637 (ALT 250 FOR IP): Performed by: STUDENT IN AN ORGANIZED HEALTH CARE EDUCATION/TRAINING PROGRAM

## 2021-09-06 PROCEDURE — 87206 SMEAR FLUORESCENT/ACID STAI: CPT

## 2021-09-06 PROCEDURE — 87106 FUNGI IDENTIFICATION YEAST: CPT

## 2021-09-06 PROCEDURE — 82805 BLOOD GASES W/O2 SATURATION: CPT

## 2021-09-06 PROCEDURE — 2580000003 HC RX 258: Performed by: THORACIC SURGERY (CARDIOTHORACIC VASCULAR SURGERY)

## 2021-09-06 PROCEDURE — 71045 X-RAY EXAM CHEST 1 VIEW: CPT

## 2021-09-06 PROCEDURE — 2580000003 HC RX 258: Performed by: NURSE PRACTITIONER

## 2021-09-06 PROCEDURE — 87186 SC STD MICRODIL/AGAR DIL: CPT

## 2021-09-06 PROCEDURE — 87635 SARS-COV-2 COVID-19 AMP PRB: CPT

## 2021-09-06 PROCEDURE — 82330 ASSAY OF CALCIUM: CPT

## 2021-09-06 PROCEDURE — 83735 ASSAY OF MAGNESIUM: CPT

## 2021-09-06 PROCEDURE — C9113 INJ PANTOPRAZOLE SODIUM, VIA: HCPCS | Performed by: NURSE PRACTITIONER

## 2021-09-06 PROCEDURE — 37799 UNLISTED PX VASCULAR SURGERY: CPT

## 2021-09-06 PROCEDURE — 87077 CULTURE AEROBIC IDENTIFY: CPT

## 2021-09-06 PROCEDURE — 84100 ASSAY OF PHOSPHORUS: CPT

## 2021-09-06 PROCEDURE — 2500000003 HC RX 250 WO HCPCS: Performed by: INTERNAL MEDICINE

## 2021-09-06 PROCEDURE — 87040 BLOOD CULTURE FOR BACTERIA: CPT

## 2021-09-06 PROCEDURE — 87070 CULTURE OTHR SPECIMN AEROBIC: CPT

## 2021-09-06 PROCEDURE — 36415 COLL VENOUS BLD VENIPUNCTURE: CPT

## 2021-09-06 PROCEDURE — 6370000000 HC RX 637 (ALT 250 FOR IP): Performed by: NURSE PRACTITIONER

## 2021-09-06 PROCEDURE — 2000000000 HC ICU R&B

## 2021-09-06 PROCEDURE — 83605 ASSAY OF LACTIC ACID: CPT

## 2021-09-06 PROCEDURE — 6370000000 HC RX 637 (ALT 250 FOR IP): Performed by: INTERNAL MEDICINE

## 2021-09-06 PROCEDURE — 6360000002 HC RX W HCPCS: Performed by: THORACIC SURGERY (CARDIOTHORACIC VASCULAR SURGERY)

## 2021-09-06 PROCEDURE — 74018 RADEX ABDOMEN 1 VIEW: CPT

## 2021-09-06 PROCEDURE — 2500000003 HC RX 250 WO HCPCS: Performed by: THORACIC SURGERY (CARDIOTHORACIC VASCULAR SURGERY)

## 2021-09-06 PROCEDURE — 87449 NOS EACH ORGANISM AG IA: CPT

## 2021-09-06 RX ORDER — POLYETHYLENE GLYCOL 3350 17 G/17G
17 POWDER, FOR SOLUTION ORAL DAILY
Status: DISCONTINUED | OUTPATIENT
Start: 2021-09-06 | End: 2021-09-16 | Stop reason: HOSPADM

## 2021-09-06 RX ADMIN — 0.12% CHLORHEXIDINE GLUCONATE 15 ML: 1.2 RINSE ORAL at 21:04

## 2021-09-06 RX ADMIN — FENTANYL CITRATE 25 MCG: 50 INJECTION, SOLUTION INTRAMUSCULAR; INTRAVENOUS at 10:11

## 2021-09-06 RX ADMIN — ATORVASTATIN CALCIUM 10 MG: 10 TABLET, FILM COATED ORAL at 21:04

## 2021-09-06 RX ADMIN — AMIODARONE HYDROCHLORIDE 1 MG/MIN: 50 INJECTION, SOLUTION INTRAVENOUS at 21:15

## 2021-09-06 RX ADMIN — PANTOPRAZOLE SODIUM 40 MG: 40 INJECTION, POWDER, FOR SOLUTION INTRAVENOUS at 08:34

## 2021-09-06 RX ADMIN — SODIUM BICARBONATE: 84 INJECTION, SOLUTION INTRAVENOUS at 14:05

## 2021-09-06 RX ADMIN — Medication 20.05 MCG/MIN: at 21:15

## 2021-09-06 RX ADMIN — IPRATROPIUM BROMIDE AND ALBUTEROL SULFATE 1 AMPULE: .5; 2.5 SOLUTION RESPIRATORY (INHALATION) at 15:56

## 2021-09-06 RX ADMIN — SODIUM CHLORIDE, PRESERVATIVE FREE 10 ML: 5 INJECTION INTRAVENOUS at 08:35

## 2021-09-06 RX ADMIN — FENTANYL CITRATE 25 MCG: 50 INJECTION, SOLUTION INTRAMUSCULAR; INTRAVENOUS at 13:12

## 2021-09-06 RX ADMIN — INSULIN LISPRO 2 UNITS: 100 INJECTION, SOLUTION INTRAVENOUS; SUBCUTANEOUS at 16:57

## 2021-09-06 RX ADMIN — PRAMIPEXOLE DIHYDROCHLORIDE 0.5 MG: 0.25 TABLET ORAL at 21:06

## 2021-09-06 RX ADMIN — PROPOFOL 20 MCG/KG/MIN: 10 INJECTION, EMULSION INTRAVENOUS at 02:21

## 2021-09-06 RX ADMIN — SENNOSIDES 5 ML: 8.8 SYRUP ORAL at 21:07

## 2021-09-06 RX ADMIN — INSULIN LISPRO 4 UNITS: 100 INJECTION, SOLUTION INTRAVENOUS; SUBCUTANEOUS at 21:11

## 2021-09-06 RX ADMIN — MIDAZOLAM 2 MG/HR: 5 INJECTION INTRAMUSCULAR; INTRAVENOUS at 13:29

## 2021-09-06 RX ADMIN — Medication 50 MCG/HR: at 13:28

## 2021-09-06 RX ADMIN — DOCUSATE SODIUM 100 MG: 50 LIQUID ORAL at 21:04

## 2021-09-06 RX ADMIN — ENOXAPARIN SODIUM 40 MG: 40 INJECTION SUBCUTANEOUS at 08:34

## 2021-09-06 RX ADMIN — MEROPENEM 1000 MG: 1 INJECTION, POWDER, FOR SOLUTION INTRAVENOUS at 16:57

## 2021-09-06 RX ADMIN — IPRATROPIUM BROMIDE AND ALBUTEROL SULFATE 1 AMPULE: .5; 2.5 SOLUTION RESPIRATORY (INHALATION) at 19:48

## 2021-09-06 RX ADMIN — Medication 10 ML: at 21:07

## 2021-09-06 RX ADMIN — BISACODYL 10 MG: 10 SUPPOSITORY RECTAL at 08:46

## 2021-09-06 RX ADMIN — AMIODARONE HYDROCHLORIDE 1 MG/MIN: 50 INJECTION, SOLUTION INTRAVENOUS at 13:29

## 2021-09-06 RX ADMIN — BISACODYL 10 MG: 10 SUPPOSITORY RECTAL at 21:04

## 2021-09-06 RX ADMIN — POLYETHYLENE GLYCOL 3350 17 G: 17 POWDER, FOR SOLUTION ORAL at 14:05

## 2021-09-06 RX ADMIN — IPRATROPIUM BROMIDE AND ALBUTEROL SULFATE 1 AMPULE: .5; 2.5 SOLUTION RESPIRATORY (INHALATION) at 11:50

## 2021-09-06 RX ADMIN — SODIUM BICARBONATE: 84 INJECTION, SOLUTION INTRAVENOUS at 05:52

## 2021-09-06 RX ADMIN — FENTANYL CITRATE 25 MCG: 50 INJECTION, SOLUTION INTRAMUSCULAR; INTRAVENOUS at 12:05

## 2021-09-06 RX ADMIN — DOCUSATE SODIUM 100 MG: 50 LIQUID ORAL at 08:34

## 2021-09-06 RX ADMIN — VASOPRESSIN 0.04 UNITS/MIN: 20 INJECTION INTRAVENOUS at 18:09

## 2021-09-06 RX ADMIN — AMIODARONE HYDROCHLORIDE 400 MG: 200 TABLET ORAL at 08:35

## 2021-09-06 RX ADMIN — ASPIRIN 81 MG CHEWABLE TABLET 81 MG: 81 TABLET CHEWABLE at 08:35

## 2021-09-06 RX ADMIN — 0.12% CHLORHEXIDINE GLUCONATE 15 ML: 1.2 RINSE ORAL at 08:34

## 2021-09-06 RX ADMIN — IPRATROPIUM BROMIDE AND ALBUTEROL SULFATE 1 AMPULE: .5; 2.5 SOLUTION RESPIRATORY (INHALATION) at 08:53

## 2021-09-06 ASSESSMENT — PULMONARY FUNCTION TESTS
PIF_VALUE: 29
PIF_VALUE: 35
PIF_VALUE: 30
PIF_VALUE: 30
PIF_VALUE: 29
PIF_VALUE: 28
PIF_VALUE: 29
PIF_VALUE: 30
PIF_VALUE: 29
PIF_VALUE: 28
PIF_VALUE: 30
PIF_VALUE: 29
PIF_VALUE: 29
PIF_VALUE: 31
PIF_VALUE: 30
PIF_VALUE: 29
PIF_VALUE: 27
PIF_VALUE: 28
PIF_VALUE: 26
PIF_VALUE: 28
PIF_VALUE: 39
PIF_VALUE: 31
PIF_VALUE: 30
PIF_VALUE: 28
PIF_VALUE: 29
PIF_VALUE: 30
PIF_VALUE: 27
PIF_VALUE: 29
PIF_VALUE: 31
PIF_VALUE: 26
PIF_VALUE: 28
PIF_VALUE: 42
PIF_VALUE: 27
PIF_VALUE: 29
PIF_VALUE: 34
PIF_VALUE: 28
PIF_VALUE: 28

## 2021-09-06 NOTE — PROCEDURES
Rosa Reyes is a 80 y.o. female patient. 1. Pre-operative laboratory examination      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[facility] 01/2019    Anemia     Arm numbness     Arthritis     Blood transfusion     Cervical spinal stenosis     Cirrhosis of liver (HCC)     Diabetes mellitus (HCC)     Fatty liver     GERD (gastroesophageal reflux disease)     Hyperlipidemia     Hypertension     Low back pain     Lumbar stenosis     Neck pain     Obesity (BMI 30.0-34.9) 10/14/2012    PAF (paroxysmal atrial fibrillation) (HCC)     Renal artery aneurysm ([facility] 7/15/2015     Blood pressure (!) 124/57, pulse 54, temperature 97.3 °F (36.3 °C), temperature source Axillary, resp. rate (!) 0, height 5' 2\" (1.575 m), weight 203 lb 7.8 oz (92.3 kg), SpO2 100 %, not currently breastfeeding. Insert Arterial Line    Date/Time: 9/6/2021 12:22 AM  Performed by: Kesha James MD  Authorized by: Kesha James MD   Consent: Written consent obtained.   Consent given by: power of   Required items: required blood products, implants, devices, and special equipment available  Patient identity confirmed: anonymous protocol, patient vented/unresponsive  Indications: multiple ABGs, respiratory failure and hemodynamic monitoring  Location: right radial  Needle gauge: 20  Seldinger technique: Seldinger technique used  Number of attempts: 1  Post-procedure: line sutured and dressing applied  Post-procedure CMS: unchanged  Patient tolerance: patient tolerated the procedure well with no immediate complications          Kesha James MD  9/6/2021

## 2021-09-06 NOTE — PROGRESS NOTES
The ett was withdrawn by 3 cm from 24 cm to 21 cm. The breathe sounds are clear upper lobes and diminished and clear in the bases.

## 2021-09-06 NOTE — CONSULTS
fibrillation) ([facility]     Renal artery aneurysm ([facility] [facility] 7/15/2015       Current Facility-Administered Medications   Medication Dose Route Frequency Provider Last Rate Last Admin    sodium bicarbonate 75 mEq in sodium chloride 0.45 % 1,000 mL infusion   IntraVENous Continuous Catie Spencer  mL/hr at 09/06/21 1023 Rate Verify at 09/06/21 1023    chlorhexidine (PERIDEX) 0.12 % solution 15 mL  15 mL Mouth/Throat BID Rhenda Moynahan Toolson, DO   15 mL at 09/06/21 0834    fentaNYL (SUBLIMAZE) injection 25 mcg  25 mcg IntraVENous Q1H PRN Rhenda Moynahan Toolson, DO   25 mcg at 09/06/21 1205    propofol injection  5-80 mcg/kg/min IntraVENous Continuous Antonio G Toolson, DO 5.5 mL/hr at 09/06/21 1106 10 mcg/kg/min at 09/06/21 1106    albumin human 25 % IV solution 25 g  25 g IntraVENous Once Catie Spencer MD        norepinephrine (LEVOPHED) 16 mg in dextrose 5% 250 mL infusion  2-100 mcg/min IntraVENous Continuous Natalee Anon, DO 11.3 mL/hr at 09/06/21 1107 12 mcg/min at 09/06/21 1107    aspirin chewable tablet 81 mg  81 mg Oral Daily Nandini Edmond, APRN - CNP   81 mg at 09/06/21 0835    pantoprazole (PROTONIX) injection 40 mg  40 mg IntraVENous Daily Nandini Edmond, APRN - CNP   40 mg at 09/06/21 0834    And    sodium chloride (PF) 0.9 % injection 10 mL  10 mL IntraVENous Daily Nandini Edmond, APRN - CNP   10 mL at 09/06/21 0835    docusate sodium (COLACE) 150 MG/15ML liquid 100 mg  100 mg Oral BID Jenene All, APRN - CNP   100 mg at 09/06/21 0834    sennosides (SENOKOT) 8.8 MG/5ML syrup 5 mL  5 mL Oral Nightly Nandini Edmond, APRN - CNP   5 mL at 09/05/21 2341    oxyCODONE (ROXICODONE) immediate release tablet 5 mg  5 mg Oral Q8H PRN Jenene All, APRN - CNP   5 mg at 09/04/21 2042    benzocaine-menthol (CEPACOL SORE THROAT) lozenge 1 lozenge  1 lozenge Oral Q2H PRN Natalee Bobo DO   1 lozenge at 09/03/21 0926    0.9 % sodium chloride infusion   IntraVENous PRN Shannon Barahona APRN - CNP        bumetanide (BUMEX) injection 1 mg  1 mg IntraVENous Once [place] Islander TEMO Little - ANCA        amiodarone (CORDARONE) tablet 400 mg  400 mg Oral BID [place] Island[place] TEMO Little CNP   400 mg at 09/06/21 5314    sodium chloride flush 0.9 % injection 5-40 mL  5-40 mL IntraVENous 2 times per day TEMO Alexander - CNP   10 mL at 09/05/21 0947    sodium chloride flush 0.9 % injection 5-40 mL  5-40 mL IntraVENous PRN Nandini Pruitt APRN - CNP   10 mL at 08/31/21 1659    0.9 % sodium chloride infusion  25 mL IntraVENous PRN TEMO Cronin - CNP        heparin flush 100 UNIT/ML injection 300 Units  3 mL IntraVENous 2 times per day TEMO Alexander - CNP   300 Units at 09/05/21 0900    heparin flush 100 UNIT/ML injection 300 Units  3 mL IntraCATHeter PRN TEMO Alexander - CNP   300 Units at 09/03/21 1452    bisacodyl (DULCOLAX) suppository 10 mg  10 mg Rectal BID Gia Clifford APRN - CNP   10 mg at 09/06/21 0846    insulin lispro (HUMALOG) injection vial 0-12 Units  0-12 Units SubCUTAneous 4x Daily AC & HS TEMO Cronin - CNP   2 Units at 09/05/21 0858    LORazepam (ATIVAN) injection 0.5 mg  0.5 mg IntraVENous Q4H PRN TEMO Alexander - CNP   0.5 mg at 09/05/21 0900    enoxaparin (LOVENOX) injection 40 mg  40 mg SubCUTAneous Daily TEMO Cronin - CNP   40 mg at 09/06/21 0834    metoprolol tartrate (LOPRESSOR) tablet 12.5 mg  12.5 mg Oral BID Gia Clifford APRN - CNP   12.5 mg at 09/05/21 0859    0.9 % sodium chloride infusion   IntraVENous PRN Gia Clifford APRN - CNP        atorvastatin (LIPITOR) tablet 10 mg  10 mg Oral Nightly Edwin Morillo DO   10 mg at 09/05/21 2336    pramipexole (MIRAPEX) tablet 0.5 mg  0.5 mg Oral TID Edwin Morillo    0.5 mg at 09/05/21 2337    0.9 % sodium chloride infusion  30 mL/hr IntraVENous Continuous Edwin Morillo DO 30 mL/hr at 08/31/21 0946 30 mL/hr at 08/31/21 0946    ondansetron (ZOFRAN) injection 4 mg  4 mg IntraVENous Q8H PRN Edwin Morillo, DO   4 mg at 09/04/21 7589    acetaminophen (TYLENOL) tablet 650 mg  650 mg Oral Q4H PRN Jessica Limerick, DO   650 mg at 09/04/21 6352    acetaminophen (TYLENOL) suppository 650 mg  650 mg Rectal Q4H PRN Jessica Limerick, DO        magnesium oxide (MAG-OX) tablet 400 mg  400 mg Oral Daily Jessica Limerick, DO   400 mg at 09/03/21 0905    magnesium hydroxide (MILK OF MAGNESIA) 400 MG/5ML suspension 30 mL  30 mL Oral Daily PRN Jessica Limerick, DO        potassium chloride 20 mEq/50 mL IVPB (Central Line)  20 mEq IntraVENous PRN Jessica Limerick, DO   Stopped at 09/01/21 3339    magnesium sulfate 2000 mg in 50 mL IVPB premix  2,000 mg IntraVENous PRN Jessica Limerick, DO   Stopped at 08/26/21 0745    ipratropium-albuterol (DUONEB) nebulizer solution 1 ampule  1 ampule Inhalation Q4H WA Jessica Limerick, DO   1 ampule at 09/06/21 1150    albumin human 5 % IV solution 25 g  25 g IntraVENous PRN Jessica Limerick, DO   Stopped at 09/05/21 2220    0.9 % sodium chloride bolus  250 mL IntraVENous Continuous PRN Saeid Brillion Jubach, DO        glucose (GLUTOSE) 40 % oral gel 15 g  15 g Oral PRN Jessica Limerick, DO        dextrose 50 % IV solution  12.5 g IntraVENous PRN Jessica Limerick, DO        glucagon (rDNA) injection 1 mg  1 mg IntraMUSCular PRN Saeid Emigdio Jubach, DO        dextrose 5 % solution  100 mL/hr IntraVENous PRN Jessica Limerick, DO        calcium gluconate 1,000 mg in dextrose 5 % 100 mL IVPB  1,000 mg IntraVENous PRN Jessica Limerick, DO   Stopped at 08/30/21 1836       Allergies   Allergen Reactions    Iodine Shortness Of Breath     SOB, Rash    Benadryl [Diphenhydramine]      hyper    Fish-Derived Products Rash       Surgical History  Past Surgical History:   Procedure Laterality Date    APPENDECTOMY      BACK SURGERY      BREAST SURGERY      reduction    BRONCHOSCOPY N/A 9/1/2021    BRONCHOSCOPY DIAGNOSTIC OR CELL [address] ONLY performed by Toney Mack MD at [facility]  06/28/2021    DR Hooks [facility] CARDIOVERSION  2021    CARPAL TUNNEL RELEASE      CATARACT REMOVAL  10/2016    CERVICAL DISCECTOMY  2007    ACDF C3-4, C4-5, C5-6 Dr. Melissa Smith N/A 2021    CABG ROGELIO performed by Zaid Salgado DO at [facility]      left lower leg    HEMICOLECTOMY N/A 2019    DIAGNOSTIC LAPAROSCOPY, LYSIS OF ADHESIONS, OPEN RIGHT HEMICOLECTOMY performed by Nayeli Suarez MD at [address] [place] [address]  2017    [facility].  [facility] TRANSESOPHAGEAL ECHOCARDIOGRAM  2021    UPPER GASTROINTESTINAL ENDOSCOPY N/A 2018    EGD BIOPSY performed by Wai Merlos MD at [address] N/A 2020    EGD BIOPSY performed by Cole Mukherjee MD at [facility]  2020    EGD CONTROL HEMORRHAGE performed by Cole Mukherjee MD at [address] History     Socioeconomic History    Marital status:    Tobacco Use    Smoking status: Former Smoker     Packs/day: 2.00     Years: 15.00     Pack years: 30.00     Quit date: 3/1/1997     Years since quittin.5    Smokeless tobacco: Never Used   Vaping Use    Vaping Use: Never used   Substance and Sexual Activity    Alcohol use: Not Currently    Drug use: No     Family Medical History  Family History   Problem Relation Age of Onset    Diabetes Mother     Stroke Father     Diabetes Sister     High Blood Pressure Sister     Diabetes Brother     High Blood Pressure Brother     Cancer Brother     Heart Disease Brother        Review of Systems:  Unable to obtain due to patient intubated and sedated    Physical Examination:  Vitals:    21 1030 21 1100 21 1130 21 1150   BP:       Pulse: 69 70 73 76   Resp:  17 17    Temp:  97 °F (36.1 °C)     TempSrc:  Esophageal     SpO2: 97% 95% 94% 94%   Weight:       Height: Constitutional: Intubated, no MV dyssynchrony  Eyes: Sclerae anicteric, no conjunctival erythema  ENT: No buccal lesion, no pharyngeal exudates  Neck: No nuchal rigidity, no cervical adenopathy  Lungs: Clear breath sounds, no crackles, no wheezes  Heart: Regular rate and rhythm, no murmurs  Abdomen: Bowel sounds present, soft, nontender  Skin: Warm and dry, no active dermatoses  Musculoskeletal: No joint erythema, no joint swelling    Labs, imaging, and medical records/notes were personally reviewed. Assessment:  Shock, presumed septic, etiology unclear, suspect HAP  Acute hypoxic respiratory failure  CAD s/p CABG on 08/25  BROOKE on CKD    Plan:  Start meropenem 1g q12h dosed according to renal function to a target dose of 1g q8h for CrCl of at least 30 mL/min. Follow up blood and respiratory cultures. Check respiratory pathogen PCR panel, procalcitonin. Thank you for involving me in the care of Tamela Escalera. I will continue to follow. Please do not hesitate to call for any questions or concerns.       Electronically signed by Pal Perez MD on 9/6/2021 at 12:16 PM

## 2021-09-06 NOTE — FLOWSHEET NOTE
Notified by radiology that ETT is in right  mainstream bronchus and needs retracted by approx. 4 cm. This was completed by RRT and chest x-ray ordered to confirm placement.

## 2021-09-06 NOTE — PROGRESS NOTES
[facility]  Nephrology Attending Progress Note  Ciro Covarrubias. Krista Anton MD        SUBJECTIVE:     9/3/21: Dwight Stauffer is a 80 y.o. female with h/o HFpEF, refractory A fib  s/p DCCV X 2, hypertension, hyperlipidemia and other medical problems listed below. She presented for elective CABG on 8/25 following ischemic work-up and LHC showing  MV CAD. 6/8/21. Procedure was performed on 8/25. Her postop course has been complicated by acute respiratory failure, atrial fibrillation with RVR and intermittent fevers. She has required Zosyn. Preop creatinine has been 1-1.2 which is her recent baseline. In the last 48 hours creatinine has shown a progressive increase to currently 1.8 mg/dL associated with decreasing urine output. She has received albumin bolus but without any significant improvement. Hence renal consult.     9/4/21: pt seen in icu, has low urine output, just received iv albumin    9/5/21: seen in icu, started on ns at 76 yesterday. Remains with low uo.      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)    Chronic heart failure with preserved ejection fraction (HCC)    Atrial fibrillation with RVR (HCC)    Severe protein-calorie malnutrition (HCC)    Abnormal nuclear stress test    Opioid use, unspecified with unspecified opioid-induced disorder    Opioid dependence with unspecified opioid-induced disorder    Opioid dependence, uncomplicated    CAD in native artery    Pre-operative laboratory examination        PAST MEDICAL HISTORY:    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[facility] 01/2019    Anemia     Arm numbness     Arthritis     Blood transfusion     Cervical spinal stenosis     Cirrhosis of liver (HCC)     Diabetes mellitus (HCC)     Fatty liver     GERD (gastroesophageal reflux disease)     Hyperlipidemia     Hypertension     Low back pain     Lumbar stenosis     Neck pain     Obesity (BMI 30.0-34.9) 10/14/2012    PAF (paroxysmal atrial fibrillation) (HCC)     Renal artery aneurysm ([facility] 7/15/2015       DIET:    ADULT TUBE FEEDING; Nasogastric; Diabetic; Continuous; 10; Yes; 10; Q 4 hours; 40; 30; Q 4 hours  ADULT DIET;  Regular; Low Fat/Low Chol/High Fiber/GAGE     PHYSICAL EXAM:     Patient Vitals for the past 24 hrs:   BP Temp Temp src Pulse Resp SpO2 Weight   09/06/21 1000 -- 95.9 °F (35.5 °C) Esophageal 67 16 99 % --   09/06/21 0930 -- -- -- 63 -- 99 % --   09/06/21 0910 -- -- -- 59 -- 100 % --   09/06/21 0900 -- 95.2 °F (35.1 °C) Esophageal 56 15 100 % --   09/06/21 0856 -- -- -- -- -- 100 % --   09/06/21 0855 -- -- -- 57 -- 99 % --   09/06/21 0830 -- -- -- 57 -- 100 % --   09/06/21 0800 -- 95.4 °F (35.2 °C) Esophageal 54 14 100 % --   09/06/21 0746 -- -- -- 54 -- -- --   09/06/21 0700 -- -- -- -- 14 -- --   09/06/21 0600 -- -- -- 54 14 100 % --   09/06/21 0533 -- -- -- -- -- -- 206 lb 2.1 oz (93.5 kg)   09/06/21 0500 -- -- -- 52 14 100 % --   09/06/21 0446 -- -- -- 52 -- 100 % --   09/06/21 0400 -- 97.1 °F (36.2 °C) Axillary 52 14 100 % --   09/06/21 0300 -- -- -- 53 14 100 % --   09/06/21 0200 -- -- -- 53 14 100 % --   09/06/21 0100 -- -- -- 53 14 100 % --   09/06/21 0000 -- 97.3 °F (36.3 °C) Axillary 54 14 100 % --   09/05/21 2349 -- -- -- 55 -- 100 % --   09/05/21 2300 -- -- -- 54 14 100 % --   09/05/21 2235 -- -- -- 54 -- 99 % --   09/05/21 2220 -- -- -- 55 -- 100 % --   09/05/21 2212 -- -- -- 55 -- 99 % --   09/05/21 2200 -- -- -- 55 14 99 % -- 09/05/21 2150 -- -- -- 55 (!) 0 100 % --   09/05/21 2130 (!) 124/57 -- -- 55 10 99 % --   09/05/21 2100 (!) 81/46 -- -- 54 14 100 % --   09/05/21 2039 -- -- -- 54 (!) 4 100 % --   09/05/21 2030 (!) 80/46 -- -- 55 17 100 % --   09/05/21 2000 (!) 81/40 97.3 °F (36.3 °C) Axillary 54 14 100 % --   09/05/21 1924 -- -- -- 53 -- 100 % --   09/05/21 1900 (!) 96/47 -- -- 54 15 100 % --   09/05/21 1858 -- -- -- 53 24 100 % --   09/05/21 1830 (!) 99/48 -- -- 54 -- 100 % --   09/05/21 1800 (!) 104/57 -- -- 55 16 100 % --   09/05/21 1730 -- -- -- -- 19 96 % --   09/05/21 1700 (!) 102/55 -- -- 56 14 100 % --   09/05/21 1607 -- -- -- -- 20 99 % --   09/05/21 1600 (!) 112/51 97.5 °F (36.4 °C) Axillary 54 13 100 % --   09/05/21 1552 -- -- -- -- 20 99 % --   09/05/21 1500 (!) 107/55 -- -- 55 14 98 % --   09/05/21 1400 102/60 -- -- 54 16 97 % --   09/05/21 1300 (!) 109/56 -- -- 54 13 98 % --   09/05/21 1202 -- -- -- -- 13 98 % --   09/05/21 1200 105/61 97.8 °F (36.6 °C) Axillary 54 17 98 % --   09/05/21 1100 (!) 101/59 -- -- 56 14 100 % --   @      Intake/Output Summary (Last 24 hours) at 9/6/2021 1024  Last data filed at 9/6/2021 1000  Gross per 24 hour   Intake 2589 ml   Output 345 ml   Net 2244 ml         Wt Readings from Last 3 Encounters:   09/06/21 206 lb 2.1 oz (93.5 kg)   08/23/21 170 lb (77.1 kg)   07/16/21 175 lb (79.4 kg)       Constitutional:  Pt is in no acute distress  Head: normocephalic, atraumatic  Neck: no JVD  Cardiovascular: regular rate and rhythm, no murmurs, gallops, or rubs  Respiratory:  No rales, rhochi, or wheezes  Gastrointestinal:  Soft, nontender, nondistended, bowel sounds x 4  Ext: no edema  Skin: dry, no rash      MEDS (scheduled):    chlorhexidine  15 mL Mouth/Throat BID    albumin human  25 g IntraVENous Once    aspirin  81 mg Oral Daily    pantoprazole  40 mg IntraVENous Daily    And    sodium chloride (PF)  10 mL IntraVENous Daily    docusate sodium  100 mg Oral BID    sennosides  5 mL Oral Nightly    bumetanide  1 mg IntraVENous Once    amiodarone  400 mg Oral BID    sodium chloride flush  5-40 mL IntraVENous 2 times per day    heparin flush  3 mL IntraVENous 2 times per day    bisacodyl  10 mg Rectal BID    insulin lispro  0-12 Units SubCUTAneous 4x Daily AC & HS    enoxaparin  40 mg SubCUTAneous Daily    metoprolol tartrate  12.5 mg Oral BID    atorvastatin  10 mg Oral Nightly    pramipexole  0.5 mg Oral TID    magnesium oxide  400 mg Oral Daily    ipratropium-albuterol  1 ampule Inhalation Q4H WA       MEDS (infusions):   sodium bicarbonate infusion 100 mL/hr at 09/06/21 1023    propofol 10 mcg/kg/min (09/06/21 1023)    norepinephrine 9 mcg/min (09/06/21 1022)    sodium chloride 100 mL/hr at 09/05/21 1152    sodium chloride      sodium chloride      sodium chloride      sodium chloride 30 mL/hr (08/31/21 0946)    sodium chloride      dextrose         MEDS (prn):  fentanNYL, oxyCODONE **OR** [DISCONTINUED] oxyCODONE, benzocaine-menthol, sodium chloride, sodium chloride flush, sodium chloride, heparin flush, LORazepam, sodium chloride, ondansetron, acetaminophen, acetaminophen, magnesium hydroxide, potassium chloride, magnesium sulfate, albumin human, sodium chloride, glucose, dextrose, glucagon (rDNA), dextrose, calcium gluconate IVPB    DATA:    Recent Labs     09/04/21  0450 09/05/21  0500 09/06/21  0500   WBC 15.5* 16.9* 17.2*   HGB 8.2* 7.9* 8.1*   HCT 27.4* 27.0* 26.7*   MCV 89.0 91.5 87.5    273 310     Recent Labs     09/04/21  0450 09/04/21  0450 09/05/21  0500 09/05/21  1805 09/06/21  0500      < > 132 132 131*   K 4.5   < > 4.8 5.0 4.4   CL 97*   < > 98 97* 96*   CO2 22   < > 19* 18* 18*   BUN 69*   < > 76* 83* 87*   CREATININE 1.9*   < > 2.5* 2.7* 2.8*   LABGLOM 25   < > 18 17 16   GLUCOSE 138*   < > 132* 112* 105*   CALCIUM 9.2   < > 8.8 8.5* 8.7   ALT  --   --   --  118*  --    AST  --   --   --  44*  --    BILITOT  --   --   --  1.9*  -- ALKPHOS  --   --   --  84  --    MG 2.9*  --  2.9*  --  2.8*    < > = values in this interval not displayed. Lab Results   Component Value Date    LABALBU 4.3 09/05/2021    LABALBU 4.3 08/23/2021    LABALBU 4.3 07/16/2021     Lab Results   Component Value Date    TSH 2.250 06/21/2021       Iron Studies  No results found for: IRON, TIBC, FERRITIN  No results found for: XOUXIVSC32  No results found for: FOLATE    No results found for: VITD25  No results found for: PTH    No components found for: URIC    Lab Results   Component Value Date    COLORU Yellow 08/28/2021    LABPH 0 10/22/2019    NITRU Negative 08/28/2021    GLUCOSEU Negative 08/28/2021    KETUA TRACE 08/28/2021    UROBILINOGEN 0.2 08/28/2021    BILIRUBINUR Negative 08/28/2021       No results found for: [facility] Sick      IMPRESSION/RECOMMENDATIONS:      1. Acute kidney injury stage I superimposed on CKD stage IIIa  BROOKE prerenal  FEurea 16% and FENa .04%, volume depletion  Receiving albumin  Cr 1.1>1.9>2.5>2.8  Given albumin and ns bolus without much increase in uo  No response to lasix  pola no hydro  Continue ivf  Consider dialysis if no imoprovement     2. CAD s/p CABG X 3 . 8/25     3. Acute postop respiratory failure  supplemental oxygen  bronchodilators  Pulm following  Asp pna  Worsening wbc and hypotension  Id to see     4. A fib RVR  currently controlled     5. Hypotension  On levo  Septic picture  Id consult    6. Metabolic acidosis  On hco3 drip  pancx  Septic picture as well as arf    7. Hyponatremia  Continue ivf  Check tsh cortisol    Lonnie Velez.  Ana Jaeger MD

## 2021-09-06 NOTE — PROGRESS NOTES
Reintubated   Levo @ 3 overnight   Worsening renal function  LFTs and tbili elevated    Pulmonology following  WBC 17, MSOF, septic picture - will ask ID to see her as well   Will need trach at some point     Updated her  on her status

## 2021-09-06 NOTE — PROGRESS NOTES
Agustín Campos M.D. Wayne Ramos M.D. Olive Ink, M.D. Jed Cherry, D.O. Daily Pulmonary Progress Note    Patient:  Travis Manzano 80 y.o. female MRN: [id number]     Date of Service: 9/5/2021        Subjective      Patient was seen and examined. Patient with resp distress today. More lethargic. Increased FiO2 requirements.     Objective   Vitals: BP (!) 124/57   Pulse 54   Temp 97.3 °F (36.3 °C) (Axillary)   Resp (!) 0   Ht 5' 2\" (1.575 m)   Wt 203 lb 7.8 oz (92.3 kg)   LMP  (LMP Unknown)   SpO2 99%   BMI 37.22 kg/m²     I/O:    Intake/Output Summary (Last 24 hours) at 9/5/2021 2304  Last data filed at 9/5/2021 2200  Gross per 24 hour   Intake 2257 ml   Output 255 ml   Net 2002 ml       CURRENT MEDS :  Scheduled Meds:   chlorhexidine  15 mL Mouth/Throat BID    albumin human  25 g IntraVENous Once    aspirin  81 mg Oral Daily    pantoprazole  40 mg IntraVENous Daily    And    sodium chloride (PF)  10 mL IntraVENous Daily    docusate sodium  100 mg Oral BID    sennosides  5 mL Oral Nightly    bumetanide  1 mg IntraVENous Once    amiodarone  400 mg Oral BID    sodium chloride flush  5-40 mL IntraVENous 2 times per day    heparin flush  3 mL IntraVENous 2 times per day    bisacodyl  10 mg Rectal BID    insulin lispro  0-12 Units SubCUTAneous 4x Daily AC & HS    enoxaparin  40 mg SubCUTAneous Daily    metoprolol tartrate  12.5 mg Oral BID    atorvastatin  10 mg Oral Nightly    pramipexole  0.5 mg Oral TID    magnesium oxide  400 mg Oral Daily    ipratropium-albuterol  1 ampule Inhalation Q4H WA       Continuous Infusions:   sodium bicarbonate infusion 100 mL/hr at 09/05/21 1902    propofol 20 mcg/kg/min (09/05/21 2000)    norepinephrine 3 mcg/min (09/05/21 2220)    sodium chloride 100 mL/hr at 09/05/21 1152    sodium chloride      sodium chloride      sodium chloride      sodium chloride 30 mL/hr (08/31/21 0946)    sodium chloride  dextrose         PRN Meds:  fentanNYL, oxyCODONE **OR** [DISCONTINUED] oxyCODONE, benzocaine-menthol, sodium chloride, sodium chloride flush, sodium chloride, heparin flush, LORazepam, sodium chloride, ondansetron, acetaminophen, acetaminophen, magnesium hydroxide, potassium chloride, magnesium sulfate, albumin human, sodium chloride, glucose, dextrose, glucagon (rDNA), dextrose, calcium gluconate IVPB      Physical Exam:  Physical Exam  Constitutional:       Appearance: She is ill-appearing. HENT:      Head: Normocephalic and atraumatic. Eyes:      Conjunctiva/sclera: Conjunctivae normal.   Neck:      Trachea: No tracheal deviation. Cardiovascular:      Rate and Rhythm: Normal rate and regular rhythm. Heart sounds: Normal heart sounds. Pulmonary:      Breath sounds: Decreased breath sounds and rhonchi present. Abdominal:      General: Bowel sounds are normal.      Palpations: Abdomen is soft. Tenderness: There is no abdominal tenderness. Musculoskeletal:         General: Swelling present. Cervical back: Neck supple. Lymphadenopathy:      Cervical: No cervical adenopathy. Skin:     General: Skin is warm and dry. Findings: No rash. Neurological:      General: No focal deficit present. Mental Status: She is lethargic. Psychiatric:         Behavior: Behavior normal.         Pertinent/ New Labs and Imaging Studies     Pulmonary Function Testing personally reviewed and interpreted. PERTINENT LAB RESULTS: Labs reviewed. DIAGNOSTICS: Pertinent imaging reviewed. Assessment:      1. Post operative respiratory insufficiency with hypoxia  2. Bilateral atelectasis/mucus plugging of bronchi s/p bronchoscopy 9/1/21  3. Pleural effusions - small on bedside US  4. CAD s/p CABG x 3 8/25/21  5. Atrial fibrillation with RVR   6. Anxiety  7. Aspiration pneumonia s/p treatment with Zosyn  8.  BROOKE secondary to intravascular volume depletion      Plan:      Plan for intubation and mechanical ventilation   ABG and vent adjustment    Continue aggressive BP hygiene, possible need for repeat bronch   Check resp cultures   Completed course of abx 7 days with Zosyn   May need trach   No significant fluid for thoracentesis   GI/DVT      Thank you for allowing me to participate in the care of [address] [place] [address]. Please feel free to call with questions.      Electronically signed by Wendy Lopez DO on 9/5/2021 at 11:04 PM

## 2021-09-06 NOTE — PLAN OF CARE
Problem: Falls - Risk of:  Goal: Will remain free from falls  Description: Will remain free from falls  9/6/2021 0748 by Farhad Rosa RN  Outcome: Met This Shift     Problem: Cardiac Output - Decreased:  Goal: Cardiac output within specified parameters  Description: Cardiac output within specified parameters  9/6/2021 0748 by Farhad Rosa RN  Outcome: Met This Shift     Problem: Fluid Volume - Imbalance:  Goal: Ability to achieve a balanced intake and output will improve  Description: Ability to achieve a balanced intake and output will improve  9/6/2021 0748 by Farhad Rosa RN  Outcome: Met This Shift     Problem: Gas Exchange - Impaired:  Goal: Levels of oxygenation will improve  Description: Levels of oxygenation will improve  9/6/2021 0748 by Farhad Rosa RN  Outcome: Met This Shift     Problem: Tissue Perfusion - Cardiopulmonary, Altered:  Goal: Hemodynamic stability will improve  Description: Hemodynamic stability will improve  9/6/2021 0748 by Farhad Rosa RN  Outcome: Met This Shift

## 2021-09-06 NOTE — PROGRESS NOTES
Alex Piedra M.D.,[facility]  Dior Latham D.O., FITALIAOAJAY., Susanna Santillan M.D. Saúl Wynn M.D. Katiuska Gan D.O. Daily Pulmonary Progress Note    Patient:  Laura Lerma 80 y.o. female MRN: [id number]     Date of Service: 9/6/2021      Synopsis     We are following patient for acute on chronic hypoxic respiratory failure      Code status: Full code      Subjective      Patient was seen and examined. He was reintubated last night. Currently at bedside has high peak pressures most likely from patient biting the ET tube and also breathing significantly above the vent. She is also on sled now increase up to 18 mcg/min and bicarb drip.       Review of Systems:  Unable to obtain    24-hour events:  Got intubated overnight    Objective   Vitals: BP (!) 124/57   Pulse 75   Temp 97.7 °F (36.5 °C) (Esophageal)   Resp 14   Ht 5' 2\" (1.575 m)   Wt 206 lb 2.1 oz (93.5 kg)   LMP  (LMP Unknown)   SpO2 97%   BMI 37.70 kg/m²     I/O:    Intake/Output Summary (Last 24 hours) at 9/6/2021 1529  Last data filed at 9/6/2021 1200  Gross per 24 hour   Intake 1314 ml   Output 375 ml   Net 939 ml       Vent Information  $Ventilation: $Subsequent Day  Skin Assessment: Clean, dry, & intact  Suction Catheter Diameter: 14  Equipment ID: [id number]  Equipment Changed: (S) Humidification  Vent Type: 980  Vent Mode: AC/VC  Vt Ordered: 450 mL  Rate Set: 14 bmp  Peak Flow: 0 L/min  Pressure Support: 0 cmH20  FiO2 : 40 %  SpO2: 97 %  SpO2/FiO2 ratio: 242.5  Sensitivity: 3  PEEP/CPAP: 5  I Time/ I Time %: 0.8 s  Humidification Source: Heated wire  Humidification Temp: 37  Humidification Temp Measured: 36.9  Circuit Condensation: Drained  Mask Type: Full face mask  Mask Size: Medium  Bonnet size: Medium       IPAP: 16 cmH20  CPAP/EPAP: 8 cmH2O     CURRENT MEDS :  Scheduled Meds:   meropenem  1,000 mg IntraVENous Q12H    polyethylene glycol  17 g Oral Daily    chlorhexidine  15 mL Mouth/Throat BID    albumin human 25 g IntraVENous Once    aspirin  81 mg Oral Daily    pantoprazole  40 mg IntraVENous Daily    And    sodium chloride (PF)  10 mL IntraVENous Daily    docusate sodium  100 mg Oral BID    sennosides  5 mL Oral Nightly    amiodarone  400 mg Oral BID    sodium chloride flush  5-40 mL IntraVENous 2 times per day    heparin flush  3 mL IntraVENous 2 times per day    bisacodyl  10 mg Rectal BID    insulin lispro  0-12 Units SubCUTAneous 4x Daily AC & HS    enoxaparin  40 mg SubCUTAneous Daily    metoprolol tartrate  12.5 mg Oral BID    atorvastatin  10 mg Oral Nightly    pramipexole  0.5 mg Oral TID    magnesium oxide  400 mg Oral Daily    ipratropium-albuterol  1 ampule Inhalation Q4H WA       Physical Exam:  General Appearance: Intubated, currently appears restless  HEENT: Normocephalic atraumatic without obvious abnormality   Neck: Lips, mucosa, and tongue normal.  Supple, symmetrical, trachea midline, no adenopathy;thyroid:  no enlargement/tenderness/nodules or JVD. Lung: Diffuse bilateral lung   heart: RRR, normal S1, S2. No MRG  Abdomen: Distended and slightly tympanic  Extremities: Pedal pulses 2+ symmetric b/l. Extremities normal, no cyanosis, clubbing, or edema. Musculokeletal: No joint swelling, no muscle tenderness. ROM normal in all joints of extremities. Neurologic: Cranial nerves II to XII grossly intact    Pertinent/ New Labs and Imaging Studies     Imaging Personally Reviewed:    Narrative   EXAMINATION:   ONE XRAY VIEW OF THE CHEST       9/6/2021 7:22 am       COMPARISON:   09/05/2021       HISTORY:   ORDERING SYSTEM PROVIDED HISTORY: resp failure   TECHNOLOGIST PROVIDED HISTORY:   Reason for exam:->resp failure   What reading provider will be dictating this exam?->CRC       FINDINGS:   EKG leads overlie the chest.  Support tubes and lines remain in place.    Bilateral parenchymal infiltrates and probable small effusions persist.   These demonstrate no definite interval change allowing for differences in   positioning.           Impression   No significant change.  Continued follow-up recommended. ECHO      Labs:  Lab Results   Component Value Date    WBC 22.8 09/06/2021    HGB 8.7 09/06/2021    HCT 28.6 09/06/2021    MCV 86.9 09/06/2021    MCH 26.4 09/06/2021    MCHC 30.4 09/06/2021    RDW 17.6 09/06/2021     09/06/2021    MPV 9.4 09/06/2021     Lab Results   Component Value Date     09/06/2021    K 4.3 09/06/2021    CL 98 09/06/2021    CO2 17 09/06/2021    BUN 86 09/06/2021    CREATININE 2.9 09/06/2021    LABALBU 4.0 09/06/2021    CALCIUM 8.8 09/06/2021    GFRAA 19 09/06/2021    LABGLOM 16 09/06/2021     Lab Results   Component Value Date    PROTIME 14.5 08/25/2021    INR 1.3 08/25/2021     No results for input(s): PROBNP in the last 72 hours. Recent Labs     09/06/21  1300   PROCAL 0.31*     This SmartLink has not been configured with any valid records. Micro:  No results for input(s): CULTRESP in the last 72 hours. No results for input(s): LABGRAM in the last 72 hours. No results for input(s): LEGUR in the last 72 hours. No results for input(s): STREPNEUMAGU in the last 72 hours. No results for input(s): LP1UAG in the last 72 hours. Assessment:    1. Post operative respiratory insufficiency with hypoxia requiring reintubation 9/5/21  2. Bilateral atelectasis/mucus plugging of bronchi s/p bronchoscopy 9/1/21  3. Pleural effusions - small on bedside US  4. CAD s/p CABG x 3 8/25/21  5. Atrial fibrillation with RVR   6. Anxiety  7. Aspiration pneumonia s/p treatment with Zosyn  8. BROOKE secondary to intravascular volume depletion  9. Leukocytosis, probable sepsis septic shock  10. Runs of nonsustained V. Tach  11. High anion gap metabolic acidosis with respiratory alkalosis      Plan:   1. Will switch sedation regimen to Versed and fentanyl given his patient hypotension and also dyssynchrony with the vent. 2. Continue full vent support.   Patient most likely will require a trach. 3. Titrate Levophed for maps above 65  4. Change bicarb drip to 3 Amp of bicarb in D5W and continue with a rate of 100 cc/h  5. Meropenem started at 1 g every 12 hours per infectious disease recommendations. Continue final culture results. 6.  Follow procalcitonin. 7. Cortisol level was ordered  8. General surgery consulted for abdominal distention elevation of LFTs. To consider obtaining a abdominal ultrasound  9. Check electrolytes and place for potassium above 4 and mag above 2  10.  Amiodarone drip per CTS surgery         Electronically signed by Syed Sparks MD on 9/6/2021 at 3:29 PM  CCT excluding procedures:33'

## 2021-09-06 NOTE — PLAN OF CARE
Problem: Non-Violent Restraints  Goal: No harm/injury to patient while restraints in use  Outcome: Met This Shift     Problem: Non-Violent Restraints  Goal: Patient's dignity will be maintained  Outcome: Met This Shift     Problem: Non-Violent Restraints  Goal: Removal from restraints as soon as assessed to be safe  Outcome: Ongoing

## 2021-09-06 NOTE — FLOWSHEET NOTE
Called for stat blood cultures. Also attempted to contact 's answering service. No answer at this time, will attempt again.

## 2021-09-06 NOTE — FLOWSHEET NOTE
Patient felt cold upon assessment, placed esophogeal temp probe reading only 35.2 degrees C. Checked temp with a temporal thermometer and it was reading 96.8. Bear hugger applied at this time. 800 MMIT notified.

## 2021-09-06 NOTE — PLAN OF CARE
Problem: Falls - Risk of:  Goal: Will remain free from falls  Outcome: Met This Shift  Goal: Absence of physical injury  Outcome: Met This Shift     Problem: Anxiety:  Goal: Level of anxiety will decrease  Outcome: Met This Shift     Problem: Cardiac Output - Decreased:  Goal: Cardiac output within specified parameters  Outcome: Met This Shift     Problem: Gas Exchange - Impaired:  Goal: Levels of oxygenation will improve  Outcome: Met This Shift     Problem: Pain:  Goal: Pain level will decrease  Outcome: Met This Shift  Goal: Control of acute pain  Outcome: Met This Shift  Goal: Control of chronic pain  Outcome: Met This Shift     Problem: Tissue Perfusion - Cardiopulmonary, Altered:  Goal: Absence of angina  Outcome: Met This Shift  Goal: Hemodynamic stability will improve  Outcome: Met This Shift  Goal: Will show no evidence of cardiac arrhythmias  Outcome: Met This Shift     Problem: Skin Integrity:  Goal: Absence of new skin breakdown  Outcome: Met This Shift     Problem: Pain:  Goal: Pain level will decrease  Outcome: Met This Shift  Goal: Control of acute pain  Outcome: Met This Shift  Goal: Control of chronic pain  Outcome: Met This Shift     Problem: Musculor/Skeletal Functional Status  Goal: Absence of falls  Outcome: Met This Shift     Problem: ABCDS Injury Assessment  Goal: Absence of physical injury  Outcome: Met This Shift     Problem: Discharge Planning:  Goal: Discharged to appropriate level of care  Outcome: Ongoing     Problem: Cardiac Output - Decreased:  Goal: Hemodynamic stability will improve  Outcome: Ongoing     Problem: Fluid Volume - Imbalance:  Goal: Ability to achieve a balanced intake and output will improve  Outcome: Ongoing     Problem: Gas Exchange - Impaired:  Goal: Ability to maintain adequate ventilation will improve  Outcome: Ongoing     Problem: Musculor/Skeletal Functional Status  Goal: Highest potential functional level  Outcome: Ongoing     Problem:  Activity Intolerance:  Goal: Able to perform prescribed physical activity  Outcome: Not Met This Shift  Goal: Ability to tolerate increased activity will improve  Outcome: Not Met This Shift     Problem: Skin Integrity:  Goal: Will show no infection signs and symptoms  Outcome: Not Met This Shift     Problem: Fluid Volume - Imbalance:  Goal: Chest tube drainage is within specified parameters  Outcome: Completed     Problem: Non-Violent Restraints  Goal: Removal from restraints as soon as assessed to be safe  Outcome: Completed  Goal: No harm/injury to patient while restraints in use  Outcome: Completed  Goal: Patient's dignity will be maintained  Outcome: Completed

## 2021-09-06 NOTE — FLOWSHEET NOTE
Spoke to the lab supervisor about stat blood cultures. They claim to be backed up and will call back.

## 2021-09-06 NOTE — CONSULTS
GENERAL SURGERY  CONSULT NOTE  9/6/2021    Physician Consulted: Dr. Ana Walters  Reason for Consult: abdominal distention   Referring Physician: Dr. Mlii Chung    HPI  Tarun Fried is a 80 y.o. female with history as below who presented for CABG on 8/25. Her course has been complicated by BROOKE and respiratory failure requiring re-intubation. Wbc count is also rising and she is requiring levophed. According to nursing staff her abdomen appears to be more distended over the last few days. Her last BM was 9/4. KUB was obtained that showed no evidence for bowel obstruction. She does have a history of cirrhosis with moderate ascites on her last CT scan of her abdomen. LFTs are slightly elevated. On exam she is mildly distended but soft and non-tympanic to percussion.        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[address] 01/2019    Anemia     Arm numbness     Arthritis     Blood transfusion     Cervical spinal stenosis     Cirrhosis of liver (HCC)     Diabetes mellitus (HCC)     Fatty liver     GERD (gastroesophageal reflux disease)     Hyperlipidemia     Hypertension     Low back pain     Lumbar stenosis     Neck pain     Obesity (BMI 30.0-34.9) 10/14/2012    PAF (paroxysmal atrial fibrillation) (HCC)     Renal artery aneurysm ([facility] [address] 7/15/2015       Past Surgical History:   Procedure Laterality Date    APPENDECTOMY      BACK SURGERY      BREAST SURGERY      reduction    BRONCHOSCOPY N/A 9/1/2021    BRONCHOSCOPY DIAGNOSTIC OR CELL [address] ONLY performed by Mark Ly MD at [address] [place] [address]  06/28/2021    DR Elise Felton    CARDIOVERSION  06/23/2021    CARPAL TUNNEL RELEASE      CATARACT REMOVAL  10/2016    CERVICAL DISCECTOMY  01/19/2007    ACDF C3-4, C4-5, C5-6 Dr. Albania Ernandez N/A 8/25/2021    CABG ROGELIO performed by Janine Austin DO at [place]      left lower leg    HEMICOLECTOMY N/A 2/19/2019    DIAGNOSTIC LAPAROSCOPY, LYSIS OF ADHESIONS, OPEN RIGHT HEMICOLECTOMY performed by Sunita Salas MD at [address] [facility]  05/17/2017    [facility]. Dr.Joseph Juanita Camacho TRANSESOPHAGEAL ECHOCARDIOGRAM  06/23/2021    UPPER GASTROINTESTINAL ENDOSCOPY N/A 11/5/2018    EGD BIOPSY performed by Best Baumann MD at [address] [place] [address] N/A 12/7/2020    EGD BIOPSY performed by Milagros Goyal MD at [address] [place] [address] [address]  12/7/2020    EGD CONTROL HEMORRHAGE performed by Milagros Goyal MD at [facility]       Medications Prior to Admission:    Prior to Admission medications    Medication Sig Start Date End Date Taking?  Authorizing Provider   amiodarone (CORDARONE) 200 MG tablet Take 1 tablet by mouth daily 7/3/21  Yes Tyrone El, DO   metoprolol tartrate (LOPRESSOR) 50 MG tablet Take 1 tablet by mouth 2 times daily 6/30/21  Yes Tyrone El, DO   amLODIPine (NORVASC) 10 MG tablet 10 mg daily  4/19/21  Yes Historical Provider, MD   Biotin 5000 MCG TABS Take by mouth daily   Yes Historical Provider, MD   calcium carbonate (OSCAL) 500 MG TABS tablet Take 500 mg by mouth daily   Yes Historical Provider, MD   pramipexole (MIRAPEX) 0.5 MG tablet Take 0.5 mg by mouth 3 times daily   Yes Historical Provider, MD   spironolactone (ALDACTONE) 25 MG tablet Take 1 tablet by mouth daily 6/21/21  Yes Gilson Marx MD   fluticasone (FLONASE) 50 MCG/ACT nasal spray 2 sprays by Nasal route daily as needed    Yes Historical Provider, MD   furosemide (LASIX) 20 MG tablet Take 20 mg by mouth daily    Yes Historical Provider, MD   potassium chloride (KLOR-CON M) 20 MEQ extended release tablet Take 20 mEq by mouth 2 times daily   Yes Historical Provider, MD   ferrous sulfate (IRON 325) 325 (65 Fe) MG tablet Take 1 tablet by mouth daily (with breakfast) 6/3/21  Yes Tyrone El, DO   miconazole (MICOTIN) 2 % powder Apply topically 2 times daily. 21  Yes Enoch Anaya MD   glimepiride (AMARYL) 2 MG tablet Take 2 mg by mouth every morning (before breakfast)    Yes Historical Provider, MD   oxybutynin (DITROPAN) 5 MG tablet TAKE 1 TABLET BY MOUTH  DAILY WITH BREAKFAST 20  Yes Aubrie Gonzalez MD   simvastatin (ZOCOR) 20 MG tablet TAKE 1 TABLET BY MOUTH  DAILY WITH SUPPER 20  Yes Clarissa Kline MD   pantoprazole (PROTONIX) 40 MG tablet TAKE ONE TABLET BY MOUTH TWO TIMES A DAY BEFORE MEALS 20  Yes Clarissa Kline MD   metFORMIN (GLUCOPHAGE) 500 MG tablet TAKE 2 TABLETS BY MOUTH  TWICE A DAY 20  Yes Aubrie Gonzalez MD   traMADol (ULTRAM) 50 MG tablet Take 1 tablet by mouth every 8 hours as needed for Pain for up to 60 days. Take lowest dose possible to manage pain 8/5/21 10/4/21  Leela Richmond MD   rivaroxaban Deyanira Cruz) 15 MG TABS tablet Take 1 tablet by mouth daily 21   Anai Claudio DO   chlordiazePOXIDE-clidinium (LIBRAX) 5-2.5 MG per capsule Take 1 capsule by mouth as needed.     Historical Provider, MD   hydrOXYzine (ATARAX) 10 MG tablet Take 1 tablet by mouth nightly 20   Clarissa Kline MD       Allergies   Allergen Reactions    Iodine Shortness Of Breath     SOB, Rash    Benadryl [Diphenhydramine]      hyper    Fish-Derived Products Rash       Family History   Problem Relation Age of Onset    Diabetes Mother     Stroke Father     Diabetes Sister     High Blood Pressure Sister     Diabetes Brother     High Blood Pressure Brother     Cancer Brother     Heart Disease Brother        Social History     Tobacco Use    Smoking status: Former Smoker     Packs/day: 2.00     Years: 15.00     Pack years: 30.00     Quit date: 3/1/1997     Years since quittin.5    Smokeless tobacco: Never Used   Vaping Use    Vaping Use: Never used   Substance Use Topics    Alcohol use: Not Currently    Drug use: No         Review of Systems   Unable to complete       PHYSICAL

## 2021-09-06 NOTE — FLOWSHEET NOTE
Updated  on the increase in Levo. Perfect serve was not allowing direct contact with the physician so he was contacted via phone.  called and updated the family on patients current condition.

## 2021-09-07 ENCOUNTER — APPOINTMENT (OUTPATIENT)
Dept: GENERAL RADIOLOGY | Age: 81
DRG: 003 | End: 2021-09-07
Attending: THORACIC SURGERY (CARDIOTHORACIC VASCULAR SURGERY)
Payer: MEDICARE

## 2021-09-07 LAB
AADO2: 162.9 MMHG
AADO2: 168.6 MMHG
ABO/RH: NORMAL
ALBUMIN SERPL-MCNC: 3.9 G/DL (ref 3.5–5.2)
ALP BLD-CCNC: 101 U/L (ref 35–104)
ALT SERPL-CCNC: 81 U/L (ref 0–32)
ANION GAP SERPL CALCULATED.3IONS-SCNC: 11 MMOL/L (ref 7–16)
ANION GAP SERPL CALCULATED.3IONS-SCNC: 15 MMOL/L (ref 7–16)
ANION GAP SERPL CALCULATED.3IONS-SCNC: 21 MMOL/L (ref 7–16)
ANTIBODY SCREEN: NORMAL
APTT: 30.5 SEC (ref 24.5–35.1)
APTT: 50.8 SEC (ref 24.5–35.1)
AST SERPL-CCNC: 35 U/L (ref 0–31)
B.E.: -1.2 MMOL/L (ref -3–3)
B.E.: 0.3 MMOL/L (ref -3–3)
BILIRUB SERPL-MCNC: 2.6 MG/DL (ref 0–1.2)
BILIRUBIN DIRECT: 1 MG/DL (ref 0–0.3)
BILIRUBIN, INDIRECT: 1.6 MG/DL (ref 0–1)
BUN BLDV-MCNC: 61 MG/DL (ref 6–23)
BUN BLDV-MCNC: 64 MG/DL (ref 6–23)
BUN BLDV-MCNC: 74 MG/DL (ref 6–23)
CALCIUM SERPL-MCNC: 8.2 MG/DL (ref 8.6–10.2)
CALCIUM SERPL-MCNC: 8.4 MG/DL (ref 8.6–10.2)
CALCIUM SERPL-MCNC: 8.5 MG/DL (ref 8.6–10.2)
CHLORIDE BLD-SCNC: 95 MMOL/L (ref 98–107)
CHLORIDE BLD-SCNC: 95 MMOL/L (ref 98–107)
CHLORIDE BLD-SCNC: 97 MMOL/L (ref 98–107)
CO2: 19 MMOL/L (ref 22–29)
CO2: 23 MMOL/L (ref 22–29)
CO2: 25 MMOL/L (ref 22–29)
COHB: 0.5 % (ref 0–1.5)
COHB: 0.9 % (ref 0–1.5)
CREAT SERPL-MCNC: 1.9 MG/DL (ref 0.5–1)
CREAT SERPL-MCNC: 1.9 MG/DL (ref 0.5–1)
CREAT SERPL-MCNC: 2.2 MG/DL (ref 0.5–1)
CRITICAL: ABNORMAL
CRITICAL: ABNORMAL
DATE ANALYZED: ABNORMAL
DATE ANALYZED: ABNORMAL
DATE OF COLLECTION: ABNORMAL
DATE OF COLLECTION: ABNORMAL
FIO2: 40 %
FIO2: 40 %
GFR AFRICAN AMERICAN: 26
GFR AFRICAN AMERICAN: 31
GFR AFRICAN AMERICAN: 31
GFR NON-AFRICAN AMERICAN: 21 ML/MIN/1.73
GFR NON-AFRICAN AMERICAN: 25 ML/MIN/1.73
GFR NON-AFRICAN AMERICAN: 25 ML/MIN/1.73
GLUCOSE BLD-MCNC: 197 MG/DL (ref 74–99)
GLUCOSE BLD-MCNC: 205 MG/DL (ref 74–99)
GLUCOSE BLD-MCNC: 211 MG/DL (ref 74–99)
HCO3: 22.6 MMOL/L (ref 22–26)
HCO3: 24.1 MMOL/L (ref 22–26)
HCT VFR BLD CALC: 24.2 % (ref 34–48)
HCT VFR BLD CALC: 26.1 % (ref 34–48)
HCT VFR BLD CALC: 27.2 % (ref 34–48)
HEMOGLOBIN: 7.6 G/DL (ref 11.5–15.5)
HEMOGLOBIN: 8.1 G/DL (ref 11.5–15.5)
HEMOGLOBIN: 8.5 G/DL (ref 11.5–15.5)
HHB: 6 % (ref 0–5)
HHB: 8.4 % (ref 0–5)
L. PNEUMOPHILA SEROGP 1 UR AG: NORMAL
LAB: ABNORMAL
MAGNESIUM: 2.4 MG/DL (ref 1.6–2.6)
MCH RBC QN AUTO: 26.6 PG (ref 26–35)
MCHC RBC AUTO-ENTMCNC: 31.3 % (ref 32–34.5)
MCV RBC AUTO: 85 FL (ref 80–99.9)
METER GLUCOSE: 208 MG/DL (ref 74–99)
METHB: 0.3 % (ref 0–1.5)
METHB: 0.4 % (ref 0–1.5)
MODE: AC
MODE: AC
O2 SATURATION: 91.5 % (ref 92–98.5)
O2 SATURATION: 93.9 % (ref 92–98.5)
O2HB: 90.7 % (ref 94–97)
O2HB: 92.8 % (ref 94–97)
OPERATOR ID: 421
OPERATOR ID: ABNORMAL
PATIENT TEMP: 37 C
PATIENT TEMP: 37 C
PCO2: 33.9 MMHG (ref 35–45)
PCO2: 35.7 MMHG (ref 35–45)
PDW BLD-RTO: 17.2 FL (ref 11.5–15)
PEEP/CPAP: 5 CMH2O
PEEP/CPAP: 5 CMH2O
PFO2: 1.64 MMHG/%
PFO2: 1.83 MMHG/%
PH BLOOD GAS: 7.44 (ref 7.35–7.45)
PH BLOOD GAS: 7.45 (ref 7.35–7.45)
PLATELET # BLD: 330 E9/L (ref 130–450)
PMV BLD AUTO: 8.9 FL (ref 7–12)
PO2: 65.5 MMHG (ref 75–100)
PO2: 73.3 MMHG (ref 75–100)
POTASSIUM SERPL-SCNC: 3.6 MMOL/L (ref 3.5–5)
POTASSIUM SERPL-SCNC: 3.7 MMOL/L (ref 3.5–5)
POTASSIUM SERPL-SCNC: 3.7 MMOL/L (ref 3.5–5)
RBC # BLD: 3.2 E12/L (ref 3.5–5.5)
RI(T): 2.22
RI(T): 2.57
RR MECHANICAL: 14 B/MIN
RR MECHANICAL: 14 B/MIN
SODIUM BLD-SCNC: 133 MMOL/L (ref 132–146)
SODIUM BLD-SCNC: 133 MMOL/L (ref 132–146)
SODIUM BLD-SCNC: 135 MMOL/L (ref 132–146)
SOURCE, BLOOD GAS: ABNORMAL
SOURCE, BLOOD GAS: ABNORMAL
STREP PNEUMONIAE ANTIGEN, URINE: NORMAL
THB: 8.8 G/DL (ref 11.5–16.5)
THB: 9.9 G/DL (ref 11.5–16.5)
TIME ANALYZED: 1139
TIME ANALYZED: 505
TOTAL PROTEIN: 6.1 G/DL (ref 6.4–8.3)
VT MECHANICAL: 450 ML
VT MECHANICAL: 450 ML
WBC # BLD: 23.4 E9/L (ref 4.5–11.5)

## 2021-09-07 PROCEDURE — 6370000000 HC RX 637 (ALT 250 FOR IP): Performed by: INTERNAL MEDICINE

## 2021-09-07 PROCEDURE — C9113 INJ PANTOPRAZOLE SODIUM, VIA: HCPCS | Performed by: NURSE PRACTITIONER

## 2021-09-07 PROCEDURE — 86923 COMPATIBILITY TEST ELECTRIC: CPT

## 2021-09-07 PROCEDURE — 2580000003 HC RX 258: Performed by: INTERNAL MEDICINE

## 2021-09-07 PROCEDURE — 36430 TRANSFUSION BLD/BLD COMPNT: CPT

## 2021-09-07 PROCEDURE — 6360000002 HC RX W HCPCS: Performed by: THORACIC SURGERY (CARDIOTHORACIC VASCULAR SURGERY)

## 2021-09-07 PROCEDURE — 6360000002 HC RX W HCPCS: Performed by: INTERNAL MEDICINE

## 2021-09-07 PROCEDURE — 94644 CONT INHLJ TX 1ST HOUR: CPT

## 2021-09-07 PROCEDURE — P9045 ALBUMIN (HUMAN), 5%, 250 ML: HCPCS | Performed by: THORACIC SURGERY (CARDIOTHORACIC VASCULAR SURGERY)

## 2021-09-07 PROCEDURE — 6370000000 HC RX 637 (ALT 250 FOR IP): Performed by: STUDENT IN AN ORGANIZED HEALTH CARE EDUCATION/TRAINING PROGRAM

## 2021-09-07 PROCEDURE — 86901 BLOOD TYPING SEROLOGIC RH(D): CPT

## 2021-09-07 PROCEDURE — P9047 ALBUMIN (HUMAN), 25%, 50ML: HCPCS | Performed by: NURSE PRACTITIONER

## 2021-09-07 PROCEDURE — 2000000000 HC ICU R&B

## 2021-09-07 PROCEDURE — 83735 ASSAY OF MAGNESIUM: CPT

## 2021-09-07 PROCEDURE — 94640 AIRWAY INHALATION TREATMENT: CPT

## 2021-09-07 PROCEDURE — 6370000000 HC RX 637 (ALT 250 FOR IP): Performed by: THORACIC SURGERY (CARDIOTHORACIC VASCULAR SURGERY)

## 2021-09-07 PROCEDURE — 86850 RBC ANTIBODY SCREEN: CPT

## 2021-09-07 PROCEDURE — 74018 RADEX ABDOMEN 1 VIEW: CPT

## 2021-09-07 PROCEDURE — 99233 SBSQ HOSP IP/OBS HIGH 50: CPT | Performed by: NURSE PRACTITIONER

## 2021-09-07 PROCEDURE — 85027 COMPLETE CBC AUTOMATED: CPT

## 2021-09-07 PROCEDURE — 80076 HEPATIC FUNCTION PANEL: CPT

## 2021-09-07 PROCEDURE — 6360000002 HC RX W HCPCS: Performed by: NURSE PRACTITIONER

## 2021-09-07 PROCEDURE — 2580000003 HC RX 258: Performed by: NURSE PRACTITIONER

## 2021-09-07 PROCEDURE — 94003 VENT MGMT INPAT SUBQ DAY: CPT

## 2021-09-07 PROCEDURE — 71045 X-RAY EXAM CHEST 1 VIEW: CPT

## 2021-09-07 PROCEDURE — 85014 HEMATOCRIT: CPT

## 2021-09-07 PROCEDURE — P9016 RBC LEUKOCYTES REDUCED: HCPCS

## 2021-09-07 PROCEDURE — 2500000003 HC RX 250 WO HCPCS: Performed by: NURSE PRACTITIONER

## 2021-09-07 PROCEDURE — 82962 GLUCOSE BLOOD TEST: CPT

## 2021-09-07 PROCEDURE — 36415 COLL VENOUS BLD VENIPUNCTURE: CPT

## 2021-09-07 PROCEDURE — 93306 TTE W/DOPPLER COMPLETE: CPT

## 2021-09-07 PROCEDURE — 85730 THROMBOPLASTIN TIME PARTIAL: CPT

## 2021-09-07 PROCEDURE — 6370000000 HC RX 637 (ALT 250 FOR IP): Performed by: NURSE PRACTITIONER

## 2021-09-07 PROCEDURE — 2500000003 HC RX 250 WO HCPCS: Performed by: INTERNAL MEDICINE

## 2021-09-07 PROCEDURE — 2580000003 HC RX 258: Performed by: THORACIC SURGERY (CARDIOTHORACIC VASCULAR SURGERY)

## 2021-09-07 PROCEDURE — 86900 BLOOD TYPING SEROLOGIC ABO: CPT

## 2021-09-07 PROCEDURE — 37799 UNLISTED PX VASCULAR SURGERY: CPT

## 2021-09-07 PROCEDURE — 82805 BLOOD GASES W/O2 SATURATION: CPT

## 2021-09-07 PROCEDURE — 87449 NOS EACH ORGANISM AG IA: CPT

## 2021-09-07 PROCEDURE — 2500000003 HC RX 250 WO HCPCS: Performed by: THORACIC SURGERY (CARDIOTHORACIC VASCULAR SURGERY)

## 2021-09-07 PROCEDURE — P9045 ALBUMIN (HUMAN), 5%, 250 ML: HCPCS | Performed by: NURSE PRACTITIONER

## 2021-09-07 PROCEDURE — 85018 HEMOGLOBIN: CPT

## 2021-09-07 PROCEDURE — 80048 BASIC METABOLIC PNL TOTAL CA: CPT

## 2021-09-07 RX ORDER — HEPARIN SODIUM 10000 [USP'U]/100ML
5-30 INJECTION, SOLUTION INTRAVENOUS CONTINUOUS
Status: DISCONTINUED | OUTPATIENT
Start: 2021-09-07 | End: 2021-09-09 | Stop reason: SINTOL

## 2021-09-07 RX ORDER — DOBUTAMINE HYDROCHLORIDE 400 MG/100ML
2 INJECTION INTRAVENOUS CONTINUOUS
Status: DISCONTINUED | OUTPATIENT
Start: 2021-09-07 | End: 2021-09-13

## 2021-09-07 RX ORDER — SODIUM CHLORIDE 9 MG/ML
INJECTION, SOLUTION INTRAVENOUS PRN
Status: DISCONTINUED | OUTPATIENT
Start: 2021-09-07 | End: 2021-09-15 | Stop reason: CLARIF

## 2021-09-07 RX ORDER — ALBUMIN, HUMAN INJ 5% 5 %
25 SOLUTION INTRAVENOUS ONCE
Status: DISCONTINUED | OUTPATIENT
Start: 2021-09-07 | End: 2021-09-16 | Stop reason: HOSPADM

## 2021-09-07 RX ORDER — ALBUMIN (HUMAN) 12.5 G/50ML
25 SOLUTION INTRAVENOUS ONCE
Status: COMPLETED | OUTPATIENT
Start: 2021-09-07 | End: 2021-09-07

## 2021-09-07 RX ORDER — ALBUMIN, HUMAN INJ 5% 5 %
12.5 SOLUTION INTRAVENOUS ONCE
Status: COMPLETED | OUTPATIENT
Start: 2021-09-07 | End: 2021-09-07

## 2021-09-07 RX ORDER — SODIUM CHLORIDE 9 MG/ML
INJECTION, SOLUTION INTRAVENOUS CONTINUOUS
Status: DISCONTINUED | OUTPATIENT
Start: 2021-09-07 | End: 2021-09-16 | Stop reason: HOSPADM

## 2021-09-07 RX ADMIN — SODIUM CHLORIDE: 9 INJECTION, SOLUTION INTRAVENOUS at 13:41

## 2021-09-07 RX ADMIN — ASPIRIN 81 MG CHEWABLE TABLET 81 MG: 81 TABLET CHEWABLE at 08:34

## 2021-09-07 RX ADMIN — ENOXAPARIN SODIUM 40 MG: 40 INJECTION SUBCUTANEOUS at 08:34

## 2021-09-07 RX ADMIN — AMIODARONE HYDROCHLORIDE 1 MG/MIN: 50 INJECTION, SOLUTION INTRAVENOUS at 07:08

## 2021-09-07 RX ADMIN — HYDROCORTISONE SODIUM SUCCINATE 100 MG: 100 INJECTION, POWDER, FOR SOLUTION INTRAMUSCULAR; INTRAVENOUS at 12:02

## 2021-09-07 RX ADMIN — SODIUM BICARBONATE: 84 INJECTION, SOLUTION INTRAVENOUS at 12:06

## 2021-09-07 RX ADMIN — POLYETHYLENE GLYCOL 3350 17 G: 17 POWDER, FOR SOLUTION ORAL at 08:34

## 2021-09-07 RX ADMIN — INSULIN LISPRO 4 UNITS: 100 INJECTION, SOLUTION INTRAVENOUS; SUBCUTANEOUS at 11:43

## 2021-09-07 RX ADMIN — BISACODYL 10 MG: 10 SUPPOSITORY RECTAL at 08:34

## 2021-09-07 RX ADMIN — IPRATROPIUM BROMIDE AND ALBUTEROL SULFATE 1 AMPULE: .5; 2.5 SOLUTION RESPIRATORY (INHALATION) at 16:41

## 2021-09-07 RX ADMIN — DOCUSATE SODIUM 100 MG: 50 LIQUID ORAL at 20:46

## 2021-09-07 RX ADMIN — PANTOPRAZOLE SODIUM 40 MG: 40 INJECTION, POWDER, FOR SOLUTION INTRAVENOUS at 08:34

## 2021-09-07 RX ADMIN — MEROPENEM 1000 MG: 1 INJECTION, POWDER, FOR SOLUTION INTRAVENOUS at 05:37

## 2021-09-07 RX ADMIN — IPRATROPIUM BROMIDE AND ALBUTEROL SULFATE 1 AMPULE: .5; 2.5 SOLUTION RESPIRATORY (INHALATION) at 11:42

## 2021-09-07 RX ADMIN — HYDROCORTISONE SODIUM SUCCINATE 100 MG: 100 INJECTION, POWDER, FOR SOLUTION INTRAMUSCULAR; INTRAVENOUS at 20:45

## 2021-09-07 RX ADMIN — SODIUM CHLORIDE, PRESERVATIVE FREE 10 ML: 5 INJECTION INTRAVENOUS at 08:34

## 2021-09-07 RX ADMIN — ALBUMIN (HUMAN) 12.5 G: 12.5 INJECTION, SOLUTION INTRAVENOUS at 10:16

## 2021-09-07 RX ADMIN — SODIUM BICARBONATE: 84 INJECTION, SOLUTION INTRAVENOUS at 00:11

## 2021-09-07 RX ADMIN — ALBUMIN (HUMAN) 25 G: 0.25 INJECTION, SOLUTION INTRAVENOUS at 08:33

## 2021-09-07 RX ADMIN — MEROPENEM 1000 MG: 1 INJECTION, POWDER, FOR SOLUTION INTRAVENOUS at 16:36

## 2021-09-07 RX ADMIN — INSULIN LISPRO 4 UNITS: 100 INJECTION, SOLUTION INTRAVENOUS; SUBCUTANEOUS at 07:07

## 2021-09-07 RX ADMIN — Medication 10 ML: at 08:35

## 2021-09-07 RX ADMIN — ALBUMIN (HUMAN) 25 G: 12.5 INJECTION, SOLUTION INTRAVENOUS at 10:16

## 2021-09-07 RX ADMIN — VASOPRESSIN 0.04 UNITS/MIN: 20 INJECTION INTRAVENOUS at 00:48

## 2021-09-07 RX ADMIN — IPRATROPIUM BROMIDE AND ALBUTEROL SULFATE 1 AMPULE: .5; 2.5 SOLUTION RESPIRATORY (INHALATION) at 07:53

## 2021-09-07 RX ADMIN — Medication 60 MCG/HR: at 13:50

## 2021-09-07 RX ADMIN — VASOPRESSIN 0.04 UNITS/MIN: 20 INJECTION INTRAVENOUS at 08:54

## 2021-09-07 RX ADMIN — 0.12% CHLORHEXIDINE GLUCONATE 15 ML: 1.2 RINSE ORAL at 08:34

## 2021-09-07 RX ADMIN — IPRATROPIUM BROMIDE AND ALBUTEROL SULFATE 1 AMPULE: .5; 2.5 SOLUTION RESPIRATORY (INHALATION) at 20:22

## 2021-09-07 RX ADMIN — VASOPRESSIN 0.04 UNITS/MIN: 20 INJECTION INTRAVENOUS at 16:04

## 2021-09-07 RX ADMIN — HEPARIN SODIUM 10.5 UNITS/KG/HR: 10000 INJECTION, SOLUTION INTRAVENOUS at 13:14

## 2021-09-07 RX ADMIN — Medication 19 MCG/MIN: at 08:34

## 2021-09-07 RX ADMIN — VASOPRESSIN 0.04 UNITS/MIN: 20 INJECTION INTRAVENOUS at 01:23

## 2021-09-07 RX ADMIN — SODIUM CHLORIDE 4.5 UNITS/HR: 9 INJECTION, SOLUTION INTRAVENOUS at 17:44

## 2021-09-07 RX ADMIN — 0.12% CHLORHEXIDINE GLUCONATE 15 ML: 1.2 RINSE ORAL at 20:46

## 2021-09-07 RX ADMIN — DOBUTAMINE IN DEXTROSE 2.5 MCG/KG/MIN: 400 INJECTION, SOLUTION INTRAVENOUS at 12:57

## 2021-09-07 RX ADMIN — CALCIUM GLUCONATE 1000 MG: 98 INJECTION, SOLUTION INTRAVENOUS at 20:40

## 2021-09-07 RX ADMIN — INSULIN LISPRO 4 UNITS: 100 INJECTION, SOLUTION INTRAVENOUS; SUBCUTANEOUS at 16:21

## 2021-09-07 RX ADMIN — BISACODYL 10 MG: 10 SUPPOSITORY RECTAL at 20:46

## 2021-09-07 RX ADMIN — DOCUSATE SODIUM 100 MG: 50 LIQUID ORAL at 08:34

## 2021-09-07 RX ADMIN — AMIODARONE HYDROCHLORIDE 0.5 MG/MIN: 50 INJECTION, SOLUTION INTRAVENOUS at 22:04

## 2021-09-07 RX ADMIN — SENNOSIDES 5 ML: 8.8 SYRUP ORAL at 20:47

## 2021-09-07 ASSESSMENT — PULMONARY FUNCTION TESTS
PIF_VALUE: 32
PIF_VALUE: 37
PIF_VALUE: 29
PIF_VALUE: 26
PIF_VALUE: 29
PIF_VALUE: 26
PIF_VALUE: 27
PIF_VALUE: 31
PIF_VALUE: 31
PIF_VALUE: 44
PIF_VALUE: 30
PIF_VALUE: 28
PIF_VALUE: 32
PIF_VALUE: 31
PIF_VALUE: 27
PIF_VALUE: 29
PIF_VALUE: 28
PIF_VALUE: 30
PIF_VALUE: 28
PIF_VALUE: 30
PIF_VALUE: 27
PIF_VALUE: 27
PIF_VALUE: 30
PIF_VALUE: 30
PIF_VALUE: 26
PIF_VALUE: 31
PIF_VALUE: 26
PIF_VALUE: 27
PIF_VALUE: 41
PIF_VALUE: 30
PIF_VALUE: 27
PIF_VALUE: 31
PIF_VALUE: 26
PIF_VALUE: 28
PIF_VALUE: 28
PIF_VALUE: 27
PIF_VALUE: 30
PIF_VALUE: 29
PIF_VALUE: 30
PIF_VALUE: 32
PIF_VALUE: 27

## 2021-09-07 NOTE — PROGRESS NOTES
Comprehensive Nutrition Assessment    Type and Reason for Visit:  Reassess    Nutrition Recommendations/Plan: Advance nutrition as medically appropriate  Please consult for appropriate recs as needed    Nutrition Assessment:  Pt remains nutritionally at risk now s/p reintubation s/p CABG x3. Hx DM, cirrhosis, cecum CA. MAP now stable on pressor x2. Will monitor for nutrition progression    Malnutrition Assessment:  Malnutrition Status: At risk for malnutrition (Comment)    Context:  Acute Illness     Findings of the 6 clinical characteristics of malnutrition:  Energy Intake:  7 - 50% or less of estimated energy requirements for 5 or more days  Weight Loss:  Unable to assess (d/t wt fluctuations 2/2 fluid shifts)     Body Fat Loss:  No significant body fat loss     Muscle Mass Loss:  No significant muscle mass loss    Fluid Accumulation:  No significant fluid accumulation     Strength:  Not Performed    Estimated Daily Nutrient Needs:  Energy (kcal):  ; ; Weight Used for Energy Requirements:  Admission     Protein (g):   (1.8-2.0); Weight Used for Protein Requirements:  Ideal        Fluid (ml/day):  per critical care; Method Used for Fluid Requirements:         Nutrition Related Findings:  sedated on vent, MAP 80 on multiple pressors, abd distention, hypoactive BS, OGT to LIS, generalized +2 edema, +I/Os (>10L)      Wounds:  Multiple, Surgical Incision       Current Nutrition Therapies:    ADULT TUBE FEEDING; Nasogastric; Diabetic; Continuous; 10; Yes; 10; Q 4 hours; 40; 30; Q 4 hours  ADULT DIET;  Regular; Low Fat/Low Chol/High Fiber/GAGE    Anthropometric Measures:  · Height: 5' 2\" (157.5 cm)  · Current Body Weight: 173 lb (78.5 kg) (adm wt, CBW remains elevated 2/2 fluids)   · Admission Body Weight: 173 lb (78.5 kg) (8/26 bed)    · Usual Body Weight:  (170-209# x8 mon back per EMR)     · Ideal Body Weight: 110 lbs; % Ideal Body Weight 157.3 %   · BMI: 31.6  · BMI Categories: Obese Class 1 (BMI 30.0-34. 9)       Nutrition Diagnosis:   · Increased nutrient needs related to increase demand for energy/nutrients as evidenced by wounds (s/p CABG X3)      Nutrition Interventions:   Food and/or Nutrient Delivery:  Continue NPO (advance nutrition as medically appropriate)  Nutrition Education/Counseling:  Education not indicated   Coordination of Nutrition Care:  Continue to monitor while inpatient    Goals:  nutrition progression       Nutrition Monitoring and Evaluation:   Food/Nutrient Intake Outcomes:  Diet Advancement/Tolerance  Physical Signs/Symptoms Outcomes:  Biochemical Data, GI Status, Fluid Status or Edema, Hemodynamic Status, Nutrition Focused Physical Findings, Skin, Weight     Discharge Planning:     Too soon to determine     Electronically signed by Yamileth Monge, MS, RD, LD on 9/7/21 at 2:35 PM EDT    Contact: [phone]

## 2021-09-07 NOTE — FLOWSHEET NOTE
Yaya Grimaldo messaged via [employer] RE: patient on oral amiodarone as well as continuous IV. Orders received.

## 2021-09-07 NOTE — PROGRESS NOTES
[facility] PROGRESS NOTE      Chief complaint: Follow-up of septic shock    The patient is a 80 y.o. female with history of hypertension, hyperlipidemia, paroxysmal atrial fibrillation, renal artery aneurysm, cirrhosis, DM, CKD, admitted on 08/25 for elective CABG. Post-op course was complicated by fever up to 101.1 F on 08/29 with worsening leukocytosis up to currently 22,000, atrial fibrillation with rapid ventricular response, hypoxia, BROOKE. CXR on admission showed bilateral airspace disease and small bilateral pleural effusion. She underwent bronchoscopy on 09/01 during which diffuse scattered mucosal hemorrhages throughout the trachea and  thick clear secretions occluding the RML and RLL bronchi were noted. Bronchial wash Gram stain and culture showed rare polymorphonuclear leukocytes, rare epithelial cells, rare yeast, absent oral pharyngeal lisy, moderate growth of Candida albicans. She was reintubated on 09/05 due to decreasing level of consciousness and increasing respiratory distress, accompanied by hypothermia and shock. CXR on 09/06 showed persistent bilateral parenchymal infiltrates and probable small effusions, essentially unchanged from that on admission. Procalcitonin level was elevated at 0.31 ng/mL. Respiratory Gram stain and culture on 09/06 showed few polymorphonuclear leukocytes, few epithelial cells, no organisms, light growth of yeast, and gram-negative rods. Urine Streptococcus pneumoniae and Legionella antigens were negative. SARS-CoV-2 PCR was not detected. She received cefazolin from 08/25-08/27, piperacillin-tazobactam from 08/27-09/02. Meropenem was started on 09/06. Subjective: Patient was seen and examined. She remains in critical condition, intubated, sedated, on vasopressor support.     Objective:  BP (!) 124/57   Pulse 79   Temp 98.8 °F (37.1 °C) (Esophageal)   Resp 14   Ht 5' 2\" (1.575 m)   Wt 210 lb 5.1 oz (95.4 kg)   LMP  (LMP Unknown)   SpO2 93%   BMI 38.47 kg/m² Constitutional: Intubated, no MV dyssynchrony  Respiratory: Clear breath sounds, no crackles, no wheezes  Cardiovascular: Regular rate and rhythm, no murmurs  Gastrointestinal: Bowel sounds present, soft, distended   Skin: Warm and dry, no active dermatoses  Musculoskeletal: No joint swelling, no joint erythema    Labs, imaging, and medical records/notes were personally reviewed. Assessment:  Shock, presumed septic, etiology unclear, suspect HAP  Acute hypoxic respiratory failure  CAD s/p CABG on 08/25  BROOKE on CKD    Recommendations:  Continue meropenem 1g q12h dosed according to renal function to a target dose of 1g q8h for CrCl of at least 30 mL/min. Follow up blood and respiratory cultures. Check serum beta D glucan.     Thank you for involving me in the care of Ranjith Bautista. I will continue to follow. Please do not hesitate to call for any questions or concerns.     Electronically signed by Vida June MD on 9/7/2021 at 10:23 AM

## 2021-09-07 NOTE — PROGRESS NOTES
[facility]  Nephrology Attending Progress Note  Vianca Canales. Joaquin Reynoso MD        SUBJECTIVE:     9/3/21: Samantha Marcelino is a 80 y.o. female with h/o HFpEF, refractory A fib  s/p DCCV X 2, hypertension, hyperlipidemia and other medical problems listed below. She presented for elective CABG on 8/25 following ischemic work-up and LHC showing  MV CAD. 6/8/21. Procedure was performed on 8/25. Her postop course has been complicated by acute respiratory failure, atrial fibrillation with RVR and intermittent fevers. She has required Zosyn. Preop creatinine has been 1-1.2 which is her recent baseline. In the last 48 hours creatinine has shown a progressive increase to currently 1.8 mg/dL associated with decreasing urine output. She has received albumin bolus but without any significant improvement. Hence renal consult.     9/4/21: pt seen in icu, has low urine output, just received iv albumin    9/5/21: seen in icu, started on ns at 76 yesterday. Remains with low uo.      9/6/21: pt reintubated and started on levo for hypotension, now hypothermic and with elevated wbc    9/7: seen in icu, uo increasing, intubated, starting dopa, on amio, vaso, levo      PROBLEM LIST:    Patient Active Problem List   Diagnosis    Spinal stenosis in cervical region    Spinal stenosis, lumbar region, without neurogenic claudication    Essential hypertension    Mixed hyperlipidemia    DM (diabetes mellitus) ([facility]    Renal artery aneurysm (HCC)    PAF (paroxysmal atrial fibrillation) (HCC)    Anemia    Malignant neoplasm of cecum ([facility]    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)    Chronic heart failure with preserved ejection fraction (HCC)    Atrial fibrillation with RVR (HCC)    Severe protein-calorie malnutrition (HCC)    Abnormal nuclear stress test    Opioid use, unspecified with unspecified opioid-induced disorder    Opioid dependence with unspecified opioid-induced disorder    Opioid dependence, uncomplicated    CAD in native artery    Pre-operative laboratory examination    Acute blood loss anemia    Leukocytosis    At risk for malnutrition    Hypotension        PAST MEDICAL HISTORY:    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[facility] 01/2019    Anemia     Arm numbness     Arthritis     Blood transfusion     Cervical spinal stenosis     Cirrhosis of liver (HCC)     Diabetes mellitus (HCC)     Fatty liver     GERD (gastroesophageal reflux disease)     Hyperlipidemia     Hypertension     Low back pain     Lumbar stenosis     Neck pain     Obesity (BMI 30.0-34.9) 10/14/2012    PAF (paroxysmal atrial fibrillation) (HCC)     Renal artery aneurysm ([facility] 7/15/2015       DIET:    ADULT TUBE FEEDING; Nasogastric; Diabetic; Continuous; 10; Yes; 10; Q 4 hours; 40; 30; Q 4 hours  ADULT DIET;  Regular; Low Fat/Low Chol/High Fiber/GAGE     PHYSICAL EXAM:     Patient Vitals for the past 24 hrs:   Temp Temp src Pulse Resp SpO2 Weight   09/07/21 0930 -- -- 81 -- 95 % --   09/07/21 0900 -- -- 82 17 91 % --   09/07/21 0830 -- -- 82 -- 95 % --   09/07/21 0800 99.1 °F (37.3 °C) Esophageal 82 19 95 % --   09/07/21 0750 -- -- 81 -- 95 % --   09/07/21 0700 -- -- 82 16 95 % --   09/07/21 0600 -- -- 82 17 100 % 210 lb 5.1 oz (95.4 kg)   09/07/21 0500 -- -- 83 16 97 % --   09/07/21 0400 99.1 °F (37.3 °C) Esophageal 81 17 97 % --   09/07/21 0300 -- -- 81 16 94 % --   09/07/21 0200 -- -- 81 16 94 % --   09/07/21 0100 -- -- 82 16 94 % --   09/07/21 0022 -- -- 82 -- 94 % --   09/07/21 0000 99.1 °F (37.3 °C) Esophageal 82 17 94 % --   09/06/21 2300 -- -- 84 16 94 % --   09/06/21 2200 -- -- 84 17 93 % --   09/06/21 2100 -- -- 86 15 94 % --   09/06/21 2000 99.7 °F (37.6 °C) Esophageal 82 20 91 % --   09/06/21 1949 -- -- -- -- 93 % --   09/06/21 1947 -- -- 80 -- 93 % --   09/06/21 1830 -- -- 81 -- 93 % --   09/06/21 1800 99.5 °F (37.5 °C) Esophageal 86 16 95 % --   09/06/21 1730 -- -- 88 -- 95 % --   09/06/21 1700 -- -- 87 16 95 % --   09/06/21 1630 -- -- 85 -- 96 % --   09/06/21 1600 99.3 °F (37.4 °C) Esophageal 84 15 99 % --   09/06/21 1554 -- -- 91 -- 99 % --   09/06/21 1530 -- -- 81 -- 95 % --   09/06/21 1500 -- -- 84 15 95 % --   09/06/21 1430 -- -- 83 -- 95 % --   09/06/21 1400 99 °F (37.2 °C) Esophageal 82 16 95 % --   09/06/21 1330 -- -- 81 -- 95 % --   09/06/21 1300 -- -- 86 25 94 % --   09/06/21 1230 -- -- 79 -- 95 % --   09/06/21 1200 97.7 °F (36.5 °C) Esophageal 75 14 97 % --   09/06/21 1150 -- -- 76 -- 94 % --   09/06/21 1130 -- -- 73 17 94 % --   09/06/21 1100 97 °F (36.1 °C) Esophageal 70 17 95 % --   09/06/21 1030 -- -- 69 -- 97 % --   @      Intake/Output Summary (Last 24 hours) at 9/7/2021 1010  Last data filed at 9/7/2021 0900  Gross per 24 hour   Intake 3614.7 ml   Output 1400 ml   Net 2214.7 ml         Wt Readings from Last 3 Encounters:   09/07/21 210 lb 5.1 oz (95.4 kg)   08/23/21 170 lb (77.1 kg)   07/16/21 175 lb (79.4 kg)       Constitutional:  Pt is in no acute distress  Head: normocephalic, atraumatic  Neck: no JVD  Cardiovascular: regular rate and rhythm, no murmurs, gallops, or rubs  Respiratory:  No rales, rhochi, or wheezes  Gastrointestinal:  Soft, nontender, nondistended, bowel sounds x 4  Ext: no edema  Skin: dry, no rash      MEDS (scheduled):    insulin lispro  0-12 Units SubCUTAneous Q4H    albumin human  12.5 g IntraVENous Once    meropenem  1,000 mg IntraVENous Q12H    polyethylene glycol  17 g Oral Daily    chlorhexidine  15 mL Mouth/Throat BID    albumin human  25 g IntraVENous Once    aspirin  81 mg Oral Daily    pantoprazole  40 mg IntraVENous Daily    And    sodium chloride (PF)  10 mL IntraVENous Daily    docusate sodium  100 mg Oral BID    sennosides  5 mL Oral Nightly    sodium chloride flush  5-40 mL IntraVENous 2 times per day    heparin flush  3 mL IntraVENous 2 times per day    bisacodyl  10 mg Rectal BID    enoxaparin 40 mg SubCUTAneous Daily    [Held by provider] metoprolol tartrate  12.5 mg Oral BID    [Held by provider] atorvastatin  10 mg Oral Nightly    pramipexole  0.5 mg Oral TID    ipratropium-albuterol  1 ampule Inhalation Q4H WA       MEDS (infusions):   amiodarone 450mg/250ml D5W infusion 0.5 mg/min (09/07/21 0855)    midazolam 2 mg/hr (09/07/21 0727)    fentaNYL 5 mcg/ml in 0.9%  ml infusion 50 mcg/hr (09/07/21 0000)    sodium bicarbonate infusion 100 mL/hr at 09/07/21 0011    vasopressin (Septic Shock) infusion 0.04 Units/min (09/07/21 0854)    norepinephrine 19 mcg/min (09/07/21 0834)    sodium chloride      sodium chloride      sodium chloride      sodium chloride 30 mL/hr (08/31/21 0946)    sodium chloride      dextrose         MEDS (prn):  perflutren lipid microspheres, oxyCODONE **OR** [DISCONTINUED] oxyCODONE, benzocaine-menthol, sodium chloride, sodium chloride flush, sodium chloride, heparin flush, LORazepam, sodium chloride, ondansetron, acetaminophen, acetaminophen, magnesium hydroxide, potassium chloride, magnesium sulfate, albumin human, sodium chloride, glucose, dextrose, glucagon (rDNA), dextrose, calcium gluconate IVPB    DATA:    Recent Labs     09/06/21  0500 09/06/21  1305 09/07/21  0510   WBC 17.2* 22.8* 23.4*   HGB 8.1* 8.7* 8.5*   HCT 26.7* 28.6* 27.2*   MCV 87.5 86.9 85.0    397 330     Recent Labs     09/05/21  0500 09/05/21  0500 09/05/21  1805 09/05/21  1805 09/06/21  0500 09/06/21  1305 09/07/21  0510      < > 132   < > 131* 135 133   K 4.8   < > 5.0   < > 4.4 4.3 3.7   CL 98   < > 97*   < > 96* 98 95*   CO2 19*   < > 18*   < > 18* 17* 23   BUN 76*   < > 83*   < > 87* 86* 74*   CREATININE 2.5*   < > 2.7*   < > 2.8* 2.9* 2.2*   LABGLOM 18   < > 17   < > 16 16 21   GLUCOSE 132*   < > 112*   < > 105* 138* 205*   CALCIUM 8.8   < > 8.5*   < > 8.7 8.8 8.5*   ALT  --   --  118*  --  100*  --  81*   AST  --   --  44*  --  37*  --  35*   BILIDIR  --   --   --   -- 0.8*  --  1.0*   BILITOT  --   --  1.9*  --  1.7*  --  2.6*   ALKPHOS  --   --  84  --  85  --  101   MG 2.9*  --   --   --  2.8*  --  2.4   PHOS  --   --   --   --   --  4.5  --     < > = values in this interval not displayed. Lab Results   Component Value Date    LABALBU 3.9 09/07/2021    LABALBU 4.0 09/06/2021    LABALBU 4.3 09/05/2021     Lab Results   Component Value Date    TSH 2.250 06/21/2021       Iron Studies  No results found for: IRON, TIBC, FERRITIN  No results found for: [id number]  No results found for: FOLATE    No results found for: VITD25  No results found for: PTH    No components found for: URIC    Lab Results   Component Value Date    COLORU Yellow 08/28/2021    LABPH 0 10/22/2019    NITRU Negative 08/28/2021    GLUCOSEU Negative 08/28/2021    KETUA TRACE 08/28/2021    UROBILINOGEN 0.2 08/28/2021    BILIRUBINUR Negative 08/28/2021       No results found for: Yuri Seat      IMPRESSION/RECOMMENDATIONS:      1. Acute kidney injury stage I superimposed on CKD stage IIIa  BROOKE prerenal  FEurea 16% and FENa .04%, volume depletion  Receiving albumin  Cr 1.1>1.9>2.5>2.8>2.2  pola no hydro  Continue ivf  Consider dialysis if no improvement, but starting to improve today     2. CAD s/p CABG X 3 . 8/25  CO 2.2, CI 1.7  Starting dopa     3. Acute postop respiratory failure  Intubated 9/5  Worsening wbc and hypotension  Id following  Started merrem for hap     4. A fib RVR  On amio     5. Hypotension  On vaso  Septic picture  Id consult    6. Metabolic acidosis  On hco3 drip  Improved  will stop    7. Hyponatremia  Continue ivf  Check tsh cortisol    Shaggy Bloom.  Leona Leslie MD

## 2021-09-07 NOTE — PLAN OF CARE
Problem: Falls - Risk of:  Goal: Will remain free from falls  Outcome: Met This Shift  Goal: Absence of physical injury  Outcome: Met This Shift     Problem: Discharge Planning:  Goal: Discharged to appropriate level of care  Outcome: Met This Shift     Problem: Anxiety:  Goal: Level of anxiety will decrease  Outcome: Met This Shift     Problem: Cardiac Output - Decreased:  Goal: Cardiac output within specified parameters  Outcome: Met This Shift     Problem: Gas Exchange - Impaired:  Goal: Levels of oxygenation will improve  Outcome: Met This Shift     Problem: Pain:  Goal: Pain level will decrease  Outcome: Met This Shift  Goal: Control of acute pain  Outcome: Met This Shift     Problem: Tissue Perfusion - Cardiopulmonary, Altered:  Goal: Absence of angina  Outcome: Met This Shift  Goal: Will show no evidence of cardiac arrhythmias  Outcome: Met This Shift     Problem: Skin Integrity:  Goal: Absence of new skin breakdown  Outcome: Met This Shift     Problem: Pain:  Goal: Pain level will decrease  Outcome: Met This Shift  Goal: Control of acute pain  Outcome: Met This Shift     Problem: Musculor/Skeletal Functional Status  Goal: Absence of falls  Outcome: Met This Shift     Problem: ABCDS Injury Assessment  Goal: Absence of physical injury  Outcome: Met This Shift     Problem: Non-Violent Restraints  Goal: No harm/injury to patient while restraints in use  Outcome: Met This Shift  Goal: Patient's dignity will be maintained  Outcome: Met This Shift     Problem: Cardiac Output - Decreased:  Goal: Hemodynamic stability will improve  Outcome: Ongoing     Problem: Fluid Volume - Imbalance:  Goal: Ability to achieve a balanced intake and output will improve  Outcome: Ongoing     Problem: Gas Exchange - Impaired:  Goal: Ability to maintain adequate ventilation will improve  Outcome: Ongoing     Problem: Tissue Perfusion - Cardiopulmonary, Altered:  Goal: Hemodynamic stability will improve  Outcome: Ongoing     Problem: Musculor/Skeletal Functional Status  Goal: Highest potential functional level  Outcome: Ongoing     Problem:  Activity Intolerance:  Goal: Able to perform prescribed physical activity  Outcome: Not Met This Shift  Goal: Ability to tolerate increased activity will improve  Outcome: Not Met This Shift     Problem: Skin Integrity:  Goal: Will show no infection signs and symptoms  Outcome: Not Met This Shift     Problem: Non-Violent Restraints  Goal: Removal from restraints as soon as assessed to be safe  Outcome: Not Met This Shift     Problem: Pain:  Goal: Control of chronic pain  Outcome: Completed     Problem: Pain:  Goal: Control of chronic pain  Outcome: Completed

## 2021-09-07 NOTE — PROGRESS NOTES
IntraVENous Once    meropenem  1,000 mg IntraVENous Q12H    polyethylene glycol  17 g Oral Daily    chlorhexidine  15 mL Mouth/Throat BID    albumin human  25 g IntraVENous Once    aspirin  81 mg Oral Daily    pantoprazole  40 mg IntraVENous Daily    And    sodium chloride (PF)  10 mL IntraVENous Daily    docusate sodium  100 mg Oral BID    sennosides  5 mL Oral Nightly    sodium chloride flush  5-40 mL IntraVENous 2 times per day    heparin flush  3 mL IntraVENous 2 times per day    bisacodyl  10 mg Rectal BID    enoxaparin  40 mg SubCUTAneous Daily    [Held by provider] metoprolol tartrate  12.5 mg Oral BID    [Held by provider] atorvastatin  10 mg Oral Nightly    pramipexole  0.5 mg Oral TID    ipratropium-albuterol  1 ampule Inhalation Q4H WA       Physical Exam:  General Appearance: Intubated, currently appears restless  HEENT: Normocephalic atraumatic without obvious abnormality   Neck: Lips, mucosa, and tongue normal.  Supple, symmetrical, trachea midline, no adenopathy;thyroid:  no enlargement/tenderness/nodules or JVD. ETT tube inplace  Lung: Diffuse bilateral lung sounds  heart: RRR, normal S1, S2. No MRG  Abdomen: Distended and slightly tympanic  Extremities: Pedal pulses 2+ symmetric b/l. Extremities normal, no cyanosis, clubbing, or edema. Musculokeletal: No joint swelling, no muscle tenderness. ROM normal in all joints of extremities. Neurologic: Cranial nerves II to XII grossly intact. sedated    Pertinent/ New Labs and Imaging Studies     Imaging Personally Reviewed:    Narrative   EXAMINATION:   ONE XRAY VIEW OF THE CHEST       9/6/2021 7:22 am       COMPARISON:   09/05/2021       HISTORY:   ORDERING SYSTEM PROVIDED HISTORY: resp failure   TECHNOLOGIST PROVIDED HISTORY:   Reason for exam:->resp failure   What reading provider will be dictating this exam?->CRC       FINDINGS:   EKG leads overlie the chest.  Support tubes and lines remain in place.    Bilateral parenchymal infiltrates and probable small effusions persist.   These demonstrate no definite interval change allowing for differences in   positioning.           Impression   No significant change.  Continued follow-up recommended. ECHO  9/7/2021  Left ventricular size is grossly normal.   Ejection fraction is visually estimated at 60 to 65%. The left atrium is mild-moderately dilated. Mildly dilated right ventricle. Right ventricle global systolic function is mildly reduced . Mildly enlarged right atrium size. The tricuspid valve was not well visualized. Severe tricuspid regurgitation. Labs:  Lab Results   Component Value Date    WBC 23.4 09/07/2021    HGB 8.5 09/07/2021    HCT 27.2 09/07/2021    MCV 85.0 09/07/2021    MCH 26.6 09/07/2021    MCHC 31.3 09/07/2021    RDW 17.2 09/07/2021     09/07/2021    MPV 8.9 09/07/2021     Lab Results   Component Value Date     09/07/2021    K 3.7 09/07/2021    K 4.3 09/06/2021    CL 95 09/07/2021    CO2 23 09/07/2021    BUN 74 09/07/2021    CREATININE 2.2 09/07/2021    LABALBU 3.9 09/07/2021    CALCIUM 8.5 09/07/2021    GFRAA 26 09/07/2021    LABGLOM 21 09/07/2021     Lab Results   Component Value Date    PROTIME 14.5 08/25/2021    INR 1.3 08/25/2021     No results for input(s): PROBNP in the last 72 hours. Recent Labs     09/06/21  1300   PROCAL 0.31*     This SmartLink has not been configured with any valid records. Micro:  Recent Labs     09/06/21  0830   CULTRESP Oral Pharyngeal Ernestine absent*  Light growth  Identification to follow    Light growth  Identification and sensitivity to follow    Rare growth  Identification and sensitivity to follow       No results for input(s): LABGRAM in the last 72 hours. No results for input(s): LEGUR in the last 72 hours. Recent Labs     09/06/21  1305   STREPNEUMAGU Presumptive negative- suggests no current or recent  pneumococcal infection.   Infection due to Strep pneumoniae cannot be  ruled out since the antigen present in the sample  may be below the detection limit of the test.  Normal Range:Presumptive Negative       Recent Labs     09/06/21  1305   [id number] Presumptive Negative -suggesting no recent or current infections  with Legionella pneumophila serogroup 1. Infection to Legionella cannot be ruled out since other serogroups  and species may cause infection, antigen may not be present in  early infection, or level of antigen may be below the  detection limit. Normal Range: Presumptive Negative       Results for Lorean Leventhal (MRN [id number]) as of 9/7/2021 11:31   Ref. Range 9/7/2021 05:05   Source: Unknown Blood Arterial   pH, Blood Gas Latest Ref Range: 7.350 - 7.450  7. 441   PCO2 Latest Ref Range: 35.0 - 45.0 mmHg 33.9 (L)   pO2 Latest Ref Range: 75.0 - 100.0 mmHg 73.3 (L)   HCO3 Latest Ref Range: 22.0 - 26.0 mmol/L 22.6   Base Excess Latest Ref Range: -3.0 - 3.0 mmol/L -1.2   O2 Sat Latest Ref Range: 92.0 - 98.5 % 93.9   tHb (est) Latest Ref Range: 11.5 - 16.5 g/dL 9.9 (L)   O2Hb Latest Ref Range: 94.0 - 97.0 % 92.8 (L)   COHb Latest Ref Range: 0.0 - 1.5 % 0.9   MetHb Latest Ref Range: 0.0 - 1.5 % 0.3   HHb Latest Ref Range: 0.0 - 5.0 % 6.0 (H)   AaDO2 Latest Units: mmHg 162.9   RI(T) Unknown 2.22   FIO2 Latest Units: % 40.0   PO2/FIO2 Latest Units: mmHg/% 1.83   Pt Temp Latest Units: C 37.0   Critical(s) Notified Unknown . No Critical Values   Time Analyzed Unknown 0505   Mode Unknown AC   Peep/Cpap Latest Units: cmH2O 5.0   Vt Mechanical Latest Units: mL 450.0   Rr Mechanical Latest Units: b/min 14.0        Assessment:    1. Post operative respiratory insufficiency with hypoxia requiring reintubation 9/5/21  2. Bilateral atelectasis/mucus plugging of bronchi s/p bronchoscopy 9/1/21  3. Pleural effusions - small on bedside US  4. CAD s/p CABG x 3 8/25/21  5. Atrial fibrillation with RVR   6. Anxiety  7. Aspiration pneumonia s/p treatment with Zosyn  8.  BROOKE secondary to intravascular volume depletion  9. Leukocytosis, probable sepsis septic shock  10. Runs of nonsustained V. Tach  11. High anion gap metabolic acidosis with respiratory alkalosis      Plan:   1. sedation regimen to Versed and fentanyl given her present hypotension and also dyssynchrony with the vent. 2. Continue full vent support. Patient most likely will require a trach. 3. Wean  Levophed for maps above 65, continue vasopressin at 0.04  4. Continue bicarb gtt  5. Meropenem started at 1 g every 12 hours per infectious disease recommendations. Continue final culture results. 6.  Follow procalcitonin. 0.31  7. Cortisol level was ordered  8. General surgery consulted for abdominal distention elevation of LFTs. To consider obtaining a abdominal ultrasound- pending  9. Check electrolytes and place for potassium above 4 and mag above 2  10. Amiodarone drip per CTS surgery  11. Echo completed, see above      Plan of care reviewed in collaboration with Dr. Derrick Mcclellan   Electronically signed by TEMO Briggs CNP on 9/7/2021 at 11:27 AM        Addendum:    Cortisol level back at 11.97. will start Hydrocortisone 100 mg Q8  And sepsis , septic shock and increase in pressors requirements. I personally saw, examined and provided care for the patient. Radiographs, labs and medication list were reviewed by me independently. I spoke with bedside nursing, therapists and consultants. The case was discussed in detail and plans for care were established. Review of CNP documentation was conducted and revisions were made as appropriate. I agree with the above documented exam, problem list and plan of care.     [place] Man MD

## 2021-09-07 NOTE — PROGRESS NOTES
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, GERD, h/o adenocarcinoma of cecum, arthritis, spinal stenosis, former smoker, obesity      Procedure/Surgeries: 8/25/2021 CABG x3 (LIMA-LAD, SVG-Ramus, SVG-OM), LAAL, EVH, KLS plating      Pacing wires: Ventricular        Intake/Output Summary (Last 24 hours) at 9/7/2021 0805  Last data filed at 9/7/2021 0700  Gross per 24 hour   Intake 3494.7 ml   Output 1215 ml   Net 2279.7 ml       Recent Labs     09/06/21  0500 09/06/21  1305 09/07/21  0510   WBC 17.2* 22.8* 23.4*   HGB 8.1* 8.7* 8.5*   HCT 26.7* 28.6* 27.2*    397 330      Lab Results   Component Value Date     09/07/2021    K 3.7 09/07/2021    K 4.3 09/06/2021    CL 95 09/07/2021    CO2 23 09/07/2021    BUN 74 09/07/2021    CREATININE 2.2 09/07/2021    GLUCOSE 205 09/07/2021    CALCIUM 8.5 09/07/2021         Physical Exam:    BP (!) 124/57   Pulse 81   Temp 99.1 °F (37.3 °C) (Esophageal)   Resp 16   Ht 5' 2\" (1.575 m)   Wt 210 lb 5.1 oz (95.4 kg)   LMP  (LMP Unknown)   SpO2 95%   BMI 38.47 kg/m²       CXR Findings: 9/7/2021     FINDINGS:   PH probe is in the mid to distal esophagus.  NG tube is subdiaphragmatic. Endotracheal tube is approximately 2 cm above the magno.  Stable appearance   of left-sided PICC line.  Infiltrate and or atelectasis in both lungs does   not appear appreciably changed. - CXR personally viewed and interpreted by ICU Nurse Practitioner, agree with above findings     General: Intubated, sedated. On vasopressin and high dose levophed gtts. Amio gtt. Eyes: PERRL, anicteric   Pulmonary: Diminished bibasilar.  No wheezes, no accessory muscle use noted   Ventilator: Mode: AC/VC, 40% FiO2, 5 PEEP, 450 Vt Cardiovascular:  RRR, no heaves or thrills on palpation  Tele: SR 80s  Abdomen: Soft, slightly distended, hypoactive BS, + small liquid BM   Extremities: Palpable pulses all extremities, generalized edema   Neurologic/Psych: Intubated, sedated   Skin: Warm and dry  Incisions: MSI well approximated, RLE SVG sites with staples intact, well approximated     Lines:   ETT: 9/5  Rt Radial Arterial line: 9/5  Pino 8/25    Assessment/Plan: POD #13    1. CAD, PAF S/p CABG x3 (LIMA-LAD, SVG-Ramus, SVG-OM)  LAAL  - ASA, Lipitor on hold   - Lovenox for VTE prophylaxis until [address] resumed   - PICC placed 8/30     2. Paroxysmal Atrial Fibrillation  - s/p LAAL   - On Xarelto preop for PAF  - post op with PAF, currently SR on amio gtt  - Decrease amio gtt given LFTs  - Plan to restart [address] if patient goes back into afib per Dr Dania Rodriguez     3. Acute Hypoxic Respiratory failure   - former smoker, 2/2 surgery, extubated DOS,? Aspiration 8/27  - postop course c/b hypoxia required prolonged high flow oxygen  - s/p bronch 9/1, RLL, RML mucous plugging; f/u pathology   -7 day course Zosyn completed 9/2  - Reintubated 9/5   - Pulmonary following    - Supportive care, likely trach once pressor requirements improve      4. Acute Post Operative Pain   - Sedation with Versed and fentanyl gtts     5. DM Type 2  - HgbA1C 6.2%  - SSI.      6. Hypotension  - Suspected septic shock, requiring levophed and vaso gtts to maintain MAP >65   - Levo @ 20mcg, vaso @ 0.04units  - ID following, on merrem   - Echo, check NICOM, albumin ordered, IVF PRN      7. Acute blood loss anemia  - expected blood loss 2/2 surgery, s/p one unit RBCs 8/26, 8/31  - Hgb stable; monitor, transfuse PRN      8. Leukocytosis  - WBC 23.4K, increasing, Afebrile- tmax 99.7   - f/u cultures from bronch  - ID following, Continue Merrem   - follow 9/6 blood cultures  - Procalcitonin 0.31  - completed 7 day course of Zosyn 9/2 9.  Anxiety  - On versed gtt, PRN Ativan 0.5mg q 4 hours    10. BROOKE superimposed on CKD   - Baseline Scr 0.9-1.2  - Scr 2.2 this AM, peaked at 2.9 9/6, UOP improving, overnight 0.7ml/kg/hr   - Nephrology following  - Bicarb gtt, BMP q 12 hours   - Monitor UOP, labs, avoid hypotension     11. Dysphagia/ At risk for malnutrition   - Suspected dysphagia, possible aspiration 8/27  - Bronch 9/1 airways with evidence of chronic aspiration   - MBS done 9/3, passed for soft and bite size consistency solids (dysphagia 3), thin liquids  - NPO, OG to LIWS  - Start TFs if okay with General surgery     12. Abdominal Distention/Elevated LFTs  - General Surgery following  - Abdomen appears less distended this morning, CT abdomen when contrast from barium swallow out of colon   - Bowel regimen, repeat KUB today        VTE Prophylaxis: Pharmacologic/Mechanical:  Yes, SCDs, Lovenox   Line infection prevention: Can CVC or arterial line be removed: no  Continued need for urinary catheter:  Yes - clinical indication: Patient post major surgery requiring fluid balance and input and output measurement.     Dispo: CVICU     Electronically signed by TEMO Vega - CNP on 9/7/2021 at 8:05 AM

## 2021-09-07 NOTE — PLAN OF CARE
Problem: Falls - Risk of:  Goal: Will remain free from falls  Description: Will remain free from falls  9/7/2021 1042 by Benjamin Burgos RN  Outcome: Met This Shift     Problem: Cardiac Output - Decreased:  Goal: Cardiac output within specified parameters  Description: Cardiac output within specified parameters  9/7/2021 1042 by Benjamin Burgos RN  Outcome: Met This Shift     Problem: Fluid Volume - Imbalance:  Goal: Ability to achieve a balanced intake and output will improve  Description: Ability to achieve a balanced intake and output will improve  9/7/2021 1042 by Benjamin Burgos RN  Outcome: Met This Shift     Problem: Tissue Perfusion - Cardiopulmonary, Altered:  Goal: Hemodynamic stability will improve  Description: Hemodynamic stability will improve  9/7/2021 1042 by Benjamin Burgos RN  Outcome: Met This Shift     Problem: Non-Violent Restraints  Goal: No harm/injury to patient while restraints in use  9/7/2021 1042 by Benjamin Burgos RN  Outcome: Met This Shift     Problem: Non-Violent Restraints  Goal: Patient's dignity will be maintained  9/7/2021 1042 by Benjamin Burgos RN  Outcome: Met This Shift     Problem: Non-Violent Restraints  Goal: Removal from restraints as soon as assessed to be safe  9/7/2021 1042 by Benjamin Burgos RN  Outcome: Ongoing

## 2021-09-07 NOTE — PROGRESS NOTES
Physical Therapy  Physical Therapy Attempt    Name: Janice Fox  : [birth year]  MRN: [id number]      Date of Service: 2021  Chart reviewed. Spoke with NP - pt is not medically appropriate for skilled PT at this time. Will re-attempt as able.     Kiara Bustamante, PT, DPT  [id number]

## 2021-09-07 NOTE — FLOWSHEET NOTE
Patient was placed on the NICOM per critical care NP.  Fluid challenge was preformed and had > 10% improvement in the SVI

## 2021-09-07 NOTE — PROGRESS NOTES
GENERAL SURGERY  DAILY PROGRESS NOTE  9/7/2021    No chief complaint on file. Subjective:  Pt intubated and sedated. No acute issues overnight. On multiple pressors.     Objective:  BP (!) 124/57   Pulse 82   Temp 99.1 °F (37.3 °C) (Esophageal)   Resp 17   Ht 5' 2\" (1.575 m)   Wt 210 lb 5.1 oz (95.4 kg)   LMP  (LMP Unknown)   SpO2 100%   BMI 38.47 kg/m²     GENERAL: Intubated and sedated  HEAD: Normocephalic, atraumatic  EYES: No sclera icterus, pupils equal  LUNGS: On mechanical ventilation  CARDIOVASCULAR:  RR and normotensive  ABDOMEN:  Soft, non-tender, non-distended  EXTREMITIES: No edema or swelling  SKIN: Warm and dry    Assessment/Plan:  80 y.o. female with recent CABG, complicated post operative course requiring re-intubation, now with increasing abdominal distention    - Recommend CT abd/pelvis when stable enough to transport (barium needs to be out of colon)  - Continue bowel reg  - Trend LFTs  - Monitor abdominal exam    Electronically signed by Haydee Montaño MD on 9/7/2021 at 6:46 AM    Seen/examined  Agree with above  [email or web address]

## 2021-09-08 ENCOUNTER — APPOINTMENT (OUTPATIENT)
Dept: GENERAL RADIOLOGY | Age: 81
DRG: 003 | End: 2021-09-08
Attending: THORACIC SURGERY (CARDIOTHORACIC VASCULAR SURGERY)
Payer: MEDICARE

## 2021-09-08 LAB
AADO2: 177 MMHG
AADO2: 217.5 MMHG
ALBUMIN SERPL-MCNC: 4 G/DL (ref 3.5–5.2)
ALP BLD-CCNC: 79 U/L (ref 35–104)
ALT SERPL-CCNC: 55 U/L (ref 0–32)
ANION GAP SERPL CALCULATED.3IONS-SCNC: 10 MMOL/L (ref 7–16)
ANION GAP SERPL CALCULATED.3IONS-SCNC: 14 MMOL/L (ref 7–16)
APTT: 77.5 SEC (ref 24.5–35.1)
AST SERPL-CCNC: 24 U/L (ref 0–31)
B.E.: 0.5 MMOL/L (ref -3–3)
B.E.: 0.5 MMOL/L (ref -3–3)
BILIRUB SERPL-MCNC: 2.8 MG/DL (ref 0–1.2)
BILIRUBIN DIRECT: 0.8 MG/DL (ref 0–0.3)
BILIRUBIN, INDIRECT: 2 MG/DL (ref 0–1)
BUN BLDV-MCNC: 49 MG/DL (ref 6–23)
BUN BLDV-MCNC: 51 MG/DL (ref 6–23)
CALCIUM SERPL-MCNC: 8.3 MG/DL (ref 8.6–10.2)
CALCIUM SERPL-MCNC: 8.5 MG/DL (ref 8.6–10.2)
CHLORIDE BLD-SCNC: 98 MMOL/L (ref 98–107)
CHLORIDE BLD-SCNC: 99 MMOL/L (ref 98–107)
CO2: 23 MMOL/L (ref 22–29)
CO2: 27 MMOL/L (ref 22–29)
COHB: 0.4 % (ref 0–1.5)
COHB: 1.1 % (ref 0–1.5)
CREAT SERPL-MCNC: 1.4 MG/DL (ref 0.5–1)
CREAT SERPL-MCNC: 1.5 MG/DL (ref 0.5–1)
CRITICAL: ABNORMAL
CRITICAL: ABNORMAL
CULTURE, RESPIRATORY: ABNORMAL
DATE ANALYZED: ABNORMAL
DATE ANALYZED: ABNORMAL
DATE OF COLLECTION: ABNORMAL
DATE OF COLLECTION: ABNORMAL
FIO2: 40 %
FIO2: 50 %
GFR AFRICAN AMERICAN: 40
GFR AFRICAN AMERICAN: 44
GFR NON-AFRICAN AMERICAN: 33 ML/MIN/1.73
GFR NON-AFRICAN AMERICAN: 36 ML/MIN/1.73
GLUCOSE BLD-MCNC: 103 MG/DL (ref 74–99)
GLUCOSE BLD-MCNC: 169 MG/DL (ref 74–99)
HCO3: 23 MMOL/L (ref 22–26)
HCO3: 24.6 MMOL/L (ref 22–26)
HCT VFR BLD CALC: 25.5 % (ref 34–48)
HCT VFR BLD CALC: 26.6 % (ref 34–48)
HEMOGLOBIN: 8.1 G/DL (ref 11.5–15.5)
HEMOGLOBIN: 8.6 G/DL (ref 11.5–15.5)
HHB: 4.5 % (ref 0–5)
HHB: 7.2 % (ref 0–5)
LAB: ABNORMAL
MAGNESIUM: 2.4 MG/DL (ref 1.6–2.6)
MCH RBC QN AUTO: 27 PG (ref 26–35)
MCH RBC QN AUTO: 27 PG (ref 26–35)
MCHC RBC AUTO-ENTMCNC: 31.8 % (ref 32–34.5)
MCHC RBC AUTO-ENTMCNC: 32.3 % (ref 32–34.5)
MCV RBC AUTO: 83.6 FL (ref 80–99.9)
MCV RBC AUTO: 85 FL (ref 80–99.9)
METER GLUCOSE: 101 MG/DL (ref 74–99)
METER GLUCOSE: 101 MG/DL (ref 74–99)
METER GLUCOSE: 102 MG/DL (ref 74–99)
METER GLUCOSE: 103 MG/DL (ref 74–99)
METER GLUCOSE: 104 MG/DL (ref 74–99)
METER GLUCOSE: 105 MG/DL (ref 74–99)
METER GLUCOSE: 108 MG/DL (ref 74–99)
METER GLUCOSE: 129 MG/DL (ref 74–99)
METER GLUCOSE: 141 MG/DL (ref 74–99)
METER GLUCOSE: 143 MG/DL (ref 74–99)
METER GLUCOSE: 153 MG/DL (ref 74–99)
METER GLUCOSE: 157 MG/DL (ref 74–99)
METER GLUCOSE: 159 MG/DL (ref 74–99)
METER GLUCOSE: 165 MG/DL (ref 74–99)
METER GLUCOSE: 169 MG/DL (ref 74–99)
METER GLUCOSE: 181 MG/DL (ref 74–99)
METER GLUCOSE: 181 MG/DL (ref 74–99)
METER GLUCOSE: 190 MG/DL (ref 74–99)
METER GLUCOSE: 204 MG/DL (ref 74–99)
METER GLUCOSE: 210 MG/DL (ref 74–99)
METER GLUCOSE: 63 MG/DL (ref 74–99)
METHB: 0.3 % (ref 0–1.5)
METHB: 0.4 % (ref 0–1.5)
MODE: AC
MODE: AC
O2 SATURATION: 92.7 % (ref 92–98.5)
O2 SATURATION: 95.5 % (ref 92–98.5)
O2HB: 91.4 % (ref 94–97)
O2HB: 94.7 % (ref 94–97)
OPERATOR ID: 1868
OPERATOR ID: ABNORMAL
ORGANISM: ABNORMAL
PATIENT TEMP: 37 C
PATIENT TEMP: 37 C
PCO2: 29.6 MMHG (ref 35–45)
PCO2: 37.5 MMHG (ref 35–45)
PDW BLD-RTO: 17 FL (ref 11.5–15)
PDW BLD-RTO: 17.2 FL (ref 11.5–15)
PEEP/CPAP: 5 CMH2O
PEEP/CPAP: 5 CMH2O
PFO2: 1.6 MMHG/%
PFO2: 1.69 MMHG/%
PH BLOOD GAS: 7.43 (ref 7.35–7.45)
PH BLOOD GAS: 7.51 (ref 7.35–7.45)
PLATELET # BLD: 107 E9/L (ref 130–450)
PLATELET # BLD: 124 E9/L (ref 130–450)
PMV BLD AUTO: 8.9 FL (ref 7–12)
PMV BLD AUTO: 9.2 FL (ref 7–12)
PO2: 64.2 MMHG (ref 75–100)
PO2: 84.3 MMHG (ref 75–100)
POTASSIUM SERPL-SCNC: 3.9 MMOL/L (ref 3.5–5)
POTASSIUM SERPL-SCNC: 3.9 MMOL/L (ref 3.5–5)
RBC # BLD: 3 E12/L (ref 3.5–5.5)
RBC # BLD: 3.18 E12/L (ref 3.5–5.5)
RI(T): 2.58
RI(T): 2.76
RR MECHANICAL: 12 B/MIN
RR MECHANICAL: 14 B/MIN
SMEAR, RESPIRATORY: ABNORMAL
SODIUM BLD-SCNC: 135 MMOL/L (ref 132–146)
SODIUM BLD-SCNC: 136 MMOL/L (ref 132–146)
SOURCE, BLOOD GAS: ABNORMAL
SOURCE, BLOOD GAS: ABNORMAL
THB: 9.3 G/DL (ref 11.5–16.5)
THB: 9.5 G/DL (ref 11.5–16.5)
TIME ANALYZED: 512
TIME ANALYZED: 940
TOTAL PROTEIN: 6 G/DL (ref 6.4–8.3)
VT MECHANICAL: 450 ML
VT MECHANICAL: 450 ML
WBC # BLD: 18.4 E9/L (ref 4.5–11.5)
WBC # BLD: 20.2 E9/L (ref 4.5–11.5)

## 2021-09-08 PROCEDURE — 6370000000 HC RX 637 (ALT 250 FOR IP): Performed by: NURSE PRACTITIONER

## 2021-09-08 PROCEDURE — 2500000003 HC RX 250 WO HCPCS: Performed by: THORACIC SURGERY (CARDIOTHORACIC VASCULAR SURGERY)

## 2021-09-08 PROCEDURE — C9113 INJ PANTOPRAZOLE SODIUM, VIA: HCPCS | Performed by: NURSE PRACTITIONER

## 2021-09-08 PROCEDURE — 94003 VENT MGMT INPAT SUBQ DAY: CPT

## 2021-09-08 PROCEDURE — 6360000002 HC RX W HCPCS: Performed by: NURSE PRACTITIONER

## 2021-09-08 PROCEDURE — 85730 THROMBOPLASTIN TIME PARTIAL: CPT

## 2021-09-08 PROCEDURE — 2580000003 HC RX 258: Performed by: NURSE PRACTITIONER

## 2021-09-08 PROCEDURE — 2580000003 HC RX 258: Performed by: INTERNAL MEDICINE

## 2021-09-08 PROCEDURE — 99291 CRITICAL CARE FIRST HOUR: CPT | Performed by: NURSE PRACTITIONER

## 2021-09-08 PROCEDURE — 37799 UNLISTED PX VASCULAR SURGERY: CPT

## 2021-09-08 PROCEDURE — 82962 GLUCOSE BLOOD TEST: CPT

## 2021-09-08 PROCEDURE — 94640 AIRWAY INHALATION TREATMENT: CPT

## 2021-09-08 PROCEDURE — 6370000000 HC RX 637 (ALT 250 FOR IP): Performed by: THORACIC SURGERY (CARDIOTHORACIC VASCULAR SURGERY)

## 2021-09-08 PROCEDURE — 6370000000 HC RX 637 (ALT 250 FOR IP): Performed by: STUDENT IN AN ORGANIZED HEALTH CARE EDUCATION/TRAINING PROGRAM

## 2021-09-08 PROCEDURE — 2500000003 HC RX 250 WO HCPCS: Performed by: INTERNAL MEDICINE

## 2021-09-08 PROCEDURE — 83735 ASSAY OF MAGNESIUM: CPT

## 2021-09-08 PROCEDURE — 71045 X-RAY EXAM CHEST 1 VIEW: CPT

## 2021-09-08 PROCEDURE — P9045 ALBUMIN (HUMAN), 5%, 250 ML: HCPCS | Performed by: NURSE PRACTITIONER

## 2021-09-08 PROCEDURE — 36415 COLL VENOUS BLD VENIPUNCTURE: CPT

## 2021-09-08 PROCEDURE — 6370000000 HC RX 637 (ALT 250 FOR IP): Performed by: INTERNAL MEDICINE

## 2021-09-08 PROCEDURE — 2000000000 HC ICU R&B

## 2021-09-08 PROCEDURE — 6360000002 HC RX W HCPCS: Performed by: INTERNAL MEDICINE

## 2021-09-08 PROCEDURE — 2500000003 HC RX 250 WO HCPCS: Performed by: NURSE PRACTITIONER

## 2021-09-08 PROCEDURE — 85027 COMPLETE CBC AUTOMATED: CPT

## 2021-09-08 PROCEDURE — 80076 HEPATIC FUNCTION PANEL: CPT

## 2021-09-08 PROCEDURE — 80048 BASIC METABOLIC PNL TOTAL CA: CPT

## 2021-09-08 PROCEDURE — 82805 BLOOD GASES W/O2 SATURATION: CPT

## 2021-09-08 PROCEDURE — 74018 RADEX ABDOMEN 1 VIEW: CPT

## 2021-09-08 PROCEDURE — P9047 ALBUMIN (HUMAN), 25%, 50ML: HCPCS | Performed by: NURSE PRACTITIONER

## 2021-09-08 RX ORDER — ALBUMIN (HUMAN) 12.5 G/50ML
25 SOLUTION INTRAVENOUS ONCE
Status: COMPLETED | OUTPATIENT
Start: 2021-09-08 | End: 2021-09-08

## 2021-09-08 RX ORDER — ALBUMIN, HUMAN INJ 5% 5 %
25 SOLUTION INTRAVENOUS ONCE
Status: COMPLETED | OUTPATIENT
Start: 2021-09-08 | End: 2021-09-08

## 2021-09-08 RX ADMIN — DOBUTAMINE IN DEXTROSE 5 MCG/KG/MIN: 400 INJECTION, SOLUTION INTRAVENOUS at 20:33

## 2021-09-08 RX ADMIN — IPRATROPIUM BROMIDE AND ALBUTEROL SULFATE 1 AMPULE: .5; 2.5 SOLUTION RESPIRATORY (INHALATION) at 15:32

## 2021-09-08 RX ADMIN — IPRATROPIUM BROMIDE AND ALBUTEROL SULFATE 1 AMPULE: .5; 2.5 SOLUTION RESPIRATORY (INHALATION) at 12:04

## 2021-09-08 RX ADMIN — HYDROCORTISONE SODIUM SUCCINATE 100 MG: 100 INJECTION, POWDER, FOR SOLUTION INTRAMUSCULAR; INTRAVENOUS at 04:03

## 2021-09-08 RX ADMIN — IPRATROPIUM BROMIDE AND ALBUTEROL SULFATE 1 AMPULE: .5; 2.5 SOLUTION RESPIRATORY (INHALATION) at 19:50

## 2021-09-08 RX ADMIN — DEXTROSE MONOHYDRATE 12.5 G: 25 INJECTION, SOLUTION INTRAVENOUS at 09:08

## 2021-09-08 RX ADMIN — VASOPRESSIN 0.04 UNITS/MIN: 20 INJECTION INTRAVENOUS at 17:16

## 2021-09-08 RX ADMIN — HYDROCORTISONE SODIUM SUCCINATE 100 MG: 100 INJECTION, POWDER, FOR SOLUTION INTRAMUSCULAR; INTRAVENOUS at 12:10

## 2021-09-08 RX ADMIN — 0.12% CHLORHEXIDINE GLUCONATE 15 ML: 1.2 RINSE ORAL at 08:58

## 2021-09-08 RX ADMIN — VASOPRESSIN 0.04 UNITS/MIN: 20 INJECTION INTRAVENOUS at 00:51

## 2021-09-08 RX ADMIN — Medication 10 ML: at 20:31

## 2021-09-08 RX ADMIN — SODIUM CHLORIDE, PRESERVATIVE FREE 10 ML: 5 INJECTION INTRAVENOUS at 08:58

## 2021-09-08 RX ADMIN — DOCUSATE SODIUM 100 MG: 50 LIQUID ORAL at 20:31

## 2021-09-08 RX ADMIN — MEROPENEM 1000 MG: 1 INJECTION, POWDER, FOR SOLUTION INTRAVENOUS at 05:05

## 2021-09-08 RX ADMIN — INSULIN LISPRO 2 UNITS: 100 INJECTION, SOLUTION INTRAVENOUS; SUBCUTANEOUS at 20:30

## 2021-09-08 RX ADMIN — BISACODYL 10 MG: 10 SUPPOSITORY RECTAL at 20:31

## 2021-09-08 RX ADMIN — AMIODARONE HYDROCHLORIDE 0.25 MG/MIN: 50 INJECTION, SOLUTION INTRAVENOUS at 18:27

## 2021-09-08 RX ADMIN — Medication 60 MCG/HR: at 12:53

## 2021-09-08 RX ADMIN — BISACODYL 10 MG: 10 SUPPOSITORY RECTAL at 08:58

## 2021-09-08 RX ADMIN — ASPIRIN 81 MG CHEWABLE TABLET 81 MG: 81 TABLET CHEWABLE at 08:58

## 2021-09-08 RX ADMIN — HEPARIN SODIUM 12.5 UNITS/KG/HR: 10000 INJECTION, SOLUTION INTRAVENOUS at 09:10

## 2021-09-08 RX ADMIN — 0.12% CHLORHEXIDINE GLUCONATE 15 ML: 1.2 RINSE ORAL at 20:31

## 2021-09-08 RX ADMIN — POLYETHYLENE GLYCOL 3350 17 G: 17 POWDER, FOR SOLUTION ORAL at 09:01

## 2021-09-08 RX ADMIN — SENNOSIDES 5 ML: 8.8 SYRUP ORAL at 20:31

## 2021-09-08 RX ADMIN — ALBUMIN (HUMAN) 25 G: 12.5 INJECTION, SOLUTION INTRAVENOUS at 08:34

## 2021-09-08 RX ADMIN — IPRATROPIUM BROMIDE AND ALBUTEROL SULFATE 1 AMPULE: .5; 2.5 SOLUTION RESPIRATORY (INHALATION) at 06:48

## 2021-09-08 RX ADMIN — INSULIN LISPRO 2 UNITS: 100 INJECTION, SOLUTION INTRAVENOUS; SUBCUTANEOUS at 09:40

## 2021-09-08 RX ADMIN — SODIUM CHLORIDE: 9 INJECTION, SOLUTION INTRAVENOUS at 14:07

## 2021-09-08 RX ADMIN — Medication 14 MCG/MIN: at 02:04

## 2021-09-08 RX ADMIN — MIDAZOLAM 2 MG/HR: 5 INJECTION INTRAMUSCULAR; INTRAVENOUS at 02:06

## 2021-09-08 RX ADMIN — INSULIN LISPRO 2 UNITS: 100 INJECTION, SOLUTION INTRAVENOUS; SUBCUTANEOUS at 16:12

## 2021-09-08 RX ADMIN — PANTOPRAZOLE SODIUM 40 MG: 40 INJECTION, POWDER, FOR SOLUTION INTRAVENOUS at 08:58

## 2021-09-08 RX ADMIN — HYDROCORTISONE SODIUM SUCCINATE 100 MG: 100 INJECTION, POWDER, FOR SOLUTION INTRAMUSCULAR; INTRAVENOUS at 20:35

## 2021-09-08 RX ADMIN — SODIUM CHLORIDE: 9 INJECTION, SOLUTION INTRAVENOUS at 02:05

## 2021-09-08 RX ADMIN — Medication 10 ML: at 08:58

## 2021-09-08 RX ADMIN — DOCUSATE SODIUM 100 MG: 50 LIQUID ORAL at 09:01

## 2021-09-08 RX ADMIN — VASOPRESSIN 0.04 UNITS/MIN: 20 INJECTION INTRAVENOUS at 09:13

## 2021-09-08 RX ADMIN — INSULIN LISPRO 2 UNITS: 100 INJECTION, SOLUTION INTRAVENOUS; SUBCUTANEOUS at 12:10

## 2021-09-08 RX ADMIN — ALBUMIN (HUMAN) 25 G: 0.25 INJECTION, SOLUTION INTRAVENOUS at 15:31

## 2021-09-08 RX ADMIN — ERTAPENEM SODIUM 1000 MG: 1 INJECTION, POWDER, LYOPHILIZED, FOR SOLUTION INTRAMUSCULAR; INTRAVENOUS at 12:57

## 2021-09-08 ASSESSMENT — PULMONARY FUNCTION TESTS
PIF_VALUE: 27
PIF_VALUE: 32
PIF_VALUE: 30
PIF_VALUE: 30
PIF_VALUE: 28
PIF_VALUE: 26
PIF_VALUE: 29
PIF_VALUE: 25
PIF_VALUE: 30
PIF_VALUE: 26
PIF_VALUE: 30
PIF_VALUE: 31
PIF_VALUE: 29
PIF_VALUE: 26
PIF_VALUE: 27
PIF_VALUE: 31
PIF_VALUE: 26
PIF_VALUE: 30
PIF_VALUE: 29
PIF_VALUE: 35
PIF_VALUE: 28
PIF_VALUE: 30
PIF_VALUE: 27
PIF_VALUE: 31
PIF_VALUE: 30
PIF_VALUE: 27
PIF_VALUE: 29
PIF_VALUE: 28
PIF_VALUE: 29
PIF_VALUE: 28
PIF_VALUE: 26
PIF_VALUE: 30
PIF_VALUE: 28
PIF_VALUE: 30
PIF_VALUE: 30
PIF_VALUE: 28
PIF_VALUE: 27
PIF_VALUE: 30
PIF_VALUE: 26
PIF_VALUE: 28
PIF_VALUE: 27
PIF_VALUE: 32
PIF_VALUE: 30
PIF_VALUE: 28
PIF_VALUE: 31
PIF_VALUE: 30

## 2021-09-08 ASSESSMENT — PAIN SCALES - GENERAL: PAINLEVEL_OUTOF10: 0

## 2021-09-08 NOTE — PROGRESS NOTES
[facility]  Nephrology Attending Progress Note  Aldo Paredes. Barb Chaidez MD        SUBJECTIVE:     9/3/21: Raissa Gaytan is a 80 y.o. female with h/o HFpEF, refractory A fib  s/p DCCV X 2, hypertension, hyperlipidemia and other medical problems listed below. She presented for elective CABG on 8/25 following ischemic work-up and C showing  MV CAD. 6/8/21. Procedure was performed on 8/25. Her postop course has been complicated by acute respiratory failure, atrial fibrillation with RVR and intermittent fevers. She has required Zosyn. Preop creatinine has been 1-1.2 which is her recent baseline. In the last 48 hours creatinine has shown a progressive increase to currently 1.8 mg/dL associated with decreasing urine output. She has received albumin bolus but without any significant improvement. Hence renal consult.     9/4/21: pt seen in icu, has low urine output, just received iv albumin    9/5/21: seen in icu, started on ns at 76 yesterday. Remains with low uo.      9/6/21: pt reintubated and started on levo for hypotension, now hypothermic and with elevated wbc    9/7: seen in icu, uo increasing, intubated, starting dopa, on amio, vaso, levo    9/8: pt remains in icu, on dopamine, vaso, levo, amio, and heparin, intubated      PROBLEM LIST:    Patient Active Problem List   Diagnosis    Spinal stenosis in cervical region    Spinal stenosis, lumbar region, without neurogenic claudication    Essential hypertension    Mixed hyperlipidemia    DM (diabetes mellitus) ([address]    Renal artery aneurysm (HCC)    PAF (paroxysmal atrial fibrillation) (HCC)    Anemia    Malignant neoplasm of cecum ([address]    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)    Chronic heart failure with preserved ejection fraction (HCC)    Atrial fibrillation with RVR (HCC)    Severe protein-calorie malnutrition (HCC)    Abnormal nuclear stress test    Opioid use, unspecified with unspecified opioid-induced disorder    Opioid dependence with unspecified opioid-induced disorder    Opioid dependence, uncomplicated    CAD in native artery    Pre-operative laboratory examination    Acute blood loss anemia    Leukocytosis    At risk for malnutrition    Hypotension        PAST MEDICAL HISTORY:    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[facility] 01/2019    Anemia     Arm numbness     Arthritis     Blood transfusion     Cervical spinal stenosis     Cirrhosis of liver (HCC)     Diabetes mellitus ([facility]     Fatty liver     GERD (gastroesophageal reflux disease)     Hyperlipidemia     Hypertension     Low back pain     Lumbar stenosis     Neck pain     Obesity (BMI 30.0-34.9) 10/14/2012    PAF (paroxysmal atrial fibrillation) (HCC)     Renal artery aneurysm ([facility] 7/15/2015       DIET:    Diet NPO Exceptions are: Sips of Water with Meds  ADULT TUBE FEEDING; Orogastric; Immune Enhancing; Continuous; 10; Yes; 10; Q 4 hours; 20; 30; Q 4 hours     PHYSICAL EXAM:     Patient Vitals for the past 24 hrs:   BP Temp Temp src Pulse Resp SpO2 Height   09/08/21 1030 -- -- -- 81 -- 96 % --   09/08/21 1000 -- 97.7 °F (36.5 °C) Esophageal 81 15 96 % --   09/08/21 0954 -- -- -- 81 -- 95 % --   09/08/21 0930 -- -- -- 82 -- 96 % --   09/08/21 0900 -- -- -- 82 -- 96 % --   09/08/21 0847 -- -- -- 84 -- -- --   09/08/21 0837 -- -- -- 82 -- 95 % --   09/08/21 0832 -- -- -- 83 -- 94 % --   09/08/21 0830 -- -- -- 85 -- 94 % --   09/08/21 0800 -- 98.1 °F (36.7 °C) Esophageal 86 14 93 % --   09/08/21 0730 -- 98.1 °F (36.7 °C) Esophageal 84 14 93 % --   09/08/21 0700 -- -- -- 83 -- 94 % --   09/08/21 0630 -- -- -- 82 -- 93 % --   09/08/21 0600 -- 98.4 °F (36.9 °C) Esophageal 84 18 95 % --   09/08/21 0530 -- -- -- 84 16 95 % --   09/08/21 0500 -- -- -- 83 14 95 % --   09/08/21 0430 -- -- -- 83 -- 95 % --   09/08/21 0400 -- 98.4 °F (36.9 °C) Esophageal 85 15 95 % --   09/08/21 0330 -- -- -- 85 -- 95 % -- 09/08/21 0300 -- -- -- 85 14 94 % --   09/08/21 0230 -- -- -- 86 -- 96 % --   09/08/21 0200 -- 98.2 °F (36.8 °C) Esophageal 86 14 95 % --   09/08/21 0130 -- -- -- 87 -- 95 % --   09/08/21 0100 -- -- -- 87 14 95 % --   09/08/21 0030 -- -- -- 87 -- 95 % --   09/08/21 0000 (!) 108/47 98.4 °F (36.9 °C) Esophageal 88 14 95 % --   09/07/21 2330 -- -- -- 88 -- 96 % --   09/07/21 2300 -- -- -- 88 -- 95 % --   09/07/21 2230 -- -- -- 89 -- 95 % --   09/07/21 2212 -- -- -- 90 -- 95 % --   09/07/21 2200 -- 98.6 °F (37 °C) Esophageal 90 14 94 % --   09/07/21 2130 -- -- -- 89 -- 93 % --   09/07/21 2100 -- -- -- 88 14 93 % --   09/07/21 2030 -- -- -- 87 -- 97 % --   09/07/21 2022 -- -- -- -- -- 93 % --   09/07/21 2000 -- 98.4 °F (36.9 °C) Esophageal 88 14 94 % --   09/07/21 1930 -- -- -- 88 -- 93 % --   09/07/21 1900 -- -- -- 91 15 92 % --   09/07/21 1830 -- -- -- 93 -- -- --   09/07/21 1800 -- 98.6 °F (37 °C) Esophageal 89 15 92 % --   09/07/21 1730 -- -- -- 88 -- 94 % --   09/07/21 1700 -- -- -- 87 14 94 % --   09/07/21 1641 -- -- -- 87 -- 95 % --   09/07/21 1630 -- -- -- 87 -- 95 % --   09/07/21 1600 -- 98.4 °F (36.9 °C) Esophageal 88 14 94 % --   09/07/21 1545 (!) 101/48 98.4 °F (36.9 °C) Esophageal 88 14 91 % --   09/07/21 1530 (!) 104/49 98.2 °F (36.8 °C) Esophageal 89 14 91 % --   09/07/21 1515 (!) 105/52 98.2 °F (36.8 °C) Esophageal 91 14 91 % --   09/07/21 1501 (!) 104/51 98.4 °F (36.9 °C) Esophageal 91 14 91 % --   09/07/21 1430 -- -- -- 91 -- 91 % --   09/07/21 1429 -- -- -- -- -- -- 5' 2\" (1.575 m)   09/07/21 1400 -- 98.4 °F (36.9 °C) Esophageal 86 15 91 % --   09/07/21 1330 -- -- -- 94 -- 90 % --   09/07/21 1300 -- -- -- 79 16 92 % --   09/07/21 1230 -- -- -- 80 -- 91 % --   09/07/21 1200 139/65 98.6 °F (37 °C) Esophageal 79 14 92 % --   09/07/21 1136 -- -- -- 79 -- 93 % --   09/07/21 1130 -- -- -- 80 -- 93 % --   @      Intake/Output Summary (Last 24 hours) at 9/8/2021 1100  Last data filed at 9/8/2021 1000  Gross 1336)    sodium chloride      sodium chloride      sodium chloride      sodium chloride 30 mL/hr (08/31/21 0946)    sodium chloride      dextrose         MEDS (prn):  perflutren lipid microspheres, sodium chloride, oxyCODONE **OR** [DISCONTINUED] oxyCODONE, benzocaine-menthol, sodium chloride, sodium chloride flush, sodium chloride, heparin flush, LORazepam, sodium chloride, ondansetron, acetaminophen, acetaminophen, magnesium hydroxide, potassium chloride, magnesium sulfate, albumin human, sodium chloride, glucose, dextrose, glucagon (rDNA), dextrose, calcium gluconate IVPB    DATA:    Recent Labs     09/06/21  1305 09/06/21  1305 09/07/21  0510 09/07/21  0510 09/07/21  1130 09/07/21  1600 09/08/21  0500   WBC 22.8*  --  23.4*  --   --   --  18.4*   HGB 8.7*   < > 8.5*   < > 7.6* 8.1* 8.6*   HCT 28.6*   < > 27.2*   < > 24.2* 26.1* 26.6*   MCV 86.9  --  85.0  --   --   --  83.6     --  330  --   --   --  124*    < > = values in this interval not displayed.      Recent Labs     09/06/21  0500 09/06/21  0500 09/06/21  1305 09/06/21  1305 09/07/21  0510 09/07/21  0510 09/07/21  1130 09/07/21  1600 09/08/21  0500   *   < > 135   < > 133   < > 135 133 136   K 4.4   < > 4.3  --  3.7   < > 3.6 3.7 3.9   CL 96*   < > 98   < > 95*   < > 95* 97* 99   CO2 18*   < > 17*   < > 23   < > 19* 25 23   BUN 87*   < > 86*   < > 74*   < > 64* 61* 51*   CREATININE 2.8*   < > 2.9*   < > 2.2*   < > 1.9* 1.9* 1.5*   LABGLOM 16   < > 16   < > 21   < > 25 25 33   GLUCOSE 105*   < > 138*   < > 205*   < > 197* 211* 103*   CALCIUM 8.7   < > 8.8   < > 8.5*   < > 8.4* 8.2* 8.5*   *  --   --   --  81*  --   --   --  55*   AST 37*  --   --   --  35*  --   --   --  24   BILIDIR 0.8*  --   --   --  1.0*  --   --   --  0.8*   BILITOT 1.7*  --   --   --  2.6*  --   --   --  2.8*   ALKPHOS 85  --   --   --  101  --   --   --  79   MG 2.8*  --   --   --  2.4  --   --   --  2.4   PHOS  --   --  4.5  --   --   --   --   --   -- < > = values in this interval not displayed. Lab Results   Component Value Date    LABALBU 4.0 09/08/2021    LABALBU 3.9 09/07/2021    LABALBU 4.0 09/06/2021     Lab Results   Component Value Date    TSH 2.250 06/21/2021       Iron Studies  No results found for: IRON, TIBC, FERRITIN  No results found for: [id number]  No results found for: FOLATE    No results found for: VITD25  No results found for: PTH    No components found for: URIC    Lab Results   Component Value Date    COLORU Yellow 08/28/2021    LABPH 0 10/22/2019    NITRU Negative 08/28/2021    GLUCOSEU Negative 08/28/2021    KETUA TRACE 08/28/2021    UROBILINOGEN 0.2 08/28/2021    BILIRUBINUR Negative 08/28/2021       No results found for: Marce Ann      IMPRESSION/RECOMMENDATIONS:      1. Acute kidney injury stage I superimposed on CKD stage IIIa  BROOKE prerenal  FEurea 16% and FENa .04%, volume depletion  uo 2.2L  Cr 1.1>1.9>2.5>2.8>2.2>1.5  pola no hydro  Continue ivf     2. CAD s/p CABG X 3 . 8/25  CO 2.2, CI 1.7  Starting dopa     3. Acute postop respiratory failure  Intubated 9/5  Worsening wbc and hypotension  Id following  Started merrem for hap  Remains on levo and vaso     4. A fib RVR  On amio     5. Metabolic acidosis  On hco3 drip  Improved  Now off    6. Hyponatremia  Continue ivf  resolved    Davy Jimenes.  Iza Zafar MD

## 2021-09-08 NOTE — FLOWSHEET NOTE
Pt remains in bilateral soft wrist restaints aggitated restless at times unable to redirect or focus

## 2021-09-08 NOTE — PROGRESS NOTES
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, GERD, h/o adenocarcinoma of cecum, arthritis, spinal stenosis, former smoker, obesity      Procedure/Surgeries: 8/25/2021 CABG x3 (LIMA-LAD, SVG-Ramus, SVG-OM), LAAL, EVH, KLS plating      Pacing wires: Ventricular        Intake/Output Summary (Last 24 hours) at 9/8/2021 0814  Last data filed at 9/8/2021 0700  Gross per 24 hour   Intake 5591.11 ml   Output 2165 ml   Net 3426.11 ml       Recent Labs     09/06/21  1305 09/06/21  1305 09/07/21  0510 09/07/21  0510 09/07/21  1130 09/07/21  1600 09/08/21  0500   WBC 22.8*  --  23.4*  --   --   --  18.4*   HGB 8.7*   < > 8.5*   < > 7.6* 8.1* 8.6*   HCT 28.6*   < > 27.2*   < > 24.2* 26.1* 26.6*     --  330  --   --   --  124*    < > = values in this interval not displayed. Lab Results   Component Value Date     09/08/2021    K 3.9 09/08/2021    K 4.3 09/06/2021    CL 99 09/08/2021    CO2 23 09/08/2021    BUN 51 09/08/2021    CREATININE 1.5 09/08/2021    GLUCOSE 103 09/08/2021    CALCIUM 8.5 09/08/2021         Physical Exam:    BP (!) 108/47   Pulse 84   Temp 98.4 °F (36.9 °C) (Esophageal)   Resp 18   Ht 5' 2\" (1.575 m)   Wt 210 lb 5.1 oz (95.4 kg)   LMP  (LMP Unknown)   SpO2 93%   BMI 38.47 kg/m²       CXR Findings: 9/8/2021     FINDINGS:   Stable support lines.  Bilateral infiltrate and or atelectasis is not   appreciably changed.  Stable cardiomediastinal silhouette. - CXR personally viewed and interpreted by ICU Nurse Practitioner, agree with above findings     General: Intubated, sedated. On dobutamine, vaso, levo, RHI, Amio gtts. Eyes: PERRL, anicteric   Pulmonary: Diminished bibasilar.  No wheezes, no accessory muscle use noted Ventilator: Mode: AC/VC, 40% FiO2, 5 PEEP, 450 Vt   Cardiovascular:  RRR, no heaves or thrills on palpation  Tele: SR 80s  Abdomen: Soft, slightly distended, hypoactive BS, +BM   Extremities: Palpable pulses all extremities, generalized edema   Neurologic/Psych: Intubated, sedated   Skin: Warm and dry  Incisions: MSI well approximated, RLE SVG sites with staples intact, well approximated     Lines:   ETT: 9/5  Rt Radial Arterial line: 9/5  Pino 8/25    Assessment/Plan: POD #14    1. CAD, PAF S/p CABG x3 (LIMA-LAD, SVG-Ramus, SVG-OM)  LAAL  - ASA, Lipitor on hold   - PICC placed 8/30     2. Paroxysmal Atrial Fibrillation  - s/p LAAL   - On Xarelto preop for PAF  - post op with PAF, Remains SR on amio gtt  - Decrease amio gtt to 0.25 given LFTs  - Heparin gtt started 9/7      3. Acute Hypoxic Respiratory failure   - former smoker, 2/2 surgery, extubated DOS,? Aspiration 8/27, HAP  - postop course c/b hypoxia required prolonged high flow oxygen  - s/p bronch 9/1, RLL, RML mucous plugging; f/u pathology   -7 day course Zosyn completed 9/2, On Merrem per ID   - Reintubated 9/5   - Pulmonary following    - Supportive care, likely trach once pressor requirements improve      4. Acute Post Operative Pain   - Sedation with Versed and fentanyl gtts     5. DM Type 2  - HgbA1C 6.2%  - RHI gtt for glycemic control for BG >200, SSI when off gtt      6.  Hypotension   - Suspected septic shock, likely HAP  - 9/7 Echo with normal LV function, Mildly dilated RV with mildly reduced RV function; started on Dobutamine gtt 9/7  - Levo weaned down to 8mcg, vaso at 0.04units   - Stress steroids started 9/7  - Antibiotics per ID   - IVF resuscitation PRN      7. Acute blood loss anemia  - expected blood loss 2/2 surgery, s/p one unit RBCs 8/26, 8/31, 9/7  - Hgb 8.6 s/p transfusion; monitor, transfuse PRN      8. Leukocytosis  - WBC 18.4K, Afebrile  - On systemic steroids   - f/u cultures from bronch  - ID following, Continue Merrem   - Respiratory Culture with light growth Candida albicans, light growth Klebsiella oxytoca, rare enterobacter cloacae  - follow 9/6 blood cultures  - Procalcitonin 0.31  - completed 7 day course of Zosyn 9/2      9. Anxiety  - On versed gtt, PRN Ativan 0.5mg q 4 hours      10. BROOKE superimposed on CKD   - Baseline Scr 0.9-1.2  - Scr down to 1.5 this AM, peaked at 2.9 9/6, Good UOP   - Nephrology following  - Bicarb gtt stopped yesterday, on NS @75cc/hr; BMP q 12 hours   - Monitor UOP, labs, avoid hypotension     11. Dysphagia/ At risk for malnutrition   - Suspected dysphagia, possible aspiration 8/27  - Bronch 9/1 airways with evidence of chronic aspiration   - MBS done 9/3, passed for soft and bite size consistency solids (dysphagia 3), thin liquids  - Start trickle TFs, monitor tolerance     12. Abdominal Distention/Elevated LFTs  - General Surgery following  - Tbili 2.8  - Abdominal exam improving, +BM overnight, CT abdomen when contrast from barium swallow out of colon   - Bowel regimen, repeat KUB today      13. Thrombocytopenia  - Plts 124K after started on heparin gtt, recheck CBC later today  - Monitor closely     This patient has a high probability of sudden deterioration, which requires preparedness to urgently intervene. I participated in the decision making process and managed the direct care of the patient that required frequent assessment and treatment. I personally spent 37 minutes of critical care time treating the patient. VTE Prophylaxis: Pharmacologic/Mechanical:  Yes, SCDs, Lovenox   Line infection prevention: Can CVC or arterial line be removed: no  Continued need for urinary catheter:  Yes - clinical indication: Patient post major surgery requiring fluid balance and input and output measurement.     Dispo: CVICU     Electronically signed by TEMO Almendarez - CNP on 9/8/2021 at 8:14 AM

## 2021-09-08 NOTE — PROGRESS NOTES
[facility]AL[facility] PROGRESS NOTE      Chief complaint: Follow-up of septic shock    The patient is a 80 y.o. female with history of hypertension, hyperlipidemia, paroxysmal atrial fibrillation, renal artery aneurysm, cirrhosis, DM, CKD, admitted on 08/25 for elective CABG. Post-op course was complicated by fever up to 101.1 F on 08/29 with worsening leukocytosis up to currently 22,000, atrial fibrillation with rapid ventricular response, hypoxia, BROOKE. CXR on admission showed bilateral airspace disease and small bilateral pleural effusion. She underwent bronchoscopy on 09/01 during which diffuse scattered mucosal hemorrhages throughout the trachea and  thick clear secretions occluding the RML and RLL bronchi were noted. Bronchial wash Gram stain and culture showed rare polymorphonuclear leukocytes, rare epithelial cells, rare yeast, absent oral pharyngeal lisy, moderate growth of Candida albicans. She was reintubated on 09/05 due to decreasing level of consciousness and increasing respiratory distress, accompanied by hypothermia and shock. CXR on 09/06 showed persistent bilateral parenchymal infiltrates and probable small effusions, essentially unchanged from that on admission. Procalcitonin level was elevated at 0.31 ng/mL. Respiratory Gram stain and culture on 09/06 showed few polymorphonuclear leukocytes, few epithelial cells, no organisms, light growth of yeast, Klebsiella oxytoca (non-ESBL; susceptible to cotrimoxazole and fluoroquinolones) and Enterobacter cloacae (susceptible to cotrimoxazole and fluoroquinolones). Urine Streptococcus pneumoniae and Legionella antigens were negative. SARS-CoV-2 PCR was not detected. She received cefazolin from 08/25-08/27, piperacillin-tazobactam from 08/27-09/02. Meropenem was started on 09/06. Subjective: Patient was seen and examined. She remains in critical condition, intubated, sedated, on vasopressor and inotropic support.     Objective:  BP (!) 108/47   Pulse 81   Temp 97.7 °F (36.5 °C) (Esophageal)   Resp 15   Ht 5' 2\" (1.575 m)   Wt 210 lb 5.1 oz (95.4 kg)   LMP  (LMP Unknown)   SpO2 96%   BMI 38.47 kg/m²   Constitutional: Intubated, no MV dyssynchrony  Respiratory: Clear breath sounds, no crackles, no wheezes  Cardiovascular: Regular rate and rhythm, no murmurs  Gastrointestinal: Bowel sounds present, soft, distended   Skin: Warm and dry, no active dermatoses  Musculoskeletal: No joint swelling, no joint erythema    Labs, imaging, and medical records/notes were personally reviewed. Assessment:  Shock, presumed septic, etiology unclear, suspect HAP  Acute hypoxic respiratory failure  CAD s/p CABG on 08/25  BROOKE on CKD    Recommendations:  Change meropenem to ertapenem 1g q24h. Follow up blood cultures. Follow up serum beta D glucan.     Thank you for involving me in the care of [address] [place] [address]. I will continue to follow. Please do not hesitate to call for any questions or concerns.     Electronically signed by Joana Gonzalez MD on 9/8/2021 at 10:35 AM

## 2021-09-08 NOTE — PLAN OF CARE
Problem: Falls - Risk of:  Goal: Will remain free from falls  9/7/2021 2328 by Nelda Chaudhary RN  Outcome: Met This Shift     Problem: Falls - Risk of:  Goal: Absence of physical injury  Outcome: Met This Shift     Problem: Skin Integrity:  Goal: Will show no infection signs and symptoms  Outcome: Met This Shift     Problem: Non-Violent Restraints  Goal: No harm/injury to patient while restraints in use  9/7/2021 2328 by Nelda Chaudhary RN  Outcome: Met This Shift     Problem: Non-Violent Restraints  Goal: Patient's dignity will be maintained  9/7/2021 2328 by Nelda Chaudhary RN  Outcome: Met This Shift     Problem: Non-Violent Restraints  Goal: Removal from restraints as soon as assessed to be safe  9/7/2021 2328 by Nelda Chaudhary RN  Outcome: Not Met This Shift  9/7/2021 1042 by Farhad Rosa RN  Outcome: Ongoing

## 2021-09-08 NOTE — FLOWSHEET NOTE
Pt remains in 2 point soft wrist restaints pt restless at times attempting to pull lines and tubes unable to redirect or focus

## 2021-09-08 NOTE — PROGRESS NOTES
OCCUPATIONAL THERAPY    Date:2021  Patient Name: Shadi Zabala  MRN: [id number]  : 1940  Room: [address] [place] [address]              Chart reviewed. Pt on hold today due to medical status. Will re-attempt at later time.      Isabell Velarde, OTR/L [id number]

## 2021-09-08 NOTE — PROGRESS NOTES
Physical Therapy  Physical Therapy Attempt    Name: Brittney Ramírez  : [birth year]  MRN: [id number]      Date of Service: 2021  Chart reviewed. Spoke with NP - pt is not medically appropriate for skilled PT at this time. Will re-attempt as able.     Cierra Saldana, PT, DPT  [id number]

## 2021-09-08 NOTE — PLAN OF CARE
Problem: Gas Exchange - Impaired:  Goal: Levels of oxygenation will improve  Description: Levels of oxygenation will improve  Outcome: Met This Shift  Note: Monitor abgs and sao2 monitor cxr suction pt as needed , encourage pt to cough and deep breathe     Problem: Gas Exchange - Impaired:  Goal: Levels of oxygenation will improve  Description: Levels of oxygenation will improve  Outcome: Met This Shift  Note: Monitor abgs and sao2 monitor cxr suction pt as needed , encourage pt to cough and deep breathe     Problem: Non-Violent Restraints  Goal: No harm/injury to patient while restraints in use  9/8/2021 0825 by Andreia Thomas RN  Outcome: Met This Shift  Note: [address] done pt repositioned as jesus elbows and heels elevated off bed  9/7/2021 2328 by James Amezquita RN  Outcome: Met This Shift     Problem: Non-Violent Restraints  Goal: Patient's dignity will be maintained  9/8/2021 0825 by Andreia Thomas RN  Outcome: Met This Shift  Note: Dignity maintained reason for restraints explained to pt and family .  Restraints released prn for rom   9/7/2021 2328 by James Amezquita RN  Outcome: Met This Shift     Problem: Non-Violent Restraints  Goal: Removal from restraints as soon as assessed to be safe  9/8/2021 0825 by Andreia Thomas RN  Outcome: Not Met This Shift  Note: Pt remains in 2 point soft wrist restraints pt restless and aggitated at times unable to redirect or focus, attempting to pull lines and tubes

## 2021-09-08 NOTE — PROGRESS NOTES
ertapenem (INVanz) IVPB  1,000 mg IntraVENous Q24H    insulin lispro  0-12 Units SubCUTAneous Q4H    albumin human  25 g IntraVENous Once    hydrocortisone sodium succinate PF  100 mg IntraVENous Q8H    polyethylene glycol  17 g Oral Daily    chlorhexidine  15 mL Mouth/Throat BID    albumin human  25 g IntraVENous Once    aspirin  81 mg Oral Daily    pantoprazole  40 mg IntraVENous Daily    And    sodium chloride (PF)  10 mL IntraVENous Daily    docusate sodium  100 mg Oral BID    sennosides  5 mL Oral Nightly    sodium chloride flush  5-40 mL IntraVENous 2 times per day    heparin flush  3 mL IntraVENous 2 times per day    bisacodyl  10 mg Rectal BID    [Held by provider] metoprolol tartrate  12.5 mg Oral BID    [Held by provider] atorvastatin  10 mg Oral Nightly    pramipexole  0.5 mg Oral TID    ipratropium-albuterol  1 ampule Inhalation Q4H WA       Physical Exam:  General Appearance: Intubated, currently appears restless  HEENT: Normocephalic atraumatic without obvious abnormality   Neck: Lips, mucosa, and tongue normal.  Supple, symmetrical, trachea midline, no adenopathy;thyroid:  no enlargement/tenderness/nodules or JVD. ETT tube inplace  Lung: Diffuse bilateral lung sounds  heart: RRR, normal S1, S2. No MRG  Abdomen: Distended and slightly tympanic  Extremities: Pedal pulses 2+ symmetric b/l. Extremities normal, no cyanosis, clubbing, or edema. Musculokeletal: No joint swelling, no muscle tenderness. ROM normal in all joints of extremities. Neurologic: Cranial nerves II to XII grossly intact.  sedated    Pertinent/ New Labs and Imaging Studies     Imaging Personally Reviewed:    Narrative   EXAMINATION:   ONE XRAY VIEW OF THE CHEST       9/6/2021 7:22 am       COMPARISON:   09/05/2021       HISTORY:   ORDERING SYSTEM PROVIDED HISTORY: resp failure   TECHNOLOGIST PROVIDED HISTORY:   Reason for exam:->resp failure   What reading provider will be dictating this exam?->CRC       FINDINGS: EKG leads overlie the chest.  Support tubes and lines remain in place. Bilateral parenchymal infiltrates and probable small effusions persist.   These demonstrate no definite interval change allowing for differences in   positioning.           Impression   No significant change.  Continued follow-up recommended. ECHO  9/7/2021  Left ventricular size is grossly normal.   Ejection fraction is visually estimated at 60 to 65%. The left atrium is mild-moderately dilated. Mildly dilated right ventricle. Right ventricle global systolic function is mildly reduced . Mildly enlarged right atrium size. The tricuspid valve was not well visualized. Severe tricuspid regurgitation. Labs:  Lab Results   Component Value Date    WBC 18.4 09/08/2021    HGB 8.6 09/08/2021    HCT 26.6 09/08/2021    MCV 83.6 09/08/2021    MCH 27.0 09/08/2021    MCHC 32.3 09/08/2021    RDW 17.0 09/08/2021     09/08/2021    MPV 8.9 09/08/2021     Lab Results   Component Value Date     09/08/2021    K 3.9 09/08/2021    K 4.3 09/06/2021    CL 99 09/08/2021    CO2 23 09/08/2021    BUN 51 09/08/2021    CREATININE 1.5 09/08/2021    LABALBU 4.0 09/08/2021    CALCIUM 8.5 09/08/2021    GFRAA 40 09/08/2021    LABGLOM 33 09/08/2021     Lab Results   Component Value Date    PROTIME 14.5 08/25/2021    INR 1.3 08/25/2021     No results for input(s): PROBNP in the last 72 hours. Recent Labs     09/06/21  1300   PROCAL 0.31*     This SmartLink has not been configured with any valid records. Micro:  Recent Labs     09/06/21  0830   CULTRESP Oral Pharyngeal Ernestine absent*  Light growth  Light growth  Rare growth     No results for input(s): LABGRAM in the last 72 hours. No results for input(s): LEGUR in the last 72 hours. Recent Labs     09/06/21  1305   STREPNEUMAGU Presumptive negative- suggests no current or recent  pneumococcal infection.   Infection due to Strep pneumoniae cannot be  ruled out since the antigen present Leukocytosis, probable sepsis septic shock  10. Runs of nonsustained V. Tach  11. High anion gap metabolic acidosis with respiratory alkalosis      Plan:   1. sedation regimen to Versed and fentanyl given her present hypotension and also dyssynchrony with the vent. 2. Continue pressors per CTS. She is currently on dobutamine, Levophed and vasopressin  3. Continue meropenem started at 1 g every 12 hours per infectious disease recommendations. Continue final culture results. 4.  Follow procalcitonin. 0.31  5. General surgery consulted for abdominal distention elevation of LFTs. 6. Check electrolytes and place for potassium above 4 and mag above 2  7. Amiodarone drip per CTS surgery  8. Echo completed, see above  9. Hydrocortisone started 100mg q8 for cortisol level 11.97        Electronically signed by TEMO Briggs CNP on 9/8/2021 at 11:49 AM      I personally saw, examined, and cared for the patient. Labs, medications, radiographs reviewed. I agree with history exam and plans detailed in NP note.   Albania Man MD

## 2021-09-08 NOTE — FLOWSHEET NOTE
Patient's arterial line is dampened at this time- reading 93/43 with a map of 64. Cuff pressure reads 128/56 with a map of 92. Will continue to monitor blood pressure via cuff and arterial line.

## 2021-09-08 NOTE — CARE COORDINATION
SOCIAL WORK/CASEMANAGEMENT TRANSITION OF CARE [id number] Tracy Rhoades, [address], [place], [phone]): I met with spouse in the room this a.m. provided him with ltac list and morteza list and went over them with him. I had select and vibra follow pt as of today. Pt has medicare and they reside in [place]. Sw/cm to follow.  ADRIAN Ramos  9/8/2021

## 2021-09-08 NOTE — FLOWSHEET NOTE
Pt reaches for ETT and other invasive lines when unrestrained despite redirection efforts. Education provided with no change in behavior. Bilateral soft wrist restraints continued for patient safety.

## 2021-09-09 ENCOUNTER — APPOINTMENT (OUTPATIENT)
Dept: GENERAL RADIOLOGY | Age: 81
DRG: 003 | End: 2021-09-09
Attending: THORACIC SURGERY (CARDIOTHORACIC VASCULAR SURGERY)
Payer: MEDICARE

## 2021-09-09 LAB
AADO2: 219.5 MMHG
ALBUMIN SERPL-MCNC: 4 G/DL (ref 3.5–5.2)
ALP BLD-CCNC: 66 U/L (ref 35–104)
ALT SERPL-CCNC: 39 U/L (ref 0–32)
ANION GAP SERPL CALCULATED.3IONS-SCNC: 10 MMOL/L (ref 7–16)
ANION GAP SERPL CALCULATED.3IONS-SCNC: 7 MMOL/L (ref 7–16)
APTT: 118 SEC (ref 24.5–35.1)
APTT: 44.6 SEC (ref 24.5–35.1)
APTT: 51.7 SEC (ref 24.5–35.1)
APTT: 77.1 SEC (ref 24.5–35.1)
AST SERPL-CCNC: 16 U/L (ref 0–31)
B.E.: -1.4 MMOL/L (ref -3–3)
BILIRUB SERPL-MCNC: 1.7 MG/DL (ref 0–1.2)
BILIRUBIN DIRECT: 0.7 MG/DL (ref 0–0.3)
BILIRUBIN, INDIRECT: 1 MG/DL (ref 0–1)
BLOOD BANK DISPENSE STATUS: NORMAL
BLOOD BANK DISPENSE STATUS: NORMAL
BLOOD BANK PRODUCT CODE: NORMAL
BLOOD BANK PRODUCT CODE: NORMAL
BPU ID: NORMAL
BPU ID: NORMAL
BUN BLDV-MCNC: 50 MG/DL (ref 6–23)
BUN BLDV-MCNC: 51 MG/DL (ref 6–23)
CALCIUM SERPL-MCNC: 8.1 MG/DL (ref 8.6–10.2)
CALCIUM SERPL-MCNC: 8.4 MG/DL (ref 8.6–10.2)
CHLORIDE BLD-SCNC: 102 MMOL/L (ref 98–107)
CHLORIDE BLD-SCNC: 99 MMOL/L (ref 98–107)
CO2: 27 MMOL/L (ref 22–29)
CO2: 27 MMOL/L (ref 22–29)
COHB: 0.8 % (ref 0–1.5)
CREAT SERPL-MCNC: 1.3 MG/DL (ref 0.5–1)
CREAT SERPL-MCNC: 1.4 MG/DL (ref 0.5–1)
CRITICAL: ABNORMAL
DATE ANALYZED: ABNORMAL
DATE OF COLLECTION: ABNORMAL
DESCRIPTION BLOOD BANK: NORMAL
DESCRIPTION BLOOD BANK: NORMAL
FIO2: 50 %
GFR AFRICAN AMERICAN: 44
GFR AFRICAN AMERICAN: 48
GFR NON-AFRICAN AMERICAN: 36 ML/MIN/1.73
GFR NON-AFRICAN AMERICAN: 39 ML/MIN/1.73
GLUCOSE BLD-MCNC: 157 MG/DL (ref 74–99)
GLUCOSE BLD-MCNC: 158 MG/DL (ref 74–99)
HCO3: 23.3 MMOL/L (ref 22–26)
HCT VFR BLD CALC: 25 % (ref 34–48)
HCT VFR BLD CALC: 28.6 % (ref 34–48)
HEMOGLOBIN: 7.7 G/DL (ref 11.5–15.5)
HEMOGLOBIN: 8.7 G/DL (ref 11.5–15.5)
HHB: 4.9 % (ref 0–5)
LAB: ABNORMAL
LACTIC ACID: 1.8 MMOL/L (ref 0.5–2.2)
Lab: ABNORMAL
MAGNESIUM: 2.3 MG/DL (ref 1.6–2.6)
MCH RBC QN AUTO: 26.7 PG (ref 26–35)
MCHC RBC AUTO-ENTMCNC: 30.8 % (ref 32–34.5)
MCV RBC AUTO: 86.8 FL (ref 80–99.9)
METER GLUCOSE: 112 MG/DL (ref 74–99)
METER GLUCOSE: 114 MG/DL (ref 74–99)
METER GLUCOSE: 117 MG/DL (ref 74–99)
METER GLUCOSE: 123 MG/DL (ref 74–99)
METER GLUCOSE: 124 MG/DL (ref 74–99)
METER GLUCOSE: 124 MG/DL (ref 74–99)
METER GLUCOSE: 126 MG/DL (ref 74–99)
METER GLUCOSE: 131 MG/DL (ref 74–99)
METER GLUCOSE: 135 MG/DL (ref 74–99)
METER GLUCOSE: 139 MG/DL (ref 74–99)
METER GLUCOSE: 144 MG/DL (ref 74–99)
METER GLUCOSE: 155 MG/DL (ref 74–99)
METER GLUCOSE: 175 MG/DL (ref 74–99)
METER GLUCOSE: 178 MG/DL (ref 74–99)
METER GLUCOSE: 184 MG/DL (ref 74–99)
METER GLUCOSE: 218 MG/DL (ref 74–99)
METER GLUCOSE: 225 MG/DL (ref 74–99)
METHB: 0.2 % (ref 0–1.5)
MODE: AC
O2 SATURATION: 95.1 % (ref 92–98.5)
O2HB: 94.1 % (ref 94–97)
OPERATOR ID: 1632
PATIENT TEMP: 37 C
PCO2: 38.7 MMHG (ref 35–45)
PDW BLD-RTO: 17.2 FL (ref 11.5–15)
PEEP/CPAP: 5 CMH2O
PFO2: 1.62 MMHG/%
PH BLOOD GAS: 7.4 (ref 7.35–7.45)
PLATELET # BLD: 80 E9/L (ref 130–450)
PLATELET CONFIRMATION: NORMAL
PMV BLD AUTO: 8.9 FL (ref 7–12)
PO2: 81 MMHG (ref 75–100)
POTASSIUM SERPL-SCNC: 3.7 MMOL/L (ref 3.5–5)
POTASSIUM SERPL-SCNC: 4.1 MMOL/L (ref 3.5–5)
RBC # BLD: 2.88 E12/L (ref 3.5–5.5)
RI(T): 2.71
RR MECHANICAL: 12 B/MIN
SODIUM BLD-SCNC: 136 MMOL/L (ref 132–146)
SODIUM BLD-SCNC: 136 MMOL/L (ref 132–146)
SOURCE, BLOOD GAS: ABNORMAL
THB: 8.9 G/DL (ref 11.5–16.5)
TIME ANALYZED: 520
TOTAL PROTEIN: 5.7 G/DL (ref 6.4–8.3)
VT MECHANICAL: 450 ML
WBC # BLD: 20.2 E9/L (ref 4.5–11.5)

## 2021-09-09 PROCEDURE — 85027 COMPLETE CBC AUTOMATED: CPT

## 2021-09-09 PROCEDURE — 86022 PLATELET ANTIBODIES: CPT

## 2021-09-09 PROCEDURE — 2580000003 HC RX 258: Performed by: INTERNAL MEDICINE

## 2021-09-09 PROCEDURE — 2000000000 HC ICU R&B

## 2021-09-09 PROCEDURE — 74018 RADEX ABDOMEN 1 VIEW: CPT

## 2021-09-09 PROCEDURE — 80048 BASIC METABOLIC PNL TOTAL CA: CPT

## 2021-09-09 PROCEDURE — 6360000002 HC RX W HCPCS: Performed by: INTERNAL MEDICINE

## 2021-09-09 PROCEDURE — 6360000002 HC RX W HCPCS: Performed by: THORACIC SURGERY (CARDIOTHORACIC VASCULAR SURGERY)

## 2021-09-09 PROCEDURE — 6370000000 HC RX 637 (ALT 250 FOR IP): Performed by: STUDENT IN AN ORGANIZED HEALTH CARE EDUCATION/TRAINING PROGRAM

## 2021-09-09 PROCEDURE — 82962 GLUCOSE BLOOD TEST: CPT

## 2021-09-09 PROCEDURE — 37799 UNLISTED PX VASCULAR SURGERY: CPT

## 2021-09-09 PROCEDURE — 36415 COLL VENOUS BLD VENIPUNCTURE: CPT

## 2021-09-09 PROCEDURE — 94640 AIRWAY INHALATION TREATMENT: CPT

## 2021-09-09 PROCEDURE — 94003 VENT MGMT INPAT SUBQ DAY: CPT

## 2021-09-09 PROCEDURE — 2500000003 HC RX 250 WO HCPCS: Performed by: INTERNAL MEDICINE

## 2021-09-09 PROCEDURE — 85018 HEMOGLOBIN: CPT

## 2021-09-09 PROCEDURE — 83605 ASSAY OF LACTIC ACID: CPT

## 2021-09-09 PROCEDURE — 6370000000 HC RX 637 (ALT 250 FOR IP): Performed by: THORACIC SURGERY (CARDIOTHORACIC VASCULAR SURGERY)

## 2021-09-09 PROCEDURE — 85014 HEMATOCRIT: CPT

## 2021-09-09 PROCEDURE — 6370000000 HC RX 637 (ALT 250 FOR IP): Performed by: NURSE PRACTITIONER

## 2021-09-09 PROCEDURE — 2580000003 HC RX 258: Performed by: NURSE PRACTITIONER

## 2021-09-09 PROCEDURE — 6370000000 HC RX 637 (ALT 250 FOR IP): Performed by: INTERNAL MEDICINE

## 2021-09-09 PROCEDURE — 6360000002 HC RX W HCPCS: Performed by: NURSE PRACTITIONER

## 2021-09-09 PROCEDURE — 99291 CRITICAL CARE FIRST HOUR: CPT | Performed by: NURSE PRACTITIONER

## 2021-09-09 PROCEDURE — 71045 X-RAY EXAM CHEST 1 VIEW: CPT

## 2021-09-09 PROCEDURE — 82805 BLOOD GASES W/O2 SATURATION: CPT

## 2021-09-09 PROCEDURE — 85730 THROMBOPLASTIN TIME PARTIAL: CPT

## 2021-09-09 PROCEDURE — 2580000003 HC RX 258: Performed by: THORACIC SURGERY (CARDIOTHORACIC VASCULAR SURGERY)

## 2021-09-09 PROCEDURE — C9113 INJ PANTOPRAZOLE SODIUM, VIA: HCPCS | Performed by: NURSE PRACTITIONER

## 2021-09-09 PROCEDURE — 83735 ASSAY OF MAGNESIUM: CPT

## 2021-09-09 PROCEDURE — 80076 HEPATIC FUNCTION PANEL: CPT

## 2021-09-09 PROCEDURE — 2500000003 HC RX 250 WO HCPCS: Performed by: NURSE PRACTITIONER

## 2021-09-09 RX ORDER — AMIODARONE HYDROCHLORIDE 200 MG/1
200 TABLET ORAL 2 TIMES DAILY
Status: DISCONTINUED | OUTPATIENT
Start: 2021-09-09 | End: 2021-09-16

## 2021-09-09 RX ORDER — ARGATROBAN 1 MG/ML
0.5 INJECTION INTRAVENOUS CONTINUOUS
Status: DISCONTINUED | OUTPATIENT
Start: 2021-09-09 | End: 2021-09-11

## 2021-09-09 RX ORDER — SODIUM CHLORIDE 9 MG/ML
INJECTION, SOLUTION INTRAVENOUS PRN
Status: DISCONTINUED | OUTPATIENT
Start: 2021-09-09 | End: 2021-09-15 | Stop reason: CLARIF

## 2021-09-09 RX ORDER — AMIODARONE HYDROCHLORIDE 200 MG/1
200 TABLET ORAL 3 TIMES DAILY
Status: DISCONTINUED | OUTPATIENT
Start: 2021-09-09 | End: 2021-09-09

## 2021-09-09 RX ADMIN — 0.12% CHLORHEXIDINE GLUCONATE 15 ML: 1.2 RINSE ORAL at 20:01

## 2021-09-09 RX ADMIN — 0.12% CHLORHEXIDINE GLUCONATE 15 ML: 1.2 RINSE ORAL at 09:16

## 2021-09-09 RX ADMIN — INSULIN LISPRO 2 UNITS: 100 INJECTION, SOLUTION INTRAVENOUS; SUBCUTANEOUS at 16:09

## 2021-09-09 RX ADMIN — IPRATROPIUM BROMIDE AND ALBUTEROL SULFATE 1 AMPULE: .5; 2.5 SOLUTION RESPIRATORY (INHALATION) at 12:24

## 2021-09-09 RX ADMIN — INSULIN LISPRO 2 UNITS: 100 INJECTION, SOLUTION INTRAVENOUS; SUBCUTANEOUS at 20:09

## 2021-09-09 RX ADMIN — Medication 10 ML: at 20:01

## 2021-09-09 RX ADMIN — DOCUSATE SODIUM 100 MG: 50 LIQUID ORAL at 20:01

## 2021-09-09 RX ADMIN — ARGATROBAN 0.5 MCG/KG/MIN: 50 INJECTION, SOLUTION INTRAVENOUS at 10:05

## 2021-09-09 RX ADMIN — AMIODARONE HYDROCHLORIDE 200 MG: 200 TABLET ORAL at 20:01

## 2021-09-09 RX ADMIN — HYDROCORTISONE SODIUM SUCCINATE 100 MG: 100 INJECTION, POWDER, FOR SOLUTION INTRAMUSCULAR; INTRAVENOUS at 12:51

## 2021-09-09 RX ADMIN — IPRATROPIUM BROMIDE AND ALBUTEROL SULFATE 1 AMPULE: .5; 2.5 SOLUTION RESPIRATORY (INHALATION) at 19:48

## 2021-09-09 RX ADMIN — SENNOSIDES 5 ML: 8.8 SYRUP ORAL at 20:01

## 2021-09-09 RX ADMIN — SODIUM CHLORIDE 4.77 UNITS/HR: 9 INJECTION, SOLUTION INTRAVENOUS at 00:22

## 2021-09-09 RX ADMIN — SODIUM CHLORIDE 6.2 UNITS/HR: 9 INJECTION, SOLUTION INTRAVENOUS at 02:46

## 2021-09-09 RX ADMIN — Medication 10 ML: at 09:17

## 2021-09-09 RX ADMIN — IPRATROPIUM BROMIDE AND ALBUTEROL SULFATE 1 AMPULE: .5; 2.5 SOLUTION RESPIRATORY (INHALATION) at 16:28

## 2021-09-09 RX ADMIN — VASOPRESSIN 0.04 UNITS/MIN: 20 INJECTION INTRAVENOUS at 00:48

## 2021-09-09 RX ADMIN — SODIUM CHLORIDE: 9 INJECTION, SOLUTION INTRAVENOUS at 16:41

## 2021-09-09 RX ADMIN — SODIUM CHLORIDE, PRESERVATIVE FREE 10 ML: 5 INJECTION INTRAVENOUS at 09:17

## 2021-09-09 RX ADMIN — HYDROCORTISONE SODIUM SUCCINATE 100 MG: 100 INJECTION, POWDER, FOR SOLUTION INTRAMUSCULAR; INTRAVENOUS at 05:41

## 2021-09-09 RX ADMIN — IPRATROPIUM BROMIDE AND ALBUTEROL SULFATE 1 AMPULE: .5; 2.5 SOLUTION RESPIRATORY (INHALATION) at 08:30

## 2021-09-09 RX ADMIN — ERTAPENEM SODIUM 1000 MG: 1 INJECTION, POWDER, LYOPHILIZED, FOR SOLUTION INTRAMUSCULAR; INTRAVENOUS at 12:51

## 2021-09-09 RX ADMIN — BISACODYL 10 MG: 10 SUPPOSITORY RECTAL at 09:17

## 2021-09-09 RX ADMIN — DOCUSATE SODIUM 100 MG: 50 LIQUID ORAL at 09:16

## 2021-09-09 RX ADMIN — POLYETHYLENE GLYCOL 3350 17 G: 17 POWDER, FOR SOLUTION ORAL at 09:16

## 2021-09-09 RX ADMIN — CALCIUM GLUCONATE 1000 MG: 98 INJECTION, SOLUTION INTRAVENOUS at 18:32

## 2021-09-09 RX ADMIN — VASOPRESSIN 0.03 UNITS/MIN: 20 INJECTION INTRAVENOUS at 19:00

## 2021-09-09 RX ADMIN — HYDROCORTISONE SODIUM SUCCINATE 100 MG: 100 INJECTION, POWDER, FOR SOLUTION INTRAMUSCULAR; INTRAVENOUS at 20:02

## 2021-09-09 RX ADMIN — BISACODYL 10 MG: 10 SUPPOSITORY RECTAL at 20:01

## 2021-09-09 RX ADMIN — VASOPRESSIN 0.04 UNITS/MIN: 20 INJECTION INTRAVENOUS at 10:16

## 2021-09-09 RX ADMIN — Medication 60 MCG/HR: at 09:08

## 2021-09-09 RX ADMIN — ASPIRIN 81 MG CHEWABLE TABLET 81 MG: 81 TABLET CHEWABLE at 09:17

## 2021-09-09 RX ADMIN — PANTOPRAZOLE SODIUM 40 MG: 40 INJECTION, POWDER, FOR SOLUTION INTRAVENOUS at 09:17

## 2021-09-09 RX ADMIN — AMIODARONE HYDROCHLORIDE 200 MG: 200 TABLET ORAL at 09:17

## 2021-09-09 ASSESSMENT — PULMONARY FUNCTION TESTS
PIF_VALUE: 28
PIF_VALUE: 35
PIF_VALUE: 31
PIF_VALUE: 34
PIF_VALUE: 28
PIF_VALUE: 27
PIF_VALUE: 30
PIF_VALUE: 36
PIF_VALUE: 33
PIF_VALUE: 31
PIF_VALUE: 30
PIF_VALUE: 31
PIF_VALUE: 30
PIF_VALUE: 29
PIF_VALUE: 31
PIF_VALUE: 29
PIF_VALUE: 30
PIF_VALUE: 27
PIF_VALUE: 28
PIF_VALUE: 32
PIF_VALUE: 27
PIF_VALUE: 31
PIF_VALUE: 32
PIF_VALUE: 30
PIF_VALUE: 30
PIF_VALUE: 29
PIF_VALUE: 24
PIF_VALUE: 27
PIF_VALUE: 33
PIF_VALUE: 31
PIF_VALUE: 34
PIF_VALUE: 28
PIF_VALUE: 31
PIF_VALUE: 30
PIF_VALUE: 29
PIF_VALUE: 35
PIF_VALUE: 31
PIF_VALUE: 32
PIF_VALUE: 23
PIF_VALUE: 31
PIF_VALUE: 28
PIF_VALUE: 24
PIF_VALUE: 29
PIF_VALUE: 27
PIF_VALUE: 33
PIF_VALUE: 36
PIF_VALUE: 54

## 2021-09-09 ASSESSMENT — PAIN SCALES - GENERAL
PAINLEVEL_OUTOF10: 0

## 2021-09-09 NOTE — PROGRESS NOTES
GENERAL SURGERY  DAILY PROGRESS NOTE  9/9/2021    No chief complaint on file. Subjective:  Pt intubated. Had another large BM per nursing staff.     Objective:  BP (!) 128/53   Pulse 86   Temp 98.4 °F (36.9 °C) (Esophageal)   Resp 14   Ht 5' 2\" (1.575 m)   Wt 210 lb 5.1 oz (95.4 kg)   LMP  (LMP Unknown)   SpO2 96%   BMI 38.47 kg/m²     GENERAL: Intubated and sedated  HEAD: Normocephalic, atraumatic  EYES: No sclera icterus, pupils equal  LUNGS: On mechanical ventilation  CARDIOVASCULAR:  RR and normotensive  ABDOMEN:  Soft, non-tender, non-distended  EXTREMITIES: No edema or swelling  SKIN: Warm and dry    Assessment/Plan:  80 y.o. female with recent CABG, complicated post operative course requiring re-intubation, now with increasing abdominal distention    - Will obtain another KUB this morning to assess if contrast in the colon  - If no contrast in colon on KUB, then will obtain dry CT abd/pelvis when stable enough to transport  - Wean pressors as able  - Continue bowel reg  - Trend LFTs  - Monitor abdominal exam    Electronically signed by Alirio Sher MD on 9/9/2021 at 6:30 AM     Seen/examined  Abdomen softly distended  Tolerating tube feeds  Bowels moving  Would advance tube feeds as tolerated  CT not that critical  Gregory

## 2021-09-09 NOTE — FLOWSHEET NOTE
Pt continues to have small amout emesis of undigested tube feeding , tube feeding off since 0800, ogt to lo intermittent suction

## 2021-09-09 NOTE — PLAN OF CARE
Problem: Cardiac Output - Decreased:  Goal: Hemodynamic stability will improve  Description: Hemodynamic stability will improve  Outcome: Met This Shift  Note: Monitor vital signs adm meds as ordered dobutrex decreased to 2.5mcg/kg      Problem: Non-Violent Restraints  Goal: No harm/injury to patient while restraints in use  9/9/2021 0839 by Missouri Dancer, RN  Outcome: Met This Shift  Note: Rom done elbows and heels elevated off bed  9/9/2021 0353 by Quyen Hager RN  Outcome: Met This Shift     Problem: Non-Violent Restraints  Goal: Patient's dignity will be maintained  9/9/2021 0839 by Missouri Dancer, RN  Outcome: Met This Shift  Note: Dignity maintained family and pt explained use of restraint  9/9/2021 0353 by Quyen Hager RN  Outcome: Met This Shift     Problem: Non-Violent Restraints  Goal: Removal from restraints as soon as assessed to be safe  9/9/2021 0839 by Missouri Dancer, RN  Outcome: Not Met This Shift  Note: 2 point soft wrist restraints intact pt restless and aggitated attempting to pull lines and tubes  9/9/2021 0353 by Quyen Hager RN  Outcome: Not Met This Shift

## 2021-09-09 NOTE — PLAN OF CARE
Problem: Falls - Risk of:  Goal: Will remain free from falls  Outcome: Met This Shift  Goal: Absence of physical injury  Outcome: Met This Shift     Problem: Skin Integrity:  Goal: Will show no infection signs and symptoms  Outcome: Met This Shift     Problem: Non-Violent Restraints  Goal: No harm/injury to patient while restraints in use  9/9/2021 0353 by Diana Flowers RN  Outcome: Met This Shift  9/8/2021 1604 by Katelyn Jha RN  Outcome: Met This Shift  Note: Rom done elbow and heels elevated off bed   Goal: Patient's dignity will be maintained  9/9/2021 0353 by Diana Flowers RN  Outcome: Met This Shift  9/8/2021 1604 by Katelyn Jha RN  Outcome: Met This Shift  Note: Dignity maintained for pt and family restraints explained and released at times for rom

## 2021-09-09 NOTE — PROGRESS NOTES
[facility]  Nephrology Attending Progress Note  Pilar Feng. Yahir Johnson MD        SUBJECTIVE:     9/3/21: Jena Chung is a 80 y.o. female with h/o HFpEF, refractory A fib  s/p DCCV X 2, hypertension, hyperlipidemia and other medical problems listed below. She presented for elective CABG on 8/25 following ischemic work-up and LHC showing  MV CAD. 6/8/21. Procedure was performed on 8/25. Her postop course has been complicated by acute respiratory failure, atrial fibrillation with RVR and intermittent fevers. She has required Zosyn. Preop creatinine has been 1-1.2 which is her recent baseline. In the last 48 hours creatinine has shown a progressive increase to currently 1.8 mg/dL associated with decreasing urine output. She has received albumin bolus but without any significant improvement. Hence renal consult.     9/4/21: pt seen in icu, has low urine output, just received iv albumin    9/5/21: seen in icu, started on ns at 76 yesterday. Remains with low uo.      9/6/21: pt reintubated and started on levo for hypotension, now hypothermic and with elevated wbc    9/7: seen in icu, uo increasing, intubated, starting dopa, on amio, vaso, levo    9/8: pt remains in icu, on dopamine, vaso, levo, amio, and heparin, intubated    9/9: pt seen in icu, intubated, on dopa and vaso      PROBLEM LIST:    Patient Active Problem List   Diagnosis    Spinal stenosis in cervical region    Spinal stenosis, lumbar region, without neurogenic claudication    Essential hypertension    Mixed hyperlipidemia    DM (diabetes mellitus) ([facility] [facility]    Renal artery aneurysm (HCC)    PAF (paroxysmal atrial fibrillation) (HCC)    Anemia    Malignant neoplasm of cecum ([address]    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)    Chronic heart failure with preserved ejection fraction (HCC)    Atrial fibrillation with RVR (HCC)    Severe protein-calorie malnutrition (HCC)    Abnormal nuclear stress test    Opioid use, unspecified with unspecified opioid-induced disorder    Opioid dependence with unspecified opioid-induced disorder    Opioid dependence, uncomplicated    CAD in native artery    Pre-operative laboratory examination    Acute blood loss anemia    Leukocytosis    At risk for malnutrition    Hypotension        PAST MEDICAL HISTORY:    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[facility] 01/2019    Anemia     Arm numbness     Arthritis     Blood transfusion     Cervical spinal stenosis     Cirrhosis of liver (HCC)     Diabetes mellitus (HCC)     Fatty liver     GERD (gastroesophageal reflux disease)     Hyperlipidemia     Hypertension     Low back pain     Lumbar stenosis     Neck pain     Obesity (BMI 30.0-34.9) 10/14/2012    PAF (paroxysmal atrial fibrillation) (HCC)     Renal artery aneurysm ([facility] 7/15/2015       DIET:    Diet NPO Exceptions are: Sips of Water with Meds  ADULT TUBE FEEDING; Orogastric; Immune Enhancing; Continuous; 10; Yes; 10; Q 4 hours; 40; 30; Q 4 hours     PHYSICAL EXAM:     Patient Vitals for the past 24 hrs:   BP Temp Temp src Pulse Resp SpO2   09/09/21 1049 (!) 102/51 98.4 °F (36.9 °C) Esophageal 67 -- 95 %   09/09/21 1037 (!) 108/40 98.4 °F (36.9 °C) Esophageal 69 12 95 %   09/09/21 1014 -- -- -- 74 -- 94 %   09/09/21 1000 (!) 119/58 98.4 °F (36.9 °C) Esophageal 74 12 95 %   09/09/21 0930 (!) 126/44 -- -- 77 -- 95 %   09/09/21 0900 (!) 109/59 98.4 °F (36.9 °C) -- 73 -- 97 %   09/09/21 0830 (!) 125/34 -- -- 81 -- 95 %   09/09/21 0800 (!) 132/34 98.4 °F (36.9 °C) Esophageal 81 12 98 %   09/09/21 0700 (!) 129/39 -- -- 81 -- 95 %   09/09/21 0600 (!) 128/53 98.4 °F (36.9 °C) Esophageal 86 -- 96 %   09/09/21 0530 (!) 115/49 -- -- 85 -- 97 %   09/09/21 0500 (!) 117/48 -- -- 84 14 97 %   09/09/21 0430 (!) 123/41 -- -- 84 -- 96 %   09/09/21 0400 (!) 142/60 98.4 °F (36.9 °C) Esophageal 88 12 97 %   09/09/21 0330 (!) 119/48 -- -- 84 -- 97 % 09/09/21 0300 (!) 123/51 -- -- 84 12 97 %   09/09/21 0230 (!) 120/48 -- -- 85 -- 97 %   09/09/21 0216 -- -- -- 85 -- 97 %   09/09/21 0200 (!) 123/50 98.1 °F (36.7 °C) Esophageal 85 14 97 %   09/09/21 0130 (!) 125/50 -- -- 85 -- 96 %   09/09/21 0100 (!) 128/50 -- -- 85 12 97 %   09/09/21 0030 (!) 122/37 -- -- 87 -- 96 %   09/09/21 0000 (!) 122/33 98.1 °F (36.7 °C) Esophageal 86 12 96 %   09/08/21 2330 (!) 126/52 -- -- 86 -- 96 %   09/08/21 2300 (!) 129/52 -- -- 87 -- 96 %   09/08/21 2230 (!) 126/53 -- -- 88 -- 96 %   09/08/21 2200 (!) 126/52 -- -- 89 12 95 %   09/08/21 2100 (!) 126/53 -- -- 89 14 96 %   09/08/21 2030 (!) 128/51 -- -- 88 -- 96 %   09/08/21 2000 (!) 114/54 98.1 °F (36.7 °C) Esophageal 87 13 98 %   09/08/21 1930 -- -- -- 86 12 94 %   09/08/21 1900 (!) 138/50 -- -- 87 12 95 %   09/08/21 1830 (!) 122/42 -- -- 87 -- 95 %   09/08/21 1800 (!) 139/57 98.1 °F (36.7 °C) Esophageal 88 13 95 %   09/08/21 1730 (!) 135/52 -- -- 89 -- 95 %   09/08/21 1700 (!) 129/34 -- -- 93 16 96 %   09/08/21 1630 134/66 -- -- 89 -- 95 %   09/08/21 1600 (!) 139/54 97.9 °F (36.6 °C) Esophageal 88 18 94 %   09/08/21 1555 (!) 128/56 -- -- 88 -- 94 %   09/08/21 1551 -- -- -- 87 -- 92 %   09/08/21 1532 -- -- -- -- -- 93 %   09/08/21 1507 -- -- -- 85 -- 94 %   09/08/21 1500 -- -- -- 83 15 95 %   09/08/21 1430 -- -- -- 84 -- 95 %   09/08/21 1400 -- 97.5 °F (36.4 °C) Esophageal 84 12 96 %   09/08/21 1330 -- -- -- 84 -- 96 %   09/08/21 1300 -- -- -- 84 13 96 %   09/08/21 1235 -- -- -- 83 -- 94 %   09/08/21 1204 -- -- -- -- -- 96 %   09/08/21 1200 -- 97.7 °F (36.5 °C) Esophageal 80 13 96 %   09/08/21 1130 -- -- -- 81 -- 96 %   @      Intake/Output Summary (Last 24 hours) at 9/9/2021 1111  Last data filed at 9/9/2021 1000  Gross per 24 hour   Intake 3742.53 ml   Output 1060 ml   Net 2682.53 ml         Wt Readings from Last 3 Encounters:   09/07/21 210 lb 5.1 oz (95.4 kg)   08/23/21 170 lb (77.1 kg)   07/16/21 175 lb (79.4 kg) Constitutional:  Pt is in no acute distress  Head: normocephalic, atraumatic  Neck: no JVD  Cardiovascular: regular rate and rhythm, no murmurs, gallops, or rubs  Respiratory:  No rales, rhochi, or wheezes  Gastrointestinal:  Soft, nontender, nondistended, bowel sounds x 4  Ext: no edema  Skin: dry, no rash      MEDS (scheduled):    amiodarone  200 mg Oral BID    ertapenem ([employer]) IVPB  1,000 mg IntraVENous Q24H    insulin lispro  0-12 Units SubCUTAneous Q4H    albumin human  25 g IntraVENous Once    hydrocortisone sodium succinate PF  100 mg IntraVENous Q8H    polyethylene glycol  17 g Oral Daily    chlorhexidine  15 mL Mouth/Throat BID    albumin human  25 g IntraVENous Once    aspirin  81 mg Oral Daily    pantoprazole  40 mg IntraVENous Daily    And    sodium chloride (PF)  10 mL IntraVENous Daily    docusate sodium  100 mg Oral BID    sennosides  5 mL Oral Nightly    sodium chloride flush  5-40 mL IntraVENous 2 times per day    bisacodyl  10 mg Rectal BID    [Held by provider] metoprolol tartrate  12.5 mg Oral BID    [Held by provider] atorvastatin  10 mg Oral Nightly    pramipexole  0.5 mg Oral TID    ipratropium-albuterol  1 ampule Inhalation Q4H WA       MEDS (infusions):   argatroban infusion 0.5 mcg/kg/min (09/09/21 1005)    sodium chloride      DOBUTamine 2.5 mcg/kg/min (09/09/21 0820)    sodium chloride      sodium chloride 75 mL/hr at 09/08/21 1407    insulin 2 Units/hr (09/09/21 1014)    midazolam 2 mg/hr (09/09/21 0800)    fentaNYL 5 mcg/ml in 0.9%  ml infusion 60 mcg/hr (09/09/21 0908)    vasopressin (Septic Shock) infusion 0.04 Units/min (09/09/21 1016)    norepinephrine Stopped (09/09/21 0630)    sodium chloride      sodium chloride      sodium chloride      sodium chloride 30 mL/hr (08/31/21 0946)    sodium chloride      dextrose         MEDS (prn):  sodium chloride, perflutren lipid microspheres, sodium chloride, oxyCODONE **OR** [DISCONTINUED] oxyCODONE, benzocaine-menthol, sodium chloride, sodium chloride flush, sodium chloride, LORazepam, sodium chloride, ondansetron, acetaminophen, acetaminophen, magnesium hydroxide, potassium chloride, magnesium sulfate, albumin human, sodium chloride, glucose, dextrose, glucagon (rDNA), dextrose, calcium gluconate IVPB    DATA:    Recent Labs     09/08/21  0500 09/08/21  1600 09/09/21  0510   WBC 18.4* 20.2* 20.2*   HGB 8.6* 8.1* 7.7*   HCT 26.6* 25.5* 25.0*   MCV 83.6 85.0 86.8   * 107* 80*     Recent Labs     09/06/21  1305 09/06/21  1305 09/07/21  0510 09/07/21  1130 09/08/21  0500 09/08/21 1600 09/09/21  0400 09/09/21  0510      < > 133   < > 136 135 136  --    K 4.3  --  3.7   < > 3.9 3.9 3.7  --    CL 98   < > 95*   < > 99 98 99  --    CO2 17*   < > 23   < > 23 27 27  --    BUN 86*   < > 74*   < > 51* 49* 51*  --    CREATININE 2.9*   < > 2.2*   < > 1.5* 1.4* 1.4*  --    LABGLOM 16   < > 21   < > 33 36 36  --    GLUCOSE 138*   < > 205*   < > 103* 169* 158*  --    CALCIUM 8.8   < > 8.5*   < > 8.5* 8.3* 8.4*  --    ALT  --   --  81*  --  55*  --   --  39*   AST  --   --  35*  --  24  --   --  16   BILIDIR  --   --  1.0*  --  0.8*  --   --  0.7*   BILITOT  --   --  2.6*  --  2.8*  --   --  1.7*   ALKPHOS  --   --  101  --  79  --   --  66   MG  --   --  2.4  --  2.4  --   --  2.3   PHOS 4.5  --   --   --   --   --   --   --     < > = values in this interval not displayed.        Lab Results   Component Value Date    LABALBU 4.0 09/09/2021    LABALBU 4.0 09/08/2021    LABALBU 3.9 09/07/2021     Lab Results   Component Value Date    TSH 2.250 06/21/2021       Iron Studies  No results found for: IRON, TIBC, FERRITIN  No results found for: ZWBALGLV45  No results found for: FOLATE    No results found for: VITD25  No results found for: PTH    No components found for: URIC    Lab Results   Component Value Date    COLORU Yellow 08/28/2021    LABPH 0 10/22/2019    NITRU Negative 08/28/2021    GLUCOSEU Negative

## 2021-09-09 NOTE — FLOWSHEET NOTE
Pt remains in 2 point restraints restless at times attempting to pull lines and tubes unable to redirect and focus

## 2021-09-09 NOTE — PROGRESS NOTES
CVICU Progress Note    Name: Samantha Marcelino  MRN: [id number]    CC: Postoperative Critical Care Management      Indication for Surgery/Procedure: CAD, PAF  LVEF:  Normal    RVF:  Normal      Important/Relevant PMH/PSH: HTN, HPL, HFpEF, Right Carotid Artery stenosis (50-69%), DM, liver cirrhosis 2/2 AMES, GERD, h/o adenocarcinoma of cecum, arthritis, spinal stenosis, former smoker, obesity      Procedure/Surgeries: 8/25/2021 CABG x3 (LIMA-LAD, SVG-Ramus, SVG-OM), LAAL, EVH, KLS plating      Pacing wires: Ventricular        Intake/Output Summary (Last 24 hours) at 9/9/2021 0802  Last data filed at 9/9/2021 0700  Gross per 24 hour   Intake 4182.53 ml   Output 1120 ml   Net 3062.53 ml       Recent Labs     09/08/21  0500 09/08/21  1600 09/09/21  0510   WBC 18.4* 20.2* 20.2*   HGB 8.6* 8.1* 7.7*   HCT 26.6* 25.5* 25.0*   * 107* 80*      Lab Results   Component Value Date     09/09/2021    K 3.7 09/09/2021    K 4.3 09/06/2021    CL 99 09/09/2021    CO2 27 09/09/2021    BUN 51 09/09/2021    CREATININE 1.4 09/09/2021    GLUCOSE 158 09/09/2021    CALCIUM 8.4 09/09/2021         Physical Exam:    BP (!) 129/39   Pulse 81   Temp 98.4 °F (36.9 °C) (Esophageal)   Resp 14   Ht 5' 2\" (1.575 m)   Wt 210 lb 5.1 oz (95.4 kg)   LMP  (LMP Unknown)   SpO2 95%   BMI 38.47 kg/m²       CXR Findings: 9/9/2021        Impression   Suspected layering pleural effusions with adjacent infiltrate and or   atelectasis.  No significant interval change.             - CXR personally viewed and interpreted by ICU Nurse Practitioner, agree with above findings       General: Intubated, sedated. On dobutamine, vaso, RHI, Amio gtts. Eyes: PERRL, anicteric   Pulmonary: Diminished bibasilar.  No wheezes, no accessory muscle use noted   Ventilator: Mode: AC/VC, 50% FiO2, 5 PEEP, 450 Vt   Cardiovascular:  RRR, no heaves or thrills on palpation  Tele: SR 80s  Abdomen: Soft, slightly distended, +BS, +BM   Extremities: Palpable pulses all extremities, generalized edema   Neurologic/Psych: Intubated, sedated   Skin: Warm and dry  Incisions: MSI well approximated, RLE SVG sites with staples intact, well approximated     Lines:   ETT: 9/5  Rt Radial Arterial line: 9/5  Pino 8/25  Left UE PICC 8/30    Assessment/Plan: POD #15    1. CAD, PAF S/p CABG x3 (LIMA-LAD, SVG-Ramus, SVG-OM)  LAAL  - ASA, Lipitor on hold   - PICC placed 8/30     2. Paroxysmal Atrial Fibrillation  - s/p LAAL   - On Xarelto preop for PAF  - post op with PAF, Remains SR   - Stop IV amio, transition to PO Amio 200mg BID   - Heparin gtt stopped; start Argatroban for [facility]       3. Acute Hypoxic Respiratory failure   - former smoker, 2/2 surgery, extubated DOS,? Aspiration 8/27, HAP  - postop course c/b hypoxia required prolonged high flow oxygen  - s/p bronch 9/1, RLL, RML mucous plugging; f/u pathology   -7 day course Zosyn completed 9/2, On Ertapenem per ID   - Reintubated 9/5   - Pulmonary following    - Supportive care, trach once pressor requirements improve      4. Acute Post Operative Pain   - Sedation with Versed and fentanyl gtts     5. DM Type 2  - HgbA1C 6.2%  - RHI gtt for glycemic control for BG >180, SSI when off gtt      6.  Hypotension   - Suspected septic shock, likely HAP  - 9/7 Echo with normal LV function, Mildly dilated RV with mildly reduced RV function; started on Dobutamine gtt 9/7  - Discrepancy in NIV BP vs arterial line, wean pressors per NIV cuff  - Wean dobutamine to 2.5mcg   - Levo weaned off yesterday, Remains on vaso at 0.04units , transfuse one unit RBCs today  - Stress steroids started 9/7  - Antibiotics per ID   - IVF resuscitation PRN      7. Acute blood loss anemia  - expected blood loss 2/2 surgery, s/p one unit RBCs 8/26, 8/31, 9/7  - Hgb 7.7, transfuse one unit this AM  - Monitor, transfuse PRN      8. Leukocytosis  - WBC 20K, Afebrile  - On systemic steroids   - f/u cultures from bronch  - ID following, Antibiotics switched to Ertapenem 9/8  - Respiratory Culture with light growth Candida albicans, light growth Klebsiella oxytoca, rare enterobacter cloacae  - follow 9/6 blood cultures  - Procalcitonin 0.31  - completed 7 day course of Zosyn 9/2      9. Anxiety  - On versed gtt, PRN Ativan 0.5mg q 4 hours      10. BROOKE superimposed on CKD   - Baseline Scr 0.9-1.2, peaked at 2.9 on 9/6   - Scr down to 1.4, Adequate UOP (0.5ml/kg/hr)   - Nephrology following  - NS @75cc/hr; BMP q 12 hours   - Monitor UOP, labs, avoid hypotension     11. Dysphagia/ At risk for malnutrition   - Suspected dysphagia, possible aspiration 8/27  - Bronch 9/1 airways with evidence of chronic aspiration   - MBS done 9/3, passed for soft and bite size consistency solids (dysphagia 3), thin liquids  - Tolerating trickle TFs, advance to goal     12. Abdominal Distention/Elevated LFTs  - General Surgery following  - Trend LFTS, this morning results pending   - Abdominal exam improving, +BM again overnight, CT abdomen when contrast from barium swallow out of colon   - Continue bowel regimen, repeat KUB today      13. Thrombocytopenia  - Plts 330K>124K>108K>80K after started on heparin gtt,   - Stop Heparin, send HIT panel, start argatroban infusion       This patient has a high probability of sudden deterioration, which requires preparedness to urgently intervene. I participated in the decision making process and managed the direct care of the patient that required frequent assessment and treatment. I personally spent 41 minutes of critical care time treating the patient. VTE Prophylaxis: Pharmacologic/Mechanical:  Yes, SCDs, Lovenox   Line infection prevention: Can CVC or arterial line be removed: no  Continued need for urinary catheter:  Yes - clinical indication: Patient post major surgery requiring fluid balance and input and output measurement.     Dispo: CVICU     Electronically signed by TEMO Almendarez - CNP on 9/9/2021 at 8:02 AM

## 2021-09-09 NOTE — PROGRESS NOTES
[facility] PROGRESS NOTE      Chief complaint: Follow-up of septic shock    The patient is a 80 y.o. female with history of hypertension, hyperlipidemia, paroxysmal atrial fibrillation, renal artery aneurysm, cirrhosis, DM, CKD, admitted on 08/25 for elective CABG. Post-op course was complicated by fever up to 101.1 F on 08/29 with worsening leukocytosis up to currently 22,000, atrial fibrillation with rapid ventricular response, hypoxia, BROOKE. CXR on admission showed bilateral airspace disease and small bilateral pleural effusion. She underwent bronchoscopy on 09/01 during which diffuse scattered mucosal hemorrhages throughout the trachea and  thick clear secretions occluding the RML and RLL bronchi were noted. Bronchial wash Gram stain and culture showed rare polymorphonuclear leukocytes, rare epithelial cells, rare yeast, absent oral pharyngeal lisy, moderate growth of Candida albicans. She was reintubated on 09/05 due to decreasing level of consciousness and increasing respiratory distress, accompanied by hypothermia and shock. CXR on 09/06 showed persistent bilateral parenchymal infiltrates and probable small effusions, essentially unchanged from that on admission. Procalcitonin level was elevated at 0.31 ng/mL. Respiratory Gram stain and culture on 09/06 showed few polymorphonuclear leukocytes, few epithelial cells, no organisms, light growth of yeast, Klebsiella oxytoca (non-ESBL; susceptible to cotrimoxazole and fluoroquinolones) and Enterobacter cloacae (susceptible to cotrimoxazole and fluoroquinolones). Urine Streptococcus pneumoniae and Legionella antigens were negative. SARS-CoV-2 PCR was not detected. She received cefazolin from 08/25-08/27, piperacillin-tazobactam from 08/27-09/02. Meropenem was started on 09/06. Subjective: Patient was seen and examined. She remains in critical condition, intubated, sedated, on decreasing vasopressor and inotropic support.     Objective:  BP (!) 119/58   Pulse 74 Temp 98.4 °F (36.9 °C) (Esophageal)   Resp 12   Ht 5' 2\" (1.575 m)   Wt 210 lb 5.1 oz (95.4 kg)   LMP  (LMP Unknown)   SpO2 94%   BMI 38.47 kg/m²   Constitutional: Intubated, no MV dyssynchrony  Respiratory: Clear breath sounds, no crackles, no wheezes  Cardiovascular: Regular rate and rhythm, no murmurs  Gastrointestinal: Bowel sounds present, soft, distended   Skin: Warm and dry, no active dermatoses  Musculoskeletal: No joint swelling, no joint erythema    Labs, imaging, and medical records/notes were personally reviewed. Assessment:  Shock, presumed septic, secondary to Klebsiella oxytoca and Enterobacter cloacae HAP  Acute hypoxic respiratory failure  CAD s/p CABG on 08/25  BROOKE on CKD    Recommendations:  Continue ertapenem 1g q24h. Follow up blood cultures. Follow up serum beta D glucan.     Thank you for involving me in the care of Tarun Fried. I will continue to follow. Please do not hesitate to call for any questions or concerns.     Electronically signed by Marques Coughlin MD on 9/9/2021 at 10:28 AM

## 2021-09-09 NOTE — PROGRESS NOTES
Danuta Yu M.D.,[facility]  Josef Araujo D.O., F.A.C.O.I., Dorothy Haque M.D. Skyler Whittington M.D. Shyanne Martinez D.O. Daily Pulmonary Progress Note    Patient:  Nick Alarcon 80 y.o. female MRN: [id number]     Date of Service: 9/9/2021      Synopsis     We are following patient for acute on chronic hypoxic respiratory failure      Code status: Full code      Subjective      Patient was seen and examined, l. Current settings are AC/VC 12, 450, 50%, +5. No changes. Remains on Fentanyl and Versed and also on vasopressin. Heparin gtt stopped, Argatroban started,  and Dobutamine. Insulin gtt has been re-started.  She is getting one unit RBC today for Hgb 7.7    Review of Systems:  Unable to obtain    24-hour events:  none    Objective   Vitals: BP (!) 102/51   Pulse 67   Temp 98.4 °F (36.9 °C) (Esophageal)   Resp 12   Ht 5' 2\" (1.575 m)   Wt 210 lb 5.1 oz (95.4 kg)   LMP  (LMP Unknown)   SpO2 95%   BMI 38.47 kg/m²     I/O:    Intake/Output Summary (Last 24 hours) at 9/9/2021 1101  Last data filed at 9/9/2021 1000  Gross per 24 hour   Intake 3742.53 ml   Output 1060 ml   Net 2682.53 ml       Vent Information  $Ventilation: $Subsequent Day  Skin Assessment: Clean, dry, & intact  Suction Catheter Diameter: 14  Equipment ID: [id number]  Equipment Changed: (S) Humidification  Vent Type: 980  Vent Mode: AC/VC  Vt Ordered: 450 mL  Rate Set: 12 bmp  Peak Flow: 0 L/min  Pressure Support: 0 cmH20  FiO2 : 50 %  SpO2: 95 %  SpO2/FiO2 ratio: 190  Sensitivity: 3  PEEP/CPAP: 5  I Time/ I Time %: 0 s  Humidification Source: Heated wire  Humidification Temp: 37  Humidification Temp Measured: 36.2  Circuit Condensation: Drained  Mask Type: Full face mask  Mask Size: Medium  Bonnet size: Medium       IPAP: 16 cmH20  CPAP/EPAP: 8 cmH2O     CURRENT MEDS :  Scheduled Meds:   amiodarone  200 mg Oral BID    ertapenem (INVanz) IVPB  1,000 mg IntraVENous Q24H    insulin lispro  0-12 Units SubCUTAneous Q4H    albumin human  25 g IntraVENous Once    hydrocortisone sodium succinate PF  100 mg IntraVENous Q8H    polyethylene glycol  17 g Oral Daily    chlorhexidine  15 mL Mouth/Throat BID    albumin human  25 g IntraVENous Once    aspirin  81 mg Oral Daily    pantoprazole  40 mg IntraVENous Daily    And    sodium chloride (PF)  10 mL IntraVENous Daily    docusate sodium  100 mg Oral BID    sennosides  5 mL Oral Nightly    sodium chloride flush  5-40 mL IntraVENous 2 times per day    bisacodyl  10 mg Rectal BID    [Held by provider] metoprolol tartrate  12.5 mg Oral BID    [Held by provider] atorvastatin  10 mg Oral Nightly    pramipexole  0.5 mg Oral TID    ipratropium-albuterol  1 ampule Inhalation Q4H WA       Physical Exam:  General Appearance: Intubated, currently appears restless  HEENT: Normocephalic atraumatic without obvious abnormality   Neck: Lips, mucosa, and tongue normal.  Supple, symmetrical, trachea midline, no adenopathy;thyroid:  no enlargement/tenderness/nodules or JVD. ETT tube inplace  Lung: Diffuse bilateral lung sounds  heart: RRR, normal S1, S2. No MRG  Abdomen: Distended and slightly tympanic  Extremities: Pedal pulses 2+ symmetric b/l. Extremities normal, no cyanosis, clubbing, or edema. Musculokeletal: No joint swelling, no muscle tenderness. ROM normal in all joints of extremities. Neurologic: Cranial nerves II to XII grossly intact. sedated    Pertinent/ New Labs and Imaging Studies     Imaging Personally Reviewed:    Narrative   EXAMINATION:   ONE XRAY VIEW OF THE CHEST       9/6/2021 7:22 am       COMPARISON:   09/05/2021       HISTORY:   ORDERING SYSTEM PROVIDED HISTORY: resp failure   TECHNOLOGIST PROVIDED HISTORY:   Reason for exam:->resp failure   What reading provider will be dictating this exam?->CRC       FINDINGS:   EKG leads overlie the chest.  Support tubes and lines remain in place.    Bilateral parenchymal infiltrates and probable small effusions persist.   These demonstrate no definite interval change allowing for differences in   positioning.           Impression   No significant change.  Continued follow-up recommended. ECHO  9/7/2021  Left ventricular size is grossly normal.   Ejection fraction is visually estimated at 60 to 65%. The left atrium is mild-moderately dilated. Mildly dilated right ventricle. Right ventricle global systolic function is mildly reduced . Mildly enlarged right atrium size. The tricuspid valve was not well visualized. Severe tricuspid regurgitation. Labs:  Lab Results   Component Value Date    WBC 20.2 09/09/2021    HGB 7.7 09/09/2021    HCT 25.0 09/09/2021    MCV 86.8 09/09/2021    MCH 26.7 09/09/2021    MCHC 30.8 09/09/2021    RDW 17.2 09/09/2021    PLT 80 09/09/2021    MPV 8.9 09/09/2021     Lab Results   Component Value Date     09/09/2021    K 3.7 09/09/2021    K 4.3 09/06/2021    CL 99 09/09/2021    CO2 27 09/09/2021    BUN 51 09/09/2021    CREATININE 1.4 09/09/2021    LABALBU 4.0 09/09/2021    CALCIUM 8.4 09/09/2021    GFRAA 44 09/09/2021    LABGLOM 36 09/09/2021     Lab Results   Component Value Date    PROTIME 14.5 08/25/2021    INR 1.3 08/25/2021     No results for input(s): PROBNP in the last 72 hours. Recent Labs     09/06/21  1300   PROCAL 0.31*     This SmartLink has not been configured with any valid records. Micro:  No results for input(s): CULTRESP in the last 72 hours. No results for input(s): LABGRAM in the last 72 hours. No results for input(s): LEGUR in the last 72 hours. Recent Labs     09/06/21  1305   STREPNEUMAGU Presumptive negative- suggests no current or recent  pneumococcal infection.   Infection due to Strep pneumoniae cannot be  ruled out since the antigen present in the sample  may be below the detection limit of the test.  Normal Range:Presumptive Negative       Recent Labs     09/06/21  1305   LP1UAG Presumptive Negative -suggesting no recent or current infections  with Legionella pneumophila serogroup 1. Infection to Legionella cannot be ruled out since other serogroups  and species may cause infection, antigen may not be present in  early infection, or level of antigen may be below the  detection limit. Normal Range: Presumptive Negative       Results for Corby Quevedo (MRN [id number]) as of 9/7/2021 11:31   Ref. Range 9/7/2021 05:05   Source: Unknown Blood Arterial   pH, Blood Gas Latest Ref Range: 7.350 - 7.450  7. 441   PCO2 Latest Ref Range: 35.0 - 45.0 mmHg 33.9 (L)   pO2 Latest Ref Range: 75.0 - 100.0 mmHg 73.3 (L)   HCO3 Latest Ref Range: 22.0 - 26.0 mmol/L 22.6   Base Excess Latest Ref Range: -3.0 - 3.0 mmol/L -1.2   O2 Sat Latest Ref Range: 92.0 - 98.5 % 93.9   tHb (est) Latest Ref Range: 11.5 - 16.5 g/dL 9.9 (L)   O2Hb Latest Ref Range: 94.0 - 97.0 % 92.8 (L)   COHb Latest Ref Range: 0.0 - 1.5 % 0.9   MetHb Latest Ref Range: 0.0 - 1.5 % 0.3   HHb Latest Ref Range: 0.0 - 5.0 % 6.0 (H)   AaDO2 Latest Units: mmHg 162.9   RI(T) Unknown 2.22   FIO2 Latest Units: % 40.0   PO2/FIO2 Latest Units: mmHg/% 1.83   Pt Temp Latest Units: C 37.0   Critical(s) Notified Unknown . No Critical Values   Time Analyzed Unknown 0505   Mode Unknown AC   Peep/Cpap Latest Units: cmH2O 5.0   Vt Mechanical Latest Units: mL 450.0   Rr Mechanical Latest Units: b/min 14.0        Assessment:    1. Post operative respiratory insufficiency with hypoxia requiring reintubation 9/5/21  2. Bilateral atelectasis/mucus plugging of bronchi s/p bronchoscopy 9/1/21  3. Pleural effusions - small on bedside US  4. CAD s/p CABG x 3 8/25/21  5. Atrial fibrillation with RVR   6. Anxiety  7. Aspiration pneumonia s/p treatment with Zosyn  8. BROOKE secondary to intravascular volume depletion  9. Leukocytosis, probable sepsis septic shock  10. Runs of nonsustained V. Tach  11.   High anion gap metabolic acidosis with respiratory alkalosis      Plan:   1. sedation regimen to Versed and fentanyl given her present hypotension and also dyssynchrony with the vent. 2. Continue pressors per CTS. She is currently on dobutamine and vasopressin  3. Continue meropenem started at 1 g every 12 hours per infectious disease recommendations. Continue final culture results. 4.  Follow procalcitonin. 0.31  5. General surgery consulted for abdominal distention elevation of LFTs. 6. Check electrolytes and place for potassium above 4 and mag above 2  7. Amiodarone drip per CTS surgery  8. Echo completed, see above  9. Hydrocortisone started 100mg q8 for cortisol level 11.97    Plan of care reviewed in collaboration with Dr. Tania Owens  Electronically signed by TEMO Arrieta CNP on 9/9/2021 at 11:01 AM      Addendum:  I personally saw and examined patient. Patient was having feeding coming up from her mouth which I suctioned. Notified nursing staff. Placed her NG tube to low intermittent suction. Will obtain a KUB. Continue full vent support. Patient will need a tracheostomy. I personally saw, examined, and cared for the patient. Labs, medications, radiographs reviewed. I agree with history exam and plans detailed in NP note.   Hyun Cruz MD

## 2021-09-09 NOTE — FLOWSHEET NOTE
Pt having small amount emesis of undigested tube feeding, tube feeding clamped, residual 60cc.  B Edmond LAKHANI notified

## 2021-09-10 ENCOUNTER — APPOINTMENT (OUTPATIENT)
Dept: GENERAL RADIOLOGY | Age: 81
DRG: 003 | End: 2021-09-10
Attending: THORACIC SURGERY (CARDIOTHORACIC VASCULAR SURGERY)
Payer: MEDICARE

## 2021-09-10 ENCOUNTER — APPOINTMENT (OUTPATIENT)
Dept: CT IMAGING | Age: 81
DRG: 003 | End: 2021-09-10
Attending: THORACIC SURGERY (CARDIOTHORACIC VASCULAR SURGERY)
Payer: MEDICARE

## 2021-09-10 LAB
(1,3)-BETA-D-GLUCAN (FUNGITELL) INTERPRETATION: NEGATIVE
(1,3)-BETA-D-GLUCAN (FUNGITELL): <31 PG/ML
AADO2: 215.2 MMHG
ALBUMIN SERPL-MCNC: 3.7 G/DL (ref 3.5–5.2)
ALP BLD-CCNC: 65 U/L (ref 35–104)
ALT SERPL-CCNC: 34 U/L (ref 0–32)
ANION GAP SERPL CALCULATED.3IONS-SCNC: 13 MMOL/L (ref 7–16)
ANION GAP SERPL CALCULATED.3IONS-SCNC: 8 MMOL/L (ref 7–16)
APTT: 44.2 SEC (ref 24.5–35.1)
APTT: 44.7 SEC (ref 24.5–35.1)
APTT: 44.7 SEC (ref 24.5–35.1)
APTT: 48.3 SEC (ref 24.5–35.1)
APTT: 51.6 SEC (ref 24.5–35.1)
APTT: 56.9 SEC (ref 24.5–35.1)
AST SERPL-CCNC: 17 U/L (ref 0–31)
B.E.: -1.7 MMOL/L (ref -3–3)
BILIRUB SERPL-MCNC: 2.1 MG/DL (ref 0–1.2)
BILIRUBIN DIRECT: 0.9 MG/DL (ref 0–0.3)
BILIRUBIN, INDIRECT: 1.2 MG/DL (ref 0–1)
BUN BLDV-MCNC: 50 MG/DL (ref 6–23)
BUN BLDV-MCNC: 51 MG/DL (ref 6–23)
CALCIUM SERPL-MCNC: 8.3 MG/DL (ref 8.6–10.2)
CALCIUM SERPL-MCNC: 8.5 MG/DL (ref 8.6–10.2)
CHLORIDE BLD-SCNC: 101 MMOL/L (ref 98–107)
CHLORIDE BLD-SCNC: 104 MMOL/L (ref 98–107)
CO2: 22 MMOL/L (ref 22–29)
CO2: 25 MMOL/L (ref 22–29)
COHB: 0.3 % (ref 0–1.5)
CREAT SERPL-MCNC: 1.3 MG/DL (ref 0.5–1)
CREAT SERPL-MCNC: 1.3 MG/DL (ref 0.5–1)
CRITICAL: ABNORMAL
DATE ANALYZED: ABNORMAL
DATE OF COLLECTION: ABNORMAL
FIO2: 50 %
GFR AFRICAN AMERICAN: 48
GFR AFRICAN AMERICAN: 48
GFR NON-AFRICAN AMERICAN: 39 ML/MIN/1.73
GFR NON-AFRICAN AMERICAN: 39 ML/MIN/1.73
GLUCOSE BLD-MCNC: 153 MG/DL (ref 74–99)
GLUCOSE BLD-MCNC: 156 MG/DL (ref 74–99)
HCO3: 22.7 MMOL/L (ref 22–26)
HCT VFR BLD CALC: 28.9 % (ref 34–48)
HEMOGLOBIN: 9 G/DL (ref 11.5–15.5)
HHB: 4.1 % (ref 0–5)
LAB: ABNORMAL
Lab: ABNORMAL
MAGNESIUM: 2.3 MG/DL (ref 1.6–2.6)
MCH RBC QN AUTO: 27.4 PG (ref 26–35)
MCHC RBC AUTO-ENTMCNC: 31.1 % (ref 32–34.5)
MCV RBC AUTO: 87.8 FL (ref 80–99.9)
METER GLUCOSE: 123 MG/DL (ref 74–99)
METER GLUCOSE: 124 MG/DL (ref 74–99)
METER GLUCOSE: 136 MG/DL (ref 74–99)
METER GLUCOSE: 148 MG/DL (ref 74–99)
METER GLUCOSE: 152 MG/DL (ref 74–99)
METER GLUCOSE: 169 MG/DL (ref 74–99)
METHB: 0.1 % (ref 0–1.5)
MODE: AC
O2 SATURATION: 95.9 % (ref 92–98.5)
O2HB: 95.5 % (ref 94–97)
OPERATOR ID: 7221
PATIENT TEMP: 37 C
PCO2: 37 MMHG (ref 35–45)
PDW BLD-RTO: 17.2 FL (ref 11.5–15)
PEEP/CPAP: 5 CMH2O
PFO2: 1.74 MMHG/%
PH BLOOD GAS: 7.41 (ref 7.35–7.45)
PLATELET # BLD: 96 E9/L (ref 130–450)
PLATELET CONFIRMATION: NORMAL
PMV BLD AUTO: 9.5 FL (ref 7–12)
PO2: 87.2 MMHG (ref 75–100)
POTASSIUM SERPL-SCNC: 4.1 MMOL/L (ref 3.5–5)
POTASSIUM SERPL-SCNC: 4.2 MMOL/L (ref 3.5–5)
RBC # BLD: 3.29 E12/L (ref 3.5–5.5)
RI(T): 2.47
RR MECHANICAL: 12 B/MIN
SODIUM BLD-SCNC: 136 MMOL/L (ref 132–146)
SODIUM BLD-SCNC: 137 MMOL/L (ref 132–146)
SOURCE, BLOOD GAS: ABNORMAL
THB: 10.1 G/DL (ref 11.5–16.5)
TIME ANALYZED: 923
TOTAL PROTEIN: 5.6 G/DL (ref 6.4–8.3)
VT MECHANICAL: 450 ML
WBC # BLD: 18.6 E9/L (ref 4.5–11.5)

## 2021-09-10 PROCEDURE — 2580000003 HC RX 258: Performed by: INTERNAL MEDICINE

## 2021-09-10 PROCEDURE — 2500000003 HC RX 250 WO HCPCS: Performed by: NURSE PRACTITIONER

## 2021-09-10 PROCEDURE — 2500000003 HC RX 250 WO HCPCS: Performed by: INTERNAL MEDICINE

## 2021-09-10 PROCEDURE — 36415 COLL VENOUS BLD VENIPUNCTURE: CPT

## 2021-09-10 PROCEDURE — 80048 BASIC METABOLIC PNL TOTAL CA: CPT

## 2021-09-10 PROCEDURE — 94003 VENT MGMT INPAT SUBQ DAY: CPT

## 2021-09-10 PROCEDURE — 85730 THROMBOPLASTIN TIME PARTIAL: CPT

## 2021-09-10 PROCEDURE — C9113 INJ PANTOPRAZOLE SODIUM, VIA: HCPCS | Performed by: NURSE PRACTITIONER

## 2021-09-10 PROCEDURE — 82805 BLOOD GASES W/O2 SATURATION: CPT

## 2021-09-10 PROCEDURE — 85027 COMPLETE CBC AUTOMATED: CPT

## 2021-09-10 PROCEDURE — 37799 UNLISTED PX VASCULAR SURGERY: CPT

## 2021-09-10 PROCEDURE — 71045 X-RAY EXAM CHEST 1 VIEW: CPT

## 2021-09-10 PROCEDURE — 6370000000 HC RX 637 (ALT 250 FOR IP): Performed by: INTERNAL MEDICINE

## 2021-09-10 PROCEDURE — 6370000000 HC RX 637 (ALT 250 FOR IP): Performed by: THORACIC SURGERY (CARDIOTHORACIC VASCULAR SURGERY)

## 2021-09-10 PROCEDURE — 6360000002 HC RX W HCPCS: Performed by: THORACIC SURGERY (CARDIOTHORACIC VASCULAR SURGERY)

## 2021-09-10 PROCEDURE — 74176 CT ABD & PELVIS W/O CONTRAST: CPT

## 2021-09-10 PROCEDURE — 94640 AIRWAY INHALATION TREATMENT: CPT

## 2021-09-10 PROCEDURE — 2580000003 HC RX 258: Performed by: NURSE PRACTITIONER

## 2021-09-10 PROCEDURE — 83735 ASSAY OF MAGNESIUM: CPT

## 2021-09-10 PROCEDURE — 6360000002 HC RX W HCPCS: Performed by: INTERNAL MEDICINE

## 2021-09-10 PROCEDURE — 6360000002 HC RX W HCPCS: Performed by: NURSE PRACTITIONER

## 2021-09-10 PROCEDURE — 2580000003 HC RX 258: Performed by: THORACIC SURGERY (CARDIOTHORACIC VASCULAR SURGERY)

## 2021-09-10 PROCEDURE — 82962 GLUCOSE BLOOD TEST: CPT

## 2021-09-10 PROCEDURE — 2000000000 HC ICU R&B

## 2021-09-10 PROCEDURE — 99291 CRITICAL CARE FIRST HOUR: CPT | Performed by: NURSE PRACTITIONER

## 2021-09-10 PROCEDURE — 80076 HEPATIC FUNCTION PANEL: CPT

## 2021-09-10 PROCEDURE — 6370000000 HC RX 637 (ALT 250 FOR IP): Performed by: NURSE PRACTITIONER

## 2021-09-10 RX ADMIN — ERTAPENEM SODIUM 1000 MG: 1 INJECTION, POWDER, LYOPHILIZED, FOR SOLUTION INTRAMUSCULAR; INTRAVENOUS at 12:27

## 2021-09-10 RX ADMIN — 0.12% CHLORHEXIDINE GLUCONATE 15 ML: 1.2 RINSE ORAL at 20:25

## 2021-09-10 RX ADMIN — INSULIN LISPRO 2 UNITS: 100 INJECTION, SOLUTION INTRAVENOUS; SUBCUTANEOUS at 20:25

## 2021-09-10 RX ADMIN — Medication 10 ML: at 20:25

## 2021-09-10 RX ADMIN — ARGATROBAN 0.5 MCG/KG/MIN: 50 INJECTION, SOLUTION INTRAVENOUS at 13:56

## 2021-09-10 RX ADMIN — PANTOPRAZOLE SODIUM 40 MG: 40 INJECTION, POWDER, FOR SOLUTION INTRAVENOUS at 07:58

## 2021-09-10 RX ADMIN — BISACODYL 10 MG: 10 SUPPOSITORY RECTAL at 20:25

## 2021-09-10 RX ADMIN — HYDROCORTISONE SODIUM SUCCINATE 100 MG: 100 INJECTION, POWDER, FOR SOLUTION INTRAMUSCULAR; INTRAVENOUS at 12:27

## 2021-09-10 RX ADMIN — IPRATROPIUM BROMIDE AND ALBUTEROL SULFATE 1 AMPULE: .5; 2.5 SOLUTION RESPIRATORY (INHALATION) at 12:02

## 2021-09-10 RX ADMIN — CALCIUM GLUCONATE 1000 MG: 98 INJECTION, SOLUTION INTRAVENOUS at 09:41

## 2021-09-10 RX ADMIN — HYDROCORTISONE SODIUM SUCCINATE 100 MG: 100 INJECTION, POWDER, FOR SOLUTION INTRAMUSCULAR; INTRAVENOUS at 20:25

## 2021-09-10 RX ADMIN — IPRATROPIUM BROMIDE AND ALBUTEROL SULFATE 1 AMPULE: .5; 2.5 SOLUTION RESPIRATORY (INHALATION) at 16:14

## 2021-09-10 RX ADMIN — INSULIN LISPRO 2 UNITS: 100 INJECTION, SOLUTION INTRAVENOUS; SUBCUTANEOUS at 17:05

## 2021-09-10 RX ADMIN — HYDROCORTISONE SODIUM SUCCINATE 100 MG: 100 INJECTION, POWDER, FOR SOLUTION INTRAMUSCULAR; INTRAVENOUS at 05:21

## 2021-09-10 RX ADMIN — SENNOSIDES 5 ML: 8.8 SYRUP ORAL at 20:25

## 2021-09-10 RX ADMIN — IPRATROPIUM BROMIDE AND ALBUTEROL SULFATE 1 AMPULE: .5; 2.5 SOLUTION RESPIRATORY (INHALATION) at 08:22

## 2021-09-10 RX ADMIN — ARGATROBAN 1 MCG/KG/MIN: 50 INJECTION, SOLUTION INTRAVENOUS at 22:50

## 2021-09-10 RX ADMIN — Medication 10 ML: at 08:04

## 2021-09-10 RX ADMIN — AMIODARONE HYDROCHLORIDE 200 MG: 200 TABLET ORAL at 20:25

## 2021-09-10 RX ADMIN — IPRATROPIUM BROMIDE AND ALBUTEROL SULFATE 1 AMPULE: .5; 2.5 SOLUTION RESPIRATORY (INHALATION) at 20:29

## 2021-09-10 RX ADMIN — DOCUSATE SODIUM 100 MG: 50 LIQUID ORAL at 20:28

## 2021-09-10 RX ADMIN — AMIODARONE HYDROCHLORIDE 200 MG: 200 TABLET ORAL at 07:57

## 2021-09-10 RX ADMIN — VASOPRESSIN 0.02 UNITS/MIN: 20 INJECTION INTRAVENOUS at 22:11

## 2021-09-10 RX ADMIN — PRAMIPEXOLE DIHYDROCHLORIDE 0.5 MG: 0.25 TABLET ORAL at 20:25

## 2021-09-10 RX ADMIN — SODIUM CHLORIDE, PRESERVATIVE FREE 10 ML: 5 INJECTION INTRAVENOUS at 07:58

## 2021-09-10 RX ADMIN — INSULIN LISPRO 2 UNITS: 100 INJECTION, SOLUTION INTRAVENOUS; SUBCUTANEOUS at 08:02

## 2021-09-10 RX ADMIN — ASPIRIN 81 MG CHEWABLE TABLET 81 MG: 81 TABLET CHEWABLE at 07:58

## 2021-09-10 RX ADMIN — 0.12% CHLORHEXIDINE GLUCONATE 15 ML: 1.2 RINSE ORAL at 08:02

## 2021-09-10 RX ADMIN — Medication 50 MCG/HR: at 12:09

## 2021-09-10 RX ADMIN — MIDAZOLAM 1 MG/HR: 5 INJECTION INTRAMUSCULAR; INTRAVENOUS at 05:57

## 2021-09-10 ASSESSMENT — PULMONARY FUNCTION TESTS
PIF_VALUE: 28
PIF_VALUE: 30
PIF_VALUE: 30
PIF_VALUE: 29
PIF_VALUE: 26
PIF_VALUE: 30
PIF_VALUE: 55
PIF_VALUE: 29
PIF_VALUE: 28
PIF_VALUE: 31
PIF_VALUE: 49
PIF_VALUE: 29
PIF_VALUE: 28
PIF_VALUE: 29
PIF_VALUE: 29
PIF_VALUE: 55
PIF_VALUE: 30
PIF_VALUE: 29
PIF_VALUE: 29
PIF_VALUE: 27
PIF_VALUE: 39
PIF_VALUE: 57
PIF_VALUE: 29
PIF_VALUE: 29
PIF_VALUE: 34
PIF_VALUE: 31
PIF_VALUE: 30
PIF_VALUE: 37
PIF_VALUE: 29
PIF_VALUE: 29
PIF_VALUE: 26
PIF_VALUE: 29
PIF_VALUE: 37
PIF_VALUE: 30
PIF_VALUE: 32
PIF_VALUE: 28
PIF_VALUE: 30
PIF_VALUE: 29
PIF_VALUE: 34
PIF_VALUE: 28
PIF_VALUE: 51

## 2021-09-10 ASSESSMENT — PAIN SCALES - GENERAL
PAINLEVEL_OUTOF10: 0

## 2021-09-10 NOTE — PROGRESS NOTES
Chart reviewed. Awaiting CT A/P. Will plan for Bedside Trach placement on Tuesday, September 14th. Will defer PEG at this time due to presence of abdominal ascites.     Electronically signed by James Brown MD on 9/10/2021 at 8:40 AM

## 2021-09-10 NOTE — PROGRESS NOTES
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, GERD, h/o adenocarcinoma of cecum, arthritis, spinal stenosis, former smoker, obesity      Procedure/Surgeries: 8/25/2021 CABG x3 (LIMA-LAD, SVG-Ramus, SVG-OM), LAAL, EVH, KLS plating      Pacing wires: Ventricular        Intake/Output Summary (Last 24 hours) at 9/10/2021 0755  Last data filed at 9/10/2021 0700  Gross per 24 hour   Intake 2980.5 ml   Output 1000 ml   Net 1980.5 ml       Recent Labs     09/08/21  1600 09/08/21  1600 09/09/21  0510 09/09/21  1330 09/10/21  0404   WBC 20.2*  --  20.2*  --  18.6*   HGB 8.1*   < > 7.7* 8.7* 9.0*   HCT 25.5*   < > 25.0* 28.6* 28.9*   *  --  80*  --  96*    < > = values in this interval not displayed. Lab Results   Component Value Date     09/10/2021    K 4.2 09/10/2021    K 4.3 09/06/2021     09/10/2021    CO2 22 09/10/2021    BUN 51 09/10/2021    CREATININE 1.3 09/10/2021    GLUCOSE 153 09/10/2021    CALCIUM 8.3 09/10/2021         Physical Exam:    BP (!) 120/48   Pulse 73   Temp 96.6 °F (35.9 °C) (Esophageal)   Resp 12   Ht 5' 2\" (1.575 m)   Wt 210 lb 5.1 oz (95.4 kg)   LMP  (LMP Unknown)   SpO2 95%   BMI 38.47 kg/m²       CXR Findings: 9/10/2021         Impression   Bilateral pulmonary airspace opacities are increased since the prior study. Cannot exclude pleural effusions.               - CXR personally viewed and interpreted by ICU Nurse Practitioner, agree with above findings       General: Intubated, sedated. On dobutamine, weaning vaso gtt. Eyes: PERRL, anicteric   Pulmonary: Diminished bibasilar.  No wheezes, no accessory muscle use noted   Ventilator: Mode: AC/VC, 50% FiO2, 5 PEEP, 450 Vt   Cardiovascular:  RRR, no heaves or thrills on palpation  Tele: SR 80s  Abdomen: Soft, slightly distended, +BS, +BM   Extremities: Palpable pulses all extremities, worsening generalized edema   Neurologic/Psych: Intubated, sedated   Skin: Warm and dry  Incisions: MSI well approximated, RLE SVG sites with staples intact, well approximated     Lines:   ETT: 9/5  Rt Radial Arterial line: 9/5  Pino 8/25  Left UE PICC 8/30    Assessment/Plan: POD #16    1. CAD, PAF S/p CABG x3 (LIMA-LAD, SVG-Ramus, SVG-OM)  LAAL  - ASA, Lipitor on hold   - PICC placed 8/30     2. Paroxysmal Atrial Fibrillation  - s/p LAAL   - On Xarelto preop for PAF  - post op with PAF, Remains SR   - Continue PO Amio 200mg BID   - Argatroban for AC       3. Acute Hypoxic Respiratory failure   - former smoker, 2/2 surgery, extubated DOS,? Aspiration 8/27, HAP  - postop course c/b hypoxia required prolonged high flow oxygen  - s/p bronch 9/1, RLL, RML mucous plugging; f/u pathology   -7 day course Zosyn completed 9/2, On Ertapenem per ID   - Reintubated 9/5   - Pulmonary following    - Supportive care, plan for tracheostomy- discuss timing with General Surgery      4. Acute Post Operative Pain   - Sedation with Versed and fentanyl gtts     5. DM Type 2  - HgbA1C 6.2%  - RHI gtt for glycemic control for BG >180, SSI when off gtt      6.  Hypotension   - Suspected septic shock, likely HAP  - 9/7 Echo with normal LV function, Mildly dilated RV with mildly reduced RV function; started on Dobutamine gtt 9/7  - Discrepancy in NIV BP vs arterial line, wean pressors per NIV cuff  - Dobutamine @ 2.5mcg, Still requiring low dose vasopressin gtt, wean off today   - Stress steroids started 9/7  - Antibiotics per ID   - IVF resuscitation PRN      7. Acute blood loss anemia  - expected blood loss 2/2 surgery, s/p one unit RBCs 8/26, 8/31, 9/7, 9/9  - Hgb 9, stable s/p transfusion yesterday   - Monitor, transfuse PRN      8. Leukocytosis  - WBC 18K, Afebrile  - On systemic steroids   - f/u cultures from bronch  - ID following, Antibiotics switched to Ertapenem 9/8  - Respiratory Culture with light growth Candida albicans, light growth Klebsiella oxytoca, rare enterobacter cloacae  - follow 9/6 blood cultures  - Procalcitonin 0.31  - completed 7 day course of Zosyn 9/2      9. Anxiety  - On versed gtt, PRN Ativan 0.5mg q 4 hours      10. BROOKE superimposed on CKD   - Baseline Scr 0.9-1.2, peaked at 2.9 on 9/6   - Scr down to 1.3, UOP 0.4ml/kg/hr  - Nephrology following  - Decrease IVF; BMP q 12 hours  - Net + 17L, diuresis when okay with nephrology   - Monitor UOP, labs, avoid hypotension     11. Dysphagia/ At risk for malnutrition   - Suspected dysphagia, possible aspiration 8/27  - Bronch 9/1 airways with evidence of chronic aspiration   - MBS done 9/3, passed for soft and bite size consistency solids (dysphagia 3), thin liquids  - Emesis yesterday, TFs held, restart today and slowly increase as tolerated to goal once seen by general surgery for Trach/PEG     12. Abdominal Distention/Elevated LFTs  - General Surgery following  - Trend LFTS, Tbili 2.1  - Multiple +BM overnight, CT abdomen when contrast from barium swallow out of colon- remains on KUB   - Continue bowel regimen    13. Thrombocytopenia  - Plts 330K>124K>108K>80K  - Platelets up to 83I on Argatroban, continue to monitor   - Heparin stopped 9/9, f/u HIT panel      This patient has a high probability of sudden deterioration, which requires preparedness to urgently intervene. I participated in the decision making process and managed the direct care of the patient that required frequent assessment and treatment. I personally spent 33 minutes of critical care time treating the patient.        VTE Prophylaxis: Pharmacologic/Mechanical:  Yes, SCDs, argatroban   Line infection prevention: Can CVC or arterial line be removed: no  Continued need for urinary catheter:  Yes - clinical indication: Patient post major surgery requiring fluid balance and input and output measurement.     Dispo: CVICU     Electronically signed by TEMO Singleton - CNP on 9/10/2021 at 7:55 AM

## 2021-09-10 NOTE — PROGRESS NOTES
Patient continues to attempt to remove ETT when repositioning. For patient safety, fauzia wrist restraints will remain on patient. RN will reassess and attempt throughout shift. Will continue to monitor.

## 2021-09-10 NOTE — PROGRESS NOTES
[facility] PROGRESS NOTE      Chief complaint: Follow-up of septic shock    The patient is a 80 y.o. female with history of hypertension, hyperlipidemia, paroxysmal atrial fibrillation, renal artery aneurysm, cirrhosis, DM, CKD, admitted on 08/25 for elective CABG. Post-op course was complicated by fever up to 101.1 F on 08/29 with worsening leukocytosis up to currently 22,000, atrial fibrillation with rapid ventricular response, hypoxia, BROOKE. CXR on admission showed bilateral airspace disease and small bilateral pleural effusion. She underwent bronchoscopy on 09/01 during which diffuse scattered mucosal hemorrhages throughout the trachea and  thick clear secretions occluding the RML and RLL bronchi were noted. Bronchial wash Gram stain and culture showed rare polymorphonuclear leukocytes, rare epithelial cells, rare yeast, absent oral pharyngeal lisy, moderate growth of Candida albicans. She was reintubated on 09/05 due to decreasing level of consciousness and increasing respiratory distress, accompanied by hypothermia and shock. CXR on 09/06 showed persistent bilateral parenchymal infiltrates and probable small effusions, essentially unchanged from that on admission. Procalcitonin level was elevated at 0.31 ng/mL. Respiratory Gram stain and culture on 09/06 showed few polymorphonuclear leukocytes, few epithelial cells, no organisms, light growth of yeast, Klebsiella oxytoca (non-ESBL; susceptible to cotrimoxazole and fluoroquinolones) and Enterobacter cloacae (susceptible to cotrimoxazole and fluoroquinolones). Beta-D-glucan was negative. Urine Streptococcus pneumoniae and Legionella antigens were negative. SARS-CoV-2 PCR was not detected. She received cefazolin from 08/25-08/27, piperacillin-tazobactam from 08/27-09/02. Meropenem was started on 09/06. Subjective: Patient was seen and examined. She remains in critical condition, intubated, sedated, off vasopressor support, still on inotropic

## 2021-09-10 NOTE — PROGRESS NOTES
Comprehensive Nutrition Assessment    Type and Reason for Visit:  Reassess    Nutrition Recommendations/Plan: Modify TF  Pt w/ intermittent hypotension now off pressors, recommend Fiberless formula until pt more hemodynamically stable. Recommend Standard w/o Fiber @ 40 ml/hr + BID protein modular to provide 960 ml tv, 1152 kcal, 53 gm pro, 787 ml free water (1352 kcal, 105 gm pro w/ BID pro mod) to meet >90% kcal, 100% pro needs    Nutrition Assessment:  Pt remains nutritionally at risk w/ EN dependence 2/2 intubation s/p CABG x3. Pt pending trach, PEG on hold d/t ascites. Hx DM, cirrhosis, cecum CA. Intermittent hypotension now off pressors. Will provide updated recs and monitor    Malnutrition Assessment:  Malnutrition Status: At risk for malnutrition (Comment)    Context:  Acute Illness     Findings of the 6 clinical characteristics of malnutrition:  Energy Intake:  7 - 50% or less of estimated energy requirements for 5 or more days  Weight Loss:  Unable to assess (d/t wt fluctuations 2/2 fluid shifts)     Body Fat Loss:  No significant body fat loss     Muscle Mass Loss:  No significant muscle mass loss    Fluid Accumulation:  No significant fluid accumulation     Strength:  Not Performed    Estimated Daily Nutrient Needs:  Energy (kcal):  ; ; Weight Used for Energy Requirements:  Admission     Protein (g):   (1.8-2.0);  Weight Used for Protein Requirements:  Ideal        Fluid (ml/day):  per critical care    Nutrition Related Findings:  sedated on vent, intermittent hypotension off pressors, abd distention/ascites, active BS, generalized +2 pitting edema, +I/Os (>17L)      Wounds:  Multiple, Surgical Incision       Current Nutrition Therapies:    Current Tube Feeding (TF) Orders:  · Feeding Route: Orogastric  · Formula: Immune Enhancing  · Schedule: Continuous (40 ml/hr, 960 ml tv)  · Water Flushes: 30 ml q4 hr = 180 ml H2O  · Current TF & Flush Orders Provides: 1440 kcal, 90 gm pro, 720 ml free water,900 ml total water    Anthropometric Measures:  · Height: 5' 2\" (157.5 cm)  · Current Body Weight: 173 lb (78.5 kg) (adm wt, CBW remains elevated 2/2 fluids)   · Admission Body Weight: 173 lb (78.5 kg) (8/26 bed)    · Usual Body Weight:  (170-209# x8 mon back per EMR)     · Ideal Body Weight: 110 lbs; % Ideal Body Weight 157.3 %   · BMI: 31.6  · BMI Categories: Obese Class 1 (BMI 30.0-34. 9)       Nutrition Diagnosis:   · Increased nutrient needs related to increase demand for energy/nutrients as evidenced by wounds (s/p CABG X3)      Nutrition Interventions:   Food and/or Nutrient Delivery:  Modify Tube Feeding (Recommend Standard w/o Fiber @ 40 ml/hr + BID protein modular)  Nutrition Education/Counseling:  Education not appropriate   Coordination of Nutrition Care:  Continue to monitor while inpatient    Goals:  nutrition progression       Nutrition Monitoring and Evaluation:   Food/Nutrient Intake Outcomes:  Enteral Nutrition Intake/Tolerance  Physical Signs/Symptoms Outcomes:  Biochemical Data, GI Status, Fluid Status or Edema, Hemodynamic Status, Nutrition Focused Physical Findings, Skin, Weight     Discharge Planning:     Too soon to determine     Electronically signed by Clarissa Pittman, MS, RD, LD on 9/10/21 at 11:57 AM EDT    Contact: [phone]

## 2021-09-10 NOTE — PLAN OF CARE
Problem: Falls - Risk of:  Goal: Will remain free from falls  Description: Will remain free from falls  Outcome: Met This Shift     Problem: Cardiac Output - Decreased:  Goal: Cardiac output within specified parameters  Description: Cardiac output within specified parameters  Outcome: Met This Shift     Problem: Fluid Volume - Imbalance:  Goal: Ability to achieve a balanced intake and output will improve  Description: Ability to achieve a balanced intake and output will improve  Outcome: Met This Shift     Problem: Gas Exchange - Impaired:  Goal: Levels of oxygenation will improve  Description: Levels of oxygenation will improve  Outcome: Met This Shift     Problem: Non-Violent Restraints  Goal: Removal from restraints as soon as assessed to be safe  Outcome: Not Met This Shift  Goal: No harm/injury to patient while restraints in use  Outcome: Met This Shift  Goal: Patient's dignity will be maintained  Outcome: Met This Shift  Goal: Removal from restraints as soon as assessed to be safe  Outcome: Not Met This Shift  Goal: No harm/injury to patient while restraints in use  Outcome: Met This Shift  Goal: Patient's dignity will be maintained  Outcome: Met This Shift

## 2021-09-10 NOTE — PROGRESS NOTES
[facility]  Nephrology Attending Progress Note  Michelle Mcdonald. Sharifa Bustos MD        SUBJECTIVE:     9/3/21: Iggy Mojica is a 80 y.o. female with h/o HFpEF, refractory A fib  s/p DCCV X 2, hypertension, hyperlipidemia and other medical problems listed below. She presented for elective CABG on 8/25 following ischemic work-up and LHC showing  MV CAD. 6/8/21. Procedure was performed on 8/25. Her postop course has been complicated by acute respiratory failure, atrial fibrillation with RVR and intermittent fevers. She has required Zosyn. Preop creatinine has been 1-1.2 which is her recent baseline. In the last 48 hours creatinine has shown a progressive increase to currently 1.8 mg/dL associated with decreasing urine output. She has received albumin bolus but without any significant improvement. Hence renal consult.     9/4/21: pt seen in icu, has low urine output, just received iv albumin    9/5/21: seen in icu, started on ns at 76 yesterday. Remains with low uo.      9/6/21: pt reintubated and started on levo for hypotension, now hypothermic and with elevated wbc    9/7: seen in icu, uo increasing, intubated, starting dopa, on amio, vaso, levo    9/8: pt remains in icu, on dopamine, vaso, levo, amio, and heparin, intubated    9/9: pt seen in icu, intubated, on dopa and vaso    9/10: pt seen in icu, remains intubated, on dopa and weaning vaso      PROBLEM LIST:    Patient Active Problem List   Diagnosis    Spinal stenosis in cervical region    Spinal stenosis, lumbar region, without neurogenic claudication    Essential hypertension    Mixed hyperlipidemia    DM (diabetes mellitus) ([address]    Renal artery aneurysm (HCC)    PAF (paroxysmal atrial fibrillation) (HCC)    Anemia    Malignant neoplasm of cecum ([address]    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) ([address]    Chronic heart failure with preserved ejection fraction (HCC)    Atrial fibrillation with RVR (HCC)    Severe protein-calorie malnutrition ([facility]    Abnormal nuclear stress test    Opioid use, unspecified with unspecified opioid-induced disorder    Opioid dependence with unspecified opioid-induced disorder    Opioid dependence, uncomplicated    CAD in native artery    Pre-operative laboratory examination    Acute blood loss anemia    Leukocytosis    At risk for malnutrition    Hypotension        PAST MEDICAL HISTORY:    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[facility] 01/2019    Anemia     Arm numbness     Arthritis     Blood transfusion     Cervical spinal stenosis     Cirrhosis of liver (HCC)     Diabetes mellitus (HCC)     Fatty liver     GERD (gastroesophageal reflux disease)     Hyperlipidemia     Hypertension     Low back pain     Lumbar stenosis     Neck pain     Obesity (BMI 30.0-34.9) 10/14/2012    PAF (paroxysmal atrial fibrillation) (HCC)     Renal artery aneurysm ([facility] 7/15/2015       DIET:    Diet NPO Exceptions are: Sips of Water with Meds  ADULT TUBE FEEDING; Orogastric; Immune Enhancing; Continuous; 10; Yes; 10; Q 4 hours; 40; 30; Q 4 hours  Diet NPO Exceptions are: Sips of Water with Meds     PHYSICAL EXAM:     Patient Vitals for the past 24 hrs:   BP Temp Temp src Pulse Resp SpO2   09/10/21 1000 (!) 116/59 96.6 °F (35.9 °C) Esophageal 79 18 96 %   09/10/21 0900 (!) 109/54 97 °F (36.1 °C) Esophageal 80 16 96 %   09/10/21 0830 -- -- -- 78 14 95 %   09/10/21 0823 -- -- -- 78 -- 98 %   09/10/21 0817 (!) 118/58 -- -- 77 14 95 %   09/10/21 0800 (!) 115/56 96.6 °F (35.9 °C) Esophageal 77 18 95 %   09/10/21 0700 (!) 120/48 -- -- 73 12 95 %   09/10/21 0630 109/60 -- -- 77 13 94 %   09/10/21 0600 (!) 125/49 96.6 °F (35.9 °C) Esophageal 76 16 96 %   09/10/21 0530 (!) 114/57 -- -- 75 14 97 %   09/10/21 0500 (!) 115/55 -- -- 79 13 96 %   09/10/21 0430 -- -- -- 80 14 96 %   09/10/21 0400 (!) 109/39 96.8 °F (36 °C) Esophageal 78 16 97 %   09/10/21 0330 (!) 115/48 -- -- 81 1115 (!) 114/46 98.4 °F (36.9 °C) Esophageal 68 12 95 %   09/09/21 1100 (!) 105/44 98.4 °F (36.9 °C) Esophageal 67 12 95 %   09/09/21 1049 (!) 102/51 98.4 °F (36.9 °C) Esophageal 67 -- 95 %   @      Intake/Output Summary (Last 24 hours) at 9/10/2021 1039  Last data filed at 9/10/2021 1000  Gross per 24 hour   Intake 2990.5 ml   Output 1020 ml   Net 1970.5 ml         Wt Readings from Last 3 Encounters:   09/07/21 210 lb 5.1 oz (95.4 kg)   08/23/21 170 lb (77.1 kg)   07/16/21 175 lb (79.4 kg)       Constitutional:  Pt is in no acute distress  Head: normocephalic, atraumatic  Neck: no JVD  Cardiovascular: regular rate and rhythm, no murmurs, gallops, or rubs  Respiratory:  No rales, rhochi, or wheezes  Gastrointestinal:  Soft, nontender, nondistended, bowel sounds x 4  Ext: no edema  Skin: dry, no rash      MEDS (scheduled):    amiodarone  200 mg Oral BID    ertapenem (INVanz) IVPB  1,000 mg IntraVENous Q24H    insulin lispro  0-12 Units SubCUTAneous Q4H    albumin human  25 g IntraVENous Once    hydrocortisone sodium succinate PF  100 mg IntraVENous Q8H    polyethylene glycol  17 g Oral Daily    chlorhexidine  15 mL Mouth/Throat BID    albumin human  25 g IntraVENous Once    aspirin  81 mg Oral Daily    pantoprazole  40 mg IntraVENous Daily    And    sodium chloride (PF)  10 mL IntraVENous Daily    docusate sodium  100 mg Oral BID    sennosides  5 mL Oral Nightly    sodium chloride flush  5-40 mL IntraVENous 2 times per day    bisacodyl  10 mg Rectal BID    [Held by provider] metoprolol tartrate  12.5 mg Oral BID    [Held by provider] atorvastatin  10 mg Oral Nightly    pramipexole  0.5 mg Oral TID    ipratropium-albuterol  1 ampule Inhalation Q4H WA       MEDS (infusions):   argatroban infusion 0.617 mcg/kg/min (09/10/21 0629)    sodium chloride      DOBUTamine 2.5 mcg/kg/min (09/09/21 0820)    sodium chloride      sodium chloride 50 mL/hr at 09/10/21 0816    insulin Stopped (09/09/21 1500)  midazolam 1 mg/hr (09/10/21 0557)    fentaNYL 5 mcg/ml in 0.9%  ml infusion 50 mcg/hr (09/09/21 1937)    vasopressin (Septic Shock) infusion Stopped (09/10/21 0817)    norepinephrine Stopped (09/09/21 0630)    sodium chloride      sodium chloride      sodium chloride      sodium chloride 30 mL/hr (08/31/21 0946)    sodium chloride      dextrose         MEDS (prn):  sodium chloride, perflutren lipid microspheres, sodium chloride, oxyCODONE **OR** [DISCONTINUED] oxyCODONE, benzocaine-menthol, sodium chloride, sodium chloride flush, sodium chloride, LORazepam, sodium chloride, ondansetron, acetaminophen, acetaminophen, magnesium hydroxide, potassium chloride, magnesium sulfate, albumin human, sodium chloride, glucose, dextrose, glucagon (rDNA), dextrose, calcium gluconate IVPB    DATA:    Recent Labs     09/08/21  1600 09/08/21  1600 09/09/21  0510 09/09/21  1330 09/10/21  0404   WBC 20.2*  --  20.2*  --  18.6*   HGB 8.1*   < > 7.7* 8.7* 9.0*   HCT 25.5*   < > 25.0* 28.6* 28.9*   MCV 85.0  --  86.8  --  87.8   *  --  80*  --  96*    < > = values in this interval not displayed. Recent Labs     09/08/21  0500 09/08/21  1600 09/09/21  0400 09/09/21  0510 09/09/21  1630 09/10/21  0400 09/10/21  0404      < > 136  --  136 136  --    K 3.9   < > 3.7  --  4.1 4.2  --    CL 99   < > 99  --  102 101  --    CO2 23   < > 27  --  27 22  --    BUN 51*   < > 51*  --  50* 51*  --    CREATININE 1.5*   < > 1.4*  --  1.3* 1.3*  --    LABGLOM 33   < > 36  --  39 39  --    GLUCOSE 103*   < > 158*  --  157* 153*  --    CALCIUM 8.5*   < > 8.4*  --  8.1* 8.3*  --    ALT 55*  --   --  39*  --   --  34*   AST 24  --   --  16  --   --  17   BILIDIR 0.8*  --   --  0.7*  --   --  0.9*   BILITOT 2.8*  --   --  1.7*  --   --  2.1*   ALKPHOS 79  --   --  66  --   --  65   MG 2.4  --   --  2.3  --   --  2.3    < > = values in this interval not displayed.        Lab Results   Component Value Date    LABALBU 3.7 09/10/2021    LABALBU 4.0 09/09/2021    LABALBU 4.0 09/08/2021     Lab Results   Component Value Date    TSH 2.250 06/21/2021       Iron Studies  No results found for: IRON, TIBC, FERRITIN  No results found for: HGTSDQBL84  No results found for: FOLATE    No results found for: VITD25  No results found for: PTH    No components found for: URIC    Lab Results   Component Value Date    COLORU Yellow 08/28/2021    LABPH 0 10/22/2019    NITRU Negative 08/28/2021    GLUCOSEU Negative 08/28/2021    KETUA TRACE 08/28/2021    UROBILINOGEN 0.2 08/28/2021    BILIRUBINUR Negative 08/28/2021       No results found for: Cydne Duet      IMPRESSION/RECOMMENDATIONS:      1. Acute kidney injury stage I superimposed on CKD stage IIIa  BROOKE prerenal  FEurea 16% and FENa .04%, volume depletion  uo 2.2L  Cr 1.1>1.9>2.5>2.8>2.2>1.5>1.3  pola no hydro  Continue ivf     2. CAD s/p CABG X 3 . 8/25  CO 2.2, CI 1.7  Starting dopa     3. Acute postop respiratory failure  Intubated 9/5  Bronch with mucus plugging  Worsening wbc and hypotension  Id following  Started merrem for hap  Weaning pressirs     4. A fib RVR  On amio     5. Metabolic acidosis  On hco3 drip  Improved  Now off    6. Hyponatremia  Continue ivf  resolved    Eloise Gonzales.  Marisol Kauffman MD

## 2021-09-10 NOTE — PROGRESS NOTES
Oscar Cordova M.D.,[facility]  Justin Stanford D.O., F.A.C.O.I., Israel Tuttle M.D. Neetu Francois M.D. Leonardo Forrest D.O. Daily Pulmonary Progress Note    Patient:  Delisa Mcginnis 80 y.o. female MRN: [id number]     Date of Service: 9/10/2021      Synopsis     We are following patient for acute on chronic hypoxic respiratory failure      Code status: Full code      Subjective      Patient was seen and examined. Current settings are AC/VC 12, 450, 50%, +5. No changes. Remains on Fentanyl and Versed. Vasopressin stopped. Argatroban continues,  and Dobutamine. Insulin gtt has been re-started. CT abdomen done for ascities.      Review of Systems:  Unable to obtain    24-hour events:  none    Objective   Vitals: BP (!) 114/51   Pulse 75   Temp 97 °F (36.1 °C) (Esophageal)   Resp 14   Ht 5' 2\" (1.575 m)   Wt 233 lb (105.7 kg)   LMP  (LMP Unknown)   SpO2 99%   BMI 42.62 kg/m²     I/O:    Intake/Output Summary (Last 24 hours) at 9/10/2021 1331  Last data filed at 9/10/2021 1300  Gross per 24 hour   Intake 2670.5 ml   Output 1055 ml   Net 1615.5 ml       Vent Information  $Ventilation: $Subsequent Day  Skin Assessment: Clean, dry, & intact  Suction Catheter Diameter: 14  Equipment ID: [id number]  Equipment Changed: (S) Humidification  Vent Type: 980  Vent Mode: AC/VC  Vt Ordered: 450 mL  Rate Set: 12 bmp  Peak Flow: 0 L/min  Pressure Support: 0 cmH20  FiO2 : 50 %  SpO2: 99 %  SpO2/FiO2 ratio: 198  Sensitivity: 3  PEEP/CPAP: 5  I Time/ I Time %: 0.8 s  Humidification Source: Heated wire  Humidification Temp: 37  Humidification Temp Measured: 36.6  Circuit Condensation: Drained  Mask Type: Full face mask  Mask Size: Medium  Bonnet size: Medium       IPAP: 16 cmH20  CPAP/EPAP: 8 cmH2O     CURRENT MEDS :  Scheduled Meds:   amiodarone  200 mg Oral BID    ertapenem (INVanz) IVPB  1,000 mg IntraVENous Q24H    insulin lispro  0-12 Units SubCUTAneous Q4H    albumin human  25 g IntraVENous Once  hydrocortisone sodium succinate PF  100 mg IntraVENous Q8H    polyethylene glycol  17 g Oral Daily    chlorhexidine  15 mL Mouth/Throat BID    albumin human  25 g IntraVENous Once    aspirin  81 mg Oral Daily    pantoprazole  40 mg IntraVENous Daily    And    sodium chloride (PF)  10 mL IntraVENous Daily    docusate sodium  100 mg Oral BID    sennosides  5 mL Oral Nightly    sodium chloride flush  5-40 mL IntraVENous 2 times per day    bisacodyl  10 mg Rectal BID    [Held by provider] metoprolol tartrate  12.5 mg Oral BID    [Held by provider] atorvastatin  10 mg Oral Nightly    pramipexole  0.5 mg Oral TID    ipratropium-albuterol  1 ampule Inhalation Q4H WA       Physical Exam:  General Appearance: Intubated, currently appears restless  HEENT: Normocephalic atraumatic without obvious abnormality   Neck: Lips, mucosa, and tongue normal.  Supple, symmetrical, trachea midline, no adenopathy;thyroid:  no enlargement/tenderness/nodules or JVD. ETT tube inplace  Lung: Diffuse bilateral lung sounds  heart: RRR, normal S1, S2. No MRG  Abdomen: Distended and slightly tympanic  Extremities: Pedal pulses 2+ symmetric b/l. Extremities normal, no cyanosis, clubbing, or edema. Musculokeletal: No joint swelling, no muscle tenderness. ROM normal in all joints of extremities. Neurologic: Cranial nerves II to XII grossly intact. sedated    Pertinent/ New Labs and Imaging Studies     Imaging Personally Reviewed:    Narrative   EXAMINATION:   ONE XRAY VIEW OF THE CHEST       9/6/2021 7:22 am       COMPARISON:   09/05/2021       HISTORY:   ORDERING SYSTEM PROVIDED HISTORY: resp failure   TECHNOLOGIST PROVIDED HISTORY:   Reason for exam:->resp failure   What reading provider will be dictating this exam?->CRC       FINDINGS:   EKG leads overlie the chest.  Support tubes and lines remain in place.    Bilateral parenchymal infiltrates and probable small effusions persist.   These demonstrate no definite interval Aurora Anderson (MRN [id number]) as of 9/7/2021 11:31   Ref. Range 9/7/2021 05:05   Source: Unknown Blood Arterial   pH, Blood Gas Latest Ref Range: 7.350 - 7.450  7. 441   PCO2 Latest Ref Range: 35.0 - 45.0 mmHg 33.9 (L)   pO2 Latest Ref Range: 75.0 - 100.0 mmHg 73.3 (L)   HCO3 Latest Ref Range: 22.0 - 26.0 mmol/L 22.6   Base Excess Latest Ref Range: -3.0 - 3.0 mmol/L -1.2   O2 Sat Latest Ref Range: 92.0 - 98.5 % 93.9   tHb (est) Latest Ref Range: 11.5 - 16.5 g/dL 9.9 (L)   O2Hb Latest Ref Range: 94.0 - 97.0 % 92.8 (L)   COHb Latest Ref Range: 0.0 - 1.5 % 0.9   MetHb Latest Ref Range: 0.0 - 1.5 % 0.3   HHb Latest Ref Range: 0.0 - 5.0 % 6.0 (H)   AaDO2 Latest Units: mmHg 162.9   RI(T) Unknown 2.22   FIO2 Latest Units: % 40.0   PO2/FIO2 Latest Units: mmHg/% 1.83   Pt Temp Latest Units: C 37.0   Critical(s) Notified Unknown . No Critical Values   Time Analyzed Unknown 0505   Mode Unknown AC   Peep/Cpap Latest Units: cmH2O 5.0   Vt Mechanical Latest Units: mL 450.0   Rr Mechanical Latest Units: b/min 14.0        Assessment:    1. Post operative respiratory insufficiency with hypoxia requiring reintubation 9/5/21  2. Bilateral atelectasis/mucus plugging of bronchi s/p bronchoscopy 9/1/21  3. Pleural effusions - small on bedside US  4. CAD s/p CABG x 3 8/25/21  5. Atrial fibrillation with RVR   6. Anxiety  7. Aspiration pneumonia s/p treatment with Zosyn  8. BROOKE secondary to intravascular volume depletion  9. Leukocytosis, probable sepsis septic shock  10. Runs of nonsustained V. Tach  11. High anion gap metabolic acidosis with respiratory alkalosis      Plan:   1. sedation regimen to Versed and fentanyl given her present hypotension and also dyssynchrony with the vent. 2. Continue pressors per CTS. She is currently on dobutamine   3. Continue meropenem started at 1 g every 12 hours per infectious disease recommendations. Continue final culture results. 4.  Follow procalcitonin. 0.31  5.  General surgery consulted for abdominal distention elevation of LFTs. 6. Check electrolytes and place for potassium above 4 and mag above 2  7. Amiodarone drip per CTS surgery  8. Echo completed, see above  9. Hydrocortisone started 100mg q8 for cortisol level 11.97  10. CT abd done, see above  11. Plans for trach Tuesday, PEG on hold d/t ascites    Plan of care reviewed in collaboration with Dr. Starr Jamison  Electronically signed by TEMO Elizondo - CNP on 9/10/2021 at 1:31 PM    Addendum:  Patient remains critically ill. Intubated on full vent support. Currently on dobutamine and also argatroban drip. Insulin drip was resumed due to hyperglycemia. CT abdomen pelvis reviewed. Patient with significant amount of ascites, small to moderate bilateral pleural effusions. Impression   Findings consistent with portal hypertension and cirrhosis.       Moderate to large amount of ascites.       Apparent irregular wall thickening in the rectum.  It is unclear if this is   related to incomplete distension, stool, or mass lesion.  Correlation with   colonoscopy is suggested.         To consider therapeutic and diagnostic paracentesis if okay with CTS team.  Continue full vent support. Patient is tentatively scheduled for a tracheostomy next week on Tuesday. I personally saw, examined, and cared for the patient. Labs, medications, radiographs reviewed. I agree with history exam and plans detailed in NP note.     Kita Felix MD

## 2021-09-10 NOTE — CARE COORDINATION
Plan for trach on Tuesday. Peg on hold due to abd ascites. Both [facility] ([facility]) are following.  Sw/cm will follow

## 2021-09-11 ENCOUNTER — APPOINTMENT (OUTPATIENT)
Dept: GENERAL RADIOLOGY | Age: 81
DRG: 003 | End: 2021-09-11
Attending: THORACIC SURGERY (CARDIOTHORACIC VASCULAR SURGERY)
Payer: MEDICARE

## 2021-09-11 LAB
AADO2: 215.7 MMHG
ALBUMIN SERPL-MCNC: 3.8 G/DL (ref 3.5–5.2)
ALP BLD-CCNC: 62 U/L (ref 35–104)
ALT SERPL-CCNC: 31 U/L (ref 0–32)
ANION GAP SERPL CALCULATED.3IONS-SCNC: 10 MMOL/L (ref 7–16)
ANION GAP SERPL CALCULATED.3IONS-SCNC: 12 MMOL/L (ref 7–16)
APTT: 66.6 SEC (ref 24.5–35.1)
APTT: 74.3 SEC (ref 24.5–35.1)
APTT: 74.4 SEC (ref 24.5–35.1)
AST SERPL-CCNC: 15 U/L (ref 0–31)
B.E.: -0.7 MMOL/L (ref -3–3)
BILIRUB SERPL-MCNC: 1.8 MG/DL (ref 0–1.2)
BILIRUBIN DIRECT: 0.8 MG/DL (ref 0–0.3)
BILIRUBIN, INDIRECT: 1 MG/DL (ref 0–1)
BLOOD CULTURE, ROUTINE: NORMAL
BUN BLDV-MCNC: 53 MG/DL (ref 6–23)
BUN BLDV-MCNC: 53 MG/DL (ref 6–23)
CALCIUM SERPL-MCNC: 8.5 MG/DL (ref 8.6–10.2)
CALCIUM SERPL-MCNC: 8.5 MG/DL (ref 8.6–10.2)
CHLORIDE BLD-SCNC: 101 MMOL/L (ref 98–107)
CHLORIDE BLD-SCNC: 103 MMOL/L (ref 98–107)
CO2: 22 MMOL/L (ref 22–29)
CO2: 25 MMOL/L (ref 22–29)
COHB: 0.1 % (ref 0–1.5)
CREAT SERPL-MCNC: 1.3 MG/DL (ref 0.5–1)
CREAT SERPL-MCNC: 1.3 MG/DL (ref 0.5–1)
CRITICAL: ABNORMAL
CULTURE, BLOOD 2: NORMAL
DATE ANALYZED: ABNORMAL
DATE OF COLLECTION: ABNORMAL
FIO2: 50 %
GFR AFRICAN AMERICAN: 48
GFR AFRICAN AMERICAN: 48
GFR NON-AFRICAN AMERICAN: 39 ML/MIN/1.73
GFR NON-AFRICAN AMERICAN: 39 ML/MIN/1.73
GLUCOSE BLD-MCNC: 153 MG/DL (ref 74–99)
GLUCOSE BLD-MCNC: 160 MG/DL (ref 74–99)
HCO3: 24.1 MMOL/L (ref 22–26)
HCT VFR BLD CALC: 28.9 % (ref 34–48)
HEMOGLOBIN: 8.9 G/DL (ref 11.5–15.5)
HHB: 4.8 % (ref 0–5)
LAB: ABNORMAL
Lab: ABNORMAL
MAGNESIUM: 2.5 MG/DL (ref 1.6–2.6)
MCH RBC QN AUTO: 26.9 PG (ref 26–35)
MCHC RBC AUTO-ENTMCNC: 30.8 % (ref 32–34.5)
MCV RBC AUTO: 87.3 FL (ref 80–99.9)
METER GLUCOSE: 132 MG/DL (ref 74–99)
METER GLUCOSE: 138 MG/DL (ref 74–99)
METER GLUCOSE: 140 MG/DL (ref 74–99)
METER GLUCOSE: 147 MG/DL (ref 74–99)
METER GLUCOSE: 148 MG/DL (ref 74–99)
METER GLUCOSE: 155 MG/DL (ref 74–99)
METHB: 0.3 % (ref 0–1.5)
MODE: AC
O2 SATURATION: 95.2 % (ref 92–98.5)
O2HB: 94.8 % (ref 94–97)
OPERATOR ID: ABNORMAL
PATIENT TEMP: 37 C
PCO2: 40.1 MMHG (ref 35–45)
PDW BLD-RTO: 17.3 FL (ref 11.5–15)
PEEP/CPAP: 5 CMH2O
PFO2: 1.66 MMHG/%
PH BLOOD GAS: 7.4 (ref 7.35–7.45)
PLATELET # BLD: 107 E9/L (ref 130–450)
PMV BLD AUTO: 9.4 FL (ref 7–12)
PO2: 83.2 MMHG (ref 75–100)
POTASSIUM SERPL-SCNC: 3.7 MMOL/L (ref 3.5–5)
POTASSIUM SERPL-SCNC: 4.2 MMOL/L (ref 3.5–5)
RBC # BLD: 3.31 E12/L (ref 3.5–5.5)
RI(T): 2.59
RR MECHANICAL: 12 B/MIN
SODIUM BLD-SCNC: 135 MMOL/L (ref 132–146)
SODIUM BLD-SCNC: 138 MMOL/L (ref 132–146)
SOURCE, BLOOD GAS: ABNORMAL
THB: 9.9 G/DL (ref 11.5–16.5)
TIME ANALYZED: 514
TOTAL PROTEIN: 5.7 G/DL (ref 6.4–8.3)
VT MECHANICAL: 450 ML
WBC # BLD: 16.2 E9/L (ref 4.5–11.5)

## 2021-09-11 PROCEDURE — 80048 BASIC METABOLIC PNL TOTAL CA: CPT

## 2021-09-11 PROCEDURE — 36415 COLL VENOUS BLD VENIPUNCTURE: CPT

## 2021-09-11 PROCEDURE — 2000000000 HC ICU R&B

## 2021-09-11 PROCEDURE — 94003 VENT MGMT INPAT SUBQ DAY: CPT

## 2021-09-11 PROCEDURE — 2500000003 HC RX 250 WO HCPCS: Performed by: INTERNAL MEDICINE

## 2021-09-11 PROCEDURE — 71045 X-RAY EXAM CHEST 1 VIEW: CPT

## 2021-09-11 PROCEDURE — 85730 THROMBOPLASTIN TIME PARTIAL: CPT

## 2021-09-11 PROCEDURE — 6370000000 HC RX 637 (ALT 250 FOR IP): Performed by: THORACIC SURGERY (CARDIOTHORACIC VASCULAR SURGERY)

## 2021-09-11 PROCEDURE — 6360000002 HC RX W HCPCS: Performed by: INTERNAL MEDICINE

## 2021-09-11 PROCEDURE — 6360000002 HC RX W HCPCS: Performed by: NURSE PRACTITIONER

## 2021-09-11 PROCEDURE — 2580000003 HC RX 258: Performed by: INTERNAL MEDICINE

## 2021-09-11 PROCEDURE — 83735 ASSAY OF MAGNESIUM: CPT

## 2021-09-11 PROCEDURE — 6370000000 HC RX 637 (ALT 250 FOR IP): Performed by: NURSE PRACTITIONER

## 2021-09-11 PROCEDURE — 85027 COMPLETE CBC AUTOMATED: CPT

## 2021-09-11 PROCEDURE — 94640 AIRWAY INHALATION TREATMENT: CPT

## 2021-09-11 PROCEDURE — 82962 GLUCOSE BLOOD TEST: CPT

## 2021-09-11 PROCEDURE — 6360000002 HC RX W HCPCS: Performed by: THORACIC SURGERY (CARDIOTHORACIC VASCULAR SURGERY)

## 2021-09-11 PROCEDURE — 6370000000 HC RX 637 (ALT 250 FOR IP): Performed by: STUDENT IN AN ORGANIZED HEALTH CARE EDUCATION/TRAINING PROGRAM

## 2021-09-11 PROCEDURE — 82805 BLOOD GASES W/O2 SATURATION: CPT

## 2021-09-11 PROCEDURE — C9113 INJ PANTOPRAZOLE SODIUM, VIA: HCPCS | Performed by: NURSE PRACTITIONER

## 2021-09-11 PROCEDURE — 80076 HEPATIC FUNCTION PANEL: CPT

## 2021-09-11 PROCEDURE — 2580000003 HC RX 258: Performed by: NURSE PRACTITIONER

## 2021-09-11 PROCEDURE — 6370000000 HC RX 637 (ALT 250 FOR IP): Performed by: INTERNAL MEDICINE

## 2021-09-11 PROCEDURE — 2580000003 HC RX 258: Performed by: THORACIC SURGERY (CARDIOTHORACIC VASCULAR SURGERY)

## 2021-09-11 RX ORDER — BUMETANIDE 0.25 MG/ML
1 INJECTION, SOLUTION INTRAMUSCULAR; INTRAVENOUS ONCE
Status: COMPLETED | OUTPATIENT
Start: 2021-09-11 | End: 2021-09-11

## 2021-09-11 RX ORDER — ARGATROBAN 1 MG/ML
1 INJECTION INTRAVENOUS CONTINUOUS
Status: DISCONTINUED | OUTPATIENT
Start: 2021-09-11 | End: 2021-09-13

## 2021-09-11 RX ORDER — MIDODRINE HYDROCHLORIDE 5 MG/1
5 TABLET ORAL
Status: DISCONTINUED | OUTPATIENT
Start: 2021-09-11 | End: 2021-09-16 | Stop reason: HOSPADM

## 2021-09-11 RX ADMIN — HYDROCORTISONE SODIUM SUCCINATE 100 MG: 100 INJECTION, POWDER, FOR SOLUTION INTRAMUSCULAR; INTRAVENOUS at 20:13

## 2021-09-11 RX ADMIN — MIDODRINE HYDROCHLORIDE 5 MG: 5 TABLET ORAL at 14:14

## 2021-09-11 RX ADMIN — IPRATROPIUM BROMIDE AND ALBUTEROL SULFATE 1 AMPULE: .5; 2.5 SOLUTION RESPIRATORY (INHALATION) at 19:16

## 2021-09-11 RX ADMIN — PRAMIPEXOLE DIHYDROCHLORIDE 0.5 MG: 0.25 TABLET ORAL at 09:39

## 2021-09-11 RX ADMIN — PRAMIPEXOLE DIHYDROCHLORIDE 0.5 MG: 0.25 TABLET ORAL at 14:14

## 2021-09-11 RX ADMIN — MIDODRINE HYDROCHLORIDE 5 MG: 5 TABLET ORAL at 09:59

## 2021-09-11 RX ADMIN — SODIUM CHLORIDE, PRESERVATIVE FREE 10 ML: 5 INJECTION INTRAVENOUS at 09:41

## 2021-09-11 RX ADMIN — IPRATROPIUM BROMIDE AND ALBUTEROL SULFATE 1 AMPULE: .5; 2.5 SOLUTION RESPIRATORY (INHALATION) at 08:21

## 2021-09-11 RX ADMIN — IPRATROPIUM BROMIDE AND ALBUTEROL SULFATE 1 AMPULE: .5; 2.5 SOLUTION RESPIRATORY (INHALATION) at 15:56

## 2021-09-11 RX ADMIN — 0.12% CHLORHEXIDINE GLUCONATE 15 ML: 1.2 RINSE ORAL at 09:39

## 2021-09-11 RX ADMIN — PRAMIPEXOLE DIHYDROCHLORIDE 0.5 MG: 0.25 TABLET ORAL at 20:13

## 2021-09-11 RX ADMIN — 0.12% CHLORHEXIDINE GLUCONATE 15 ML: 1.2 RINSE ORAL at 20:13

## 2021-09-11 RX ADMIN — BISACODYL 10 MG: 10 SUPPOSITORY RECTAL at 09:40

## 2021-09-11 RX ADMIN — ASPIRIN 81 MG CHEWABLE TABLET 81 MG: 81 TABLET CHEWABLE at 09:40

## 2021-09-11 RX ADMIN — DOCUSATE SODIUM 100 MG: 50 LIQUID ORAL at 09:46

## 2021-09-11 RX ADMIN — DOCUSATE SODIUM 100 MG: 50 LIQUID ORAL at 20:13

## 2021-09-11 RX ADMIN — DOBUTAMINE IN DEXTROSE 2 MCG/KG/MIN: 400 INJECTION, SOLUTION INTRAVENOUS at 20:13

## 2021-09-11 RX ADMIN — ERTAPENEM SODIUM 1000 MG: 1 INJECTION, POWDER, LYOPHILIZED, FOR SOLUTION INTRAMUSCULAR; INTRAVENOUS at 12:24

## 2021-09-11 RX ADMIN — POLYETHYLENE GLYCOL 3350 17 G: 17 POWDER, FOR SOLUTION ORAL at 09:46

## 2021-09-11 RX ADMIN — ARGATROBAN 1 MCG/KG/MIN: 50 INJECTION, SOLUTION INTRAVENOUS at 06:28

## 2021-09-11 RX ADMIN — SENNOSIDES 5 ML: 8.8 SYRUP ORAL at 20:13

## 2021-09-11 RX ADMIN — CALCIUM GLUCONATE 1000 MG: 98 INJECTION, SOLUTION INTRAVENOUS at 06:30

## 2021-09-11 RX ADMIN — HYDROCORTISONE SODIUM SUCCINATE 100 MG: 100 INJECTION, POWDER, FOR SOLUTION INTRAMUSCULAR; INTRAVENOUS at 04:34

## 2021-09-11 RX ADMIN — Medication 10 ML: at 20:14

## 2021-09-11 RX ADMIN — PANTOPRAZOLE SODIUM 40 MG: 40 INJECTION, POWDER, FOR SOLUTION INTRAVENOUS at 09:40

## 2021-09-11 RX ADMIN — BUMETANIDE 1 MG: 0.25 INJECTION, SOLUTION INTRAMUSCULAR; INTRAVENOUS at 09:40

## 2021-09-11 RX ADMIN — ARGATROBAN 1 MCG/KG/MIN: 50 INJECTION, SOLUTION INTRAVENOUS at 14:59

## 2021-09-11 RX ADMIN — HYDROCORTISONE SODIUM SUCCINATE 100 MG: 100 INJECTION, POWDER, FOR SOLUTION INTRAMUSCULAR; INTRAVENOUS at 12:24

## 2021-09-11 RX ADMIN — AMIODARONE HYDROCHLORIDE 200 MG: 200 TABLET ORAL at 20:14

## 2021-09-11 RX ADMIN — MIDODRINE HYDROCHLORIDE 5 MG: 5 TABLET ORAL at 17:30

## 2021-09-11 RX ADMIN — INSULIN LISPRO 2 UNITS: 100 INJECTION, SOLUTION INTRAVENOUS; SUBCUTANEOUS at 16:29

## 2021-09-11 RX ADMIN — Medication 10 ML: at 09:42

## 2021-09-11 RX ADMIN — INSULIN LISPRO 2 UNITS: 100 INJECTION, SOLUTION INTRAVENOUS; SUBCUTANEOUS at 04:34

## 2021-09-11 RX ADMIN — IPRATROPIUM BROMIDE AND ALBUTEROL SULFATE 1 AMPULE: .5; 2.5 SOLUTION RESPIRATORY (INHALATION) at 11:43

## 2021-09-11 RX ADMIN — INSULIN LISPRO 2 UNITS: 100 INJECTION, SOLUTION INTRAVENOUS; SUBCUTANEOUS at 12:30

## 2021-09-11 RX ADMIN — AMIODARONE HYDROCHLORIDE 200 MG: 200 TABLET ORAL at 09:39

## 2021-09-11 RX ADMIN — BISACODYL 10 MG: 10 SUPPOSITORY RECTAL at 20:14

## 2021-09-11 RX ADMIN — INSULIN LISPRO 2 UNITS: 100 INJECTION, SOLUTION INTRAVENOUS; SUBCUTANEOUS at 20:13

## 2021-09-11 RX ADMIN — ARGATROBAN 1 MCG/KG/MIN: 50 INJECTION, SOLUTION INTRAVENOUS at 23:13

## 2021-09-11 RX ADMIN — Medication 50 MCG/HR: at 18:36

## 2021-09-11 ASSESSMENT — PAIN SCALES - GENERAL
PAINLEVEL_OUTOF10: 0

## 2021-09-11 ASSESSMENT — PULMONARY FUNCTION TESTS
PIF_VALUE: 29
PIF_VALUE: 27
PIF_VALUE: 29
PIF_VALUE: 29
PIF_VALUE: 30
PIF_VALUE: 29
PIF_VALUE: 28
PIF_VALUE: 51
PIF_VALUE: 29
PIF_VALUE: 31
PIF_VALUE: 27
PIF_VALUE: 36
PIF_VALUE: 53
PIF_VALUE: 29
PIF_VALUE: 45
PIF_VALUE: 29

## 2021-09-11 NOTE — PLAN OF CARE
Problem: Non-Violent Restraints  Goal: Removal from restraints as soon as assessed to be safe  Outcome: Completed  Goal: No harm/injury to patient while restraints in use  Outcome: Completed  Goal: Patient's dignity will be maintained  Outcome: Completed  Goal: Removal from restraints as soon as assessed to be safe  Outcome: Completed  Goal: No harm/injury to patient while restraints in use  Outcome: Completed  Goal: Patient's dignity will be maintained  Outcome: Completed

## 2021-09-11 NOTE — PROGRESS NOTES
[facility] PROGRESS NOTE      Chief complaint: Follow-up of septic shock    The patient is a 80 y.o. female with history of hypertension, hyperlipidemia, paroxysmal atrial fibrillation, renal artery aneurysm, cirrhosis, DM, CKD, admitted on 08/25 for elective CABG. Post-op course was complicated by fever up to 101.1 F on 08/29 with worsening leukocytosis up to currently 22,000, atrial fibrillation with rapid ventricular response, hypoxia, BROOKE. CXR on admission showed bilateral airspace disease and small bilateral pleural effusion. She underwent bronchoscopy on 09/01 during which diffuse scattered mucosal hemorrhages throughout the trachea and  thick clear secretions occluding the RML and RLL bronchi were noted. Bronchial wash Gram stain and culture showed rare polymorphonuclear leukocytes, rare epithelial cells, rare yeast, absent oral pharyngeal lisy, moderate growth of Candida albicans. She was reintubated on 09/05 due to decreasing level of consciousness and increasing respiratory distress, accompanied by hypothermia and shock. CXR on 09/06 showed persistent bilateral parenchymal infiltrates and probable small effusions, essentially unchanged from that on admission. Procalcitonin level was elevated at 0.31 ng/mL. Respiratory Gram stain and culture on 09/06 showed few polymorphonuclear leukocytes, few epithelial cells, no organisms, light growth of yeast, Klebsiella oxytoca (non-ESBL; susceptible to cotrimoxazole and fluoroquinolones) and Enterobacter cloacae (susceptible to cotrimoxazole and fluoroquinolones). Beta-D-glucan was negative. Urine Streptococcus pneumoniae and Legionella antigens were negative. SARS-CoV-2 PCR was not detected. She received cefazolin from 08/25-08/27, piperacillin-tazobactam from 08/27-09/02. Meropenem was started on 09/06. Subjective: Patient was seen and examined. She remains in critical condition, intubated, sedated, on decreasing inotropic support.     Objective:  BP (!) 114/52 Pulse 69   Temp 96.3 °F (35.7 °C) (Esophageal)   Resp 12   Ht 5' 2\" (1.575 m)   Wt 233 lb (105.7 kg)   LMP  (LMP Unknown)   SpO2 100%   BMI 42.62 kg/m²   Constitutional: Intubated, no MV dyssynchrony  Respiratory: Clear breath sounds, no crackles, no wheezes  Cardiovascular: Regular rate and rhythm, no murmurs  Gastrointestinal: Bowel sounds present, soft, distended   Skin: Warm and dry, no active dermatoses  Musculoskeletal: No joint swelling, no joint erythema    Labs, imaging, and medical records/notes were personally reviewed. Assessment:  Shock, presumed septic, secondary to Klebsiella oxytoca and Enterobacter cloacae HAP  Acute hypoxic respiratory failure  Candida albicans on respiratory culture, deemed colonization  Leukocytosis, on steroids  CAD s/p CABG on 08/25  BROOKE on CKD    Recommendations:  Continue ertapenem 1g q24h. Follow up blood cultures.     Thank you for involving me in the care of Amna Franklin. I will continue to follow. Please do not hesitate to call for any questions or concerns.     Electronically signed by Joel Milligan MD on 9/11/2021 at 10:51 AM

## 2021-09-11 NOTE — PROGRESS NOTES
Stable. Vaso off, then on, now off again. ABG good, labs stable. Dobut at 2. Trach and PEG soon. Add midodrine. Supportive care.   Sabra Walker MD

## 2021-09-11 NOTE — PLAN OF CARE
Problem: Falls - Risk of:  Goal: Will remain free from falls  Description: Will remain free from falls  Outcome: Met This Shift  Goal: Absence of physical injury  Description: Absence of physical injury  Outcome: Met This Shift     Problem: Anxiety:  Goal: Level of anxiety will decrease  Description: Level of anxiety will decrease  Outcome: Met This Shift     Problem: Cardiac Output - Decreased:  Goal: Cardiac output within specified parameters  Description: Cardiac output within specified parameters  Outcome: Met This Shift  Goal: Hemodynamic stability will improve  Description: Hemodynamic stability will improve  Outcome: Met This Shift     Problem: Pain:  Description: Pain management should include both nonpharmacologic and pharmacologic interventions. Goal: Pain level will decrease  Description: Pain level will decrease  Outcome: Met This Shift  Goal: Control of acute pain  Description: Control of acute pain  Outcome: Met This Shift     Problem: Tissue Perfusion - Cardiopulmonary, Altered:  Goal: Absence of angina  Description: Absence of angina  Outcome: Met This Shift  Goal: Hemodynamic stability will improve  Description: Hemodynamic stability will improve  Outcome: Met This Shift  Goal: Will show no evidence of cardiac arrhythmias  Description: Will show no evidence of cardiac arrhythmias  Outcome: Met This Shift     Problem: Skin Integrity:  Goal: Will show no infection signs and symptoms  Description: Will show no infection signs and symptoms  Outcome: Met This Shift  Goal: Absence of new skin breakdown  Description: Absence of new skin breakdown  Outcome: Met This Shift     Problem: Pain:  Description: Pain management should include both nonpharmacologic and pharmacologic interventions.   Goal: Pain level will decrease  Description: Pain level will decrease  Outcome: Met This Shift     Problem: Musculor/Skeletal Functional Status  Goal: Highest potential functional level  Outcome: Met This Shift  Goal: Absence of falls  Outcome: Met This Shift

## 2021-09-11 NOTE — PROGRESS NOTES
Chante Rodriguez M.D.,[facility]  Ranjith Franco D.O., F.A.C.O.I., Matt James M.D. Rani Morrow M.D. Henrry Vega D.O. Daily Pulmonary Progress Note    Patient:  Amna Primus 80 y.o. female MRN: [id number]     Date of Service: 9/11/2021      Synopsis     We are following patient for acute on chronic hypoxic respiratory failure      Code status: Full code      Subjective      Patient was seen and examined. Current settings are AC/VC 12, 450, 50%, +5. Remains on Fentanyl and Versed. Was on vasopressin for a short time last night. Continues to be on dobutamine at 2 mcg/kg/h. Off insulin drip.     Review of Systems:  Unable to obtain    24-hour events:  none    Objective   Vitals: BP (!) 114/52   Pulse 82   Temp 97 °F (36.1 °C) (Esophageal)   Resp 13   Ht 5' 2\" (1.575 m)   Wt 233 lb (105.7 kg)   LMP  (LMP Unknown)   SpO2 98%   BMI 42.62 kg/m²     I/O:    Intake/Output Summary (Last 24 hours) at 9/11/2021 1543  Last data filed at 9/11/2021 1500  Gross per 24 hour   Intake 2206.8 ml   Output 1565 ml   Net 641.8 ml       Vent Information  $Ventilation: $Subsequent Day  Skin Assessment: Clean, dry, & intact  Suction Catheter Diameter: 14  Equipment ID: [id number]  Equipment Changed: (S) Humidification  Vent Type: 980  Vent Mode: AC/VC+  Vt Ordered: 450 mL  Rate Set: 12 bmp  Peak Flow: 0 L/min  Pressure Support: 0 cmH20  FiO2 : 50 %  SpO2: 98 %  SpO2/FiO2 ratio: 196  Sensitivity: 3  PEEP/CPAP: 5  I Time/ I Time %: 0.8 s  Humidification Source: Heated wire  Humidification Temp: 37  Humidification Temp Measured: 37  Circuit Condensation: Drained  Mask Type: Full face mask  Mask Size: Medium  Bonnet size: Medium       IPAP: 16 cmH20  CPAP/EPAP: 8 cmH2O     CURRENT MEDS :  Scheduled Meds:   midodrine  5 mg Oral TID WC    amiodarone  200 mg Oral BID    ertapenem (INVanz) IVPB  1,000 mg IntraVENous Q24H    insulin lispro  0-12 Units SubCUTAneous Q4H    albumin human  25 g IntraVENous Once    hydrocortisone sodium succinate PF  100 mg IntraVENous Q8H    polyethylene glycol  17 g Oral Daily    chlorhexidine  15 mL Mouth/Throat BID    albumin human  25 g IntraVENous Once    aspirin  81 mg Oral Daily    pantoprazole  40 mg IntraVENous Daily    And    sodium chloride (PF)  10 mL IntraVENous Daily    docusate sodium  100 mg Oral BID    sennosides  5 mL Oral Nightly    sodium chloride flush  5-40 mL IntraVENous 2 times per day    bisacodyl  10 mg Rectal BID    [Held by provider] metoprolol tartrate  12.5 mg Oral BID    [Held by provider] atorvastatin  10 mg Oral Nightly    pramipexole  0.5 mg Oral TID    ipratropium-albuterol  1 ampule Inhalation Q4H WA       Physical Exam:  General Appearance: Intubated, sedated in no distress  HEENT: Normocephalic atraumatic without obvious abnormality   Neck: Lips, mucosa, and tongue normal.  Supple, symmetrical, trachea midline, no adenopathy;thyroid:  no enlargement/tenderness/nodules or JVD. ETT tube inplace  Lung: Diffuse bilateral lung sounds  heart: RRR, normal S1, S2. No MRG  Abdomen: Appears to be less distended and soft today. Extremities: Pedal pulses 2+ symmetric b/l. Extremities normal, no cyanosis, clubbing, or edema. Musculokeletal: No joint swelling, no muscle tenderness. ROM normal in all joints of extremities. Neurologic: Cranial nerves II to XII grossly intact. sedated    Pertinent/ New Labs and Imaging Studies     Imaging Personally Reviewed:    1    ECHO  9/7/2021  Left ventricular size is grossly normal.   Ejection fraction is visually estimated at 60 to 65%. The left atrium is mild-moderately dilated. Mildly dilated right ventricle. Right ventricle global systolic function is mildly reduced . Mildly enlarged right atrium size. The tricuspid valve was not well visualized. Severe tricuspid regurgitation.     Labs:  Lab Results   Component Value Date    WBC 16.2 09/11/2021    HGB 8.9 09/11/2021    HCT 28.9 09/11/2021    MCV 87.3 09/11/2021    MCH 26.9 09/11/2021    MCHC 30.8 09/11/2021    RDW 17.3 09/11/2021     09/11/2021    MPV 9.4 09/11/2021     Lab Results   Component Value Date     09/11/2021    K 4.2 09/11/2021    K 4.3 09/06/2021     09/11/2021    CO2 22 09/11/2021    BUN 53 09/11/2021    CREATININE 1.3 09/11/2021    LABALBU 3.8 09/11/2021    CALCIUM 8.5 09/11/2021    GFRAA 48 09/11/2021    LABGLOM 39 09/11/2021     Lab Results   Component Value Date    PROTIME 14.5 08/25/2021    INR 1.3 08/25/2021     No results for input(s): PROBNP in the last 72 hours. No results for input(s): PROCAL in the last 72 hours. This SmartLink has not been configured with any valid records. Micro:  No results for input(s): CULTRESP in the last 72 hours. No results for input(s): LABGRAM in the last 72 hours. No results for input(s): LEGUR in the last 72 hours. No results for input(s): STREPNEUMAGU in the last 72 hours. No results for input(s): LP1UAG in the last 72 hours. Results for Fela Chaidez (MRN [id number]) as of 9/7/2021 11:31  Results for Fela Chaidez (MRN [id number]) as of 9/11/2021 15:50   Ref. Range 9/9/2021 05:20 9/10/2021 09:23 9/11/2021 05:14   pH, Blood Gas Latest Ref Range: 7.350 - 7.450  7. 397 7.405 7. 396   PCO2 Latest Ref Range: 35.0 - 45.0 mmHg 38.7 37.0 40.1   pO2 Latest Ref Range: 75.0 - 100.0 mmHg 81.0 87.2 83.2   HCO3 Latest Ref Range: 22.0 - 26.0 mmol/L 23.3 22.7 24.1   Base Excess Latest Ref Range: -3.0 - 3.0 mmol/L -1.4 -1.7 -0.7   O2 Sat Latest Ref Range: 92.0 - 98.5 % 95.1 95.9 95.2   tHb (est) Latest Ref Range: 11.5 - 16.5 g/dL 8.9 (L) 10.1 (L) 9.9 (L)   O2Hb Latest Ref Range: 94.0 - 97.0 % 94.1 95.5 94.8   COHb Latest Ref Range: 0.0 - 1.5 % 0.8 0.3 0.1   MetHb Latest Ref Range: 0.0 - 1.5 % 0.2 0.1 0.3   HHb Latest Ref Range: 0.0 - 5.0 % 4.9 4.1 4.8   AaDO2 Latest Units: mmHg 219.5 215.2 215.7   RI(T) Unknown 2.71 2.47 2.59   FIO2 Latest Units: % 50.0 50.0 50.0 PO2/FIO2 Latest Units: mmHg/% 1.62 1.74 1.66   Pt Temp Latest Units: C 37.0 37.0 37.0   Critical(s) Notified Unknown . No Critical Values . No Critical Values . No Critical Values   Time Analyzed Unknown [id number]   Mode Unknown AC AC AC   Peep/Cpap Latest Units: cmH2O 5.0 5.0 5.0   Vt Mechanical Latest Units: mL 450.0 450.0 450.0   Rr Mechanical Latest Units: b/min 12.0 12.0 12.0      Assessment:    1. Post operative respiratory insufficiency with hypoxia requiring reintubation 9/5/21  2. Bilateral atelectasis/mucus plugging of bronchi s/p bronchoscopy 9/1/21  3. Pleural effusions - small on bedside US  4. CAD s/p CABG x 3 8/25/21  5. Atrial fibrillation with RVR   6. Anxiety  7. Aspiration pneumonia s/p treatment with Zosyn  8. BROOKE secondary to intravascular volume depletion  9. Leukocytosis, probable sepsis septic shock  10. Runs of nonsustained V. Tach  11. High anion gap metabolic acidosis with respiratory alkalosis-improved  12. Klebsiella oxytoca and Enterobacter cloacae HAP      Plan:   1.  continue full vent support, patient on scheduled for possible tracheostomy on Tuesday  2. Continue pressors per CTS. She is currently on dobutamine   3. C patient on ertapenem 1 g every 24 hours per ID  4. LFTs trending down,  5. Diuresis per nephrology recommendations she received 1 dose of Bumex today. 6. Continue amiodarone for A. fib  7.  Patient with cirrhosis and ascites, question need for paracentesis, not a candidate for her PEG tube due to her ascites      Electronically signed by Robert Cruz MD on 9/11/2021 at 3:43 PM

## 2021-09-11 NOTE — FLOWSHEET NOTE
Bilateral wrist restraints removed. Patient very weak and unable to fully reach hands up to ETT. Also able to redirect patient verbally not to reach for tube.

## 2021-09-11 NOTE — PROGRESS NOTES
Patient continues to reach for ETT tube when awake and unrestrained. For patient safety, bilateral soft wrist restraints continued at this time.

## 2021-09-11 NOTE — PROGRESS NOTES
[facility]  Nephrology Attending Progress Note          SUBJECTIVE:     9/3/21: Jacob Ahmadi is a 80 y.o. female with h/o HFpEF, refractory A fib  s/p DCCV X 2, hypertension, hyperlipidemia and other medical problems listed below. She presented for elective CABG on 8/25 following ischemic work-up and C showing  MV CAD. 6/8/21. Procedure was performed on 8/25. Her postop course has been complicated by acute respiratory failure, atrial fibrillation with RVR and intermittent fevers. She has required Zosyn. Preop creatinine has been 1-1.2 which is her recent baseline. In the last 48 hours creatinine has shown a progressive increase to currently 1.8 mg/dL associated with decreasing urine output. She has received albumin bolus but without any significant improvement. Hence renal consult.     9/4/21: pt seen in icu, has low urine output, just received iv albumin    9/5/21: seen in icu, started on ns at 76 yesterday. Remains with low uo.      9/6/21: pt reintubated and started on levo for hypotension, now hypothermic and with elevated wbc    9/7: seen in icu, uo increasing, intubated, starting dopa, on amio, vaso, levo    9/8: pt remains in icu, on dopamine, vaso, levo, amio, and heparin, intubated    9/9: pt seen in icu, intubated, on dopa and vaso    9/10: pt seen in icu, remains intubated, on dopa and weaning vaso    9/11: No acute issues overnight; remains intubated      PROBLEM LIST:    Patient Active Problem List   Diagnosis    Spinal stenosis in cervical region    Spinal stenosis, lumbar region, without neurogenic claudication    Essential hypertension    Mixed hyperlipidemia    DM (diabetes mellitus) ([facility]    Renal artery aneurysm (HCC)    PAF (paroxysmal atrial fibrillation) (HCC)    Anemia    Malignant neoplasm of cecum ([address]    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)    Chronic heart failure with preserved ejection fraction (HCC)    Atrial fibrillation with RVR (HCC)    Severe protein-calorie malnutrition (HCC)    Abnormal nuclear stress test    Opioid use, unspecified with unspecified opioid-induced disorder    Opioid dependence with unspecified opioid-induced disorder    Opioid dependence, uncomplicated    CAD in native artery    Pre-operative laboratory examination    Acute blood loss anemia    Leukocytosis    At risk for malnutrition    Hypotension        PAST MEDICAL HISTORY:    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[facility] 01/2019    Anemia     Arm numbness     Arthritis     Blood transfusion     Cervical spinal stenosis     Cirrhosis of liver (HCC)     Diabetes mellitus ([facility]     Fatty liver     GERD (gastroesophageal reflux disease)     Hyperlipidemia     Hypertension     Low back pain     Lumbar stenosis     Neck pain     Obesity (BMI 30.0-34.9) 10/14/2012    PAF (paroxysmal atrial fibrillation) (HCC)     Renal artery aneurysm ([facility] 7/15/2015       DIET:    Diet NPO Exceptions are: Sips of Water with Meds  Diet NPO Exceptions are: Sips of Water with Meds  ADULT TUBE FEEDING; Orogastric; Standard without Fiber; Continuous; 10; Yes; 0; Q 4 hours; 10; 30; Q 4 hours; Protein; BID protein modular     PHYSICAL EXAM:     Patient Vitals for the past 24 hrs:   BP Temp Temp src Pulse Resp SpO2 Height Weight   09/11/21 0700 -- -- -- 73 -- 97 % -- --   09/11/21 0600 (!) 118/53 -- -- 64 -- 98 % -- --   09/11/21 0500 (!) 117/54 -- -- 73 -- 98 % -- --   09/11/21 0400 (!) 128/57 97.2 °F (36.2 °C) -- 75 -- 96 % -- --   09/11/21 0300 (!) 119/45 -- -- 67 -- 96 % -- --   09/11/21 0200 (!) 118/43 -- -- 74 -- 97 % -- --   09/11/21 0100 (!) 111/45 -- -- 74 -- 97 % -- --   09/11/21 0000 (!) 127/39 97 °F (36.1 °C) -- 76 -- 97 % -- --   09/10/21 2300 (!) 119/49 -- -- 78 -- 96 % -- --   09/10/21 2200 (!) 135/49 -- -- 76 -- 96 % -- --   09/10/21 2135 -- -- -- 75 -- 97 % -- --   09/10/21 2100 (!) 98/57 -- -- 70 -- 96 bowel sounds x 4  Ext: no edema  Skin: dry, no rash      MEDS (scheduled):    midodrine  5 mg Oral TID WC    amiodarone  200 mg Oral BID    ertapenem (INVanz) IVPB  1,000 mg IntraVENous Q24H    insulin lispro  0-12 Units SubCUTAneous Q4H    albumin human  25 g IntraVENous Once    hydrocortisone sodium succinate PF  100 mg IntraVENous Q8H    polyethylene glycol  17 g Oral Daily    chlorhexidine  15 mL Mouth/Throat BID    albumin human  25 g IntraVENous Once    aspirin  81 mg Oral Daily    pantoprazole  40 mg IntraVENous Daily    And    sodium chloride (PF)  10 mL IntraVENous Daily    docusate sodium  100 mg Oral BID    sennosides  5 mL Oral Nightly    sodium chloride flush  5-40 mL IntraVENous 2 times per day    bisacodyl  10 mg Rectal BID    [Held by provider] metoprolol tartrate  12.5 mg Oral BID    [Held by provider] atorvastatin  10 mg Oral Nightly    pramipexole  0.5 mg Oral TID    ipratropium-albuterol  1 ampule Inhalation Q4H WA       MEDS (infusions):   argatroban infusion 1 mcg/kg/min (09/11/21 5443)    sodium chloride      DOBUTamine 2 mcg/kg/min (09/10/21 1445)    sodium chloride      sodium chloride 50 mL/hr at 09/10/21 0816    insulin Stopped (09/09/21 1500)    midazolam 2 mg/hr (09/10/21 2314)    fentaNYL 5 mcg/ml in 0.9%  ml infusion 50 mcg/hr (09/10/21 2135)    vasopressin (Septic Shock) infusion Stopped (09/11/21 0012)    norepinephrine Stopped (09/09/21 0630)    sodium chloride      sodium chloride      sodium chloride      sodium chloride 30 mL/hr (08/31/21 0946)    sodium chloride      dextrose         MEDS (prn):  sodium chloride, perflutren lipid microspheres, sodium chloride, oxyCODONE **OR** [DISCONTINUED] oxyCODONE, benzocaine-menthol, sodium chloride, sodium chloride flush, sodium chloride, LORazepam, sodium chloride, ondansetron, acetaminophen, acetaminophen, magnesium hydroxide, potassium chloride, magnesium sulfate, albumin human, sodium chloride, nonoliguric but low UO  pola no hydro  Fluid balance +++, increasing pleural effusion  Will give a 1 time dose of bumex, BP soft     2. CAD s/p CABG X 3 . 8/25  CO 2.2, CI 1.7  Starting dopa     3. Acute postop respiratory failure  Intubated 9/5  Bronch with mucus plugging  Worsening wbc and hypotension  Id following  Started merrem for hap  Now off pressors     4. A fib RVR  On amio     5. Metabolic acidosis  On hco3 drip  Improved  Now off    6.  Hyponatremia  Continue ivf  resolved    Mayur Adams MD

## 2021-09-12 ENCOUNTER — APPOINTMENT (OUTPATIENT)
Dept: GENERAL RADIOLOGY | Age: 81
DRG: 003 | End: 2021-09-12
Attending: THORACIC SURGERY (CARDIOTHORACIC VASCULAR SURGERY)
Payer: MEDICARE

## 2021-09-12 LAB
AADO2: 211.7 MMHG
ALBUMIN SERPL-MCNC: 3.7 G/DL (ref 3.5–5.2)
ALP BLD-CCNC: 66 U/L (ref 35–104)
ALT SERPL-CCNC: 28 U/L (ref 0–32)
ANION GAP SERPL CALCULATED.3IONS-SCNC: 13 MMOL/L (ref 7–16)
ANION GAP SERPL CALCULATED.3IONS-SCNC: 8 MMOL/L (ref 7–16)
APTT: 72.4 SEC (ref 24.5–35.1)
APTT: 74 SEC (ref 24.5–35.1)
AST SERPL-CCNC: 18 U/L (ref 0–31)
B.E.: -1 MMOL/L (ref -3–3)
BILIRUB SERPL-MCNC: 1.7 MG/DL (ref 0–1.2)
BILIRUBIN DIRECT: 0.7 MG/DL (ref 0–0.3)
BILIRUBIN, INDIRECT: 1 MG/DL (ref 0–1)
BUN BLDV-MCNC: 48 MG/DL (ref 6–23)
BUN BLDV-MCNC: 53 MG/DL (ref 6–23)
CALCIUM SERPL-MCNC: 8.6 MG/DL (ref 8.6–10.2)
CALCIUM SERPL-MCNC: 9 MG/DL (ref 8.6–10.2)
CHLORIDE BLD-SCNC: 104 MMOL/L (ref 98–107)
CHLORIDE BLD-SCNC: 104 MMOL/L (ref 98–107)
CO2: 23 MMOL/L (ref 22–29)
CO2: 27 MMOL/L (ref 22–29)
COHB: 0.3 % (ref 0–1.5)
CREAT SERPL-MCNC: 1.1 MG/DL (ref 0.5–1)
CREAT SERPL-MCNC: 1.2 MG/DL (ref 0.5–1)
CRITICAL: ABNORMAL
DATE ANALYZED: ABNORMAL
DATE OF COLLECTION: ABNORMAL
FIO2: 50 %
GFR AFRICAN AMERICAN: 52
GFR AFRICAN AMERICAN: 58
GFR NON-AFRICAN AMERICAN: 43 ML/MIN/1.73
GFR NON-AFRICAN AMERICAN: 48 ML/MIN/1.73
GLUCOSE BLD-MCNC: 149 MG/DL (ref 74–99)
GLUCOSE BLD-MCNC: 154 MG/DL (ref 74–99)
HCO3: 23.4 MMOL/L (ref 22–26)
HCT VFR BLD CALC: 28 % (ref 34–48)
HEMOGLOBIN: 8.7 G/DL (ref 11.5–15.5)
HHB: 3.5 % (ref 0–5)
LAB: ABNORMAL
Lab: ABNORMAL
MAGNESIUM: 2.4 MG/DL (ref 1.6–2.6)
MCH RBC QN AUTO: 26.9 PG (ref 26–35)
MCHC RBC AUTO-ENTMCNC: 31.1 % (ref 32–34.5)
MCV RBC AUTO: 86.4 FL (ref 80–99.9)
METER GLUCOSE: 126 MG/DL (ref 74–99)
METER GLUCOSE: 136 MG/DL (ref 74–99)
METER GLUCOSE: 141 MG/DL (ref 74–99)
METER GLUCOSE: 143 MG/DL (ref 74–99)
METER GLUCOSE: 145 MG/DL (ref 74–99)
METER GLUCOSE: 150 MG/DL (ref 74–99)
METHB: 0.2 % (ref 0–1.5)
MODE: AC
O2 SATURATION: 96.5 % (ref 92–98.5)
O2HB: 96 % (ref 94–97)
OPERATOR ID: 2962
PATIENT TEMP: 37 C
PCO2: 37.8 MMHG (ref 35–45)
PDW BLD-RTO: 17.1 FL (ref 11.5–15)
PEEP/CPAP: 5 CMH2O
PFO2: 1.8 MMHG/%
PH BLOOD GAS: 7.41 (ref 7.35–7.45)
PLATELET # BLD: 117 E9/L (ref 130–450)
PMV BLD AUTO: 9.1 FL (ref 7–12)
PO2: 89.8 MMHG (ref 75–100)
POTASSIUM SERPL-SCNC: 3.8 MMOL/L (ref 3.5–5)
POTASSIUM SERPL-SCNC: 4 MMOL/L (ref 3.5–5)
RBC # BLD: 3.24 E12/L (ref 3.5–5.5)
RI(T): 2.36
RR MECHANICAL: 12 B/MIN
SODIUM BLD-SCNC: 139 MMOL/L (ref 132–146)
SODIUM BLD-SCNC: 140 MMOL/L (ref 132–146)
SOURCE, BLOOD GAS: ABNORMAL
THB: 9.9 G/DL (ref 11.5–16.5)
TIME ANALYZED: 509
TOTAL PROTEIN: 5.5 G/DL (ref 6.4–8.3)
VT MECHANICAL: 450 ML
WBC # BLD: 15.2 E9/L (ref 4.5–11.5)

## 2021-09-12 PROCEDURE — 80048 BASIC METABOLIC PNL TOTAL CA: CPT

## 2021-09-12 PROCEDURE — 2580000003 HC RX 258: Performed by: INTERNAL MEDICINE

## 2021-09-12 PROCEDURE — 6370000000 HC RX 637 (ALT 250 FOR IP): Performed by: NURSE PRACTITIONER

## 2021-09-12 PROCEDURE — 6370000000 HC RX 637 (ALT 250 FOR IP): Performed by: THORACIC SURGERY (CARDIOTHORACIC VASCULAR SURGERY)

## 2021-09-12 PROCEDURE — 36415 COLL VENOUS BLD VENIPUNCTURE: CPT

## 2021-09-12 PROCEDURE — 37799 UNLISTED PX VASCULAR SURGERY: CPT

## 2021-09-12 PROCEDURE — 2500000003 HC RX 250 WO HCPCS: Performed by: INTERNAL MEDICINE

## 2021-09-12 PROCEDURE — 82962 GLUCOSE BLOOD TEST: CPT

## 2021-09-12 PROCEDURE — 94003 VENT MGMT INPAT SUBQ DAY: CPT

## 2021-09-12 PROCEDURE — 83735 ASSAY OF MAGNESIUM: CPT

## 2021-09-12 PROCEDURE — 2580000003 HC RX 258: Performed by: NURSE PRACTITIONER

## 2021-09-12 PROCEDURE — 6360000002 HC RX W HCPCS: Performed by: INTERNAL MEDICINE

## 2021-09-12 PROCEDURE — 85730 THROMBOPLASTIN TIME PARTIAL: CPT

## 2021-09-12 PROCEDURE — 6370000000 HC RX 637 (ALT 250 FOR IP): Performed by: STUDENT IN AN ORGANIZED HEALTH CARE EDUCATION/TRAINING PROGRAM

## 2021-09-12 PROCEDURE — 80076 HEPATIC FUNCTION PANEL: CPT

## 2021-09-12 PROCEDURE — 2000000000 HC ICU R&B

## 2021-09-12 PROCEDURE — 82805 BLOOD GASES W/O2 SATURATION: CPT

## 2021-09-12 PROCEDURE — 94640 AIRWAY INHALATION TREATMENT: CPT

## 2021-09-12 PROCEDURE — C9113 INJ PANTOPRAZOLE SODIUM, VIA: HCPCS | Performed by: NURSE PRACTITIONER

## 2021-09-12 PROCEDURE — 71045 X-RAY EXAM CHEST 1 VIEW: CPT

## 2021-09-12 PROCEDURE — 85027 COMPLETE CBC AUTOMATED: CPT

## 2021-09-12 PROCEDURE — 6370000000 HC RX 637 (ALT 250 FOR IP): Performed by: INTERNAL MEDICINE

## 2021-09-12 PROCEDURE — 6360000002 HC RX W HCPCS: Performed by: NURSE PRACTITIONER

## 2021-09-12 RX ADMIN — SODIUM CHLORIDE, PRESERVATIVE FREE 10 ML: 5 INJECTION INTRAVENOUS at 08:12

## 2021-09-12 RX ADMIN — SODIUM CHLORIDE: 9 INJECTION, SOLUTION INTRAVENOUS at 11:10

## 2021-09-12 RX ADMIN — SENNOSIDES 5 ML: 8.8 SYRUP ORAL at 21:09

## 2021-09-12 RX ADMIN — IPRATROPIUM BROMIDE AND ALBUTEROL SULFATE 1 AMPULE: .5; 2.5 SOLUTION RESPIRATORY (INHALATION) at 11:26

## 2021-09-12 RX ADMIN — IPRATROPIUM BROMIDE AND ALBUTEROL SULFATE 1 AMPULE: .5; 2.5 SOLUTION RESPIRATORY (INHALATION) at 19:19

## 2021-09-12 RX ADMIN — INSULIN LISPRO 2 UNITS: 100 INJECTION, SOLUTION INTRAVENOUS; SUBCUTANEOUS at 17:01

## 2021-09-12 RX ADMIN — HYDROCORTISONE SODIUM SUCCINATE 100 MG: 100 INJECTION, POWDER, FOR SOLUTION INTRAMUSCULAR; INTRAVENOUS at 11:59

## 2021-09-12 RX ADMIN — PRAMIPEXOLE DIHYDROCHLORIDE 0.5 MG: 0.25 TABLET ORAL at 21:09

## 2021-09-12 RX ADMIN — HYDROCORTISONE SODIUM SUCCINATE 100 MG: 100 INJECTION, POWDER, FOR SOLUTION INTRAMUSCULAR; INTRAVENOUS at 07:01

## 2021-09-12 RX ADMIN — IPRATROPIUM BROMIDE AND ALBUTEROL SULFATE 1 AMPULE: .5; 2.5 SOLUTION RESPIRATORY (INHALATION) at 16:02

## 2021-09-12 RX ADMIN — AMIODARONE HYDROCHLORIDE 200 MG: 200 TABLET ORAL at 21:09

## 2021-09-12 RX ADMIN — PRAMIPEXOLE DIHYDROCHLORIDE 0.5 MG: 0.25 TABLET ORAL at 08:11

## 2021-09-12 RX ADMIN — AMIODARONE HYDROCHLORIDE 200 MG: 200 TABLET ORAL at 08:11

## 2021-09-12 RX ADMIN — HYDROCORTISONE SODIUM SUCCINATE 50 MG: 100 INJECTION, POWDER, FOR SOLUTION INTRAMUSCULAR; INTRAVENOUS at 20:02

## 2021-09-12 RX ADMIN — INSULIN LISPRO 2 UNITS: 100 INJECTION, SOLUTION INTRAVENOUS; SUBCUTANEOUS at 20:02

## 2021-09-12 RX ADMIN — 0.12% CHLORHEXIDINE GLUCONATE 15 ML: 1.2 RINSE ORAL at 08:11

## 2021-09-12 RX ADMIN — DOCUSATE SODIUM 100 MG: 50 LIQUID ORAL at 08:11

## 2021-09-12 RX ADMIN — ARGATROBAN 1 MCG/KG/MIN: 50 INJECTION, SOLUTION INTRAVENOUS at 06:52

## 2021-09-12 RX ADMIN — DOCUSATE SODIUM 100 MG: 50 LIQUID ORAL at 21:09

## 2021-09-12 RX ADMIN — ASPIRIN 81 MG CHEWABLE TABLET 81 MG: 81 TABLET CHEWABLE at 08:11

## 2021-09-12 RX ADMIN — PRAMIPEXOLE DIHYDROCHLORIDE 0.5 MG: 0.25 TABLET ORAL at 14:15

## 2021-09-12 RX ADMIN — MIDAZOLAM 2 MG/HR: 5 INJECTION INTRAMUSCULAR; INTRAVENOUS at 22:55

## 2021-09-12 RX ADMIN — INSULIN LISPRO 2 UNITS: 100 INJECTION, SOLUTION INTRAVENOUS; SUBCUTANEOUS at 07:01

## 2021-09-12 RX ADMIN — ARGATROBAN 1 MCG/KG/MIN: 50 INJECTION, SOLUTION INTRAVENOUS at 15:13

## 2021-09-12 RX ADMIN — IPRATROPIUM BROMIDE AND ALBUTEROL SULFATE 1 AMPULE: .5; 2.5 SOLUTION RESPIRATORY (INHALATION) at 07:57

## 2021-09-12 RX ADMIN — BISACODYL 10 MG: 10 SUPPOSITORY RECTAL at 08:11

## 2021-09-12 RX ADMIN — ARGATROBAN 1 MCG/KG/MIN: 50 INJECTION, SOLUTION INTRAVENOUS at 22:28

## 2021-09-12 RX ADMIN — BISACODYL 10 MG: 10 SUPPOSITORY RECTAL at 21:13

## 2021-09-12 RX ADMIN — INSULIN LISPRO 2 UNITS: 100 INJECTION, SOLUTION INTRAVENOUS; SUBCUTANEOUS at 08:03

## 2021-09-12 RX ADMIN — POLYETHYLENE GLYCOL 3350 17 G: 17 POWDER, FOR SOLUTION ORAL at 08:11

## 2021-09-12 RX ADMIN — MIDODRINE HYDROCHLORIDE 5 MG: 5 TABLET ORAL at 12:00

## 2021-09-12 RX ADMIN — ERTAPENEM SODIUM 1000 MG: 1 INJECTION, POWDER, LYOPHILIZED, FOR SOLUTION INTRAMUSCULAR; INTRAVENOUS at 11:59

## 2021-09-12 RX ADMIN — PANTOPRAZOLE SODIUM 40 MG: 40 INJECTION, POWDER, FOR SOLUTION INTRAVENOUS at 08:12

## 2021-09-12 RX ADMIN — Medication 10 ML: at 21:09

## 2021-09-12 RX ADMIN — MIDODRINE HYDROCHLORIDE 5 MG: 5 TABLET ORAL at 17:02

## 2021-09-12 RX ADMIN — Medication 50 MCG/HR: at 23:44

## 2021-09-12 RX ADMIN — MIDODRINE HYDROCHLORIDE 5 MG: 5 TABLET ORAL at 08:11

## 2021-09-12 RX ADMIN — 0.12% CHLORHEXIDINE GLUCONATE 15 ML: 1.2 RINSE ORAL at 21:09

## 2021-09-12 ASSESSMENT — PAIN SCALES - GENERAL
PAINLEVEL_OUTOF10: 0

## 2021-09-12 ASSESSMENT — PULMONARY FUNCTION TESTS
PIF_VALUE: 25
PIF_VALUE: 27
PIF_VALUE: 30
PIF_VALUE: 28
PIF_VALUE: 26
PIF_VALUE: 29
PIF_VALUE: 30
PIF_VALUE: 26
PIF_VALUE: 25
PIF_VALUE: 31
PIF_VALUE: 27
PIF_VALUE: 50
PIF_VALUE: 29
PIF_VALUE: 29
PIF_VALUE: 26
PIF_VALUE: 25
PIF_VALUE: 30
PIF_VALUE: 29
PIF_VALUE: 28
PIF_VALUE: 29
PIF_VALUE: 27
PIF_VALUE: 25
PIF_VALUE: 30

## 2021-09-12 NOTE — PROGRESS NOTES
[facility] PROGRESS NOTE      Chief complaint: Follow-up of septic shock    The patient is a 80 y.o. female with history of hypertension, hyperlipidemia, paroxysmal atrial fibrillation, renal artery aneurysm, cirrhosis, DM, CKD, admitted on 08/25 for elective CABG. Post-op course was complicated by fever up to 101.1 F on 08/29 with worsening leukocytosis up to currently 22,000, atrial fibrillation with rapid ventricular response, hypoxia, BROOKE. CXR on admission showed bilateral airspace disease and small bilateral pleural effusion. She underwent bronchoscopy on 09/01 during which diffuse scattered mucosal hemorrhages throughout the trachea and  thick clear secretions occluding the RML and RLL bronchi were noted. Bronchial wash Gram stain and culture showed rare polymorphonuclear leukocytes, rare epithelial cells, rare yeast, absent oral pharyngeal lisy, moderate growth of Candida albicans. She was reintubated on 09/05 due to decreasing level of consciousness and increasing respiratory distress, accompanied by hypothermia and shock. CXR on 09/06 showed persistent bilateral parenchymal infiltrates and probable small effusions, essentially unchanged from that on admission. Procalcitonin level was elevated at 0.31 ng/mL. Respiratory Gram stain and culture on 09/06 showed few polymorphonuclear leukocytes, few epithelial cells, no organisms, light growth of yeast, Klebsiella oxytoca (non-ESBL; susceptible to cotrimoxazole and fluoroquinolones) and Enterobacter cloacae (susceptible to cotrimoxazole and fluoroquinolones). Beta-D-glucan was negative. Urine Streptococcus pneumoniae and Legionella antigens were negative. SARS-CoV-2 PCR was not detected. She received cefazolin from 08/25-08/27, piperacillin-tazobactam from 08/27-09/02. Meropenem was started on 09/06. Subjective: Patient was seen and examined. She remains in critical condition, intubated, sedated, on stable inotropic support.     Objective:  BP (!) 114/52 Pulse 74   Temp 97.2 °F (36.2 °C)   Resp 12   Ht 5' 2\" (1.575 m)   Wt 244 lb 11.4 oz (111 kg)   LMP  (LMP Unknown)   SpO2 99%   BMI 44.76 kg/m²   Constitutional: Intubated, no MV dyssynchrony  Respiratory: Clear breath sounds, no crackles, no wheezes  Cardiovascular: Regular rate and rhythm, no murmurs  Gastrointestinal: Bowel sounds present, soft, distended   Skin: Warm and dry, no active dermatoses  Musculoskeletal: No joint swelling, no joint erythema    Labs, imaging, and medical records/notes were personally reviewed. Assessment:  Shock, presumed septic, secondary to Klebsiella oxytoca and Enterobacter cloacae HAP  Acute hypoxic respiratory failure  Candida albicans on respiratory culture, deemed colonization  Leukocytosis, on steroids  CAD s/p CABG on 08/25  BROOKE on CKD    Recommendations:  Continue ertapenem 1g q24h.     Thank you for involving me in the care of Russell Carpenter. I will continue to follow. Please do not hesitate to call for any questions or concerns.     Electronically signed by Maribell Mai MD on 9/12/2021 at 6:53 AM

## 2021-09-12 NOTE — PROGRESS NOTES
[facility]  Nephrology Attending Progress Note          SUBJECTIVE:     9/3/21: Shadi Zabala is a 80 y.o. female with h/o HFpEF, refractory A fib  s/p DCCV X 2, hypertension, hyperlipidemia and other medical problems listed below. She presented for elective CABG on 8/25 following ischemic work-up and C showing  MV CAD. 6/8/21. Procedure was performed on 8/25. Her postop course has been complicated by acute respiratory failure, atrial fibrillation with RVR and intermittent fevers. She has required Zosyn. Preop creatinine has been 1-1.2 which is her recent baseline. In the last 48 hours creatinine has shown a progressive increase to currently 1.8 mg/dL associated with decreasing urine output. She has received albumin bolus but without any significant improvement. Hence renal consult.     9/4/21: pt seen in icu, has low urine output, just received iv albumin    9/5/21: seen in icu, started on ns at 76 yesterday. Remains with low uo.      9/6/21: pt reintubated and started on levo for hypotension, now hypothermic and with elevated wbc    9/7: seen in icu, uo increasing, intubated, starting dopa, on amio, vaso, levo    9/8: pt remains in icu, on dopamine, vaso, levo, amio, and heparin, intubated    9/9: pt seen in icu, intubated, on dopa and vaso    9/10: pt seen in icu, remains intubated, on dopa and weaning vaso    9/11: No acute issues overnight; remains intubated    9/12: No new acute issues from overnight; remains intubated      PROBLEM LIST:    Patient Active Problem List   Diagnosis    Spinal stenosis in cervical region    Spinal stenosis, lumbar region, without neurogenic claudication    Essential hypertension    Mixed hyperlipidemia    DM (diabetes mellitus) [facility] [address]    Renal artery aneurysm (HCC)    PAF (paroxysmal atrial fibrillation) (HCC)    Anemia    Malignant neoplasm of cecum ([address]    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)    Chronic heart failure with preserved ejection fraction (HCC)    Atrial fibrillation with RVR (HCC)    Severe protein-calorie malnutrition (HCC)    Abnormal nuclear stress test    Opioid use, unspecified with unspecified opioid-induced disorder    Opioid dependence with unspecified opioid-induced disorder    Opioid dependence, uncomplicated    CAD in native artery    Pre-operative laboratory examination    Acute blood loss anemia    Leukocytosis    At risk for malnutrition    Hypotension        PAST MEDICAL HISTORY:    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[facility] 01/2019    Anemia     Arm numbness     Arthritis     Blood transfusion     Cervical spinal stenosis     Cirrhosis of liver (HCC)     Diabetes mellitus (HCC)     Fatty liver     GERD (gastroesophageal reflux disease)     Hyperlipidemia     Hypertension     Low back pain     Lumbar stenosis     Neck pain     Obesity (BMI 30.0-34.9) 10/14/2012    PAF (paroxysmal atrial fibrillation) (HCC)     Renal artery aneurysm ([facility] 7/15/2015       DIET:    Diet NPO Exceptions are: Sips of Water with Meds  Diet NPO Exceptions are: Sips of Water with Meds  ADULT TUBE FEEDING; Orogastric; Standard without Fiber; Continuous; 10; Yes; 0; Q 4 hours; 10; 30; Q 4 hours; Protein; BID protein modular     PHYSICAL EXAM:     Patient Vitals for the past 24 hrs:   Temp Temp src Pulse Resp SpO2 Weight   09/12/21 0800 97 °F (36.1 °C) -- 77 12 100 % --   09/12/21 0755 -- -- 78 15 99 % --   09/12/21 0700 -- -- 78 13 99 % --   09/12/21 0600 -- -- 74 12 99 % 244 lb 11.4 oz (111 kg)   09/12/21 0500 -- -- 72 12 100 % --   09/12/21 0400 97.2 °F (36.2 °C) -- 79 12 100 % --   09/12/21 0300 -- -- 83 21 96 % --   09/12/21 0200 -- -- 76 12 100 % --   09/12/21 0100 -- -- 70 12 99 % --   09/12/21 0037 -- -- 77 12 99 % --   09/12/21 0000 96.8 °F (36 °C) -- 71 12 99 % --   09/11/21 2300 -- -- 78 12 99 % --   09/11/21 2200 -- -- 72 12 98 % --   09/11/21 2100 -- -- 81 12 100 % --   09/11/21 2000 96.8 °F (36 °C) -- 88 17 97 % --   09/11/21 1916 -- -- 79 13 98 % --   09/11/21 1900 -- -- 79 13 99 % --   09/11/21 1800 97 °F (36.1 °C) -- 80 13 98 % --   09/11/21 1700 -- -- 80 13 98 % --   09/11/21 1600 97.2 °F (36.2 °C) Esophageal 78 12 99 % --   09/11/21 1554 -- -- 79 12 99 % --   09/11/21 1500 -- -- 82 13 98 % --   09/11/21 1400 97 °F (36.1 °C) Esophageal 81 18 99 % --   09/11/21 1300 -- -- 80 13 99 % --   09/11/21 1200 96.6 °F (35.9 °C) Esophageal 78 13 99 % --   09/11/21 1143 -- -- -- 13 100 % --   09/11/21 1135 -- -- 78 18 98 % --   09/11/21 1100 -- -- 71 13 99 % --   09/11/21 1000 96.3 °F (35.7 °C) Esophageal 69 12 100 % --   @      Intake/Output Summary (Last 24 hours) at 9/12/2021 0902  Last data filed at 9/12/2021 0800  Gross per 24 hour   Intake 2044.7 ml   Output 1990 ml   Net 54.7 ml         Wt Readings from Last 3 Encounters:   09/12/21 244 lb 11.4 oz (111 kg)   08/23/21 170 lb (77.1 kg)   07/16/21 175 lb (79.4 kg)       Constitutional:  Pt is in no acute distress  Head: normocephalic, atraumatic  Neck: no JVD  Cardiovascular: regular rate and rhythm, no murmurs, gallops, or rubs  Respiratory:  No rales, rhochi, or wheezes  Gastrointestinal:  Soft, nontender, nondistended, bowel sounds x 4  Ext: 3+ pitting bilateral legs edema  Skin: dry, no rash      MEDS (scheduled):    midodrine  5 mg Oral TID WC    amiodarone  200 mg Oral BID    ertapenem (INVanz) IVPB  1,000 mg IntraVENous Q24H    insulin lispro  0-12 Units SubCUTAneous Q4H    albumin human  25 g IntraVENous Once    hydrocortisone sodium succinate PF  100 mg IntraVENous Q8H    polyethylene glycol  17 g Oral Daily    chlorhexidine  15 mL Mouth/Throat BID    albumin human  25 g IntraVENous Once    aspirin  81 mg Oral Daily    pantoprazole  40 mg IntraVENous Daily    And    sodium chloride (PF)  10 mL IntraVENous Daily    docusate sodium  100 mg Oral BID    sennosides  5 mL Oral Nightly    sodium chloride flush  5-40 mL IntraVENous 2 times per day    bisacodyl  10 mg Rectal BID    [Held by provider] metoprolol tartrate  12.5 mg Oral BID    [Held by provider] atorvastatin  10 mg Oral Nightly    pramipexole  0.5 mg Oral TID    ipratropium-albuterol  1 ampule Inhalation Q4H WA       MEDS (infusions):   argatroban infusion 1 mcg/kg/min (09/12/21 4603)    sodium chloride      DOBUTamine 2 mcg/kg/min (09/11/21 2013)    sodium chloride      sodium chloride 30 mL/hr at 09/11/21 1509    insulin Stopped (09/09/21 1500)    midazolam 2 mg/hr (09/10/21 2314)    fentaNYL 5 mcg/ml in 0.9%  ml infusion 50 mcg/hr (09/11/21 1836)    vasopressin (Septic Shock) infusion Stopped (09/11/21 0012)    norepinephrine Stopped (09/09/21 0630)    sodium chloride      sodium chloride      sodium chloride      sodium chloride 30 mL/hr (08/31/21 0946)    sodium chloride      dextrose         MEDS (prn):  sodium chloride, perflutren lipid microspheres, sodium chloride, oxyCODONE **OR** [DISCONTINUED] oxyCODONE, benzocaine-menthol, sodium chloride, sodium chloride flush, sodium chloride, LORazepam, sodium chloride, ondansetron, acetaminophen, acetaminophen, magnesium hydroxide, potassium chloride, magnesium sulfate, albumin human, sodium chloride, glucose, dextrose, glucagon (rDNA), dextrose, calcium gluconate IVPB    DATA:    Recent Labs     09/10/21  0404 09/11/21  0400 09/12/21  0500   WBC 18.6* 16.2* 15.2*   HGB 9.0* 8.9* 8.7*   HCT 28.9* 28.9* 28.0*   MCV 87.8 87.3 86.4   PLT 96* 107* 117*     Recent Labs     09/10/21  0404 09/10/21  1700 09/11/21  0400 09/11/21  1620 09/12/21  0500   NA  --    < > 135 138 139   K  --    < > 4.2 3.7 3.8   CL  --    < > 101 103 104   CO2  --    < > 22 25 27   BUN  --    < > 53* 53* 53*   CREATININE  --    < > 1.3* 1.3* 1.2*   LABGLOM  --    < > 39 39 43   GLUCOSE  --    < > 160* 153* 154*   CALCIUM  --    < > 8.5* 8.5* 9.0   ALT 34*  --  31  --  28   AST 17  --  15  --  18

## 2021-09-12 NOTE — PROGRESS NOTES
Lubna Bailey M.D.,[facility]  Latonya Becerril D.O., F.A.C.O.I., Mariza Candelario M.D. Seamus Art M.D. Juanita Nixon D.O. Daily Pulmonary Progress Note    Patient:  Jacob Ahmadi 80 y.o. female MRN: [id number]     Date of Service: 9/12/2021      Synopsis     We are following patient for acute on chronic hypoxic respiratory failure      Code status: Full code      Subjective      Patient was seen and examined. Current settings are AC/VC 12, 450, 50%, +5. Remains on Fentanyl and Versed. Continues to be on dobutamine @ 2 mck/kg/min.     Review of Systems:  Unable to obtain    24-hour events:  none    Objective   Vitals: BP (!) 114/52   Pulse 79   Temp 96.3 °F (35.7 °C) (Axillary)   Resp 18   Ht 5' 2\" (1.575 m)   Wt 244 lb 11.4 oz (111 kg)   LMP  (LMP Unknown)   SpO2 100%   BMI 44.76 kg/m²     I/O:    Intake/Output Summary (Last 24 hours) at 9/12/2021 1441  Last data filed at 9/12/2021 1400  Gross per 24 hour   Intake 1904.7 ml   Output 1565 ml   Net 339.7 ml       Vent Information  $Ventilation: $Subsequent Day  Skin Assessment: Clean, dry, & intact  Suction Catheter Diameter: 14  Equipment ID: [id number]  Equipment Changed: Vent Circuit  Vent Type: 980  Vent Mode: AC/VC+  Vt Ordered: 450 mL  Rate Set: 12 bmp  Peak Flow: 0 L/min  Pressure Support: 0 cmH20  FiO2 : 50 %  SpO2: 100 %  SpO2/FiO2 ratio: 200  Sensitivity: 3  PEEP/CPAP: 5  I Time/ I Time %: 0 s  Humidification Source: Heated wire  Humidification Temp: 37  Humidification Temp Measured: 37  Circuit Condensation: Drained  Mask Type: Full face mask  Mask Size: Medium  Bonnet size: Medium       IPAP: 16 cmH20  CPAP/EPAP: 8 cmH2O     CURRENT MEDS :  Scheduled Meds:   midodrine  5 mg Oral TID WC    amiodarone  200 mg Oral BID    ertapenem (INVanz) IVPB  1,000 mg IntraVENous Q24H    insulin lispro  0-12 Units SubCUTAneous Q4H    albumin human  25 g IntraVENous Once    hydrocortisone sodium succinate PF  100 mg IntraVENous Q8H  polyethylene glycol  17 g Oral Daily    chlorhexidine  15 mL Mouth/Throat BID    albumin human  25 g IntraVENous Once    aspirin  81 mg Oral Daily    pantoprazole  40 mg IntraVENous Daily    And    sodium chloride (PF)  10 mL IntraVENous Daily    docusate sodium  100 mg Oral BID    sennosides  5 mL Oral Nightly    sodium chloride flush  5-40 mL IntraVENous 2 times per day    bisacodyl  10 mg Rectal BID    [Held by provider] metoprolol tartrate  12.5 mg Oral BID    [Held by provider] atorvastatin  10 mg Oral Nightly    pramipexole  0.5 mg Oral TID    ipratropium-albuterol  1 ampule Inhalation Q4H WA       Physical Exam:  General Appearance: Intubated, sedated in no distress  HEENT: Normocephalic atraumatic without obvious abnormality   Neck: Lips, mucosa, and tongue normal.  Supple, symmetrical, trachea midline, no adenopathy;thyroid:  no enlargement/tenderness/nodules or JVD. ETT tube inplace  Lung: Diffuse bilateral lung sounds  heart: RRR, normal S1, S2. No MRG  Abdomen: Appears to be less distended and soft today. Extremities: Pedal pulses 2+ symmetric b/l. Extremities normal, no cyanosis, clubbing, or edema. Musculokeletal: No joint swelling, no muscle tenderness. ROM normal in all joints of extremities. Neurologic: Cranial nerves II to XII grossly intact. sedated    Pertinent/ New Labs and Imaging Studies     Imaging Personally Reviewed:    1    ECHO  9/7/2021  Left ventricular size is grossly normal.   Ejection fraction is visually estimated at 60 to 65%. The left atrium is mild-moderately dilated. Mildly dilated right ventricle. Right ventricle global systolic function is mildly reduced . Mildly enlarged right atrium size. The tricuspid valve was not well visualized. Severe tricuspid regurgitation.     Labs:  Lab Results   Component Value Date    WBC 15.2 09/12/2021    HGB 8.7 09/12/2021    HCT 28.0 09/12/2021    MCV 86.4 09/12/2021    MCH 26.9 09/12/2021    MCHC 31.1 09/12/2021    RDW 17.1 09/12/2021     09/12/2021    MPV 9.1 09/12/2021     Lab Results   Component Value Date     09/12/2021    K 3.8 09/12/2021    K 4.3 09/06/2021     09/12/2021    CO2 27 09/12/2021    BUN 53 09/12/2021    CREATININE 1.2 09/12/2021    LABALBU 3.7 09/12/2021    CALCIUM 9.0 09/12/2021    GFRAA 52 09/12/2021    LABGLOM 43 09/12/2021     Lab Results   Component Value Date    PROTIME 14.5 08/25/2021    INR 1.3 08/25/2021     No results for input(s): PROBNP in the last 72 hours. No results for input(s): PROCAL in the last 72 hours. This SmartLink has not been configured with any valid records. Micro:  No results for input(s): CULTRESP in the last 72 hours. No results for input(s): LABGRAM in the last 72 hours. No results for input(s): LEGUR in the last 72 hours. No results for input(s): STREPNEUMAGU in the last 72 hours. No results for input(s): LP1UAG in the last 72 hours. Results for Shane Patiño (MRN [id number]) as of 9/7/2021 11:31  Results for Shane Patiño (MRN [id number]) as of 9/12/2021 14:43   Ref. Range 9/11/2021 05:14 9/12/2021 05:09   pH, Blood Gas Latest Ref Range: 7.350 - 7.450  7.396 7.410   PCO2 Latest Ref Range: 35.0 - 45.0 mmHg 40.1 37.8   pO2 Latest Ref Range: 75.0 - 100.0 mmHg 83.2 89.8   HCO3 Latest Ref Range: 22.0 - 26.0 mmol/L 24.1 23.4   Base Excess Latest Ref Range: -3.0 - 3.0 mmol/L -0.7 -1.0   O2 Sat Latest Ref Range: 92.0 - 98.5 % 95.2 96.5   tHb (est) Latest Ref Range: 11.5 - 16.5 g/dL 9.9 (L) 9.9 (L)   O2Hb Latest Ref Range: 94.0 - 97.0 % 94.8 96.0   COHb Latest Ref Range: 0.0 - 1.5 % 0.1 0.3   MetHb Latest Ref Range: 0.0 - 1.5 % 0.3 0.2   HHb Latest Ref Range: 0.0 - 5.0 % 4.8 3.5   AaDO2 Latest Units: mmHg 215.7 211.7   RI(T) Unknown 2.59 2.36   FIO2 Latest Units: % 50.0 50.0   PO2/FIO2 Latest Units: mmHg/% 1.66 1.80   Pt Temp Latest Units: C 37.0 37.0   Critical(s) Notified Unknown . No Critical Values .  No Critical Values   Time Analyzed Unknown 0514 0509   Mode Unknown AC AC   Peep/Cpap Latest Units: cmH2O 5.0 5.0   Vt Mechanical Latest Units: mL 450.0 450.0   Rr Mechanical Latest Units: b/min 12.0 12.0      Assessment:    1. Post operative respiratory insufficiency with hypoxia requiring reintubation 9/5/21  2. Bilateral atelectasis/mucus plugging of bronchi s/p bronchoscopy 9/1/21  3. Pleural effusions - small on bedside US  4. CAD s/p CABG x 3 8/25/21  5. Atrial fibrillation with RVR   6. Anxiety  7. Aspiration pneumonia s/p treatment with Zosyn  8. BROOKE secondary to intravascular volume depletion  9. Leukocytosis, probable sepsis septic shock  10. Runs of nonsustained V. Tach  11. High anion gap metabolic acidosis with respiratory alkalosis-improved  12. Klebsiella oxytoca and Enterobacter cloacae HAP      Plan:   1. Continue full vent support, patient on scheduled for possible tracheostomy on Tuesday  2. Continue pressors per CTS. She is currently on dobutamine   3. patient on ertapenem 1 g every 24 hours per ID  4. LFTs trending down,  5. Diuresis per nephrology recommendations she received 1 dose of Bumex today. 6. Continue amiodarone for A. fib  7. Patient with cirrhosis and ascites, question need for paracentesis, not a candidate for her PEG tube due to her ascites  8. Weaned hydrocortisone down to 50 mg q8.       Electronically signed by Jose Richardson MD on 9/12/2021 at 2:41 PM

## 2021-09-13 ENCOUNTER — APPOINTMENT (OUTPATIENT)
Dept: GENERAL RADIOLOGY | Age: 81
DRG: 003 | End: 2021-09-13
Attending: THORACIC SURGERY (CARDIOTHORACIC VASCULAR SURGERY)
Payer: MEDICARE

## 2021-09-13 LAB
AADO2: 211 MMHG
ALBUMIN SERPL-MCNC: 3.7 G/DL (ref 3.5–5.2)
ALP BLD-CCNC: 65 U/L (ref 35–104)
ALT SERPL-CCNC: 28 U/L (ref 0–32)
ANION GAP SERPL CALCULATED.3IONS-SCNC: 10 MMOL/L (ref 7–16)
ANION GAP SERPL CALCULATED.3IONS-SCNC: 6 MMOL/L (ref 7–16)
APTT: 54.9 SEC (ref 24.5–35.1)
APTT: 68.4 SEC (ref 24.5–35.1)
AST SERPL-CCNC: 21 U/L (ref 0–31)
B.E.: -0.9 MMOL/L (ref -3–3)
BILIRUB SERPL-MCNC: 1.6 MG/DL (ref 0–1.2)
BILIRUBIN DIRECT: 0.7 MG/DL (ref 0–0.3)
BILIRUBIN, INDIRECT: 0.9 MG/DL (ref 0–1)
BUN BLDV-MCNC: 42 MG/DL (ref 6–23)
BUN BLDV-MCNC: 43 MG/DL (ref 6–23)
CALCIUM SERPL-MCNC: 8.5 MG/DL (ref 8.6–10.2)
CALCIUM SERPL-MCNC: 8.5 MG/DL (ref 8.6–10.2)
CHLORIDE BLD-SCNC: 106 MMOL/L (ref 98–107)
CHLORIDE BLD-SCNC: 107 MMOL/L (ref 98–107)
CO2: 25 MMOL/L (ref 22–29)
CO2: 29 MMOL/L (ref 22–29)
COHB: 0.3 % (ref 0–1.5)
CREAT SERPL-MCNC: 1 MG/DL (ref 0.5–1)
CREAT SERPL-MCNC: 1 MG/DL (ref 0.5–1)
CRITICAL: ABNORMAL
DATE ANALYZED: ABNORMAL
DATE OF COLLECTION: ABNORMAL
FIO2: 50 %
GFR AFRICAN AMERICAN: >60
GFR AFRICAN AMERICAN: >60
GFR NON-AFRICAN AMERICAN: 53 ML/MIN/1.73
GFR NON-AFRICAN AMERICAN: 53 ML/MIN/1.73
GLUCOSE BLD-MCNC: 133 MG/DL (ref 74–99)
GLUCOSE BLD-MCNC: 154 MG/DL (ref 74–99)
HCO3: 23.4 MMOL/L (ref 22–26)
HCT VFR BLD CALC: 29.2 % (ref 34–48)
HEMOGLOBIN: 8.8 G/DL (ref 11.5–15.5)
HEPARIN PF4 ANTIBODY: 0.55 OD
HHB: 3.6 % (ref 0–5)
LAB: ABNORMAL
Lab: ABNORMAL
MAGNESIUM: 2.4 MG/DL (ref 1.6–2.6)
MCH RBC QN AUTO: 26.7 PG (ref 26–35)
MCHC RBC AUTO-ENTMCNC: 30.1 % (ref 32–34.5)
MCV RBC AUTO: 88.8 FL (ref 80–99.9)
METER GLUCOSE: 109 MG/DL (ref 74–99)
METER GLUCOSE: 129 MG/DL (ref 74–99)
METER GLUCOSE: 139 MG/DL (ref 74–99)
METER GLUCOSE: 145 MG/DL (ref 74–99)
METER GLUCOSE: 147 MG/DL (ref 74–99)
METER GLUCOSE: 152 MG/DL (ref 74–99)
METER GLUCOSE: 89 MG/DL (ref 74–99)
METHB: 0.2 % (ref 0–1.5)
MODE: AC
O2 SATURATION: 96.4 % (ref 92–98.5)
O2HB: 95.9 % (ref 94–97)
OPERATOR ID: 421
PATIENT TEMP: 37 C
PCO2: 37.2 MMHG (ref 35–45)
PDW BLD-RTO: 17.3 FL (ref 11.5–15)
PEEP/CPAP: 5 CMH2O
PFO2: 1.82 MMHG/%
PH BLOOD GAS: 7.42 (ref 7.35–7.45)
PLATELET # BLD: 156 E9/L (ref 130–450)
PMV BLD AUTO: 9.6 FL (ref 7–12)
PO2: 91.2 MMHG (ref 75–100)
POTASSIUM SERPL-SCNC: 3.7 MMOL/L (ref 3.5–5)
POTASSIUM SERPL-SCNC: 3.9 MMOL/L (ref 3.5–5)
RBC # BLD: 3.29 E12/L (ref 3.5–5.5)
RI(T): 2.31
RR MECHANICAL: 12 B/MIN
SODIUM BLD-SCNC: 141 MMOL/L (ref 132–146)
SODIUM BLD-SCNC: 142 MMOL/L (ref 132–146)
SOURCE, BLOOD GAS: ABNORMAL
THB: 10.1 G/DL (ref 11.5–16.5)
TIME ANALYZED: 527
TOTAL PROTEIN: 5.5 G/DL (ref 6.4–8.3)
VT MECHANICAL: 450 ML
WBC # BLD: 18.3 E9/L (ref 4.5–11.5)

## 2021-09-13 PROCEDURE — 6370000000 HC RX 637 (ALT 250 FOR IP): Performed by: STUDENT IN AN ORGANIZED HEALTH CARE EDUCATION/TRAINING PROGRAM

## 2021-09-13 PROCEDURE — 94003 VENT MGMT INPAT SUBQ DAY: CPT

## 2021-09-13 PROCEDURE — 85027 COMPLETE CBC AUTOMATED: CPT

## 2021-09-13 PROCEDURE — 94640 AIRWAY INHALATION TREATMENT: CPT

## 2021-09-13 PROCEDURE — 82962 GLUCOSE BLOOD TEST: CPT

## 2021-09-13 PROCEDURE — 6360000002 HC RX W HCPCS: Performed by: THORACIC SURGERY (CARDIOTHORACIC VASCULAR SURGERY)

## 2021-09-13 PROCEDURE — 2000000000 HC ICU R&B

## 2021-09-13 PROCEDURE — 85730 THROMBOPLASTIN TIME PARTIAL: CPT

## 2021-09-13 PROCEDURE — 6370000000 HC RX 637 (ALT 250 FOR IP): Performed by: THORACIC SURGERY (CARDIOTHORACIC VASCULAR SURGERY)

## 2021-09-13 PROCEDURE — 6360000002 HC RX W HCPCS: Performed by: INTERNAL MEDICINE

## 2021-09-13 PROCEDURE — 36415 COLL VENOUS BLD VENIPUNCTURE: CPT

## 2021-09-13 PROCEDURE — 2580000003 HC RX 258: Performed by: NURSE PRACTITIONER

## 2021-09-13 PROCEDURE — C9113 INJ PANTOPRAZOLE SODIUM, VIA: HCPCS | Performed by: NURSE PRACTITIONER

## 2021-09-13 PROCEDURE — 71045 X-RAY EXAM CHEST 1 VIEW: CPT

## 2021-09-13 PROCEDURE — 82805 BLOOD GASES W/O2 SATURATION: CPT

## 2021-09-13 PROCEDURE — 2580000003 HC RX 258: Performed by: INTERNAL MEDICINE

## 2021-09-13 PROCEDURE — 6360000002 HC RX W HCPCS: Performed by: NURSE PRACTITIONER

## 2021-09-13 PROCEDURE — 80048 BASIC METABOLIC PNL TOTAL CA: CPT

## 2021-09-13 PROCEDURE — 2500000003 HC RX 250 WO HCPCS: Performed by: NURSE PRACTITIONER

## 2021-09-13 PROCEDURE — 83735 ASSAY OF MAGNESIUM: CPT

## 2021-09-13 PROCEDURE — 80076 HEPATIC FUNCTION PANEL: CPT

## 2021-09-13 PROCEDURE — 2500000003 HC RX 250 WO HCPCS: Performed by: INTERNAL MEDICINE

## 2021-09-13 PROCEDURE — 6370000000 HC RX 637 (ALT 250 FOR IP): Performed by: NURSE PRACTITIONER

## 2021-09-13 PROCEDURE — 6370000000 HC RX 637 (ALT 250 FOR IP): Performed by: INTERNAL MEDICINE

## 2021-09-13 PROCEDURE — 99233 SBSQ HOSP IP/OBS HIGH 50: CPT | Performed by: NURSE PRACTITIONER

## 2021-09-13 PROCEDURE — 6360000002 HC RX W HCPCS: Performed by: STUDENT IN AN ORGANIZED HEALTH CARE EDUCATION/TRAINING PROGRAM

## 2021-09-13 PROCEDURE — 37799 UNLISTED PX VASCULAR SURGERY: CPT

## 2021-09-13 RX ORDER — DOBUTAMINE HYDROCHLORIDE 400 MG/100ML
1.5 INJECTION INTRAVENOUS CONTINUOUS
Status: DISCONTINUED | OUTPATIENT
Start: 2021-09-13 | End: 2021-09-14

## 2021-09-13 RX ORDER — BUMETANIDE 0.25 MG/ML
1 INJECTION, SOLUTION INTRAMUSCULAR; INTRAVENOUS
Status: DISCONTINUED | OUTPATIENT
Start: 2021-09-13 | End: 2021-09-16 | Stop reason: HOSPADM

## 2021-09-13 RX ORDER — BUMETANIDE 0.25 MG/ML
1 INJECTION, SOLUTION INTRAMUSCULAR; INTRAVENOUS
Status: DISCONTINUED | OUTPATIENT
Start: 2021-09-14 | End: 2021-09-13

## 2021-09-13 RX ORDER — POTASSIUM CHLORIDE 29.8 MG/ML
20 INJECTION INTRAVENOUS ONCE
Status: DISCONTINUED | OUTPATIENT
Start: 2021-09-13 | End: 2021-09-16

## 2021-09-13 RX ORDER — ARGATROBAN 1 MG/ML
1 INJECTION INTRAVENOUS CONTINUOUS
Status: DISCONTINUED | OUTPATIENT
Start: 2021-09-13 | End: 2021-09-14

## 2021-09-13 RX ADMIN — HYDROCORTISONE SODIUM SUCCINATE 50 MG: 100 INJECTION, POWDER, FOR SOLUTION INTRAMUSCULAR; INTRAVENOUS at 11:00

## 2021-09-13 RX ADMIN — INSULIN LISPRO 2 UNITS: 100 INJECTION, SOLUTION INTRAVENOUS; SUBCUTANEOUS at 07:38

## 2021-09-13 RX ADMIN — SENNOSIDES 5 ML: 8.8 SYRUP ORAL at 20:08

## 2021-09-13 RX ADMIN — IPRATROPIUM BROMIDE AND ALBUTEROL SULFATE 1 AMPULE: .5; 2.5 SOLUTION RESPIRATORY (INHALATION) at 12:36

## 2021-09-13 RX ADMIN — PANTOPRAZOLE SODIUM 40 MG: 40 INJECTION, POWDER, FOR SOLUTION INTRAVENOUS at 07:42

## 2021-09-13 RX ADMIN — IPRATROPIUM BROMIDE AND ALBUTEROL SULFATE 1 AMPULE: .5; 2.5 SOLUTION RESPIRATORY (INHALATION) at 08:28

## 2021-09-13 RX ADMIN — MIDODRINE HYDROCHLORIDE 5 MG: 5 TABLET ORAL at 15:46

## 2021-09-13 RX ADMIN — ATORVASTATIN CALCIUM 10 MG: 10 TABLET, FILM COATED ORAL at 20:06

## 2021-09-13 RX ADMIN — INSULIN LISPRO 2 UNITS: 100 INJECTION, SOLUTION INTRAVENOUS; SUBCUTANEOUS at 15:43

## 2021-09-13 RX ADMIN — Medication 10 ML: at 20:08

## 2021-09-13 RX ADMIN — ASPIRIN 81 MG CHEWABLE TABLET 81 MG: 81 TABLET CHEWABLE at 07:41

## 2021-09-13 RX ADMIN — INSULIN LISPRO 2 UNITS: 100 INJECTION, SOLUTION INTRAVENOUS; SUBCUTANEOUS at 23:39

## 2021-09-13 RX ADMIN — IPRATROPIUM BROMIDE AND ALBUTEROL SULFATE 1 AMPULE: .5; 2.5 SOLUTION RESPIRATORY (INHALATION) at 21:53

## 2021-09-13 RX ADMIN — DOCUSATE SODIUM 100 MG: 50 LIQUID ORAL at 07:42

## 2021-09-13 RX ADMIN — POTASSIUM CHLORIDE 20 MEQ: 400 INJECTION, SOLUTION INTRAVENOUS at 17:29

## 2021-09-13 RX ADMIN — BISACODYL 10 MG: 10 SUPPOSITORY RECTAL at 20:07

## 2021-09-13 RX ADMIN — MIDAZOLAM 3 MG/HR: 5 INJECTION INTRAMUSCULAR; INTRAVENOUS at 23:45

## 2021-09-13 RX ADMIN — BISACODYL 10 MG: 10 SUPPOSITORY RECTAL at 07:40

## 2021-09-13 RX ADMIN — AMIODARONE HYDROCHLORIDE 200 MG: 200 TABLET ORAL at 20:07

## 2021-09-13 RX ADMIN — PRAMIPEXOLE DIHYDROCHLORIDE 0.5 MG: 0.25 TABLET ORAL at 20:07

## 2021-09-13 RX ADMIN — ARGATROBAN 1 MCG/KG/MIN: 50 INJECTION, SOLUTION INTRAVENOUS at 17:17

## 2021-09-13 RX ADMIN — MIDODRINE HYDROCHLORIDE 5 MG: 5 TABLET ORAL at 07:41

## 2021-09-13 RX ADMIN — PRAMIPEXOLE DIHYDROCHLORIDE 0.5 MG: 0.25 TABLET ORAL at 15:45

## 2021-09-13 RX ADMIN — 0.12% CHLORHEXIDINE GLUCONATE 15 ML: 1.2 RINSE ORAL at 20:07

## 2021-09-13 RX ADMIN — 0.12% CHLORHEXIDINE GLUCONATE 15 ML: 1.2 RINSE ORAL at 07:40

## 2021-09-13 RX ADMIN — BUMETANIDE 1 MG: 0.25 INJECTION INTRAMUSCULAR; INTRAVENOUS at 11:59

## 2021-09-13 RX ADMIN — ERTAPENEM SODIUM 1000 MG: 1 INJECTION, POWDER, LYOPHILIZED, FOR SOLUTION INTRAMUSCULAR; INTRAVENOUS at 10:57

## 2021-09-13 RX ADMIN — DOCUSATE SODIUM 100 MG: 50 LIQUID ORAL at 20:10

## 2021-09-13 RX ADMIN — POLYETHYLENE GLYCOL 3350 17 G: 17 POWDER, FOR SOLUTION ORAL at 07:42

## 2021-09-13 RX ADMIN — MAGNESIUM SULFATE HEPTAHYDRATE 2000 MG: 40 INJECTION, SOLUTION INTRAVENOUS at 05:10

## 2021-09-13 RX ADMIN — POTASSIUM CHLORIDE 20 MEQ: 400 INJECTION, SOLUTION INTRAVENOUS at 09:17

## 2021-09-13 RX ADMIN — IPRATROPIUM BROMIDE AND ALBUTEROL SULFATE 1 AMPULE: .5; 2.5 SOLUTION RESPIRATORY (INHALATION) at 17:17

## 2021-09-13 RX ADMIN — ARGATROBAN 1 MCG/KG/MIN: 50 INJECTION, SOLUTION INTRAVENOUS at 10:53

## 2021-09-13 RX ADMIN — PRAMIPEXOLE DIHYDROCHLORIDE 0.5 MG: 0.25 TABLET ORAL at 07:41

## 2021-09-13 RX ADMIN — Medication 50 MCG/HR: at 21:19

## 2021-09-13 RX ADMIN — HYDROCORTISONE SODIUM SUCCINATE 50 MG: 100 INJECTION, POWDER, FOR SOLUTION INTRAMUSCULAR; INTRAVENOUS at 20:08

## 2021-09-13 RX ADMIN — MIDODRINE HYDROCHLORIDE 5 MG: 5 TABLET ORAL at 11:00

## 2021-09-13 RX ADMIN — SODIUM CHLORIDE, PRESERVATIVE FREE 10 ML: 5 INJECTION INTRAVENOUS at 07:42

## 2021-09-13 RX ADMIN — HYDROCORTISONE SODIUM SUCCINATE 50 MG: 100 INJECTION, POWDER, FOR SOLUTION INTRAMUSCULAR; INTRAVENOUS at 05:10

## 2021-09-13 RX ADMIN — AMIODARONE HYDROCHLORIDE 200 MG: 200 TABLET ORAL at 07:41

## 2021-09-13 ASSESSMENT — PAIN SCALES - GENERAL
PAINLEVEL_OUTOF10: 0

## 2021-09-13 ASSESSMENT — PULMONARY FUNCTION TESTS
PIF_VALUE: 28
PIF_VALUE: 28
PIF_VALUE: 33
PIF_VALUE: 28
PIF_VALUE: 26
PIF_VALUE: 27
PIF_VALUE: 28
PIF_VALUE: 27
PIF_VALUE: 29
PIF_VALUE: 28
PIF_VALUE: 28
PIF_VALUE: 27
PIF_VALUE: 28
PIF_VALUE: 27
PIF_VALUE: 28
PIF_VALUE: 26
PIF_VALUE: 27
PIF_VALUE: 38
PIF_VALUE: 27
PIF_VALUE: 29
PIF_VALUE: 27
PIF_VALUE: 39
PIF_VALUE: 26
PIF_VALUE: 28
PIF_VALUE: 35
PIF_VALUE: 27
PIF_VALUE: 30
PIF_VALUE: 25
PIF_VALUE: 28
PIF_VALUE: 28
PIF_VALUE: 35
PIF_VALUE: 27
PIF_VALUE: 29
PIF_VALUE: 27
PIF_VALUE: 36
PIF_VALUE: 26
PIF_VALUE: 30
PIF_VALUE: 28
PIF_VALUE: 27
PIF_VALUE: 29
PIF_VALUE: 29
PIF_VALUE: 27
PIF_VALUE: 28
PIF_VALUE: 27
PIF_VALUE: 31
PIF_VALUE: 27
PIF_VALUE: 28
PIF_VALUE: 27
PIF_VALUE: 28
PIF_VALUE: 29
PIF_VALUE: 28

## 2021-09-13 NOTE — PLAN OF CARE
Problem: Falls - Risk of:  Goal: Will remain free from falls  Description: Will remain free from falls  9/13/2021 0829 by Gulshan Castro RN  Outcome: Met This Shift     Problem: Anxiety:  Goal: Level of anxiety will decrease  Description: Level of anxiety will decrease  9/13/2021 0829 by Gulshan Castro RN  Outcome: Met This Shift     Problem: Cardiac Output - Decreased:  Goal: Cardiac output within specified parameters  Description: Cardiac output within specified parameters  9/13/2021 0829 by Gulshan Castro RN  Outcome: Met This Shift

## 2021-09-13 NOTE — CARE COORDINATION
Vent. IV versed and fentanyl. IV invanz 1000mg q 24 hrs. Iv argatroban gtt. Iv dobutamine 1.5 mcg/kg/min. Pt for trach tomorrow. Both ltacs([facility] and [facility]) following pt.  Sw/cm will follow

## 2021-09-13 NOTE — PROGRESS NOTES
CVICU Progress Note    Name: Russell Carpenter  MRN: [id number]    CC: Postoperative Critical Care Management      Indication for Surgery/Procedure: CAD, PAF  LVEF:  Normal    RVF:  Normal      Important/Relevant PMH/PSH: HTN, HPL, HFpEF, Right Carotid Artery stenosis (50-69%), DM, liver cirrhosis 2/2 AMES, GERD, h/o adenocarcinoma of cecum, arthritis, spinal stenosis, former smoker, obesity      Procedure/Surgeries: 8/25/2021 CABG x3 (LIMA-LAD, SVG-Ramus, SVG-OM), LAAL, EVH, KLS plating      Pacing wires: Ventricular (Cut 9/13/2021)       Intake/Output Summary (Last 24 hours) at 9/13/2021 0830  Last data filed at 9/13/2021 0800  Gross per 24 hour   Intake 2019.5 ml   Output 1330 ml   Net 689.5 ml       Recent Labs     09/11/21  0400 09/12/21  0500 09/13/21  0510   WBC 16.2* 15.2* 18.3*   HGB 8.9* 8.7* 8.8*   HCT 28.9* 28.0* 29.2*   * 117* 156      Lab Results   Component Value Date     09/13/2021    K 3.9 09/13/2021    K 4.3 09/06/2021     09/13/2021    CO2 25 09/13/2021    BUN 43 09/13/2021    CREATININE 1.0 09/13/2021    GLUCOSE 133 09/13/2021    CALCIUM 8.5 09/13/2021         Physical Exam:    BP (!) 114/52   Pulse 75   Temp 97.3 °F (36.3 °C) (Temporal)   Resp 12   Ht 5' 2\" (1.575 m)   Wt 236 lb 5.3 oz (107.2 kg)   LMP  (LMP Unknown)   SpO2 97%   BMI 43.23 kg/m²       CXR Findings: 9/13/2021     Impression   Worsening aeration at the right base.  Minimally improved aeration on the   left.  See above.           - CXR personally viewed and interpreted by ICU Nurse Practitioner, agree with above findings       General: Intubated, sedated. On dobutamine. TFs via OG  Eyes: PERRL, anicteric   Pulmonary: Diminished bibasilar.  No wheezes, no accessory muscle use noted   Ventilator: Mode: AC/VC, 50% FiO2, 5 PEEP, 450 Vt   Cardiovascular:  RRR, no heaves or thrills on palpation  Tele: SR   Abdomen: Soft, distended, +BS, +BM   Extremities: Palpable pulses all extremities, generalized pitting edema Neurologic/Psych: Intubated, sedated   Skin: Warm and dry  Incisions: MSI well approximated, RLE SVG sites with staples intact, well approximated     Lines:   ETT: 9/5  Rt Radial Arterial line: 9/5  Pino 8/25  Left UE PICC 8/30    Assessment/Plan: POD #19    1. CAD, PAF S/p CABG x3 (LIMA-LAD, SVG-Ramus, SVG-OM)  LAAL  - ASA, Lipitor resumed  - PICC placed 8/30     2. Paroxysmal Atrial Fibrillation  - s/p LAAL   - On Xarelto preop for PAF  - post op with PAF, Remains SR; burst of Afib overnight- self converted and given Mag sulfate   - Continue PO Amio 200mg BID   - Argatroban for AC       3. Acute Hypoxic Respiratory failure   - former smoker, 2/2 surgery, extubated DOS,? Aspiration 8/27, HAP  - postop course c/b hypoxia required prolonged high flow oxygen  - s/p bronch 9/1, RLL, RML mucous plugging; f/u pathology   -7 day course Zosyn completed 9/2, On Ertapenem per ID   - Reintubated 9/5   - Pulmonary following    - Supportive care, plan for tracheostomy tomorrow per Gen Surg      4. Acute Post Operative Pain   - Sedation with Versed and fentanyl gtts     5. DM Type 2  - HgbA1C 6.2%  - RHI gtt for glycemic control for BG >180, SSI when off gtt      6. Hypotension   - Suspected septic shock, likely HAP  - 9/7 Echo with normal LV function, Mildly dilated RV with mildly reduced RV function; started on Dobutamine gtt 9/7  - Weaned off levophed 9/9, off vaso 9/11, started on Midodrine 9/11  - Dobutamine weaned to @ 1.5mcg, weaning stress steroids  - Antibiotics per ID      7. Acute blood loss anemia  - expected blood loss 2/2 surgery, s/p one unit RBCs 8/26, 8/31, 9/7, 9/9  - Hgb stable  - Monitor, transfuse PRN      8. Leukocytosis  - completed 7 day course of Zosyn 9/2   - WBC 18.3K, Afebrile  - weaning systemic steroids   - Respiratory Culture with Klebsiella oxytoca, enterobacter cloacae  - ID following, Continue Ertapenem   - 9/6 blood cultures negative    9.  Anxiety  - On versed gtt, PRN Ativan 0.5mg q 4 hours    10. BROOKE superimposed on CKD   - Baseline Scr 0.9-1.2, peaked at 2.9 on 9/6   - Scr down to 1.0, UOP 0.5ml/kg/hr  - Nephrology following  - BMP q 12 hours  - Net + 17L, diuresis per nephrology, given Bumex past 2 days   - Monitor UOP, labs, avoid hypotension     11. Dysphagia/ At risk for malnutrition   - Suspected dysphagia, possible aspiration 8/27  - Bronch 9/1 airways with evidence of chronic aspiration   - MBS done 9/3, passed for soft and bite size consistency solids (dysphagia 3), thin liquids  - On trickle feeds via OG, increase TFs as tolerated  - NPO after midnight for Trach, plan for small bowel feeding tube once trach complete    12. Abdominal Distention/Elevated LFTs  - General Surgery following  - Tbili downtrending, 1.6; CT abdomen with cirrhosis and ascites, PEG tube  contraindicated     13. Thrombocytopenia  - Plts 330K>124K>108K>80K; Heparin stopped 9/9, f/u HIT panel   - Platelets uptrending, 156K on Argatroban, continue to monitor       VTE Prophylaxis: Pharmacologic/Mechanical:  Yes, SCDs, argatroban   Line infection prevention: Can CVC or arterial line be removed: no  Continued need for urinary catheter:  Yes - clinical indication: Patient post major surgery requiring fluid balance and input and output measurement.     Dispo: CVICU     Electronically signed by TEMO Gaston - CNP on 9/13/2021 at 8:30 AM

## 2021-09-13 NOTE — PROGRESS NOTES
[facility]AL[facility] PROGRESS NOTE      Chief complaint: Follow-up of septic shock    The patient is a 80 y.o. female with history of hypertension, hyperlipidemia, paroxysmal atrial fibrillation, renal artery aneurysm, cirrhosis, DM, CKD, admitted on 08/25 for elective CABG. Post-op course was complicated by fever up to 101.1 F on 08/29 with worsening leukocytosis up to currently 22,000, atrial fibrillation with rapid ventricular response, hypoxia, BROOKE. CXR on admission showed bilateral airspace disease and small bilateral pleural effusion. She underwent bronchoscopy on 09/01 during which diffuse scattered mucosal hemorrhages throughout the trachea and  thick clear secretions occluding the RML and RLL bronchi were noted. Bronchial wash Gram stain and culture showed rare polymorphonuclear leukocytes, rare epithelial cells, rare yeast, absent oral pharyngeal lisy, moderate growth of Candida albicans. She was reintubated on 09/05 due to decreasing level of consciousness and increasing respiratory distress, accompanied by hypothermia and shock. CXR on 09/06 showed persistent bilateral parenchymal infiltrates and probable small effusions, essentially unchanged from that on admission. Procalcitonin level was elevated at 0.31 ng/mL. Respiratory Gram stain and culture on 09/06 showed few polymorphonuclear leukocytes, few epithelial cells, no organisms, light growth of yeast, Klebsiella oxytoca (non-ESBL; susceptible to cotrimoxazole and fluoroquinolones) and Enterobacter cloacae (susceptible to cotrimoxazole and fluoroquinolones). Beta-D-glucan was negative. Urine Streptococcus pneumoniae and Legionella antigens were negative. SARS-CoV-2 PCR was not detected. She received cefazolin from 08/25-08/27, piperacillin-tazobactam from 08/27-09/02. Meropenem was started on 09/06. Subjective: Patient was seen and examined. She remains in critical condition, intubated, sedated, on decreasing inotropic support.     Objective:  BP (!) 114/52 Pulse 73   Temp 97.3 °F (36.3 °C) (Temporal)   Resp 12   Ht 5' 2\" (1.575 m)   Wt 236 lb 5.3 oz (107.2 kg)   LMP  (LMP Unknown)   SpO2 98%   BMI 43.23 kg/m²   Constitutional: Intubated, no MV dyssynchrony  Respiratory: Clear breath sounds, no crackles, no wheezes  Cardiovascular: Regular rate and rhythm, no murmurs  Gastrointestinal: Bowel sounds present, soft, distended   Skin: Warm and dry, no active dermatoses  Musculoskeletal: No joint swelling, no joint erythema    Labs, imaging, and medical records/notes were personally reviewed. Assessment:  Shock, presumed septic, secondary to Klebsiella oxytoca and Enterobacter cloacae HAP  Acute hypoxic respiratory failure  Candida albicans on respiratory culture, deemed colonization  Leukocytosis, on steroids  CAD s/p CABG on 08/25  BROOKE on CKD    Recommendations:  Continue ertapenem 1g q24h to complete 10 days until 09/15.     Thank you for involving me in the care of [address] [place] [address]. I will continue to follow. Please do not hesitate to call for any questions or concerns.     Electronically signed by Susanne Castleman, MD on 9/13/2021 at 9:45 AM

## 2021-09-13 NOTE — PROGRESS NOTES
[facility]  Nephrology Attending Progress Note          SUBJECTIVE:     9/3/21: Rosa Reyes is a 80 y.o. female with h/o HFpEF, refractory A fib  s/p DCCV X 2, hypertension, hyperlipidemia and other medical problems listed below. She presented for elective CABG on 8/25 following ischemic work-up and C showing  MV CAD. 6/8/21. Procedure was performed on 8/25. Her postop course has been complicated by acute respiratory failure, atrial fibrillation with RVR and intermittent fevers. She has required Zosyn. Preop creatinine has been 1-1.2 which is her recent baseline. In the last 48 hours creatinine has shown a progressive increase to currently 1.8 mg/dL associated with decreasing urine output. She has received albumin bolus but without any significant improvement. Hence renal consult.     9/4/21: pt seen in icu, has low urine output, just received iv albumin    9/5/21: seen in icu, started on ns at 76 yesterday. Remains with low uo. 9/6/21: pt reintubated and started on levo for hypotension, now hypothermic and with elevated wbc    9/7: seen in icu, uo increasing, intubated, starting dopa, on amio, vaso, levo    9/8: pt remains in icu, on dopamine, vaso, levo, amio, and heparin, intubated    9/9: pt seen in icu, intubated, on dopa and vaso    9/10: pt seen in icu, remains intubated, on dopa and weaning vaso    9/11: No acute issues overnight; remains intubated    9/12: No new acute issues from overnight; remains intubated    9/13: pt seen in icu, intubated. Had run of VT.  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)    Chronic heart failure with preserved ejection fraction (HCC)    Atrial fibrillation with RVR (HCC)    Severe protein-calorie malnutrition (HCC)    Abnormal nuclear stress test    Opioid use, unspecified with unspecified opioid-induced disorder    Opioid dependence with unspecified opioid-induced disorder    Opioid dependence, uncomplicated    CAD in native artery    Pre-operative laboratory examination    Acute blood loss anemia    Leukocytosis    At risk for malnutrition    Hypotension        PAST MEDICAL HISTORY:    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[facility] 01/2019    Anemia     Arm numbness     Arthritis     Blood transfusion     Cervical spinal stenosis     Cirrhosis of liver (HCC)     Diabetes mellitus ([facility]     Fatty liver     GERD (gastroesophageal reflux disease)     Hyperlipidemia     Hypertension     Low back pain     Lumbar stenosis     Neck pain     Obesity (BMI 30.0-34.9) 10/14/2012    PAF (paroxysmal atrial fibrillation) (HCC)     Renal artery aneurysm ([facility] 7/15/2015       DIET:    Diet NPO Exceptions are: Sips of Water with Meds  Diet NPO Exceptions are: Sips of Water with Meds  ADULT TUBE FEEDING; Orogastric; Standard without Fiber; Continuous; 10; Yes; 0; Q 4 hours; 10; 30; Q 4 hours; Protein; BID protein modular     PHYSICAL EXAM:     Patient Vitals for the past 24 hrs:   Temp Temp src Pulse Resp SpO2 Weight   09/13/21 1000 98.6 °F (37 °C) Temporal 73 12 99 % --   09/13/21 0930 -- -- 73 12 99 % --   09/13/21 0900 -- -- 73 12 98 % --   09/13/21 0830 -- -- 73 24 98 % --   09/13/21 0828 -- -- -- -- 98 % --   09/13/21 0822 -- -- 75 -- 97 % --   09/13/21 0800 97.3 °F (36.3 °C) Temporal 78 12 98 % --   09/13/21 0730 -- -- 81 22 94 % --   09/13/21 0700 -- -- 82 13 98 % --   09/13/21 0630 -- -- 74 12 99 % --   09/13/21 0600 -- -- 76 13 99 % --   09/13/21 0530 -- -- 77 12 99 % --   09/13/21 0500 -- -- 78 13 100 % --   09/13/21 0430 -- -- 75 12 100 % --   09/13/21 0400 97.1 °F (36.2 °C) Temporal 77 12 100 % 236 lb 5.3 oz (107.2 kg)   09/13/21 0337 -- -- 79 13 99 % --   09/13/21 0330 -- -- 77 13 100 % --   09/13/21 0300 -- -- 77 15 100 % --   09/13/21 0230 -- -- 77 13 100 % --   09/13/21 0200 -- -- 76 24 100 % --   09/13/21 0130 -- -- 77 12 100 % --   09/13/21 0100 -- -- 76 14 100 % --   09/13/21 0030 -- -- 78 13 100 % --   09/13/21 0000 97.6 °F (36.4 °C) Axillary 75 13 100 % --   09/12/21 2330 -- -- 80 13 100 % --   09/12/21 2300 -- -- 87 13 100 % --   09/12/21 2230 -- -- 78 12 100 % --   09/12/21 2200 -- -- 78 13 100 % --   09/12/21 2130 -- -- 78 13 100 % --   09/12/21 2100 -- -- 79 13 100 % --   09/12/21 2030 -- -- 80 13 98 % --   09/12/21 2000 97.7 °F (36.5 °C) Axillary 79 13 100 % --   09/12/21 1930 -- -- 78 12 100 % --   09/12/21 1917 -- -- 80 -- 100 % --   09/12/21 1900 -- -- 78 12 100 % --   09/12/21 1800 -- -- 79 12 100 % --   09/12/21 1700 -- -- 79 14 98 % --   09/12/21 1603 -- -- -- -- 100 % --   09/12/21 1601 -- -- 78 -- 100 % --   09/12/21 1600 96.5 °F (35.8 °C) Axillary 77 13 100 % --   09/12/21 1500 -- -- 84 13 100 % --   09/12/21 1400 96.3 °F (35.7 °C) Axillary 79 18 100 % --   09/12/21 1300 -- -- 80 12 100 % --   09/12/21 1200 96.4 °F (35.8 °C) Axillary 80 14 99 % --   09/12/21 1126 -- -- -- 13 100 % --   09/12/21 1115 -- -- 77 12 100 % --   09/12/21 1100 -- -- 78 17 100 % --   @      Intake/Output Summary (Last 24 hours) at 9/13/2021 1033  Last data filed at 9/13/2021 1000  Gross per 24 hour   Intake 2019.5 ml   Output 1300 ml   Net 719.5 ml         Wt Readings from Last 3 Encounters:   09/13/21 236 lb 5.3 oz (107.2 kg)   08/23/21 170 lb (77.1 kg)   07/16/21 175 lb (79.4 kg)       Constitutional:  Pt is intubated  Head: normocephalic, atraumatic  Neck: no JVD  Cardiovascular: regular rate and rhythm, no murmurs, gallops, or rubs  Respiratory:  No rales, rhochi, or wheezes  Gastrointestinal:  Soft, 09/11/21  0400 09/12/21  0500 09/13/21  0510   WBC 16.2* 15.2* 18.3*   HGB 8.9* 8.7* 8.8*   HCT 28.9* 28.0* 29.2*   MCV 87.3 86.4 88.8   * 117* 156     Recent Labs     09/11/21  0400 09/11/21  1620 09/12/21  0500 09/12/21  1650 09/13/21  0510 09/13/21  0515      < > 139 140  --  141   K 4.2   < > 3.8 4.0  --  3.9      < > 104 104  --  106   CO2 22   < > 27 23  --  25   BUN 53*   < > 53* 48*  --  43*   CREATININE 1.3*   < > 1.2* 1.1*  --  1.0   LABGLOM 39   < > 43 48  --  53   GLUCOSE 160*   < > 154* 149*  --  133*   CALCIUM 8.5*   < > 9.0 8.6  --  8.5*   ALT 31  --  28  --  28  --    AST 15  --  18  --  21  --    BILIDIR 0.8*  --  0.7*  --  0.7*  --    BILITOT 1.8*  --  1.7*  --  1.6*  --    ALKPHOS 62  --  66  --  65  --    MG 2.5  --  2.4  --  2.4  --     < > = values in this interval not displayed. Lab Results   Component Value Date    LABALBU 3.7 09/13/2021    LABALBU 3.7 09/12/2021    LABALBU 3.8 09/11/2021     Lab Results   Component Value Date    TSH 2.250 06/21/2021       Iron Studies  No results found for: IRON, TIBC, FERRITIN  No results found for: TNDXOAFH91  No results found for: FOLATE    No results found for: VITD25  No results found for: PTH    No components found for: URIC    Lab Results   Component Value Date    COLORU Yellow 08/28/2021    LABPH 0 10/22/2019    NITRU Negative 08/28/2021    GLUCOSEU Negative 08/28/2021    KETUA TRACE 08/28/2021    UROBILINOGEN 0.2 08/28/2021    BILIRUBINUR Negative 08/28/2021       No results found for: Emanuel Yancey      IMPRESSION/RECOMMENDATIONS:      1. Acute kidney injury stage I superimposed on CKD stage IIIa  BROOKE prerenal, FEurea 16% and FENa .04%, volume depletion  Cr peaked at 2.8 now 1.0  nonoliguric but low UO  pola no hydro  Fluid balance +++, increasing pleural effusion  Will give additional dose of bumex today     2. CAD s/p CABG X 3 . 8/25  CO 2.2, CI 1.7  On dobutrex     3.   Acute postop respiratory failure  Intubated 9/5  Bronch 9/1 with mucus plugging  Worsening wbc and hypotension  Id following  Started merrem for hap  Now off pressors     4. A fib RVR  On amio     5. Metabolic acidosis  Improved with HCO3 drip  Now resolved  Improved  Now off    6.  Run of VT  Mg 2.4  k 3.9  Give dose k    Lita Milner MD

## 2021-09-13 NOTE — PROGRESS NOTES
Enio Barr M.D.,[facility]  Jonna Joya D.O., F.A.C.O.I., Ruby Chacko M.D. Benson Collazo M.D. Donell Hughes D.O. Daily Pulmonary Progress Note    Patient:  Samantha Arnettws 80 y.o. female MRN: [id number]     Date of Service: 9/13/2021      Synopsis     We are following patient for acute on chronic hypoxic respiratory failure      Code status: Full code      Subjective      Patient was seen and examined. Current settings are AC/VC 12, 450, 50%, +5. Remains on Fentanyl and Versed. Continues to be on dobutamine: was weaned today @ 2 mck/kg/min. Plans for trach tomorrow.      Review of Systems:  Unable to obtain    24-hour events:  none    Objective   Vitals: BP (!) 114/52   Pulse 73   Temp 98.6 °F (37 °C) (Temporal)   Resp 12   Ht 5' 2\" (1.575 m)   Wt 236 lb 5.3 oz (107.2 kg)   LMP  (LMP Unknown)   SpO2 99%   BMI 43.23 kg/m²     I/O:    Intake/Output Summary (Last 24 hours) at 9/13/2021 1101  Last data filed at 9/13/2021 1000  Gross per 24 hour   Intake 2019.5 ml   Output 1300 ml   Net 719.5 ml       Vent Information  $Ventilation: $Subsequent Day  Skin Assessment: Clean, dry, & intact  Suction Catheter Diameter: 14  Equipment ID: [id number]  Equipment Changed: Vent Circuit  Vent Type: 980  Vent Mode: AC/VC+  Vt Ordered: 450 mL  Rate Set: 12 bmp  Peak Flow: 0 L/min  Pressure Support: 0 cmH20  FiO2 : 50 %  SpO2: 99 %  SpO2/FiO2 ratio: 198  Sensitivity: 3  PEEP/CPAP: 5  I Time/ I Time %: 0.9 s  Humidification Source: Heated wire  Humidification Temp: 37  Humidification Temp Measured: 37.3  Circuit Condensation: Not drained  Mask Type: Full face mask  Mask Size: Medium  Bonnet size: Medium       IPAP: 16 cmH20  CPAP/EPAP: 8 cmH2O     CURRENT MEDS :  Scheduled Meds:   potassium chloride  20 mEq IntraVENous Once    [START ON 9/14/2021] bumetanide  1 mg IntraVENous Daily with breakfast    hydrocortisone sodium succinate PF  50 mg IntraVENous Q8H    midodrine  5 mg Oral TID WC Value Date    WBC 18.3 09/13/2021    HGB 8.8 09/13/2021    HCT 29.2 09/13/2021    MCV 88.8 09/13/2021    MCH 26.7 09/13/2021    MCHC 30.1 09/13/2021    RDW 17.3 09/13/2021     09/13/2021    MPV 9.6 09/13/2021     Lab Results   Component Value Date     09/13/2021    K 3.9 09/13/2021    K 4.3 09/06/2021     09/13/2021    CO2 25 09/13/2021    BUN 43 09/13/2021    CREATININE 1.0 09/13/2021    LABALBU 3.7 09/13/2021    CALCIUM 8.5 09/13/2021    GFRAA >60 09/13/2021    LABGLOM 53 09/13/2021     Lab Results   Component Value Date    PROTIME 14.5 08/25/2021    INR 1.3 08/25/2021     No results for input(s): PROBNP in the last 72 hours. No results for input(s): PROCAL in the last 72 hours. This SmartLink has not been configured with any valid records. Micro:  No results for input(s): CULTRESP in the last 72 hours. No results for input(s): LABGRAM in the last 72 hours. No results for input(s): LEGUR in the last 72 hours. No results for input(s): STREPNEUMAGU in the last 72 hours. No results for input(s): LP1UAG in the last 72 hours. Results for Christopher Cheese (MRN [id number]) as of 9/7/2021 11:31  Results for Christopher Cheese (MRN [id number]) as of 9/12/2021 14:43   Ref.  Range 9/11/2021 05:14 9/12/2021 05:09   pH, Blood Gas Latest Ref Range: 7.350 - 7.450  7.396 7.410   PCO2 Latest Ref Range: 35.0 - 45.0 mmHg 40.1 37.8   pO2 Latest Ref Range: 75.0 - 100.0 mmHg 83.2 89.8   HCO3 Latest Ref Range: 22.0 - 26.0 mmol/L 24.1 23.4   Base Excess Latest Ref Range: -3.0 - 3.0 mmol/L -0.7 -1.0   O2 Sat Latest Ref Range: 92.0 - 98.5 % 95.2 96.5   tHb (est) Latest Ref Range: 11.5 - 16.5 g/dL 9.9 (L) 9.9 (L)   O2Hb Latest Ref Range: 94.0 - 97.0 % 94.8 96.0   COHb Latest Ref Range: 0.0 - 1.5 % 0.1 0.3   MetHb Latest Ref Range: 0.0 - 1.5 % 0.3 0.2   HHb Latest Ref Range: 0.0 - 5.0 % 4.8 3.5   AaDO2 Latest Units: mmHg 215.7 211.7   RI(T) Unknown 2.59 2.36   FIO2 Latest Units: % 50.0 50.0   PO2/FIO2 Latest Units: mmHg/% 1.66 1.80   Pt Temp Latest Units: C 37.0 37.0   Critical(s) Notified Unknown . No Critical Values . No Critical Values   Time Analyzed Unknown 0502 2041   Mode Unknown AC AC   Peep/Cpap Latest Units: cmH2O 5.0 5.0   Vt Mechanical Latest Units: mL 450.0 450.0   Rr Mechanical Latest Units: b/min 12.0 12.0      Assessment:    1. Post operative respiratory insufficiency with hypoxia requiring reintubation 9/5/21  2. Bilateral atelectasis/mucus plugging of bronchi s/p bronchoscopy 9/1/21  3. Pleural effusions - small on bedside US  4. CAD s/p CABG x 3 8/25/21  5. Atrial fibrillation with RVR   6. Anxiety  7. Aspiration pneumonia s/p treatment with Zosyn  8. BROOKE secondary to intravascular volume depletion  9. Leukocytosis, probable sepsis septic shock  10. Runs of nonsustained V. Tach  11. High anion gap metabolic acidosis with respiratory alkalosis-improved  12. Klebsiella oxytoca and Enterobacter cloacae HAP      Plan:    Continue full vent support, patient on schedule for tracheostomy tomorrow  Continue pressors per CTS. She is currently on dobutamine   patient on ertapenem 1 g every 24 hours per ID  LFTs trending down,  Diuresis per nephrology recommendations she received 1 dose of Bumex today. Continue amiodarone for A. fib  Patient with cirrhosis and ascites, question need for paracentesis, not a candidate for her PEG tube due to her ascites  Weaned hydrocortisone down to 50 mg q8. Given Mg today for run of VT      Electronically signed by TEMO Rasmussen CNP on 9/13/2021 at 11:01 AM    I personally saw, examined, and cared for the patient. Labs, medications, radiographs reviewed. I agree with history exam and plans detailed in NP note.     Trach/PEG when able    Davion Gravanatoly, DO

## 2021-09-13 NOTE — PLAN OF CARE
Problem: Falls - Risk of:  Goal: Will remain free from falls  Description: Will remain free from falls  Outcome: Met This Shift  Goal: Absence of physical injury  Description: Absence of physical injury  Outcome: Met This Shift     Problem: Discharge Planning:  Goal: Discharged to appropriate level of care  Description: Discharged to appropriate level of care  Outcome: Ongoing     Problem:  Activity Intolerance:  Goal: Able to perform prescribed physical activity  Description: Able to perform prescribed physical activity  Outcome: Ongoing  Goal: Ability to tolerate increased activity will improve  Description: Ability to tolerate increased activity will improve  Outcome: Ongoing     Problem: Anxiety:  Goal: Level of anxiety will decrease  Description: Level of anxiety will decrease  Outcome: Ongoing     Problem: Cardiac Output - Decreased:  Goal: Cardiac output within specified parameters  Description: Cardiac output within specified parameters  Outcome: Met This Shift  Goal: Hemodynamic stability will improve  Description: Hemodynamic stability will improve  Outcome: Met This Shift     Problem: Fluid Volume - Imbalance:  Goal: Ability to achieve a balanced intake and output will improve  Description: Ability to achieve a balanced intake and output will improve  Outcome: Ongoing     Problem: Gas Exchange - Impaired:  Goal: Levels of oxygenation will improve  Description: Levels of oxygenation will improve  Outcome: Ongoing  Goal: Ability to maintain adequate ventilation will improve  Description: Ability to maintain adequate ventilation will improve  Outcome: Ongoing     Problem: Tissue Perfusion - Cardiopulmonary, Altered:  Goal: Absence of angina  Description: Absence of angina  Outcome: Met This Shift  Goal: Hemodynamic stability will improve  Description: Hemodynamic stability will improve  Outcome: Met This Shift  Goal: Will show no evidence of cardiac arrhythmias  Description: Will show no evidence of cardiac arrhythmias  Outcome: Met This Shift     Problem: Skin Integrity:  Goal: Will show no infection signs and symptoms  Description: Will show no infection signs and symptoms  Outcome: Met This Shift  Goal: Absence of new skin breakdown  Description: Absence of new skin breakdown  Outcome: Met This Shift     Problem: Pain:  Goal: Pain level will decrease  Description: Pain level will decrease  Outcome: Met This Shift  Goal: Control of acute pain  Description: Control of acute pain  Outcome: Met This Shift     Problem: Musculor/Skeletal Functional Status  Goal: Highest potential functional level  Outcome: Ongoing  Goal: Absence of falls  Outcome: Met This Shift     Problem: ABCDS Injury Assessment  Goal: Absence of physical injury  Outcome: Met This Shift

## 2021-09-14 ENCOUNTER — APPOINTMENT (OUTPATIENT)
Dept: GENERAL RADIOLOGY | Age: 81
DRG: 003 | End: 2021-09-14
Attending: THORACIC SURGERY (CARDIOTHORACIC VASCULAR SURGERY)
Payer: MEDICARE

## 2021-09-14 LAB
AADO2: 211.9 MMHG
ALBUMIN SERPL-MCNC: 3.3 G/DL (ref 3.5–5.2)
ALP BLD-CCNC: 64 U/L (ref 35–104)
ALT SERPL-CCNC: 28 U/L (ref 0–32)
ANION GAP SERPL CALCULATED.3IONS-SCNC: 13 MMOL/L (ref 7–16)
APTT: 67 SEC (ref 24.5–35.1)
AST SERPL-CCNC: 29 U/L (ref 0–31)
B.E.: 1.8 MMOL/L (ref -3–3)
BILIRUB SERPL-MCNC: 1.7 MG/DL (ref 0–1.2)
BILIRUBIN DIRECT: 0.8 MG/DL (ref 0–0.3)
BILIRUBIN, INDIRECT: 0.9 MG/DL (ref 0–1)
BUN BLDV-MCNC: 39 MG/DL (ref 6–23)
CALCIUM SERPL-MCNC: 8.5 MG/DL (ref 8.6–10.2)
CHLORIDE BLD-SCNC: 106 MMOL/L (ref 98–107)
CO2: 24 MMOL/L (ref 22–29)
COHB: 0.4 % (ref 0–1.5)
CREAT SERPL-MCNC: 1 MG/DL (ref 0.5–1)
CRITICAL: ABNORMAL
DATE ANALYZED: ABNORMAL
DATE OF COLLECTION: ABNORMAL
FIO2: 50 %
GFR AFRICAN AMERICAN: >60
GFR NON-AFRICAN AMERICAN: 53 ML/MIN/1.73
GLUCOSE BLD-MCNC: 123 MG/DL (ref 74–99)
HCO3: 26.3 MMOL/L (ref 22–26)
HCT VFR BLD CALC: 27.1 % (ref 34–48)
HEMOGLOBIN: 8.1 G/DL (ref 11.5–15.5)
HHB: 4.2 % (ref 0–5)
LAB: ABNORMAL
Lab: ABNORMAL
MAGNESIUM: 2.3 MG/DL (ref 1.6–2.6)
MAGNESIUM: 2.4 MG/DL (ref 1.6–2.6)
MCH RBC QN AUTO: 26.5 PG (ref 26–35)
MCHC RBC AUTO-ENTMCNC: 29.9 % (ref 32–34.5)
MCV RBC AUTO: 88.6 FL (ref 80–99.9)
METER GLUCOSE: 102 MG/DL (ref 74–99)
METER GLUCOSE: 111 MG/DL (ref 74–99)
METER GLUCOSE: 118 MG/DL (ref 74–99)
METER GLUCOSE: 144 MG/DL (ref 74–99)
METER GLUCOSE: 148 MG/DL (ref 74–99)
METHB: 0 % (ref 0–1.5)
MODE: AC
O2 SATURATION: 95.8 % (ref 92–98.5)
O2HB: 95.4 % (ref 94–97)
OPERATOR ID: ABNORMAL
PATIENT TEMP: 37 C
PCO2: 40.9 MMHG (ref 35–45)
PDW BLD-RTO: 17.4 FL (ref 11.5–15)
PEEP/CPAP: 5 CMH2O
PFO2: 1.72 MMHG/%
PH BLOOD GAS: 7.43 (ref 7.35–7.45)
PLATELET # BLD: 155 E9/L (ref 130–450)
PMV BLD AUTO: 9.2 FL (ref 7–12)
PO2: 86.1 MMHG (ref 75–100)
POTASSIUM SERPL-SCNC: 3.7 MMOL/L (ref 3.5–5)
RBC # BLD: 3.06 E12/L (ref 3.5–5.5)
RI(T): 2.46
RR MECHANICAL: 12 B/MIN
SODIUM BLD-SCNC: 143 MMOL/L (ref 132–146)
SOURCE, BLOOD GAS: ABNORMAL
THB: 9.2 G/DL (ref 11.5–16.5)
TIME ANALYZED: 416
TOTAL PROTEIN: 5.3 G/DL (ref 6.4–8.3)
VT MECHANICAL: 450 ML
WBC # BLD: 18.5 E9/L (ref 4.5–11.5)

## 2021-09-14 PROCEDURE — 80076 HEPATIC FUNCTION PANEL: CPT

## 2021-09-14 PROCEDURE — 83735 ASSAY OF MAGNESIUM: CPT

## 2021-09-14 PROCEDURE — 6360000002 HC RX W HCPCS: Performed by: NURSE PRACTITIONER

## 2021-09-14 PROCEDURE — 82962 GLUCOSE BLOOD TEST: CPT

## 2021-09-14 PROCEDURE — 6370000000 HC RX 637 (ALT 250 FOR IP): Performed by: NURSE PRACTITIONER

## 2021-09-14 PROCEDURE — 71045 X-RAY EXAM CHEST 1 VIEW: CPT

## 2021-09-14 PROCEDURE — 6360000002 HC RX W HCPCS: Performed by: INTERNAL MEDICINE

## 2021-09-14 PROCEDURE — 2709999900 HC NON-CHARGEABLE SUPPLY

## 2021-09-14 PROCEDURE — 2709999900 HC NON-CHARGEABLE SUPPLY: Performed by: SURGERY

## 2021-09-14 PROCEDURE — 6370000000 HC RX 637 (ALT 250 FOR IP): Performed by: THORACIC SURGERY (CARDIOTHORACIC VASCULAR SURGERY)

## 2021-09-14 PROCEDURE — C9113 INJ PANTOPRAZOLE SODIUM, VIA: HCPCS | Performed by: NURSE PRACTITIONER

## 2021-09-14 PROCEDURE — 99233 SBSQ HOSP IP/OBS HIGH 50: CPT | Performed by: NURSE PRACTITIONER

## 2021-09-14 PROCEDURE — 82805 BLOOD GASES W/O2 SATURATION: CPT

## 2021-09-14 PROCEDURE — 6360000002 HC RX W HCPCS: Performed by: THORACIC SURGERY (CARDIOTHORACIC VASCULAR SURGERY)

## 2021-09-14 PROCEDURE — 2580000003 HC RX 258: Performed by: INTERNAL MEDICINE

## 2021-09-14 PROCEDURE — 74018 RADEX ABDOMEN 1 VIEW: CPT

## 2021-09-14 PROCEDURE — 99152 MOD SED SAME PHYS/QHP 5/>YRS: CPT | Performed by: SURGERY

## 2021-09-14 PROCEDURE — 80048 BASIC METABOLIC PNL TOTAL CA: CPT

## 2021-09-14 PROCEDURE — 2580000003 HC RX 258: Performed by: NURSE PRACTITIONER

## 2021-09-14 PROCEDURE — 37799 UNLISTED PX VASCULAR SURGERY: CPT

## 2021-09-14 PROCEDURE — 6360000002 HC RX W HCPCS: Performed by: STUDENT IN AN ORGANIZED HEALTH CARE EDUCATION/TRAINING PROGRAM

## 2021-09-14 PROCEDURE — 94003 VENT MGMT INPAT SUBQ DAY: CPT

## 2021-09-14 PROCEDURE — 99153 MOD SED SAME PHYS/QHP EA: CPT | Performed by: SURGERY

## 2021-09-14 PROCEDURE — 2000000000 HC ICU R&B

## 2021-09-14 PROCEDURE — 36415 COLL VENOUS BLD VENIPUNCTURE: CPT

## 2021-09-14 PROCEDURE — 2500000003 HC RX 250 WO HCPCS: Performed by: STUDENT IN AN ORGANIZED HEALTH CARE EDUCATION/TRAINING PROGRAM

## 2021-09-14 PROCEDURE — 2500000003 HC RX 250 WO HCPCS: Performed by: INTERNAL MEDICINE

## 2021-09-14 PROCEDURE — 2500000003 HC RX 250 WO HCPCS: Performed by: NURSE PRACTITIONER

## 2021-09-14 PROCEDURE — 85027 COMPLETE CBC AUTOMATED: CPT

## 2021-09-14 PROCEDURE — 94640 AIRWAY INHALATION TREATMENT: CPT

## 2021-09-14 PROCEDURE — 6370000000 HC RX 637 (ALT 250 FOR IP): Performed by: INTERNAL MEDICINE

## 2021-09-14 PROCEDURE — 85730 THROMBOPLASTIN TIME PARTIAL: CPT

## 2021-09-14 PROCEDURE — 0B113F4 BYPASS TRACHEA TO CUTANEOUS WITH TRACHEOSTOMY DEVICE, PERCUTANEOUS APPROACH: ICD-10-PCS | Performed by: SURGERY

## 2021-09-14 RX ORDER — FENTANYL CITRATE 50 UG/ML
INJECTION, SOLUTION INTRAMUSCULAR; INTRAVENOUS
Status: DISPENSED
Start: 2021-09-14 | End: 2021-09-15

## 2021-09-14 RX ORDER — DOBUTAMINE HYDROCHLORIDE 400 MG/100ML
1 INJECTION INTRAVENOUS CONTINUOUS
Status: DISCONTINUED | OUTPATIENT
Start: 2021-09-14 | End: 2021-09-14

## 2021-09-14 RX ORDER — MIDAZOLAM HYDROCHLORIDE 2 MG/2ML
10 INJECTION, SOLUTION INTRAMUSCULAR; INTRAVENOUS ONCE
Status: COMPLETED | OUTPATIENT
Start: 2021-09-14 | End: 2021-09-14

## 2021-09-14 RX ORDER — VECURONIUM BROMIDE 1 MG/ML
10 INJECTION, POWDER, LYOPHILIZED, FOR SOLUTION INTRAVENOUS ONCE
Status: COMPLETED | OUTPATIENT
Start: 2021-09-14 | End: 2021-09-14

## 2021-09-14 RX ORDER — FENTANYL CITRATE 50 UG/ML
200 INJECTION, SOLUTION INTRAMUSCULAR; INTRAVENOUS ONCE
Status: COMPLETED | OUTPATIENT
Start: 2021-09-14 | End: 2021-09-14

## 2021-09-14 RX ADMIN — 0.12% CHLORHEXIDINE GLUCONATE 15 ML: 1.2 RINSE ORAL at 07:52

## 2021-09-14 RX ADMIN — ATORVASTATIN CALCIUM 10 MG: 10 TABLET, FILM COATED ORAL at 21:24

## 2021-09-14 RX ADMIN — Medication 75 MCG/HR: at 17:28

## 2021-09-14 RX ADMIN — PRAMIPEXOLE DIHYDROCHLORIDE 0.5 MG: 0.25 TABLET ORAL at 07:52

## 2021-09-14 RX ADMIN — MICONAZOLE NITRATE: 20 POWDER TOPICAL at 21:23

## 2021-09-14 RX ADMIN — ERTAPENEM SODIUM 1000 MG: 1 INJECTION, POWDER, LYOPHILIZED, FOR SOLUTION INTRAMUSCULAR; INTRAVENOUS at 11:26

## 2021-09-14 RX ADMIN — IPRATROPIUM BROMIDE AND ALBUTEROL SULFATE 1 AMPULE: .5; 2.5 SOLUTION RESPIRATORY (INHALATION) at 20:43

## 2021-09-14 RX ADMIN — PRAMIPEXOLE DIHYDROCHLORIDE 0.5 MG: 0.25 TABLET ORAL at 21:25

## 2021-09-14 RX ADMIN — MIDODRINE HYDROCHLORIDE 5 MG: 5 TABLET ORAL at 07:53

## 2021-09-14 RX ADMIN — HYDROCORTISONE SODIUM SUCCINATE 50 MG: 100 INJECTION, POWDER, FOR SOLUTION INTRAMUSCULAR; INTRAVENOUS at 15:07

## 2021-09-14 RX ADMIN — ARGATROBAN 1 MCG/KG/MIN: 50 INJECTION, SOLUTION INTRAVENOUS at 09:55

## 2021-09-14 RX ADMIN — IPRATROPIUM BROMIDE AND ALBUTEROL SULFATE 1 AMPULE: .5; 2.5 SOLUTION RESPIRATORY (INHALATION) at 16:23

## 2021-09-14 RX ADMIN — BUMETANIDE 1 MG: 0.25 INJECTION INTRAMUSCULAR; INTRAVENOUS at 07:53

## 2021-09-14 RX ADMIN — 0.12% CHLORHEXIDINE GLUCONATE 15 ML: 1.2 RINSE ORAL at 21:24

## 2021-09-14 RX ADMIN — INSULIN LISPRO 2 UNITS: 100 INJECTION, SOLUTION INTRAVENOUS; SUBCUTANEOUS at 07:57

## 2021-09-14 RX ADMIN — MIDAZOLAM 2 MG: 1 INJECTION INTRAMUSCULAR; INTRAVENOUS at 16:01

## 2021-09-14 RX ADMIN — IPRATROPIUM BROMIDE AND ALBUTEROL SULFATE 1 AMPULE: .5; 2.5 SOLUTION RESPIRATORY (INHALATION) at 12:34

## 2021-09-14 RX ADMIN — POTASSIUM CHLORIDE 20 MEQ: 400 INJECTION, SOLUTION INTRAVENOUS at 10:01

## 2021-09-14 RX ADMIN — IPRATROPIUM BROMIDE AND ALBUTEROL SULFATE 1 AMPULE: .5; 2.5 SOLUTION RESPIRATORY (INHALATION) at 08:14

## 2021-09-14 RX ADMIN — INSULIN LISPRO 2 UNITS: 100 INJECTION, SOLUTION INTRAVENOUS; SUBCUTANEOUS at 11:26

## 2021-09-14 RX ADMIN — HYDROCORTISONE SODIUM SUCCINATE 50 MG: 100 INJECTION, POWDER, FOR SOLUTION INTRAMUSCULAR; INTRAVENOUS at 03:35

## 2021-09-14 RX ADMIN — AMIODARONE HYDROCHLORIDE 200 MG: 200 TABLET ORAL at 07:51

## 2021-09-14 RX ADMIN — FENTANYL CITRATE 200 MCG: 50 INJECTION INTRAMUSCULAR; INTRAVENOUS at 15:32

## 2021-09-14 RX ADMIN — MIDODRINE HYDROCHLORIDE 5 MG: 5 TABLET ORAL at 11:26

## 2021-09-14 RX ADMIN — Medication 10 ML: at 07:52

## 2021-09-14 RX ADMIN — ARGATROBAN 1 MCG/KG/MIN: 50 INJECTION, SOLUTION INTRAVENOUS at 01:49

## 2021-09-14 RX ADMIN — ASPIRIN 81 MG CHEWABLE TABLET 81 MG: 81 TABLET CHEWABLE at 07:52

## 2021-09-14 RX ADMIN — VECURONIUM BROMIDE 10 MG: 1 INJECTION, POWDER, LYOPHILIZED, FOR SOLUTION INTRAVENOUS at 15:32

## 2021-09-14 RX ADMIN — SODIUM CHLORIDE, PRESERVATIVE FREE 10 ML: 5 INJECTION INTRAVENOUS at 08:08

## 2021-09-14 RX ADMIN — AMIODARONE HYDROCHLORIDE 200 MG: 200 TABLET ORAL at 21:23

## 2021-09-14 RX ADMIN — PANTOPRAZOLE SODIUM 40 MG: 40 INJECTION, POWDER, FOR SOLUTION INTRAVENOUS at 07:52

## 2021-09-14 ASSESSMENT — PULMONARY FUNCTION TESTS
PIF_VALUE: 29
PIF_VALUE: 31
PIF_VALUE: 21
PIF_VALUE: 31
PIF_VALUE: 28
PIF_VALUE: 26
PIF_VALUE: 29
PIF_VALUE: 25
PIF_VALUE: 27
PIF_VALUE: 32
PIF_VALUE: 31
PIF_VALUE: 32
PIF_VALUE: 27
PIF_VALUE: 25
PIF_VALUE: 29
PIF_VALUE: 29
PIF_VALUE: 28
PIF_VALUE: 27
PEFR_L/MIN: 35
PIF_VALUE: 24
PIF_VALUE: 26
PIF_VALUE: 28
PIF_VALUE: 25
PIF_VALUE: 26
PIF_VALUE: 26
PIF_VALUE: 28
PIF_VALUE: 26
PIF_VALUE: 33
PIF_VALUE: 26
PIF_VALUE: 35
PIF_VALUE: 25
PIF_VALUE: 26
PIF_VALUE: 33
PIF_VALUE: 25
PIF_VALUE: 43
PIF_VALUE: 26
PIF_VALUE: 25
PIF_VALUE: 32
PIF_VALUE: 30
PIF_VALUE: 27
PIF_VALUE: 32
PIF_VALUE: 26
PIF_VALUE: 28
PIF_VALUE: 29
PIF_VALUE: 28
PIF_VALUE: 26
PIF_VALUE: 29
PIF_VALUE: 25
PIF_VALUE: 26
PIF_VALUE: 27

## 2021-09-14 ASSESSMENT — PAIN SCALES - GENERAL
PAINLEVEL_OUTOF10: 0
PAINLEVEL_OUTOF10: 3
PAINLEVEL_OUTOF10: 0

## 2021-09-14 ASSESSMENT — PAIN SCALES - WONG BAKER: WONGBAKER_NUMERICALRESPONSE: 0

## 2021-09-14 NOTE — PROGRESS NOTES
pulses all extremities, generalized pitting edema   Neurologic/Psych: Intubated, sedated   Skin: Warm and dry  Incisions: MSI well approximated, RLE SVG sites with staples intact, well approximated     Lines:   ETT: 9/5  Rt Radial Arterial line: 9/5  Pino 8/25  Left UE PICC 8/30    Assessment/Plan: POD #20    1. CAD, PAF S/p CABG x3 (LIMA-LAD, SVG-Ramus, SVG-OM)  LAAL  - ASA, Lipitor   - PICC placed 8/30     2. Paroxysmal Atrial Fibrillation  - s/p LAAL   - On Xarelto preop for PAF  - post op with PAF, Remains SR; burst of Afib overnight- self converted and given Mag sulfate   - Continue PO Amio 200mg BID   - Argatroban for AC       3. Acute Hypoxic Respiratory failure   - former smoker, 2/2 surgery, extubated DOS,? Aspiration 8/27, HAP  - postop course c/b hypoxia required prolonged high flow oxygen  - s/p bronch 9/1, RLL, RML mucous plugging; f/u pathology   -7 day course Zosyn completed 9/2, On Ertapenem per ID   - Reintubated 9/5   - Pulmonary following    - Supportive care, plan for tracheostomy today per Gen Surg      4. Acute Post Operative Pain   - Sedation with Versed and fentanyl gtts until trach completed      5. DM Type 2  - HgbA1C 6.2%  - RHI gtt for glycemic control for BG >180, SSI when off gtt      6.  Hypotension   - Suspected septic shock, likely HAP  - 9/7 Echo with normal LV function, Mildly dilated RV with mildly reduced RV function; started on Dobutamine gtt 9/7  - Weaned off levophed 9/9, off vaso 9/11, started on Midodrine 9/11  - Dobutamine weaned to @ 1mcg, weaning stress steroids  - Antibiotics per ID      7. Acute blood loss anemia  - expected blood loss 2/2 surgery, s/p one unit RBCs 8/26, 8/31, 9/7, 9/9  - Hgb stable  - Monitor, transfuse PRN      8. Leukocytosis  - completed 7 day course of Zosyn 9/2   - WBC 18.5K, Afebrile  - weaning systemic steroids   - Respiratory Culture with Klebsiella oxytoca, enterobacter cloacae  - ID following, Continue Ertapenem   - 9/6 blood cultures negative    9. Anxiety  - On versed gtt, PRN Ativan 0.5mg q 4 hours      10. BROOKE superimposed on CKD   - Baseline Scr 0.9-1.2, peaked at 2.9 on 9/6   - Scr down to 1.0, good response to Bumex yesterday, UOP 1ml/kg/hr   - Nephrology following  - BMP q 12 hours  - Net + 16L, diuresis per nephrology, continue daily bumex   - Monitor UOP, labs, avoid hypotension     11. Dysphagia/ At risk for malnutrition   - Suspected dysphagia, possible aspiration 8/27  - Bronch 9/1 airways with evidence of chronic aspiration   - NPOfor Trach, plan for small bowel feeding tube once trach complete    12. Abdominal Distention/Elevated LFTs  - General Surgery following  - Tbili stable; CT abdomen with cirrhosis and ascites, PEG tube  contraindicated     13. Thrombocytopenia  - Plts 330K>124K>108K>80K; Heparin stopped 9/9, f/u HIT panel   - Platelets stable, 411B on Argatroban, continue to monitor       VTE Prophylaxis: Pharmacologic/Mechanical:  Yes, SCDs, argatroban   Line infection prevention: Can CVC or arterial line be removed: no  Continued need for urinary catheter:  Yes - clinical indication: Patient post major surgery requiring fluid balance and input and output measurement.     Dispo: CVICU     Electronically signed by Ihsan Maria, TEMO - CNP on 9/14/2021 at 8:31 AM

## 2021-09-14 NOTE — PROGRESS NOTES
No acute events overnight. Patient for bedside tracheostomy today, tube feeds have been held since midnight. Supplies available at bedside this morning.

## 2021-09-14 NOTE — PROGRESS NOTES
[facility] PROGRESS NOTE      Chief complaint: Follow-up of septic shock    The patient is a 80 y.o. female with history of hypertension, hyperlipidemia, paroxysmal atrial fibrillation, renal artery aneurysm, cirrhosis, DM, CKD, admitted on 08/25 for elective CABG. Post-op course was complicated by fever up to 101.1 F on 08/29 with worsening leukocytosis up to currently 22,000, atrial fibrillation with rapid ventricular response, hypoxia, BROOKE. CXR on admission showed bilateral airspace disease and small bilateral pleural effusion. She underwent bronchoscopy on 09/01 during which diffuse scattered mucosal hemorrhages throughout the trachea and  thick clear secretions occluding the RML and RLL bronchi were noted. Bronchial wash Gram stain and culture showed rare polymorphonuclear leukocytes, rare epithelial cells, rare yeast, absent oral pharyngeal lisy, moderate growth of Candida albicans. She was reintubated on 09/05 due to decreasing level of consciousness and increasing respiratory distress, accompanied by hypothermia and shock. CXR on 09/06 showed persistent bilateral parenchymal infiltrates and probable small effusions, essentially unchanged from that on admission. Procalcitonin level was elevated at 0.31 ng/mL. Respiratory Gram stain and culture on 09/06 showed few polymorphonuclear leukocytes, few epithelial cells, no organisms, light growth of yeast, Klebsiella oxytoca (non-ESBL; susceptible to cotrimoxazole and fluoroquinolones) and Enterobacter cloacae (susceptible to cotrimoxazole and fluoroquinolones). Beta-D-glucan was negative. Urine Streptococcus pneumoniae and Legionella antigens were negative. SARS-CoV-2 PCR was not detected. She received cefazolin from 08/25-08/27, piperacillin-tazobactam from 08/27-09/02. Meropenem was started on 09/06. Subjective: Patient was seen and examined. She remains in critical condition, intubated, sedated, on decreasing inotropic support.     Objective:  BP (!) 125/52 Pulse 77   Temp 97.4 °F (36.3 °C) (Temporal)   Resp 12   Ht 5' 2\" (1.575 m)   Wt 233 lb 14.5 oz (106.1 kg)   LMP  (LMP Unknown)   SpO2 99%   BMI 42.78 kg/m²   Constitutional: Intubated, no MV dyssynchrony  Respiratory: Clear breath sounds, no crackles, no wheezes  Cardiovascular: Regular rate and rhythm, no murmurs  Gastrointestinal: Bowel sounds present, soft, distended   Skin: Warm and dry, no active dermatoses  Musculoskeletal: No joint swelling, no joint erythema    Labs, imaging, and medical records/notes were personally reviewed. Assessment:  Shock, presumed septic, secondary to Klebsiella oxytoca and Enterobacter cloacae HAP  Acute hypoxic respiratory failure  Candida albicans on respiratory culture, deemed colonization  Leukocytosis, on steroids  CAD s/p CABG on 08/25  BROOKE on CKD    Recommendations:  Continue ertapenem 1g q24h to complete 10 days until 09/15. Continue supportive care.     Thank you for involving me in the care of Ardevinchica Chung. I will continue to follow. Please do not hesitate to call for any questions or concerns.     Electronically signed by Roxi Heck MD on 9/14/2021 at 10:40 AM

## 2021-09-14 NOTE — OP NOTE
Surrounding Skin Unable to view 08/30/21 0800   Patency Intervention Tip/Tilt 08/30/21 0800   Output (ml) 0 ml 08/30/21 0800       [REMOVED] Chest Tube 2 Mediastinal 19 [place] (Removed)   Suction -20 cm H2O 08/30/21 0800   Chest Tube Airleak No 08/30/21 0800   Drainage Description Serosanguinous 08/30/21 0800   Dressing Status Clean;Dry; Intact 08/30/21 0800   Dressing Type Dry dressing 08/30/21 0800   Dressing Change Due 08/31/21 08/30/21 0800   Site Assessment Not assessed 08/30/21 0800   Surrounding Skin Unable to view 08/30/21 0800   Patency Intervention Tip/Tilt 08/30/21 0800       [REMOVED] Chest Tube 3 Pleural 19 [place] (Removed)   $ Chest tube insertion $ Yes 08/31/21 0800   Suction -20 cm H2O 09/02/21 0400   Chest Tube Airleak No 09/02/21 0400   Drainage Description Serous 09/02/21 0400   Dressing Status Clean;Dry; Intact 09/02/21 0400   Dressing Type Dry dressing 09/02/21 0400   Dressing Change Due 09/04/21 09/01/21 1800   Site Assessment Not assessed 09/02/21 0400   Surrounding Skin Unable to view 09/02/21 0400   Patency Intervention Milked 09/02/21 0600   Output (ml) 20 ml 09/02/21 0600       [REMOVED] NG/OG/NJ/NE Tube Orogastric Center mouth (Removed)   Surrounding Skin Dry; Intact 08/25/21 1300   Securement device Yes 08/25/21 1300   Status Suction-low intermittent 08/25/21 1300   Placement Verified by X-Ray (Initial) 08/25/21 1300   NG/OG/NJ/NE External Measurement (cm) 55 cm 08/25/21 1300   Drainage Appearance Green 08/25/21 1300   Output (mL) 0 ml 08/25/21 1315       [REMOVED] NG/OG/NJ/NE Tube Nasogastric Left nostril (Removed)   Surrounding Skin Dry; Intact 09/03/21 0400   Securement device Yes 09/03/21 0400   Status Clamped 09/03/21 0400   Placement Verified by X-Ray (repeat) 09/03/21 0400   Tube Feeding Diabetic 09/02/21 1200   Tube Feeding Status Continuous 09/02/21 1200   Rate/Schedule 40 mL/hr 09/02/21 1200   Tube Feeding Intake (mL) 298 ml 09/02/21 1400   Free Water Flush (mL) 60 mL 09/03/21 0000   Free Water Rate 21std8b 09/02/21 1200   Residual Volume (ml) 0 ml 09/02/21 1200       Findings: as expected    Detailed Description of Procedure:    4 mg Versed, 100mcgIV Fentantly and 10mg Norcuronium IV were administered for moderate sedation. The nurse monitored vitals  and were stable during the procedure. A shoulder roll was placed under the patient. The area of the neck was prepped and draped in the standard sterile fashion. The flexible bronchoscope was inserted into the endotracheal tube. Next 5 cc1% lidocaine was inserted into the subcutaneous tissue approx. 2 fingerbreaths above the sternal notch. Using a #15 blade, 2cm vertical incision was made and the hemostat was used to dilate the space. Next the bronchoscope and ET tube were withdrawn such that the ET tube was just distal to the vocal cords. The needle was inserted into the trachea at the 2nd tracheal ring. Using the Seldinger technique, the guidewire was inserted into the needle and the needle was withdrawn. Next the small dilator was used X3, then the large dilator was used to dilate the track. Finally the #8shiley was inserted over the [address] [place] dilator. The bronchoscope was positioned such that the entire process was visualized. Next, the inner canula was placed into the shiley. The End tidal CO2 detector confirmed the position of the tracheostomy tube. Next the bronchoscope was placed through the shiley, which also confirmed placement. There were no complications from the procedure. Dr. Naman Ignacio was present during the entire procedure.        Electronically signed by Mady Raya DO on 9/14/2021 at 3:57 PM

## 2021-09-14 NOTE — PLAN OF CARE
Problem: Falls - Risk of:  Goal: Will remain free from falls  Description: Will remain free from falls  Outcome: Met This Shift     Problem:  Activity Intolerance:  Goal: Able to perform prescribed physical activity  Description: Able to perform prescribed physical activity  Outcome: Not Met This Shift     Problem: Anxiety:  Goal: Level of anxiety will decrease  Description: Level of anxiety will decrease  Outcome: Met This Shift     Problem: Cardiac Output - Decreased:  Goal: Cardiac output within specified parameters  Description: Cardiac output within specified parameters  Outcome: Met This Shift     Problem: Gas Exchange - Impaired:  Goal: Levels of oxygenation will improve  Description: Levels of oxygenation will improve  Outcome: Met This Shift     Problem: Pain:  Goal: Pain level will decrease  Description: Pain level will decrease  Outcome: Met This Shift

## 2021-09-14 NOTE — PROGRESS NOTES
Nasoenteric tube inserted into right nares and bridled at the [address] using [employer] system   Will xray for tip location.

## 2021-09-14 NOTE — PLAN OF CARE
Problem: Falls - Risk of:  Goal: Will remain free from falls  Description: Will remain free from falls  9/14/2021 0819 by William Blanca RN  Outcome: Met This Shift     Problem: Falls - Risk of:  Goal: Absence of physical injury  Description: Absence of physical injury  Outcome: Met This Shift     Problem: Activity Intolerance:  Goal: Able to perform prescribed physical activity  Description: Able to perform prescribed physical activity  9/14/2021 0819 by William Blanca RN  Outcome: Met This Shift     Problem:  Activity Intolerance:  Goal: Ability to tolerate increased activity will improve  Description: Ability to tolerate increased activity will improve  Outcome: Met This Shift

## 2021-09-14 NOTE — PROGRESS NOTES
[facility]  Nephrology Attending Progress Note          SUBJECTIVE:     9/3/21: Laura Lerma is a 80 y.o. female with h/o HFpEF, refractory A fib  s/p DCCV X 2, hypertension, hyperlipidemia and other medical problems listed below. She presented for elective CABG on 8/25 following ischemic work-up and [facility] showing  MV CAD. 6/8/21. Procedure was performed on 8/25. Her postop course has been complicated by acute respiratory failure, atrial fibrillation with RVR and intermittent fevers. She has required Zosyn. Preop creatinine has been 1-1.2 which is her recent baseline. In the last 48 hours creatinine has shown a progressive increase to currently 1.8 mg/dL associated with decreasing urine output. She has received albumin bolus but without any significant improvement. Hence renal consult.     9/4/21: pt seen in icu, has low urine output, just received iv albumin    9/5/21: seen in icu, started on ns at 76 yesterday. Remains with low uo. 9/6/21: pt reintubated and started on levo for hypotension, now hypothermic and with elevated wbc    9/7: seen in icu, uo increasing, intubated, starting dopa, on amio, vaso, levo    9/8: pt remains in icu, on dopamine, vaso, levo, amio, and heparin, intubated    9/9: pt seen in icu, intubated, on dopa and vaso    9/10: pt seen in icu, remains intubated, on dopa and weaning vaso    9/11: No acute issues overnight; remains intubated    9/12: No new acute issues from overnight; remains intubated    9/13: pt seen in icu, intubated. Had run of VT. On dobutamine    9/14: pt seen in icu, intubated.        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)    Chronic heart failure with preserved ejection fraction (HCC)    Atrial fibrillation with RVR (HCC)    Severe protein-calorie malnutrition (HCC)    Abnormal nuclear stress test    Opioid use, unspecified with unspecified opioid-induced disorder    Opioid dependence with unspecified opioid-induced disorder    Opioid dependence, uncomplicated    CAD in native artery    Pre-operative laboratory examination    Acute blood loss anemia    Leukocytosis    At risk for malnutrition    Hypotension        PAST MEDICAL HISTORY:    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    Anemia     Arm numbness     Arthritis     Blood transfusion     Cervical spinal stenosis     Cirrhosis of liver (HCC)     Diabetes mellitus (HCC)     Fatty liver     GERD (gastroesophageal reflux disease)     Hyperlipidemia     Hypertension     Low back pain     Lumbar stenosis     Neck pain     Obesity (BMI 30.0-34.9) 10/14/2012    PAF (paroxysmal atrial fibrillation) (HCC)     Renal artery aneurysm ([facility] 7/15/2015       DIET:    Diet NPO     PHYSICAL EXAM:     Patient Vitals for the past 24 hrs:   BP Temp Temp src Pulse Resp SpO2 Weight   09/14/21 0807 (!) 120/54 -- -- 77 -- 99 % --   09/14/21 0800 (!) 118/49 97.4 °F (36.3 °C) Oral 74 12 100 % --   09/14/21 0700 (!) 115/49 -- -- 74 12 99 % --   09/14/21 0630 (!) 119/53 -- -- 75 12 98 % --   09/14/21 0600 (!) 157/78 -- -- 81 12 96 % 233 lb 14.5 oz (106.1 kg)   09/14/21 0530 (!) 121/48 -- -- 76 12 96 % --   09/14/21 0500 (!) 122/50 -- -- 78 14 96 % --   09/14/21 0430 (!) 118/48 -- -- 77 12 97 % --   09/14/21 0400 (!) 117/47 97.2 °F (36.2 °C) Temporal 77 12 97 % --   09/14/21 0330 (!) 127/52 -- -- 81 14 97 % --   09/14/21 0300 (!) 116/46 -- -- 81 15 100 % --   09/14/21 0230 (!) 104/45 -- -- 77 14 100 % --   09/14/21 0200 (!) 94/39 -- -- 76 12 100 % -- 09/14/21 0130 (!) 114/43 -- -- 80 14 99 % --   09/14/21 0100 (!) 118/45 -- -- 81 13 99 % --   09/14/21 0048 -- -- -- 82 -- 99 % --   09/14/21 0030 (!) 120/44 -- -- 82 14 100 % --   09/14/21 0000 (!) 122/44 97.2 °F (36.2 °C) Temporal 82 12 98 % --   09/13/21 2330 (!) 121/49 97.2 °F (36.2 °C) Temporal 87 16 100 % --   09/13/21 2300 -- -- -- 83 12 98 % --   09/13/21 2230 -- -- -- 82 -- 99 % --   09/13/21 2200 -- 97.1 °F (36.2 °C) Temporal 79 12 100 % --   09/13/21 2153 -- -- -- 77 -- 99 % --   09/13/21 2130 -- -- -- 78 -- 100 % --   09/13/21 2100 -- -- -- 79 12 100 % --   09/13/21 2030 -- -- -- 78 12 100 % --   09/13/21 2000 -- 97.1 °F (36.2 °C) Temporal 86 12 96 % --   09/13/21 1930 -- -- -- 80 -- 97 % --   09/13/21 1900 -- -- -- 86 -- 98 % --   09/13/21 1830 -- -- -- 75 -- 98 % --   09/13/21 1800 -- 97.3 °F (36.3 °C) Temporal 77 16 98 % --   09/13/21 1730 -- -- -- 77 12 99 % --   09/13/21 1717 -- -- -- -- -- 98 % --   09/13/21 1716 -- -- -- 76 -- 98 % --   09/13/21 1700 -- -- -- 78 14 98 % --   09/13/21 1630 -- -- -- 82 12 98 % --   09/13/21 1600 -- 97.8 °F (36.6 °C) Temporal 87 14 98 % --   09/13/21 1530 -- -- -- 78 12 100 % --   09/13/21 1500 -- -- -- 79 12 100 % --   09/13/21 1430 -- -- -- 79 12 100 % --   09/13/21 1400 -- 98.7 °F (37.1 °C) Temporal 80 13 100 % --   09/13/21 1330 -- -- -- 80 15 100 % --   09/13/21 1300 -- -- -- 77 12 100 % --   09/13/21 1236 -- -- -- -- -- 100 % --   09/13/21 1232 -- -- -- 73 -- 100 % --   09/13/21 1230 -- -- -- 73 12 100 % --   09/13/21 1200 -- 98.4 °F (36.9 °C) Temporal 73 12 100 % --   09/13/21 1130 -- -- -- 73 14 100 % --   09/13/21 1100 -- -- -- 76 12 98 % --   09/13/21 1030 -- -- -- 75 12 99 % --   09/13/21 1000 -- 98.6 °F (37 °C) Temporal 73 12 99 % --   09/13/21 0930 -- -- -- 73 12 99 % --   09/13/21 0900 -- -- -- 73 12 98 % --   @      Intake/Output Summary (Last 24 hours) at 9/14/2021 0858  Last data filed at 9/14/2021 0800  Gross per 24 hour   Intake 1360.56 ml Output 2850 ml   Net -1489.44 ml         Wt Readings from Last 3 Encounters:   09/14/21 233 lb 14.5 oz (106.1 kg)   08/23/21 170 lb (77.1 kg)   07/16/21 175 lb (79.4 kg)       Constitutional:  Pt is intubated  Head: normocephalic, atraumatic  Neck: no JVD  Cardiovascular: regular rate and rhythm, no murmurs, gallops, or rubs  Respiratory:  No rales, rhochi, or wheezes  Gastrointestinal:  Soft, nontender, nondistended, bowel sounds x 4  Ext: 1+ edema  Skin: dry, no rash      MEDS (scheduled):    hydrocortisone sodium succinate PF  50 mg IntraVENous Q12H    potassium chloride  20 mEq IntraVENous Once    bumetanide  1 mg IntraVENous Daily with breakfast    midodrine  5 mg Oral TID WC    amiodarone  200 mg Oral BID    ertapenem ([employer]) IVPB  1,000 mg IntraVENous Q24H    insulin lispro  0-12 Units SubCUTAneous Q4H    albumin human  25 g IntraVENous Once    polyethylene glycol  17 g Oral Daily    chlorhexidine  15 mL Mouth/Throat BID    albumin human  25 g IntraVENous Once    aspirin  81 mg Oral Daily    pantoprazole  40 mg IntraVENous Daily    And    sodium chloride (PF)  10 mL IntraVENous Daily    docusate sodium  100 mg Oral BID    sennosides  5 mL Oral Nightly    sodium chloride flush  5-40 mL IntraVENous 2 times per day    bisacodyl  10 mg Rectal BID    [Held by provider] metoprolol tartrate  12.5 mg Oral BID    atorvastatin  10 mg Oral Nightly    pramipexole  0.5 mg Oral TID    ipratropium-albuterol  1 ampule Inhalation Q4H WA       MEDS (infusions):   DOBUTamine 1 mcg/kg/min (09/14/21 0806)    argatroban infusion 1 mcg/kg/min (09/14/21 0149)    sodium chloride      sodium chloride      sodium chloride 30 mL/hr at 09/12/21 1110    insulin Stopped (09/09/21 1500)    midazolam 3 mg/hr (09/14/21 0320)    fentaNYL 5 mcg/ml in 0.9%  ml infusion 75 mcg/hr (09/14/21 0146)    sodium chloride      sodium chloride      sodium chloride      sodium chloride 30 mL/hr (08/31/21 0946)   [facility] sodium chloride      dextrose         MEDS (prn):  sodium chloride, perflutren lipid microspheres, sodium chloride, oxyCODONE **OR** [DISCONTINUED] oxyCODONE, benzocaine-menthol, sodium chloride, sodium chloride flush, sodium chloride, LORazepam, sodium chloride, ondansetron, acetaminophen, acetaminophen, magnesium hydroxide, potassium chloride, magnesium sulfate, albumin human, sodium chloride, glucose, dextrose, glucagon (rDNA), dextrose, calcium gluconate IVPB    DATA:    Recent Labs     09/12/21  0500 09/13/21  0510 09/14/21  0500   WBC 15.2* 18.3* 18.5*   HGB 8.7* 8.8* 8.1*   HCT 28.0* 29.2* 27.1*   MCV 86.4 88.8 88.6   * 156 155     Recent Labs     09/12/21  0500 09/12/21  1650 09/13/21  0510 09/13/21  0515 09/13/21  1540 09/14/21  0500      < >  --  141 142 143   K 3.8   < >  --  3.9 3.7 3.7      < >  --  106 107 106   CO2 27   < >  --  25 29 24   BUN 53*   < >  --  43* 42* 39*   CREATININE 1.2*   < >  --  1.0 1.0 1.0   LABGLOM 43   < >  --  53 53 53   GLUCOSE 154*   < >  --  133* 154* 123*   CALCIUM 9.0   < >  --  8.5* 8.5* 8.5*   ALT 28  --  28  --   --  28   AST 18 --  21  --   --  29   BILIDIR 0.7*  --  0.7*  --   --  0.8*   BILITOT 1.7*  --  1.6*  --   --  1.7*   ALKPHOS 66  --  65  --   --  64   MG 2.4  --  2.4  --   --  2.4  2.3    < > = values in this interval not displayed.        Lab Results   Component Value Date    LABALBU 3.3 (L) 09/14/2021    LABALBU 3.7 09/13/2021    LABALBU 3.7 09/12/2021     Lab Results   Component Value Date    TSH 2.250 06/21/2021       Iron Studies  No results found for: IRON, TIBC, FERRITIN  No results found for: [id number]  No results found for: FOLATE    No results found for: VITD25  No results found for: PTH    No components found for: URIC    Lab Results   Component Value Date    COLORU Yellow 08/28/2021    LABPH 0 10/22/2019    NITRU Negative 08/28/2021    GLUCOSEU Negative 08/28/2021    KETUA TRACE 08/28/2021    UROBILINOGEN 0.2 08/28/2021 BILIRUBINUR Negative 08/28/2021       No results found for: Susan Worley      IMPRESSION/RECOMMENDATIONS:      1. Acute kidney injury stage I superimposed on CKD stage IIIa  BROOKE prerenal, FEurea 16% and FENa .04%, volume depletion  Cr peaked at 2.8 now 1.0  nonoliguric but low UO, lower today but bp and cr stable, contreras was clogged  pola no hydro  Fluid balance +++  Daily bumex     2. CAD s/p CABG X 3 . 8/25  CO 2.2, CI 1.7  On dobutrex     3. Acute postop respiratory failure  Intubated 9/5  Bronch 9/1 with mucus plugging  Worsening wbc and hypotension  Id following  Started merrem for hap  Now off pressors     4. A fib RVR  On amio     5. Metabolic acidosis  Improved with HCO3 drip  Now resolved  Improved  Now off    6.  Run of VT  Mg 2.4  k 3.9  Give dose k 9/13    Yovany Johnson MD

## 2021-09-14 NOTE — PROGRESS NOTES
OCCUPATIONAL THERAPY    Date:2021  Patient Name: Val Salgado  MRN: [id number]  : 1940  Room: [address] [place] [address]              Chart reviewed. Pt on hold- scheduled for trach today. Will re-attempt at later time. Thank you for consult.     Patricia Ellington, OTR/L [id number]

## 2021-09-14 NOTE — PROGRESS NOTES
Units SubCUTAneous Q4H    albumin human  25 g IntraVENous Once    polyethylene glycol  17 g Oral Daily    chlorhexidine  15 mL Mouth/Throat BID    albumin human  25 g IntraVENous Once    aspirin  81 mg Oral Daily    pantoprazole  40 mg IntraVENous Daily    And    sodium chloride (PF)  10 mL IntraVENous Daily    docusate sodium  100 mg Oral BID    sennosides  5 mL Oral Nightly    sodium chloride flush  5-40 mL IntraVENous 2 times per day    bisacodyl  10 mg Rectal BID    [Held by provider] metoprolol tartrate  12.5 mg Oral BID    atorvastatin  10 mg Oral Nightly    pramipexole  0.5 mg Oral TID    ipratropium-albuterol  1 ampule Inhalation Q4H WA       Physical Exam:  General Appearance: Intubated, sedated in no distress  HEENT: Normocephalic atraumatic without obvious abnormality   Neck: Lips, mucosa, and tongue normal.  Supple, symmetrical, trachea midline, no adenopathy;thyroid:  no enlargement/tenderness/nodules or JVD. ETT tube inplace  Lung: Diffuse coarse bilateral lung sounds  heart: RRR, normal S1, S2. No MRG  Abdomen: Appears to be less distended and soft today. Extremities: Pedal pulses 2+ symmetric b/l. Extremities normal, no cyanosis, clubbing, or edema. Musculokeletal: No joint swelling, no muscle tenderness. ROM normal in all joints of extremities. Neurologic: Cranial nerves II to XII grossly intact. sedated    Pertinent/ New Labs and Imaging Studies     Imaging Personally Reviewed:    1    ECHO  9/7/2021  Left ventricular size is grossly normal.   Ejection fraction is visually estimated at 60 to 65%. The left atrium is mild-moderately dilated. Mildly dilated right ventricle. Right ventricle global systolic function is mildly reduced . Mildly enlarged right atrium size. The tricuspid valve was not well visualized. Severe tricuspid regurgitation.     Labs:  Lab Results   Component Value Date    WBC 18.5 09/14/2021    HGB 8.1 09/14/2021    HCT 27.1 09/14/2021    MCV 88.6 09/14/2021    MCH 26.5 09/14/2021    MCHC 29.9 09/14/2021    RDW 17.4 09/14/2021     09/14/2021    MPV 9.2 09/14/2021     Lab Results   Component Value Date     09/14/2021    K 3.7 09/14/2021    K 4.3 09/06/2021     09/14/2021    CO2 24 09/14/2021    BUN 39 09/14/2021    CREATININE 1.0 09/14/2021    LABALBU 3.3 09/14/2021    CALCIUM 8.5 09/14/2021    GFRAA >60 09/14/2021    LABGLOM 53 09/14/2021     Lab Results   Component Value Date    PROTIME 14.5 08/25/2021    INR 1.3 08/25/2021     No results for input(s): PROBNP in the last 72 hours. No results for input(s): PROCAL in the last 72 hours. This SmartLink has not been configured with any valid records. Micro:  No results for input(s): CULTRESP in the last 72 hours. No results for input(s): LABGRAM in the last 72 hours. No results for input(s): LEGUR in the last 72 hours. No results for input(s): STREPNEUMAGU in the last 72 hours. No results for input(s): LP1UAG in the last 72 hours. Results for Severo Spotted (MRN [id number]) as of 9/7/2021 11:31  Results for Severo Spotted (MRN [id number]) as of 9/12/2021 14:43   Ref. Range 9/11/2021 05:14 9/12/2021 05:09   pH, Blood Gas Latest Ref Range: 7.350 - 7.450  7.396 7.410   PCO2 Latest Ref Range: 35.0 - 45.0 mmHg 40.1 37.8   pO2 Latest Ref Range: 75.0 - 100.0 mmHg 83.2 89.8   HCO3 Latest Ref Range: 22.0 - 26.0 mmol/L 24.1 23.4   Base Excess Latest Ref Range: -3.0 - 3.0 mmol/L -0.7 -1.0   O2 Sat Latest Ref Range: 92.0 - 98.5 % 95.2 96.5   tHb (est) Latest Ref Range: 11.5 - 16.5 g/dL 9.9 (L) 9.9 (L)   O2Hb Latest Ref Range: 94.0 - 97.0 % 94.8 96.0   COHb Latest Ref Range: 0.0 - 1.5 % 0.1 0.3   MetHb Latest Ref Range: 0.0 - 1.5 % 0.3 0.2   HHb Latest Ref Range: 0.0 - 5.0 % 4.8 3.5   AaDO2 Latest Units: mmHg 215.7 211.7   RI(T) Unknown 2.59 2.36   FIO2 Latest Units: % 50.0 50.0   PO2/FIO2 Latest Units: mmHg/% 1.66 1.80   Pt Temp Latest Units: C 37.0 37.0   Critical(s) Notified Unknown .  No Critical Values . No Critical Values   Time Analyzed Unknown 0514 0509   Mode Unknown AC AC   Peep/Cpap Latest Units: cmH2O 5.0 5.0   Vt Mechanical Latest Units: mL 450.0 450.0   Rr Mechanical Latest Units: b/min 12.0 12.0      Assessment:    1. Post operative respiratory insufficiency with hypoxia requiring reintubation 9/5/21  2. Bilateral atelectasis/mucus plugging of bronchi s/p bronchoscopy 9/1/21  3. Pleural effusions - small on bedside US  4. CAD s/p CABG x 3 8/25/21  5. Atrial fibrillation with RVR   6. Anxiety  7. Aspiration pneumonia s/p treatment with Zosyn  8. BROOKE secondary to intravascular volume depletion  9. Leukocytosis, probable sepsis septic shock  10. Runs of nonsustained V. Tach  11. High anion gap metabolic acidosis with respiratory alkalosis-improved  12. Klebsiella oxytoca and Enterobacter cloacae HAP      Plan:   1. Continue full vent support, patient on schedule for tracheostomy  2. patient on ertapenem 1 g every 24 hours per ID  3. LFTs trending down,  4. Diuresis per nephrology recommendations she received 1 dose of Bumex today. 5. Continue amiodarone for A. fib  6. Patient with cirrhosis and ascites, question need for paracentesis, not a candidate for her PEG tube due to her ascites  7.  Weaned hydrocortisone down to 50 mg q12        Christian Perez DO

## 2021-09-14 NOTE — PROGRESS NOTES
Physical Therapy  Physical Therapy Attempt    Name: Samantha Marcelino  : [id number]  MRN: [id number]      Date of Service: 2021  Chart reviewed. Spoke with NP - pt scheduled for trach today. Will re-attempt as able.     Elizabeth Gamez, PT, DPT  [id number]

## 2021-09-15 ENCOUNTER — APPOINTMENT (OUTPATIENT)
Dept: GENERAL RADIOLOGY | Age: 81
DRG: 003 | End: 2021-09-15
Attending: THORACIC SURGERY (CARDIOTHORACIC VASCULAR SURGERY)
Payer: MEDICARE

## 2021-09-15 LAB
ABO/RH: NORMAL
ALBUMIN SERPL-MCNC: 3.2 G/DL (ref 3.5–5.2)
ALP BLD-CCNC: 63 U/L (ref 35–104)
ALT SERPL-CCNC: 28 U/L (ref 0–32)
ANION GAP SERPL CALCULATED.3IONS-SCNC: 11 MMOL/L (ref 7–16)
ANTIBODY SCREEN: NORMAL
AST SERPL-CCNC: 38 U/L (ref 0–31)
BILIRUB SERPL-MCNC: 2 MG/DL (ref 0–1.2)
BILIRUBIN DIRECT: 0.7 MG/DL (ref 0–0.3)
BILIRUBIN, INDIRECT: 1.3 MG/DL (ref 0–1)
BUN BLDV-MCNC: 33 MG/DL (ref 6–23)
CALCIUM SERPL-MCNC: 8.3 MG/DL (ref 8.6–10.2)
CHLORIDE BLD-SCNC: 107 MMOL/L (ref 98–107)
CO2: 28 MMOL/L (ref 22–29)
CREAT SERPL-MCNC: 0.9 MG/DL (ref 0.5–1)
CRITICAL: ABNORMAL
CRITICAL: NORMAL
DATE ANALYZED: ABNORMAL
DATE ANALYZED: NORMAL
DATE OF COLLECTION: ABNORMAL
DATE OF COLLECTION: NORMAL
FIO2: 50 %
GFR AFRICAN AMERICAN: >60
GFR NON-AFRICAN AMERICAN: >60 ML/MIN/1.73
GLUCOSE BLD-MCNC: 91 MG/DL (ref 74–99)
HCT VFR BLD CALC: 25.5 % (ref 34–48)
HCT VFR BLD CALC: 30.7 % (ref 34–48)
HEMOGLOBIN: 7.7 G/DL (ref 11.5–15.5)
HEMOGLOBIN: 9.4 G/DL (ref 11.5–15.5)
LAB: ABNORMAL
LAB: NORMAL
Lab: ABNORMAL
Lab: NORMAL
MAGNESIUM: 2.1 MG/DL (ref 1.6–2.6)
MCH RBC QN AUTO: 27 PG (ref 26–35)
MCHC RBC AUTO-ENTMCNC: 30.2 % (ref 32–34.5)
MCV RBC AUTO: 89.5 FL (ref 80–99.9)
METER GLUCOSE: 100 MG/DL (ref 74–99)
METER GLUCOSE: 104 MG/DL (ref 74–99)
METER GLUCOSE: 123 MG/DL (ref 74–99)
METER GLUCOSE: 130 MG/DL (ref 74–99)
METER GLUCOSE: 144 MG/DL (ref 74–99)
METER GLUCOSE: 90 MG/DL (ref 74–99)
METER GLUCOSE: 99 MG/DL (ref 74–99)
OPERATOR ID: 7874
OPERATOR ID: ABNORMAL
PATIENT TEMP: 37 C
PATIENT TEMP: 37 C
PDW BLD-RTO: 17 FL (ref 11.5–15)
PLATELET # BLD: 158 E9/L (ref 130–450)
PMV BLD AUTO: 9.5 FL (ref 7–12)
POTASSIUM SERPL-SCNC: 2.98 MMOL/L (ref 3.5–5)
POTASSIUM SERPL-SCNC: 3.1 MMOL/L (ref 3.5–5)
POTASSIUM SERPL-SCNC: 3.65 MMOL/L (ref 3.5–5)
RBC # BLD: 2.85 E12/L (ref 3.5–5.5)
SODIUM BLD-SCNC: 146 MMOL/L (ref 132–146)
SOURCE, BLOOD GAS: ABNORMAL
SOURCE, BLOOD GAS: NORMAL
TIME ANALYZED: 1330
TIME ANALYZED: 1804
TOTAL PROTEIN: 5 G/DL (ref 6.4–8.3)
WBC # BLD: 15.8 E9/L (ref 4.5–11.5)

## 2021-09-15 PROCEDURE — C9113 INJ PANTOPRAZOLE SODIUM, VIA: HCPCS | Performed by: NURSE PRACTITIONER

## 2021-09-15 PROCEDURE — 85027 COMPLETE CBC AUTOMATED: CPT

## 2021-09-15 PROCEDURE — 6370000000 HC RX 637 (ALT 250 FOR IP): Performed by: THORACIC SURGERY (CARDIOTHORACIC VASCULAR SURGERY)

## 2021-09-15 PROCEDURE — 83735 ASSAY OF MAGNESIUM: CPT

## 2021-09-15 PROCEDURE — 6370000000 HC RX 637 (ALT 250 FOR IP): Performed by: NURSE PRACTITIONER

## 2021-09-15 PROCEDURE — 94645 CONT INHLJ TX EACH ADDL HOUR: CPT

## 2021-09-15 PROCEDURE — 80048 BASIC METABOLIC PNL TOTAL CA: CPT

## 2021-09-15 PROCEDURE — 6360000002 HC RX W HCPCS: Performed by: INTERNAL MEDICINE

## 2021-09-15 PROCEDURE — 86850 RBC ANTIBODY SCREEN: CPT

## 2021-09-15 PROCEDURE — 85018 HEMOGLOBIN: CPT

## 2021-09-15 PROCEDURE — 6360000002 HC RX W HCPCS: Performed by: THORACIC SURGERY (CARDIOTHORACIC VASCULAR SURGERY)

## 2021-09-15 PROCEDURE — 6370000000 HC RX 637 (ALT 250 FOR IP): Performed by: STUDENT IN AN ORGANIZED HEALTH CARE EDUCATION/TRAINING PROGRAM

## 2021-09-15 PROCEDURE — 84132 ASSAY OF SERUM POTASSIUM: CPT

## 2021-09-15 PROCEDURE — 6370000000 HC RX 637 (ALT 250 FOR IP): Performed by: INTERNAL MEDICINE

## 2021-09-15 PROCEDURE — 6360000002 HC RX W HCPCS: Performed by: NURSE PRACTITIONER

## 2021-09-15 PROCEDURE — 86923 COMPATIBILITY TEST ELECTRIC: CPT

## 2021-09-15 PROCEDURE — 85014 HEMATOCRIT: CPT

## 2021-09-15 PROCEDURE — 82962 GLUCOSE BLOOD TEST: CPT

## 2021-09-15 PROCEDURE — 86901 BLOOD TYPING SEROLOGIC RH(D): CPT

## 2021-09-15 PROCEDURE — 80076 HEPATIC FUNCTION PANEL: CPT

## 2021-09-15 PROCEDURE — 2000000000 HC ICU R&B

## 2021-09-15 PROCEDURE — 2580000003 HC RX 258: Performed by: NURSE PRACTITIONER

## 2021-09-15 PROCEDURE — 94003 VENT MGMT INPAT SUBQ DAY: CPT

## 2021-09-15 PROCEDURE — 86900 BLOOD TYPING SEROLOGIC ABO: CPT

## 2021-09-15 PROCEDURE — 36415 COLL VENOUS BLD VENIPUNCTURE: CPT

## 2021-09-15 PROCEDURE — 2500000003 HC RX 250 WO HCPCS: Performed by: NURSE PRACTITIONER

## 2021-09-15 PROCEDURE — 99233 SBSQ HOSP IP/OBS HIGH 50: CPT | Performed by: NURSE PRACTITIONER

## 2021-09-15 PROCEDURE — 71045 X-RAY EXAM CHEST 1 VIEW: CPT

## 2021-09-15 PROCEDURE — 2580000003 HC RX 258: Performed by: INTERNAL MEDICINE

## 2021-09-15 PROCEDURE — P9016 RBC LEUKOCYTES REDUCED: HCPCS

## 2021-09-15 PROCEDURE — 37799 UNLISTED PX VASCULAR SURGERY: CPT

## 2021-09-15 PROCEDURE — 2580000003 HC RX 258

## 2021-09-15 PROCEDURE — 94640 AIRWAY INHALATION TREATMENT: CPT

## 2021-09-15 RX ORDER — SODIUM CHLORIDE 9 MG/ML
INJECTION, SOLUTION INTRAVENOUS PRN
Status: DISCONTINUED | OUTPATIENT
Start: 2021-09-15 | End: 2021-09-16 | Stop reason: HOSPADM

## 2021-09-15 RX ORDER — LORAZEPAM 0.5 MG/1
0.5 TABLET ORAL 3 TIMES DAILY PRN
Status: DISCONTINUED | OUTPATIENT
Start: 2021-09-15 | End: 2021-09-16 | Stop reason: HOSPADM

## 2021-09-15 RX ORDER — BISACODYL 10 MG
10 SUPPOSITORY, RECTAL RECTAL DAILY
Status: DISCONTINUED | OUTPATIENT
Start: 2021-09-16 | End: 2021-09-16 | Stop reason: HOSPADM

## 2021-09-15 RX ORDER — OXYCODONE HYDROCHLORIDE 5 MG/1
5 TABLET ORAL EVERY 6 HOURS PRN
Status: DISCONTINUED | OUTPATIENT
Start: 2021-09-15 | End: 2021-09-16 | Stop reason: HOSPADM

## 2021-09-15 RX ADMIN — MIDODRINE HYDROCHLORIDE 5 MG: 5 TABLET ORAL at 07:55

## 2021-09-15 RX ADMIN — ERTAPENEM SODIUM 1000 MG: 1 INJECTION, POWDER, LYOPHILIZED, FOR SOLUTION INTRAMUSCULAR; INTRAVENOUS at 12:05

## 2021-09-15 RX ADMIN — IPRATROPIUM BROMIDE AND ALBUTEROL SULFATE 1 AMPULE: .5; 2.5 SOLUTION RESPIRATORY (INHALATION) at 16:40

## 2021-09-15 RX ADMIN — APIXABAN 5 MG: 5 TABLET, FILM COATED ORAL at 20:04

## 2021-09-15 RX ADMIN — MICONAZOLE NITRATE: 20 POWDER TOPICAL at 20:03

## 2021-09-15 RX ADMIN — DOCUSATE SODIUM 100 MG: 50 LIQUID ORAL at 09:32

## 2021-09-15 RX ADMIN — AMIODARONE HYDROCHLORIDE 200 MG: 200 TABLET ORAL at 20:04

## 2021-09-15 RX ADMIN — SODIUM CHLORIDE: 9 INJECTION, SOLUTION INTRAVENOUS at 22:42

## 2021-09-15 RX ADMIN — MIDODRINE HYDROCHLORIDE 5 MG: 5 TABLET ORAL at 16:50

## 2021-09-15 RX ADMIN — HYDROCORTISONE SODIUM SUCCINATE 50 MG: 100 INJECTION, POWDER, FOR SOLUTION INTRAMUSCULAR; INTRAVENOUS at 03:55

## 2021-09-15 RX ADMIN — 0.12% CHLORHEXIDINE GLUCONATE 15 ML: 1.2 RINSE ORAL at 09:25

## 2021-09-15 RX ADMIN — SODIUM CHLORIDE, PRESERVATIVE FREE 10 ML: 5 INJECTION INTRAVENOUS at 09:29

## 2021-09-15 RX ADMIN — LORAZEPAM 0.5 MG: 0.5 TABLET ORAL at 22:45

## 2021-09-15 RX ADMIN — POTASSIUM CHLORIDE 20 MEQ: 400 INJECTION, SOLUTION INTRAVENOUS at 14:35

## 2021-09-15 RX ADMIN — PRAMIPEXOLE DIHYDROCHLORIDE 0.5 MG: 0.25 TABLET ORAL at 09:27

## 2021-09-15 RX ADMIN — HYDROCORTISONE SODIUM SUCCINATE 50 MG: 100 INJECTION, POWDER, FOR SOLUTION INTRAMUSCULAR; INTRAVENOUS at 15:36

## 2021-09-15 RX ADMIN — POTASSIUM CHLORIDE 20 MEQ: 400 INJECTION, SOLUTION INTRAVENOUS at 10:20

## 2021-09-15 RX ADMIN — PANTOPRAZOLE SODIUM 40 MG: 40 INJECTION, POWDER, FOR SOLUTION INTRAVENOUS at 09:29

## 2021-09-15 RX ADMIN — WATER: 1 INJECTION INTRAMUSCULAR; INTRAVENOUS; SUBCUTANEOUS at 15:37

## 2021-09-15 RX ADMIN — 0.12% CHLORHEXIDINE GLUCONATE 15 ML: 1.2 RINSE ORAL at 20:04

## 2021-09-15 RX ADMIN — PRAMIPEXOLE DIHYDROCHLORIDE 0.5 MG: 0.25 TABLET ORAL at 13:39

## 2021-09-15 RX ADMIN — APIXABAN 5 MG: 5 TABLET, FILM COATED ORAL at 10:09

## 2021-09-15 RX ADMIN — IPRATROPIUM BROMIDE AND ALBUTEROL SULFATE 1 AMPULE: .5; 2.5 SOLUTION RESPIRATORY (INHALATION) at 07:46

## 2021-09-15 RX ADMIN — AMIODARONE HYDROCHLORIDE 200 MG: 200 TABLET ORAL at 09:25

## 2021-09-15 RX ADMIN — BUMETANIDE 1 MG: 0.25 INJECTION INTRAMUSCULAR; INTRAVENOUS at 09:30

## 2021-09-15 RX ADMIN — Medication 10 ML: at 09:32

## 2021-09-15 RX ADMIN — LORAZEPAM 0.5 MG: 0.5 TABLET ORAL at 13:39

## 2021-09-15 RX ADMIN — POTASSIUM BICARBONATE 40 MEQ: 782 TABLET, EFFERVESCENT ORAL at 14:45

## 2021-09-15 RX ADMIN — Medication 10 ML: at 20:04

## 2021-09-15 RX ADMIN — INSULIN LISPRO 2 UNITS: 100 INJECTION, SOLUTION INTRAVENOUS; SUBCUTANEOUS at 21:21

## 2021-09-15 RX ADMIN — MICONAZOLE NITRATE: 20 POWDER TOPICAL at 09:25

## 2021-09-15 RX ADMIN — OXYCODONE 5 MG: 5 TABLET ORAL at 16:44

## 2021-09-15 RX ADMIN — OXYCODONE 5 MG: 5 TABLET ORAL at 10:33

## 2021-09-15 RX ADMIN — ASPIRIN 81 MG CHEWABLE TABLET 81 MG: 81 TABLET CHEWABLE at 09:29

## 2021-09-15 RX ADMIN — ATORVASTATIN CALCIUM 10 MG: 10 TABLET, FILM COATED ORAL at 20:04

## 2021-09-15 RX ADMIN — MIDODRINE HYDROCHLORIDE 5 MG: 5 TABLET ORAL at 12:05

## 2021-09-15 RX ADMIN — IPRATROPIUM BROMIDE AND ALBUTEROL SULFATE 1 AMPULE: .5; 2.5 SOLUTION RESPIRATORY (INHALATION) at 20:56

## 2021-09-15 RX ADMIN — POTASSIUM CHLORIDE 20 MEQ: 400 INJECTION, SOLUTION INTRAVENOUS at 15:07

## 2021-09-15 RX ADMIN — OXYCODONE 5 MG: 5 TABLET ORAL at 23:49

## 2021-09-15 RX ADMIN — PRAMIPEXOLE DIHYDROCHLORIDE 0.5 MG: 0.25 TABLET ORAL at 20:04

## 2021-09-15 RX ADMIN — BISACODYL 10 MG: 10 SUPPOSITORY RECTAL at 09:27

## 2021-09-15 RX ADMIN — POLYETHYLENE GLYCOL 3350 17 G: 17 POWDER, FOR SOLUTION ORAL at 09:25

## 2021-09-15 RX ADMIN — IPRATROPIUM BROMIDE AND ALBUTEROL SULFATE 1 AMPULE: .5; 2.5 SOLUTION RESPIRATORY (INHALATION) at 12:29

## 2021-09-15 ASSESSMENT — PAIN SCALES - GENERAL
PAINLEVEL_OUTOF10: 0
PAINLEVEL_OUTOF10: 4
PAINLEVEL_OUTOF10: 0
PAINLEVEL_OUTOF10: 0
PAINLEVEL_OUTOF10: 5
PAINLEVEL_OUTOF10: 0
PAINLEVEL_OUTOF10: 4
PAINLEVEL_OUTOF10: 0

## 2021-09-15 ASSESSMENT — PULMONARY FUNCTION TESTS
PIF_VALUE: 29
PIF_VALUE: 27
PIF_VALUE: 26
PIF_VALUE: 21
PIF_VALUE: 20
PIF_VALUE: 23
PIF_VALUE: 20
PIF_VALUE: 46
PIF_VALUE: 40
PIF_VALUE: 28
PIF_VALUE: 43
PIF_VALUE: 21
PIF_VALUE: 27
PIF_VALUE: 42
PIF_VALUE: 21
PIF_VALUE: 35
PIF_VALUE: 21
PIF_VALUE: 20
PIF_VALUE: 27
PIF_VALUE: 22

## 2021-09-15 NOTE — PROGRESS NOTES
Lizzeth Cox M.D.,[facility]  Mindy Minor D.O., F.A.C.O.I., Nilay Barbosa M.D. Rose Sanches M.D. Daniel Aleman D.O. Daily Pulmonary Progress Note    Patient:  William Melchor 80 y.o. female MRN: [id number]     Date of Service: 9/15/2021      Synopsis     We are following patient for acute on chronic hypoxic respiratory failure      Code status: Full code      Subjective      Patient was seen and examined.  S/p trach 9/14    Review of Systems:  Unable to obtain    24-hour events:  none    Objective   Vitals: BP (!) 105/47   Pulse 76   Temp 96.5 °F (35.8 °C) (Temporal)   Resp 18   Ht 5' 2\" (1.575 m)   Wt 233 lb 7.5 oz (105.9 kg)   LMP  (LMP Unknown)   SpO2 100%   BMI 42.70 kg/m²     I/O:    Intake/Output Summary (Last 24 hours) at 9/15/2021 0905  Last data filed at 9/15/2021 0700  Gross per 24 hour   Intake 821.95 ml   Output 2925 ml   Net -2103.05 ml       Vent Information  $Ventilation: $Subsequent Day  Skin Assessment: Clean, dry, & intact  Suction Catheter Diameter: 14  Equipment ID: [id number]  Equipment Changed: Vent Circuit  Vent Type: 980  Vent Mode: AC/VC+  Vt Ordered: 450 mL  Rate Set: 12 bmp  Peak Flow: 0 L/min  Pressure Support: 0 cmH20  FiO2 : 50 %  SpO2: 100 %  SpO2/FiO2 ratio: 200  Sensitivity: 3  PEEP/CPAP: 5  I Time/ I Time %: 0.9 s  Humidification Source: Heated wire  Humidification Temp: 37  Humidification Temp Measured: 37.2  Circuit Condensation: Not drained  Mask Type: Full face mask  Mask Size: Medium  Bonnet size: Medium       IPAP: 16 cmH20  CPAP/EPAP: 8 cmH2O     CURRENT MEDS :  Scheduled Meds:   hydrocortisone sodium succinate PF  50 mg IntraVENous Q12H    miconazole   Topical BID    potassium chloride  20 mEq IntraVENous Once    bumetanide  1 mg IntraVENous Daily with breakfast    midodrine  5 mg Oral TID WC    amiodarone  200 mg Oral BID    ertapenem (INVanz) IVPB  1,000 mg IntraVENous Q24H    insulin lispro  0-12 Units SubCUTAneous Q4H    albumin human  25 g IntraVENous Once    polyethylene glycol  17 g Oral Daily    chlorhexidine  15 mL Mouth/Throat BID    albumin human  25 g IntraVENous Once    aspirin  81 mg Oral Daily    pantoprazole  40 mg IntraVENous Daily    And    sodium chloride (PF)  10 mL IntraVENous Daily    docusate sodium  100 mg Oral BID    sennosides  5 mL Oral Nightly    sodium chloride flush  5-40 mL IntraVENous 2 times per day    bisacodyl  10 mg Rectal BID    [Held by provider] metoprolol tartrate  12.5 mg Oral BID    atorvastatin  10 mg Oral Nightly    pramipexole  0.5 mg Oral TID    ipratropium-albuterol  1 ampule Inhalation Q4H WA       Physical Exam:  General Appearance: Intubated, sedated in no distress  HEENT: Normocephalic atraumatic without obvious abnormality   Neck: Lips, mucosa, and tongue normal.  Supple, symmetrical, trachea midline, no adenopathy;thyroid:  no enlargement/tenderness/nodules or JVD. ETT tube inplace  Lung: Diffuse coarse bilateral lung sounds  heart: RRR, normal S1, S2. No MRG  Abdomen: Appears to be less distended and soft today. Extremities: Pedal pulses 2+ symmetric b/l. Extremities normal, no cyanosis, clubbing, or edema. Musculokeletal: No joint swelling, no muscle tenderness. ROM normal in all joints of extremities. Neurologic: Cranial nerves II to XII grossly intact. sedated    Pertinent/ New Labs and Imaging Studies     Imaging Personally Reviewed:    1    ECHO  9/7/2021  Left ventricular size is grossly normal.   Ejection fraction is visually estimated at 60 to 65%. The left atrium is mild-moderately dilated. Mildly dilated right ventricle. Right ventricle global systolic function is mildly reduced . Mildly enlarged right atrium size. The tricuspid valve was not well visualized. Severe tricuspid regurgitation.     Labs:  Lab Results   Component Value Date    WBC 15.8 09/15/2021    HGB 7.7 09/15/2021    HCT 25.5 09/15/2021    MCV 89.5 09/15/2021    MCH 27.0 09/15/2021    MCHC 30.2 09/15/2021    RDW 17.0 09/15/2021     09/15/2021    MPV 9.5 09/15/2021     Lab Results   Component Value Date     09/14/2021    K 3.7 09/14/2021    K 4.3 09/06/2021     09/14/2021    CO2 24 09/14/2021    BUN 39 09/14/2021    CREATININE 1.0 09/14/2021    LABALBU 3.2 09/15/2021    CALCIUM 8.5 09/14/2021    GFRAA >60 09/14/2021    LABGLOM 53 09/14/2021     Lab Results   Component Value Date    PROTIME 14.5 08/25/2021    INR 1.3 08/25/2021     No results for input(s): PROBNP in the last 72 hours. No results for input(s): PROCAL in the last 72 hours. This SmartLink has not been configured with any valid records. Micro:  No results for input(s): CULTRESP in the last 72 hours. No results for input(s): LABGRAM in the last 72 hours. No results for input(s): LEGUR in the last 72 hours. No results for input(s): STREPNEUMAGU in the last 72 hours. No results for input(s): LP1UAG in the last 72 hours. Results for Dru Donovan (MRN [id number]) as of 9/7/2021 11:31  Results for Dru Donovan (MRN [id number]) as of 9/12/2021 14:43   Ref. Range 9/11/2021 05:14 9/12/2021 05:09   pH, Blood Gas Latest Ref Range: 7.350 - 7.450  7.396 7.410   PCO2 Latest Ref Range: 35.0 - 45.0 mmHg 40.1 37.8   pO2 Latest Ref Range: 75.0 - 100.0 mmHg 83.2 89.8   HCO3 Latest Ref Range: 22.0 - 26.0 mmol/L 24.1 23.4   Base Excess Latest Ref Range: -3.0 - 3.0 mmol/L -0.7 -1.0   O2 Sat Latest Ref Range: 92.0 - 98.5 % 95.2 96.5   tHb (est) Latest Ref Range: 11.5 - 16.5 g/dL 9.9 (L) 9.9 (L)   O2Hb Latest Ref Range: 94.0 - 97.0 % 94.8 96.0   COHb Latest Ref Range: 0.0 - 1.5 % 0.1 0.3   MetHb Latest Ref Range: 0.0 - 1.5 % 0.3 0.2   HHb Latest Ref Range: 0.0 - 5.0 % 4.8 3.5   AaDO2 Latest Units: mmHg 215.7 211.7   RI(T) Unknown 2.59 2.36   FIO2 Latest Units: % 50.0 50.0   PO2/FIO2 Latest Units: mmHg/% 1.66 1.80   Pt Temp Latest Units: C 37.0 37.0   Critical(s) Notified Unknown . No Critical Values .  No Critical Values   Time Analyzed Unknown 0514 0509   Mode Unknown AC AC   Peep/Cpap Latest Units: cmH2O 5.0 5.0   Vt Mechanical Latest Units: mL 450.0 450.0   Rr Mechanical Latest Units: b/min 12.0 12.0      Assessment:    1. Post operative respiratory insufficiency with hypoxia requiring reintubation 9/5/21  2. Bilateral atelectasis/mucus plugging of bronchi s/p bronchoscopy 9/1/21  3. Pleural effusions - small on bedside US  4. CAD s/p CABG x 3 8/25/21  5. Atrial fibrillation with RVR   6. Anxiety  7. Aspiration pneumonia s/p treatment with Zosyn  8. BROOKE secondary to intravascular volume depletion  9. Leukocytosis, probable sepsis septic shock  10. Runs of nonsustained V. Tach  11. High anion gap metabolic acidosis with respiratory alkalosis-improved  12. Klebsiella oxytoca and Enterobacter cloacae HAP  13. S/p trach 9/14      Plan:   1. Continue vent support, PSV trials as jesus  2. patient on ertapenem 1 g every 24 hours per ID  3. LFTs trending down,  4. Diuresis per nephrology recommendations she received 1 dose of Bumex today. 5. Continue amiodarone for A. fib  6. Patient with cirrhosis and ascites, question need for paracentesis, not a candidate for her PEG tube due to her ascites  7. Weaned hydrocortisone down to 50 mg q12  8. Continue trach care  9. Pain and anxiety meds switched to PO    Ok from Pulm POV to go to [facility], [facility], will continue to follow. Discussed with RN, Critical care, and case management.     Bianka Rico DO

## 2021-09-15 NOTE — CARE COORDINATION
SOCIAL WORK/CASEMANAGEMENT TRANSITION OF CARE [id number] [facility] [address], [place], [phone]): po day 1 trach. Called lluvia, the spouse, and he wants select here. Park Peter from [facility] has accepted pt and they have a bed today if needed. A covid is not needed. Park Peter to follow up with spouse within the hour when he comes in to see pt. Envelope to be provided to the floor. precert not needed nor is a rapid covid. Sw/cm to follow.  ADRIAN Hayden  9/15/2021

## 2021-09-15 NOTE — FLOWSHEET NOTE
Right radial  Art line discontinued site held 10 minutes no bleeding present pulse 2t hand warm to touch pressure dressing applied

## 2021-09-15 NOTE — PLAN OF CARE
Problem: Non-Violent Restraints  Goal: No harm/injury to patient while restraints in use  Outcome: Met This Shift  Note: No injury noted rom done pt turned eels and elbows elevated     Problem: Non-Violent Restraints  Goal: Patient's dignity will be maintained  Outcome: Met This Shift  Note: Dignity maintained     Problem: Non-Violent Restraints  Goal: Removal from restraints as soon as assessed to be safe  Outcome: Not Met This Shift  Note: Pt aggitated and restless attempting to pull lines and trach unable to focus and redirect

## 2021-09-15 NOTE — PROGRESS NOTES
[facility]  Nephrology Attending Progress Note          SUBJECTIVE:     9/3/21: Warden Hogan is a 80 y.o. female with h/o HFpEF, refractory A fib  s/p DCCV X 2, hypertension, hyperlipidemia and other medical problems listed below. She presented for elective CABG on 8/25 following ischemic work-up and C showing  MV CAD. 6/8/21. Procedure was performed on 8/25. Her postop course has been complicated by acute respiratory failure, atrial fibrillation with RVR and intermittent fevers. She has required Zosyn. Preop creatinine has been 1-1.2 which is her recent baseline. In the last 48 hours creatinine has shown a progressive increase to currently 1.8 mg/dL associated with decreasing urine output. She has received albumin bolus but without any significant improvement. Hence renal consult.     9/4/21: pt seen in icu, has low urine output, just received iv albumin    9/5/21: seen in icu, started on ns at 76 yesterday. Remains with low uo. 9/6/21: pt reintubated and started on levo for hypotension, now hypothermic and with elevated wbc    9/7: seen in icu, uo increasing, intubated, starting dopa, on amio, vaso, levo    9/8: pt remains in icu, on dopamine, vaso, levo, amio, and heparin, intubated    9/9: pt seen in icu, intubated, on dopa and vaso    9/10: pt seen in icu, remains intubated, on dopa and weaning vaso    9/11: No acute issues overnight; remains intubated    9/12: No new acute issues from overnight; remains intubated    9/13: pt seen in icu, intubated. Had run of VT. On dobutamine    9/14: pt seen in icu, intubated.      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)    Chronic heart failure with preserved ejection fraction (HCC)    Atrial fibrillation with RVR (HCC)    Severe protein-calorie malnutrition (HCC)    Abnormal nuclear stress test    Opioid use, unspecified with unspecified opioid-induced disorder    Opioid dependence with unspecified opioid-induced disorder    Opioid dependence, uncomplicated    CAD in native artery    Pre-operative laboratory examination    Acute blood loss anemia    Leukocytosis    At risk for malnutrition    Hypotension        PAST MEDICAL HISTORY:    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[facility] 01/2019    Anemia     Arm numbness     Arthritis     Blood transfusion     Cervical spinal stenosis     Cirrhosis of liver (HCC)     Diabetes mellitus (HCC)     Fatty liver     GERD (gastroesophageal reflux disease)     Hyperlipidemia     Hypertension     Low back pain     Lumbar stenosis     Neck pain     Obesity (BMI 30.0-34.9) 10/14/2012    PAF (paroxysmal atrial fibrillation) (HCC)     Renal artery aneurysm ([facility] 7/15/2015       DIET:    ADULT TUBE FEEDING; Nasoenteric; Standard without Fiber; Continuous; 20; Yes; 10; Q 4 hours; 40; 30; Q 4 hours; Protein; BID     PHYSICAL EXAM:     Patient Vitals for the past 24 hrs:   BP Temp Temp src Pulse Resp SpO2 Weight   09/15/21 0925 -- -- -- 87 -- 97 % --   09/15/21 0800 (!) 105/47 96.5 °F (35.8 °C) Temporal -- 18 -- --   09/15/21 0746 -- -- -- -- -- 100 % --   09/15/21 0745 (!) 90/39 97 °F (36.1 °C) Temporal -- 18 -- --   09/15/21 0700 -- -- -- 76 15 100 % --   09/15/21 0600 -- -- -- 77 17 100 % --   09/15/21 0500 -- -- -- 77 19 100 % --   09/15/21 0457 -- -- -- -- -- -- 233 lb 7.5 oz (105.9 kg)   09/15/21 0400 -- 97.4 °F (36.3 °C) Temporal 76 17 100 % --   09/15/21 0350 -- -- -- 76 -- 100 % --   09/15/21 0300 -- -- -- 74 12 100 % -- 09/15/21 0200 -- -- -- 71 12 99 % --   09/15/21 0100 -- -- -- 72 12 99 % --   09/15/21 0000 -- 97.4 °F (36.3 °C) Temporal 75 12 100 % --   09/14/21 2356 -- -- -- 74 -- 100 % --   09/14/21 2300 -- -- -- 76 12 99 % --   09/14/21 2200 -- -- -- 78 12 100 % --   09/14/21 2100 -- -- -- 75 12 100 % --   09/14/21 2048 -- -- -- -- 12 100 % --   09/14/21 2046 -- -- -- 73 13 100 % --   09/14/21 2000 -- -- -- 73 12 100 % --   09/14/21 1900 (!) 114/49 97.2 °F (36.2 °C) Temporal 77 12 100 % --   09/14/21 1800 (!) 106/43 97.3 °F (36.3 °C) Temporal 78 12 100 % --   09/14/21 1730 (!) 114/48 97.6 °F (36.4 °C) Temporal 81 12 100 % --   09/14/21 1700 (!) 112/42 97.5 °F (36.4 °C) Temporal 81 12 100 % --   09/14/21 1630 (!) 140/70 97.4 °F (36.3 °C) Temporal 78 12 100 % --   09/14/21 1623 -- -- -- 70 -- 100 % --   09/14/21 1620 (!) 129/59 -- -- 70 12 100 % --   09/14/21 1615 (!) 128/58 -- -- 70 12 100 % --   09/14/21 1610 (!) 123/55 -- -- 69 12 100 % --   09/14/21 1605 (!) 116/52 -- -- 70 12 100 % --   09/14/21 1600 (!) 127/59 -- -- 72 12 100 % --   09/14/21 1555 (!) 116/55 -- -- 71 12 100 % --   09/14/21 1550 124/60 -- -- 74 12 100 % --   09/14/21 1545 139/71 -- -- -- 12 -- --   09/14/21 1540 -- -- -- 72 13 100 % --   09/14/21 1535 -- -- -- 78 12 100 % --   09/14/21 1532 (!) 109/53 -- -- 76 14 98 % --   09/14/21 1500 105/73 97 °F (36.1 °C) Temporal 70 12 100 % --   09/14/21 1400 (!) 105/45 97.1 °F (36.2 °C) Temporal 75 12 99 % --   09/14/21 1300 (!) 111/46 97.3 °F (36.3 °C) Temporal 72 12 100 % --   09/14/21 1236 (!) 129/52 -- -- 73 12 100 % --   09/14/21 1234 -- -- -- -- -- 100 % --   09/14/21 1228 -- -- -- 74 12 100 % --   09/14/21 1200 (!) 128/51 97.3 °F (36.3 °C) Temporal 76 12 100 % --   09/14/21 1100 (!) 122/51 97.2 °F (36.2 °C) Temporal 76 12 99 % --   @      Intake/Output Summary (Last 24 hours) at 9/15/2021 1014  Last data filed at 9/15/2021 0700  Gross per 24 hour   Intake 821.95 ml   Output 2525 ml   Net -1703.05 ml Wt Readings from Last 3 Encounters:   09/15/21 233 lb 7.5 oz (105.9 kg)   08/23/21 170 lb (77.1 kg)   07/16/21 175 lb (79.4 kg)       Constitutional:  Pt is intubated  Head: normocephalic, atraumatic  Neck: no JVD  Cardiovascular: regular rate and rhythm, no murmurs, gallops, or rubs  Respiratory:  No rales, rhochi, or wheezes  Gastrointestinal:  Soft, nontender, nondistended, bowel sounds x 4  Ext: 1+ edema  Skin: dry, no rash      MEDS (scheduled):    apixaban  5 mg Oral BID    hydrocortisone sodium succinate PF  50 mg IntraVENous Q12H    miconazole   Topical BID    potassium chloride  20 mEq IntraVENous Once    bumetanide  1 mg IntraVENous Daily with breakfast    midodrine  5 mg Oral TID WC    amiodarone  200 mg Oral BID    ertapenem (INVanz) IVPB  1,000 mg IntraVENous Q24H    insulin lispro  0-12 Units SubCUTAneous Q4H    albumin human  25 g IntraVENous Once    polyethylene glycol  17 g Oral Daily    chlorhexidine  15 mL Mouth/Throat BID    albumin human  25 g IntraVENous Once    aspirin  81 mg Oral Daily    pantoprazole  40 mg IntraVENous Daily    And    sodium chloride (PF)  10 mL IntraVENous Daily    docusate sodium  100 mg Oral BID    sennosides  5 mL Oral Nightly    sodium chloride flush  5-40 mL IntraVENous 2 times per day    bisacodyl  10 mg Rectal BID    [Held by provider] metoprolol tartrate  12.5 mg Oral BID    atorvastatin  10 mg Oral Nightly    pramipexole  0.5 mg Oral TID    ipratropium-albuterol  1 ampule Inhalation Q4H WA       MEDS (infusions):   sodium chloride      sodium chloride 30 mL/hr at 09/12/21 1110    sodium chloride      sodium chloride 30 mL/hr (08/31/21 0946)    sodium chloride      dextrose         MEDS (prn):  oxyCODONE, LORazepam, sodium chloride, perflutren lipid microspheres, benzocaine-menthol, sodium chloride flush, sodium chloride, ondansetron, acetaminophen, acetaminophen, magnesium hydroxide, potassium chloride, magnesium sulfate, albumin human, sodium chloride, glucose, dextrose, glucagon (rDNA), dextrose, calcium gluconate IVPB    DATA:    Recent Labs     09/13/21  0510 09/14/21  0500 09/15/21  0515   WBC 18.3* 18.5* 15.8*   HGB 8.8* 8.1* 7.7*   HCT 29.2* 27.1* 25.5*   MCV 88.8 88.6 89.5    155 158     Recent Labs     09/13/21  0510 09/13/21  0515 09/13/21  1540 09/14/21  0500 09/15/21  0515   NA  --    < > 142 143 146   K  --    < > 3.7 3.7 3.1*   CL  --    < > 107 106 107   CO2  --    < > 29 24 28   BUN  --    < > 42* 39* 33*   CREATININE  --    < > 1.0 1.0 0.9   LABGLOM  --    < > 53 53 >60   GLUCOSE  --    < > 154* 123* 91   CALCIUM  --    < > 8.5* 8.5* 8.3*   ALT 28  --   --  28 28   AST 21  --   --  29 38*   BILIDIR 0.7*  --   --  0.8* 0.7*   BILITOT 1.6*  --   --  1.7* 2.0*   ALKPHOS 65  --   --  64 63   MG 2.4  --   --  2.4  2.3 2.1    < > = values in this interval not displayed. Lab Results   Component Value Date    LABALBU 3.2 (L) 09/15/2021    LABALBU 3.3 (L) 09/14/2021    LABALBU 3.7 09/13/2021     Lab Results   Component Value Date    TSH 2.250 06/21/2021       Iron Studies  No results found for: IRON, TIBC, FERRITIN  No results found for: NTTWYWVH36  No results found for: FOLATE    No results found for: VITD25  No results found for: PTH    No components found for: URIC    Lab Results   Component Value Date    COLORU Yellow 08/28/2021    LABPH 0 10/22/2019    NITRU Negative 08/28/2021    GLUCOSEU Negative 08/28/2021    KETUA TRACE 08/28/2021    UROBILINOGEN 0.2 08/28/2021    BILIRUBINUR Negative 08/28/2021       No results found for: Quan Wright      IMPRESSION/RECOMMENDATIONS:      1. Acute kidney injury stage I superimposed on CKD stage IIIa  BROOKE prerenal, FEurea 16% and FENa .04%, volume depletion  Cr peaked at 2.8 now 0.9  contreras was clogged with 3.6L out since it was flushed  pola no hydro  Fluid balance +++  Daily bumex     2. CAD s/p CABG X 3 . 8/25  CO 2.2, CI 1.7  On dobutrex     3.   Acute postop respiratory failure  Intubated 9/5  Bronch 9/1 with mucus plugging  Worsening wbc and hypotension  Id following  Started merrem for hap  Now off pressors     4. A fib RVR  On amio     5. Metabolic acidosis  Improved with HCO3 drip  Now resolved  Improved  Now off    6.  Hypokalemia  Replace prn    Madhuri Francisco MD

## 2021-09-15 NOTE — DISCHARGE INSTR - COC
Continuity of Care Form    Patient Name: Amna Franklin   :  89  MRN:  [id number]    [address] [place] [address] date:  2021  Discharge date:  ***    Code Status Order: Full Code   Advance Directives:   Advance Care Flowsheet Documentation       Date/Time Healthcare Directive Type of Healthcare Directive Copy in [address] Agent's Name Healthcare Agent's Phone Number    21 0542  No, patient does not have an advance directive for healthcare treatment  --  --  --  --  --            Admitting Physician:  Barbi Todd DO  PCP: Norma Ramírez MD    Discharging Nurse: [facility] Unit/Room#: 4104/5214-Z  Discharging Unit Phone Number: ***    Emergency Contact:   Extended Emergency Contact Information  Primary Emergency Contact: [facility]  Address: [address] Phone: [phone]  Mobile Phone: [phone]  Relation: Spouse  Secondary Emergency Contact: [address] of [address] [place] [address] Phone: [phone]  Relation: Other    Past Surgical History:  Past Surgical History:   Procedure Laterality Date    APPENDECTOMY      BACK SURGERY      BREAST SURGERY      reduction    BRONCHOSCOPY N/A 2021    BRONCHOSCOPY DIAGNOSTIC OR CELL [address] ONLY performed by Galen Albrecht MD at [address] [facility]  2021    DR Radha Mei  2021    CARPAL TUNNEL RELEASE      CATARACT REMOVAL  10/2016    CERVICAL DISCECTOMY  2007    ACDF C3-4, C4-5, C5-6 Dr. Therese Jo N/A 2021    CABG ROGELIO performed by Barbi Todd DO at [facility]      left lower leg    HEMICOLECTOMY N/A 2019    DIAGNOSTIC LAPAROSCOPY, LYSIS OF ADHESIONS, OPEN RIGHT HEMICOLECTOMY performed by Ebony Torres MD at [address]  2017    [facility].  Dr.Joseph Verner Leroshan    TRACHEAL SURGERY N/A 9/14/2021    TRACHEOTOMY PERCUTANEOUS BRONCHOSCOPY  (bedside) performed by Marilin Walsh MD at [address] [place] [address]    TRANSESOPHAGEAL ECHOCARDIOGRAM  06/23/2021    UPPER GASTROINTESTINAL ENDOSCOPY N/A 11/5/2018    EGD BIOPSY performed by Angel Sher MD at [place] [address] N/A 12/7/2020    EGD BIOPSY performed by Kelsey Jason MD at [place] [address]  12/7/2020    EGD CONTROL HEMORRHAGE performed by Kelsey Jason MD at [facility] History:   Immunization History   Administered Date(s) Administered    COVID-19, Moderna, PF, 100mcg/0.5mL 01/22/2021, 02/19/2021    Influenza 10/10/2008    Influenza A (A7D3-65) Vaccine PF IM 11/17/2009    Influenza Vaccine, unspecified formulation 09/27/2016    Influenza Whole 09/22/2015    Influenza, High Dose (Fluzone 65 yrs and older) 09/27/2016, 11/28/2017, 10/16/2018, 09/05/2019    Pneumococcal Conjugate 13-valent (Cdlvbbd10) 09/22/2015    Pneumococcal Polysaccharide (Lapuqmihx23) 10/16/2000    Tdap (Boostrix, Adacel) 01/30/2014, 11/08/2014, 11/09/2014, 06/10/2020    Zoster Live (Zostavax) 08/20/2008       Active Problems:  Patient Active Problem List   Diagnosis Code    Spinal stenosis in cervical region M48.02    Spinal stenosis, lumbar region, without neurogenic claudication M48.061    Essential hypertension I10    Mixed hyperlipidemia E78.2    DM (diabetes mellitus) ([facility] E11.9    Renal artery aneurysm (HCC) I72.2    PAF (paroxysmal atrial fibrillation) (HCC) I48.0    Anemia D64.9    Malignant neoplasm of cecum (HCC) C18.0    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) K75.81, K74.60    Chronic heart failure with preserved ejection fraction (HCC) I50.32    Atrial fibrillation with RVR (HCC) I48.91    Severe protein-calorie malnutrition (HCC) E43    Abnormal nuclear stress test R94.39    Opioid use, unspecified with unspecified opioid-induced disorder F11.99 Opioid dependence with unspecified opioid-induced disorder F11.29    Opioid dependence, uncomplicated P37.07    CAD in native artery I25.10    Pre-operative laboratory examination Z01.812    Acute blood loss anemia D62    Leukocytosis D72.829    At risk for malnutrition Z91.89    Hypotension I95.9       Isolation/Infection:   Isolation            No Isolation          Patient Infection Status       Infection Onset Added Last Indicated Last Indicated By Review Planned Expiration Resolved Resolved By    None active    Resolved    COVID-19 Rule Out 09/06/21 09/06/21 09/06/21 COVID-19, Rapid (Ordered)   09/06/21 Rule-Out Test Resulted    COVID-19 Rule Out 12/31/20 12/31/20 12/31/20 COVID-19 (Ordered)   01/04/21 Issa Pena RN    Cancelled-duplicate GVO-13/38/52    COVID-19 Rule Out 12/31/20 12/31/20 12/31/20 COVID-19 (Ordered)   12/31/20 Rule-Out Test Resulted    COVID-19 Rule Out 12/05/20 12/05/20 12/05/20 Respiratory Panel, Molecular, with COVID-19 (Restricted: peds pts or suitable admitted adults) (Ordered)   12/05/20 Rule-Out Test Resulted            Nurse Assessment:  Last Vital Signs: BP (!) 105/47   Pulse 87   Temp 96.5 °F (35.8 °C) (Temporal)   Resp 18   Ht 5' 2\" (1.575 m)   Wt 233 lb 7.5 oz (105.9 kg)   LMP  (LMP Unknown)   SpO2 97%   BMI 42.70 kg/m²     Last documented pain score (0-10 scale): Pain Level: 0  Last Weight:   Wt Readings from Last 1 Encounters:   09/15/21 233 lb 7.5 oz (105.9 kg)     Mental Status:  alert    IV Access:  - PICC - site  L Upper Arm, insertion date: 8/30/2021    Nursing Mobility/ADLs:  Walking   Dependent  Transfer  Dependent  Bathing  Dependent  Dressing  Dependent  Toileting  Dependent  Feeding  Dependent  Med Admin  Dependent  Med Delivery    via corepak    Wound Care Documentation and Therapy:        Elimination:  Continence:    Bowel: No  Bladder: contreras  Urinary Catheter: Last Change Date 9/16/2021    Colostomy/Ileostomy/Ileal Conduit: No       Date of Last BM: 9/16/2021    Intake/Output Summary (Last 24 hours) at 9/15/2021 1003  Last data filed at 9/15/2021 0700  Gross per 24 hour   Intake 821.95 ml   Output 2525 ml   Net -1703.05 ml     I/O last 3 completed shifts: In: 80 [I.V.:822; NG/GT:30]  Out: [address] [Urine:3625]    Safety Concerns:      At Risk for Falls    Impairments/Disabilities:      Language Barrier - trach    Nutrition Therapy:  Current Nutrition Therapy:   - Tube Feedings:  Standard without fiber    Routes of Feeding: Nasogastric (corepak)  Liquids: NPO  Daily Fluid Restriction: no  Last Modified Barium Swallow with Video (Video Swallowing Test): not done    Treatments at the Time of Hospital Discharge:   Respiratory Treatments: trach/ Ventilator  Oxygen Therapy:   ventilator  Ventilator: volume support 450 50% peep 5   - Ventilator Settings:    Vt Ordered: 0 mL  Rate Set: 0 bmp  FiO2 : 50 %    PEEP/CPAP: 5  Pressure Support: 0 cmH20    Rehab Therapies: PT, OT and speech to eval and treat   Weight Bearing Status/Restrictions: No weight bearing restirctions  Other Medical Equipment (for information only, NOT a DME order):  hospital bed  Other Treatments: PT    Patient's personal belongings (please select all that are sent with patient):  None    RN SIGNATURE:  Electronically signed by Jazzmine Macias RN on 9/16/21 at 8:47 AM EDT    CASE MANAGEMENT/SOCIAL WORK SECTION    Inpatient Status Date: 08/25/21    Readmission Risk Assessment Score:  Readmission Risk              Risk of Unplanned Readmission:  62           Discharging to Facility/ Agency   Name: [facility] at [address] [place] [address],[address] main   Address: [place], [place]   Phone:  Fax:    Dialysis Facility (if applicable)   Name:  Address:  Dialysis Schedule:  Phone:  Fax:    / signature: Electronically signed by ADRIAN Vuong on 9/15/2021 at 10:03 AM      PHYSICIAN SECTION    Prognosis: Fair    Condition at Discharge: Stable    Rehab Potential (if transferring to Rehab): Fair    Recommended Labs or Other Treatments After Discharge: Trach care, skin care  CBC, BMP daily, CXR PRN   Ventilator weaning   Remove remaining staples on leg incisions 9/23/2021      Physician Certification: I certify the above information and transfer of Laura Lerma  is necessary for the continuing treatment of the diagnosis listed and that she requires LTAC for more than 30 days. Update Admission H&P: This is an 66-year-old female that presented to the office to continue discussion regarding possible surgical intervention. She has a past medical history of spinal stenosis, GERD, obesity, fatty liver, diabetes, hypertension, hyperlipidemia, paroxysmal atrial fibrillation for which she takes Xarelto, HFpEF, anemia, adenocarcinoma of the cecum, and cirrhosis of the liver secondary to Feliciano Ours. She is a former cigarette smoker quitting in 1997. She was initially seen during her hospital admission due to A. fib with RVR. During her work-up she had a mildly elevated high-sensitivity troponin. She underwent a stress test which was positive and a left heart cath revealed CAD. On 8/25/2021 she underwent a CABGx3 (LIMA-LAD, SVG-Ramus, SVG-OM), LAAL. Post operative course was complicated by hypoxic respiratory failure requiring heated hiflow nasal cannula, given empiric antibiotics 8/27-9/2, noninvasive ventilation as tolerated, bedside bronchoscopy 9/1/2021, and reintubation 9/5/2021. Patient had bedside percutaneous Tracheostomy per general surgery 9/14/2021. Postoperative Echo with mild RV dysfunction and severe TV regurgitation 9/7, started on Dobutamine infusion, weaned off 9/14. Also complicated by septic shock requiring intravenous vasopressor support, started on stress steroids 9/7 and midodrine 9/11, weaned off levophed 9/9 and vasopressin 9/11. BROOKE on CKD, serum creatinine peaked at 2.9, improved with IVF resuscitation, started on daily bumex 9/11, and creatinine returned to baseline of 0.9-1.1. Paroxysmal atrial fibrillation RVR post op started on amiodarone infusion, transitioned to PO amio, which was stopped 9/16 due to elevated total bilirubin. Patient became thrombocytopenic after starting heparin infusion for paroxysmal atrial fibrillation 9/9, heparin stopped and started on argatroban and HIT panel sent- resulted with heparin induced platelet antibodies, HIT +. Started on Eliquis 9/15 after tracheostomy placed. Acute blood loss anemia, transfused post op when needed to maintain Hgb >8.      PHYSICIAN SIGNATURE:  Electronically signed by TEMO Bella CNP on 9/16/21 at 12:18 PM EDT

## 2021-09-15 NOTE — FLOWSHEET NOTE
Inpatient Wound Care(initial evaluation) 3821    Admit Date: 8/25/2021  5:24 AM    Reason for consult:  Breast, abdomen    Significant history:  Refer to H & P    Procedure(s): 8/25  CABG x3 (LIMA-LAD, SVG-Ramus, SVG-OM)  LAAL  EVH  KLS plating     Findings:     09/15/21 1030   Skin Integrity   Skin Integrity Bruising   Location BUE, right popiteal   Skin Integrity Site 2   Skin Integrity Location 2 Redness; Excoriation   Location 2 under breast   Skin Integrity Site 3   Skin Integrity Location 3 Rash;Redness; Excoriation    Location 3 under abdominal fold   Skin Integrity Site 4   Skin Integrity Location 4   (3 sutures fro chest tube)   Location 4 distal sternum   Incision 08/25/21 Sternum Mid   Date First Assessed/Time First Assessed: 08/25/21 0751   Present on Hospital Admission: No  Location: Sternum  Wound Location Orientation: Mid   Dressing Status Intact   Incision 08/25/21 Thigh Anterior;Right;Distal   Date First Assessed/Time First Assessed: 08/25/21 0742   Present on Hospital Admission: No  Primary Wound Type: Surgical Type  Location: Thigh  Wound Location Orientation: Anterior;Right;Distal  Wound Description (Comments): multiple leg incisions   Dressing Status Intact   intubated with vent support    Plan:  interdry under abdominal fold  Antifungal under breast and groins  Will need continued preventative care    Hyun Young RN 9/15/2021 11:02 AM

## 2021-09-15 NOTE — PROGRESS NOTES
[facility] PROGRESS NOTE      Chief complaint: Follow-up of septic shock    The patient is a 80 y.o. female with history of hypertension, hyperlipidemia, paroxysmal atrial fibrillation, renal artery aneurysm, cirrhosis, DM, CKD, admitted on 08/25 for elective CABG. Post-op course was complicated by fever up to 101.1 F on 08/29 with worsening leukocytosis up to currently 22,000, atrial fibrillation with rapid ventricular response, hypoxia, BROOKE. CXR on admission showed bilateral airspace disease and small bilateral pleural effusion. She underwent bronchoscopy on 09/01 during which diffuse scattered mucosal hemorrhages throughout the trachea and  thick clear secretions occluding the RML and RLL bronchi were noted. Bronchial wash Gram stain and culture showed rare polymorphonuclear leukocytes, rare epithelial cells, rare yeast, absent oral pharyngeal lisy, moderate growth of Candida albicans. She was reintubated on 09/05 due to decreasing level of consciousness and increasing respiratory distress, accompanied by hypothermia and shock. CXR on 09/06 showed persistent bilateral parenchymal infiltrates and probable small effusions, essentially unchanged from that on admission. Procalcitonin level was elevated at 0.31 ng/mL. Respiratory Gram stain and culture on 09/06 showed few polymorphonuclear leukocytes, few epithelial cells, no organisms, light growth of yeast, Klebsiella oxytoca (non-ESBL; susceptible to cotrimoxazole and fluoroquinolones) and Enterobacter cloacae (susceptible to cotrimoxazole and fluoroquinolones). Beta-D-glucan was negative. Urine Streptococcus pneumoniae and Legionella antigens were negative. SARS-CoV-2 PCR was not detected. She received cefazolin from 08/25-08/27, piperacillin-tazobactam from 08/27-09/02. Meropenem was started on 09/06. Subjective: Patient was seen and examined. She is off inotropic support, awake, confused and not responding to question.     Objective:  BP (!) 105/47   Pulse 87

## 2021-09-15 NOTE — PROGRESS NOTES
Patient seen and examined  No issues with trach overnight  No signs of bleeding  Ok for Eliquis from surgery POV    Electronically signed by Marlene Edouard DO on 9/15/2021 at 6:03 AM

## 2021-09-15 NOTE — PLAN OF CARE
Problem: Falls - Risk of:  Goal: Will remain free from falls  Outcome: Met This Shift  Goal: Absence of physical injury  Outcome: Met This Shift     Problem: Anxiety:  Goal: Level of anxiety will decrease  Outcome: Met This Shift     Problem: Cardiac Output - Decreased:  Goal: Cardiac output within specified parameters  Outcome: Met This Shift  Goal: Hemodynamic stability will improve  Outcome: Met This Shift     Problem: Gas Exchange - Impaired:  Goal: Levels of oxygenation will improve  Outcome: Met This Shift     Problem: Pain:  Goal: Pain level will decrease  Outcome: Met This Shift     Problem: Tissue Perfusion - Cardiopulmonary, Altered:  Goal: Absence of angina  Outcome: Met This Shift  Goal: Hemodynamic stability will improve  Outcome: Met This Shift  Goal: Will show no evidence of cardiac arrhythmias  Outcome: Met This Shift     Problem: Skin Integrity:  Goal: Will show no infection signs and symptoms  Outcome: Met This Shift  Goal: Absence of new skin breakdown  Outcome: Met This Shift     Problem: Pain:  Goal: Pain level will decrease  Outcome: Met This Shift     Problem: Musculor/Skeletal Functional Status  Goal: Absence of falls  Outcome: Met This Shift     Problem: ABCDS Injury Assessment  Goal: Absence of physical injury  Outcome: Met This Shift     Problem: Discharge Planning:  Goal: Discharged to appropriate level of care  Outcome: Ongoing     Problem: Fluid Volume - Imbalance:  Goal: Ability to achieve a balanced intake and output will improve  Outcome: Ongoing     Problem: Gas Exchange - Impaired:  Goal: Ability to maintain adequate ventilation will improve  Outcome: Ongoing     Problem: Musculor/Skeletal Functional Status  Goal: Highest potential functional level  Outcome: Ongoing     Problem:  Activity Intolerance:  Goal: Able to perform prescribed physical activity  Outcome: Not Met This Shift  Goal: Ability to tolerate increased activity will improve  Outcome: Not Met This Shift     Problem: Pain:  Goal: Control of acute pain  Outcome: Completed     Problem: Pain:  Goal: Control of acute pain  Outcome: Completed

## 2021-09-15 NOTE — PROGRESS NOTES
Spoke with Dr. Amanda Barrera at bedside. OK to [facility] to [facility] from his standpoint. Notified Case Management and left a voicemail. Discussed with CTS NP about patient current status. Discontinued fentanyl gtt and versed gtt at this time. Will continue to monitor.   Electronically signed by Tracy Ellison on 9/15/21 at 9:11 AM EDT

## 2021-09-15 NOTE — PROGRESS NOTES
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, GERD, h/o adenocarcinoma of cecum, arthritis, spinal stenosis, former smoker, obesity      Procedure/Surgeries: 8/25/2021 CABG x3 (LIMA-LAD, SVG-Ramus, SVG-OM), LAAL, EVH, KLS plating     9/15/2021: Tracheostomy Percutaneous Bronchoscopy      Pacing wires: Ventricular (Cut 9/13/2021)       Intake/Output Summary (Last 24 hours) at 9/15/2021 0923  Last data filed at 9/15/2021 0700  Gross per 24 hour   Intake 821.95 ml   Output 2925 ml   Net -2103.05 ml       Recent Labs     09/13/21  0510 09/14/21  0500 09/15/21  0515   WBC 18.3* 18.5* 15.8*   HGB 8.8* 8.1* 7.7*   HCT 29.2* 27.1* 25.5*    155 158      Lab Results   Component Value Date     09/15/2021    K 3.1 09/15/2021    K 4.3 09/06/2021     09/15/2021    CO2 28 09/15/2021    BUN 33 09/15/2021    CREATININE 0.9 09/15/2021    GLUCOSE 91 09/15/2021    CALCIUM 8.3 09/15/2021         Physical Exam:    BP (!) 105/47   Pulse 76   Temp 96.5 °F (35.8 °C) (Temporal)   Resp 18   Ht 5' 2\" (1.575 m)   Wt 233 lb 7.5 oz (105.9 kg)   LMP  (LMP Unknown)   SpO2 100%   BMI 42.70 kg/m²       CXR Findings: Ordered     General: s/p Trach, sedation weaned off this morning. Eyes: PERRL, anicteric   Pulmonary: Diminished bibasilar.  No wheezes, no accessory muscle use noted   Ventilator: Mode: AC/VC, 50% FiO2, 5 PEEP, 450 Vt   Cardiovascular:  RRR, no heaves or thrills on palpation  Tele: SR   Abdomen: Soft, distended, +BS  Extremities: Palpable pulses all extremities, generalized pitting edema   Neurologic/Psych: Drowsy on sedation, following commands weakly   Skin: Warm and dry  Incisions: MSI well approximated, RLE SVG sites with staples intact, well 9/6   - Scr stable at 0.9, good UOP on Bumex    - Nephrology following  - Net + 14L, diuresis per nephrology, daily bumex   - Monitor UOP, labs, avoid hypotension     11. Dysphagia/ At risk for malnutrition   - Suspected dysphagia, possible aspiration 8/27  - Bronch 9/1 airways with evidence of chronic aspiration   - Not a candidate for PEG tube due to ascites   - Small bowel feeding tube placed 9/14; start TFs today and advance to goal     12. 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 Thrombocytopenia  - Plts 330K>124K>108K>80K; Heparin stopped 9/9  - HIT + 9/9   - Platelets [id number],407P, monitor on Eliquis       VTE Prophylaxis: Pharmacologic/Mechanical:  Yes, SCDs, Eliquis   Line infection prevention: Can CVC or arterial line be removed: yes remove arterial line   Continued need for urinary catheter:  Yes - clinical indication: Patient post major surgery requiring fluid balance and input and output measurement.     Dispo: Discharge planning per , to [facility]     Electronically signed by TEMO Gaston - CNP on 9/15/2021 at 9:23 AM

## 2021-09-16 ENCOUNTER — APPOINTMENT (OUTPATIENT)
Dept: GENERAL RADIOLOGY | Age: 81
DRG: 003 | End: 2021-09-16
Attending: THORACIC SURGERY (CARDIOTHORACIC VASCULAR SURGERY)
Payer: MEDICARE

## 2021-09-16 VITALS
RESPIRATION RATE: 15 BRPM | OXYGEN SATURATION: 100 % | SYSTOLIC BLOOD PRESSURE: 114 MMHG | BODY MASS INDEX: 42.6 KG/M2 | WEIGHT: 231.48 LBS | HEART RATE: 80 BPM | HEIGHT: 62 IN | TEMPERATURE: 98.4 F | DIASTOLIC BLOOD PRESSURE: 59 MMHG

## 2021-09-16 LAB
ALBUMIN SERPL-MCNC: 3 G/DL (ref 3.5–5.2)
ALP BLD-CCNC: 62 U/L (ref 35–104)
ALT SERPL-CCNC: 36 U/L (ref 0–32)
ANION GAP SERPL CALCULATED.3IONS-SCNC: 10 MMOL/L (ref 7–16)
AST SERPL-CCNC: 62 U/L (ref 0–31)
BILIRUB SERPL-MCNC: 2.7 MG/DL (ref 0–1.2)
BILIRUBIN DIRECT: 0.7 MG/DL (ref 0–0.3)
BILIRUBIN, INDIRECT: 2 MG/DL (ref 0–1)
BLOOD BANK DISPENSE STATUS: NORMAL
BLOOD BANK DISPENSE STATUS: NORMAL
BLOOD BANK PRODUCT CODE: NORMAL
BLOOD BANK PRODUCT CODE: NORMAL
BPU ID: NORMAL
BPU ID: NORMAL
BUN BLDV-MCNC: 32 MG/DL (ref 6–23)
CALCIUM SERPL-MCNC: 8.3 MG/DL (ref 8.6–10.2)
CHLORIDE BLD-SCNC: 106 MMOL/L (ref 98–107)
CO2: 29 MMOL/L (ref 22–29)
CREAT SERPL-MCNC: 0.9 MG/DL (ref 0.5–1)
DESCRIPTION BLOOD BANK: NORMAL
DESCRIPTION BLOOD BANK: NORMAL
GFR AFRICAN AMERICAN: >60
GFR NON-AFRICAN AMERICAN: >60 ML/MIN/1.73
GLUCOSE BLD-MCNC: 129 MG/DL (ref 74–99)
HCT VFR BLD CALC: 24.7 % (ref 34–48)
HEMOGLOBIN: 7.6 G/DL (ref 11.5–15.5)
MAGNESIUM: 2 MG/DL (ref 1.6–2.6)
MCH RBC QN AUTO: 27.2 PG (ref 26–35)
MCHC RBC AUTO-ENTMCNC: 30.8 % (ref 32–34.5)
MCV RBC AUTO: 88.5 FL (ref 80–99.9)
METER GLUCOSE: 125 MG/DL (ref 74–99)
METER GLUCOSE: 158 MG/DL (ref 74–99)
METER GLUCOSE: 171 MG/DL (ref 74–99)
PDW BLD-RTO: 16.9 FL (ref 11.5–15)
PLATELET # BLD: 154 E9/L (ref 130–450)
PMV BLD AUTO: 9.9 FL (ref 7–12)
POTASSIUM SERPL-SCNC: 3.4 MMOL/L (ref 3.5–5)
RBC # BLD: 2.79 E12/L (ref 3.5–5.5)
SODIUM BLD-SCNC: 145 MMOL/L (ref 132–146)
TOTAL PROTEIN: 5 G/DL (ref 6.4–8.3)
WBC # BLD: 17 E9/L (ref 4.5–11.5)

## 2021-09-16 PROCEDURE — 2580000003 HC RX 258: Performed by: NURSE PRACTITIONER

## 2021-09-16 PROCEDURE — 97168 OT RE-EVAL EST PLAN CARE: CPT

## 2021-09-16 PROCEDURE — 36592 COLLECT BLOOD FROM PICC: CPT

## 2021-09-16 PROCEDURE — 80048 BASIC METABOLIC PNL TOTAL CA: CPT

## 2021-09-16 PROCEDURE — 36415 COLL VENOUS BLD VENIPUNCTURE: CPT

## 2021-09-16 PROCEDURE — 6370000000 HC RX 637 (ALT 250 FOR IP): Performed by: INTERNAL MEDICINE

## 2021-09-16 PROCEDURE — 82962 GLUCOSE BLOOD TEST: CPT

## 2021-09-16 PROCEDURE — C9113 INJ PANTOPRAZOLE SODIUM, VIA: HCPCS | Performed by: NURSE PRACTITIONER

## 2021-09-16 PROCEDURE — 6370000000 HC RX 637 (ALT 250 FOR IP): Performed by: STUDENT IN AN ORGANIZED HEALTH CARE EDUCATION/TRAINING PROGRAM

## 2021-09-16 PROCEDURE — 97164 PT RE-EVAL EST PLAN CARE: CPT

## 2021-09-16 PROCEDURE — 6370000000 HC RX 637 (ALT 250 FOR IP): Performed by: NURSE PRACTITIONER

## 2021-09-16 PROCEDURE — 94003 VENT MGMT INPAT SUBQ DAY: CPT

## 2021-09-16 PROCEDURE — 6360000002 HC RX W HCPCS: Performed by: THORACIC SURGERY (CARDIOTHORACIC VASCULAR SURGERY)

## 2021-09-16 PROCEDURE — 97530 THERAPEUTIC ACTIVITIES: CPT

## 2021-09-16 PROCEDURE — 2500000003 HC RX 250 WO HCPCS: Performed by: NURSE PRACTITIONER

## 2021-09-16 PROCEDURE — 71045 X-RAY EXAM CHEST 1 VIEW: CPT

## 2021-09-16 PROCEDURE — 80076 HEPATIC FUNCTION PANEL: CPT

## 2021-09-16 PROCEDURE — 6360000002 HC RX W HCPCS: Performed by: NURSE PRACTITIONER

## 2021-09-16 PROCEDURE — 94640 AIRWAY INHALATION TREATMENT: CPT

## 2021-09-16 PROCEDURE — 85027 COMPLETE CBC AUTOMATED: CPT

## 2021-09-16 PROCEDURE — 6370000000 HC RX 637 (ALT 250 FOR IP): Performed by: THORACIC SURGERY (CARDIOTHORACIC VASCULAR SURGERY)

## 2021-09-16 PROCEDURE — 99233 SBSQ HOSP IP/OBS HIGH 50: CPT | Performed by: NURSE PRACTITIONER

## 2021-09-16 PROCEDURE — 83735 ASSAY OF MAGNESIUM: CPT

## 2021-09-16 RX ORDER — IPRATROPIUM BROMIDE AND ALBUTEROL SULFATE 2.5; .5 MG/3ML; MG/3ML
3 SOLUTION RESPIRATORY (INHALATION)
Qty: 360 ML | Refills: 0
Start: 2021-09-16

## 2021-09-16 RX ORDER — DOCUSATE SODIUM 50 MG/5ML
100 LIQUID ORAL 2 TIMES DAILY
Qty: 300 ML | Refills: 0
Start: 2021-09-16

## 2021-09-16 RX ORDER — OXYCODONE HYDROCHLORIDE 5 MG/1
5 TABLET ORAL EVERY 6 HOURS PRN
Qty: 12 TABLET | Refills: 0 | Status: SHIPPED | OUTPATIENT
Start: 2021-09-16 | End: 2021-09-19

## 2021-09-16 RX ORDER — ASPIRIN 81 MG/1
81 TABLET, CHEWABLE ORAL DAILY
Qty: 30 TABLET | Refills: 3
Start: 2021-09-17

## 2021-09-16 RX ORDER — SODIUM CHLORIDE 9 MG/ML
10 INJECTION INTRAVENOUS DAILY
Qty: 10 EACH | Refills: 0
Start: 2021-09-17

## 2021-09-16 RX ORDER — BISACODYL 10 MG
10 SUPPOSITORY, RECTAL RECTAL DAILY
Qty: 30 SUPPOSITORY | Refills: 0
Start: 2021-09-17 | End: 2021-10-17

## 2021-09-16 RX ORDER — AMIODARONE HYDROCHLORIDE 200 MG/1
200 TABLET ORAL DAILY
Status: DISCONTINUED | OUTPATIENT
Start: 2021-09-16 | End: 2021-09-16

## 2021-09-16 RX ORDER — MIDODRINE HYDROCHLORIDE 5 MG/1
5 TABLET ORAL
Qty: 90 TABLET | Refills: 3
Start: 2021-09-16

## 2021-09-16 RX ORDER — LORAZEPAM 0.5 MG/1
0.5 TABLET ORAL 3 TIMES DAILY PRN
Qty: 21 TABLET | Refills: 0 | Status: SHIPPED | OUTPATIENT
Start: 2021-09-16 | End: 2021-09-23

## 2021-09-16 RX ORDER — SODIUM CHLORIDE 9 MG/ML
INJECTION, SOLUTION INTRAVENOUS PRN
Status: DISCONTINUED | OUTPATIENT
Start: 2021-09-16 | End: 2021-09-16 | Stop reason: HOSPADM

## 2021-09-16 RX ORDER — CHLORHEXIDINE GLUCONATE 0.12 MG/ML
15 RINSE ORAL 2 TIMES DAILY
Qty: 420 ML | Refills: 0
Start: 2021-09-16 | End: 2021-09-30

## 2021-09-16 RX ORDER — POTASSIUM BICARBONATE 25 MEQ/1
50 TABLET, EFFERVESCENT ORAL DAILY
Status: DISCONTINUED | OUTPATIENT
Start: 2021-09-16 | End: 2021-09-16 | Stop reason: HOSPADM

## 2021-09-16 RX ORDER — BUMETANIDE 0.25 MG/ML
1 INJECTION, SOLUTION INTRAMUSCULAR; INTRAVENOUS
Qty: 21 EACH | Refills: 0
Start: 2021-09-17

## 2021-09-16 RX ORDER — PANTOPRAZOLE SODIUM 40 MG/10ML
40 INJECTION, POWDER, LYOPHILIZED, FOR SOLUTION INTRAVENOUS DAILY
Qty: 10 EACH | Refills: 0
Start: 2021-09-17

## 2021-09-16 RX ORDER — POTASSIUM CHLORIDE 29.8 MG/ML
20 INJECTION INTRAVENOUS PRN
Qty: 100 ML | Refills: 0
Start: 2021-09-16

## 2021-09-16 RX ADMIN — POLYETHYLENE GLYCOL 3350 17 G: 17 POWDER, FOR SOLUTION ORAL at 12:26

## 2021-09-16 RX ADMIN — SODIUM CHLORIDE, PRESERVATIVE FREE 10 ML: 5 INJECTION INTRAVENOUS at 09:11

## 2021-09-16 RX ADMIN — IPRATROPIUM BROMIDE AND ALBUTEROL SULFATE 1 AMPULE: .5; 2.5 SOLUTION RESPIRATORY (INHALATION) at 10:31

## 2021-09-16 RX ADMIN — MICONAZOLE NITRATE: 20 POWDER TOPICAL at 09:16

## 2021-09-16 RX ADMIN — MIDODRINE HYDROCHLORIDE 5 MG: 5 TABLET ORAL at 09:12

## 2021-09-16 RX ADMIN — POTASSIUM CHLORIDE 20 MEQ: 400 INJECTION, SOLUTION INTRAVENOUS at 10:09

## 2021-09-16 RX ADMIN — LORAZEPAM 0.5 MG: 0.5 TABLET ORAL at 12:26

## 2021-09-16 RX ADMIN — PRAMIPEXOLE DIHYDROCHLORIDE 0.5 MG: 0.25 TABLET ORAL at 09:15

## 2021-09-16 RX ADMIN — IPRATROPIUM BROMIDE AND ALBUTEROL SULFATE 1 AMPULE: .5; 2.5 SOLUTION RESPIRATORY (INHALATION) at 13:26

## 2021-09-16 RX ADMIN — AMIODARONE HYDROCHLORIDE 200 MG: 200 TABLET ORAL at 09:09

## 2021-09-16 RX ADMIN — BUMETANIDE 1 MG: 0.25 INJECTION INTRAMUSCULAR; INTRAVENOUS at 09:09

## 2021-09-16 RX ADMIN — PRAMIPEXOLE DIHYDROCHLORIDE 0.5 MG: 0.25 TABLET ORAL at 14:31

## 2021-09-16 RX ADMIN — INSULIN LISPRO 2 UNITS: 100 INJECTION, SOLUTION INTRAVENOUS; SUBCUTANEOUS at 09:09

## 2021-09-16 RX ADMIN — APIXABAN 5 MG: 5 TABLET, FILM COATED ORAL at 09:09

## 2021-09-16 RX ADMIN — 0.12% CHLORHEXIDINE GLUCONATE 15 ML: 1.2 RINSE ORAL at 09:09

## 2021-09-16 RX ADMIN — MIDODRINE HYDROCHLORIDE 5 MG: 5 TABLET ORAL at 12:24

## 2021-09-16 RX ADMIN — ACETAMINOPHEN 650 MG: 325 TABLET ORAL at 01:10

## 2021-09-16 RX ADMIN — ASPIRIN 81 MG CHEWABLE TABLET 81 MG: 81 TABLET CHEWABLE at 09:09

## 2021-09-16 RX ADMIN — PANTOPRAZOLE SODIUM 40 MG: 40 INJECTION, POWDER, FOR SOLUTION INTRAVENOUS at 09:09

## 2021-09-16 RX ADMIN — POTASSIUM CHLORIDE 20 MEQ: 400 INJECTION, SOLUTION INTRAVENOUS at 09:11

## 2021-09-16 RX ADMIN — INSULIN LISPRO 2 UNITS: 100 INJECTION, SOLUTION INTRAVENOUS; SUBCUTANEOUS at 12:31

## 2021-09-16 RX ADMIN — Medication 10 ML: at 09:14

## 2021-09-16 RX ADMIN — HYDROCORTISONE SODIUM SUCCINATE 50 MG: 100 INJECTION, POWDER, FOR SOLUTION INTRAMUSCULAR; INTRAVENOUS at 03:22

## 2021-09-16 ASSESSMENT — PAIN SCALES - GENERAL
PAINLEVEL_OUTOF10: 5
PAINLEVEL_OUTOF10: 1
PAINLEVEL_OUTOF10: 0
PAINLEVEL_OUTOF10: 0

## 2021-09-16 ASSESSMENT — PULMONARY FUNCTION TESTS
PIF_VALUE: 20
PIF_VALUE: 20
PIF_VALUE: 24
PIF_VALUE: 26
PIF_VALUE: 25
PIF_VALUE: 20
PIF_VALUE: 20
PIF_VALUE: 21
PIF_VALUE: 21
PIF_VALUE: 22
PIF_VALUE: 21
PIF_VALUE: 21
PIF_VALUE: 25
PIF_VALUE: 20
PIF_VALUE: 20
PIF_VALUE: 21
PIF_VALUE: 25

## 2021-09-16 NOTE — PLAN OF CARE
Problem: Falls - Risk of:  Goal: Will remain free from falls  Description: Will remain free from falls  9/16/2021 0852 by Toñito Lopez RN  Outcome: Met This Shift  9/16/2021 0334 by Lam Iglesias RN  Outcome: Met This Shift  Goal: Absence of physical injury  Description: Absence of physical injury  9/16/2021 0852 by Toñito Lopez RN  Outcome: Met This Shift  9/16/2021 0334 by Lam Iglesias RN  Outcome: Met This Shift     Problem:  Activity Intolerance:  Goal: Ability to tolerate increased activity will improve  Description: Ability to tolerate increased activity will improve  9/16/2021 0852 by Toñito Lopez RN  Outcome: Met This Shift  9/16/2021 0334 by Lam Iglesias RN  Outcome: Not Met This Shift     Problem: Cardiac Output - Decreased:  Goal: Cardiac output within specified parameters  Description: Cardiac output within specified parameters  9/16/2021 0852 by Toñito Lopez RN  Outcome: Met This Shift  9/16/2021 0334 by Lam Igleisas RN  Outcome: Met This Shift  Goal: Hemodynamic stability will improve  Description: Hemodynamic stability will improve  9/16/2021 0334 by Lam Iglesias RN  Outcome: Met This Shift     Problem: Fluid Volume - Imbalance:  Goal: Ability to achieve a balanced intake and output will improve  Description: Ability to achieve a balanced intake and output will improve  9/16/2021 0334 by Lam Iglesias RN  Outcome: Met This Shift     Problem: Gas Exchange - Impaired:  Goal: Levels of oxygenation will improve  Description: Levels of oxygenation will improve  9/16/2021 0852 by Toñito Lopez RN  Outcome: Met This Shift  9/16/2021 0334 by Lam Iglesias RN  Outcome: Met This Shift  Goal: Ability to maintain adequate ventilation will improve  Description: Ability to maintain adequate ventilation will improve  9/16/2021 0334 by Lam Iglesias RN  Outcome: Met This Shift     Problem: Pain:  Goal: Pain level will decrease  Description: Pain level will decrease  9/16/2021 0852 by Logan Nichols RN  Outcome: Met This Shift  9/16/2021 0334 by Therese Wahl RN  Outcome: Met This Shift     Problem: Tissue Perfusion - Cardiopulmonary, Altered:  Goal: Absence of angina  Description: Absence of angina  9/16/2021 0852 by Logan Nichols RN  Outcome: Met This Shift  9/16/2021 0334 by Therese Wahl RN  Outcome: Met This Shift  Goal: Hemodynamic stability will improve  Description: Hemodynamic stability will improve  9/16/2021 0334 by Therese Wahl RN  Outcome: Met This Shift  Goal: Will show no evidence of cardiac arrhythmias  Description: Will show no evidence of cardiac arrhythmias  9/16/2021 0334 by Therese Wahl RN  Outcome: Met This Shift     Problem: Skin Integrity:  Goal: Will show no infection signs and symptoms  Description: Will show no infection signs and symptoms  9/16/2021 0334 by Therese Wahl RN  Outcome: Met This Shift  Goal: Absence of new skin breakdown  Description: Absence of new skin breakdown  9/16/2021 0334 by Therese Wahl RN  Outcome: Met This Shift     Problem: Pain:  Goal: Pain level will decrease  Description: Pain level will decrease  9/16/2021 0852 by Logan Nichols RN  Outcome: Met This Shift  9/16/2021 0334 by Therese Wahl RN  Outcome: Met This Shift     Problem: Musculor/Skeletal Functional Status  Goal: Absence of falls  9/16/2021 0334 by Therese Wahl RN  Outcome: Met This Shift     Problem: ABCDS Injury Assessment  Goal: Absence of physical injury  9/16/2021 0334 by Therese Wahl RN  Outcome: Met This Shift     Problem: Non-Violent Restraints  Goal: Removal from restraints as soon as assessed to be safe  9/16/2021 0852 by Logan Nichols RN  Outcome: Met This Shift  9/16/2021 0334 by Therese Wahl RN  Outcome: Met This Shift  Goal: No harm/injury to patient while restraints in use  9/16/2021 0852 by Logan Nichols RN  Outcome: Met This Shift  9/16/2021 0334 by Therese Wahl RN  Outcome: Met This Shift  Goal: Patient's dignity will be maintained  9/16/2021 0852 by John Gale RN  Outcome: Met This Shift  9/16/2021 0334 by Otoniel Rice RN  Outcome: Met This Shift

## 2021-09-16 NOTE — PROGRESS NOTES
Occupational Therapy  OCCUPATIONAL THERAPY RE- EVALUATION     Yuliana Shetty [email or web address] [address]    Date:2021                                                  Patient Name: Diego Ellison  MRN: [id number]  : 1940  Room: [address] [place] [address]    Re-Evaluating OT: Roberta Macedon, OTR/L #[id number]  Evaluating OT: Bhavik Renee, OTR/L [id number]     Referring Provider: GEORGIA Kiser   Specific Provider Orders/Date: OT eval and treat (21)     Diagnosis: CAD      Re-evaluation d/t tracheotomy on .      Surgery/Procedures:  CABG x 3,  bronch,  TRACHEOTOMY PERCUTANEOUS BRONCHOSCOPY       Pertinent Medical History: Adenocarcinoma of cecum, Anemia, Arthritis, cervical spinal stenosis, blood transfusion, DM, cirrhosis of liver, HLD, HTN, LBP, Neck pain, PAF, Renal artery aneurysm       *Precautions:  Fall Risk, sternal, trach to vent, NGT     Assessment of current deficits   [x]? Functional mobility            [x]?ADLs           [x]? Strength                  [x]? Cognition  [x]? Functional transfers          [x]? IADLs          [x]? Safety Awareness   [x]? Endurance  [x]? Fine Motor Coordination   [x]? Balance       []? Vision/perception    []? Sensation      []? Gross Motor Coordination [x]? ROM           []? Delirium                  []? Motor Control     [x]? Communication      OT PLAN OF CARE   OT POC based on physician orders, patient diagnosis and results of clinical assessment.        Frequency/Duration: 1-3 days/wk for 1-2 weeks PRN     Specific OT Treatment to include:   * Instruction/training on adapted ADL techniques and AE recommendations to increase functional independence within precautions       * Training on energy conservation strategies, correct breathing pattern and techniques to improve independence/tolerance for self-care routine  * Functional transfer/mobility training/DME recommendations for increased independence, safety, and fall prevention  * Patient/Family education to increase follow through with safety techniques and functional independence  * Recommendation of environmental modifications for increased safety with functional transfers/mobility and ADLs  * Cognitive retraining/development of therapeutic activities to improve problem solving, judgement, memory, and attention for increased safety/participation in ADL/IADL tasks  * Therapeutic exercise to improve motor endurance, ROM, and functional strength for ADLs/functional transfers  * Therapeutic activities to facilitate/challenge dynamic balance, stand tolerance for increased safety and independence with ADLs  * Therapeutic activities to facilitate gross/fine motor skills for increased independence with ADLs  * Positioning to improve skin integrity, interaction with environment and functional independence    Recommended Adaptive Equipment:  LB dressing AE     Home Living: Pt lives with   in a 1 floor condo with 2 step(s) to enter and 2 rail(s)  Bathroom setup: walk in shower with seat and rail  Equipment owned: shower seat, [employer], [address]     Prior Level of Function: [place] with ADLs; Assist with IADLs. [employer] for ambulation. Driving: no  Occupation: N/A     Pain Level: pt c/o 10/10 chest pain  this session     Cognition: A&O: 2/4 with choices. (hospital and self). Follows 1-2 step commands appropriately. Memory: Good              Comprehension Good              Problem solving: Fair+/Good              Judgement/safety: Fair+/Good                 Communication skills: limited d/t trach. Unable to write. Able to shake head yes/no to answer questions. Vision: [employer]                     Glasses:readers                                                        Hearing: WFL                RASS: 0  CAM-ICU: (NT) Delirium      UE Assessment:  Hand Dominance: Right [x]?   Left []?       ROM Strength STM goal: PRN   RUE  AAROM WFL within precautions Not formally tested; limited              WNL for ADLS      JACKI CASTANEDA WFL within precautions Not formally tested; limited              WNL for ADLS         Sensation: No c/o numbness or tingling in extremities   Tone: WNL   Edema: min B UE/LE     Functional Assessment: AM-PAC Daily Activity Raw Score: 8/24    Re- Eval Status  Date: 9/16 Treatment Status  Date: STG=LTG  Time Frame: 10-14 days   Feeding NGT/trach                               Grooming Max A (able to grasp cloth, required assistance to bring UE to face)                        Min. A   while seated in supported position demonstrating G tolerance; G balance.       UB dressing/bathing Max A                        Min A   demonstrating G knowledge of precautions during tasks      LB dressing/bathing Dep                             Mod A  using AE as needed for safe reach/ energy conservation        Toileting Dep (assisted pt onto bedpan in supine, RN aware)                        Mod. A      Bed Mobility  Supine to sit: Dep x2     Sit to supine: Dep x2                        Mod. A  in prep of ADL tasks & transfers   Functional Transfers Sit to stand: NT                           Mod. A  sit<>stand/functional bathroom transfers using AD/DME as needed for balance and safety   Functional Mobility NT                            Mod. A   functional/bathroom mobility using AD as needed & demonstrating G safety      Balance Sitting:     Static: Max A    Dynamic:Max A  Standing: NT   Min. A dynamic sitting balance; Mod. A dynamic standing balance  during ADL tasks & transfers   Endurance/Activity Tolerance    Fair- tolerance with light activity. Pt limited by anxiety.  Pt provided with encouragement and pt safety reassured.   Fair+   tolerance with moderate activity/self care routine   Visual/  Perceptual               WFL                                    Vitals:   HR at rest: 86 bpm HR at end of session: 88 bpm   Spo2 at rest:99% Spo2 at end of session 98%   BP at rest:127/61 mmHg BP at end of session 132/69 mmHg          Comments: OK from RN to see patient. Upon arrival, patient lying in bed. Pt demo fair- tolerance with fair understanding of education/techniques. At end of session, patient lying in bed on bedpan(RN aware). Call light within reach, all lines and tubes intact. Pt instructed on use of call light for assistance and fall prevention. Line management and environmental modifications made prior to and end of session to ensure patient safety and to increase efficiency of session. Skilled monitoring of HR, O2 saturation, blood pressure and patient's response to activity performed throughout session. Overall, pt presents with decreased activity tolerance, dynamic balance, functional mobility limiting completion of ADLs and safe return home. Pt can benefit from continued skilled OT to increase safety and functional independence. Treatment:   · Bed mobility: Facilitated bed mobility with cues for proper body mechanics and sequencing to prepare for ADL completion. Assistance of 2 required d/t pt deconditioned and safety. Almost total assist for transfer, pt able to initiate BLE movement. · ADL completion: Self-care retraining for the above-mentioned ADLs; training on proper hand placement, safety technique, sequencing, and energy conservation techniques. [place] assist to wash face seated EOB. · Therapeutic Exercises: B UE AAROM completed at all levels to maintain strength/flexibility and to decrease edema/contractures to enhance ADL completion. · Postural Balance: Sitting balance retraining to improve righting reactions with postural changes during ADLS. Sat EOB >8 minutes for increased balance, activity tolerance and upright posture for ADLs. · Skilled positioning: Proper positioning to improve interaction with environment, overall functioning and decrease/prevent edema and contractures.     Assistance of 2 required for safety d/t pt's medical complexity, line management and pt's deconditioned. Rehab Potential: Good for established goals     Patient / Family Goal: not stated      Patient and/or family were instructed on functional diagnosis, prognosis/goals and OT plan of care. Demonstrated good understanding. Eval Complexity: Re-eval  · History: Expanded chart review of consults, imaging, and psychosocial history related to current functional performance. · Exam: 5+ performance deficits identified limiting functional independence and safe return home   · Assistance/Modification: Min/mod assistance or modifications required to perform tasks. May have comorbidities that affect occupational performance. Time In: 10:00  Time Out: 10:30  Total Treatment Time: 15 minutes    Min Units   OT Eval Low 77534       OT Eval Medium 29092      OT Eval High 51451       OT Re-Eval 97168  x  1   Therapeutic Ex 06083       Therapeutic Activities 42288  10  1   ADL/Self Care 76619  5     Orthotic Management 58586       Neuro Re-Ed 35514       Non-Billable Time          Evaluation Time includes thorough review of current medical information, gathering information on past medical history/social history and prior level of function, completion of standardized testing/informal observation of tasks, assessment of data and education on plan of care and goals.           Johanne Regan OTR/L #[id number]

## 2021-09-16 NOTE — PLAN OF CARE
Problem: Non-Violent Restraints  Goal: Removal from restraints as soon as assessed to be safe  9/16/2021 0854 by Sandra Awan RN  Outcome: Completed  9/16/2021 0852 by Sandra Awan RN  Outcome: Met This Shift  9/16/2021 0334 by Sridhar Garcia RN  Outcome: Met This Shift  Goal: No harm/injury to patient while restraints in use  9/16/2021 0854 by Sandra Awan RN  Outcome: Completed  9/16/2021 0852 by Sandra Awan RN  Outcome: Met This Shift  9/16/2021 0334 by Sridhar Garcia RN  Outcome: Met This Shift  Goal: Patient's dignity will be maintained  9/16/2021 0854 by Sandra Awan RN  Outcome: Completed  9/16/2021 0852 by Sandra Awan RN  Outcome: Met This Shift  9/16/2021 0334 by Sridhar Garcia RN  Outcome: Met This Shift

## 2021-09-16 NOTE — PROGRESS NOTES
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, GERD, h/o adenocarcinoma of cecum, arthritis, spinal stenosis, former smoker, obesity      Procedure/Surgeries: 8/25/2021 CABG x3 (LIMA-LAD, SVG-Ramus, SVG-OM), LAAL, EVH, KLS plating     9/15/2021: Tracheostomy Percutaneous Bronchoscopy      Pacing wires: Ventricular (Cut 9/13/2021)       Intake/Output Summary (Last 24 hours) at 9/16/2021 0801  Last data filed at 9/16/2021 0700  Gross per 24 hour   Intake 1693 ml   Output 2020 ml   Net -327 ml       Recent Labs     09/14/21  0500 09/14/21  0500 09/15/21  0515 09/15/21  1540 09/16/21  0507   WBC 18.5*  --  15.8*  --  17.0*   HGB 8.1*   < > 7.7* 9.4* 7.6*   HCT 27.1*   < > 25.5* 30.7* 24.7*     --  158  --  154    < > = values in this interval not displayed. Lab Results   Component Value Date     09/16/2021    K 3.4 09/16/2021    K 4.3 09/06/2021     09/16/2021    CO2 29 09/16/2021    BUN 32 09/16/2021    CREATININE 0.9 09/16/2021    GLUCOSE 129 09/16/2021    CALCIUM 8.3 09/16/2021         Physical Exam:    BP (!) 100/57   Pulse 84   Temp 99.8 °F (37.7 °C) (Temporal)   Resp 17   Ht 5' 2\" (1.575 m)   Wt 231 lb 7.7 oz (105 kg)   LMP  (LMP Unknown)   SpO2 98%   BMI 42.34 kg/m²       CXR Findings:     - CXR personally read and interpreted by ICU Nurse Practitioner     General: s/p Trach, tolerating VS on ventilator. Eyes: PERRL, anicteric   Pulmonary: Diminished bibasilar.  No wheezes, no accessory muscle use noted   Ventilator: Mode: , 50% FiO2, 5 PEEP  Cardiovascular:  RRR, no heaves or thrills on palpation  Tele: SR   Abdomen: Soft, distended, +BS  Extremities: Palpable pulses all extremities, generalized pitting edema Neurologic/Psych: Drowsy on sedation, following commands weakly   Skin: Warm and dry  Incisions: MSI well approximated, RLE SVG sites with staples intact, well approximated     Lines:   Trach 9/14  Pino 8/25- replacing today   Left UE PICC 8/30  Small bowel feeding tube 9/14      Assessment/Plan: POD #22    1. CAD, PAF S/p CABG x3 (LIMA-LAD, SVG-Ramus, SVG-OM)  LAAL  - ASA, Hold Lipitor (total bilirubin 2.7)  - PICC placed 8/30  - RLE SVG sites: distal incision staples removed without difficulty; other incisions every other staple removed-- remove remaining staples in 1 week     2. Paroxysmal Atrial Fibrillation  - s/p LAAL   - On Xarelto preop for PAF  - post op with PAF, Remains SR  - D/c PO amio   - Started on Eliquis 9/15     3. Acute Hypoxic Respiratory failure   - former smoker, 2/2 surgery, extubated DOS,? Aspiration 8/27, HAP  - postop course c/b hypoxia required prolonged high flow oxygen  - s/p bronch 9/1, RLL, RML mucous plugging; f/u pathology   -7 day course Zosyn completed 9/2, On Ertapenem per ID   - Reintubated 9/5, s/p Tracheostomy 9/14  - Pulmonary following    - Supportive care, spontaneous breathing trials as tolerated, trach care      4. Acute Post Operative Pain   - PRN oxycodone      5. DM Type 2  - HgbA1C 6.2%  - SSI for glycemic control     6. Hypotension   - Suspected septic shock, HAP  - 9/7 Echo with normal LV function, Mildly dilated RV with mildly reduced RV function; started on Dobutamine gtt 9/7  - Weaned off levophed 9/9, off vaso 9/11, started on Midodrine 9/11  - Dobutamine weaned off 9/14, some hypotension overnight, improving with sedation wean  - Continue to wean stress steroids, still requiring midodrine      7.  Acute blood loss anemia  - expected blood loss 2/2 surgery, s/p one unit RBCs 8/26, 8/31, 9/7, 9/9, 9/14  - Likely dilutional due to volume overload, no obvious signs of bleeding   - Hgb 7.4, transfuse one unit today   - Monitor, transfuse PRN      8. Leukocytosis  - completed 7 day course of Zosyn 9/2   - WBC 17K, Afebrile  - weaning systemic steroids   - Respiratory Culture with Klebsiella oxytoca, enterobacter cloacae  - ID following, Completed Ertapenem 9/15  - 9/6 blood cultures negative    9. Anxiety  - PRN Ativan 0.5mg TID      10. BROOKE superimposed on CKD   - Baseline Scr 0.9-1.2, peaked at 2.9 on 9/6   - Scr stable at 0.8, good UOP on Bumex, UOP tapers off between doses   - Nephrology following  - Net + 13.8L, diuresis per nephrology, daily bumex   - Monitor UOP, labs, avoid hypotension     11. Dysphagia/ At risk for malnutrition   - Suspected dysphagia, possible aspiration 8/27  - Bronch 9/1 airways with evidence of chronic aspiration   - Not a candidate for PEG tube due to ascites   - Small bowel feeding tube placed 9/14; TFs running, advance to goal      12. 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 Thrombocytopenia  - Plts 330K>124K>108K>80K; Heparin stopped 9/9  - HIT + 9/9   - Platelets LBNKRK,262W, monitor on Eliquis       VTE Prophylaxis: Pharmacologic/Mechanical:  Yes, SCDs, Eliquis   Line infection prevention: Can CVC or arterial line be removed: yes remove arterial line   Continued need for urinary catheter:  Yes - clinical indication: Patient post major surgery requiring fluid balance and input and output measurement.     Dispo: Discharge planning per , to [facility] possible discharge today     Electronically signed by TEMO Bardales - CNP on 9/16/2021 at 8:01 AM

## 2021-09-16 NOTE — CARE COORDINATION
Per KISHAN li, [facility] assigned bed 4201B for pt and will be ready at noon and rep is working with pt's nurse regarding potential dc today.  Tacos/luke will follow

## 2021-09-16 NOTE — PLAN OF CARE
Problem: Falls - Risk of:  Goal: Will remain free from falls  Description: Will remain free from falls  Outcome: Met This Shift  Goal: Absence of physical injury  Description: Absence of physical injury  Outcome: Met This Shift     Problem: Discharge Planning:  Goal: Discharged to appropriate level of care  Description: Discharged to appropriate level of care  Outcome: Not Met This Shift     Problem:  Activity Intolerance:  Goal: Able to perform prescribed physical activity  Description: Able to perform prescribed physical activity  Outcome: Not Met This Shift  Goal: Ability to tolerate increased activity will improve  Description: Ability to tolerate increased activity will improve  Outcome: Not Met This Shift     Problem: Anxiety:  Goal: Level of anxiety will decrease  Description: Level of anxiety will decrease  Outcome: Not Met This Shift     Problem: Cardiac Output - Decreased:  Goal: Cardiac output within specified parameters  Description: Cardiac output within specified parameters  Outcome: Met This Shift  Goal: Hemodynamic stability will improve  Description: Hemodynamic stability will improve  Outcome: Met This Shift     Problem: Fluid Volume - Imbalance:  Goal: Ability to achieve a balanced intake and output will improve  Description: Ability to achieve a balanced intake and output will improve  Outcome: Met This Shift     Problem: Gas Exchange - Impaired:  Goal: Levels of oxygenation will improve  Description: Levels of oxygenation will improve  Outcome: Met This Shift  Goal: Ability to maintain adequate ventilation will improve  Description: Ability to maintain adequate ventilation will improve  Outcome: Met This Shift     Problem: Pain:  Goal: Pain level will decrease  Description: Pain level will decrease  Outcome: Met This Shift     Problem: Tissue Perfusion - Cardiopulmonary, Altered:  Goal: Absence of angina  Description: Absence of angina  Outcome: Met This Shift  Goal: Hemodynamic stability will improve  Description: Hemodynamic stability will improve  Outcome: Met This Shift  Goal: Will show no evidence of cardiac arrhythmias  Description: Will show no evidence of cardiac arrhythmias  Outcome: Met This Shift     Problem: Skin Integrity:  Goal: Will show no infection signs and symptoms  Description: Will show no infection signs and symptoms  Outcome: Met This Shift  Goal: Absence of new skin breakdown  Description: Absence of new skin breakdown  Outcome: Met This Shift     Problem: Pain:  Goal: Pain level will decrease  Description: Pain level will decrease  Outcome: Met This Shift     Problem: Musculor/Skeletal Functional Status  Goal: Highest potential functional level  Outcome: Not Met This Shift  Goal: Absence of falls  Outcome: Met This Shift     Problem: ABCDS Injury Assessment  Goal: Absence of physical injury  Outcome: Met This Shift     Problem: Non-Violent Restraints  Goal: Removal from restraints as soon as assessed to be safe  9/16/2021 0334 by Lam Iglesias RN  Outcome: Met This Shift  9/15/2021 1642 by Diana Young RN  Outcome: Not Met This Shift  Note: Pt aggitated and restless attempting to pull lines and trach unable to focus and redirect  Goal: No harm/injury to patient while restraints in use  9/16/2021 0334 by Lam Iglesias RN  Outcome: Met This Shift  9/15/2021 1642 by Diana Young RN  Outcome: Met This Shift  Note: No injury noted rom done pt turned eels and elbows elevated  Goal: Patient's dignity will be maintained  9/16/2021 0334 by Lam Iglesias RN  Outcome: Met This Shift  9/15/2021 1642 by Diana Young RN  Outcome: Met This Shift  Note: Dignity maintained

## 2021-09-16 NOTE — PROGRESS NOTES
[facility] PROGRESS NOTE      Chief complaint: Follow-up of septic shock    The patient is a 80 y.o. female with history of hypertension, hyperlipidemia, paroxysmal atrial fibrillation, renal artery aneurysm, cirrhosis, DM, CKD, admitted on 08/25 for elective CABG. Post-op course was complicated by fever up to 101.1 F on 08/29 with worsening leukocytosis up to currently 22,000, atrial fibrillation with rapid ventricular response, hypoxia, BROOKE. CXR on admission showed bilateral airspace disease and small bilateral pleural effusion. She underwent bronchoscopy on 09/01 during which diffuse scattered mucosal hemorrhages throughout the trachea and  thick clear secretions occluding the RML and RLL bronchi were noted. Bronchial wash Gram stain and culture showed rare polymorphonuclear leukocytes, rare epithelial cells, rare yeast, absent oral pharyngeal lisy, moderate growth of Candida albicans. She was reintubated on 09/05 due to decreasing level of consciousness and increasing respiratory distress, accompanied by hypothermia and shock. CXR on 09/06 showed persistent bilateral parenchymal infiltrates and probable small effusions, essentially unchanged from that on admission. Procalcitonin level was elevated at 0.31 ng/mL. Respiratory Gram stain and culture on 09/06 showed few polymorphonuclear leukocytes, few epithelial cells, no organisms, light growth of yeast, Klebsiella oxytoca (non-ESBL; susceptible to cotrimoxazole and fluoroquinolones) and Enterobacter cloacae (susceptible to cotrimoxazole and fluoroquinolones). Beta-D-glucan was negative. Urine Streptococcus pneumoniae and Legionella antigens were negative. SARS-CoV-2 PCR was not detected. She received cefazolin from 08/25-08/27, piperacillin-tazobactam from 08/27-09/02. Meropenem was started on 09/06. Subjective: Patient was seen and examined. She is awake, confused, responds selectively to questions, reports abdominal pain.     Objective:  BP (!) 100/57 Pulse 84   Temp 99.8 °F (37.7 °C) (Temporal)   Resp 17   Ht 5' 2\" (1.575 m)   Wt 231 lb 7.7 oz (105 kg)   LMP  (LMP Unknown)   SpO2 98%   BMI 42.34 kg/m²   Constitutional: Tracheostomy in place, no MV dyssynchrony  Respiratory: Clear breath sounds, no crackles, no wheezes  Cardiovascular: Regular rate and rhythm, no murmurs  Gastrointestinal: Bowel sounds present, soft, distended   Skin: Warm and dry, no active dermatoses  Musculoskeletal: No joint swelling, no joint erythema    Labs, imaging, and medical records/notes were personally reviewed. Assessment:  Shock, resolved, presumed septic, secondary to Klebsiella oxytoca and Enterobacter cloacae HAP  Acute hypoxic respiratory failure  Candida albicans on respiratory culture, deemed colonization  Leukocytosis, on steroids  CAD s/p CABG on 08/25  BROOKE on CKD    Recommendations:  Monitor clinically off antibiotics for now. Continue supportive care.     Thank you for involving me in the care of Iggy Mojica. I will continue to follow. Please do not hesitate to call for any questions or concerns.     Electronically signed by Susanne Castleman, MD on 9/16/2021 at 10:19 AM

## 2021-09-16 NOTE — PROGRESS NOTES
Physical Therapy  Physical Therapy Re-Assessment     Name: Janice Fox  : [birth year]  MRN: [id number]      Date of Service: 2021    Evaluating PT:  Kiara Bustamante PT, DPT [id number]    Room #:  7809/0579-D  Diagnosis:  CAD in native artery [I25.10]  PMHx/PSHx:  Arthritis, DM, HLD, HTN  Procedure/Surgery:   CABG x 3  Precautions:  Falls, Sternal, trach to vent, NG tube  Equipment Needs:  TBD    SUBJECTIVE:    Pt lives with  in a 1 story condo with 2 stairs to enter and 2 rail. Pt ambulated with [employer] or SPC and was independent PTA. OBJECTIVE:   Re-Evaluation  Date: 21 Treatment Short Term/ Long Term   Goals   AM-PAC 6 Clicks      Was pt agreeable to Eval/treatment? Yes     Does pt have pain?  Surgical pain - unable to rate     Bed Mobility  Rolling: Dep  Supine to sit: Dep x 2 with HOB elevated  Sit to supine: Dep x 2  Scooting: Dep  MaxA   Transfers Sit to stand: NT  Stand to sit: NT  Stand pivot: NT  MaxA with [employer] if appropriate   Ambulation   NT  >25 feet with MaxA with [employer] if appropriate   Stair negotiation: ascended and descended NT  NA   ROM BUE:  Defer to OT note  BLE:  WFL     Strength BUE:  Defer to OT note  BLE:  2/5  Increase by 1/3 MMT grade   Balance Sitting EOB:  MaxA dynamic  Dynamic Standing:  NT  Sitting EOB:  SBA  Dynamic Standing:  MaxA with WW if appropriate     Pt is A & O x 2 self and place  CAM-ICU: NT  RASS: 0  Sensation:  No reported paresthesias  Edema:  None    Vitals:  Heart Rate at rest 83 bpm Heart Rate post session 88 bpm   SpO2 at rest 98% SpO2 post session 98%   Blood Pressure at rest 127/61 mmHg Blood Pressure post session 132/69 mmHg       Functional Status Score-Intensive Care Unit (FSS-ICU)   Rolling 1/7   Supine to sit transfer 1/7   Unsupported sitting  2/7   Sit to stand transfers -/   Ambulation /   Total  -/35     Therapeutic Exercises:  NA    Patient education  Pt educated on safety    Patient response to education:   Pt verbalized understanding Pt demonstrated skill Pt requires further education in this area   x x x     ASSESSMENT:    Conditions Requiring Skilled Therapeutic Intervention:    [x]Decreased strength     []Decreased ROM  [x]Decreased functional mobility  [x]Decreased balance   [x]Decreased endurance   [x]Decreased posture  []Decreased sensation  []Decreased coordination   []Decreased vision  []Decreased safety awareness   [x]Increased pain       Comments:  NP reported pt was stable for session. Pt was in bed upon arrival, agreeable to re-evaluation s/p decline in medical status and need for updated POC. Pt was educated on sternal precautions and PLB prior to activity. Pt was anxious at time and required cues for deep breathing. Total assistance provided for bed mobility although pt did attempt to initiate movement. Dizziness reported at EOB but improved with time. Pt completed minimal BLE ROM at EOB due to deconditioning. Pt was returned to bed and placed on bedpan per request.  All needs met and call light in reach. All lines remained intact. Treatment:  Patient practiced and was instructed in the following treatment:     Bed mobility training - pt given verbal and tactile cues to facilitate proper sequencing and safety during rolling and supine>sit as well as provided with physical assistance.  Sitting EOB for >8 minutes for upright tolerance, postural awareness and BLE ROM   Skilled positioning - Pt placed in the chair position with pillows utilized to facilitate upright posture, joint and skin integrity, and interaction with environment. Pt's/ family goals   1. Return home    Prognosis is good for reaching above PT goals. Patient and or family understand(s) diagnosis, prognosis, and plan of care.   yes    PHYSICAL THERAPY PLAN OF CARE:    PT POC is established based on physician order and patient diagnosis     Referring provider/PT Order:  Zaid Salgado, /08/26/21 0600 PT eval and treat  Diagnosis:

## 2021-09-16 NOTE — PROGRESS NOTES
[facility]  Nephrology Attending Progress Note          SUBJECTIVE:     9/3/21: Amna Franklin is a 80 y.o. female with h/o HFpEF, refractory A fib  s/p DCCV X 2, hypertension, hyperlipidemia and other medical problems listed below. She presented for elective CABG on 8/25 following ischemic work-up and [facility] showing  MV CAD. 6/8/21. Procedure was performed on 8/25. Her postop course has been complicated by acute respiratory failure, atrial fibrillation with RVR and intermittent fevers. She has required Zosyn. Preop creatinine has been 1-1.2 which is her recent baseline. In the last 48 hours creatinine has shown a progressive increase to currently 1.8 mg/dL associated with decreasing urine output. She has received albumin bolus but without any significant improvement. Hence renal consult.     9/4/21: pt seen in icu, has low urine output, just received iv albumin    9/5/21: seen in icu, started on ns at 76 yesterday. Remains with low uo. 9/6/21: pt reintubated and started on levo for hypotension, now hypothermic and with elevated wbc    9/7: seen in icu, uo increasing, intubated, starting dopa, on amio, vaso, levo    9/8: pt remains in icu, on dopamine, vaso, levo, amio, and heparin, intubated    9/9: pt seen in icu, intubated, on dopa and vaso    9/10: pt seen in icu, remains intubated, on dopa and weaning vaso    9/11: No acute issues overnight; remains intubated    9/12: No new acute issues from overnight; remains intubated    9/13: pt seen in icu, intubated. Had run of VT. On dobutamine    9/14: pt seen in icu, intubated.      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)    Chronic heart failure with preserved ejection fraction (HCC)    Atrial fibrillation with RVR (HCC)    Severe protein-calorie malnutrition (HCC)    Abnormal nuclear stress test    Opioid use, unspecified with unspecified opioid-induced disorder    Opioid dependence with unspecified opioid-induced disorder    Opioid dependence, uncomplicated    CAD in native artery    Pre-operative laboratory examination    Acute blood loss anemia    Leukocytosis    At risk for malnutrition    Hypotension        PAST MEDICAL HISTORY:    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[facility] 01/2019    Anemia     Arm numbness     Arthritis     Blood transfusion     Cervical spinal stenosis     Cirrhosis of liver (HCC)     Diabetes mellitus (HCC)     Fatty liver     GERD (gastroesophageal reflux disease)     Hyperlipidemia     Hypertension     Low back pain     Lumbar stenosis     Neck pain     Obesity (BMI 30.0-34.9) 10/14/2012    PAF (paroxysmal atrial fibrillation) (HCC)     Renal artery aneurysm ([facility] 7/15/2015       DIET:    ADULT TUBE FEEDING; Nasoenteric; Standard without Fiber; Continuous; 20; Yes; 10; Q 4 hours; 40; 30; Q 4 hours; Protein; BID     PHYSICAL EXAM:     Patient Vitals for the past 24 hrs:   BP Temp Temp src Pulse Resp SpO2 Weight   09/16/21 1100 125/60 -- -- 94 -- 97 % --   09/16/21 1028 -- -- -- -- -- 98 % --   09/16/21 1000 127/61 -- -- 83 -- 98 % --   09/16/21 0900 (!) 90/34 -- -- 81 -- 97 % --   09/16/21 0800 (!) 104/47 97.6 °F (36.4 °C) Temporal 82 -- 97 % --   09/16/21 0700 (!) 100/57 -- -- 84 17 98 % --   09/16/21 0600 (!) 96/44 -- -- 80 17 97 % 231 lb 7.7 oz (105 kg)   09/16/21 0500 (!) 106/43 -- -- 81 19 98 % --   09/16/21 0400 (!) 101/52 -- -- 82 23 96 % --   09/16/21 0300 (!) 93/46 -- -- 83 15 98 % --   09/16/21 0200 (!) 101/39 -- -- 84 16 98 % --   09/16/21 0115 -- -- -- 85 -- 97 % --   09/16/21 0100 (!) 104/48 -- -- 86 20 96 % --   09/16/21 0000 (!) 103/43 99.8 °F (37.7 °C) Temporal 85 22 96 % --   09/15/21 2300 111/60 -- -- 87 16 98 % --   09/15/21 2200 (!) 112/51 -- -- 92 13 95 % --   09/15/21 2100 (!) 108/55 -- -- 91 15 97 % --   09/15/21 2053 -- -- -- 89 -- 93 % --   09/15/21 2000 (!) 145/67 98.6 °F (37 °C) Axillary 97 30 93 % --   09/15/21 1900 126/86 -- -- 98 22 92 % --   09/15/21 1800 134/66 -- -- 91 22 92 % --   09/15/21 1700 -- -- -- 101 -- 92 % --   09/15/21 1640 -- -- -- -- -- 93 % --   09/15/21 1638 -- -- -- 103 -- 94 % --   09/15/21 1600 137/64 97.6 °F (36.4 °C) Temporal 107 -- 96 % --   09/15/21 1430 128/64 97.4 °F (36.3 °C) Temporal 101 22 98 % --   09/15/21 1415 (!) 113/58 -- -- 102 20 98 % --   09/15/21 1400 124/60 97.2 °F (36.2 °C) Temporal 91 18 99 % --   09/15/21 1345 (!) 107/53 -- -- 100 16 98 % --   09/15/21 1330 130/67 97.5 °F (36.4 °C) Temporal 97 20 98 % --   09/15/21 1315 130/61 -- -- 94 16 100 % --   09/15/21 1300 (!) 127/57 97 °F (36.1 °C) Temporal 94 20 99 % --   09/15/21 1227 -- -- -- 94 -- -- --   09/15/21 1200 (!) 94/40 97.3 °F (36.3 °C) Temporal 86 18 96 % --   @      Intake/Output Summary (Last 24 hours) at 9/16/2021 1140  Last data filed at 9/16/2021 1100  Gross per 24 hour   Intake 1493 ml   Output 2565 ml   Net -1072 ml         Wt Readings from Last 3 Encounters:   09/16/21 231 lb 7.7 oz (105 kg)   08/23/21 170 lb (77.1 kg)   07/16/21 175 lb (79.4 kg)       Constitutional:  Pt is intubated  Head: normocephalic, atraumatic  Neck: no JVD  Cardiovascular: regular rate and rhythm, no murmurs, gallops, or rubs  Respiratory:  No rales, rhochi, or wheezes  Gastrointestinal:  Soft, nontender, nondistended, bowel sounds x 4  Ext: 1+ edema  Skin: dry, no rash      MEDS (scheduled):    [START ON 9/17/2021] hydrocortisone sodium succinate PF  50 mg IntraVENous Daily    potassium bicarbonate  50 mEq Oral Daily    apixaban  5 mg Oral BID    bisacodyl  10 mg Rectal Daily    miconazole   Topical BID    bumetanide  1 mg IntraVENous Daily with breakfast    midodrine  5 mg Oral TID WC    insulin lispro  0-12 Units SubCUTAneous Q4H    albumin human  25 g IntraVENous Once    polyethylene glycol  17 g Oral Daily    chlorhexidine  15 mL Mouth/Throat BID    albumin human  25 g IntraVENous Once    aspirin  81 mg Oral Daily    pantoprazole  40 mg IntraVENous Daily    And    sodium chloride (PF)  10 mL IntraVENous Daily    docusate sodium  100 mg Oral BID    sennosides  5 mL Oral Nightly    sodium chloride flush  5-40 mL IntraVENous 2 times per day    [Held by provider] metoprolol tartrate  12.5 mg Oral BID    pramipexole  0.5 mg Oral TID    ipratropium-albuterol  1 ampule Inhalation Q4H WA       MEDS (infusions):   sodium chloride      sodium chloride      sodium chloride 30 mL/hr at 09/15/21 2242    sodium chloride      sodium chloride 30 mL/hr (08/31/21 0946)    sodium chloride      dextrose         MEDS (prn):  sodium chloride, oxyCODONE, LORazepam, sodium chloride, perflutren lipid microspheres, benzocaine-menthol, sodium chloride flush, sodium chloride, ondansetron, acetaminophen, acetaminophen, magnesium hydroxide, potassium chloride, magnesium sulfate, albumin human, sodium chloride, glucose, dextrose, glucagon (rDNA), dextrose, calcium gluconate IVPB    DATA:    Recent Labs     09/14/21  0500 09/14/21  0500 09/15/21  0515 09/15/21  1540 09/16/21  0507   WBC 18.5*  --  15.8*  --  17.0*   HGB 8.1*   < > 7.7* 9.4* 7.6*   HCT 27.1*   < > 25.5* 30.7* 24.7*   MCV 88.6  --  89.5  --  88.5     --  158  --  154    < > = values in this interval not displayed.      Recent Labs     09/14/21  0500 09/14/21  0500 09/15/21  0515 09/15/21  0515 09/15/21  1330 09/15/21  1804 09/16/21  0507     --  146  --   --   --  145   K 3.7   < > 3.1*   < > 2.98* 3.65 3.4*     --  107  --   --   --  106   CO2 24  --  28  --   -- --  29   BUN 39*  --  33*  --   --   --  32*   CREATININE 1.0  --  0.9  --   --   --  0.9   LABGLOM 53  --  >60  --   --   --  >60   GLUCOSE 123*  --  91  --   --   --  129*   CALCIUM 8.5*  --  8.3*  --   --   --  8.3*   ALT 28  --  28  --   --   --  36*   AST 29  --  38*  --   --   --  62*   BILIDIR 0.8*  --  0.7*  --   --   --  0.7*   BILITOT 1.7*  --  2.0*  --   --   --  2.7*   ALKPHOS 64  --  63  --   --   --  62   MG 2.4  2.3  --  2.1  --   --   --  2.0    < > = values in this interval not displayed. Lab Results   Component Value Date    LABALBU 3.0 (L) 09/16/2021    LABALBU 3.2 (L) 09/15/2021    LABALBU 3.3 (L) 09/14/2021     Lab Results   Component Value Date    TSH 2.250 06/21/2021       Iron Studies  No results found for: IRON, TIBC, FERRITIN  No results found for: XWXDVUFR19  No results found for: FOLATE    No results found for: VITD25  No results found for: PTH    No components found for: URIC    Lab Results   Component Value Date    COLORU Yellow 08/28/2021    LABPH 0 10/22/2019    NITRU Negative 08/28/2021    GLUCOSEU Negative 08/28/2021    KETUA TRACE 08/28/2021    UROBILINOGEN 0.2 08/28/2021    BILIRUBINUR Negative 08/28/2021       No results found for: Jocelyn Araiza      IMPRESSION/RECOMMENDATIONS:      1. Acute kidney injury stage I superimposed on CKD stage IIIa  BROOKE prerenal, FEurea 16% and FENa .04%, volume depletion  Cr peaked at 2.8 now 0.9  pola no hydro  Fluid balance +++  Daily bumex     2. CAD s/p CABG X 3 . 8/25  CO 2.2, CI 1.7  On dobutrex     3. Acute postop respiratory failure  Intubated 9/5  Bronch 9/1 with mucus plugging  Worsening wbc and hypotension  Id following  Started merrem for hap  Now off pressors     4. A fib RVR  On amio     5. Metabolic acidosis  Improved with HCO3 drip  Now resolved  Improved  Now off    6.  Hypokalemia  Replace prn    Madhuri Francisco MD

## 2021-09-17 NOTE — PROGRESS NOTES
Stephany King M.D.,[facility]  Chet Bliss D.O., F.A.C.O.I., Dawson Mccormick M.D. Emiliano Lai M.D. James Norton D.O. Daily Pulmonary Progress Note    Patient:  Terri Nur 80 y.o. female MRN: [id number]     Date of Service: 9/16/2021      Synopsis     We are following patient for acute on chronic hypoxic respiratory failure      Code status: Full code      Subjective      Patient was seen and examined. S/p trach 9/14    Review of Systems:  Unable to obtain    24-hour events:  none    Objective   Vitals: BP (!) 114/59   Pulse 80   Temp 98.4 °F (36.9 °C) (Axillary)   Resp 15   Ht 5' 2\" (1.575 m)   Wt 231 lb 7.7 oz (105 kg)   LMP  (LMP Unknown)   SpO2 100%   BMI 42.34 kg/m²     I/O:    Intake/Output Summary (Last 24 hours) at 9/16/2021 2215  Last data filed at 9/16/2021 1417  Gross per 24 hour   Intake 1033 ml   Output 2135 ml   Net -1102 ml       Vent Information  $Ventilation: $Subsequent Day  Skin Assessment: Clean, dry, & intact  Suction Catheter Diameter: 14  Equipment ID: [id number]  Equipment Changed: Vent Circuit  Vent Type: 980  Vent Mode: VS  Vt Ordered: 0 mL  Rate Set: 0 bmp  Peak Flow: 0 L/min  Pressure Support: 0 cmH20  FiO2 : 50 %  SpO2: 100 %  SpO2/FiO2 ratio: 200  Sensitivity: 1  PEEP/CPAP: 5  I Time/ I Time %: 0 s  Humidification Source: Heated wire  Humidification Temp: 37  Humidification Temp Measured: 37  Circuit Condensation: Not drained  Mask Type: Full face mask  Mask Size: Medium  Bonnet size: Medium       IPAP: 16 cmH20  CPAP/EPAP: 8 cmH2O     CURRENT MEDS :  Scheduled Meds:      Physical Exam:  General Appearance: Intubated, sedated in no distress  HEENT: Normocephalic atraumatic without obvious abnormality   Neck: Lips, mucosa, and tongue normal.  Supple, symmetrical, trachea midline, no adenopathy;thyroid:  no enlargement/tenderness/nodules or JVD. ETT tube inplace  Lung: Diffuse coarse bilateral lung sounds  heart: RRR, normal S1, S2.  No MRG  Abdomen: Appears to be less distended and soft today. Extremities: Pedal pulses 2+ symmetric b/l. Extremities normal, no cyanosis, clubbing, or edema. Musculokeletal: No joint swelling, no muscle tenderness. ROM normal in all joints of extremities. Neurologic: Cranial nerves II to XII grossly intact. sedated    Pertinent/ New Labs and Imaging Studies     Imaging Personally Reviewed:    1    ECHO  9/7/2021  Left ventricular size is grossly normal.   Ejection fraction is visually estimated at 60 to 65%. The left atrium is mild-moderately dilated. Mildly dilated right ventricle. Right ventricle global systolic function is mildly reduced . Mildly enlarged right atrium size. The tricuspid valve was not well visualized. Severe tricuspid regurgitation. Labs:  Lab Results   Component Value Date    WBC 17.0 09/16/2021    HGB 7.6 09/16/2021    HCT 24.7 09/16/2021    MCV 88.5 09/16/2021    MCH 27.2 09/16/2021    MCHC 30.8 09/16/2021    RDW 16.9 09/16/2021     09/16/2021    MPV 9.9 09/16/2021     Lab Results   Component Value Date     09/16/2021    K 3.4 09/16/2021    K 4.3 09/06/2021     09/16/2021    CO2 29 09/16/2021    BUN 32 09/16/2021    CREATININE 0.9 09/16/2021    LABALBU 3.0 09/16/2021    CALCIUM 8.3 09/16/2021    GFRAA >60 09/16/2021    LABGLOM >60 09/16/2021     Lab Results   Component Value Date    PROTIME 14.5 08/25/2021    INR 1.3 08/25/2021     No results for input(s): PROBNP in the last 72 hours. No results for input(s): PROCAL in the last 72 hours. This SmartLink has not been configured with any valid records. Micro:  No results for input(s): CULTRESP in the last 72 hours. No results for input(s): LABGRAM in the last 72 hours. No results for input(s): LEGUR in the last 72 hours. No results for input(s): STREPNEUMAGU in the last 72 hours. No results for input(s): LP1UAG in the last 72 hours.   Results for Minh Dow (MRN [id number]) as of 9/7/2021 11:31  Results dose of Bumex today. 5. Continue amiodarone for A. fib  6. Patient with cirrhosis and ascites, question need for paracentesis, not a candidate for her PEG tube due to her ascites  7. Weaned hydrocortisone down to 50 mg q12  8. Continue trach care  9. Pain and anxiety meds switched to PO    Ok from Pulm POV to go to [facility], will continue to follow. Discussed with RN, Critical care, and case management.     Heidi Whelan DO

## 2021-09-22 ENCOUNTER — OUTSIDE SERVICES (OUTPATIENT)
Dept: VASCULAR SURGERY | Age: 81
End: 2021-09-22

## 2021-09-22 DIAGNOSIS — T81.31XS POSTOPERATIVE WOUND BREAKDOWN, SEQUELA: Primary | Chronic | ICD-10-CM

## 2021-09-22 PROCEDURE — 99222 1ST HOSP IP/OBS MODERATE 55: CPT | Performed by: SURGERY

## 2021-09-30 ENCOUNTER — OUTSIDE SERVICES (OUTPATIENT)
Dept: VASCULAR SURGERY | Age: 81
End: 2021-09-30

## 2021-09-30 ENCOUNTER — HOSPITAL ENCOUNTER (OUTPATIENT)
Dept: INTERVENTIONAL RADIOLOGY/VASCULAR | Age: 81
Discharge: HOME | End: 2021-10-02

## 2021-09-30 VITALS — SYSTOLIC BLOOD PRESSURE: 125 MMHG | HEART RATE: 86 BPM | DIASTOLIC BLOOD PRESSURE: 66 MMHG | OXYGEN SATURATION: 93 %

## 2021-09-30 DIAGNOSIS — T81.31XS POSTOPERATIVE WOUND BREAKDOWN, SEQUELA: Primary | Chronic | ICD-10-CM

## 2021-09-30 PROCEDURE — C1729 CATH, DRAINAGE: HCPCS

## 2021-09-30 PROCEDURE — 32555 ASPIRATE PLEURA W/ IMAGING: CPT | Performed by: RADIOLOGY

## 2021-09-30 PROCEDURE — 99233 SBSQ HOSP IP/OBS HIGH 50: CPT | Performed by: SURGERY

## 2021-09-30 NOTE — PROGRESS NOTES
Patient was identified via 2 patient identifiers and arrived to pre-work up area in stable condition. The patient is here for an ultrasound-guided thoracentesis. Procedure explained by Megha ZARATE(ETHAN)(CT)(MR)(VI) and Dr. Radha Dickey. All questions answered, patient expresses understanding and verbal consent was obtained by . Pre-procedure routine/checklist was completed. The chest was scanned and marked under the guidance of ultrasound. The tray was prepared sterilely and the patient was prepped and draped sterilely. Lidocaine 2% 4 mL was injected intradermally for anesthetic and an incision was made into the right of the chest.       Time Out: 1115  Procedure Start:  1115  Drain Placed: 1116  Drain Pulled: 1125  Fluid drained: 350 mL, clear yellow colored fluid  Specimens were sent for testing. The patient tolerated the procedure well. The incision site cleansed and dry dressing of gauze, vaseline gauze and OpSite were applied. No bleeding, swelling or complications noted. The patient had no questions. Vital signs remained stable and the dressing remained clean, dry, and intact. The patient left the department in stable condition, with no complaints of pain or discomfort.

## 2021-09-30 NOTE — H&P
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    Anemia     Arm numbness     Arthritis     Blood transfusion     Cervical spinal stenosis     Cirrhosis of liver (HCC)     Diabetes mellitus ([address]     Fatty liver     GERD (gastroesophageal reflux disease)     Hyperlipidemia     Hypertension     Low back pain     Lumbar stenosis     Neck pain     Obesity (BMI 30.0-34.9) 10/14/2012    PAF (paroxysmal atrial fibrillation) (HCC)     Renal artery aneurysm ([facility] 7/15/2015       Past Surgical History:   Procedure Laterality Date    APPENDECTOMY      BACK SURGERY      BREAST SURGERY      reduction    BRONCHOSCOPY N/A 9/1/2021    BRONCHOSCOPY DIAGNOSTIC OR CELL [address] ONLY performed by Ruben Stark MD at [address]  06/28/2021    DR Maria Eugenia Escalona    CARDIOVERSION  06/23/2021    CARPAL TUNNEL RELEASE      CATARACT REMOVAL  10/2016    CERVICAL DISCECTOMY  01/19/2007    ACDF C3-4, C4-5, C5-6 Dr. Ashia Gonzalez N/A 8/25/2021    CABG ROGELIO performed by Ivone Parsons DO at [address] [facility]      left lower leg    HEMICOLECTOMY N/A 2/19/2019    DIAGNOSTIC LAPAROSCOPY, LYSIS OF ADHESIONS, OPEN RIGHT HEMICOLECTOMY performed by Justin Pacheco MD at [address] [place] [address]  05/17/2017    [facility].  Dr.Joseph Lockwood Lenny TRACHEAL SURGERY N/A 9/14/2021    TRACHEOTOMY PERCUTANEOUS BRONCHOSCOPY  (bedside) performed by Justin Pacheco MD at [address] [facility] TRANSESOPHAGEAL ECHOCARDIOGRAM  06/23/2021    UPPER GASTROINTESTINAL ENDOSCOPY N/A 11/5/2018    EGD BIOPSY performed by Johan Hylton MD at [place] [address] N/A 12/7/2020    EGD BIOPSY performed by Claude Weller MD at [place] [address]  12/7/2020    EGD CONTROL HEMORRHAGE performed by Claude Weller MD at [place] [address] History   Problem Relation Age of Onset    Diabetes Mother     Stroke Father     Diabetes Sister     High Blood Pressure Sister     Diabetes Brother     High Blood Pressure Brother     Cancer Brother     Heart Disease Brother        Social History     Socioeconomic History    Marital status:      Spouse name: Not on file    Number of children: Not on file    Years of education: Not on file    Highest education level: Not on file   Occupational History    Not on file   Tobacco Use    Smoking status: Former Smoker     Packs/day: 2.00     Years: 15.00     Pack years: 30.00     Quit date: 3/1/1997 Years since quittin.6    Smokeless tobacco: Never Used   Vaping Use    Vaping Use: Never used   Substance and Sexual Activity    Alcohol use: Not Currently    Drug use: No    Sexual activity: Not on file   Other Topics Concern    Not on file   Social History Narrative    Not on file     Social Determinants of Health     Financial Resource Strain:     Difficulty of Paying Living Expenses:    Food Insecurity:     Worried About Running Out of Food in the Last Year:     [address] in the Last Year:    Transportation Needs:     Lack of Transportation (Medical):      Lack of Transportation (Non-Medical):    Physical Activity:     Days of Exercise per Week:     Minutes of Exercise per Session:    Stress:     Feeling of Stress :    Social Connections:     Frequency of Communication with Friends and Family:     Frequency of Social Gatherings with Friends and Family:     Attends [employer] Services:     Active Member of Clubs or Organizations:     Attends Club or Organization Meetings:     Marital Status:    Intimate Partner Violence:     Fear of Current or Ex-Partner:     Emotionally Abused:     Physically Abused:     Sexually Abused:        ROS: Non-contributory other than as noted above    PHYSICAL EXAM:      Heart and Lungs:  demonstrate no contraindications to proceed    DATA:  CBC:   Lab Results   Component Value Date    WBC 7.2 2021    RBC 2.83 2021    HGB 7.9 2021    HCT 27.2 2021    MCV 96.1 2021    MCH 27.9 2021    MCHC 29.0 2021    RDW 21.3 2021     2021    MPV 10.7 2021     CBC with Differential:    Lab Results   Component Value Date    WBC 7.2 2021    RBC 2.83 2021    HGB 7.9 2021    HCT 27.2 2021     2021    MCV 96.1 2021    MCH 27.9 2021    MCHC 29.0 2021    RDW 21.3 2021    NRBC 1.0 2021    LYMPHOPCT 10.0 2021    MONOPCT 4.0 2021 MYELOPCT 6.0 09/28/2021    BASOPCT 0.0 09/28/2021    MONOSABS 0.10 09/28/2021    LYMPHSABS 0.25 09/28/2021    EOSABS 0.00 09/28/2021    BASOSABS 0.00 09/28/2021     Platelets:    Lab Results   Component Value Date     09/30/2021     BUN/Creatinine:    Lab Results   Component Value Date    BUN 29 09/30/2021    CREATININE 0.7 09/30/2021       ASSESSMENT AND PLAN:  1. Right pleural effusion plan thora  2. Procedure options, risks and benefits reviewed with patient. Patient expresses understanding.     Electronically signed by Danny Noland II, MD on 9/30/2021 at 11:24 AM

## 2021-09-30 NOTE — PROGRESS NOTES
Thoracentesis completed on right, removed 350 cc luca fluid. Called report to JANES Buenrostro in [facility] and notified of need to accompany patient back to unit. Post thoracentesis chest x-ray completed.

## 2021-09-30 NOTE — BRIEF OP NOTE
Brief Postoperative Note    Miguel A Grace  YOB: 1940  [id number]    Pre-operative Diagnosis and Procedure: Right pleural effusion plan thora    Post-operative Diagnosis: Same    Anesthesia: Local    Estimated Blood Loss: < 10 cc    Surgeon: Kellie SMITH     Complications: none    Specimen obtained: none     Findings: none     Lady Lico II, MD   9/30/2021 11:25 AM

## 2021-09-30 NOTE — H&P (VIEW-ONLY)
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     Allergies   Allergen Reactions    Iodine Shortness Of Breath     SOB, Rash    Heparin Other (See Comments)     HIT +; Heparin induced platelet antibodies 5/7/4000     Benadryl [Diphenhydramine]      hyper    Fish-Derived Products Rash       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[facility] 01/2019    Anemia     Arm numbness     Arthritis     Blood transfusion     Cervical spinal stenosis     Cirrhosis of liver (HCC)     Diabetes mellitus ([address]     Fatty liver     GERD (gastroesophageal reflux disease)     Hyperlipidemia     Hypertension     Low back pain     Lumbar stenosis     Neck pain     Obesity (BMI 30.0-34.9) 10/14/2012    PAF (paroxysmal atrial fibrillation) (HCC)     Renal artery aneurysm ([facility] 7/15/2015       Past Surgical History:   Procedure Laterality Date    APPENDECTOMY      BACK SURGERY      BREAST SURGERY      reduction    BRONCHOSCOPY N/A 9/1/2021    BRONCHOSCOPY DIAGNOSTIC OR [facility]  06/28/2021    DR Geri Oneil    CARDIOVERSION  06/23/2021    CARPAL TUNNEL RELEASE      CATARACT REMOVAL  10/2016    CERVICAL DISCECTOMY  01/19/2007    ACDF C3-4, C4-5, C5-6 Dr. Alli Myrick N/A 8/25/2021    CABG ROGELIO performed by Matthew Mayfield DO at [place]      left lower leg    HEMICOLECTOMY N/A 2/19/2019    DIAGNOSTIC LAPAROSCOPY, LYSIS OF ADHESIONS, OPEN RIGHT HEMICOLECTOMY performed by Arian Rodriguez MD at [address] [place] [address]  05/17/2017    [facility].   [facility] TRACHEAL SURGERY N/A 9/14/2021    TRACHEOTOMY PERCUTANEOUS BRONCHOSCOPY  (bedside) performed by Arian Rodriguez MD at [address] TRANSESOPHAGEAL ECHOCARDIOGRAM  06/23/2021    UPPER GASTROINTESTINAL ENDOSCOPY N/A 11/5/2018    EGD BIOPSY performed by Josseline Buck MD at [facility] N/A 12/7/2020    EGD BIOPSY performed by Carlo Sears MD at [facility]  12/7/2020    EGD CONTROL HEMORRHAGE performed by Carlo Sears MD at [place] [address] History   Problem Relation Age of Onset    Diabetes Mother     Stroke Father     Diabetes Sister     High Blood Pressure Sister     Diabetes Brother     High Blood Pressure Brother     Cancer Brother     Heart Disease Brother        Social History     Socioeconomic History    Marital status:      Spouse name: Not on file    Number of children: Not on file    Years of education: Not on file    Highest education level: Not on file   Occupational History    Not on file   Tobacco Use    Smoking status: Former Smoker     Packs/day: 2.00     Years: 15.00     Pack years: 30.00     Quit date: 3/1/1997 Years since quittin.6    Smokeless tobacco: Never Used   Vaping Use    Vaping Use: Never used   Substance and Sexual Activity    Alcohol use: Not Currently    Drug use: No    Sexual activity: Not on file   Other Topics Concern    Not on file   Social History Narrative    Not on file     Social Determinants of Health     Financial Resource Strain:     Difficulty of Paying Living Expenses:    Food Insecurity:     Worried About Running Out of Food in the Last Year:     [address] in the Last Year:    Transportation Needs:     Lack of Transportation (Medical):      Lack of Transportation (Non-Medical):    Physical Activity:     Days of Exercise per Week:     Minutes of Exercise per Session:    Stress:     Feeling of Stress :    Social Connections:     Frequency of Communication with Friends and Family:     Frequency of Social Gatherings with Friends and Family:     Attends Judaism Services:     Active Member of Clubs or Organizations:     Attends Club or Organization Meetings:     Marital Status:    Intimate Partner Violence:     Fear of Current or Ex-Partner:     Emotionally Abused:     Physically Abused:     Sexually Abused:        ROS: Non-contributory other than as noted above    PHYSICAL EXAM:      Heart and Lungs:  demonstrate no contraindications to proceed    DATA:  CBC:   Lab Results   Component Value Date    WBC 7.2 2021    RBC 2.83 2021    HGB 7.9 2021    HCT 27.2 2021    MCV 96.1 2021    MCH 27.9 2021    MCHC 29.0 2021    RDW 21.3 2021     2021    MPV 10.7 2021     CBC with Differential:    Lab Results   Component Value Date    WBC 7.2 2021    RBC 2.83 2021    HGB 7.9 2021    HCT 27.2 2021     2021    MCV 96.1 2021    MCH 27.9 2021    MCHC 29.0 2021    RDW 21.3 2021    NRBC 1.0 2021    LYMPHOPCT 10.0 2021    MONOPCT 4.0 2021 MYELOPCT 6.0 09/28/2021    BASOPCT 0.0 09/28/2021    MONOSABS 0.10 09/28/2021    LYMPHSABS 0.25 09/28/2021    EOSABS 0.00 09/28/2021    BASOSABS 0.00 09/28/2021     Platelets:    Lab Results   Component Value Date     09/30/2021     BUN/Creatinine:    Lab Results   Component Value Date    BUN 29 09/30/2021    CREATININE 0.7 09/30/2021       ASSESSMENT AND PLAN:  1. Right pleural effusion plan thora  2. Procedure options, risks and benefits reviewed with patient. Patient expresses understanding.     Electronically signed by Lico Gallardo II, MD on 9/30/2021 at 11:24 AM

## 2021-10-01 ENCOUNTER — HOSPITAL ENCOUNTER (OUTPATIENT)
Dept: INTERVENTIONAL RADIOLOGY/VASCULAR | Age: 81
Discharge: HOME | End: 2021-10-03
Payer: MEDICARE

## 2021-10-01 PROCEDURE — 32555 ASPIRATE PLEURA W/ IMAGING: CPT | Performed by: RADIOLOGY

## 2021-10-01 PROCEDURE — 32555 ASPIRATE PLEURA W/ IMAGING: CPT

## 2021-10-01 PROCEDURE — C1729 CATH, DRAINAGE: HCPCS

## 2021-10-01 NOTE — INTERVAL H&P NOTE
H&P Update    Patient's History and Physical  was reviewed. The patient appears likely to able to tolerate the procedure. Risk and benefits discussed including ultimate complications, possibly death and consent obtained.     Angelo Mcdonald, II

## 2021-10-01 NOTE — BRIEF OP NOTE
Brief Postoperative Note    Yovany Cuba  YOB: 1940  [id number]    Pre-operative Diagnosis and Procedure: left pleural effusion plan thoracentesis    Post-operative Diagnosis: Same    Anesthesia: Local    Estimated Blood Loss: < 10 cc    Surgeon: Rose SMITH     Complications: none    Specimen obtained: yes    Findings: none     Marcela Pulido II, MD   10/1/2021 11:45 AM

## 2021-10-03 PROBLEM — T81.31XS POSTOPERATIVE WOUND BREAKDOWN, SEQUELA: Chronic | Status: ACTIVE | Noted: 2021-10-03

## 2022-10-24 NOTE — PROGRESS NOTES
Patient being discharged home. Medications and instructions reviewed with patient. No questions or concerns. Tubigrips applied. IV removed per policy .  Patient awaiting ride from  Please Approve or Refuse.   Send to Pharmacy per Pt's Request: [facility]     Next Visit Date:  11/22/2022   Last Visit Date: 2/21/2022    Hemoglobin A1C (%)   Date Value   02/19/2022 11.1 (H)   06/19/2021 7.8 (H)   01/18/2021 11.4 (H)             ( goal A1C is < 7)   BP Readings from Last 3 Encounters:   02/21/22 126/80   06/21/21 120/74   02/26/20 132/84          (goal 120/80)  BUN   Date Value Ref Range Status   09/20/2021 10 6 - 20 mg/dL Final     Creatinine   Date Value Ref Range Status   09/20/2021 0.79 0.50 - 0.90 mg/dL Final     Potassium   Date Value Ref Range Status   09/20/2021 3.9 3.7 - 5.3 mmol/L Final

## (undated) DEVICE — SPONGE GZ W4XL4IN RAYON POLY FILL CVR W/ NONWOVEN FAB

## (undated) DEVICE — FORCEPS BX OVL CUP FEN DISPOSABLE CAP L 160CM RAD JAW 4

## (undated) DEVICE — PERFUSION PACK CUST OPN HRT

## (undated) DEVICE — TUBING PERF PMP L10FT OD0.25IN ID1/16IN MED CLASS VI PRECUT

## (undated) DEVICE — LANCET AORTOTOMY 3.3X10MM

## (undated) DEVICE — PACK OPEN HRT DRP

## (undated) DEVICE — NEEDLE INSUF L120MM DIA2MM DISP FOR PNEUMOPERI ENDOPATH

## (undated) DEVICE — SINGLE USE BIOPSY VALVE MAJ-210: Brand: SINGLE USE BIOPSY VALVE (STERILE)

## (undated) DEVICE — PUMP SUC IRR TBNG L10FT W/ HNDPC ASSEMB STRYKEFLOW 2

## (undated) DEVICE — Z INACTIVE USE 2662641 SOLUTION IV 1000ML 140MEQ/L SOD 5MEQ/L K 3MEQ/L MG 27MEQ/L

## (undated) DEVICE — GRADUATE TRIANG MEASURE 1000ML BLK PRNT

## (undated) DEVICE — TOWEL,OR,DSP,ST,WHITE,DLX,4/PK,20PK/CS: Brand: MEDLINE

## (undated) DEVICE — TROCAR ENDOSCP L100MM DIA12MM DIL TIP OBT RADLUC STBL SL

## (undated) DEVICE — CONNECTOR ENDOSCP AUXILIARY H2O

## (undated) DEVICE — CANNULA INJ L2.5IN BLNT TIP 3MM CLR BODY W/ 1 W VLV DLP

## (undated) DEVICE — TROCAR ENDOSCP L100MM DIA5MM DIL TIP STBL SL W OPTVW

## (undated) DEVICE — ADAPTER TBNG DIA15MM SWVL FBROPT BRONCHSCP TERM 2 AXIS PEEP

## (undated) DEVICE — CLOTH SURG PREP PREOPERATIVE CHLORHEXIDINE GLUC 2% READYPREP

## (undated) DEVICE — TROCAR ENDOSCP L100MM DIA5MM BLDELSS STBL SL OBT RADLUC

## (undated) DEVICE — YANKAUER,POOLE TIP,STERILE,50/CS: Brand: MEDLINE

## (undated) DEVICE — LABEL MED CARD SURG 4 IN PANEL STRL

## (undated) DEVICE — 4-PORT MANIFOLD: Brand: NEPTUNE 2

## (undated) DEVICE — ACCESS PLATFORM FOR MINIMALLY INVASIVE SURGERY.: Brand: GELPORT® LAPAROSCOPIC  SYSTEM

## (undated) DEVICE — Device

## (undated) DEVICE — SCISSORS ENDOSCP DIA5MM CRV MPLR CAUT W/ RATCH HNDL

## (undated) DEVICE — TOWEL,OR,DSP,ST,BLUE,STD,6/PK,12PK/CS: Brand: MEDLINE

## (undated) DEVICE — BLOCK BITE 60FR RUBBER ADLT DENTAL

## (undated) DEVICE — GLOVE SURG SZ 65 THK91MIL LTX FREE SYN POLYISOPRENE

## (undated) DEVICE — SINGLE USE SUCTION VALVE MAJ-209: Brand: SINGLE USE SUCTION VALVE (STERILE)

## (undated) DEVICE — DRESSING FOAM W22XL25CM FILVE LAYR FOAM DP DEF SAFETAC

## (undated) DEVICE — INSUFFLATION TUBING SET WITH FILTER, FUNNEL CONNECTOR AND LUER LOCK: Brand: JOSNOE MEDICAL INC

## (undated) DEVICE — BLADE OPHTH KNF D3MM 15DEG CATRCT BLU MICRO-SHARP

## (undated) DEVICE — BLADE OPHTH 180DEG CUT SURF BLU STR SHRP DBL BVL GRINDLESS

## (undated) DEVICE — SYSTEM ENDOSCP VES HARV W/ TOOL CANN SEAL SHT PRT BLNT TIP

## (undated) DEVICE — GLOVE SURG SZ 6 THK91MIL LTX FREE SYN POLYISOPRENE ANTI

## (undated) DEVICE — Z DISCONTINUED SUGG SUB 2622703 KIT OST L12IN FLNG DIA70MM ST TRNSPAR TWO PC MOLD

## (undated) DEVICE — DRAPE,CHEST,FENES,15X10,STERIL: Brand: MEDLINE

## (undated) DEVICE — PICO 7 10CM X 30CM: Brand: PICO™ 7

## (undated) DEVICE — TAPE ADH W2INXL10YD WHT PAPR GENTLE BRTH FLX COMFORTABLE

## (undated) DEVICE — SURGICAL PROCEDURE PACK BRONCH

## (undated) DEVICE — GOWN,SIRUS,FABRNF,XL,20/CS: Brand: MEDLINE

## (undated) DEVICE — TIP APPL GEL PLT ENDO 5MMX32CM

## (undated) DEVICE — TTL1LYR 16FR10ML 100%SIL TMPST TR: Brand: MEDLINE

## (undated) DEVICE — SEALER TISS L45CM ADV BPLR STR TIP LAP APPRCH ENSEAL G2

## (undated) DEVICE — 3M™ TEGADERM™ CHG DRESSING 25/CARTON 4 CARTONS/CASE 1657: Brand: TEGADERM™

## (undated) DEVICE — SET CARDIAC I

## (undated) DEVICE — REAGENT TEST UREASE RAPD CLOTEST F/

## (undated) DEVICE — BLOWER COR ART L16.5CM PLAS SHFT MAL W/ MIST IV SET AXIUS

## (undated) DEVICE — KIT,ANTI FOG,W/SPONGE & FLUID,SOFT PACK: Brand: MEDLINE

## (undated) DEVICE — TAPE ADH W2INXL10YD PLAS TRNSPAR H2O RESIST HYPOALRG CURAD

## (undated) DEVICE — PACK PROCEDURE SURG GEN CUST

## (undated) DEVICE — Z DISCONTINUED USE 2624852 GLOVE SURG 7 PF TEXT NEOPRNE BRN STRL NEOLON 2G LF

## (undated) DEVICE — AIRWAY PHARYNGEAL AD 9 CM INTUB

## (undated) DEVICE — SET ACB VEIN

## (undated) DEVICE — CANNULA PERF 7FR L5.5IN AORT ROOT RADPQ STD TIP W/ VENT LN

## (undated) DEVICE — DOUBLE BASIN SET: Brand: MEDLINE INDUSTRIES, INC.

## (undated) DEVICE — BLADE CLIPPER GEN PURP NS

## (undated) DEVICE — SPONGE LAP W18XL18IN WHT COT 4 PLY FLD STRUNG RADPQ DISP ST

## (undated) DEVICE — BLOOD TRANSFUSION FILTER: Brand: HAEMONETICS

## (undated) DEVICE — BITEBLOCK 54FR W/ DENT RIM BLOX

## (undated) DEVICE — PADDLE INTERN DEFIB CHILD

## (undated) DEVICE — CLIP INT M L GRN TI TRNSVRS GRV CHEVRON SHP W/ PRECIS TIP

## (undated) DEVICE — SYRINGE MED 20ML STD CLR PLAS LUERLOCK TIP N CTRL DISP

## (undated) DEVICE — DRAIN SURG L3/8-1/2IN DIA3/16IN SIL CARD CONN 1:1 BLAK

## (undated) DEVICE — Z INACTIVE USE 2660664 SOLUTION IRRIG 3000ML 0.9% SOD CHL USP UROMATIC PLAS CONT

## (undated) DEVICE — E-Z CLEAN, NON-STICK, PTFE COATED, ELECTROSURGICAL BLADE ELECTRODE, MODIFIED EXTENDED INSULATION, 6.5 INCH (16.5 CM): Brand: MEGADYNE

## (undated) DEVICE — CANNULA NSL ORAL AD FOR CAPNOFLEX CO2 O2 AIRLFE

## (undated) DEVICE — SET CARDIAC II

## (undated) DEVICE — FORCEPS BX L160CM DIA8MM GRSP DISECT CUP TIP NONLOCKING ROT

## (undated) DEVICE — SYRINGE MED 50ML LUERLOCK TIP

## (undated) DEVICE — E-Z CLEAN, NON-STICK, PTFE COATED, ELECTROSURGICAL BLADE ELECTRODE, 6.5 INCH (16.5 CM): Brand: MEGADYNE

## (undated) DEVICE — 1.5L THIN WALL CAN: Brand: CRD

## (undated) DEVICE — TUBING, SUCTION, 3/16" X 12', STRAIGHT: Brand: MEDLINE

## (undated) DEVICE — GLOVE SURG SZ 6.5 L11.2IN FNGR THK9.8MIL STRW LTX POLYMER

## (undated) DEVICE — 6 FOOT DISPOSABLE EXTENSION CABLE WITH SAFE CONNECT / SCREW-DOWN

## (undated) DEVICE — RETROGRADE CARDIOPLEGIA CATHETER: Brand: EDWARDS LIFESCIENCES RETROGRADE CARDIOPLEGIA CATHETER

## (undated) DEVICE — BANDAGE ADH W0.75XL3IN UNIV WVN FAB NAT GEN USE STRP N ADH

## (undated) DEVICE — SYRINGE MED 20ML STD CLR PLAS LUERSLIP TIP N CTRL DISP

## (undated) DEVICE — TROCAR ENDOSCP L100MM DIA5MM BLDELSS STBL SL THRD OPT VW

## (undated) DEVICE — KIT PLT RATIO DISPNS KT 2IN CANN TIP SPRY TIP DISP MAGELLAN

## (undated) DEVICE — CHLORAPREP 26ML ORANGE

## (undated) DEVICE — EZ GLIDE AORTIC CANNULA: Brand: EDWARDS LIFESCIENCES EZ GLIDE AORTIC CANNULA

## (undated) DEVICE — SUTURE VCRL + 0 L27IN ABSRB VLT CTX L48MM 1/2 CIR VCP364H

## (undated) DEVICE — ADHESIVE SKIN CLOSURE TOP 36 CC HI VISC DERMBND MINI

## (undated) DEVICE — CATHETER,URETHRAL,REDRUBBER,STRL,18FR: Brand: MEDLINE

## (undated) DEVICE — DRAIN SURG SGL COLL PT TB FOR ATS BG OASIS

## (undated) DEVICE — ALCOHOL RUBBING ISO 16OZ 70%

## (undated) DEVICE — GLOVE ORANGE PI 7   MSG9070

## (undated) DEVICE — WORKING LENGTH 155CM, WORKING CHANNEL 2.8MM: Brand: RESOLUTION 360 CLIP

## (undated) DEVICE — GOWN,SIRUS,FABRNF,L,20/CS: Brand: MEDLINE

## (undated) DEVICE — CARDIAC STRYKER STERNAL SAW

## (undated) DEVICE — SOLUTION IV 50ML 0.9% SOD CHL PLAS CONT USP VIAFLX

## (undated) DEVICE — SURGICAL PROCEDURE PACK CRD SEHC

## (undated) DEVICE — GAUZE,SPONGE,POST-OP,4X3,STRL,LF: Brand: MEDLINE

## (undated) DEVICE — TOTAL TRAY, 16FR 10ML SIL FOLEY, URN: Brand: MEDLINE

## (undated) DEVICE — GLOVE ORANGE PI 7 1/2   MSG9075

## (undated) DEVICE — STAPLER SKIN L39MM DIA0.53MM CRWN 5.7MM S STL FIX HD PROX

## (undated) DEVICE — Device: Brand: CLEANCUT ROTATING AORTIC PUNCH

## (undated) DEVICE — CONNECTOR DRNGE W3/8-0.5XH3/16XL3/16IN 2:1 SIL CARD STR

## (undated) DEVICE — AGENT HEMSTAT W4XL8IN OXIDIZED REGENERATED CELOS ABSRB

## (undated) DEVICE — INTENDED FOR TISSUE SEPARATION, AND OTHER PROCEDURES THAT REQUIRE A SHARP SURGICAL BLADE TO PUNCTURE OR CUT.: Brand: BARD-PARKER ® STAINLESS STEEL BLADES

## (undated) DEVICE — DISCONTINUED USE 320496 BATTERY 50-800-01 OR 50-800-03 SNGL USE

## (undated) DEVICE — SET SURG BASIN OPEN HEART NO  1 REUSABLE

## (undated) DEVICE — BASIC SINGLE BASIN 1-LF: Brand: MEDLINE INDUSTRIES, INC.

## (undated) DEVICE — SOLUTION IRRIG 500ML 0.9% SOD CHL USP POUR PLAS BTL

## (undated) DEVICE — DEFENDO AIR WATER SUCTION AND BIOPSY VALVE KIT FOR  OLYMPUS: Brand: DEFENDO AIR/WATER/SUCTION AND BIOPSY VALVE

## (undated) DEVICE — DRAIN CHN 19FR L0.25MM DIA6.3MM SIL RND HUBLESS FULL FLUT

## (undated) DEVICE — SOLUTION IRRIG 1000ML STRL H2O USP PLAS POUR BTL